# Patient Record
Sex: FEMALE | Race: WHITE | Employment: OTHER | URBAN - METROPOLITAN AREA
[De-identification: names, ages, dates, MRNs, and addresses within clinical notes are randomized per-mention and may not be internally consistent; named-entity substitution may affect disease eponyms.]

---

## 2017-02-21 ENCOUNTER — OFFICE VISIT (OUTPATIENT)
Dept: NEUROLOGY | Age: 77
End: 2017-02-21

## 2017-02-21 VITALS
WEIGHT: 150 LBS | HEART RATE: 84 BPM | BODY MASS INDEX: 26.58 KG/M2 | SYSTOLIC BLOOD PRESSURE: 122 MMHG | RESPIRATION RATE: 16 BRPM | DIASTOLIC BLOOD PRESSURE: 72 MMHG | HEIGHT: 63 IN | OXYGEN SATURATION: 98 %

## 2017-02-21 DIAGNOSIS — E05.90 HYPERTHYROIDISM: ICD-10-CM

## 2017-02-21 DIAGNOSIS — G44.229 CHRONIC TENSION-TYPE HEADACHE, NOT INTRACTABLE: ICD-10-CM

## 2017-02-21 DIAGNOSIS — G25.0 BENIGN ESSENTIAL TREMOR SYNDROME: Primary | ICD-10-CM

## 2017-02-21 DIAGNOSIS — G44.209 TENSION VASCULAR HEADACHE: ICD-10-CM

## 2017-02-21 RX ORDER — GABAPENTIN 100 MG/1
100 CAPSULE ORAL
Qty: 100 CAP | Refills: 11 | Status: SHIPPED | OUTPATIENT
Start: 2017-02-21 | End: 2017-08-31

## 2017-02-21 NOTE — LETTER
2/21/2017 12:23 PM 
 
Patient:  Destin Feliz YOB: 1940 Date of Visit: 2/21/2017 Dear No Recipients: Thank you for referring Ms. Destin Feliz to me for evaluation/treatment. Below are the relevant portions of my assessment and plan of care. Consult REFERRED BY: 
Liz Jang MD 
 
CHIEF COMPLAINT: Progressive tremors and headaches Subjective: Destin Feliz is a 77 y.o. right-handed  female seen today as a new patient to me for evaluation of new problem of progressive tremors over the last 1-1/2-2 years, and progressive headaches in the right temporal and periorbital region for the last 2 months at the request of Dr. Jenae Pinzon. Patient states the tremor seems to be worse when she is doing things like holding a plate or holding a cup of liquid, gets worse when she is stressed, anxious, tired, fatigued, and sometimes affect her ability to write. She has no family history of similar problems. She does not drink alcohol also does not know any relation to alcohol. Has been improved with taking clonazepam 1 mg twice a day. She has not tried any other medications. She complains of headache in the right temporal region that are becoming increasingly severe and almost daily and chronic, and sometimes can be more severe. She banged her head several times in the right frontal area and thinks that may have caused the headaches. She is having some difficulty functioning because the headaches can be more severe. She has been under increased stress and tension due to the illness of her  who just had a shunt inserted in his brain because of normal pressure hydrocephalus. She also had left shoulder replacement this year. She has chronic neck pain, chronic back pain, has had a cervical laminectomy from C5-C7 2016, and tube lumbar laminectomies done in 2005 and 2007 with chronic low back pain ever since.   She also has chronic numbness in her legs ever since the back surgery. She also had bilateral parotid gland removal because of painful salivation last year. She also had a history of hallucinations 3-1/2 years ago of unclear cause it seemed to resolve on its own, mainly with the medication of clonazepam.  She described herself as being anxious and very nervous. She has had all these medical problems recently and feels more stressed out. Again no family history of tremors. She has had no fever, no meningismus, no other precipitating cause for the headaches, except for the trauma as mentioned above which was associated with a dazed state after banging her head but did not lose consciousness. She had an MRI of her brain in 2010 that was normal, and a CT of the head in 2010 that was normal.  She has had no recent imaging of the brain. Past Medical History Diagnosis Date  Anemia   
  has had iron infusions; Hem/onc Dr Pia Tyson [phone]  Anxiety and depression  GERD (gastroesophageal reflux disease)  Hypercholesteremia  Hypertension  Limited peripheral vision of right eye   
  complication of cataract surgery  Meniere disease  Monoclonal gammopathy of unknown significance   
  hem/onc:  Dr Pia Tyson [phone]  Osteoporosis  Psoriasis   
  bilateral Legs  Restless leg  Skin melanoma ([facility] [address] 2012 (Rt ankle) and basal cell (Lt eye) removed  Transient ischemic attack approx 2005 HAS HAD 2 MINISTROKES-NO DEFICITS; neuro Dr Christie Fox (cc)  Unspecified adverse effect of anesthesia HAS NEEDED PORT OR CUTDOWN FOR RECVNG BLOOD OR LONG-TERM IVs; surgery 7/28/15 w/o any difficulty  Unspecified sleep apnea USES CPAP Past Surgical History Procedure Laterality Date  Hx cholecystectomy  2000  Hx appendectomy  Hx hysterectomy  Hx orthopaedic  2009 & 2011 LUMBAR FUSION and revision  Hx thyroidectomy Right  Hx other surgical  2010 MELANOMA (Rt ankle) and basal cell (Lt eye) removed; q6mo ck by DERMATOLOGY  Hx gi  2010  
  liver bx due to liver Bx due to elevated enzymes, liver laceration during Bx, hospitalized 10-12d  Hx cervical fusion  1/28/15 C5-7  
 Hx cataract removal  2011 Right  Hx heent  7/28/15 Lt total parotidectomy with facial nerve dissection: Dr Armen Wharton  Hx heent  11/2015 Right parotidectomy  Pr colon ca scrn not hi rsk ind  1/27/2016  Hx shoulder replacement Left 08/01/2016 Family History Problem Relation Age of Onset  Alcohol abuse Father  Emphysema Father  Cancer Sister PANCREATIC  Alcohol abuse Brother  COPD Brother Keenan Elysia Other Mother UNKNOWN CAUSE OF DEATH  
 Heart Disease Mother  Anesth Problems Neg Hx Social History Substance Use Topics  Smoking status: Former Smoker Years: 15.00 Quit date: 7/15/1975  Smokeless tobacco: Never Used  Alcohol use Yes Comment: may have a glass of wine on rare occasion Current Outpatient Prescriptions:  
  gabapentin (NEURONTIN) 100 mg capsule, Take 1 Cap by mouth three (3) times daily as needed. , Disp: 100 Cap, Rfl: 11 
  FOLIC ACID/MULTIVIT-MIN/LUTEIN (CENTRUM SILVER PO), Take  by mouth daily. , Disp: , Rfl:  
  clopidogrel (PLAVIX) 75 mg tablet, Take 75 mg by mouth daily. , Disp: , Rfl:  
  escitalopram oxalate (LEXAPRO) 20 mg tablet, Take 20 mg by mouth nightly. Indications: ANXIETY WITH DEPRESSION, Disp: , Rfl:  
  simvastatin (ZOCOR) 10 mg tablet, Take  by mouth nightly. Indications: HYPERCHOLESTEROLEMIA, Disp: , Rfl:  
  pantoprazole (PROTONIX) 40 mg tablet, Take 40 mg by mouth every morning. Indications: GASTROESOPHAGEAL REFLUX, Disp: , Rfl:  
  verapamil SR (CALAN-SR) 120 mg CR tablet, Take 120 mg by mouth nightly. Indications: HYPERTENSION, Disp: , Rfl:  
  ferrous sulfate 325 mg (65 mg iron) CpER, Take  by mouth daily. , Disp: , Rfl:  
   clonazepam (KLONOPIN) 0.5 mg tablet, Take 0.5 mg by mouth two (2) times a day. Indications: PANIC DISORDER, Disp: , Rfl:  
  NORTRIPTYLINE HCL (NORTRIPTYLINE PO), Take 10 mg by mouth two (2) times a day., Disp: , Rfl:  
  ROPINIROLE HCL (REQUIP PO), Take 1 mg by mouth nightly., Disp: , Rfl:  
  polyethylene glycol (MIRALAX) 17 gram packet, Take 17 g by mouth every morning. Pt puts in her coffee, Disp: , Rfl:  
 
 
 
Allergies Allergen Reactions  Aspirin Nausea and Vomiting  Bee Sting [Sting, Bee] Hives and Shortness of Breath  
  WASP STING  
 Codeine Nausea and Vomiting  Hydrocodone Rash And sedation  Morphine Rash  Oxycodone Rash On her back  Penicillins Hives, Shortness of Breath and Swelling Review of Systems: A comprehensive review of systems was negative except for: Constitutional: positive for fatigue and malaise Musculoskeletal: positive for myalgias, arthralgias, stiff joints, neck pain, back pain and muscle weakness Neurological: positive for headaches, paresthesia and tremor Behvioral/Psych: positive for anxiety and depression Vitals:  
 02/21/17 1112 BP: 122/72 Pulse: 84 Resp: 16 SpO2: 98% Weight: 150 lb (68 kg) Height: 5' 3\" (1.6 m) Objective: I 
 
 
NEUROLOGICAL EXAM: 
 
Appearance: The patient is well developed, well nourished, provides a coherent history and is in no acute distress. Mental Status: Oriented to time, place and person, and the president, cognitive function is normal and speech is fluent and no aphasia or dysarthria. Mood and affect appropriate but anxious and depressed . Cranial Nerves:   Intact visual fields. Fundi are benign. Pupils react normally on the left, but the right pupil is scarred and fundus poorly seen and she has decreased visual acuity there., EOM's full, no nystagmus, no ptosis. Facial sensation is normal. Corneal reflexes are not tested. Facial movement is symmetric. Hearing is abnormal bilaterally. Palate is midline with normal sternocleidomastoid and trapezius muscles are normal. Tongue is midline. Neck without meningismus or bruits Neck with limited mobility secondary to her previous surgery Temporal arteries are not tender or enlarged Motor:  4/5 strength in upper and lower proximal and distal muscles. Normal bulk and tone, with perhaps a slight increase in cogwheeling and rigidity in the right upper extremity. No fasciculations. Reflexes:   Deep tendon reflexes 1+/4 and symmetrical. 
No babinski or clonus present Sensory:   Normal to touch, pinprick and vibration and temperature decreased in both feet. DSS is intact Gait:  Abnormal gait with patient moving slowly, and limited mobility of the lumbar spine secondary to her previous back operations. Patient may have decreased arm swing in the right upper extremity Tremor:   Minimal bilateral intention tremor noted. Cerebellar:  No cerebellar signs present. Neurovascular:  Normal heart sounds and regular rhythm, peripheral pulses decreased, and no carotid bruits. Assessment: ICD-10-CM ICD-9-CM 1. Benign essential tremor syndrome G25.0 333.1 T3 TOTAL  
   T4  
   TSH 3RD GENERATION  
   SED RATE (ESR) CBC WITH AUTOMATED DIFF PATTIE COMPREHENSIVE PLUS PANEL  
   MRI BRAIN W WO CONT  
   gabapentin (NEURONTIN) 100 mg capsule 2. Chronic tension-type headache, not intractable G44.229 339.12 T3 TOTAL  
   T4  
   TSH 3RD GENERATION  
   SED RATE (ESR) CBC WITH AUTOMATED DIFF PATTIE COMPREHENSIVE PLUS PANEL  
   MRI BRAIN W WO CONT  
   gabapentin (NEURONTIN) 100 mg capsule 3. Tension vascular headache G44.209 307.81 T3 TOTAL  
   T4  
   TSH 3RD GENERATION  
   SED RATE (ESR) CBC WITH AUTOMATED DIFF PATTIE COMPREHENSIVE PLUS PANEL  
   MRI BRAIN W WO CONT  
   gabapentin (NEURONTIN) 100 mg capsule 4. Hyperthyroidism E05.90 242.90 T3 TOTAL  
   T4  
   TSH 3RD GENERATION  
   SED RATE (ESR) CBC WITH AUTOMATED DIFF PATTIE COMPREHENSIVE PLUS PANEL  
   MRI BRAIN W WO CONT  
   gabapentin (NEURONTIN) 100 mg capsule Plan:  
 
Patient with a history that suggest benign essential tremor, but very little evidence on exam today We will check her thyroid test and MRI of the brain in view of the associated headaches also to rule out structural brain lesion, rule out tumor, rule out subdural, rule out other neurologic causes. We will also try her on Neurontin 100 mg as needed 3 times a day for the tremor. She does not have persistent tremors so Mysoline on a daily basis does not sound like a good treatment. She will continue the Klonopin in the meantime She will check a MRI of the brain to rule out other causes of her tremor and severe headache, and also get a sed rate and PATTIE to rule out temporal arteritis as a cause of her right temporal headache She will check my chart for results of her test, and call us with any question. Further treatment and evaluation will depend on the results of her tests and her clinical course. Signed By: Soco Shin MD   
 February 21, 2017 CC: Micheal Barclay MD 
FAX: [phone] This note will not be viewable in [address] If you have questions, please do not hesitate to call me. I look forward to following Ms. Erickson Franks along with you. Sincerely, Soco Shin MD

## 2017-02-21 NOTE — PATIENT INSTRUCTIONS

## 2017-02-21 NOTE — PROGRESS NOTES
Consult  REFERRED BY:  Alonso Prasad MD    CHIEF COMPLAINT: Progressive tremors and headaches      Subjective: Km Loo is a 77 y.o. right-handed  female seen today as a new patient to me for evaluation of new problem of progressive tremors over the last 1-1/2-2 years, and progressive headaches in the right temporal and periorbital region for the last 2 months at the request of Dr. Tolu Barrios. Patient states the tremor seems to be worse when she is doing things like holding a plate or holding a cup of liquid, gets worse when she is stressed, anxious, tired, fatigued, and sometimes affect her ability to write. She has no family history of similar problems. She does not drink alcohol also does not know any relation to alcohol. Has been improved with taking clonazepam 1 mg twice a day. She has not tried any other medications. She complains of headache in the right temporal region that are becoming increasingly severe and almost daily and chronic, and sometimes can be more severe. She banged her head several times in the right frontal area and thinks that may have caused the headaches. She is having some difficulty functioning because the headaches can be more severe. She has been under increased stress and tension due to the illness of her  who just had a shunt inserted in his brain because of normal pressure hydrocephalus. She also had left shoulder replacement this year. She has chronic neck pain, chronic back pain, has had a cervical laminectomy from C5-C7 2016, and tube lumbar laminectomies done in 2005 and 2007 with chronic low back pain ever since. She also has chronic numbness in her legs ever since the back surgery. She also had bilateral parotid gland removal because of painful salivation last year.   She also had a history of hallucinations 3-1/2 years ago of unclear cause it seemed to resolve on its own, mainly with the medication of clonazepam.  She described herself as being anxious and very nervous. She has had all these medical problems recently and feels more stressed out. Again no family history of tremors. She has had no fever, no meningismus, no other precipitating cause for the headaches, except for the trauma as mentioned above which was associated with a dazed state after banging her head but did not lose consciousness. She had an MRI of her brain in 2010 that was normal, and a CT of the head in 2010 that was normal.  She has had no recent imaging of the brain. Past Medical History   Diagnosis Date    Anemia      has had iron infusions;  Hem/onc Dr Ifeoma Colvin [phone]    Anxiety and depression     GERD (gastroesophageal reflux disease)     Hypercholesteremia     Hypertension     Limited peripheral vision of right eye      complication of cataract surgery    Meniere disease     Monoclonal gammopathy of unknown significance      hem/onc:  Dr Ifeoma Colvin [phone]    Osteoporosis     Psoriasis      bilateral Legs    Restless leg     Skin melanoma ([facility] [address] 2012     (Rt ankle) and basal cell (Lt eye) removed    Transient ischemic attack approx 2005     HAS HAD 2 MINISTROKES-NO DEFICITS; neuro Dr Jerilyn Ng (cc)    Unspecified adverse effect of anesthesia      HAS NEEDED PORT OR CUTDOWN FOR RECVNG BLOOD OR LONG-TERM IVs; surgery 7/28/15 w/o any difficulty    Unspecified sleep apnea      USES CPAP      Past Surgical History   Procedure Laterality Date    Hx cholecystectomy  2000    Hx appendectomy      Hx hysterectomy      Hx orthopaedic  2009 & 2011     LUMBAR FUSION and revision    Hx thyroidectomy       Right    Hx other surgical  2010     MELANOMA (Rt ankle) and basal cell (Lt eye) removed; q6mo ck by DERMATOLOGY    Hx gi  2010     liver bx due to liver Bx due to elevated enzymes, liver laceration during Bx, hospitalized 10-12d    Hx cervical fusion  1/28/15     C5-7    Hx cataract removal  2011     Right    Hx heent  7/28/15     Lt total parotidectomy with facial nerve dissection: Dr Franchesca Hameed Hx heent  11/2015     Right parotidectomy    Pr colon ca scrn not hi rsk ind  1/27/2016          Hx shoulder replacement Left 08/01/2016     Family History   Problem Relation Age of Onset    Alcohol abuse Father     Emphysema Father     Cancer Sister      PANCREATIC    Alcohol abuse Brother     COPD Brother     Other Mother      UNKNOWN CAUSE OF DEATH    Heart Disease Mother     Anesth Problems Neg Hx       Social History   Substance Use Topics    Smoking status: Former Smoker     Years: 15.00     Quit date: 7/15/1975    Smokeless tobacco: Never Used    Alcohol use Yes      Comment: may have a glass of wine on rare occasion         Current Outpatient Prescriptions:     gabapentin (NEURONTIN) 100 mg capsule, Take 1 Cap by mouth three (3) times daily as needed. , Disp: 100 Cap, Rfl: 11    FOLIC ACID/MULTIVIT-MIN/LUTEIN (CENTRUM SILVER PO), Take  by mouth daily. , Disp: , Rfl:     clopidogrel (PLAVIX) 75 mg tablet, Take 75 mg by mouth daily. , Disp: , Rfl:     escitalopram oxalate (LEXAPRO) 20 mg tablet, Take 20 mg by mouth nightly. Indications: ANXIETY WITH DEPRESSION, Disp: , Rfl:     simvastatin (ZOCOR) 10 mg tablet, Take  by mouth nightly. Indications: HYPERCHOLESTEROLEMIA, Disp: , Rfl:     pantoprazole (PROTONIX) 40 mg tablet, Take 40 mg by mouth every morning. Indications: GASTROESOPHAGEAL REFLUX, Disp: , Rfl:     verapamil SR (CALAN-SR) 120 mg CR tablet, Take 120 mg by mouth nightly. Indications: HYPERTENSION, Disp: , Rfl:     ferrous sulfate 325 mg (65 mg iron) CpER, Take  by mouth daily. , Disp: , Rfl:     clonazepam (KLONOPIN) 0.5 mg tablet, Take 0.5 mg by mouth two (2) times a day.  Indications: PANIC DISORDER, Disp: , Rfl:     NORTRIPTYLINE HCL (NORTRIPTYLINE PO), Take 10 mg by mouth two (2) times a day., Disp: , Rfl:     ROPINIROLE HCL (REQUIP PO), Take 1 mg by mouth nightly., Disp: , Rfl:     polyethylene glycol (MIRALAX) 17 gram packet, Take 17 g by mouth every morning. Pt puts in her coffee, Disp: , Rfl:         Allergies   Allergen Reactions    Aspirin Nausea and Vomiting    Bee Sting [Sting, Bee] Hives and Shortness of Breath     WASP STING    Codeine Nausea and Vomiting    Hydrocodone Rash     And sedation    Morphine Rash    Oxycodone Rash     On her back    Penicillins Hives, Shortness of Breath and Swelling        Review of Systems:  A comprehensive review of systems was negative except for: Constitutional: positive for fatigue and malaise  Musculoskeletal: positive for myalgias, arthralgias, stiff joints, neck pain, back pain and muscle weakness  Neurological: positive for headaches, paresthesia and tremor  Behvioral/Psych: positive for anxiety and depression   Vitals:    02/21/17 1112   BP: 122/72   Pulse: 84   Resp: 16   SpO2: 98%   Weight: 150 lb (68 kg)   Height: 5' 3\" (1.6 m)     Objective:     I      NEUROLOGICAL EXAM:    Appearance: The patient is well developed, well nourished, provides a coherent history and is in no acute distress. Mental Status: Oriented to time, place and person, and the president, cognitive function is normal and speech is fluent and no aphasia or dysarthria. Mood and affect appropriate but anxious and depressed . Cranial Nerves:   Intact visual fields. Fundi are benign. Pupils react normally on the left, but the right pupil is scarred and fundus poorly seen and she has decreased visual acuity there., EOM's full, no nystagmus, no ptosis. Facial sensation is normal. Corneal reflexes are not tested. Facial movement is symmetric. Hearing is abnormal bilaterally. Palate is midline with normal sternocleidomastoid and trapezius muscles are normal. Tongue is midline. Neck without meningismus or bruits  Neck with limited mobility secondary to her previous surgery  Temporal arteries are not tender or enlarged    Motor:  4/5 strength in upper and lower proximal and distal muscles.  Normal bulk and tone, with perhaps a slight increase in cogwheeling and rigidity in the right upper extremity. No fasciculations. Reflexes:   Deep tendon reflexes 1+/4 and symmetrical.  No babinski or clonus present   Sensory:   Normal to touch, pinprick and vibration and temperature decreased in both feet. DSS is intact   Gait:  Abnormal gait with patient moving slowly, and limited mobility of the lumbar spine secondary to her previous back operations. Patient may have decreased arm swing in the right upper extremity    Tremor:   Minimal bilateral intention tremor noted. Cerebellar:  No cerebellar signs present. Neurovascular:  Normal heart sounds and regular rhythm, peripheral pulses decreased, and no carotid bruits. Assessment:       ICD-10-CM ICD-9-CM    1. Benign essential tremor syndrome G25.0 333.1 T3 TOTAL      T4      TSH 3RD GENERATION      SED RATE (ESR)      CBC WITH AUTOMATED DIFF      PATTIE COMPREHENSIVE PLUS PANEL      MRI BRAIN W WO CONT      gabapentin (NEURONTIN) 100 mg capsule   2. Chronic tension-type headache, not intractable G44.229 339.12 T3 TOTAL      T4      TSH 3RD GENERATION      SED RATE (ESR)      CBC WITH AUTOMATED DIFF      PATTIE COMPREHENSIVE PLUS PANEL      MRI BRAIN W WO CONT      gabapentin (NEURONTIN) 100 mg capsule   3. Tension vascular headache G44.209 307.81 T3 TOTAL      T4      TSH 3RD GENERATION      SED RATE (ESR)      CBC WITH AUTOMATED DIFF      PATTIE COMPREHENSIVE PLUS PANEL      MRI BRAIN W WO CONT      gabapentin (NEURONTIN) 100 mg capsule   4.  Hyperthyroidism E05.90 242.90 T3 TOTAL      T4      TSH 3RD GENERATION      SED RATE (ESR)      CBC WITH AUTOMATED DIFF      PATTIE COMPREHENSIVE PLUS PANEL      MRI BRAIN W WO CONT      gabapentin (NEURONTIN) 100 mg capsule         Plan:     Patient with a history that suggest benign essential tremor, but very little evidence on exam today  We will check her thyroid test and MRI of the brain in view of the associated headaches also to rule out structural brain lesion, rule out tumor, rule out subdural, rule out other neurologic causes. We will also try her on Neurontin 100 mg as needed 3 times a day for the tremor. She does not have persistent tremors so Mysoline on a daily basis does not sound like a good treatment. She will continue the Klonopin in the meantime  She will check a MRI of the brain to rule out other causes of her tremor and severe headache, and also get a sed rate and PATTIE to rule out temporal arteritis as a cause of her right temporal headache  She will check my chart for results of her test, and call us with any question. Further treatment and evaluation will depend on the results of her tests and her clinical course. Signed By: Linsday Neff MD     February 21, 2017       CC: Cedrick Mcnair MD  FAX: [phone]    This note will not be viewable in [address]

## 2017-02-21 NOTE — MR AVS SNAPSHOT
Visit Information Date & Time Provider Department Dept. Phone Encounter #  
 2/21/2017 11:00 AM Coleman Schmidt MD [facility]rology [facility] at [facility] [phone] [id number] Follow-up Instructions Return in about 6 months (around 8/21/2017). Upcoming Health Maintenance Date Due DTaP/Tdap/Td series (1 - Tdap) 9/23/1962 ZOSTER VACCINE AGE 60> 9/23/2001 GLAUCOMA SCREENING Q2Y 9/23/2006 Pneumococcal 65+ High/Highest Risk (1 of 2 - PCV13) 9/23/2006 MEDICARE YEARLY EXAM 9/23/2006 INFLUENZA AGE 9 TO ADULT 8/1/2016 Allergies as of 2/21/2017  Review Complete On: 2/21/2017 By: Coleman Schmidt MD  
  
 Severity Noted Reaction Type Reactions Aspirin  10/10/2013   Intolerance Nausea and Vomiting Bee Sting [Sting, Bee]  07/15/2011    Hives, Shortness of Breath  
 WASP STING Codeine  07/15/2011    Nausea and Vomiting Hydrocodone  08/31/2016    Rash And sedation Morphine  07/15/2011    Rash Oxycodone  08/31/2016    Rash On her back Penicillins  07/15/2011    Hives, Shortness of Breath, Swelling Current Immunizations  Reviewed on 1/28/2015 No immunizations on file. Not reviewed this visit You Were Diagnosed With   
  
 Codes Comments Benign essential tremor syndrome    -  Primary ICD-10-CM: G25.0 ICD-9-CM: 333.1 Chronic tension-type headache, not intractable     ICD-10-CM: C42.181 ICD-9-CM: 339.12 Tension vascular headache     ICD-10-CM: G44.209 ICD-9-CM: 307.81 Hyperthyroidism     ICD-10-CM: E05.90 ICD-9-CM: 242.90 Vitals BP Pulse Resp Height(growth percentile) Weight(growth percentile) SpO2  
 122/72 84 16 5' 3\" (1.6 m) 150 lb (68 kg) 98% BMI OB Status Smoking Status 26.57 kg/m2 Hysterectomy Former Smoker Vitals History BMI and BSA Data Body Mass Index Body Surface Area  
 26.57 kg/m 2 1.74 m 2 Preferred Pharmacy Pharmacy Name Phone [address] [phone] Your Updated Medication List  
  
   
This list is accurate as of: 2/21/17 11:51 AM.  Always use your most recent med list.  
  
  
  
  
 CENTRUM SILVER PO Take  by mouth daily. clonazePAM 0.5 mg tablet Commonly known as:  Pacheco Aw Take 0.5 mg by mouth two (2) times a day. Indications: PANIC DISORDER  
  
 clopidogrel 75 mg Tab Commonly known as:  PLAVIX Take 75 mg by mouth daily. ferrous sulfate 325 mg (65 mg iron) Cper Take  by mouth daily. gabapentin 100 mg capsule Commonly known as:  NEURONTIN Take 1 Cap by mouth three (3) times daily as needed. LEXAPRO 20 mg tablet Generic drug:  escitalopram oxalate Take 20 mg by mouth nightly. Indications: ANXIETY WITH DEPRESSION  
  
 MIRALAX 17 gram packet Generic drug:  polyethylene glycol Take 17 g by mouth every morning. Pt puts in her coffee NORTRIPTYLINE PO Take 10 mg by mouth two (2) times a day. PROTONIX 40 mg tablet Generic drug:  pantoprazole Take 40 mg by mouth every morning. Indications: GASTROESOPHAGEAL REFLUX  
  
 REQUIP PO Take 1 mg by mouth nightly. simvastatin 10 mg tablet Commonly known as:  ZOCOR Take  by mouth nightly. Indications: HYPERCHOLESTEROLEMIA  
  
 verapamil  mg tablet Commonly known as:  CALAN-SR Take 120 mg by mouth nightly. Indications: HYPERTENSION Prescriptions Sent to Pharmacy Refills  
 gabapentin (NEURONTIN) 100 mg capsule 11 Sig: Take 1 Cap by mouth three (3) times daily as needed. Class: Normal  
 Pharmacy: [address] Ph #: [phone] Route: Oral  
  
We Performed the Following PATTIE COMPREHENSIVE PLUS PANEL [[id number] Custom] CBC WITH AUTOMATED DIFF [64874 CPT(R)] SED RATE (ESR) [id number] CPT(R)]   
 T3 TOTAL [42863 CPT(R)] T4 [id number] CPT(R)] TSH 3RD GENERATION [58037 CPT(R)] Follow-up Instructions Return in about 6 months (around 8/21/2017). To-Do List   
 02/28/2017 Imaging:  MRI BRAIN W WO CONT Patient Instructions A Healthy Lifestyle: Care Instructions Your Care Instructions A healthy lifestyle can help you feel good, stay at a healthy weight, and have plenty of energy for both work and play. A healthy lifestyle is something you can share with your whole family. A healthy lifestyle also can lower your risk for serious health problems, such as high blood pressure, heart disease, and diabetes. You can follow a few steps listed below to improve your health and the health of your family. Follow-up care is a key part of your treatment and safety. Be sure to make and go to all appointments, and call your doctor if you are having problems. Its also a good idea to know your test results and keep a list of the medicines you take. How can you care for yourself at home? · Do not eat too much sugar, fat, or fast foods. You can still have dessert and treats now and then. The goal is moderation. · Start small to improve your eating habits. Pay attention to portion sizes, drink less juice and soda pop, and eat more fruits and vegetables. ¨ Eat a healthy amount of food. A 3-ounce serving of meat, for example, is about the size of a deck of cards. Fill the rest of your plate with vegetables and whole grains. ¨ Limit the amount of soda and sports drinks you have every day. Drink more water when you are thirsty. ¨ Eat at least 5 servings of fruits and vegetables every day. It may seem like a lot, but it is not hard to reach this goal. A serving or helping is 1 piece of fruit, 1 cup of vegetables, or 2 cups of leafy, raw vegetables. Have an apple or some carrot sticks as an afternoon snack instead of a candy bar.  Try to have fruits and/or vegetables at every meal. 
 · Make exercise part of your daily routine. You may want to start with simple activities, such as walking, bicycling, or slow swimming. Try to be active 30 to 60 minutes every day. You do not need to do all 30 to 60 minutes all at once. For example, you can exercise 3 times a day for 10 or 20 minutes. Moderate exercise is safe for most people, but it is always a good idea to talk to your doctor before starting an exercise program. 
· Keep moving. [place] Kra[place] the lawn, work in the garden, or [employer]. Take the stairs instead of the elevator at work. · If you smoke, quit. People who smoke have an increased risk for heart attack, stroke, cancer, and other lung illnesses. Quitting is hard, but there are ways to boost your chance of quitting tobacco for good. ¨ Use nicotine gum, patches, or lozenges. ¨ Ask your doctor about stop-smoking programs and medicines. ¨ Keep trying. In addition to reducing your risk of diseases in the future, you will notice some benefits soon after you stop using tobacco. If you have shortness of breath or asthma symptoms, they will likely get better within a few weeks after you quit. · Limit how much alcohol you drink. Moderate amounts of alcohol (up to 2 drinks a day for men, 1 drink a day for women) are okay. But drinking too much can lead to liver problems, high blood pressure, and other health problems. Family health If you have a family, there are many things you can do together to improve your health. · Eat meals together as a family as often as possible. · Eat healthy foods. This includes fruits, vegetables, lean meats and dairy, and whole grains. · Include your family in your fitness plan. Most people think of activities such as jogging or tennis as the way to fitness, but there are many ways you and your family can be more active. Anything that makes you breathe hard and gets your heart pumping is exercise. Here are some tips: ¨ Walk to do errands or to take your child to school or the bus. ¨ Go for a family bike ride after dinner instead of watching TV. Where can you learn more? Go to http://trae-joseline.info/. Enter V898 in the search box to learn more about \"A Healthy Lifestyle: Care Instructions. \" Current as of: July 26, 2016 Content Version: 11.1 © 0827-1212 [employer]. Care instructions adapted under license by [employer] (which disclaims liability or warranty for this information). If you have questions about a medical condition or this instruction, always ask your healthcare professional. [address] any warranty or liability for your use of this information. Introducing [facility]! Nimesh Leyva introduces [employer] patient portal. Now you can access parts of your medical record, email your doctor's office, and request medication refills online. 1. In your internet browser, go to https://[employer]. Send the Trend[employer] 2. Click on the First Time User? Click Here link in the Sign In box. You will see the New Member Sign Up page. 3. Enter your [employer] Access Code exactly as it appears below. You will not need to use this code after youve completed the sign-up process. If you do not sign up before the expiration date, you must request a new code. · [employer] Access Code: [email or web address] Expires: 5/22/2017 11:04 AM 
 
4. Enter the last four digits of your Social Security Number (xxxx) and Date of Birth (mm/dd/yyyy) as indicated and click Submit. You will be taken to the next sign-up page. 5. Create a Rating[employer]t ID. This will be your [employer] login ID and cannot be changed, so think of one that is secure and easy to remember. 6. Create a [employer] password. You can change your password at any time. 7. Enter your Password Reset Question and Answer. This can be used at a later time if you forget your password. 8. Enter your e-mail address. You will receive e-mail notification when new information is available in [address] 9. Click Sign Up. You can now view and download portions of your medical record. 10. Click the Download Summary menu link to download a portable copy of your medical information. If you have questions, please visit the Frequently Asked Questions section of the [facility] website. Remember, [facility] is NOT to be used for urgent needs. For medical emergencies, dial 911. Now available from your iPhone and Android! Please provide this summary of care documentation to your next provider. Your primary care clinician is listed as 535Megan Kaplan. If you have any questions after today's visit, please call [phone].

## 2017-03-02 ENCOUNTER — HOSPITAL ENCOUNTER (OUTPATIENT)
Dept: MRI IMAGING | Age: 77
Discharge: HOME | End: 2017-03-02
Attending: PSYCHIATRY & NEUROLOGY
Payer: MEDICARE

## 2017-03-02 DIAGNOSIS — G44.229 CHRONIC TENSION-TYPE HEADACHE, NOT INTRACTABLE: ICD-10-CM

## 2017-03-02 DIAGNOSIS — G25.0 BENIGN ESSENTIAL TREMOR SYNDROME: ICD-10-CM

## 2017-03-02 DIAGNOSIS — G44.209 TENSION VASCULAR HEADACHE: ICD-10-CM

## 2017-03-02 DIAGNOSIS — E05.90 HYPERTHYROIDISM: ICD-10-CM

## 2017-03-02 LAB — CREAT BLD-MCNC: 0.8 MG/DL (ref 0.6–1.3)

## 2017-03-02 PROCEDURE — 74011250636 HC RX REV CODE- 250/636: Performed by: PSYCHIATRY & NEUROLOGY

## 2017-03-02 PROCEDURE — A9585 GADOBUTROL INJECTION: HCPCS | Performed by: PSYCHIATRY & NEUROLOGY

## 2017-03-02 PROCEDURE — 82565 ASSAY OF CREATININE: CPT

## 2017-03-02 PROCEDURE — 70553 MRI BRAIN STEM W/O & W/DYE: CPT

## 2017-03-02 RX ADMIN — GADOBUTROL 7 ML: 604.72 INJECTION INTRAVENOUS at 16:21

## 2017-03-03 LAB
BASOPHILS # BLD AUTO: 0 X10E3/UL (ref 0–0.2)
BASOPHILS NFR BLD AUTO: 0 %
CENTROMERE B AB SER-ACNC: <0.2 AI (ref 0–0.9)
CHROMATIN AB SERPL-ACNC: <0.2 AI (ref 0–0.9)
DSDNA AB SER-ACNC: <1 IU/ML (ref 0–9)
ENA JO1 AB SER-ACNC: <0.2 AI (ref 0–0.9)
ENA RNP AB SER-ACNC: 0.4 AI (ref 0–0.9)
ENA SCL70 AB SER-ACNC: <0.2 AI (ref 0–0.9)
ENA SM AB SER-ACNC: <0.2 AI (ref 0–0.9)
ENA SM+RNP AB SER-ACNC: <0.2 AI (ref 0–0.9)
ENA SS-A AB SER-ACNC: <0.2 AI (ref 0–0.9)
ENA SS-B AB SER-ACNC: <0.2 AI (ref 0–0.9)
EOSINOPHIL # BLD AUTO: 0.1 X10E3/UL (ref 0–0.4)
EOSINOPHIL NFR BLD AUTO: 2 %
ERYTHROCYTE [DISTWIDTH] IN BLOOD BY AUTOMATED COUNT: 13.4 % (ref 12.3–15.4)
ERYTHROCYTE [SEDIMENTATION RATE] IN BLOOD BY WESTERGREN METHOD: 2 MM/HR (ref 0–40)
HCT VFR BLD AUTO: 38.3 % (ref 34–46.6)
HGB BLD-MCNC: 12.6 G/DL (ref 11.1–15.9)
IMM GRANULOCYTES # BLD: 0 X10E3/UL (ref 0–0.1)
IMM GRANULOCYTES NFR BLD: 0 %
LYMPHOCYTES # BLD AUTO: 1.5 X10E3/UL (ref 0.7–3.1)
LYMPHOCYTES NFR BLD AUTO: 28 %
MCH RBC QN AUTO: 30.7 PG (ref 26.6–33)
MCHC RBC AUTO-ENTMCNC: 32.9 G/DL (ref 31.5–35.7)
MCV RBC AUTO: 93 FL (ref 79–97)
MONOCYTES # BLD AUTO: 0.5 X10E3/UL (ref 0.1–0.9)
MONOCYTES NFR BLD AUTO: 8 %
NEUTROPHILS # BLD AUTO: 3.4 X10E3/UL (ref 1.4–7)
NEUTROPHILS NFR BLD AUTO: 62 %
PLATELET # BLD AUTO: 283 X10E3/UL (ref 150–379)
RBC # BLD AUTO: 4.11 X10E6/UL (ref 3.77–5.28)
RIBOSOMAL P AB SER-ACNC: <0.2 AI (ref 0–0.9)
SEE BELOW:, 164879: NORMAL
T3 SERPL-MCNC: 109 NG/DL (ref 71–180)
T4 SERPL-MCNC: 6.4 UG/DL (ref 4.5–12)
TSH SERPL DL<=0.005 MIU/L-ACNC: 1.93 UIU/ML (ref 0.45–4.5)
WBC # BLD AUTO: 5.5 X10E3/UL (ref 3.4–10.8)

## 2017-03-13 DIAGNOSIS — I67.89 CEREBRAL MICROVASCULAR DISEASE: ICD-10-CM

## 2017-03-13 DIAGNOSIS — I65.23 STENOSIS OF BOTH INTERNAL CAROTID ARTERIES: Primary | ICD-10-CM

## 2017-03-30 ENCOUNTER — HOSPITAL ENCOUNTER (OUTPATIENT)
Dept: MAMMOGRAPHY | Age: 77
Discharge: HOME | End: 2017-03-30
Attending: FAMILY MEDICINE
Payer: MEDICARE

## 2017-03-30 DIAGNOSIS — Z12.31 VISIT FOR SCREENING MAMMOGRAM: ICD-10-CM

## 2017-03-30 PROCEDURE — 77067 SCR MAMMO BI INCL CAD: CPT

## 2017-08-22 ENCOUNTER — OFFICE VISIT (OUTPATIENT)
Dept: NEUROLOGY | Age: 77
End: 2017-08-22

## 2017-08-22 VITALS
BODY MASS INDEX: 26.22 KG/M2 | HEIGHT: 63 IN | HEART RATE: 73 BPM | DIASTOLIC BLOOD PRESSURE: 70 MMHG | WEIGHT: 148 LBS | SYSTOLIC BLOOD PRESSURE: 130 MMHG | OXYGEN SATURATION: 98 %

## 2017-08-22 DIAGNOSIS — G44.209 TENSION VASCULAR HEADACHE: ICD-10-CM

## 2017-08-22 DIAGNOSIS — G25.0 BENIGN ESSENTIAL TREMOR SYNDROME: Primary | ICD-10-CM

## 2017-08-22 NOTE — PROGRESS NOTES
Date:             2017    Name:  Madeleine Be  :  1940  MRN:  [id number]     PCP:  Satish Cronin MD    Chief Complaint   Patient presents with    Follow-up    Tremors    Headache         HISTORY OF PRESENT ILLNESS:  Paul Thomas is a 77 y.o., female who presents today for follow up for headaches and essential tremor. When she was last seen in February, she was having severe headaches that were disruptive to her life. These have improved dramatically. She still gets a headache in her right temple a few times a week, but it is mild and she can usually get rid of it with Tylenol. She was started on gabapentin 100 mg 2-3 times a day for her tremor, has not seen much change and would like to discontinue it. Tremor is worst when she tries to read and tends to be more pronounced in her left arm but is also occasionally present in the right, it starts in the shoulder and travels down to her hand. It does not keep her from eating, if she puts the book down she is able to reach is fine. Sometimes when she is resting in bed her arm will start shaking. She had a total shoulder replacement on the left, but had the tremor before that. She has noticed the tremor for 2-3 years. Tremor is worst when she is holding something. It does not keeping her from eating or writing. She takes klonopin for anxiety, she is not sure that it makes much difference in her tremor but does plan to continue it for her anxiety. She keeps herself very busy, she works in the yard and cleans her large home. She admits that she is a little tired, but fatigue does not keep her from doing anything that she wants to do.      MRI Results (most recent):    Results from [place] encounter on 17   MRI BRAIN W WO CONT   Narrative INDICATION:  progressive headaches and tremors     COMPARISON:  2010    TECHNIQUE:  MR imaging of the brain was performed with sagittal T1, axial T1,  T2, FLAIR, GRE, DWI/ADC; pre and post contrast multiplanar T1 utilizing 7 mL  Gadavist.    FINDINGS:      Ventricles:  Midline, no hydrocephalus. Intracranial Hemorrhage:  None. Brain Parenchyma/Brainstem:  Several small foci of T2 hyperintense signal  supratentorial white matter suggests chronic small vessel ischemic changes. No  acute infarction. Basal Cisterns:  Normal.   Flow Voids:  Normal.  Post Contrast:  No abnormal parenchymal or meningeal enhancement. Additional Comments:  Fluid signal right mastoid. Partially visualized  postoperative changes cervical spine. Impression IMPRESSION:  Minimal supratentorial white matter disease likely due to chronic small vessel  ischemic changes, similar to the prior study. No acute intracranial abnormality. 2.21.2017 recap  Janny Lewis is a 77 y.o. right-handed  female seen today as a new patient to me for evaluation of new problem of progressive tremors over the last 1-1/2-2 years, and progressive headaches in the right temporal and periorbital region for the last 2 months at the request of Dr. Jesús Benoit. Patient states the tremor seems to be worse when she is doing things like holding a plate or holding a cup of liquid, gets worse when she is stressed, anxious, tired, fatigued, and sometimes affect her ability to write. She has no family history of similar problems. She does not drink alcohol also does not know any relation to alcohol. Has been improved with taking clonazepam 1 mg twice a day. She has not tried any other medications. She complains of headache in the right temporal region that are becoming increasingly severe and almost daily and chronic, and sometimes can be more severe. She banged her head several times in the right frontal area and thinks that may have caused the headaches. She is having some difficulty functioning because the headaches can be more severe.   She has been under increased stress and tension due to the illness of her  who just had a shunt inserted in his brain because of normal pressure hydrocephalus. She also had left shoulder replacement this year. She has chronic neck pain, chronic back pain, has had a cervical laminectomy from C5-C7 2016, and tube lumbar laminectomies done in 2005 and 2007 with chronic low back pain ever since. She also has chronic numbness in her legs ever since the back surgery. She also had bilateral parotid gland removal because of painful salivation last year. She also had a history of hallucinations 3-1/2 years ago of unclear cause it seemed to resolve on its own, mainly with the medication of clonazepam.  She described herself as being anxious and very nervous. She has had all these medical problems recently and feels more stressed out. Again no family history of tremors. She has had no fever, no meningismus, no other precipitating cause for the headaches, except for the trauma as mentioned above which was associated with a dazed state after banging her head but did not lose consciousness. She had an MRI of her brain in 2010 that was normal, and a CT of the head in 2010 that was normal.  She has had no recent imaging of the brain. Current Outpatient Prescriptions   Medication Sig    gabapentin (NEURONTIN) 100 mg capsule Take 1 Cap by mouth three (3) times daily as needed.  FOLIC ACID/MULTIVIT-MIN/LUTEIN (CENTRUM SILVER PO) Take  by mouth daily.  clopidogrel (PLAVIX) 75 mg tablet Take 75 mg by mouth daily.  escitalopram oxalate (LEXAPRO) 20 mg tablet Take 20 mg by mouth nightly. Indications: ANXIETY WITH DEPRESSION    simvastatin (ZOCOR) 10 mg tablet Take  by mouth nightly. Indications: HYPERCHOLESTEROLEMIA    pantoprazole (PROTONIX) 40 mg tablet Take 40 mg by mouth every morning. Indications: GASTROESOPHAGEAL REFLUX    verapamil SR (CALAN-SR) 120 mg CR tablet Take 120 mg by mouth nightly.  Indications: HYPERTENSION    ferrous sulfate 325 mg (65 mg iron) CpER Take by mouth daily.  clonazepam (KLONOPIN) 0.5 mg tablet Take 0.5 mg by mouth two (2) times a day. Indications: PANIC DISORDER    NORTRIPTYLINE HCL (NORTRIPTYLINE PO) Take 10 mg by mouth two (2) times a day.  ROPINIROLE HCL (REQUIP PO) Take 1 mg by mouth nightly.  polyethylene glycol (MIRALAX) 17 gram packet Take 17 g by mouth every morning. Pt puts in her coffee     No current facility-administered medications for this visit. Allergies   Allergen Reactions    Aspirin Nausea and Vomiting    Bee Sting [Sting, Bee] Hives and Shortness of Breath     WASP STING    Codeine Nausea and Vomiting    Hydrocodone Rash     And sedation    Morphine Rash    Oxycodone Rash     On her back    Penicillins Hives, Shortness of Breath and Swelling     Past Medical History:   Diagnosis Date    Anemia     has had iron infusions;  Hem/onc Dr Tatum Gallegos [phone]    Anxiety and depression     GERD (gastroesophageal reflux disease)     Hypercholesteremia     Hypertension     Limited peripheral vision of right eye     complication of cataract surgery    Meniere disease     Monoclonal gammopathy of unknown significance     hem/onc:  Dr Tatum Gallegos [phone]    Osteoporosis     Psoriasis     bilateral Legs    Restless leg     Skin melanoma ([facility] [address] 2012    (Rt ankle) and basal cell (Lt eye) removed    Transient ischemic attack approx 2005    HAS HAD 2 MINISTROKES-NO DEFICITS; neuro Dr Danielle Philip (cc)    Unspecified adverse effect of anesthesia     HAS NEEDED PORT OR CUTDOWN FOR RECVNG BLOOD OR LONG-TERM IVs; surgery 7/28/15 w/o any difficulty    Unspecified sleep apnea     USES CPAP     Past Surgical History:   Procedure Laterality Date    HX APPENDECTOMY      HX CATARACT REMOVAL  2011    Right    HX CERVICAL FUSION  1/28/15    C5-7    HX CHOLECYSTECTOMY  2000    HX GI  2010    liver bx due to liver Bx due to elevated enzymes, liver laceration during Bx, hospitalized 10-12d    HX HEENT  7/28/15    Lt total parotidectomy with facial nerve dissection: Dr Norton Payment HX HEENT  11/2015    Right parotidectomy    HX HYSTERECTOMY      HX ORTHOPAEDIC  2009 & 2011    LUMBAR FUSION and revision    HX OTHER SURGICAL  2010    MELANOMA (Rt ankle) and basal cell (Lt eye) removed; q6mo ck by DERMATOLOGY    HX SHOULDER REPLACEMENT Left 08/01/2016    HX THYROIDECTOMY      Right    [place] COLON CA SCRN NOT HI RSK IND  1/27/2016          Social History     Social History    Marital status:      Spouse name: N/A    Number of children: N/A    Years of education: N/A     Occupational History    Not on file. Social History Main Topics    Smoking status: Former Smoker     Years: 15.00     Quit date: 7/15/1975    Smokeless tobacco: Never Used    Alcohol use Yes      Comment: may have a glass of wine on rare occasion    Drug use: No    Sexual activity: Not on file     Other Topics Concern    Not on file     Social History Narrative    Lives in [place] with  of 62 years. Has 2 sons and 1 daughter. Used to work in a bank. Likes to garden and sew. Family History   Problem Relation Age of Onset    Alcohol abuse Father     Emphysema Father     Cancer Sister      PANCREATIC    Alcohol abuse Brother     COPD Brother     Other Mother      UNKNOWN CAUSE OF DEATH    Heart Disease Mother     Anesth Problems Neg Hx          PHYSICAL EXAMINATION:    Visit Vitals    /70    Pulse 73    Ht 5' 3\" (1.6 m)    Wt 148 lb (67.1 kg)    SpO2 98%    BMI 26.22 kg/m2     General:  Well defined, nourished, and groomed individual in no acute distress. Neck: Supple, nontender, no bruits, no pain with resistance to active range of motion. Heart: Regular rate and rhythm, no murmurs, rub, or gallop. Normal S1S2. Lungs:  Clear to auscultation bilaterally with equal chest expansion, no cough, no wheeze  Musculoskeletal:  Extremities revealed no edema and had full range of motion of joints.     Psych: Good mood and bright affect    NEUROLOGICAL EXAMINATION:     Mental Status:   Alert and oriented to person, place, and time with recent and remote memory intact. Attention span and concentration are normal. Speech is fluent with a full fund of knowledge. Cranial Nerves:    II, III, IV, VI:  Visual acuity grossly intact. Right pupil fixed, left pupil round, and reactive to light. Extra-ocular movements are full and fluid. No ptosis or nystagmus. V-XII: Hearing is grossly intact. Facial features are symmetric, with normal sensation and strength. The palate rises symmetrically and the tongue protrudes midline. Sternocleidomastoids 5/5. Motor Examination: Normal tone, bulk, and strength, 5/5 muscle strength throughout. No cogwheel rigidity  Coordination:  Finger to nose testing was normal.   No resting tremor, bilateral hand intention tremor worse in the left  Gait and Station:  Steady while walking with abnormal gait consistent with prior lumbar surgeries. Normal arm swing. No pronator drift. No muscle wasting or fasciculations noted. ASSESSMENT AND PLAN    ICD-10-CM ICD-9-CM    1. Benign essential tremor syndrome G25.0 333.1    2. Tension vascular headache G44.209 307.81      80-year-old female seen in follow-up for above. Headaches have improved, she has an occasional headache that goes away with Tylenol. Tremor is not terribly bothersome, usually occurs when she is holding a book. She still able to read. It is not affecting her ability to eat or write. She complains of an occasional resting tremor, but has no resting tremor on exam and no parkinsonian traits. 1.  Can stop gabapentin, has not made any improvement in her tremor is likely only contributing to fatigue. Advised her to resume it if headaches her tremor worsened when she comes off of it  2. Continue Tylenol as needed for headache abortive, no indication for preventative  3.   We will continue to monitor for any additional parkinsonian traits   4. Patient will call the office if essential tremor becomes more bothersome than it currently is and she would like other treatment options    Follow-up in 6 months, call sooner with concerns      Tammy Done NP    This note was created using voice recognition software. Despite editing, there may be syntax errors.

## 2017-08-22 NOTE — MR AVS SNAPSHOT
Visit Information Date & Time Provider Department Dept. Phone Encounter #  
 8/22/2017 11:30 AM Mayda Ortiz NP [facility]rology [facility] at [facility] [phone] [id number] Upcoming Health Maintenance Date Due DTaP/Tdap/Td series (1 - Tdap) 9/23/1962 ZOSTER VACCINE AGE 60> 7/23/2001 GLAUCOMA SCREENING Q2Y 9/23/2006 Pneumococcal 65+ High/Highest Risk (1 of 2 - PCV13) 9/23/2006 MEDICARE YEARLY EXAM 9/23/2006 INFLUENZA AGE 9 TO ADULT 8/1/2017 Allergies as of 8/22/2017  Review Complete On: 8/22/2017 By: Kobe Spangler LPN Severity Noted Reaction Type Reactions Aspirin  10/10/2013   Intolerance Nausea and Vomiting Bee Sting [Sting, Bee]  07/15/2011    Hives, Shortness of Breath  
 WASP STING Codeine  07/15/2011    Nausea and Vomiting Hydrocodone  08/31/2016    Rash And sedation Morphine  07/15/2011    Rash Oxycodone  08/31/2016    Rash On her back Penicillins  07/15/2011    Hives, Shortness of Breath, Swelling Current Immunizations  Reviewed on 1/28/2015 No immunizations on file. Not reviewed this visit You Were Diagnosed With   
  
 Codes Comments Benign essential tremor syndrome    -  Primary ICD-10-CM: G25.0 ICD-9-CM: 333.1 Tension vascular headache     ICD-10-CM: G44.209 ICD-9-CM: 307.81 Vitals BP Pulse Height(growth percentile) Weight(growth percentile) SpO2 BMI  
 130/70 73 5' 3\" (1.6 m) 148 lb (67.1 kg) 98% 26.22 kg/m2 OB Status Smoking Status Hysterectomy Former Smoker Vitals History BMI and BSA Data Body Mass Index Body Surface Area  
 26.22 kg/m 2 1.73 m 2 Preferred Pharmacy Pharmacy Name Phone [address] [phone] Your Updated Medication List  
  
   
This list is accurate as of: 8/22/17 12:09 PM.  Always use your most recent med list.  
  
  
  
  
 CENTRUM SILVER PO Take  by mouth daily. clonazePAM 0.5 mg tablet Commonly known as:  Layman Sella Take 0.5 mg by mouth two (2) times a day. Indications: PANIC DISORDER  
  
 clopidogrel 75 mg Tab Commonly known as:  PLAVIX Take 75 mg by mouth daily. ferrous sulfate 325 mg (65 mg iron) Cper Take  by mouth daily. gabapentin 100 mg capsule Commonly known as:  NEURONTIN Take 1 Cap by mouth three (3) times daily as needed. LEXAPRO 20 mg tablet Generic drug:  escitalopram oxalate Take 20 mg by mouth nightly. Indications: ANXIETY WITH DEPRESSION  
  
 MIRALAX 17 gram packet Generic drug:  polyethylene glycol Take 17 g by mouth every morning. Pt puts in her coffee NORTRIPTYLINE PO Take 10 mg by mouth two (2) times a day. PROTONIX 40 mg tablet Generic drug:  pantoprazole Take 40 mg by mouth every morning. Indications: GASTROESOPHAGEAL REFLUX  
  
 REQUIP PO Take 1 mg by mouth nightly. simvastatin 10 mg tablet Commonly known as:  ZOCOR Take  by mouth nightly. Indications: HYPERCHOLESTEROLEMIA  
  
 verapamil  mg tablet Commonly known as:  CALAN-SR Take 120 mg by mouth nightly. Indications: HYPERTENSION Patient Instructions You can stop taking your gabapentin if you do not feel that it has made an improvement in your tremor. If your tremor or your headaches get worse when you come off the medication, you can start taking it again PRESCRIPTION REFILL POLICY [facility] Statement to Patients April 1, 2014 In an effort to ensure the large volume of patient prescription refills is processed in the most efficient and expeditious manner, we are asking our patients to assist us by calling your Pharmacy for all prescription refills, this will include also your  Mail Order Pharmacy. The pharmacy will contact our office electronically to continue the refill process. Please do not wait until the last minute to call your pharmacy. We need at least 48 hours (2days) to fill prescriptions. We also encourage you to call your pharmacy before going to  your prescription to make sure it is ready. With regard to controlled substance prescription refill requests (narcotic refills) that need to be picked up at our office, we ask your cooperation by providing us with at least 72 hours (3days) notice that you will need a refill. We will not refill narcotic prescription refill requests after 4:00pm on any weekday, Monday through Thursday, or after 2:00pm on Fridays, or on the weekends. We encourage everyone to explore another way of getting your prescription refill request processed using EarlyTracks, our patient web portal through our electronic medical record system. EarlyTracks is an efficient and effective way to communicate your medication request directly to the office and  downloadable as an yesenia on your smart phone . EarlyTracks also features a review functionality that allows you to view your medication list as well as leave messages for your physician. Are you ready to get connected? If so please review the attatched instructions or speak to any of our staff to get you set up right away! Thank you so much for your cooperation. Should you have any questions please contact our Practice Administrator. The Physicians and Staff,  [facility] A Healthy Lifestyle: Care Instructions Your Care Instructions A healthy lifestyle can help you feel good, stay at a healthy weight, and have plenty of energy for both work and play. A healthy lifestyle is something you can share with your whole family. A healthy lifestyle also can lower your risk for serious health problems, such as high blood pressure, heart disease, and diabetes. You can follow a few steps listed below to improve your health and the health of your family. Follow-up care is a key part of your treatment and safety. Be sure to make and go to all appointments, and call your doctor if you are having problems. Its also a good idea to know your test results and keep a list of the medicines you take. How can you care for yourself at home? · Do not eat too much sugar, fat, or fast foods. You can still have dessert and treats now and then. The goal is moderation. · Start small to improve your eating habits. Pay attention to portion sizes, drink less juice and soda pop, and eat more fruits and vegetables. ¨ Eat a healthy amount of food. A 3-ounce serving of meat, for example, is about the size of a deck of cards. Fill the rest of your plate with vegetables and whole grains. ¨ Limit the amount of soda and sports drinks you have every day. Drink more water when you are thirsty. ¨ Eat at least 5 servings of fruits and vegetables every day. It may seem like a lot, but it is not hard to reach this goal. A serving or helping is 1 piece of fruit, 1 cup of vegetables, or 2 cups of leafy, raw vegetables. Have an apple or some carrot sticks as an afternoon snack instead of a candy bar. Try to have fruits and/or vegetables at every meal. 
· Make exercise part of your daily routine. You may want to start with simple activities, such as walking, bicycling, or slow swimming. Try to be active 30 to 60 minutes every day. You do not need to do all 30 to 60 minutes all at once. For example, you can exercise 3 times a day for 10 or 20 minutes. Moderate exercise is safe for most people, but it is always a good idea to talk to your doctor before starting an exercise program. 
· Keep moving. Ana Cristina Puff the lawn, work in the garden, or [employer]. Take the stairs instead of the elevator at work. · If you smoke, quit. People who smoke have an increased risk for heart attack, stroke, cancer, and other lung illnesses.  Quitting is hard, but there are ways to boost your chance of quitting tobacco for good. ¨ Use nicotine gum, patches, or lozenges. ¨ Ask your doctor about stop-smoking programs and medicines. ¨ Keep trying. In addition to reducing your risk of diseases in the future, you will notice some benefits soon after you stop using tobacco. If you have shortness of breath or asthma symptoms, they will likely get better within a few weeks after you quit. · Limit how much alcohol you drink. Moderate amounts of alcohol (up to 2 drinks a day for men, 1 drink a day for women) are okay. But drinking too much can lead to liver problems, high blood pressure, and other health problems. Family health If you have a family, there are many things you can do together to improve your health. · Eat meals together as a family as often as possible. · Eat healthy foods. This includes fruits, vegetables, lean meats and dairy, and whole grains. · Include your family in your fitness plan. Most people think of activities such as jogging or tennis as the way to fitness, but there are many ways you and your family can be more active. Anything that makes you breathe hard and gets your heart pumping is exercise. Here are some tips: 
¨ Walk to do errands or to take your child to school or the bus. ¨ Go for a family bike ride after dinner instead of watching TV. Where can you learn more? Go to http://trae-joseline.info/. Enter A641 in the search box to learn more about \"A Healthy Lifestyle: Care Instructions. \" Current as of: July 26, 2016 Content Version: 11.3 © 0908-2329 CatchFree. Care instructions adapted under license by [employer] (which disclaims liability or warranty for this information). If you have questions about a medical condition or this instruction, always ask your healthcare professional. [place] [address] any warranty or liability for your use of this information. Introducing [facility]! Dear Kaila King: 
Thank you for requesting a panpan account. Our records indicate that you already have an active panpan account. You can access your account anytime at [email or web address] Did you know that you can access your hospital and ER discharge instructions at any time in panpan? You can also review all of your test results from your hospital stay or ER visit. Additional Information If you have questions, please visit the Frequently Asked Questions section of the panpan website at [email or web address] Remember, panpan is NOT to be used for urgent needs. For medical emergencies, dial 911. Now available from your iPhone and Android! Please provide this summary of care documentation to your next provider. Your primary care clinician is listed as Dorcas Kaplan. If you have any questions after today's visit, please call [phone].

## 2017-08-22 NOTE — PATIENT INSTRUCTIONS
You can stop taking your gabapentin if you do not feel that it has made an improvement in your tremor. If your tremor or your headaches get worse when you come off the medication, you can start taking it again        [facility]   Statement to Patients  April 1, 2014      In an effort to ensure the large volume of patient prescription refills is processed in the most efficient and expeditious manner, we are asking our patients to assist us by calling your Pharmacy for all prescription refills, this will include also your  Mail Order Pharmacy. The pharmacy will contact our office electronically to continue the refill process. Please do not wait until the last minute to call your pharmacy. We need at least 48 hours (2days) to fill prescriptions. We also encourage you to call your pharmacy before going to  your prescription to make sure it is ready. With regard to controlled substance prescription refill requests (narcotic refills) that need to be picked up at our office, we ask your cooperation by providing us with at least 72 hours (3days) notice that you will need a refill. We will not refill narcotic prescription refill requests after 4:00pm on any weekday, Monday through Thursday, or after 2:00pm on Fridays, or on the weekends. We encourage everyone to explore another way of getting your prescription refill request processed using Wallerius, our patient web portal through our electronic medical record system. Wallerius is an efficient and effective way to communicate your medication request directly to the office and  downloadable as an yesenia on your smart phone . Wallerius also features a review functionality that allows you to view your medication list as well as leave messages for your physician. Are you ready to get connected? If so please review the attatched instructions or speak to any of our staff to get you set up right away!     Thank you so much for your cooperation. Should you have any questions please contact our Practice Administrator. The Physicians and Staff,  [facility]          A Healthy Lifestyle: Care Instructions  Your Care Instructions  A healthy lifestyle can help you feel good, stay at a healthy weight, and have plenty of energy for both work and play. A healthy lifestyle is something you can share with your whole family. A healthy lifestyle also can lower your risk for serious health problems, such as high blood pressure, heart disease, and diabetes. You can follow a few steps listed below to improve your health and the health of your family. Follow-up care is a key part of your treatment and safety. Be sure to make and go to all appointments, and call your doctor if you are having problems. Its also a good idea to know your test results and keep a list of the medicines you take. How can you care for yourself at home? · Do not eat too much sugar, fat, or fast foods. You can still have dessert and treats now and then. The goal is moderation. · Start small to improve your eating habits. Pay attention to portion sizes, drink less juice and soda pop, and eat more fruits and vegetables. ¨ Eat a healthy amount of food. A 3-ounce serving of meat, for example, is about the size of a deck of cards. Fill the rest of your plate with vegetables and whole grains. ¨ Limit the amount of soda and sports drinks you have every day. Drink more water when you are thirsty. ¨ Eat at least 5 servings of fruits and vegetables every day. It may seem like a lot, but it is not hard to reach this goal. A serving or helping is 1 piece of fruit, 1 cup of vegetables, or 2 cups of leafy, raw vegetables. Have an apple or some carrot sticks as an afternoon snack instead of a candy bar. Try to have fruits and/or vegetables at every meal.  · Make exercise part of your daily routine.  You may want to start with simple activities, such as walking, bicycling, or slow swimming. Try to be active 30 to 60 minutes every day. You do not need to do all 30 to 60 minutes all at once. For example, you can exercise 3 times a day for 10 or 20 minutes. Moderate exercise is safe for most people, but it is always a good idea to talk to your doctor before starting an exercise program.  · Keep moving. Jacob Brush the lawn, work in the garden, or [employer]. Take the stairs instead of the elevator at work. · If you smoke, quit. People who smoke have an increased risk for heart attack, stroke, cancer, and other lung illnesses. Quitting is hard, but there are ways to boost your chance of quitting tobacco for good. ¨ Use nicotine gum, patches, or lozenges. ¨ Ask your doctor about stop-smoking programs and medicines. ¨ Keep trying. In addition to reducing your risk of diseases in the future, you will notice some benefits soon after you stop using tobacco. If you have shortness of breath or asthma symptoms, they will likely get better within a few weeks after you quit. · Limit how much alcohol you drink. Moderate amounts of alcohol (up to 2 drinks a day for men, 1 drink a day for women) are okay. But drinking too much can lead to liver problems, high blood pressure, and other health problems. Family health  If you have a family, there are many things you can do together to improve your health. · Eat meals together as a family as often as possible. · Eat healthy foods. This includes fruits, vegetables, lean meats and dairy, and whole grains. · Include your family in your fitness plan. Most people think of activities such as jogging or tennis as the way to fitness, but there are many ways you and your family can be more active. Anything that makes you breathe hard and gets your heart pumping is exercise. Here are some tips:  ¨ Walk to do errands or to take your child to school or the bus. ¨ Go for a family bike ride after dinner instead of watching TV.   Where can you learn more? Go to http://trae-joseline.info/. Enter L046 in the search box to learn more about \"A Healthy Lifestyle: Care Instructions. \"  Current as of: July 26, 2016  Content Version: 11.3  © 6549-7154 [employer], [email or web address][employer]. Care instructions adapted under license by [employer] (which disclaims liability or warranty for this information). If you have questions about a medical condition or this instruction, always ask your healthcare professional. [place] [address] any warranty or liability for your use of this information.

## 2017-08-31 NOTE — PERIOP NOTES
[facility]  Ambulatory Surgery Unit  Pre-operative Instructions    Surgery/Procedure Date  9/6/2017            Tentative Arrival Time 0800      1. On the day of your surgery/procedure, please report to the Ambulatory Surgery Unit Registration Desk and sign in at your designated time. The Ambulatory Surgery Unit is located in [facility] on the [facility] side of the [facility] across from the [address] [place] [address]. Please have all of your health insurance cards and a photo ID. 2. You must have someone with you to drive you home, as you should not drive a car for 24 hours following anesthesia. Please make arrangements for a responsible adult friend or family member to stay with you for at least the first 24 hours after your surgery. 3. Do not have anything to eat or drink (including water, gum, mints, coffee, juice) after midnight   9/5/2017. This may not apply to medications prescribed by your physician. (Please note below the special instructions with medications to take the morning of surgery, if applicable.)    4. We recommend you do not drink any alcoholic beverages for 24 hours before and after your surgery. 5. Stop all Aspirin, non-steroidal anti-inflammatory drugs (i.e. Advil, Aleve), vitamins, and supplements as directed by your surgeon's office. **If you are currently taking Plavix, Coumadin, or other blood-thinning agents, contact your surgeon for instructions. **    6. In an effort to help prevent surgical site infection, we ask that you shower with an anti-bacterial soap (i.e. Dial or Safeguard) for 3 days prior to and on the morning of surgery, using a fresh towel after each shower. (Please begin this process with fresh bed linens.) Do not apply any lotions, powders, or deodorants after the shower on the day of your procedure. If applicable, please do not shave the operative site for 48 hours prior to surgery. 7. Wear comfortable clothes.  Wear glasses instead of contacts. Do not bring any jewelry or money (other than copays or fees as instructed). Do not wear make-up, particularly mascara, the morning of your surgery. Do not wear nail polish, particularly if you are having foot /hand surgery. Wear your hair loose or down, no ponytails, buns, armin pins or clips. All body piercings must be removed. 8. You should understand that if you do not follow these instructions your surgery may be cancelled. If your physical condition changes (i.e. fever, cold or flu) please contact your surgeon as soon as possible. 9. It is important that you be on time. If a situation occurs where you may be late, or if you have any questions or problems, please call [phone].    10. Your surgery time may be subject to change. You will receive a phone call the day prior to surgery to confirm your arrival time. 11. Pediatric patients: please bring a change of clothes, diapers, bottle/sippy cup, pacifier, etc.      Special Instructions: Take all medications and inhalers, as prescribed, on the morning of surgery with a sip of water EXCEPT: none      I understand a pre-operative phone call will be made to verify my surgery time. In the event that I am not available, I give permission for a message to be left on my answering service and/or with another person?       yes         _Patient Verbalized Understanding__________________      ___________________      ________________  (Signature of Patient)          (Witness)                   (Date and Time)

## 2017-09-01 ENCOUNTER — ANESTHESIA EVENT (OUTPATIENT)
Dept: SURGERY | Age: 77
End: 2017-09-01
Payer: MEDICARE

## 2017-09-05 ENCOUNTER — ANESTHESIA (OUTPATIENT)
Dept: SURGERY | Age: 77
End: 2017-09-05
Payer: MEDICARE

## 2017-09-05 ENCOUNTER — HOSPITAL ENCOUNTER (OUTPATIENT)
Age: 77
Setting detail: OUTPATIENT SURGERY
Discharge: HOME | End: 2017-09-05
Attending: PODIATRIST | Admitting: PODIATRIST
Payer: MEDICARE

## 2017-09-05 VITALS
OXYGEN SATURATION: 100 % | SYSTOLIC BLOOD PRESSURE: 137 MMHG | RESPIRATION RATE: 11 BRPM | HEIGHT: 62 IN | BODY MASS INDEX: 28.34 KG/M2 | WEIGHT: 154 LBS | DIASTOLIC BLOOD PRESSURE: 59 MMHG | TEMPERATURE: 97.8 F | HEART RATE: 65 BPM

## 2017-09-05 PROCEDURE — 77030031139 HC SUT VCRL2 J&J -A: Performed by: PODIATRIST

## 2017-09-05 PROCEDURE — 74011250636 HC RX REV CODE- 250/636: Performed by: PODIATRIST

## 2017-09-05 PROCEDURE — 74011000250 HC RX REV CODE- 250

## 2017-09-05 PROCEDURE — 74011250636 HC RX REV CODE- 250/636: Performed by: ANESTHESIOLOGY

## 2017-09-05 PROCEDURE — 77030018850 HC STOCK ANTIEMB THG COVD -A: Performed by: PODIATRIST

## 2017-09-05 PROCEDURE — 76210000040 HC AMBSU PH I REC FIRST 0.5 HR: Performed by: PODIATRIST

## 2017-09-05 PROCEDURE — 74011000250 HC RX REV CODE- 250: Performed by: PODIATRIST

## 2017-09-05 PROCEDURE — 77030020255 HC SOL INJ LR 1000ML BG: Performed by: PODIATRIST

## 2017-09-05 PROCEDURE — 77030000032 HC CUF TRNQT ZIMM -B: Performed by: PODIATRIST

## 2017-09-05 PROCEDURE — 77030002933 HC SUT MCRYL J&J -A: Performed by: PODIATRIST

## 2017-09-05 PROCEDURE — 76210000057 HC AMBSU PH II REC 1 TO 1.5 HR: Performed by: PODIATRIST

## 2017-09-05 PROCEDURE — 77030011640 HC PAD GRND REM COVD -A: Performed by: PODIATRIST

## 2017-09-05 PROCEDURE — 74011250636 HC RX REV CODE- 250/636

## 2017-09-05 PROCEDURE — 77030018836 HC SOL IRR NACL ICUM -A: Performed by: PODIATRIST

## 2017-09-05 PROCEDURE — 76030000001 HC AMB SURG OR TIME 1 TO 1.5: Performed by: PODIATRIST

## 2017-09-05 PROCEDURE — 76060000062 HC AMB SURG ANES 1 TO 1.5 HR: Performed by: PODIATRIST

## 2017-09-05 RX ORDER — SODIUM CHLORIDE 0.9 % (FLUSH) 0.9 %
5-10 SYRINGE (ML) INJECTION EVERY 8 HOURS
Status: DISCONTINUED | OUTPATIENT
Start: 2017-09-05 | End: 2017-09-05 | Stop reason: HOSPADM

## 2017-09-05 RX ORDER — LIDOCAINE HYDROCHLORIDE 10 MG/ML
0.1 INJECTION, SOLUTION EPIDURAL; INFILTRATION; INTRACAUDAL; PERINEURAL AS NEEDED
Status: DISCONTINUED | OUTPATIENT
Start: 2017-09-05 | End: 2017-09-05 | Stop reason: HOSPADM

## 2017-09-05 RX ORDER — DIPHENHYDRAMINE HYDROCHLORIDE 50 MG/ML
12.5 INJECTION, SOLUTION INTRAMUSCULAR; INTRAVENOUS AS NEEDED
Status: DISCONTINUED | OUTPATIENT
Start: 2017-09-05 | End: 2017-09-05 | Stop reason: HOSPADM

## 2017-09-05 RX ORDER — DEXAMETHASONE SODIUM PHOSPHATE 4 MG/ML
INJECTION, SOLUTION INTRA-ARTICULAR; INTRALESIONAL; INTRAMUSCULAR; INTRAVENOUS; SOFT TISSUE AS NEEDED
Status: DISCONTINUED | OUTPATIENT
Start: 2017-09-05 | End: 2017-09-05 | Stop reason: HOSPADM

## 2017-09-05 RX ORDER — CLINDAMYCIN PHOSPHATE 900 MG/50ML
900 INJECTION INTRAVENOUS ONCE
Status: COMPLETED | OUTPATIENT
Start: 2017-09-05 | End: 2017-09-05

## 2017-09-05 RX ORDER — SODIUM CHLORIDE, SODIUM LACTATE, POTASSIUM CHLORIDE, CALCIUM CHLORIDE 600; 310; 30; 20 MG/100ML; MG/100ML; MG/100ML; MG/100ML
25 INJECTION, SOLUTION INTRAVENOUS CONTINUOUS
Status: DISCONTINUED | OUTPATIENT
Start: 2017-09-05 | End: 2017-09-05 | Stop reason: HOSPADM

## 2017-09-05 RX ORDER — FENTANYL CITRATE 50 UG/ML
25 INJECTION, SOLUTION INTRAMUSCULAR; INTRAVENOUS
Status: DISCONTINUED | OUTPATIENT
Start: 2017-09-05 | End: 2017-09-05 | Stop reason: HOSPADM

## 2017-09-05 RX ORDER — HYDROMORPHONE HYDROCHLORIDE 2 MG/1
1 TABLET ORAL
Status: DISCONTINUED | OUTPATIENT
Start: 2017-09-05 | End: 2017-09-05 | Stop reason: HOSPADM

## 2017-09-05 RX ORDER — BUPIVACAINE HYDROCHLORIDE 5 MG/ML
INJECTION, SOLUTION EPIDURAL; INTRACAUDAL AS NEEDED
Status: DISCONTINUED | OUTPATIENT
Start: 2017-09-05 | End: 2017-09-05 | Stop reason: HOSPADM

## 2017-09-05 RX ORDER — PROMETHAZINE HYDROCHLORIDE 25 MG/1
25 TABLET ORAL
Qty: 25 TAB | Refills: 0 | Status: SHIPPED | OUTPATIENT
Start: 2017-09-05 | End: 2018-06-07 | Stop reason: CLARIF

## 2017-09-05 RX ORDER — LIDOCAINE HYDROCHLORIDE 20 MG/ML
INJECTION, SOLUTION EPIDURAL; INFILTRATION; INTRACAUDAL; PERINEURAL AS NEEDED
Status: DISCONTINUED | OUTPATIENT
Start: 2017-09-05 | End: 2017-09-05 | Stop reason: HOSPADM

## 2017-09-05 RX ORDER — PROPOFOL 10 MG/ML
INJECTION, EMULSION INTRAVENOUS
Status: DISCONTINUED | OUTPATIENT
Start: 2017-09-05 | End: 2017-09-05 | Stop reason: HOSPADM

## 2017-09-05 RX ORDER — HYDROMORPHONE HYDROCHLORIDE 1 MG/ML
.2-.5 INJECTION, SOLUTION INTRAMUSCULAR; INTRAVENOUS; SUBCUTANEOUS ONCE
Status: DISCONTINUED | OUTPATIENT
Start: 2017-09-05 | End: 2017-09-05 | Stop reason: HOSPADM

## 2017-09-05 RX ORDER — HYDROMORPHONE HYDROCHLORIDE 2 MG/1
2 TABLET ORAL
Qty: 25 TAB | Refills: 0 | Status: SHIPPED | OUTPATIENT
Start: 2017-09-05 | End: 2018-06-07 | Stop reason: CLARIF

## 2017-09-05 RX ORDER — SODIUM CHLORIDE 0.9 % (FLUSH) 0.9 %
5-10 SYRINGE (ML) INJECTION AS NEEDED
Status: DISCONTINUED | OUTPATIENT
Start: 2017-09-05 | End: 2017-09-05 | Stop reason: HOSPADM

## 2017-09-05 RX ORDER — PROPOFOL 10 MG/ML
INJECTION, EMULSION INTRAVENOUS AS NEEDED
Status: DISCONTINUED | OUTPATIENT
Start: 2017-09-05 | End: 2017-09-05 | Stop reason: HOSPADM

## 2017-09-05 RX ADMIN — PROPOFOL 100 MCG/KG/MIN: 10 INJECTION, EMULSION INTRAVENOUS at 09:31

## 2017-09-05 RX ADMIN — LIDOCAINE HYDROCHLORIDE 60 MG: 20 INJECTION, SOLUTION EPIDURAL; INFILTRATION; INTRACAUDAL; PERINEURAL at 09:30

## 2017-09-05 RX ADMIN — CLINDAMYCIN PHOSPHATE 900 MG: 18 INJECTION, SOLUTION INTRAVENOUS at 09:30

## 2017-09-05 RX ADMIN — PROPOFOL 50 MG: 10 INJECTION, EMULSION INTRAVENOUS at 09:30

## 2017-09-05 RX ADMIN — SODIUM CHLORIDE, SODIUM LACTATE, POTASSIUM CHLORIDE, AND CALCIUM CHLORIDE 25 ML/HR: 600; 310; 30; 20 INJECTION, SOLUTION INTRAVENOUS at 08:26

## 2017-09-05 RX ADMIN — PROPOFOL 20 MG: 10 INJECTION, EMULSION INTRAVENOUS at 09:44

## 2017-09-05 NOTE — ANESTHESIA POSTPROCEDURE EVALUATION
Post-Anesthesia Evaluation and Assessment    Patient: Paul Thomas MRN: [id number]  SSN: [id number]    YOB: 1940  Age: 77 y.o. Sex: female       Cardiovascular Function/Vital Signs  Visit Vitals    /59 (BP 1 Location: Left arm, BP Patient Position: At rest)    Pulse 65    Temp 36.6 °C (97.8 °F)    Resp 11    Ht 5' 2\" (1.575 m)    Wt 69.9 kg (154 lb)    SpO2 100%    BMI 28.17 kg/m2       Patient is status post MAC, general - backup anesthesia for Procedure(s):  CHEILECTOMY METATARSAL PHALANGEAL JOINT 1 RIGHT FOOT. Nausea/Vomiting: None    Postoperative hydration reviewed and adequate. Pain:  Pain Scale 1: Numeric (0 - 10) (09/05/17 1130)  Pain Intensity 1: 0 (09/05/17 1130)   Managed    Neurological Status:   Neuro (WDL): Within Defined Limits (09/05/17 1130)  Neuro  Neurologic State: Alert (09/05/17 1130)   At baseline    Mental Status and Level of Consciousness: Arousable    Pulmonary Status:   O2 Device: Room air (09/05/17 1130)   Adequate oxygenation and airway patent    Complications related to anesthesia: None    Post-anesthesia assessment completed.  No concerns    Signed By: Maurilio Gorman MD     September 5, 2017

## 2017-09-05 NOTE — DISCHARGE INSTRUCTIONS
INSTRUCTIONS FOLLOWING FOOT SURGERY    ACTIVITY:  · Elevate feet for 48 hours  · Use ice as directed by your doctor  Use crutches / walker as directed by your doctor, and follow your doctors instructions as to your weight bearing status - you can bear weight   DIET:  · Clear liquids until no nausea or vomiting; then light diet for the first day  · An advance to regular diet on second day, unless your doctor orders otherwise. PAIN:  · Take pain medication as directed by your doctor. · Call your doctor if pain is not relieved by medication. · Do not take aspirin or blood thinners until directed by your doctor. DRESSING CARE: keep dressing clean and dry, do not remove       FOLLOW-UP PHONE CALLS:  · Calls will be made by nursing staff. · If you had any problems, call your doctor as needed. CALL YOUR DOCTOR IF:  · Excessive bleeding that does not stop after holding mild pressure over the area  · Temperature of 101° F or above  · Redness, excessive swelling or bruising, and/or green or yellow, smelly discharge from incision  · Loss of sensation -cold, white, or blue toes    AFTER ANESTHESIA :   · For the first 24 hours: do not drive, drink alcoholic beverages, or make important decisions. · Be aware of dizziness following anesthesia and while taking pain medication. DISCHARGE MEDICATIONS:         APPOINTMENT DATE/TIME: please call for an appointment    YOUR [address] PHONE NUMBER: [phone]    Patients signature:  Date: 9/5/2017  Discharging Nurse:     DO NOT TAKE TYLENOL/ACETAMINOPHEN WITH PERCOCET, [address], [address] TAKE NARCOTIC PAIN MEDICATIONS WITH FOOD   Narcotics tend to be constipating, we suggest taking a stool softener such as Colace or Miralax (follow package instructions). DO NOT DRIVE WHILE TAKING NARCOTIC PAIN MEDICATIONS. DO NOT TAKE SLEEPING MEDICATIONS OR ANTIANXIETY MEDICATIONS WHILE TAKING NARCOTIC PAIN MEDICATIONS,  ESPECIALLY THE NIGHT OF ANESTHESIA.     CPAP PATIENTS BE SURE TO WEAR MACHINE WHENEVER NAPPING OR SLEEPING. DISCHARGE SUMMARY from Nurse    The following personal items collected during your admission are returned to you:   Dental Appliance: Dental Appliances: None  Vision: Visual Aid: Glasses  Hearing Aid:    Jewelry: Jewelry: None  Clothing: Clothing:  (pt belonging bag )  Other Valuables: Other Valuables: Other (comment) (glasses )  Valuables sent to safe:        PATIENT INSTRUCTIONS:    After General Anesthesia or Intravenous Sedation, for 24 hours or while taking prescription Narcotics:        Someone should be with you for the next 24 hours. For your own safety, a responsible adult must drive you home. · Limit your activities  · Recommended activity: Rest today, up with assistance today. Do not climb stairs or shower unattended for the next 24 hours. · Please start with a soft bland diet and advance as tolerated (no nausea) to regular diet. · If you have a sore throat you should try the following: fluids, warm salt water gargles, or throat lozenges. If it does not improve after several days please follow up with your primary physician. · Do not drive and operate hazardous machinery  · Do not make important personal or business decisions  · Do  not drink alcoholic beverages  · If you have not urinated within 8 hours after discharge, please contact your surgeon on call. Report the following to your surgeon:  · Excessive pain, swelling, redness or odor of or around the surgical area  · Temperature over 100.5  · Nausea and vomiting lasting longer than 4 hours or if unable to take medications  · Any signs of decreased circulation or nerve impairment to extremity: change in color, persistent  numbness, tingling, coldness or increase pain      · You will receive a Post Operative Call from one of the Recovery Room Nurses on the day after your surgery to check on you. It is very important for us to know how you are recovering after your surgery. If you have an issue or need to speak with someone, please call your surgeon, do not wait for the post operative call. · You may receive an e-mail or letter in the mail from [facility] regarding your experience with us in the Ambulatory Surgery Unit. Your feedback is valuable to us and we appreciate your participation in the survey. · If the above instructions are not adequate, please contact Lewis Dickson RN, Stephanie anesthesia Nurse Manager or our Anesthesiologist, at [phone]. If you are having problems after your surgery, call the physician at his office number. · We wish you a speedy recovery ? What to do at Home:      *  Please give a list of your current medications to your Primary Care Provider. *  Please update this list whenever your medications are discontinued, doses are      changed, or new medications (including over-the-counter products) are added. *  Please carry medication information at all times in case of emergency situations. These are general instructions for a healthy lifestyle:    No smoking/ No tobacco products/ Avoid exposure to second hand smoke    Surgeon General's Warning:  Quitting smoking now greatly reduces serious risk to your health. Obesity, smoking, and sedentary lifestyle greatly increases your risk for illness    A healthy diet, regular physical exercise & weight monitoring are important for maintaining a healthy lifestyle    You may be retaining fluid if you have a history of heart failure or if you experience any of the following symptoms:  Weight gain of 3 pounds or more overnight or 5 pounds in a week, increased swelling in our hands or feet or shortness of breath while lying flat in bed. Please call your doctor as soon as you notice any of these symptoms; do not wait until your next office visit.     Recognize signs and symptoms of STROKE:    B - Balance  E - Eyes    F-  Face looks uneven    A-  Arms unable to move or move even    S- Speech slurred or non-existent    T-  Time-call 911 as soon as signs and symptoms begin-DO NOT go       Back to bed or wait to see if you get better-TIME IS BRAIN. If you have not received your influenza and/or pneumococcal vaccine, please follow up with your primary care physician. The discharge information has been reviewed with the patient and spouse. The patient and spouse verbalized understanding. Devin Alberto THROMBOSIS AND PULMONARY EMBOLUS    SURGICAL PATIENTS  Surgical patients are the #1 risk for DVT and PE. WHAT IS DVT? WHAT IS PE?  DVT is a serious condition where blood clots develop deep in the veins of the legs. PE occurs when a blood clot breaks loose from the wall of a vein and travels to the lungs blocking the pulmonary artery or one of its branches impairing blood flow from the heart, which could result in death.   RISK FACTORS   Surgery lasting longer than 45 minutes   History of inflammatory bowel disease   Oral contraceptive or hormone replacement therapy   Immobilization   Varicose veins / swollen legs   Smoking    CHF / Acute MI / Irregular heart beat   Family history of thrombosis   General anesthesia greater than 2 hours   Obesity   Infection of less than one month   Less than 1 month postpartum   COPD / Pneumonia   Arthroscopic surgery   Malignancy / cancer   Spine surgery   Blood abnormalities   Stroke / Paralysis / Coma    SIGNS AND SYMPTOMS OF DEEP VEIN TROMBOSIS  Usually occurs in one leg, above or below the knee   Swelling - one calf or thigh may be larger than the other   Feeling increased warmth in the area of the leg that is swollen or painful   Leg pain, which may increase when standing or walking   Swelling along the vein of the leg   When swollen areas is pressed with a finger, a depression may remain   Tenderness of the leg that may be confined to one area   Change in leg color (bluish or red)    SIGNS AND SYMPTOMS OF PULMONARY EMBOLUS   Chest pain that gets worse with deep breath, coughing or chest movement   Coughing up blood   Sweating   Shortness of breath or difficulty breathing   Rapid heart beat   Lightheadedness    HOW TO REDUCE THE POSSIBLE RISK OF DVT   Exercise - simple activities as rotating ankles and wrists, wiggling toes and fingers, tightening and relaxing muscles in calves and thighs promotes circulation while recovering from surgery. Please do these exercises every hour during waking hours   BRIT hose - If you have been given white support hose while having surgery, wear them home. You may remove them for half an hour every 8-hour period and stop wearing them 48 hours after surgery or as prescribed by your physician. BRIT hose may be reused for air travel or extended car travel   Take mediation as prescribed by your physician (Lovenox, Coumadin, Aspirin)   Resume your normal activities as soon as your doctor advises you to do so.  Remember, when traveling, to [place] your legs frequently. Wear non skid shoes when BRIT hose are on. They are very slippery!     PATIENTS WHO BELIEVE THEY MAY BE EXPERIENCING SIGNS AND SYMPTOMS OF DVT OR PE SHOULD SEEK MEDICAL HELP IMMEDIATELY

## 2017-09-05 NOTE — BRIEF OP NOTE
BRIEF OPERATIVE NOTE    Date of Procedure: 9/5/2017   Preoperative Diagnosis: EXOSTOSIS METATARSAL PHALANGEAL JOINT 1 RIGHT FOOT  Postoperative Diagnosis: EXOSTOSIS METATARSAL PHALANGEAL JOINT 1 RIGHT     Procedure(s):  CHEILECTOMY METATARSAL PHALANGEAL JOINT 1 RIGHT FOOT  Surgeon(s) and Role:     * Stan Young DPM - Primary         Assistant Staff:       Surgical Staff:  Circ-1: Siomara Rutledge RN  Scrub Tech-1: Sara Ortega  Event Time In   Incision Start 3276   Incision Close 1023     Anesthesia: MAC   Estimated Blood Loss: 20cc  Specimens: * No specimens in log *   Findings: exostosis   Complications: none  Implants: * No implants in log *

## 2017-09-05 NOTE — ANESTHESIA PREPROCEDURE EVALUATION
Anesthetic History   No history of anesthetic complications            Review of Systems / Medical History  Patient summary reviewed, nursing notes reviewed and pertinent labs reviewed    Pulmonary        Sleep apnea: No treatment           Neuro/Psych         TIA (x 2, 2005) and psychiatric history (anxiety/ depression)     Cardiovascular    Hypertension: well controlled          Hyperlipidemia         GI/Hepatic/Renal     GERD: well controlled           Endo/Other        Anemia (h/o iron infusions)  Pertinent negatives: No hypothyroidism   Other Findings   Comments: Monoclonal gammopathy of unknown significance  Restless leg syndrome           Physical Exam    Airway  Mallampati: III  TM Distance: 4 - 6 cm  Neck ROM: normal range of motion   Mouth opening: Normal     Cardiovascular    Rhythm: regular  Rate: normal      Pertinent negatives: No murmur   Dental    Dentition: Caps/crowns  Comments: Across upper front   Pulmonary  Breath sounds clear to auscultation               Abdominal  GI exam deferred       Other Findings            Anesthetic Plan    ASA: 3  Anesthesia type: MAC and general - backup          Induction: Intravenous  Anesthetic plan and risks discussed with: Patient      Off plavix since 08/23/17

## 2017-09-05 NOTE — PERIOP NOTES
Janny Lewis  1940  [id number]    Situation:  Verbal report given from: Rosalva Delgado CRNA, John Houston RN  Procedure: Procedure(s):  CHEILECTOMY METATARSAL PHALANGEAL JOINT 1 RIGHT FOOT    Background:    Preoperative diagnosis: EXOSTOSIS METATARSAL PHALANGEAL JOINT 1 RIGHT FOOT    Postoperative diagnosis: EXOSTOSIS METATARSAL PHALANGEAL JOINT 1 RIGHT     :  Dr. Jude Lisa    Assistant(s): Circ-1: Edwin Chacon RN  Scrub Tech-1: Sancho Blanco    Specimens: * No specimens in log *    Assessment:  Intra-procedure medications         Anesthesia gave intra-procedure sedation and medications, see anesthesia flow sheet     Intravenous fluids: LR@ KVO     Vital signs stable       Recommendation:    Permission to share finding with family or friend yes

## 2017-09-05 NOTE — PERIOP NOTES
Pt waking up, vss.  Pt declined any liquids at this time. Called  to notify pt in recovery room. Instructed  to report to recovery room, pt fitted for post op shoe. 1100-Family at bedside, receiving update from Dr Jackie Guillen regarding surgical results, prescriptions and foot care. 1140-D/c instructions reviewed thoroughly with pt , daughter in law, . VSS. Explained how to do leg exercises, how post op shoe works and what meds to take when and when to call doctor. 1158- Pt transported via w/c to awaiting transportation. No complaints at this time.

## 2017-09-05 NOTE — H&P
Surgery History and Physical    Subjective: Ger Winters is a 77 y.o. female who presents for a painful exostosis dorsal aspect of the MPJ 1     Past Medical History:   Diagnosis Date    Anemia     has had iron infusions;  Hem/onc Dr Wright Core [phone]    Anxiety and depression     GERD (gastroesophageal reflux disease)     Hypercholesteremia     Hypertension     Limited peripheral vision of right eye     complication of cataract surgery    Meniere disease     Monoclonal gammopathy of unknown significance     hem/onc:  Dr Wright Core [phone]    Osteoporosis     Psoriasis     bilateral Legs    Restless leg     Skin melanoma ([facility] [address] 2012    (Rt ankle) and basal cell (Lt eye) removed    Transient ischemic attack approx 2005    HAS HAD 2 MINISTROKES-NO DEFICITS; neuro Dr Reynold Ponce (cc)    Unspecified adverse effect of anesthesia     HAS NEEDED PORT OR CUTDOWN FOR RECVNG BLOOD OR LONG-TERM IVs; surgery 7/28/15 w/o any difficulty    Unspecified sleep apnea     does not use CPAP     Past Surgical History:   Procedure Laterality Date    HX APPENDECTOMY      HX CATARACT REMOVAL  2011    Right    HX CERVICAL FUSION  1/28/15    C5-7    HX CHOLECYSTECTOMY  2000    HX GI  2010    liver bx due to liver Bx due to elevated enzymes, liver laceration during Bx, hospitalized 10-12d    HX HEENT  7/28/15    Lt total parotidectomy with facial nerve dissection: Dr Marc Him HX HEENT  11/2015    Right parotidectomy    HX HYSTERECTOMY      HX ORTHOPAEDIC  2009 & 2011    LUMBAR FUSION and revision    HX OTHER SURGICAL  2010    MELANOMA (Rt ankle) and basal cell (Lt eye) removed; q6mo ck by DERMATOLOGY    HX SHOULDER REPLACEMENT Left 08/01/2016    HX THYROIDECTOMY      Right    CT COLON CA SCRN NOT HI RSK IND  1/27/2016           Family History   Problem Relation Age of Onset    Alcohol abuse Father     Emphysema Father     Cancer Sister      PANCREATIC    Alcohol abuse Brother     COPD Brother     Other Mother      UNKNOWN CAUSE OF DEATH    Heart Disease Mother     Anesth Problems Neg Hx      Social History   Substance Use Topics    Smoking status: Former Smoker     Years: 15.00     Quit date: 7/15/1975    Smokeless tobacco: Never Used    Alcohol use Yes      Comment: may have a glass of wine on rare occasion      Prior to Admission medications    Medication Sig Start Date End Date Taking? Authorizing Provider   PREDNISONE PO Take  by mouth once. Indications: muscle spasm in next  took 1 daily x 3 days   Yes Historical Provider   OTHER Indications: muscle relax pt does not know name or dose   Yes Historical Provider   FOLIC ACID/MULTIVIT-MIN/LUTEIN (CENTRUM SILVER PO) Take  by mouth daily. Yes Historical Provider   clopidogrel (PLAVIX) 75 mg tablet Take 75 mg by mouth daily. Yes Historical Provider   escitalopram oxalate (LEXAPRO) 20 mg tablet Take 20 mg by mouth nightly. Indications: ANXIETY WITH DEPRESSION   Yes Historical Provider   simvastatin (ZOCOR) 10 mg tablet Take  by mouth nightly. Indications: HYPERCHOLESTEROLEMIA   Yes Historical Provider   pantoprazole (PROTONIX) 40 mg tablet Take 40 mg by mouth every morning. Indications: GASTROESOPHAGEAL REFLUX   Yes Historical Provider   verapamil SR (CALAN-SR) 120 mg CR tablet Take 120 mg by mouth nightly. Indications: HYPERTENSION   Yes Historical Provider   ferrous sulfate 325 mg (65 mg iron) CpER Take  by mouth daily. Yes Historical Provider   clonazepam (KLONOPIN) 0.5 mg tablet Take 0.5 mg by mouth two (2) times a day. Indications: PANIC DISORDER   Yes Historical Provider   NORTRIPTYLINE HCL (NORTRIPTYLINE PO) Take 10 mg by mouth two (2) times a day. Yes Historical Provider   ROPINIROLE HCL (REQUIP PO) Take 1 mg by mouth nightly. Yes Historical Provider   polyethylene glycol (MIRALAX) 17 gram packet Take 17 g by mouth every morning.  Pt puts in her coffee   Yes Historical Provider      Allergies   Allergen Reactions    Aspirin Nausea and Vomiting  Bee Sting [Sting, Bee] Hives and Shortness of Breath     WASP STING    Codeine Nausea and Vomiting    Hydrocodone Rash     And sedation    Morphine Rash    Nsaids (Non-Steroidal Anti-Inflammatory Drug) Nausea and Vomiting    Oxycodone Rash     On her back    Penicillins Hives, Shortness of Breath and Swelling       Review of Systems:  A comprehensive review of systems was negative except for that written in the History of Present Illness. Objective:      Patient Vitals for the past 8 hrs:   BP Temp Pulse Resp SpO2 Height Weight   17 0827 135/77 98.2 °F (36.8 °C) 73 15 96 % 5' 2\" (1.575 m) 69.9 kg (154 lb)       Temp (24hrs), Av.2 °F (36.8 °C), Min:98.2 °F (36.8 °C), Max:98.2 °F (36.8 °C)      Physical Exam:  Dorsal exostosis MPJ 1     Assessment:     Hallux limitus     Plan:     Discussed the risk of surgery including infection and continued pain in her arthritic joint ,  and the risks of general anesthetic. The patient understands the risks; any and all questions were answered to the patient's satisfaction.     Signed By: Gia rCowder DPM     2017

## 2017-09-05 NOTE — IP AVS SNAPSHOT
[address] [facility] 
[phone] Patient: Vitaly Parada MRN: [id number] MQP:[id number]/71/[id number] You are allergic to the following Allergen Reactions Aspirin Nausea and Vomiting Bee Sting (Sting, Bee) Hives Shortness of Breath  
 WASP STING Codeine Nausea and Vomiting Hydrocodone Rash And sedation Morphine Rash  
    
 Nsaids (Non-Steroidal Anti-Inflammatory Drug) Nausea and Vomiting Oxycodone Rash On her back Penicillins Hives Shortness of Breath Swelling Recent Documentation Height Weight BMI OB Status Smoking Status 1.575 m 69.9 kg 28.17 kg/m2 Hysterectomy Former Smoker Emergency Contacts Name Discharge Info Relation Home Work Mobile Megan Plant CAREGIVER [3] Spouse [3] [phone] Karin Acosta  Daughter [21] [phone] About your hospitalization You were admitted on:  September 5, 2017 You last received care in the:  [facility] PACU You were discharged on:  September 5, 2017 Unit phone number:  [phone] Why you were hospitalized Your primary diagnosis was:  Not on File Providers Seen During Your Hospitalizations Provider Role Specialty Primary office phone Aurora Ozuna DPM Attending Provider Podiatry [phone] Your Primary Care Physician (PCP) Primary Care Physician Office Phone Office Fax Shakir Soto [phone] Follow-up Information Follow up With Details Comments Contact Info Romario Jones MD   [address] [place] [address] 
[phone] Current Discharge Medication List  
  
START taking these medications Dose & Instructions Dispensing Information Comments Morning Noon Evening Bedtime HYDROmorphone 2 mg tablet Commonly known as:  DILAUDID Your last dose was: Your next dose is:    
   
   
 Dose:  2 mg Take 1 Tab by mouth every four (4) hours as needed for Pain. Max Daily Amount: 12 mg. Indications: Pain Quantity:  25 Tab Refills:  0  
     
   
   
   
  
 promethazine 25 mg tablet Commonly known as:  PHENERGAN Your last dose was: Your next dose is:    
   
   
 Dose:  25 mg Take 1 Tab by mouth every six (6) hours as needed for Nausea. Indications: Pain Treatment Adjunct, POST-OPERATIVE NAUSEA AND VOMITING Quantity:  25 Tab Refills:  0 CONTINUE these medications which have NOT CHANGED Dose & Instructions Dispensing Information Comments Morning Noon Evening Bedtime CENTRUM SILVER PO Your last dose was: Your next dose is: Take  by mouth daily. Refills:  0  
     
   
   
   
  
 clonazePAM 0.5 mg tablet Commonly known as:  Carlyle Ricketts Your last dose was: Your next dose is:    
   
   
 Dose:  0.5 mg Take 0.5 mg by mouth two (2) times a day. Indications: PANIC DISORDER Refills:  0  
     
   
   
   
  
 clopidogrel 75 mg Tab Commonly known as:  PLAVIX Your last dose was: Your next dose is:    
   
   
 Dose:  75 mg Take 75 mg by mouth daily. Refills:  0  
     
   
   
   
  
 ferrous sulfate 325 mg (65 mg iron) Cper Your last dose was: Your next dose is: Take  by mouth daily. Refills:  0 LEXAPRO 20 mg tablet Generic drug:  escitalopram oxalate Your last dose was: Your next dose is:    
   
   
 Dose:  20 mg Take 20 mg by mouth nightly. Indications: ANXIETY WITH DEPRESSION Refills:  0 MIRALAX 17 gram packet Generic drug:  polyethylene glycol Your last dose was: Your next dose is:    
   
   
 Dose:  17 g Take 17 g by mouth every morning. Pt puts in her coffee Refills:  0 NORTRIPTYLINE PO Your last dose was: Your next dose is:    
   
   
 Dose:  10 mg Take 10 mg by mouth two (2) times a day. Refills:  0  
     
   
   
   
  
 OTHER Your last dose was: Your next dose is:    
   
   
 Indications: muscle relax pt does not know name or dose Refills:  0 PREDNISONE PO Your last dose was: Your next dose is: Take  by mouth once. Indications: muscle spasm in next  took 1 daily x 3 days Refills:  0 PROTONIX 40 mg tablet Generic drug:  pantoprazole Your last dose was: Your next dose is:    
   
   
 Dose:  40 mg Take 40 mg by mouth every morning. Indications: GASTROESOPHAGEAL REFLUX Refills:  0  
     
   
   
   
  
 REQUIP PO Your last dose was: Your next dose is:    
   
   
 Dose:  1 mg Take 1 mg by mouth nightly. Refills:  0  
     
   
   
   
  
 simvastatin 10 mg tablet Commonly known as:  ZOCOR Your last dose was: Your next dose is: Take  by mouth nightly. Indications: HYPERCHOLESTEROLEMIA Refills:  0  
     
   
   
   
  
 verapamil  mg tablet Commonly known as:  CALAN-SR Your last dose was: Your next dose is:    
   
   
 Dose:  120 mg Take 120 mg by mouth nightly. Indications: HYPERTENSION Refills:  0 Where to Get Your Medications Information on where to get these meds will be given to you by the nurse or doctor. ! Ask your nurse or doctor about these medications HYDROmorphone 2 mg tablet  
 promethazine 25 mg tablet Discharge Instructions INSTRUCTIONS FOLLOWING FOOT SURGERY 
 
ACTIVITY: 
· Elevate feet for 48 hours · Use ice as directed by your doctor Use crutches / walker as directed by your doctor, and follow your doctors instructions as to your weight bearing status - you can bear weight DIET: 
· Clear liquids until no nausea or vomiting; then light diet for the first day · An advance to regular diet on second day, unless your doctor orders otherwise. PAIN: 
· Take pain medication as directed by your doctor. · Call your doctor if pain is not relieved by medication. · Do not take aspirin or blood thinners until directed by your doctor. DRESSING CARE: keep dressing clean and dry, do not remove FOLLOW-UP PHONE CALLS: 
· Calls will be made by nursing staff. · If you had any problems, call your doctor as needed. CALL YOUR DOCTOR IF: 
· Excessive bleeding that does not stop after holding mild pressure over the area · Temperature of 101° F or above · Redness, excessive swelling or bruising, and/or green or yellow, smelly discharge from incision · Loss of sensation cold, white, or blue toes AFTER ANESTHESIA :  
· For the first 24 hours: do not drive, drink alcoholic beverages, or make important decisions. · Be aware of dizziness following anesthesia and while taking pain medication. DISCHARGE MEDICATIONS:  
   
 
APPOINTMENT DATE/TIME: please call for an appointment YOUR DOCTORS PHONE NUMBER: [phone] Patients signature: 
Date: 9/5/2017 Discharging Nurse:  
 
DO NOT TAKE TYLENOL/ACETAMINOPHEN WITH PERCOCET, [address], [address] [address] [facility]. TAKE NARCOTIC PAIN MEDICATIONS WITH FOOD Narcotics tend to be constipating, we suggest taking a stool softener such as Colace or Miralax (follow package instructions). DO NOT DRIVE WHILE TAKING NARCOTIC PAIN MEDICATIONS. DO NOT TAKE SLEEPING MEDICATIONS OR ANTIANXIETY MEDICATIONS WHILE TAKING NARCOTIC PAIN MEDICATIONS,  ESPECIALLY THE NIGHT OF ANESTHESIA. CPAP PATIENTS BE SURE TO WEAR MACHINE WHENEVER NAPPING OR SLEEPING. DISCHARGE SUMMARY from Nurse The following personal items collected during your admission are returned to you:  
Dental Appliance: Dental Appliances: None Vision: Visual Aid: Glasses Hearing Aid:   
Jewelry: Jewelry: None Clothing: Clothing:  (pt belonging bag ) Other Valuables: Other Valuables: Other (comment) (glasses ) Valuables sent to safe:   
 
 
PATIENT INSTRUCTIONS: 
 
 
B - Balance E - Eyes F-  Face looks uneven A-  Arms unable to move or move even S-  Speech slurred or non-existent T-  Time-call 911 as soon as signs and symptoms begin-DO NOT go Back to bed or wait to see if you get better-TIME IS BRAIN. If you have not received your influenza and/or pneumococcal vaccine, please follow up with your primary care physician. The discharge information has been reviewed with the patient and spouse. The patient and spouse verbalized understanding. [address] THROMBOSIS AND PULMONARY EMBOLUS 
 
SURGICAL PATIENTS Surgical patients are the #1 risk for DVT and PE. WHAT IS DVT? WHAT IS PE? 
DVT is a serious condition where blood clots develop deep in the veins of the legs. PE occurs when a blood clot breaks loose from the wall of a vein and travels to the lungs blocking the pulmonary artery or one of its branches impairing blood flow from the heart, which could result in death. RISK FACTORS 
? Surgery lasting longer than 45 minutes ? History of inflammatory bowel disease 
? Oral contraceptive or hormone replacement therapy ? Immobilization ? Varicose veins / swollen legs ? Smoking  
? CHF / Acute MI / Irregular heart beat ? Family history of thrombosis ? General anesthesia greater than 2 hours ? Obesity ? Infection of less than one month ? Less than 1 month postpartum 
? COPD / Pneumonia ? Arthroscopic surgery ? Malignancy / cancer ? Spine surgery ? Blood abnormalities ? Stroke / Paralysis / Coma SIGNS AND SYMPTOMS OF DEEP VEIN TROMBOSIS Usually occurs in one leg, above or below the knee ? Swelling  one calf or thigh may be larger than the other ? Feeling increased warmth in the area of the leg that is swollen or painful ? Leg pain, which may increase when standing or walking ? Swelling along the vein of the leg ? When swollen areas is pressed with a finger, a depression may remain ? Tenderness of the leg that may be confined to one area ? Change in leg color (bluish or red) SIGNS AND SYMPTOMS OF PULMONARY EMBOLUS 
 ? Chest pain that gets worse with deep breath, coughing or chest movement ? Coughing up blood ? Sweating ? Shortness of breath or difficulty breathing ? Rapid heart beat ? Lightheadedness HOW TO REDUCE THE POSSIBLE RISK OF DVT ? Exercise  simple activities as rotating ankles and wrists, wiggling toes and fingers, tightening and relaxing muscles in calves and thighs promotes circulation while recovering from surgery. Please do these exercises every hour during waking hours ? BRIT hose  If you have been given white support hose while having surgery, wear them home. You may remove them for half an hour every 8-hour period and stop wearing them 48 hours after surgery or as prescribed by your physician. BRIT hose may be reused for air travel or extended car travel ? Take mediation as prescribed by your physician (Lovenox, Coumadin, Aspirin) ? Resume your normal activities as soon as your doctor advises you to do so. 
? Remember, when traveling, to [place] your legs frequently. Wear non skid shoes when BRIT hose are on. They are very slippery! PATIENTS WHO BELIEVE THEY MAY BE EXPERIENCING SIGNS AND SYMPTOMS OF DVT OR PE SHOULD SEEK MEDICAL HELP IMMEDIATELY Discharge Orders None [facility]! Dear Dave Cheney: 
Thank you for requesting a [employer] account. Our records indicate that you already have an active [employer] account. You can access your account anytime at [email or web address] Did you know that you can access your hospital and ER discharge instructions at any time in [employer]? You can also review all of your test results from your hospital stay or ER visit. Additional Information If you have questions, please visit the Frequently Asked Questions section of the [employer] website at [email or web address] Remember, [employer] is NOT to be used for urgent needs. For medical emergencies, dial 911. Now available from your iPhone and Android! General Information Please provide this summary of care documentation to your next provider. Patient Signature:  ____________________________________________________________ Date:  ____________________________________________________________  
  
Janny Jarrett Provider Signature:  ____________________________________________________________ Date:  ____________________________________________________________

## 2017-09-08 NOTE — OP NOTES
[facility]   [address]   OP NOTE       Name:  Basia Deng   MR#:  [id number]   :  1940   Account #:  [de-identified]    Surgery Date:  2017   Date of Adm:  2017       PREOPERATIVE DIAGNOSIS: Exostosis of metatarsophalangeal joint   1. POSTOPERATIVE DIAGNOSIS: Exostosis of metatarsophalangeal   joint 1. PROCEDURES PERFORMED: Cheilectomy of the   metatarsophalangeal joint, one right. SURGEON: Akira Otoole DPM    ANESTHESIA: Monitored anesthesia care. ESTIMATED BLOOD LOSS: 20 mL. COMPLICATIONS: None. SPECIMENS REMOVED: None. INDICATIONS: The patient has a painful exostosis on the dorsal   aspect of the first metatarsal. We discussed the surgery, the risks, the   complications, and even though there is arthritis, I recommended   leaving the joint alone and only removing the offending bone on the   dorsal aspect that was causing her problem with shoes. DESCRIPTION OF PROCEDURE: An incision was made over the 1st   metatarsophalangeal joint and carried down to the level of superficial   fascia and capsule was entered. The exostosis was identified and was   resected with a sagittal saw and smoothed out with an oval bur. The   area was lavaged. The capsule was closed with 3-0 Vicryl, superficial   fascia with 4-0 Vicryl, and the skin was reapproximated with 4-0 nylon. She left the operating room to the recovery room in stable condition.         Janice Lim DPM      LGR / THS   D:  2017   22:43   T:  2017   04:56   Job #:  [id number]

## 2017-09-11 ENCOUNTER — HOSPITAL ENCOUNTER (EMERGENCY)
Age: 77
Discharge: HOME | End: 2017-09-12
Attending: EMERGENCY MEDICINE
Payer: MEDICARE

## 2017-09-11 ENCOUNTER — APPOINTMENT (OUTPATIENT)
Dept: GENERAL RADIOLOGY | Age: 77
End: 2017-09-11
Attending: EMERGENCY MEDICINE
Payer: MEDICARE

## 2017-09-11 DIAGNOSIS — J20.9 ACUTE BRONCHITIS, UNSPECIFIED ORGANISM: Primary | ICD-10-CM

## 2017-09-11 LAB
BASOPHILS # BLD: 0 K/UL (ref 0–0.1)
BASOPHILS NFR BLD: 0 % (ref 0–1)
EOSINOPHIL # BLD: 0.1 K/UL (ref 0–0.4)
EOSINOPHIL NFR BLD: 3 % (ref 0–7)
ERYTHROCYTE [DISTWIDTH] IN BLOOD BY AUTOMATED COUNT: 13.3 % (ref 11.5–14.5)
HCT VFR BLD AUTO: 40.4 % (ref 35–47)
HGB BLD-MCNC: 13 G/DL (ref 11.5–16)
LYMPHOCYTES # BLD: 1.8 K/UL (ref 0.8–3.5)
LYMPHOCYTES NFR BLD: 36 % (ref 12–49)
MCH RBC QN AUTO: 30.3 PG (ref 26–34)
MCHC RBC AUTO-ENTMCNC: 32.2 G/DL (ref 30–36.5)
MCV RBC AUTO: 94.2 FL (ref 80–99)
MONOCYTES # BLD: 0.4 K/UL (ref 0–1)
MONOCYTES NFR BLD: 9 % (ref 5–13)
NEUTS SEG # BLD: 2.5 K/UL (ref 1.8–8)
NEUTS SEG NFR BLD: 52 % (ref 32–75)
PLATELET # BLD AUTO: 218 K/UL (ref 150–400)
RBC # BLD AUTO: 4.29 M/UL (ref 3.8–5.2)
WBC # BLD AUTO: 4.9 K/UL (ref 3.6–11)

## 2017-09-11 PROCEDURE — 94640 AIRWAY INHALATION TREATMENT: CPT

## 2017-09-11 PROCEDURE — 83735 ASSAY OF MAGNESIUM: CPT | Performed by: EMERGENCY MEDICINE

## 2017-09-11 PROCEDURE — 36415 COLL VENOUS BLD VENIPUNCTURE: CPT | Performed by: EMERGENCY MEDICINE

## 2017-09-11 PROCEDURE — 99284 EMERGENCY DEPT VISIT MOD MDM: CPT

## 2017-09-11 PROCEDURE — 71020 XR CHEST PA LAT: CPT

## 2017-09-11 PROCEDURE — 80053 COMPREHEN METABOLIC PANEL: CPT | Performed by: EMERGENCY MEDICINE

## 2017-09-11 PROCEDURE — 84484 ASSAY OF TROPONIN QUANT: CPT | Performed by: EMERGENCY MEDICINE

## 2017-09-11 PROCEDURE — 83605 ASSAY OF LACTIC ACID: CPT | Performed by: EMERGENCY MEDICINE

## 2017-09-11 PROCEDURE — 81003 URINALYSIS AUTO W/O SCOPE: CPT | Performed by: EMERGENCY MEDICINE

## 2017-09-11 PROCEDURE — 81001 URINALYSIS AUTO W/SCOPE: CPT | Performed by: EMERGENCY MEDICINE

## 2017-09-11 PROCEDURE — 77030029684 HC NEB SM VOL KT MONA -A

## 2017-09-11 PROCEDURE — 85025 COMPLETE CBC W/AUTO DIFF WBC: CPT | Performed by: EMERGENCY MEDICINE

## 2017-09-11 RX ORDER — ACETAMINOPHEN 325 MG/1
650 TABLET ORAL ONCE
Status: COMPLETED | OUTPATIENT
Start: 2017-09-11 | End: 2017-09-12

## 2017-09-11 RX ORDER — ALBUTEROL SULFATE 0.83 MG/ML
2.5 SOLUTION RESPIRATORY (INHALATION)
Status: COMPLETED | OUTPATIENT
Start: 2017-09-11 | End: 2017-09-12

## 2017-09-12 ENCOUNTER — APPOINTMENT (OUTPATIENT)
Dept: CT IMAGING | Age: 77
End: 2017-09-12
Attending: EMERGENCY MEDICINE
Payer: MEDICARE

## 2017-09-12 VITALS
OXYGEN SATURATION: 96 % | HEART RATE: 79 BPM | RESPIRATION RATE: 18 BRPM | HEIGHT: 62 IN | DIASTOLIC BLOOD PRESSURE: 60 MMHG | SYSTOLIC BLOOD PRESSURE: 127 MMHG | TEMPERATURE: 97.2 F | BODY MASS INDEX: 28.28 KG/M2 | WEIGHT: 153.66 LBS

## 2017-09-12 LAB
ALBUMIN SERPL-MCNC: 3.8 G/DL (ref 3.5–5)
ALBUMIN/GLOB SERPL: 1.1 {RATIO} (ref 1.1–2.2)
ALP SERPL-CCNC: 96 U/L (ref 45–117)
ALT SERPL-CCNC: 38 U/L (ref 12–78)
ANION GAP SERPL CALC-SCNC: 6 MMOL/L (ref 5–15)
APPEARANCE UR: CLEAR
AST SERPL-CCNC: 24 U/L (ref 15–37)
ATRIAL RATE: 77 BPM
BACTERIA URNS QL MICRO: NEGATIVE /HPF
BILIRUB SERPL-MCNC: 0.3 MG/DL (ref 0.2–1)
BILIRUB UR QL: NEGATIVE
BUN SERPL-MCNC: 14 MG/DL (ref 6–20)
BUN/CREAT SERPL: 20 (ref 12–20)
CALCIUM SERPL-MCNC: 8.8 MG/DL (ref 8.5–10.1)
CALCULATED P AXIS, ECG09: 51 DEGREES
CALCULATED R AXIS, ECG10: 52 DEGREES
CALCULATED T AXIS, ECG11: 42 DEGREES
CHLORIDE SERPL-SCNC: 106 MMOL/L (ref 97–108)
CO2 SERPL-SCNC: 27 MMOL/L (ref 21–32)
COLOR UR: ABNORMAL
CREAT SERPL-MCNC: 0.71 MG/DL (ref 0.55–1.02)
D DIMER PPP FEU-MCNC: 1.07 MG/L FEU (ref 0–0.65)
DIAGNOSIS, 93000: NORMAL
EPITH CASTS URNS QL MICRO: ABNORMAL /LPF
GLOBULIN SER CALC-MCNC: 3.6 G/DL (ref 2–4)
GLUCOSE SERPL-MCNC: 89 MG/DL (ref 65–100)
GLUCOSE UR STRIP.AUTO-MCNC: NEGATIVE MG/DL
HGB UR QL STRIP: NEGATIVE
KETONES UR QL STRIP.AUTO: NEGATIVE MG/DL
LACTATE SERPL-SCNC: 1.2 MMOL/L (ref 0.4–2)
LEUKOCYTE ESTERASE UR QL STRIP.AUTO: ABNORMAL
MAGNESIUM SERPL-MCNC: 2.3 MG/DL (ref 1.6–2.4)
NITRITE UR QL STRIP.AUTO: NEGATIVE
P-R INTERVAL, ECG05: 142 MS
PH UR STRIP: 6 [PH] (ref 5–8)
POTASSIUM SERPL-SCNC: 4 MMOL/L (ref 3.5–5.1)
PROT SERPL-MCNC: 7.4 G/DL (ref 6.4–8.2)
PROT UR STRIP-MCNC: NEGATIVE MG/DL
Q-T INTERVAL, ECG07: 384 MS
QRS DURATION, ECG06: 78 MS
QTC CALCULATION (BEZET), ECG08: 434 MS
RBC #/AREA URNS HPF: ABNORMAL /HPF (ref 0–5)
SODIUM SERPL-SCNC: 139 MMOL/L (ref 136–145)
SP GR UR REFRACTOMETRY: 1.01 (ref 1–1.03)
TROPONIN I SERPL-MCNC: <0.04 NG/ML
UROBILINOGEN UR QL STRIP.AUTO: 0.2 EU/DL (ref 0.2–1)
VENTRICULAR RATE, ECG03: 77 BPM
WBC URNS QL MICRO: ABNORMAL /HPF (ref 0–4)

## 2017-09-12 PROCEDURE — 74011250636 HC RX REV CODE- 250/636: Performed by: EMERGENCY MEDICINE

## 2017-09-12 PROCEDURE — 74011000250 HC RX REV CODE- 250: Performed by: EMERGENCY MEDICINE

## 2017-09-12 PROCEDURE — 93005 ELECTROCARDIOGRAM TRACING: CPT

## 2017-09-12 PROCEDURE — 71275 CT ANGIOGRAPHY CHEST: CPT

## 2017-09-12 PROCEDURE — 36415 COLL VENOUS BLD VENIPUNCTURE: CPT | Performed by: EMERGENCY MEDICINE

## 2017-09-12 PROCEDURE — 74011636320 HC RX REV CODE- 636/320: Performed by: EMERGENCY MEDICINE

## 2017-09-12 PROCEDURE — 85379 FIBRIN DEGRADATION QUANT: CPT | Performed by: EMERGENCY MEDICINE

## 2017-09-12 PROCEDURE — 74011250637 HC RX REV CODE- 250/637: Performed by: EMERGENCY MEDICINE

## 2017-09-12 RX ORDER — ALBUTEROL SULFATE 90 UG/1
1 AEROSOL, METERED RESPIRATORY (INHALATION)
Qty: 1 INHALER | Refills: 0 | Status: SHIPPED | OUTPATIENT
Start: 2017-09-12 | End: 2018-07-13

## 2017-09-12 RX ORDER — BENZONATATE 100 MG/1
100 CAPSULE ORAL
Qty: 30 CAP | Refills: 0 | Status: SHIPPED | OUTPATIENT
Start: 2017-09-12 | End: 2017-09-19

## 2017-09-12 RX ORDER — SODIUM CHLORIDE 0.9 % (FLUSH) 0.9 %
10 SYRINGE (ML) INJECTION
Status: COMPLETED | OUTPATIENT
Start: 2017-09-12 | End: 2017-09-12

## 2017-09-12 RX ORDER — AZITHROMYCIN 250 MG/1
TABLET, FILM COATED ORAL
Qty: 6 TAB | Refills: 0 | Status: SHIPPED | OUTPATIENT
Start: 2017-09-12 | End: 2018-06-07 | Stop reason: CLARIF

## 2017-09-12 RX ORDER — SODIUM CHLORIDE 9 MG/ML
50 INJECTION, SOLUTION INTRAVENOUS
Status: COMPLETED | OUTPATIENT
Start: 2017-09-12 | End: 2017-09-12

## 2017-09-12 RX ADMIN — SODIUM CHLORIDE 50 ML/HR: 900 INJECTION, SOLUTION INTRAVENOUS at 02:17

## 2017-09-12 RX ADMIN — ALBUTEROL SULFATE 2.5 MG: 2.5 SOLUTION RESPIRATORY (INHALATION) at 00:05

## 2017-09-12 RX ADMIN — Medication 10 ML: at 02:17

## 2017-09-12 RX ADMIN — IOPAMIDOL 80 ML: 755 INJECTION, SOLUTION INTRAVENOUS at 02:17

## 2017-09-12 RX ADMIN — ACETAMINOPHEN 650 MG: 325 TABLET ORAL at 00:06

## 2017-09-12 NOTE — ED NOTES
Pt arrives via wheelchair to room c/o a cough and shortness of breath x 1 day. Pt denies fever or other symptoms. Monitor x 2. Call bell within reach and plan of care discussed.

## 2017-09-12 NOTE — ED PROVIDER NOTES
Patient is a 77 y.o. female presenting with cough. The history is provided by the patient. Cough   This is a new problem. The current episode started more than 2 days ago. The problem has been gradually worsening. The cough is productive of sputum. There has been no fever. Associated symptoms include chest pain, chills, sweats, myalgias, shortness of breath and wheezing. Pertinent negatives include no headaches, no rhinorrhea, no sore throat, no nausea, no vomiting and no confusion. She has tried nothing for the symptoms. She is not a smoker. 76 yo F with PMH of anemia, GERD, HLD, HTN, Osteoporosis, TIA, and FRED who is post op day 6 from R bunionectomy who presents for evaluation of myalgias and cough. Patient states that since surgery she has had aching in all her muscles, worse at night. Has tried sleeping on a heating pad without improvement. She then starting having cough for the last few days. Cough productive of minimum amount of clear sputum, associated with SOB, and CP when coughing. Has had night sweats and chills but no objective fevers. Saw her surgeon this AM who stated that her foot was healing appropriately. This afternoon she saw her PCP who was concerned that she could have PNA and sent her here for further evaluation. She denies sick contact, nausea, vomiting, dysuria. Patient does not smoke. No h/o lung or heart issues. Past Medical History:   Diagnosis Date    Anemia     has had iron infusions;  Hem/onc Dr Landry Shah [phone]    Anxiety and depression     GERD (gastroesophageal reflux disease)     Hypercholesteremia     Hypertension     Limited peripheral vision of right eye     complication of cataract surgery    Meniere disease     Monoclonal gammopathy of unknown significance     hem/onc:  Dr Landry Shah [phone]    Osteoporosis     Psoriasis     bilateral Legs    Restless leg     Skin melanoma ([facility] [address] 2012    (Rt ankle) and basal cell (Lt eye) removed    Transient ischemic attack approx 2005    HAS HAD 2 MINISTROKES-NO DEFICITS; neuro Dr Denise Marshall (cc)    Unspecified adverse effect of anesthesia     HAS NEEDED PORT OR CUTDOWN FOR RECVNG BLOOD OR LONG-TERM IVs; surgery 7/28/15 w/o any difficulty    Unspecified sleep apnea     does not use CPAP       Past Surgical History:   Procedure Laterality Date    HX APPENDECTOMY      HX CATARACT REMOVAL  2011    Right    HX CERVICAL FUSION  1/28/15    C5-7    HX CHOLECYSTECTOMY  2000    HX GI  2010    liver bx due to liver Bx due to elevated enzymes, liver laceration during Bx, hospitalized 10-12d    HX HEENT  7/28/15    Lt total parotidectomy with facial nerve dissection: Dr Siri Baumgarten HX HEENT  11/2015    Right parotidectomy    HX HYSTERECTOMY      HX ORTHOPAEDIC  2009 & 2011    LUMBAR FUSION and revision    HX OTHER SURGICAL  2010    MELANOMA (Rt ankle) and basal cell (Lt eye) removed; q6mo ck by DERMATOLOGY    HX SHOULDER REPLACEMENT Left 08/01/2016    HX THYROIDECTOMY      Right    OR COLON CA SCRN NOT HI RSK IND  1/27/2016              Family History:   Problem Relation Age of Onset    Alcohol abuse Father     Emphysema Father     Cancer Sister      PANCREATIC    Alcohol abuse Brother     COPD Brother     Other Mother      UNKNOWN CAUSE OF DEATH    Heart Disease Mother     Anesth Problems Neg Hx        Social History     Social History    Marital status:      Spouse name: N/A    Number of children: N/A    Years of education: N/A     Occupational History    Not on file. Social History Main Topics    Smoking status: Former Smoker     Years: 15.00     Quit date: 7/15/1975    Smokeless tobacco: Never Used    Alcohol use Yes      Comment: may have a glass of wine on rare occasion    Drug use: No    Sexual activity: Not on file     Other Topics Concern    Not on file     Social History Narrative    Lives in [place] with  of 62 years. Has 2 sons and 1 daughter.   Used to work in a bank. Likes to garden and sew. ALLERGIES: Aspirin; Bee sting [sting, bee]; Codeine; Hydrocodone; Morphine; Nsaids (non-steroidal anti-inflammatory drug); Oxycodone; and Penicillins    Review of Systems   Constitutional: Positive for chills. Negative for activity change and fever. HENT: Negative for rhinorrhea and sore throat. Respiratory: Positive for cough, shortness of breath and wheezing. Cardiovascular: Positive for chest pain. Gastrointestinal: Negative for abdominal pain, diarrhea, nausea and vomiting. Endocrine: Negative for polyuria. Genitourinary: Negative for dysuria. Musculoskeletal: Positive for myalgias. Skin: Negative for rash. Neurological: Negative for headaches. Psychiatric/Behavioral: Negative for confusion. Vitals:    09/11/17 2043 09/12/17 0330   BP: (!) 190/101 127/60   Pulse: 79    Resp: 18    Temp: 97.2 °F (36.2 °C)    SpO2: 97% 96%   Weight: 69.7 kg (153 lb 10.6 oz)    Height: 5' 2\" (1.575 m)             Physical Exam   Constitutional: She is oriented to person, place, and time. She appears well-developed and well-nourished. No distress. HENT:   Head: Normocephalic and atraumatic. Eyes: Conjunctivae are normal.   Cardiovascular: Normal rate and normal heart sounds. Pulmonary/Chest: Effort normal. No respiratory distress. She has wheezes (mild, bibasillar). She has rales (mild, worse at L lung base). Musculoskeletal:   R foot with surgical dressing in place, appears clean, dry, and intact. Ecchymosis of the right toes   Neurological: She is alert and oriented to person, place, and time. Skin: No rash noted. She is not diaphoretic. Psychiatric: She has a normal mood and affect. Her behavior is normal.   Nursing note and vitals reviewed.        MDM  Number of Diagnoses or Management Options  Diagnosis management comments: Ddx: PNA, Bronchitis, Viral Illness, ACS, PE, Post Op Complication    78 yo F with PMH of anemia, GERD, HLD, HTN, Osteoporosis, TIA, and FRED who is post op day 6 from R bunionectomy who presents for evaluation of myalgias and cough. VS sig for HTN, other VS WNL. Patient is well appearing but does cough throughout interview. Will check EKG, CBC, BMP, trop, lactate, and CXR. Most likely etiology is PNA vs bronchitis. PE felt less likely though patient is post op because she has cough, no tachypnea/tachycardia. Wells criteria for PE score is 1.5, will get d dimer to further risk stratify. ACS less likely as patient has pain with coughing. Heart score is 3. Will get tylenol for pain. ED Course       Procedures    Chief Complaint   Patient presents with    Spasms     pt states she had foot surgery last week and has had cough and muscle spasms    Cough       12:44 AM  The patients presenting problems have been discussed, and they are in agreement with the care plan formulated and outlined with them. I have encouraged them to ask questions as they arise throughout their visit.     MEDICATIONS GIVEN:  Medications   acetaminophen (TYLENOL) tablet 650 mg (650 mg Oral Given 9/12/17 0006)   albuterol (PROVENTIL VENTOLIN) nebulizer solution 2.5 mg (2.5 mg Nebulization Given 9/12/17 0005)   sodium chloride (NS) flush 10 mL (10 mL IntraVENous Given 9/12/17 0217)   iopamidol (ISOVUE-370) 76 % injection 80 mL (80 mL IntraVENous Given 9/12/17 0217)   0.9% sodium chloride infusion (50 mL/hr IntraVENous New Bag 9/12/17 0217)       LABS REVIEWED:  Recent Results (from the past 24 hour(s))   CBC WITH AUTOMATED DIFF    Collection Time: 09/11/17 11:41 PM   Result Value Ref Range    WBC 4.9 3.6 - 11.0 K/uL    RBC 4.29 3.80 - 5.20 M/uL    HGB 13.0 11.5 - 16.0 g/dL    HCT 40.4 35.0 - 47.0 %    MCV 94.2 80.0 - 99.0 FL    MCH 30.3 26.0 - 34.0 PG    MCHC 32.2 30.0 - 36.5 g/dL    RDW 13.3 11.5 - 14.5 %    PLATELET 400 847 - 487 K/uL    NEUTROPHILS 52 32 - 75 %    LYMPHOCYTES 36 12 - 49 %    MONOCYTES 9 5 - 13 %    EOSINOPHILS 3 0 - 7 % BASOPHILS 0 0 - 1 %    ABS. NEUTROPHILS 2.5 1.8 - 8.0 K/UL    ABS. LYMPHOCYTES 1.8 0.8 - 3.5 K/UL    ABS. MONOCYTES 0.4 0.0 - 1.0 K/UL    ABS. EOSINOPHILS 0.1 0.0 - 0.4 K/UL    ABS. BASOPHILS 0.0 0.0 - 0.1 K/UL   LACTIC ACID    Collection Time: 09/11/17 11:41 PM   Result Value Ref Range    Lactic acid 1.2 0.4 - 2.0 MMOL/L   URINALYSIS W/ RFLX MICROSCOPIC    Collection Time: 09/11/17 11:41 PM   Result Value Ref Range    Color YELLOW/STRAW      Appearance CLEAR CLEAR      Specific gravity 1.015 1.003 - 1.030      pH (UA) 6.0 5.0 - 8.0      Protein NEGATIVE  NEG mg/dL    Glucose NEGATIVE  NEG mg/dL    Ketone NEGATIVE  NEG mg/dL    Bilirubin NEGATIVE  NEG      Blood NEGATIVE  NEG      Urobilinogen 0.2 0.2 - 1.0 EU/dL    Nitrites NEGATIVE  NEG      Leukocyte Esterase MODERATE (A) NEG      WBC 5-10 0 - 4 /hpf    RBC 0-5 0 - 5 /hpf    Epithelial cells FEW FEW /lpf    Bacteria NEGATIVE  NEG /hpf   TROPONIN I    Collection Time: 09/11/17 11:41 PM   Result Value Ref Range    Troponin-I, Qt. <0.04 <0.05 ng/mL   MAGNESIUM    Collection Time: 09/11/17 11:41 PM   Result Value Ref Range    Magnesium 2.3 1.6 - 2.4 mg/dL   METABOLIC PANEL, COMPREHENSIVE    Collection Time: 09/11/17 11:41 PM   Result Value Ref Range    Sodium 139 136 - 145 mmol/L    Potassium 4.0 3.5 - 5.1 mmol/L    Chloride 106 97 - 108 mmol/L    CO2 27 21 - 32 mmol/L    Anion gap 6 5 - 15 mmol/L    Glucose 89 65 - 100 mg/dL    BUN 14 6 - 20 MG/DL    Creatinine 0.71 0.55 - 1.02 MG/DL    BUN/Creatinine ratio 20 12 - 20      GFR est AA >60 >60 ml/min/1.73m2    GFR est non-AA >60 >60 ml/min/1.73m2    Calcium 8.8 8.5 - 10.1 MG/DL    Bilirubin, total 0.3 0.2 - 1.0 MG/DL    ALT (SGPT) 38 12 - 78 U/L    AST (SGOT) 24 15 - 37 U/L    Alk.  phosphatase 96 45 - 117 U/L    Protein, total 7.4 6.4 - 8.2 g/dL    Albumin 3.8 3.5 - 5.0 g/dL    Globulin 3.6 2.0 - 4.0 g/dL    A-G Ratio 1.1 1.1 - 2.2     EKG, 12 LEAD, INITIAL    Collection Time: 09/12/17 12:05 AM   Result Value Ref Range    Ventricular Rate 77 BPM    Atrial Rate 77 BPM    P-R Interval 142 ms    QRS Duration 78 ms    Q-T Interval 384 ms    QTC Calculation (Bezet) 434 ms    Calculated P Axis 51 degrees    Calculated R Axis 52 degrees    Calculated T Axis 42 degrees    Diagnosis       Normal sinus rhythm  Normal ECG  When compared with ECG of 16-JAN-2015 13:35,  No significant change was found     D DIMER    Collection Time: 09/12/17 12:48 AM   Result Value Ref Range    D-dimer 1.07 (H) 0.00 - 0.65 mg/L FEU       VITAL SIGNS:  Patient Vitals for the past 12 hrs:   Temp Pulse Resp BP SpO2   09/12/17 0330 - - - 127/60 96 %   09/11/17 2043 97.2 °F (36.2 °C) 79 18 (!) 190/101 97 %       RADIOLOGY RESULTS:  The following have been ordered and reviewed:  CTA CHEST W OR W WO CONT   Final Result   EXAM:  CTA CHEST W OR W WO CONT     INDICATION:   eval for PE. Cough     COMPARISON: None.     CONTRAST:  80 mL of Isovue-370.     TECHNIQUE:   Precontrast  images were obtained to localize the volume for acquisition. Multislice helical CT arteriography was performed from the diaphragm to the  thoracic inlet during uneventful rapid bolus intravenous contrast  administration. Lung and soft tissue windows were generated. Coronal and  sagittal images were generated and 3D post processing consisting of coronal  maximum intensity images was performed. CT dose reduction was achieved through  use of a standardized protocol tailored for this examination and automatic  exposure control for dose modulation.     FINDINGS:     THYROID: No nodule. MEDIASTINUM: No mass or lymphadenopathy. CHRIS: No mass or lymphadenopathy. THORACIC AORTA: No dissection or aneurysm. PULMONARY ARTERY: Normal in caliber. Pulmonary arteries are well enhanced. No  evidence of pulmonary embolus. TRACHEA/BRONCHI: Patent. ESOPHAGUS: No wall thickening or dilatation. HEART: Normal in size. PLEURA: No effusion or pneumothorax.   LUNGS: There is a 3 mm pulmonary nodule in the right middle lobe on image 55. A  4 mm pulmonary nodule is seen on image 54. Mild atelectatic changes seen in the  lungs. No evidence of infection, pleural effusion, or pneumothorax  INCIDENTALLY IMAGED UPPER ABDOMEN: There is a 5 mm hypodensity in the dome of  liver too small fully characterize, but likely representing a cyst. A 10 mm cyst  is seen in the left lateral hepatic lobe. BONES: No destructive bone lesion.     IMPRESSION  IMPRESSION:     1. Negative for pulmonary embolus. No evidence of acute process. 2. 2 small adjacent nodules in the right lung measuring less than 4 mm or less. If the patient is low-risk, no further follow-up is necessary. If the patient is  high risk, consider chest CT in 12 months. XR CHEST PA LAT   Final Result   Chest PA and lateral     History: Cough     Comparison: 4/26/2014     Findings: The lungs are well expanded. No focal consolidation, pleural  effusion, or pneumothorax. The cardiomediastinal silhouette is unremarkable. The patient is status post left shoulder arthroplasty. Anterior cervical fusion  hardware is present. Surgical clips are seen in the right upper quadrant.     IMPRESSION  Impression:  No acute cardiopulmonary process. EKG interpretation: (Preliminary) 5:20 AM  Rhythm: normal sinus rhythm; and regular . Rate (approx.): 77; Axis: normal; NM interval: normal; QRS interval: normal ; QTc interval: normal; other findings: no ischemic changes    PROCEDURES:  None    CONSULTATIONS:   None    PROGRESS NOTES:  0044- Lab workup remarkable to this point. CXR without any consolidation. Awaiting d dimer  0123- D dimer elevated at 1.07, CTA chest ordered. 3418- CTA of chest without PE. Has two small nodules that do not need follow up as patient is low risk. Have given her hard copy of her imaging results for her and her doctor's review. Will prescribe tessalon pearls, albuterol, and z pack. Patient to f/u with her PCP. DIAGNOSIS:    1.  Acute bronchitis, unspecified organism        PLAN:  Follow-up Information     Follow up With Details Comments Contact Info    Julianna Owen MD In 3 days  [facility]  [phone]      \[facility]\"" EMERGENCY DEPT  As needed, If symptoms worsen [address] [place] [address] Dr Julianna Owen MD In 3 days  [facility]  [phone]          Discharge Medication List as of 9/12/2017  3:48 AM            ED COURSE: The patients hospital course has been uncomplicated.

## 2018-03-23 ENCOUNTER — HOSPITAL ENCOUNTER (OUTPATIENT)
Dept: MRI IMAGING | Age: 78
Discharge: HOME | End: 2018-03-23
Attending: ORTHOPAEDIC SURGERY
Payer: MEDICARE

## 2018-03-23 DIAGNOSIS — M47.816 LUMBAR SPONDYLOSIS: ICD-10-CM

## 2018-03-23 DIAGNOSIS — M54.16 LEFT LUMBAR RADICULOPATHY: ICD-10-CM

## 2018-03-23 DIAGNOSIS — M48.062 LUMBAR STENOSIS WITH NEUROGENIC CLAUDICATION: ICD-10-CM

## 2018-03-23 DIAGNOSIS — M51.36 DDD (DEGENERATIVE DISC DISEASE), LUMBAR: ICD-10-CM

## 2018-03-23 PROCEDURE — 72148 MRI LUMBAR SPINE W/O DYE: CPT

## 2018-04-15 ENCOUNTER — HOSPITAL ENCOUNTER (OUTPATIENT)
Dept: MRI IMAGING | Age: 78
Discharge: HOME | End: 2018-04-15
Attending: ORTHOPAEDIC SURGERY
Payer: MEDICARE

## 2018-04-15 DIAGNOSIS — R26.81 UNSTEADY GAIT: ICD-10-CM

## 2018-04-15 DIAGNOSIS — R29.2 ABNORMAL REFLEX: ICD-10-CM

## 2018-04-15 PROCEDURE — 72146 MRI CHEST SPINE W/O DYE: CPT

## 2018-04-15 PROCEDURE — 72141 MRI NECK SPINE W/O DYE: CPT

## 2018-04-18 ENCOUNTER — TELEPHONE (OUTPATIENT)
Dept: NEUROLOGY | Age: 78
End: 2018-04-18

## 2018-04-18 NOTE — TELEPHONE ENCOUNTER
----- Message from Leann Bolus sent at 4/17/2018  4:04 PM EDT -----  Regarding: Dr. Fredis Vazquez / Lisa Jordon: [phone]  Pt stated Dr. Leeanne Rivas requested pt f/up with Dr. Fredis Vazquez regarding numbness in legs. Can you work in.

## 2018-04-19 ENCOUNTER — TELEPHONE (OUTPATIENT)
Dept: NEUROLOGY | Age: 78
End: 2018-04-19

## 2018-04-19 NOTE — TELEPHONE ENCOUNTER
----- Message from Carlos Hays sent at 4/19/2018  3:47 PM EDT -----  Regarding: Dr Echavarria/Telephone  Pt would like to schedule an appointment with Dr Elvin Stallworth, pt is requesting an appointment before October.     Contact [phone]

## 2018-04-30 ENCOUNTER — OFFICE VISIT (OUTPATIENT)
Dept: SURGERY | Age: 78
End: 2018-04-30

## 2018-04-30 VITALS
SYSTOLIC BLOOD PRESSURE: 199 MMHG | TEMPERATURE: 98.2 F | HEART RATE: 82 BPM | OXYGEN SATURATION: 99 % | HEIGHT: 62 IN | BODY MASS INDEX: 28.34 KG/M2 | WEIGHT: 154 LBS | DIASTOLIC BLOOD PRESSURE: 104 MMHG

## 2018-04-30 DIAGNOSIS — R10.30 GROIN PAIN, UNSPECIFIED LATERALITY: Primary | ICD-10-CM

## 2018-04-30 NOTE — PROGRESS NOTES
1. Have you been to the ER, urgent care clinic since your last visit? Hospitalized since your last visit? New patient    2. Have you seen or consulted any other health care providers outside of the [address] [place] [address] since your last visit? Include any pap smears or colon screening. New patient      Discussed advanced directive. Patient states that she does not have an advanced directive.

## 2018-04-30 NOTE — PROGRESS NOTES
To: Donna Mcrae MD  CC: Luciana Laird MD    From: Vaishali Amador MD    Thank you for sending Marquis Dumont to see us. Encounter Date: 4/30/2018  History and Physical    Assessment:   Bilateral groin pain. L>R. No evidence of hernia on exam.      She does have h/o lumbar spine surgery and c/o numbness in both legs and weakness in the left. Groin pain could be related -- referred radicular pain. Plan: Will see what Dr. Chad Ramos thinks when he sees her in June. She has seen Dr. Kayleigh Giron already. Lumbar spine MRI March of this year did not show significant dz. If Dr. Chad Ramos has no explanation for her symptoms, would get a CT of the abdomen and pelvis. Without urinary and GI symptoms, however, this may be low yield. Could possibly be tender lymph nodes from the vaginitis, but none palpable on exam.      HPI:   Marquis Dumont is a 69 y.o. female who is seen in consultation at the request of Dr. Lillian Gonzales for bilateral groin pain. Says its worse on the left than the right. Notices most at night in bed. Sleeps on her sides. Pain there 2-3 months. No bulge noted. Was recently treated by Dr. Lillian Gonzales for vaginitis. He also did a transvaginal US that per patient did not show any dz. She had partial hysterectomy in ~1973 for heavy periods and a large fibroid. Still has her ovaries. She has h/o spine surgery, cervical and lumbar by Dr. Kayleigh Giron. She is currently have numbness in both legs and weakness on the left. Had MRI's. Going to see Dr. Chad Ramos in June. Patient does not have urinary symptoms. Patient does not have difficulty with bowel movements. Patient does not have nausea or vomiting. Past Medical History:   Diagnosis Date    Anemia     has had iron infusions;  Hem/onc Dr Elizabeth Lawler [phone]    Anxiety and depression     GERD (gastroesophageal reflux disease)     Hypercholesteremia     Hypertension     Limited peripheral vision of right eye     complication of cataract surgery    Long term current use of anticoagulant therapy     Meniere disease     Monoclonal gammopathy of unknown significance     hem/onc:  Dr Kristi Neri [phone]    Osteoporosis     Psoriasis     bilateral Legs    Restless leg     Skin melanoma ([address] 2012    (Rt ankle) and basal cell (Lt eye) removed    Transient ischemic attack approx 2005    HAS HAD 2 MINISTROKES-NO DEFICITS; neuro Dr Michael Soni (cc)    Unspecified adverse effect of anesthesia     HAS NEEDED PORT OR CUTDOWN FOR RECVNG BLOOD OR LONG-TERM IVs; surgery 7/28/15 w/o any difficulty    Unspecified sleep apnea     does not use CPAP     Past Surgical History:   Procedure Laterality Date    HX APPENDECTOMY      HX CATARACT REMOVAL  2011    Right    HX CERVICAL FUSION  1/28/15    C5-7    HX CHOLECYSTECTOMY  2000    HX GI  2010    liver bx due to liver Bx due to elevated enzymes, liver laceration during Bx, hospitalized 10-12d    HX HEENT  7/28/15    Lt total parotidectomy with facial nerve dissection: Dr Ernie Fox HX HEENT  11/2015    Right parotidectomy    HX HYSTERECTOMY      HX ORTHOPAEDIC  2009 & 2011    LUMBAR FUSION and revision    HX OTHER SURGICAL  2010    MELANOMA (Rt ankle) and basal cell (Lt eye) removed; q6mo ck by DERMATOLOGY    HX SHOULDER REPLACEMENT Left 08/01/2016    HX THYROIDECTOMY      Right    AR COLON CA SCRN NOT HI RSK IND  1/27/2016           Family History   Problem Relation Age of Onset    Alcohol abuse Father     Emphysema Father     Cancer Sister      PANCREATIC    Alcohol abuse Brother     COPD Brother     Other Mother      UNKNOWN CAUSE OF DEATH    Heart Disease Mother     Anesth Problems Neg Hx       Social History   Substance Use Topics    Smoking status: Former Smoker     Years: 15.00     Quit date: 7/15/1975    Smokeless tobacco: Never Used    Alcohol use Yes      Comment: may have a glass of wine on rare occasion      Current Outpatient Prescriptions   Medication Sig    albuterol (PROVENTIL HFA, VENTOLIN HFA, PROAIR HFA) 90 mcg/actuation inhaler Take 1 Puff by inhalation every four (4) hours as needed for Wheezing.  escitalopram oxalate (LEXAPRO) 20 mg tablet Take 20 mg by mouth nightly. Indications: ANXIETY WITH DEPRESSION    simvastatin (ZOCOR) 10 mg tablet Take  by mouth nightly. Indications: HYPERCHOLESTEROLEMIA    pantoprazole (PROTONIX) 40 mg tablet Take 40 mg by mouth every morning. Indications: GASTROESOPHAGEAL REFLUX    verapamil SR (CALAN-SR) 120 mg CR tablet Take 120 mg by mouth nightly. Indications: HYPERTENSION    ferrous sulfate 325 mg (65 mg iron) CpER Take  by mouth daily.  clonazepam (KLONOPIN) 0.5 mg tablet Take 0.5 mg by mouth two (2) times a day. Indications: PANIC DISORDER    NORTRIPTYLINE HCL (NORTRIPTYLINE PO) Take 10 mg by mouth two (2) times a day.  ROPINIROLE HCL (REQUIP PO) Take 1 mg by mouth nightly.  polyethylene glycol (MIRALAX) 17 gram packet Take 17 g by mouth every morning. Pt puts in her coffee    azithromycin (ZITHROMAX Z-CLAUDY) 250 mg tablet As directed    PREDNISONE PO Take  by mouth once. Indications: muscle spasm in next  took 1 daily x 3 days    OTHER Indications: muscle relax pt does not know name or dose    HYDROmorphone (DILAUDID) 2 mg tablet Take 1 Tab by mouth every four (4) hours as needed for Pain. Max Daily Amount: 12 mg. Indications: Pain    promethazine (PHENERGAN) 25 mg tablet Take 1 Tab by mouth every six (6) hours as needed for Nausea. Indications: Pain Treatment Adjunct, POST-OPERATIVE NAUSEA AND VOMITING    FOLIC ACID/MULTIVIT-MIN/LUTEIN (CENTRUM SILVER PO) Take  by mouth daily.  clopidogrel (PLAVIX) 75 mg tablet Take 75 mg by mouth daily. No current facility-administered medications for this visit. Allergies:   Allergies   Allergen Reactions    Aspirin Nausea and Vomiting    Bee Sting [Sting, Bee] Hives and Shortness of Breath     WASP STING    Codeine Nausea and Vomiting    Hydrocodone Rash     And sedation    Morphine Rash    Nsaids (Non-Steroidal Anti-Inflammatory Drug) Nausea and Vomiting    Oxycodone Rash     On her back    Penicillins Hives, Shortness of Breath and Swelling        Review of Systems:  10 systems reviewed. See scanned sheet in \"Media\" section. See HPI for pertinent positives and negatives. Objective:     Visit Vitals    BP (!) 199/104 (BP 1 Location: Right arm, BP Patient Position: Sitting)    Pulse 82    Temp 98.2 °F (36.8 °C)    Ht 5' 2\" (1.575 m)    Wt 69.9 kg (154 lb)    SpO2 99%    BMI 28.17 kg/m2       Physical Exam:  General appearance  Alert, cooperative, no distress, appears stated age           Back   No CVA tenderness   Lungs   Clear to auscultation bilaterally   Heart  Regular rate and rhythm. No murmur, rub or gallop   Abdomen   Soft. Bowel sounds normal.  No hernias in the groins or along her pfannenstiel scar. TTP left groin > right. No obvious inguinal lymphadenopathy. Extremities no cyanosis or edema, feet warm and sensate. Pulses 1+ DP's bilaterally. Skin Skin color, texture, turgor normal.   Lymph nodes Inguinal nodes not palpable.    Neurologic Without overt sensory or motor deficit     Signed By: Sakshi Valdez MD     April 30, 2018

## 2018-05-10 ENCOUNTER — HOSPITAL ENCOUNTER (OUTPATIENT)
Dept: MAMMOGRAPHY | Age: 78
Discharge: HOME | End: 2018-05-10
Attending: FAMILY MEDICINE
Payer: MEDICARE

## 2018-05-10 DIAGNOSIS — Z12.39 SCREENING BREAST EXAMINATION: ICD-10-CM

## 2018-05-10 PROCEDURE — 77067 SCR MAMMO BI INCL CAD: CPT

## 2018-06-07 ENCOUNTER — OFFICE VISIT (OUTPATIENT)
Dept: NEUROLOGY | Age: 78
End: 2018-06-07

## 2018-06-07 VITALS
WEIGHT: 154 LBS | BODY MASS INDEX: 28.34 KG/M2 | HEIGHT: 62 IN | DIASTOLIC BLOOD PRESSURE: 82 MMHG | SYSTOLIC BLOOD PRESSURE: 136 MMHG | TEMPERATURE: 98 F | OXYGEN SATURATION: 97 % | RESPIRATION RATE: 12 BRPM | HEART RATE: 76 BPM

## 2018-06-07 DIAGNOSIS — M54.16 LUMBAR BACK PAIN WITH RADICULOPATHY AFFECTING LEFT LOWER EXTREMITY: ICD-10-CM

## 2018-06-07 DIAGNOSIS — M48.061 SPINAL STENOSIS OF LUMBAR REGION WITHOUT NEUROGENIC CLAUDICATION: ICD-10-CM

## 2018-06-07 DIAGNOSIS — G44.209 TENSION VASCULAR HEADACHE: Primary | ICD-10-CM

## 2018-06-07 DIAGNOSIS — I67.89 CEREBRAL MICROVASCULAR DISEASE: ICD-10-CM

## 2018-06-07 DIAGNOSIS — I65.23 STENOSIS OF BOTH INTERNAL CAROTID ARTERIES: ICD-10-CM

## 2018-06-07 DIAGNOSIS — G25.0 BENIGN ESSENTIAL TREMOR SYNDROME: ICD-10-CM

## 2018-06-07 DIAGNOSIS — E11.42 DIABETIC PERIPHERAL NEUROPATHY ASSOCIATED WITH TYPE 2 DIABETES MELLITUS (HCC): ICD-10-CM

## 2018-06-07 DIAGNOSIS — G44.229 CHRONIC TENSION-TYPE HEADACHE, NOT INTRACTABLE: ICD-10-CM

## 2018-06-07 DIAGNOSIS — E53.8 B12 DEFICIENCY: ICD-10-CM

## 2018-06-07 DIAGNOSIS — G60.8 IDIOPATHIC SMALL AND LARGE FIBER SENSORY NEUROPATHY: ICD-10-CM

## 2018-06-07 DIAGNOSIS — M96.1 CERVICAL POST-LAMINECTOMY SYNDROME: ICD-10-CM

## 2018-06-07 DIAGNOSIS — M54.16 LUMBAR BACK PAIN WITH RADICULOPATHY AFFECTING RIGHT LOWER EXTREMITY: ICD-10-CM

## 2018-06-07 PROBLEM — M47.22 CERVICAL RADICULOPATHY DUE TO DEGENERATIVE JOINT DISEASE OF SPINE: Status: ACTIVE | Noted: 2018-06-07

## 2018-06-07 PROBLEM — E55.9 VITAMIN D DEFICIENCY: Status: ACTIVE | Noted: 2018-06-07

## 2018-06-07 RX ORDER — MELATONIN
DAILY
COMMUNITY
End: 2019-03-08

## 2018-06-07 RX ORDER — PRIMIDONE 50 MG/1
25 TABLET ORAL
Qty: 90 TAB | Refills: 3 | Status: SHIPPED | OUTPATIENT
Start: 2018-06-07 | End: 2019-11-05 | Stop reason: DRUGHIGH

## 2018-06-07 NOTE — MR AVS SNAPSHOT
[address] [place] [address], [address] [facility] 
[phone] Patient: Danielle Friedman MRN: [id number]:[id number] Visit Information Date & Time Provider Department Dept. Phone Encounter #  
 6/7/2018  3:40 PM Guy Rubio MD [facility] [phone] [id number] Follow-up Instructions Return in about 6 months (around 12/7/2018). Your Appointments 10/24/2018  8:00 AM  
Follow Up with Guy Rubio MD  
[facility] [address] [facility]) Appt Note: Follow up $0CP tdb 8/22/17; r/s from 3/8/18/ follow up $0 CP, 3/7/18 ndm [address] [facility], 
[address] [place] [address], [address] [phone]  
[address] [facility], [address] [place] [address], [address] [place] [address] [address] [phone] Upcoming Health Maintenance Date Due DTaP/Tdap/Td series (1 - Tdap) 9/23/1962 ZOSTER VACCINE AGE 60> 7/23/2001 GLAUCOMA SCREENING Q2Y 9/23/2006 Pneumococcal 65+ High/Highest Risk (1 of 2 - PCV13) 9/23/2006 MEDICARE YEARLY EXAM 3/26/2018 Influenza Age 5 to Adult 8/1/2018 Allergies as of 6/7/2018  Review Complete On: 6/7/2018 By: Guy Rubio MD  
  
 Severity Noted Reaction Type Reactions Aspirin  10/10/2013   Intolerance Nausea and Vomiting Bee Sting [Sting, Bee]  07/15/2011    Hives, Shortness of Breath  
 WASP STING Codeine  07/15/2011    Nausea and Vomiting Hydrocodone  08/31/2016    Rash And sedation Morphine  07/15/2011    Rash  
 Nsaids (Non-steroidal Anti-inflammatory Drug)  08/31/2017    Nausea and Vomiting Oxycodone  08/31/2016    Rash On her back Penicillins  07/15/2011    Hives, Shortness of Breath, Swelling Current Immunizations  Reviewed on 1/28/2015 No immunizations on file. Not reviewed this visit You Were Diagnosed With   
  
 Codes Comments Tension vascular headache    -  Primary ICD-10-CM: C55.508 ICD-9-CM: 307.81 Benign essential tremor syndrome     ICD-10-CM: G25.0 ICD-9-CM: 333.1 Stenosis of both internal carotid arteries     ICD-10-CM: I65.23 ICD-9-CM: 433.10, 433.30 Spinal stenosis of lumbar region without neurogenic claudication     ICD-10-CM: M48.061 
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 
ICD-9-CM: 250.60, 357.2 B12 deficiency     ICD-10-CM: E53.8 ICD-9-CM: 266.2 Lumbar back pain with radiculopathy affecting right lower extremity     ICD-10-CM: M54.17 ICD-9-CM: 724.4 Lumbar back pain with radiculopathy affecting left lower extremity     ICD-10-CM: M54.17 ICD-9-CM: 724.4 Vitals BP Pulse Temp Resp Height(growth percentile) Weight(growth percentile) 136/82 76 98 °F (36.7 °C) 12 5' 2\" (1.575 m) 154 lb (69.9 kg) LMP SpO2 BMI OB Status Smoking Status (LMP Unknown) 97% 28.17 kg/m2 Hysterectomy Former Smoker Vitals History BMI and BSA Data Body Mass Index Body Surface Area  
 28.17 kg/m 2 1.75 m 2 Preferred Pharmacy Pharmacy Name Phone [address] [place] [address] [phone] Your Updated Medication List  
  
   
This list is accurate as of 6/7/18  4:36 PM.  Always use your most recent med list.  
  
  
  
  
 albuterol 90 mcg/actuation inhaler Commonly known as:  PROVENTIL HFA, VENTOLIN HFA, PROAIR HFA Take 1 Puff by inhalation every four (4) hours as needed for Wheezing.   
  
 CENTRUM SILVER PO  
 Take  by mouth daily. clonazePAM 0.5 mg tablet Commonly known as:  Lucendia Demark Take 0.5 mg by mouth two (2) times a day. Indications: PANIC DISORDER  
  
 clopidogrel 75 mg Tab Commonly known as:  PLAVIX Take 75 mg by mouth daily. ferrous sulfate 325 mg (65 mg iron) Cper Take  by mouth daily. LEXAPRO 20 mg tablet Generic drug:  escitalopram oxalate Take 20 mg by mouth nightly. Indications: ANXIETY WITH DEPRESSION  
  
 MIRALAX 17 gram packet Generic drug:  polyethylene glycol Take 17 g by mouth every morning. Pt puts in her coffee NORTRIPTYLINE PO Take 10 mg by mouth two (2) times a day. primidone 50 mg tablet Commonly known as: MYSOLINE Take 0.5 Tabs by mouth nightly. PROTONIX 40 mg tablet Generic drug:  pantoprazole Take 40 mg by mouth every morning. Indications: GASTROESOPHAGEAL REFLUX  
  
 REQUIP PO Take 1 mg by mouth nightly. simvastatin 10 mg tablet Commonly known as:  ZOCOR Take  by mouth nightly. Indications: HYPERCHOLESTEROLEMIA  
  
 verapamil  mg tablet Commonly known as:  CALAN-SR Take 120 mg by mouth nightly. Indications: HYPERTENSION  
  
 VITAMIN D3 1,000 unit tablet Generic drug:  cholecalciferol Take  by mouth daily. Prescriptions Sent to Pharmacy Refills  
 primidone (MYSOLINE) 50 mg tablet 3 Sig: Take 0.5 Tabs by mouth nightly. Class: Normal  
 Pharmacy: [facility] [address], [place] Ph #: [phone] Route: Oral  
  
We Performed the Following PATTIE COMPREHENSIVE PLUS PANEL [[id number] Custom] CBC WITH AUTOMATED DIFF [78360 CPT(R)] CK [id number] CPT(R)] GM1 ANTIBODY IGG, IGM [83294 CPT(R)] HEMOGLOBIN A1C WITH EAG [64341 CPT(R)] IMMUNOELECTROPHORESIS [facility].) [id number] CPT(R)] MAGNESIUM [id number] CPT(R)] METABOLIC PANEL, COMPREHENSIVE [56469 CPT(R)] MYELIN ASSOC GLYCOPROTEIN AB, IGM [id number] CPT(R)] SED RATE (ESR) [id number] CPT(R)] TSH 3RD GENERATION [39191 CPT(R)] VITAMIN B12 & FOLATE [78568 CPT(R)] VITAMIN D, 25 HYROXY PANEL [[id number] Custom] Follow-up Instructions Return in about 6 months (around 12/7/2018). To-Do List   
 07/07/2018 Neurology:  [facility] Patient Instructions Office Policies 
 
o Phone calls/patient messages: Please allow up to 24 hours for someone in the office to contact you about your call or message. Be mindful your provider may be out of the office or your message may require further review. We encourage you to use Plastio for your messages as this is a faster, more efficient way to communicate with our office 
 
o Medication Refills: 
Prescription medications require up to 48 business hours to process. We encourage you to use Plastio for your refills. For controlled medications: Please allow up to 72 business hours to process. Certain medications may require you to  a written prescription at our office. NO narcotic/controlled medications will be prescribed after 4pm Monday through Friday or on weekends 
 
o Form/Paperwork Completion: 
Please note there is a $25 fee for all paperwork completed by our providers. We ask that you allow 7-14 business days. Pre-payment is due prior to picking up/faxing the completed form. You may also download your forms to Plastio to have your doctor print off. A Healthy Lifestyle: Care Instructions Your Care Instructions A healthy lifestyle can help you feel good, stay at a healthy weight, and have plenty of energy for both work and play. A healthy lifestyle is something you can share with your whole family. A healthy lifestyle also can lower your risk for serious health problems, such as high blood pressure, heart disease, and diabetes. You can follow a few steps listed below to improve your health and the health of your family. Follow-up care is a key part of your treatment and safety. Be sure to make and go to all appointments, and call your doctor if you are having problems. It's also a good idea to know your test results and keep a list of the medicines you take. How can you care for yourself at home? · Do not eat too much sugar, fat, or fast foods. You can still have dessert and treats now and then. The goal is moderation. · Start small to improve your eating habits. Pay attention to portion sizes, drink less juice and soda pop, and eat more fruits and vegetables. ¨ Eat a healthy amount of food. A 3-ounce serving of meat, for example, is about the size of a deck of cards. Fill the rest of your plate with vegetables and whole grains. ¨ Limit the amount of soda and sports drinks you have every day. Drink more water when you are thirsty. ¨ Eat at least 5 servings of fruits and vegetables every day. It may seem like a lot, but it is not hard to reach this goal. A serving or helping is 1 piece of fruit, 1 cup of vegetables, or 2 cups of leafy, raw vegetables. Have an apple or some carrot sticks as an afternoon snack instead of a candy bar. Try to have fruits and/or vegetables at every meal. 
· Make exercise part of your daily routine. You may want to start with simple activities, such as walking, bicycling, or slow swimming. Try to be active 30 to 60 minutes every day. You do not need to do all 30 to 60 minutes all at once. For example, you can exercise 3 times a day for 10 or 20 minutes. Moderate exercise is safe for most people, but it is always a good idea to talk to your doctor before starting an exercise program. 
· Keep moving. Miley Velasquez the lawn, work in the garden, or behaview. Take the stairs instead of the elevator at work. · If you smoke, quit. People who smoke have an increased risk for heart attack, stroke, cancer, and other lung illnesses.  Quitting is hard, but there are ways to boost your chance of quitting tobacco for good. ¨ Use nicotine gum, patches, or lozenges. ¨ Ask your doctor about stop-smoking programs and medicines. ¨ Keep trying. In addition to reducing your risk of diseases in the future, you will notice some benefits soon after you stop using tobacco. If you have shortness of breath or asthma symptoms, they will likely get better within a few weeks after you quit. · Limit how much alcohol you drink. Moderate amounts of alcohol (up to 2 drinks a day for men, 1 drink a day for women) are okay. But drinking too much can lead to liver problems, high blood pressure, and other health problems. Family health If you have a family, there are many things you can do together to improve your health. · Eat meals together as a family as often as possible. · Eat healthy foods. This includes fruits, vegetables, lean meats and dairy, and whole grains. · Include your family in your fitness plan. Most people think of activities such as jogging or tennis as the way to fitness, but there are many ways you and your family can be more active. Anything that makes you breathe hard and gets your heart pumping is exercise. Here are some tips: 
¨ Walk to do errands or to take your child to school or the bus. ¨ Go for a family bike ride after dinner instead of watching TV. Where can you learn more? Go to http://trae-joseline.info/. Enter I467 in the search box to learn more about \"A Healthy Lifestyle: Care Instructions. \" Current as of: May 12, 2017 Content Version: 11.4 © 4421-1192 Healthwise, Incorporated. Care instructions adapted under license by [employer] (which disclaims liability or warranty for this information). If you have questions about a medical condition or this instruction, always ask your healthcare professional. [address] any warranty or liability for your use of this information. Introducing [facility]! Dear Javy Going: 
Thank you for requesting a AdsWizz account. Our records indicate that you already have an active AdsWizz account. You can access your account anytime at [email or web address]. [employer] Did you know that you can access your hospital and ER discharge instructions at any time in AdsWizz? You can also review all of your test results from your hospital stay or ER visit. Additional Information If you have questions, please visit the Frequently Asked Questions section of the AdsWizz website at [email or web address]. [employer]/. Remember, AdsWizz is NOT to be used for urgent needs. For medical emergencies, dial 911. Now available from your iPhone and Android! Please provide this summary of care documentation to your next provider. Your primary care clinician is listed as 535Megan Kaplan. If you have any questions after today's visit, please call [phone].

## 2018-06-07 NOTE — PATIENT INSTRUCTIONS
Office Policies    o Phone calls/patient messages:  Please allow up to 24 hours for someone in the office to contact you about your call or message. Be mindful your provider may be out of the office or your message may require further review. We encourage you to use [email or web address] for your messages as this is a faster, more efficient way to communicate with our office    o Medication Refills:  Prescription medications require up to 48 business hours to process. We encourage you to use [email or web address] for your refills. For controlled medications: Please allow up to 72 business hours to process. Certain medications may require you to  a written prescription at our office. NO narcotic/controlled medications will be prescribed after 4pm Monday through Friday or on weekends    o Form/Paperwork Completion:  Please note there is a $25 fee for all paperwork completed by our providers. We ask that you allow 7-14 business days. Pre-payment is due prior to picking up/faxing the completed form. You may also download your forms to [email or web address] to have your doctor print off. A Healthy Lifestyle: Care Instructions  Your Care Instructions    A healthy lifestyle can help you feel good, stay at a healthy weight, and have plenty of energy for both work and play. A healthy lifestyle is something you can share with your whole family. A healthy lifestyle also can lower your risk for serious health problems, such as high blood pressure, heart disease, and diabetes. You can follow a few steps listed below to improve your health and the health of your family. Follow-up care is a key part of your treatment and safety. Be sure to make and go to all appointments, and call your doctor if you are having problems. It's also a good idea to know your test results and keep a list of the medicines you take. How can you care for yourself at home? · Do not eat too much sugar, fat, or fast foods. You can still have dessert and treats now and then. The goal is moderation. · Start small to improve your eating habits. Pay attention to portion sizes, drink less juice and soda pop, and eat more fruits and vegetables. ¨ Eat a healthy amount of food. A 3-ounce serving of meat, for example, is about the size of a deck of cards. Fill the rest of your plate with vegetables and whole grains. ¨ Limit the amount of soda and sports drinks you have every day. Drink more water when you are thirsty. ¨ Eat at least 5 servings of fruits and vegetables every day. It may seem like a lot, but it is not hard to reach this goal. A serving or helping is 1 piece of fruit, 1 cup of vegetables, or 2 cups of leafy, raw vegetables. Have an apple or some carrot sticks as an afternoon snack instead of a candy bar. Try to have fruits and/or vegetables at every meal.  · Make exercise part of your daily routine. You may want to start with simple activities, such as walking, bicycling, or slow swimming. Try to be active 30 to 60 minutes every day. You do not need to do all 30 to 60 minutes all at once. For example, you can exercise 3 times a day for 10 or 20 minutes. Moderate exercise is safe for most people, but it is always a good idea to talk to your doctor before starting an exercise program.  · Keep moving. Miley Velasquez the lawn, work in the garden, or [place]. Take the stairs instead of the elevator at work. · If you smoke, quit. People who smoke have an increased risk for heart attack, stroke, cancer, and other lung illnesses. Quitting is hard, but there are ways to boost your chance of quitting tobacco for good. ¨ Use nicotine gum, patches, or lozenges. ¨ Ask your doctor about stop-smoking programs and medicines. ¨ Keep trying. In addition to reducing your risk of diseases in the future, you will notice some benefits soon after you stop using tobacco. If you have shortness of breath or asthma symptoms, they will likely get better within a few weeks after you quit.   · Limit how much alcohol you drink. Moderate amounts of alcohol (up to 2 drinks a day for men, 1 drink a day for women) are okay. But drinking too much can lead to liver problems, high blood pressure, and other health problems. Family health  If you have a family, there are many things you can do together to improve your health. · Eat meals together as a family as often as possible. · Eat healthy foods. This includes fruits, vegetables, lean meats and dairy, and whole grains. · Include your family in your fitness plan. Most people think of activities such as jogging or tennis as the way to fitness, but there are many ways you and your family can be more active. Anything that makes you breathe hard and gets your heart pumping is exercise. Here are some tips:  ¨ Walk to do errands or to take your child to school or the bus. ¨ Go for a family bike ride after dinner instead of watching TV. Where can you learn more? Go to http://trae-joseline.info/. Enter Y929 in the search box to learn more about \"A Healthy Lifestyle: Care Instructions. \"  Current as of: May 12, 2017  Content Version: 11.4  © 2083-3619 Healthwise, Incorporated. Care instructions adapted under license by [employer] (which disclaims liability or warranty for this information). If you have questions about a medical condition or this instruction, always ask your healthcare professional. [address] any warranty or liability for your use of this information.

## 2018-06-07 NOTE — LETTER
6/7/2018 8:45 PM 
 
Patient:  Ila Marcus YOB: 1940 Date of Visit: 6/7/2018 Dear No Recipients: Thank you for referring Ms. Ila Marcus to me for evaluation/treatment. Below are the relevant portions of my assessment and plan of care. Consult REFERRED BY: 
Isela Ken MD 
 
CHIEF COMPLAINT: Progressive tremors and headaches Subjective: Ila Marcus is a 69 y.o. right-handed  female seen today for urgent evaluation of new problem at the request of Dr. Jass Canales of progressive numbness in her legs, and pain and numbness radiating into her left arm, and increasing tremors in the left hand, and a sensation of weakness in her left leg and difficulty walking, with her leg occasionally turning out. On exam she seems to have a new decreased vibration and pin sensation in her feet may have a mild neuropathy, so we will do a workup for that. However on careful questioning of the patient's symptoms a lot of the symptoms have been present ever since she had her first spinal surgery and a subsequent spinal surgery with fusion from C5-C7 with rods inserted for myelopathy. A lot of this is probably myelopathic weakness and numbness from her previous cervical cord compression and myelopathy. She has a slight intention tremor in the left side, but she says it gets much worse now would like some medication for it. The pain in her left arm seems to radiate up into the neck region, is probably a cervical radiculopathy, but she does have a Tinel's over the median nerve on the left, so we will do an EMG to rule out carpal tunnel. Besides her neck operation she is also had a previous lumbar laminectomy from L3-L5.   MRI scans done in April 2018 by Dr. Karyle Reilly have shown stable disease in the MR I of the cervical spine, thoracic spine looks stable with still some degenerative changes and slight swelling of some of the disc spaces from the previous operations. In the lumbar spine looks stable again with moderate degenerative changes remaining. She has had tremors in her hands over the last 2-3 years, and progressive headaches in the right temporal and periorbital region for the last 2 months. Patient states the tremor seems to be worse when she is doing things like holding a plate or holding a cup of liquid, gets worse when she is stressed, anxious, tired, fatigued, and sometimes affect her ability to write. She has no family history of similar problems. She does not drink alcohol also does not know any relation to alcohol. Has been improved with taking clonazepam 1 mg twice a day. She has not tried any other medications. She complains of headache in the right temporal region that are becoming increasingly severe and almost daily and chronic, and sometimes can be more severe. She been under increased stress because her  has dementia with a shunt put in for normal pressure hydrocephalus several years ago. She also had left shoulder replacement. She has chronic neck pain, chronic back pain, has had a cervical laminectomy from C5-C7 2016, and tube lumbar laminectomies done in 2005 and 2007 with chronic low back pain ever since. She also has chronic numbness in her legs ever since the back surgery. She also had bilateral parotid gland removal because of painful salivation last year. She also had a history of hallucinations 3-1/2 years ago of unclear cause it seemed to resolve on its own, mainly with the medication of clonazepam.  She described herself as being anxious and very nervous. She has had all these medical problems recently and feels more stressed out. Again no family history of tremors.   She has had no fever, no meningismus, no other precipitating cause for the headaches, except for the trauma as mentioned above which was associated with a dazed state after banging her head but did not lose consciousness. She had an MRI of her brain in 2010 that was normal, and a CT of the head in 2010 that was normal.  She has had no recent imaging of the brain. Past Medical History:  
Diagnosis Date  Anemia   
 has had iron infusions; Hem/onc Dr Kendal Zaragoza [phone]  Anxiety and depression  GERD (gastroesophageal reflux disease)  Hypercholesteremia  Hypertension  Limited peripheral vision of right eye   
 complication of cataract surgery  Long term current use of anticoagulant therapy  Meniere disease  Monoclonal gammopathy of unknown significance   
 hem/onc:  Dr Kendal Zaragoza [phone]  Osteoporosis  Psoriasis   
 bilateral Legs  Restless leg  Skin melanoma ([facility] [address] 2012 (Rt ankle) and basal cell (Lt eye) removed  Transient ischemic attack approx 2005 HAS HAD 2 MINISTROKES-NO DEFICITS; neuro Dr Delia Benoit (cc)  Unspecified adverse effect of anesthesia HAS NEEDED PORT OR CUTDOWN FOR RECVNG BLOOD OR LONG-TERM IVs; surgery 7/28/15 w/o any difficulty  Unspecified sleep apnea   
 does not use CPAP Past Surgical History:  
Procedure Laterality Date  HX APPENDECTOMY  HX CATARACT REMOVAL  2011 Right  HX CERVICAL FUSION  1/28/15 C5-7  
 HX CHOLECYSTECTOMY  2000  HX GI  2010  
 liver bx due to liver Bx due to elevated enzymes, liver laceration during Bx, hospitalized 10-12d  HX HEENT  7/28/15 Lt total parotidectomy with facial nerve dissection: Dr Gillian Ocasio  HX HEENT  11/2015 Right parotidectomy  HX HYSTERECTOMY [facility]  2009 & 2011 LUMBAR FUSION and revision  HX OTHER SURGICAL  2010 MELANOMA (Rt ankle) and basal cell (Lt eye) removed; q6mo ck by DERMATOLOGY  HX SHOULDER REPLACEMENT Left 08/01/2016  HX THYROIDECTOMY Right  [place] COLON CA SCRN NOT HI RSK IND  1/27/2016 Family History Problem Relation Age of Onset  Alcohol abuse Father  Emphysema Father  Cancer Sister PANCREATIC  Alcohol abuse Brother  COPD Brother [facility] Other Mother UNKNOWN CAUSE OF DEATH  
 Heart Disease Mother  Breast Cancer Maternal Aunt  Anesth Problems Neg Hx Social History Substance Use Topics  Smoking status: Former Smoker Years: 15.00 Quit date: 7/15/1975  Smokeless tobacco: Never Used  Alcohol use Yes Comment: may have a glass of wine on rare occasion Current Outpatient Prescriptions:  
  cholecalciferol (VITAMIN D3) 1,000 unit tablet, Take  by mouth daily. , Disp: , Rfl:  
  primidone (MYSOLINE) 50 mg tablet, Take 0.5 Tabs by mouth nightly., Disp: 90 Tab, Rfl: 3 
  albuterol (PROVENTIL HFA, VENTOLIN HFA, PROAIR HFA) 90 mcg/actuation inhaler, Take 1 Puff by inhalation every four (4) hours as needed for Wheezing., Disp: 1 Inhaler, Rfl: 0 
  FOLIC ACID/MULTIVIT-MIN/LUTEIN (CENTRUM SILVER PO), Take  by mouth daily. , Disp: , Rfl:  
  clopidogrel (PLAVIX) 75 mg tablet, Take 75 mg by mouth daily. , Disp: , Rfl:  
  escitalopram oxalate (LEXAPRO) 20 mg tablet, Take 20 mg by mouth nightly. Indications: ANXIETY WITH DEPRESSION, Disp: , Rfl:  
  simvastatin (ZOCOR) 10 mg tablet, Take  by mouth nightly. Indications: HYPERCHOLESTEROLEMIA, Disp: , Rfl:  
  pantoprazole (PROTONIX) 40 mg tablet, Take 40 mg by mouth every morning. Indications: GASTROESOPHAGEAL REFLUX, Disp: , Rfl:  
  verapamil SR (CALAN-SR) 120 mg CR tablet, Take 120 mg by mouth nightly. Indications: HYPERTENSION, Disp: , Rfl:  
  ferrous sulfate 325 mg (65 mg iron) CpER, Take  by mouth daily. , Disp: , Rfl:  
  clonazepam (KLONOPIN) 0.5 mg tablet, Take 0.5 mg by mouth two (2) times a day.  Indications: PANIC DISORDER, Disp: , Rfl:  
  NORTRIPTYLINE HCL (NORTRIPTYLINE PO), Take 10 mg by mouth two (2) times a day., Disp: , Rfl:  
  ROPINIROLE HCL (REQUIP PO), Take 1 mg by mouth nightly., Disp: , Rfl:  
   polyethylene glycol (MIRALAX) 17 gram packet, Take 17 g by mouth every morning. Pt puts in her coffee, Disp: , Rfl:  
 
 
 
Allergies Allergen Reactions  Aspirin Nausea and Vomiting  Bee Sting [Sting, Bee] Hives and Shortness of Breath  
  WASP STING  
 Codeine Nausea and Vomiting  Hydrocodone Rash And sedation  Morphine Rash  Nsaids (Non-Steroidal Anti-Inflammatory Drug) Nausea and Vomiting  Oxycodone Rash On her back  Penicillins Hives, Shortness of Breath and Swelling Review of Systems: A comprehensive review of systems was negative except for: Constitutional: positive for fatigue and malaise Musculoskeletal: positive for myalgias, arthralgias, stiff joints, neck pain, back pain and muscle weakness Neurological: positive for headaches, paresthesia and tremor Behvioral/Psych: positive for anxiety and depression Vitals:  
 06/07/18 1550 BP: 136/82 Pulse: 76 Resp: 12 Temp: 98 °F (36.7 °C) SpO2: 97% Weight: 154 lb (69.9 kg) Height: 5' 2\" (1.575 m) Objective: I 
 
 
NEUROLOGICAL EXAM: 
 
Appearance: The patient is well developed, well nourished, provides a coherent history and is in no acute distress. Mental Status: Oriented to time, place and person, and the president, cognitive function is normal and speech is fluent and no aphasia or dysarthria. Mood and affect appropriate but anxious and depressed . Cranial Nerves:   Intact visual fields. Fundi are benign. Pupils react normally on the left, but the right pupil is scarred and fundus poorly seen and she has decreased visual acuity there., EOM's full, no nystagmus, no ptosis. Facial sensation is normal. Corneal reflexes are not tested. Facial movement is symmetric. Hearing is abnormal bilaterally. Palate is midline with normal sternocleidomastoid and trapezius muscles are normal. Tongue is midline. Neck without meningismus or bruits Neck with limited mobility secondary to her previous surgery Temporal arteries are not tender or enlarged Motor:  4/5 strength in upper and lower proximal and distal muscles. Normal bulk and tone, with a mild spastic paraplegia, worse on the left side where she has mild weakness noted and decreased respiratory movement and hyperreflexia and increased tone from her previous myelopathy. No fasciculations. Reflexes:   Deep tendon reflexes 1+/4 and symmetrical in the arms, 2+/4 in both legs with slight decreased ankle jerks on the right. Mallorie Belch No babinski or clonus present Sensory:   Abnormal to touch, pinprick and vibration and temperature decreased in both legs up to the knee level. DSS is intact Gait:  Abnormal gait with patient moving slowly, and limited mobility of the lumbar spine secondary to her previous back operations. Patient may have decreased arm swing in the right upper extremity Tremor:   Minimal bilateral intention tremor noted. Cerebellar:   Mildly abnormal Romberg and tandem cerebellar signs present. Neurovascular:  Normal heart sounds and regular rhythm, peripheral pulses decreased, and no carotid bruits. Assessment: ICD-10-CM ICD-9-CM 1. Tension vascular headache G44.209 307.81 cholecalciferol (VITAMIN D3) 1,000 unit tablet PATTIE COMPREHENSIVE PLUS PANEL  
   MAGNESIUM  
   CK METABOLIC PANEL, COMPREHENSIVE  
   CBC WITH AUTOMATED DIFF  
   VITAMIN D, 25 HYROXY PANEL  
   VITAMIN B12 & FOLATE  
   TSH 3RD GENERATION  
   IMMUNOELECTROPHORESIS (IMMUNOFIX.) HEMOGLOBIN A1C WITH EAG  
   GM1 ANTIBODY IGG, IGM  
   MYELIN ASSOC GLYCOPROTEIN AB, IGM  
   SED RATE (ESR) EMG LIMITED  
   primidone (MYSOLINE) 50 mg tablet 2. Benign essential tremor syndrome G25.0 333.1 cholecalciferol (VITAMIN D3) 1,000 unit tablet PATTIE COMPREHENSIVE PLUS PANEL  
   MAGNESIUM  
   CK    METABOLIC PANEL, COMPREHENSIVE  
   CBC WITH AUTOMATED DIFF  
 VITAMIN D, 25 HYROXY PANEL  
   VITAMIN B12 & FOLATE  
   TSH 3RD GENERATION  
   IMMUNOELECTROPHORESIS (IMMUNOFIX.) HEMOGLOBIN A1C WITH EAG  
   GM1 ANTIBODY IGG, IGM  
   MYELIN ASSOC GLYCOPROTEIN AB, IGM  
   SED RATE (ESR) EMG LIMITED  
   primidone (MYSOLINE) 50 mg tablet 3. Stenosis of both internal carotid arteries I65.23 433.10 cholecalciferol (VITAMIN D3) 1,000 unit tablet 433.30 PATTIE COMPREHENSIVE PLUS PANEL  
   MAGNESIUM  
   CK METABOLIC PANEL, COMPREHENSIVE  
   CBC WITH AUTOMATED DIFF  
   VITAMIN D, 25 HYROXY PANEL  
   VITAMIN B12 & FOLATE  
   TSH 3RD GENERATION  
   IMMUNOELECTROPHORESIS (IMMUNOFIX.) HEMOGLOBIN A1C WITH EAG  
   GM1 ANTIBODY IGG, IGM  
   MYELIN ASSOC GLYCOPROTEIN AB, IGM  
   SED RATE (ESR) EMG LIMITED  
   primidone (MYSOLINE) 50 mg tablet 4. Spinal stenosis of lumbar region without neurogenic claudication M48.061 724.02 cholecalciferol (VITAMIN D3) 1,000 unit tablet PATTIE COMPREHENSIVE PLUS PANEL  
   MAGNESIUM  
   CK METABOLIC PANEL, COMPREHENSIVE  
   CBC WITH AUTOMATED DIFF  
   VITAMIN D, 25 HYROXY PANEL  
   VITAMIN B12 & FOLATE  
   TSH 3RD GENERATION  
   IMMUNOELECTROPHORESIS (IMMUNOFIX.) HEMOGLOBIN A1C WITH EAG  
   GM1 ANTIBODY IGG, IGM  
   MYELIN ASSOC GLYCOPROTEIN AB, IGM  
   SED RATE (ESR) EMG LIMITED  
   primidone (MYSOLINE) 50 mg tablet 5. Cerebral microvascular disease I67.9 437.9 cholecalciferol (VITAMIN D3) 1,000 unit tablet PATTIE COMPREHENSIVE PLUS PANEL  
   MAGNESIUM  
   CK METABOLIC PANEL, COMPREHENSIVE  
   CBC WITH AUTOMATED DIFF  
   VITAMIN D, 25 HYROXY PANEL  
   VITAMIN B12 & FOLATE  
   TSH 3RD GENERATION  
   IMMUNOELECTROPHORESIS (IMMUNOFIX.) HEMOGLOBIN A1C WITH EAG  
   GM1 ANTIBODY IGG, IGM  
   MYELIN ASSOC GLYCOPROTEIN AB, IGM  
   SED RATE (ESR) EMG LIMITED  
   primidone (MYSOLINE) 50 mg tablet 6.  Chronic tension-type headache, not intractable G44.229 339.12 cholecalciferol (VITAMIN D3) 1,000 unit tablet PATTIE COMPREHENSIVE PLUS PANEL  
   MAGNESIUM  
   CK METABOLIC PANEL, COMPREHENSIVE  
   CBC WITH AUTOMATED DIFF  
   VITAMIN D, 25 HYROXY PANEL  
   VITAMIN B12 & FOLATE  
   TSH 3RD GENERATION  
   IMMUNOELECTROPHORESIS (IMMUNOFIX.) HEMOGLOBIN A1C WITH EAG  
   GM1 ANTIBODY IGG, IGM  
   MYELIN ASSOC GLYCOPROTEIN AB, IGM  
   SED RATE (ESR) EMG LIMITED  
   primidone (MYSOLINE) 50 mg tablet 7. Cervical post-laminectomy syndrome M96.1 722.81 cholecalciferol (VITAMIN D3) 1,000 unit tablet PATTIE COMPREHENSIVE PLUS PANEL  
   MAGNESIUM  
   CK METABOLIC PANEL, COMPREHENSIVE  
   CBC WITH AUTOMATED DIFF  
   VITAMIN D, 25 HYROXY PANEL  
   VITAMIN B12 & FOLATE  
   TSH 3RD GENERATION  
   IMMUNOELECTROPHORESIS (IMMUNOFIX.) HEMOGLOBIN A1C WITH EAG  
   GM1 ANTIBODY IGG, IGM  
   MYELIN ASSOC GLYCOPROTEIN AB, IGM  
   SED RATE (ESR) EMG LIMITED  
   primidone (MYSOLINE) 50 mg tablet 8. Idiopathic small and large fiber sensory neuropathy G60.8 356.4 cholecalciferol (VITAMIN D3) 1,000 unit tablet PATTIE COMPREHENSIVE PLUS PANEL  
   MAGNESIUM  
   CK METABOLIC PANEL, COMPREHENSIVE  
   CBC WITH AUTOMATED DIFF  
   VITAMIN D, 25 HYROXY PANEL  
   VITAMIN B12 & FOLATE  
   TSH 3RD GENERATION  
   IMMUNOELECTROPHORESIS (IMMUNOFIX.) HEMOGLOBIN A1C WITH EAG  
   GM1 ANTIBODY IGG, IGM  
   MYELIN ASSOC GLYCOPROTEIN AB, IGM  
   SED RATE (ESR) EMG LIMITED  
   primidone (MYSOLINE) 50 mg tablet 9. Diabetic peripheral neuropathy associated with type 2 diabetes mellitus (HCC) E11.42 250.60 cholecalciferol (VITAMIN D3) 1,000 unit tablet  
  357.2 PATTIE COMPREHENSIVE PLUS PANEL  
   MAGNESIUM  
   CK METABOLIC PANEL, COMPREHENSIVE  
   CBC WITH AUTOMATED DIFF  
   VITAMIN D, 25 HYROXY PANEL  
   VITAMIN B12 & FOLATE  
   TSH 3RD GENERATION  
   IMMUNOELECTROPHORESIS (IMMUNOFIX.)    HEMOGLOBIN A1C WITH EAG  
 GM1 ANTIBODY IGG, IGM  
   MYELIN ASSOC GLYCOPROTEIN AB, IGM  
   SED RATE (ESR) EMG LIMITED  
   primidone (MYSOLINE) 50 mg tablet 10. B12 deficiency E53.8 266.2 cholecalciferol (VITAMIN D3) 1,000 unit tablet PATTIE COMPREHENSIVE PLUS PANEL  
   MAGNESIUM  
   CK METABOLIC PANEL, COMPREHENSIVE  
   CBC WITH AUTOMATED DIFF  
   VITAMIN D, 25 HYROXY PANEL  
   VITAMIN B12 & FOLATE  
   TSH 3RD GENERATION  
   IMMUNOELECTROPHORESIS (IMMUNOFIX.) HEMOGLOBIN A1C WITH EAG  
   GM1 ANTIBODY IGG, IGM  
   MYELIN ASSOC GLYCOPROTEIN AB, IGM  
   SED RATE (ESR) EMG LIMITED  
   primidone (MYSOLINE) 50 mg tablet 11. Lumbar back pain with radiculopathy affecting right lower extremity M54.17 724.4 cholecalciferol (VITAMIN D3) 1,000 unit tablet PATTIE COMPREHENSIVE PLUS PANEL  
   MAGNESIUM  
   CK METABOLIC PANEL, COMPREHENSIVE  
   CBC WITH AUTOMATED DIFF  
   VITAMIN D, 25 HYROXY PANEL  
   VITAMIN B12 & FOLATE  
   TSH 3RD GENERATION  
   IMMUNOELECTROPHORESIS (IMMUNOFIX.) HEMOGLOBIN A1C WITH EAG  
   GM1 ANTIBODY IGG, IGM  
   MYELIN ASSOC GLYCOPROTEIN AB, IGM  
   SED RATE (ESR) EMG LIMITED  
   primidone (MYSOLINE) 50 mg tablet 12. Lumbar back pain with radiculopathy affecting left lower extremity M54.17 724.4 cholecalciferol (VITAMIN D3) 1,000 unit tablet PATTIE COMPREHENSIVE PLUS PANEL  
   MAGNESIUM  
   CK METABOLIC PANEL, COMPREHENSIVE  
   CBC WITH AUTOMATED DIFF  
   VITAMIN D, 25 HYROXY PANEL  
   VITAMIN B12 & FOLATE  
   TSH 3RD GENERATION  
   IMMUNOELECTROPHORESIS (IMMUNOFIX.) HEMOGLOBIN A1C WITH EAG  
   GM1 ANTIBODY IGG, IGM  
   MYELIN ASSOC GLYCOPROTEIN AB, IGM  
   SED RATE (ESR) EMG LIMITED  
   primidone (MYSOLINE) 50 mg tablet Plan:  
 
Patient with numbness in her feet, and on exam to suggest mild neuropathy, metabolic studies sent to rule out treatable causes, but I think most of this is probably from her previous myelopathy No weakness in her left leg is clearly coming because of the myelopathy and mild spastic paraplegia from her condition previous to surgery. She has a mild essential tremor, we will try her on Mysoline 25 mg at bedtime. Patient with a history that suggest benign essential tremor, but very little evidence on exam today We had treated her with Neurontin previously because of her pain and tremor, and that did not seem to help so she discontinued that Her headaches are most assuredly coming from the cervicogenic headaches and her chronic neck problems. Because of her left arm radicular pain, an EMG study will be done of the left arm in addition. We discussed her condition with the patient and her  in detail, for all these problems, discussed the workup as needed, treatment options, and she is advised to make sure she takes a multivitamin and vitamin D regularly exercise regular, and remain mentally and physically active. She will check my chart for results of her test, and call us with any question. Further treatment and evaluation will depend on the results of her tests and her clinical course. Signed By: Hernan Jimenez MD   
 June 7, 2018 CC: Brandon Ma MD 
FAX: [phone] This note will not be viewable in [address] If you have questions, please do not hesitate to call me. I look forward to following Ms. Porsha Nguyen along with you. Sincerely, Hernan Jimenez MD

## 2018-06-08 NOTE — PROGRESS NOTES
Consult  REFERRED BY:  Ruthie Burkitt, MD    CHIEF COMPLAINT: Progressive tremors and headaches      Subjective: David Slade is a 69 y.o. right-handed  female seen today for urgent evaluation of new problem at the request of Dr. Tramaine Babcock of progressive numbness in her legs, and pain and numbness radiating into her left arm, and increasing tremors in the left hand, and a sensation of weakness in her left leg and difficulty walking, with her leg occasionally turning out. On exam she seems to have a new decreased vibration and pin sensation in her feet may have a mild neuropathy, so we will do a workup for that. However on careful questioning of the patient's symptoms a lot of the symptoms have been present ever since she had her first spinal surgery and a subsequent spinal surgery with fusion from C5-C7 with rods inserted for myelopathy. A lot of this is probably myelopathic weakness and numbness from her previous cervical cord compression and myelopathy. She has a slight intention tremor in the left side, but she says it gets much worse now would like some medication for it. The pain in her left arm seems to radiate up into the neck region, is probably a cervical radiculopathy, but she does have a Tinel's over the median nerve on the left, so we will do an EMG to rule out carpal tunnel. Besides her neck operation she is also had a previous lumbar laminectomy from L3-L5. MRI scans done in April 2018 by Dr. Alex Bright have shown stable disease in the MR I of the cervical spine, thoracic spine looks stable with still some degenerative changes and slight swelling of some of the disc spaces from the previous operations. In the lumbar spine looks stable again with moderate degenerative changes remaining. She has had tremors in her hands over the last 2-3 years, and progressive headaches in the right temporal and periorbital region for the last 2 months.   Patient states the tremor seems to be worse when she is doing things like holding a plate or holding a cup of liquid, gets worse when she is stressed, anxious, tired, fatigued, and sometimes affect her ability to write. She has no family history of similar problems. She does not drink alcohol also does not know any relation to alcohol. Has been improved with taking clonazepam 1 mg twice a day. She has not tried any other medications. She complains of headache in the right temporal region that are becoming increasingly severe and almost daily and chronic, and sometimes can be more severe. She been under increased stress because her  has dementia with a shunt put in for normal pressure hydrocephalus several years ago. She also had left shoulder replacement. She has chronic neck pain, chronic back pain, has had a cervical laminectomy from C5-C7 2016, and tube lumbar laminectomies done in 2005 and 2007 with chronic low back pain ever since. She also has chronic numbness in her legs ever since the back surgery. She also had bilateral parotid gland removal because of painful salivation last year. She also had a history of hallucinations 3-1/2 years ago of unclear cause it seemed to resolve on its own, mainly with the medication of clonazepam.  She described herself as being anxious and very nervous. She has had all these medical problems recently and feels more stressed out. Again no family history of tremors. She has had no fever, no meningismus, no other precipitating cause for the headaches, except for the trauma as mentioned above which was associated with a dazed state after banging her head but did not lose consciousness. She had an MRI of her brain in 2010 that was normal, and a CT of the head in 2010 that was normal.  She has had no recent imaging of the brain. Past Medical History:   Diagnosis Date    Anemia     has had iron infusions;  Hem/onc Dr Kristi Neri [phone]    Anxiety and depression     GERD (gastroesophageal reflux disease)  Hypercholesteremia     Hypertension     Limited peripheral vision of right eye     complication of cataract surgery    Long term current use of anticoagulant therapy     Meniere disease     Monoclonal gammopathy of unknown significance     hem/onc:  Dr Cintron Factor [phone]    Osteoporosis     Psoriasis     bilateral Legs    Restless leg     Skin melanoma ([address] 2012    (Rt ankle) and basal cell (Lt eye) removed    Transient ischemic attack approx 2005    HAS HAD 2 MINISTROKES-NO DEFICITS; neuro Dr Holli Flnyn (cc)    Unspecified adverse effect of anesthesia     HAS NEEDED PORT OR CUTDOWN FOR RECVNG BLOOD OR LONG-TERM IVs; surgery 7/28/15 w/o any difficulty    Unspecified sleep apnea     does not use CPAP      Past Surgical History:   Procedure Laterality Date    HX APPENDECTOMY      HX CATARACT REMOVAL  2011    Right    HX CERVICAL FUSION  1/28/15    C5-7    HX CHOLECYSTECTOMY  2000    HX GI  2010    liver bx due to liver Bx due to elevated enzymes, liver laceration during Bx, hospitalized 10-12d    HX HEENT  7/28/15    Lt total parotidectomy with facial nerve dissection: Dr Orquidea Sotelo HX HEENT  11/2015    Right parotidectomy    HX HYSTERECTOMY      HX ORTHOPAEDIC  2009 & 2011    LUMBAR FUSION and revision    HX OTHER SURGICAL  2010    MELANOMA (Rt ankle) and basal cell (Lt eye) removed; q6mo ck by DERMATOLOGY    HX SHOULDER REPLACEMENT Left 08/01/2016    HX THYROIDECTOMY      Right    MT COLON CA SCRN NOT HI RSK IND  1/27/2016          Family History   Problem Relation Age of Onset    Alcohol abuse Father     Emphysema Father     Cancer Sister      PANCREATIC    Alcohol abuse Brother     COPD Brother     Other Mother      UNKNOWN CAUSE OF DEATH    Heart Disease Mother     Breast Cancer Maternal Aunt     Anesth Problems Neg Hx       Social History   Substance Use Topics    Smoking status: Former Smoker     Years: 15.00     Quit date: 7/15/1975    Smokeless tobacco: Never Used   [facility] Alcohol use Yes      Comment: may have a glass of wine on rare occasion         Current Outpatient Prescriptions:     cholecalciferol (VITAMIN D3) 1,000 unit tablet, Take  by mouth daily. , Disp: , Rfl:     primidone (MYSOLINE) 50 mg tablet, Take 0.5 Tabs by mouth nightly., Disp: 90 Tab, Rfl: 3    albuterol (PROVENTIL HFA, VENTOLIN HFA, PROAIR HFA) 90 mcg/actuation inhaler, Take 1 Puff by inhalation every four (4) hours as needed for Wheezing., Disp: 1 Inhaler, Rfl: 0    FOLIC ACID/MULTIVIT-MIN/LUTEIN (CENTRUM SILVER PO), Take  by mouth daily. , Disp: , Rfl:     clopidogrel (PLAVIX) 75 mg tablet, Take 75 mg by mouth daily. , Disp: , Rfl:     escitalopram oxalate (LEXAPRO) 20 mg tablet, Take 20 mg by mouth nightly. Indications: ANXIETY WITH DEPRESSION, Disp: , Rfl:     simvastatin (ZOCOR) 10 mg tablet, Take  by mouth nightly. Indications: HYPERCHOLESTEROLEMIA, Disp: , Rfl:     pantoprazole (PROTONIX) 40 mg tablet, Take 40 mg by mouth every morning. Indications: GASTROESOPHAGEAL REFLUX, Disp: , Rfl:     verapamil SR (CALAN-SR) 120 mg CR tablet, Take 120 mg by mouth nightly. Indications: HYPERTENSION, Disp: , Rfl:     ferrous sulfate 325 mg (65 mg iron) CpER, Take  by mouth daily. , Disp: , Rfl:     clonazepam (KLONOPIN) 0.5 mg tablet, Take 0.5 mg by mouth two (2) times a day. Indications: PANIC DISORDER, Disp: , Rfl:     NORTRIPTYLINE HCL (NORTRIPTYLINE PO), Take 10 mg by mouth two (2) times a day., Disp: , Rfl:     ROPINIROLE HCL (REQUIP PO), Take 1 mg by mouth nightly., Disp: , Rfl:     polyethylene glycol (MIRALAX) 17 gram packet, Take 17 g by mouth every morning.  Pt puts in her coffee, Disp: , Rfl:         Allergies   Allergen Reactions    Aspirin Nausea and Vomiting    Bee Sting [Sting, Bee] Hives and Shortness of Breath     WASP STING    Codeine Nausea and Vomiting    Hydrocodone Rash     And sedation    Morphine Rash    Nsaids (Non-Steroidal Anti-Inflammatory Drug) Nausea and Vomiting    Oxycodone Rash     On her back    Penicillins Hives, Shortness of Breath and Swelling        Review of Systems:  A comprehensive review of systems was negative except for: Constitutional: positive for fatigue and malaise  Musculoskeletal: positive for myalgias, arthralgias, stiff joints, neck pain, back pain and muscle weakness  Neurological: positive for headaches, paresthesia and tremor  Behvioral/Psych: positive for anxiety and depression   Vitals:    06/07/18 1550   BP: 136/82   Pulse: 76   Resp: 12   Temp: 98 °F (36.7 °C)   SpO2: 97%   Weight: 154 lb (69.9 kg)   Height: 5' 2\" (1.575 m)     Objective:     I      NEUROLOGICAL EXAM:    Appearance: The patient is well developed, well nourished, provides a coherent history and is in no acute distress. Mental Status: Oriented to time, place and person, and the president, cognitive function is normal and speech is fluent and no aphasia or dysarthria. Mood and affect appropriate but anxious and depressed . Cranial Nerves:   Intact visual fields. Fundi are benign. Pupils react normally on the left, but the right pupil is scarred and fundus poorly seen and she has decreased visual acuity there., EOM's full, no nystagmus, no ptosis. Facial sensation is normal. Corneal reflexes are not tested. Facial movement is symmetric. Hearing is abnormal bilaterally. Palate is midline with normal sternocleidomastoid and trapezius muscles are normal. Tongue is midline. Neck without meningismus or bruits  Neck with limited mobility secondary to her previous surgery  Temporal arteries are not tender or enlarged    Motor:  4/5 strength in upper and lower proximal and distal muscles. Normal bulk and tone, with a mild spastic paraplegia, worse on the left side where she has mild weakness noted and decreased respiratory movement and hyperreflexia and increased tone from her previous myelopathy. No fasciculations.    Reflexes:   Deep tendon reflexes 1+/4 and symmetrical in the arms, 2+/4 in both legs with slight decreased ankle jerks on the right. .  No babinski or clonus present   Sensory:   Abnormal to touch, pinprick and vibration and temperature decreased in both legs up to the knee level. DSS is intact   Gait:  Abnormal gait with patient moving slowly, and limited mobility of the lumbar spine secondary to her previous back operations. Patient may have decreased arm swing in the right upper extremity    Tremor:   Minimal bilateral intention tremor noted. Cerebellar:   Mildly abnormal Romberg and tandem cerebellar signs present. Neurovascular:  Normal heart sounds and regular rhythm, peripheral pulses decreased, and no carotid bruits. Assessment:       ICD-10-CM ICD-9-CM    1. Tension vascular headache G44.209 307.81 cholecalciferol (VITAMIN D3) 1,000 unit tablet      PATTIE COMPREHENSIVE PLUS PANEL      MAGNESIUM      CK      METABOLIC PANEL, COMPREHENSIVE      CBC WITH AUTOMATED DIFF      VITAMIN D, 25 HYROXY PANEL      VITAMIN B12 & FOLATE      TSH 3RD GENERATION      IMMUNOELECTROPHORESIS (IMMUNOFIX.)      HEMOGLOBIN A1C WITH EAG      GM1 ANTIBODY IGG, IGM      MYELIN ASSOC GLYCOPROTEIN AB, IGM      SED RATE (ESR)      EMG LIMITED      primidone (MYSOLINE) 50 mg tablet   2. Benign essential tremor syndrome G25.0 333.1 cholecalciferol (VITAMIN D3) 1,000 unit tablet      PATTIE COMPREHENSIVE PLUS PANEL      MAGNESIUM      CK      METABOLIC PANEL, COMPREHENSIVE      CBC WITH AUTOMATED DIFF      VITAMIN D, 25 HYROXY PANEL      VITAMIN B12 & FOLATE      TSH 3RD GENERATION      IMMUNOELECTROPHORESIS (IMMUNOFIX.)      HEMOGLOBIN A1C WITH EAG      GM1 ANTIBODY IGG, IGM      MYELIN ASSOC GLYCOPROTEIN AB, IGM      SED RATE (ESR)      EMG LIMITED      primidone (MYSOLINE) 50 mg tablet   3.  Stenosis of both internal carotid arteries I65.23 433.10 cholecalciferol (VITAMIN D3) 1,000 unit tablet     433.30 PATTIE COMPREHENSIVE PLUS PANEL      MAGNESIUM      CK      METABOLIC PANEL, COMPREHENSIVE CBC WITH AUTOMATED DIFF      VITAMIN D, 25 HYROXY PANEL      VITAMIN B12 & FOLATE      TSH 3RD GENERATION      IMMUNOELECTROPHORESIS (IMMUNOFIX.)      HEMOGLOBIN A1C WITH EAG      GM1 ANTIBODY IGG, IGM      MYELIN ASSOC GLYCOPROTEIN AB, IGM      SED RATE (ESR)      EMG LIMITED      primidone (MYSOLINE) 50 mg tablet   4. Spinal stenosis of lumbar region without neurogenic claudication M48.061 724.02 cholecalciferol (VITAMIN D3) 1,000 unit tablet      PATTIE COMPREHENSIVE PLUS PANEL      MAGNESIUM      CK      METABOLIC PANEL, COMPREHENSIVE      CBC WITH AUTOMATED DIFF      VITAMIN D, 25 HYROXY PANEL      VITAMIN B12 & FOLATE      TSH 3RD GENERATION      IMMUNOELECTROPHORESIS (IMMUNOFIX.)      HEMOGLOBIN A1C WITH EAG      GM1 ANTIBODY IGG, IGM      MYELIN ASSOC GLYCOPROTEIN AB, IGM      SED RATE (ESR)      EMG LIMITED      primidone (MYSOLINE) 50 mg tablet   5. Cerebral microvascular disease I67.9 437.9 cholecalciferol (VITAMIN D3) 1,000 unit tablet      PATTIE COMPREHENSIVE PLUS PANEL      MAGNESIUM      CK      METABOLIC PANEL, COMPREHENSIVE      CBC WITH AUTOMATED DIFF      VITAMIN D, 25 HYROXY PANEL      VITAMIN B12 & FOLATE      TSH 3RD GENERATION      IMMUNOELECTROPHORESIS (IMMUNOFIX.)      HEMOGLOBIN A1C WITH EAG      GM1 ANTIBODY IGG, IGM      MYELIN ASSOC GLYCOPROTEIN AB, IGM      SED RATE (ESR)      EMG LIMITED      primidone (MYSOLINE) 50 mg tablet   6. Chronic tension-type headache, not intractable G44.229 339.12 cholecalciferol (VITAMIN D3) 1,000 unit tablet      PATTIE COMPREHENSIVE PLUS PANEL      MAGNESIUM      CK      METABOLIC PANEL, COMPREHENSIVE      CBC WITH AUTOMATED DIFF      VITAMIN D, 25 HYROXY PANEL      VITAMIN B12 & FOLATE      TSH 3RD GENERATION      IMMUNOELECTROPHORESIS (IMMUNOFIX.)      HEMOGLOBIN A1C WITH EAG      GM1 ANTIBODY IGG, IGM      MYELIN ASSOC GLYCOPROTEIN AB, IGM      SED RATE (ESR)      EMG LIMITED      primidone (MYSOLINE) 50 mg tablet   7.  Cervical post-laminectomy syndrome M96.1 722.81 cholecalciferol (VITAMIN D3) 1,000 unit tablet      PATTIE COMPREHENSIVE PLUS PANEL      MAGNESIUM      CK      METABOLIC PANEL, COMPREHENSIVE      CBC WITH AUTOMATED DIFF      VITAMIN D, 25 HYROXY PANEL      VITAMIN B12 & FOLATE      TSH 3RD GENERATION      IMMUNOELECTROPHORESIS (IMMUNOFIX.)      HEMOGLOBIN A1C WITH EAG      GM1 ANTIBODY IGG, IGM      MYELIN ASSOC GLYCOPROTEIN AB, IGM      SED RATE (ESR)      EMG LIMITED      primidone (MYSOLINE) 50 mg tablet   8. Idiopathic small and large fiber sensory neuropathy G60.8 356.4 cholecalciferol (VITAMIN D3) 1,000 unit tablet      PATTIE COMPREHENSIVE PLUS PANEL      MAGNESIUM      CK      METABOLIC PANEL, COMPREHENSIVE      CBC WITH AUTOMATED DIFF      VITAMIN D, 25 HYROXY PANEL      VITAMIN B12 & FOLATE      TSH 3RD GENERATION      IMMUNOELECTROPHORESIS (IMMUNOFIX.)      HEMOGLOBIN A1C WITH EAG      GM1 ANTIBODY IGG, IGM      MYELIN ASSOC GLYCOPROTEIN AB, IGM      SED RATE (ESR)      EMG LIMITED      primidone (MYSOLINE) 50 mg tablet   9. Diabetic peripheral neuropathy associated with type 2 diabetes mellitus (HCC) E11.42 250.60 cholecalciferol (VITAMIN D3) 1,000 unit tablet     357.2 PATTIE COMPREHENSIVE PLUS PANEL      MAGNESIUM      CK      METABOLIC PANEL, COMPREHENSIVE      CBC WITH AUTOMATED DIFF      VITAMIN D, 25 HYROXY PANEL      VITAMIN B12 & FOLATE      TSH 3RD GENERATION      IMMUNOELECTROPHORESIS (IMMUNOFIX.)      HEMOGLOBIN A1C WITH EAG      GM1 ANTIBODY IGG, IGM      MYELIN ASSOC GLYCOPROTEIN AB, IGM      SED RATE (ESR)      EMG LIMITED      primidone (MYSOLINE) 50 mg tablet   10.  B12 deficiency E53.8 266.2 cholecalciferol (VITAMIN D3) 1,000 unit tablet      PATTIE COMPREHENSIVE PLUS PANEL      MAGNESIUM      CK      METABOLIC PANEL, COMPREHENSIVE      CBC WITH AUTOMATED DIFF      VITAMIN D, 25 HYROXY PANEL      VITAMIN B12 & FOLATE      TSH 3RD GENERATION      IMMUNOELECTROPHORESIS (IMMUNOFIX.)      HEMOGLOBIN A1C WITH EAG      GM1 ANTIBODY IGG, IGM MYELIN ASSOC GLYCOPROTEIN AB, IGM      SED RATE (ESR)      EMG LIMITED      primidone (MYSOLINE) 50 mg tablet   11. Lumbar back pain with radiculopathy affecting right lower extremity M54.17 724.4 cholecalciferol (VITAMIN D3) 1,000 unit tablet      PATTIE COMPREHENSIVE PLUS PANEL      MAGNESIUM      CK      METABOLIC PANEL, COMPREHENSIVE      CBC WITH AUTOMATED DIFF      VITAMIN D, 25 HYROXY PANEL      VITAMIN B12 & FOLATE      TSH 3RD GENERATION      IMMUNOELECTROPHORESIS (IMMUNOFIX.)      HEMOGLOBIN A1C WITH EAG      GM1 ANTIBODY IGG, IGM      MYELIN ASSOC GLYCOPROTEIN AB, IGM      SED RATE (ESR)      EMG LIMITED      primidone (MYSOLINE) 50 mg tablet   12. Lumbar back pain with radiculopathy affecting left lower extremity M54.17 724.4 cholecalciferol (VITAMIN D3) 1,000 unit tablet      PATTIE COMPREHENSIVE PLUS PANEL      MAGNESIUM      CK      METABOLIC PANEL, COMPREHENSIVE      CBC WITH AUTOMATED DIFF      VITAMIN D, 25 HYROXY PANEL      VITAMIN B12 & FOLATE      TSH 3RD GENERATION      IMMUNOELECTROPHORESIS (IMMUNOFIX.)      HEMOGLOBIN A1C WITH EAG      GM1 ANTIBODY IGG, IGM      MYELIN ASSOC GLYCOPROTEIN AB, IGM      SED RATE (ESR)      EMG LIMITED      primidone (MYSOLINE) 50 mg tablet         Plan:     Patient with numbness in her feet, and on exam to suggest mild neuropathy, metabolic studies sent to rule out treatable causes, but I think most of this is probably from her previous myelopathy  No weakness in her left leg is clearly coming because of the myelopathy and mild spastic paraplegia from her condition previous to surgery. She has a mild essential tremor, we will try her on Mysoline 25 mg at bedtime.   Patient with a history that suggest benign essential tremor, but very little evidence on exam today  We had treated her with Neurontin previously because of her pain and tremor, and that did not seem to help so she discontinued that  Her headaches are most assuredly coming from the cervicogenic headaches and her chronic neck problems. Because of her left arm radicular pain, an EMG study will be done of the left arm in addition. We discussed her condition with the patient and her  in detail, for all these problems, discussed the workup as needed, treatment options, and she is advised to make sure she takes a multivitamin and vitamin D regularly exercise regular, and remain mentally and physically active. She will check my chart for results of her test, and call us with any question. Further treatment and evaluation will depend on the results of her tests and her clinical course. Signed By: Gabriela Shah MD     June 7, 2018       CC: Thomas Rodriguez MD  FAX: [phone]    This note will not be viewable in [address]

## 2018-06-27 LAB
25(OH)D2 SERPL-MCNC: <1 NG/ML
25(OH)D3 SERPL-MCNC: 45 NG/ML
25(OH)D3+25(OH)D2 SERPL-MCNC: 46 NG/ML
ALBUMIN SERPL-MCNC: 4 G/DL (ref 3.5–4.8)
ALBUMIN/GLOB SERPL: 1.7 {RATIO} (ref 1.2–2.2)
ALP SERPL-CCNC: 100 IU/L (ref 39–117)
ALT SERPL-CCNC: 13 IU/L (ref 0–32)
AST SERPL-CCNC: 19 IU/L (ref 0–40)
BASOPHILS # BLD AUTO: 0 X10E3/UL (ref 0–0.2)
BASOPHILS NFR BLD AUTO: 1 %
BILIRUB SERPL-MCNC: 0.3 MG/DL (ref 0–1.2)
BUN SERPL-MCNC: 18 MG/DL (ref 8–27)
BUN/CREAT SERPL: 26 (ref 12–28)
CALCIUM SERPL-MCNC: 9 MG/DL (ref 8.7–10.3)
CENTROMERE B AB SER-ACNC: <0.2 AI (ref 0–0.9)
CHLORIDE SERPL-SCNC: 105 MMOL/L (ref 96–106)
CHROMATIN AB SERPL-ACNC: <0.2 AI (ref 0–0.9)
CK SERPL-CCNC: 55 U/L (ref 24–173)
CO2 SERPL-SCNC: 25 MMOL/L (ref 18–29)
CREAT SERPL-MCNC: 0.7 MG/DL (ref 0.57–1)
DSDNA AB SER-ACNC: <1 IU/ML (ref 0–9)
ENA JO1 AB SER-ACNC: <0.2 AI (ref 0–0.9)
ENA RNP AB SER-ACNC: 0.4 AI (ref 0–0.9)
ENA SCL70 AB SER-ACNC: <0.2 AI (ref 0–0.9)
ENA SM AB SER-ACNC: <0.2 AI (ref 0–0.9)
ENA SM+RNP AB SER-ACNC: <0.2 AI (ref 0–0.9)
ENA SS-A AB SER-ACNC: <0.2 AI (ref 0–0.9)
ENA SS-B AB SER-ACNC: <0.2 AI (ref 0–0.9)
EOSINOPHIL # BLD AUTO: 0.1 X10E3/UL (ref 0–0.4)
EOSINOPHIL NFR BLD AUTO: 2 %
ERYTHROCYTE [DISTWIDTH] IN BLOOD BY AUTOMATED COUNT: 13.6 % (ref 12.3–15.4)
ERYTHROCYTE [SEDIMENTATION RATE] IN BLOOD BY WESTERGREN METHOD: 2 MM/HR (ref 0–40)
EST. AVERAGE GLUCOSE BLD GHB EST-MCNC: 108 MG/DL
FOLATE SERPL-MCNC: >20 NG/ML
GLOBULIN SER CALC-MCNC: 2.3 G/DL (ref 1.5–4.5)
GLUCOSE SERPL-MCNC: 86 MG/DL (ref 65–99)
GM1 GANGL IGG TITR SER IA: NORMAL TITER
GM1 GANGL IGM TITR SER IA: NORMAL TITER
HBA1C MFR BLD: 5.4 % (ref 4.8–5.6)
HCT VFR BLD AUTO: 38 % (ref 34–46.6)
HGB BLD-MCNC: 12.6 G/DL (ref 11.1–15.9)
IGA SERPL-MCNC: 256 MG/DL (ref 64–422)
IGG SERPL-MCNC: 669 MG/DL (ref 700–1600)
IGM SERPL-MCNC: 34 MG/DL (ref 26–217)
IMM GRANULOCYTES # BLD: 0 X10E3/UL (ref 0–0.1)
IMM GRANULOCYTES NFR BLD: 0 %
LYMPHOCYTES # BLD AUTO: 1.6 X10E3/UL (ref 0.7–3.1)
LYMPHOCYTES NFR BLD AUTO: 42 %
MAG IGM TITR SER: NORMAL TITER
MAGNESIUM SERPL-MCNC: 2.2 MG/DL (ref 1.6–2.3)
MCH RBC QN AUTO: 30.7 PG (ref 26.6–33)
MCHC RBC AUTO-ENTMCNC: 33.2 G/DL (ref 31.5–35.7)
MCV RBC AUTO: 93 FL (ref 79–97)
MONOCYTES # BLD AUTO: 0.3 X10E3/UL (ref 0.1–0.9)
MONOCYTES NFR BLD AUTO: 9 %
NEUTROPHILS # BLD AUTO: 1.7 X10E3/UL (ref 1.4–7)
NEUTROPHILS NFR BLD AUTO: 46 %
PLATELET # BLD AUTO: 262 X10E3/UL (ref 150–379)
POTASSIUM SERPL-SCNC: 4.7 MMOL/L (ref 3.5–5.2)
PROT PATTERN SERPL IFE-IMP: ABNORMAL
PROT SERPL-MCNC: 6.3 G/DL (ref 6–8.5)
RBC # BLD AUTO: 4.1 X10E6/UL (ref 3.77–5.28)
RIBOSOMAL P AB SER-ACNC: <0.2 AI (ref 0–0.9)
SEE BELOW:, 164879: NORMAL
SODIUM SERPL-SCNC: 143 MMOL/L (ref 134–144)
TSH SERPL DL<=0.005 MIU/L-ACNC: 1.41 UIU/ML (ref 0.45–4.5)
VIT B12 SERPL-MCNC: 434 PG/ML (ref 232–1245)
WBC # BLD AUTO: 3.7 X10E3/UL (ref 3.4–10.8)

## 2018-07-13 ENCOUNTER — APPOINTMENT (OUTPATIENT)
Dept: GENERAL RADIOLOGY | Age: 78
End: 2018-07-13
Attending: PHYSICIAN ASSISTANT
Payer: MEDICARE

## 2018-07-13 ENCOUNTER — HOSPITAL ENCOUNTER (EMERGENCY)
Age: 78
Discharge: HOME | End: 2018-07-13
Attending: EMERGENCY MEDICINE
Payer: MEDICARE

## 2018-07-13 VITALS
SYSTOLIC BLOOD PRESSURE: 176 MMHG | WEIGHT: 155.2 LBS | RESPIRATION RATE: 16 BRPM | BODY MASS INDEX: 28.56 KG/M2 | OXYGEN SATURATION: 99 % | HEIGHT: 62 IN | HEART RATE: 84 BPM | TEMPERATURE: 97.9 F | DIASTOLIC BLOOD PRESSURE: 77 MMHG

## 2018-07-13 DIAGNOSIS — R07.89 ATYPICAL CHEST PAIN: Primary | ICD-10-CM

## 2018-07-13 DIAGNOSIS — R06.02 SHORTNESS OF BREATH: ICD-10-CM

## 2018-07-13 LAB
ALBUMIN SERPL-MCNC: 3.7 G/DL (ref 3.5–5)
ALBUMIN/GLOB SERPL: 1.2 {RATIO} (ref 1.1–2.2)
ALP SERPL-CCNC: 98 U/L (ref 45–117)
ALT SERPL-CCNC: 24 U/L (ref 12–78)
ANION GAP SERPL CALC-SCNC: 7 MMOL/L (ref 5–15)
AST SERPL-CCNC: 19 U/L (ref 15–37)
BASOPHILS # BLD: 0 K/UL (ref 0–0.1)
BASOPHILS NFR BLD: 1 % (ref 0–1)
BILIRUB SERPL-MCNC: 0.3 MG/DL (ref 0.2–1)
BNP SERPL-MCNC: 198 PG/ML (ref 0–450)
BUN SERPL-MCNC: 13 MG/DL (ref 6–20)
BUN/CREAT SERPL: 16 (ref 12–20)
CALCIUM SERPL-MCNC: 8.6 MG/DL (ref 8.5–10.1)
CHLORIDE SERPL-SCNC: 106 MMOL/L (ref 97–108)
CK MB CFR SERPL CALC: NORMAL % (ref 0–2.5)
CK MB SERPL-MCNC: <1 NG/ML (ref 5–25)
CK SERPL-CCNC: 60 U/L (ref 26–192)
CO2 SERPL-SCNC: 26 MMOL/L (ref 21–32)
CREAT SERPL-MCNC: 0.79 MG/DL (ref 0.55–1.02)
DIFFERENTIAL METHOD BLD: NORMAL
EOSINOPHIL # BLD: 0.1 K/UL (ref 0–0.4)
EOSINOPHIL NFR BLD: 2 % (ref 0–7)
ERYTHROCYTE [DISTWIDTH] IN BLOOD BY AUTOMATED COUNT: 12.4 % (ref 11.5–14.5)
GLOBULIN SER CALC-MCNC: 3 G/DL (ref 2–4)
GLUCOSE SERPL-MCNC: 87 MG/DL (ref 65–100)
HCT VFR BLD AUTO: 36.8 % (ref 35–47)
HGB BLD-MCNC: 12.1 G/DL (ref 11.5–16)
IMM GRANULOCYTES # BLD: 0 K/UL (ref 0–0.04)
IMM GRANULOCYTES NFR BLD AUTO: 0 % (ref 0–0.5)
LYMPHOCYTES # BLD: 1.5 K/UL (ref 0.8–3.5)
LYMPHOCYTES NFR BLD: 32 % (ref 12–49)
MCH RBC QN AUTO: 30.8 PG (ref 26–34)
MCHC RBC AUTO-ENTMCNC: 32.9 G/DL (ref 30–36.5)
MCV RBC AUTO: 93.6 FL (ref 80–99)
MONOCYTES # BLD: 0.5 K/UL (ref 0–1)
MONOCYTES NFR BLD: 10 % (ref 5–13)
NEUTS SEG # BLD: 2.5 K/UL (ref 1.8–8)
NEUTS SEG NFR BLD: 55 % (ref 32–75)
NRBC # BLD: 0 K/UL (ref 0–0.01)
NRBC BLD-RTO: 0 PER 100 WBC
PLATELET # BLD AUTO: 231 K/UL (ref 150–400)
PMV BLD AUTO: 10.1 FL (ref 8.9–12.9)
POTASSIUM SERPL-SCNC: 4.3 MMOL/L (ref 3.5–5.1)
PROT SERPL-MCNC: 6.7 G/DL (ref 6.4–8.2)
RBC # BLD AUTO: 3.93 M/UL (ref 3.8–5.2)
SODIUM SERPL-SCNC: 139 MMOL/L (ref 136–145)
TROPONIN I SERPL-MCNC: <0.05 NG/ML
TROPONIN I SERPL-MCNC: <0.05 NG/ML
WBC # BLD AUTO: 4.6 K/UL (ref 3.6–11)

## 2018-07-13 PROCEDURE — 85025 COMPLETE CBC W/AUTO DIFF WBC: CPT | Performed by: PHYSICIAN ASSISTANT

## 2018-07-13 PROCEDURE — 94640 AIRWAY INHALATION TREATMENT: CPT

## 2018-07-13 PROCEDURE — 83880 ASSAY OF NATRIURETIC PEPTIDE: CPT | Performed by: PHYSICIAN ASSISTANT

## 2018-07-13 PROCEDURE — 77030029684 HC NEB SM VOL KT MONA -A

## 2018-07-13 PROCEDURE — 93005 ELECTROCARDIOGRAM TRACING: CPT

## 2018-07-13 PROCEDURE — 71045 X-RAY EXAM CHEST 1 VIEW: CPT

## 2018-07-13 PROCEDURE — 82550 ASSAY OF CK (CPK): CPT | Performed by: PHYSICIAN ASSISTANT

## 2018-07-13 PROCEDURE — 80053 COMPREHEN METABOLIC PANEL: CPT | Performed by: PHYSICIAN ASSISTANT

## 2018-07-13 PROCEDURE — 74011250637 HC RX REV CODE- 250/637: Performed by: EMERGENCY MEDICINE

## 2018-07-13 PROCEDURE — 74011000250 HC RX REV CODE- 250: Performed by: EMERGENCY MEDICINE

## 2018-07-13 PROCEDURE — 74011636637 HC RX REV CODE- 636/637: Performed by: EMERGENCY MEDICINE

## 2018-07-13 PROCEDURE — 36415 COLL VENOUS BLD VENIPUNCTURE: CPT | Performed by: PHYSICIAN ASSISTANT

## 2018-07-13 PROCEDURE — 84484 ASSAY OF TROPONIN QUANT: CPT | Performed by: PHYSICIAN ASSISTANT

## 2018-07-13 PROCEDURE — 99284 EMERGENCY DEPT VISIT MOD MDM: CPT

## 2018-07-13 RX ORDER — NITROGLYCERIN 0.4 MG/1
0.4 TABLET SUBLINGUAL
Status: COMPLETED | OUTPATIENT
Start: 2018-07-13 | End: 2018-07-13

## 2018-07-13 RX ORDER — IPRATROPIUM BROMIDE AND ALBUTEROL SULFATE 2.5; .5 MG/3ML; MG/3ML
3 SOLUTION RESPIRATORY (INHALATION) ONCE
Status: COMPLETED | OUTPATIENT
Start: 2018-07-13 | End: 2018-07-13

## 2018-07-13 RX ORDER — PREDNISONE 10 MG/1
30 TABLET ORAL
Qty: 9 TAB | Refills: 0 | Status: SHIPPED | OUTPATIENT
Start: 2018-07-13 | End: 2018-07-16

## 2018-07-13 RX ORDER — PREDNISONE 10 MG/1
40 TABLET ORAL
Status: COMPLETED | OUTPATIENT
Start: 2018-07-13 | End: 2018-07-13

## 2018-07-13 RX ORDER — ATORVASTATIN CALCIUM 20 MG/1
20 TABLET, FILM COATED ORAL DAILY
COMMUNITY
End: 2019-06-03 | Stop reason: SDUPTHER

## 2018-07-13 RX ORDER — ALBUTEROL SULFATE 90 UG/1
1 AEROSOL, METERED RESPIRATORY (INHALATION)
Qty: 1 INHALER | Refills: 0 | Status: SHIPPED | OUTPATIENT
Start: 2018-07-13

## 2018-07-13 RX ADMIN — PREDNISONE 40 MG: 10 TABLET ORAL at 20:11

## 2018-07-13 RX ADMIN — IPRATROPIUM BROMIDE AND ALBUTEROL SULFATE 3 ML: .5; 3 SOLUTION RESPIRATORY (INHALATION) at 20:11

## 2018-07-13 RX ADMIN — NITROGLYCERIN 0.4 MG: 0.4 TABLET SUBLINGUAL at 14:56

## 2018-07-13 NOTE — ED PROVIDER NOTES
EMERGENCY DEPARTMENT HISTORY AND PHYSICAL EXAM 
 
 
Date: 7/13/2018 Patient Name: Catherine Adames History of Presenting Illness Chief Complaint Patient presents with  Shortness of Breath Pt ambulatroy to triage, states hard time breathing x 1 week; pt states she had bronchitis and had xray done at [facility] on Monday, showed bronchitis; pt was seen at PCP for \"checkup\"; states \"chest heaviness\"  Chest Pain \"heaviness\" History Provided By: Patient HPI: Catherine Adames, 69 y.o. female with PMHx significant for GERD, HTN, Anemia, melanoma, TIA, presents ambulatory to the ED with cc of new onset worsening SOB with associated chest tightness and heaviness for over a week. Pt also reports a cough for the past week. She describes her chest tightness as a pressure and heaviness. Pt was seen last week by Dr. Antonio Rivas (heme/onc) for follow up at [facility]. During her visit pt had c/o chest tightness and a cough, Dr. Igor Jang ordered an x-ray. The x-ray showed a bronchitis but no PNA, she was not started on any medications. Pt denies taking any of her prescribed medications this morning. Pt denies any fever, nausea, vomiting, abdominal pain. Social Hx: - Tobacco (former smoker), + EtOH (wine on occasion), - illicit drug use (-) There are no other complaints, changes, or physical findings at this time. PCP: Horace Mays MD 
 
Current Outpatient Prescriptions Medication Sig Dispense Refill  atorvastatin (LIPITOR) 20 mg tablet Take 20 mg by mouth daily.  predniSONE (DELTASONE) 10 mg tablet Take 3 Tabs by mouth daily (with breakfast) for 3 days. 9 Tab 0  
 albuterol (PROVENTIL HFA, VENTOLIN HFA, PROAIR HFA) 90 mcg/actuation inhaler Take 1 Puff by inhalation every four (4) hours as needed for Wheezing. 1 Inhaler 0  
 cholecalciferol (VITAMIN D3) 1,000 unit tablet Take  by mouth daily.  primidone (MYSOLINE) 50 mg tablet Take 0.5 Tabs by mouth nightly.  90 Tab 3  
 FOLIC ACID/MULTIVIT-MIN/LUTEIN (CENTRUM SILVER PO) Take  by mouth daily.  clopidogrel (PLAVIX) 75 mg tablet Take 75 mg by mouth daily.  escitalopram oxalate (LEXAPRO) 20 mg tablet Take 20 mg by mouth nightly. Indications: ANXIETY WITH DEPRESSION  pantoprazole (PROTONIX) 40 mg tablet Take 40 mg by mouth every morning. Indications: GASTROESOPHAGEAL REFLUX  verapamil SR (CALAN-SR) 120 mg CR tablet Take 120 mg by mouth nightly. Indications: HYPERTENSION  ferrous sulfate 325 mg (65 mg iron) CpER Take  by mouth daily.  clonazepam (KLONOPIN) 0.5 mg tablet Take 0.5 mg by mouth two (2) times a day. Indications: PANIC DISORDER    
 NORTRIPTYLINE HCL (NORTRIPTYLINE PO) Take 10 mg by mouth two (2) times a day.  ROPINIROLE HCL (REQUIP PO) Take 1 mg by mouth nightly.  polyethylene glycol (MIRALAX) 17 gram packet Take 17 g by mouth every morning. Pt puts in her coffee Past History Past Medical History: 
Past Medical History:  
Diagnosis Date  Anemia   
 has had iron infusions; Hem/onc Dr Tom Dang [phone]  Anxiety and depression  GERD (gastroesophageal reflux disease)  Hypercholesteremia  Hypertension  Limited peripheral vision of right eye   
 complication of cataract surgery  Long term current use of anticoagulant therapy  Meniere disease  Monoclonal gammopathy of unknown significance   
 hem/onc:  Dr Tom Dang [phone]  Osteoporosis  Psoriasis   
 bilateral Legs  Restless leg  Skin melanoma ([facility] [address] 2012 (Rt ankle) and basal cell (Lt eye) removed  Transient ischemic attack approx 2005 HAS HAD 2 MINISTROKES-NO DEFICITS; neuro Dr Gregory Azul (cc)  Unspecified adverse effect of anesthesia HAS NEEDED PORT OR CUTDOWN FOR RECVNG BLOOD OR LONG-TERM IVs; surgery 7/28/15 w/o any difficulty  Unspecified sleep apnea   
 does not use CPAP Past Surgical History: 
Past Surgical History:  
Procedure Laterality Date  HX APPENDECTOMY  HX CATARACT REMOVAL  2011 Right  HX CERVICAL FUSION  1/28/15 C5-7  
 HX CHOLECYSTECTOMY  2000  HX GI  2010  
 liver bx due to liver Bx due to elevated enzymes, liver laceration during Bx, hospitalized 10-12d  HX HEENT  7/28/15 Lt total parotidectomy with facial nerve dissection: Dr Pao Gaytan  HX HEENT  11/2015 Right parotidectomy  HX HYSTERECTOMY [facility]  2009 & 2011 LUMBAR FUSION and revision  HX OTHER SURGICAL  2010 MELANOMA (Rt ankle) and basal cell (Lt eye) removed; q6mo ck by DERMATOLOGY  HX SHOULDER REPLACEMENT Left 08/01/2016  HX THYROIDECTOMY Right  TN COLON CA SCRN NOT HI RSK IND  1/27/2016 Family History: 
Family History Problem Relation Age of Onset  Alcohol abuse Father  Emphysema Father  Cancer Sister PANCREATIC  Alcohol abuse Brother  COPD Brother Leonardo Cords Other Mother UNKNOWN CAUSE OF DEATH  
 Heart Disease Mother  Breast Cancer Maternal Aunt  Anesth Problems Neg Hx Social History: 
Social History Substance Use Topics  Smoking status: Former Smoker Years: 15.00 Quit date: 7/15/1975  Smokeless tobacco: Never Used  Alcohol use Yes Comment: may have a glass of wine on rare occasion Allergies: Allergies Allergen Reactions  Aspirin Nausea and Vomiting  Bee Sting [Sting, Bee] Hives and Shortness of Breath  
  WASP STING  
 Codeine Nausea and Vomiting  Hydrocodone Rash And sedation  Morphine Rash  Nsaids (Non-Steroidal Anti-Inflammatory Drug) Nausea and Vomiting  Oxycodone Rash On her back  Penicillins Hives, Shortness of Breath and Swelling Review of Systems Review of Systems Constitutional: Negative for activity change, appetite change, chills, fever and unexpected weight change. HENT: Negative for congestion. Eyes: Negative for pain and visual disturbance.   
Respiratory: Positive for cough, chest tightness and shortness of breath. Cardiovascular: Negative for chest pain. Gastrointestinal: Negative for abdominal pain, diarrhea, nausea and vomiting. Genitourinary: Negative for dysuria. Musculoskeletal: Negative for back pain. Skin: Negative for rash. Neurological: Negative for headaches. Physical Exam  
Physical Exam  
Constitutional: She is oriented to person, place, and time. She appears well-developed. She appears distressed (mild to moderate). Elderly thin female in mild to moderate distress. HENT:  
Head: Normocephalic and atraumatic. Mouth/Throat: Oropharynx is clear and moist.  
Eyes: Conjunctivae and EOM are normal. Pupils are equal, round, and reactive to light. Right eye exhibits no discharge. Left eye exhibits no discharge. Neck: Normal range of motion. Neck supple. Cardiovascular: Normal rate, regular rhythm and normal heart sounds. No murmur heard. Pulmonary/Chest: Effort normal and breath sounds normal. No respiratory distress. She has no wheezes. She has no rales. Abdominal: Soft. Bowel sounds are normal. She exhibits no distension. There is no tenderness. Musculoskeletal: Normal range of motion. She exhibits no edema. Neurological: She is alert and oriented to person, place, and time. No cranial nerve deficit. She exhibits normal muscle tone. Skin: Skin is warm and dry. No rash noted. She is not diaphoretic. Psychiatric:  
Flat affect. Nursing note and vitals reviewed. Diagnostic Study Results Labs - Recent Results (from the past 12 hour(s)) EKG, 12 LEAD, INITIAL Collection Time: 07/13/18 12:23 PM  
Result Value Ref Range Ventricular Rate 75 BPM  
 Atrial Rate 75 BPM  
 P-R Interval 154 ms QRS Duration 80 ms  
 Q-T Interval 406 ms QTC Calculation (Bezet) 453 ms Calculated P Axis 65 degrees Calculated R Axis 43 degrees Calculated T Axis 39 degrees Diagnosis Normal sinus rhythm Normal ECG When compared with ECG of 12-SEP-2017 00:05, No significant change was found CBC WITH AUTOMATED DIFF Collection Time: 07/13/18 12:46 PM  
Result Value Ref Range WBC 4.6 3.6 - 11.0 K/uL  
 RBC 3.93 3.80 - 5.20 M/uL  
 HGB 12.1 11.5 - 16.0 g/dL HCT 36.8 35.0 - 47.0 % MCV 93.6 80.0 - 99.0 FL  
 MCH 30.8 26.0 - 34.0 PG  
 MCHC 32.9 30.0 - 36.5 g/dL  
 RDW 12.4 11.5 - 14.5 % PLATELET 288 995 - 160 K/uL MPV 10.1 8.9 - 12.9 FL  
 NRBC 0.0 0  WBC ABSOLUTE NRBC 0.00 0.00 - 0.01 K/uL NEUTROPHILS 55 32 - 75 % LYMPHOCYTES 32 12 - 49 % MONOCYTES 10 5 - 13 % EOSINOPHILS 2 0 - 7 % BASOPHILS 1 0 - 1 % IMMATURE GRANULOCYTES 0 0.0 - 0.5 % ABS. NEUTROPHILS 2.5 1.8 - 8.0 K/UL  
 ABS. LYMPHOCYTES 1.5 0.8 - 3.5 K/UL  
 ABS. MONOCYTES 0.5 0.0 - 1.0 K/UL  
 ABS. EOSINOPHILS 0.1 0.0 - 0.4 K/UL  
 ABS. BASOPHILS 0.0 0.0 - 0.1 K/UL  
 ABS. IMM. GRANS. 0.0 0.00 - 0.04 K/UL  
 DF AUTOMATED METABOLIC PANEL, COMPREHENSIVE Collection Time: 07/13/18 12:46 PM  
Result Value Ref Range Sodium 139 136 - 145 mmol/L Potassium 4.3 3.5 - 5.1 mmol/L Chloride 106 97 - 108 mmol/L  
 CO2 26 21 - 32 mmol/L Anion gap 7 5 - 15 mmol/L Glucose 87 65 - 100 mg/dL BUN 13 6 - 20 MG/DL Creatinine 0.79 0.55 - 1.02 MG/DL  
 BUN/Creatinine ratio 16 12 - 20 GFR est AA >60 >60 ml/min/1.73m2 GFR est non-AA >60 >60 ml/min/1.73m2 Calcium 8.6 8.5 - 10.1 MG/DL Bilirubin, total 0.3 0.2 - 1.0 MG/DL  
 ALT (SGPT) 24 12 - 78 U/L  
 AST (SGOT) 19 15 - 37 U/L Alk. phosphatase 98 45 - 117 U/L Protein, total 6.7 6.4 - 8.2 g/dL Albumin 3.7 3.5 - 5.0 g/dL Globulin 3.0 2.0 - 4.0 g/dL A-G Ratio 1.2 1.1 - 2.2    
TROPONIN I Collection Time: 07/13/18 12:46 PM  
Result Value Ref Range Troponin-I, Qt. <0.05 <0.05 ng/mL CK W/ CKMB & INDEX Collection Time: 07/13/18 12:46 PM  
Result Value Ref Range  CK 60 26 - 192 U/L  
 CK - MB <1.0 <3.6 NG/ML  
 CK-MB Index Cannot be calculated 0 - 2.5 NT-PRO BNP Collection Time: 07/13/18 12:46 PM  
Result Value Ref Range NT pro- 0 - 450 PG/ML  
TROPONIN I Collection Time: 07/13/18  7:17 PM  
Result Value Ref Range Troponin-I, Qt. <0.05 <0.05 ng/mL Radiologic Studies - CXR Results  (Last 48 hours) 07/13/18 1351  XR CHEST PORT Final result Impression:  IMPRESSION:  
   
No acute process on portable chest. Nonspecific chronic interstitial lung  
disease is likely unchanged. Narrative:  EXAM:  XR CHEST PORT INDICATION:  Chest heaviness and shortness of breath for one week. Recent  
diagnosis of bronchitis. Chronic hypertension and psoriasis. COMPARISON: Chest views on 9/11/2017 TECHNIQUE: Upright portable chest AP digital view. FINDINGS: Cardiac monitoring wires overlie the thorax. The cardiomediastinal and  
hilar contours are within normal limits. The pulmonary vasculature is within  
normal limits. Reticular interstitial opacities are subtle but likely unchanged given  
difference in technique. Left upper lobe lingular scarring is unchanged. No  
evidence of pneumonia. Pleural spaces are clear. Left humerus prosthesis is  
partially imaged. Medical Decision Making I am the first provider for this patient. I reviewed the vital signs, available nursing notes, past medical history, past surgical history, family history and social history. Vital Signs-Reviewed the patient's vital signs. Patient Vitals for the past 12 hrs: 
 Temp Pulse Resp BP SpO2  
07/13/18 2015 - 84 16 176/77 99 % 07/13/18 1915 - 76 11 143/69 96 % 07/13/18 1816 - 79 20 (!) 154/93 96 % 07/13/18 1715 - 76 19 162/67 96 % 07/13/18 1616 - 72 18 163/69 98 % 07/13/18 1515 - 72 11 148/67 100 % 07/13/18 1446 - 78 19 - 99 % 07/13/18 1445 - - - 143/64 100 % 07/13/18 1430 - 74 16 153/61 99 % 07/13/18 1400 - - - 158/62 99 % 07/13/18 1330 - - - 161/75 100 % 07/13/18 1315 - - - 147/67 100 % 07/13/18 1210 97.9 °F (36.6 °C) 78 18 153/85 100 % EKG interpretation: (Preliminary) 12:39 PM 
Rhythm: normal sinus rhythm; and regular . Rate (approx.): 75; Axis: normal; SD interval: normal; QRS interval: normal ; ST/T wave: normal; Other findings: normal. 
Written by Evita Morrissey, ED Scribe, as dictated by Paty Vaz MD. Records Reviewed: Nursing Notes, Old Medical Records, Previous Radiology Studies and Previous Laboratory Studies Provider Notes (Medical Decision Making): Chest tightness x 1 week with SOB. Diagnosed with bronchitis without lab evaluation. Low suspicion for PE or dissection. Will rule out unstable angina. ED Course:  
Initial assessment performed. The patients presenting problems have been discussed, and they are in agreement with the care plan formulated and outlined with them. I have encouraged them to ask questions as they arise throughout their visit. Critical Care Time:  
None. Disposition: 
DISCHARGE NOTE 
7:55 PM 
The patient has been re-evaluated and is ready for discharge. Reviewed available results with patient. Counseled pt on diagnosis and care plan. Pt has expressed understanding, and all questions have been answered. Pt agrees with plan and agrees to F/U as recommended, or return to the ED if their sxs worsen. Discharge instructions have been provided and explained to the pt, along with reasons to return to the ED. PLAN: 
1. Current Discharge Medication List  
  
START taking these medications Details  
predniSONE (DELTASONE) 10 mg tablet Take 3 Tabs by mouth daily (with breakfast) for 3 days. Qty: 9 Tab, Refills: 0 CONTINUE these medications which have CHANGED Details  
albuterol (PROVENTIL HFA, VENTOLIN HFA, PROAIR HFA) 90 mcg/actuation inhaler Take 1 Puff by inhalation every four (4) hours as needed for Wheezing. Qty: 1 Inhaler, Refills: 0  
  
  
 
2. Follow-up Information  Follow up With Details Comments Contact Info [facility] EMERGENCY DEPT  If symptoms worsen [address] [facility] 
[phone] Return to ED if worse Diagnosis Clinical Impression: 1. Atypical chest pain 2. Shortness of breath Attestations: This note is prepared by Kash Alcantar acting as Scribe for MD Zari Cristina MD : The scribe's documentation has been prepared under my direction and personally reviewed by me in its entirety. I confirm that the note above accurately reflects all work, treatment, procedures, and medical decision making performed by me.

## 2018-07-13 NOTE — ED NOTES
Received pt to exam room for c/o having a hard time taking a deep breath and a heaviness in her chest. Pt was diagnosed Monday w/ bronchitis and states she feels like her breathing has gotten worse.

## 2018-07-13 NOTE — DISCHARGE INSTRUCTIONS
Chest Pain: Care Instructions  Your Care Instructions  There are many things that can cause chest pain. Some are not serious and will get better on their own in a few days. But some kinds of chest pain need more testing and treatment. Your doctor may have recommended a follow-up visit in the next 8 to 12 hours. If you are not getting better, you may need more tests or treatment. Even though your doctor has released you, you still need to watch for any problems. The doctor carefully checked you, but sometimes problems can develop later. If you have new symptoms or if your symptoms do not get better, get medical care right away. If you have worse or different chest pain or pressure that lasts more than 5 minutes or you passed out (lost consciousness), call 911 or seek other emergency help right away. A medical visit is only one step in your treatment. Even if you feel better, you still need to do what your doctor recommends, such as going to all suggested follow-up appointments and taking medicines exactly as directed. This will help you recover and help prevent future problems. How can you care for yourself at home? · Rest until you feel better. · Take your medicine exactly as prescribed. Call your doctor if you think you are having a problem with your medicine. · Do not drive after taking a prescription pain medicine. When should you call for help? Call 911 if:    · You passed out (lost consciousness).     · You have severe difficulty breathing.     · You have symptoms of a heart attack. These may include:  ¨ Chest pain or pressure, or a strange feeling in your chest.  ¨ Sweating. ¨ Shortness of breath. ¨ Nausea or vomiting. ¨ Pain, pressure, or a strange feeling in your back, neck, jaw, or upper belly or in one or both shoulders or arms. ¨ Lightheadedness or sudden weakness. ¨ A fast or irregular heartbeat.   After you call 911, the  may tell you to chew 1 adult-strength or 2 to 4 low-dose aspirin. Wait for an ambulance. Do not try to drive yourself.    Call your doctor today if:    · You have any trouble breathing.     · Your chest pain gets worse.     · You are dizzy or lightheaded, or you feel like you may faint.     · You are not getting better as expected.     · You are having new or different chest pain. Where can you learn more? Go to http://trae-joseline.info/. Enter A120 in the search box to learn more about \"Chest Pain: Care Instructions. \"  Current as of: November 20, 2017  Content Version: 11.7  © 7533-9708 [employer]. Care instructions adapted under license by [employer] (which disclaims liability or warranty for this information). If you have questions about a medical condition or this instruction, always ask your healthcare professional. [address] any warranty or liability for your use of this information. Shortness of Breath: Care Instructions  Your Care Instructions  Shortness of breath has many causes. Sometimes conditions such as anxiety can lead to shortness of breath. Some people get mild shortness of breath when they exercise. Trouble breathing also can be a symptom of a serious problem, such as asthma, lung disease, emphysema, heart problems, and pneumonia. If your shortness of breath continues, you may need tests and treatment. Watch for any changes in your breathing and other symptoms. Follow-up care is a key part of your treatment and safety. Be sure to make and go to all appointments, and call your doctor if you are having problems. It's also a good idea to know your test results and keep a list of the medicines you take. How can you care for yourself at home? · Do not smoke or allow others to smoke around you. If you need help quitting, talk to your doctor about stop-smoking programs and medicines. These can increase your chances of quitting for good.   · Get plenty of rest and sleep.  · Take your medicines exactly as prescribed. Call your doctor if you think you are having a problem with your medicine. · Find healthy ways to deal with stress. ¨ Exercise daily. ¨ Get plenty of sleep. ¨ Eat regularly and well. When should you call for help? Call 911 anytime you think you may need emergency care. For example, call if:    · You have severe shortness of breath.     · You have symptoms of a heart attack. These may include:  ¨ Chest pain or pressure, or a strange feeling in the chest.  ¨ Sweating. ¨ Shortness of breath. ¨ Nausea or vomiting. ¨ Pain, pressure, or a strange feeling in the back, neck, jaw, or upper belly or in one or both shoulders or arms. ¨ Lightheadedness or sudden weakness. ¨ A fast or irregular heartbeat. After you call 911, the  may tell you to chew 1 adult-strength or 2 to 4 low-dose aspirin. Wait for an ambulance. Do not try to drive yourself.    Call your doctor now or seek immediate medical care if:    · Your shortness of breath gets worse or you start to wheeze. Wheezing is a high-pitched sound when you breathe.     · You wake up at night out of breath or have to prop your head up on several pillows to breathe.     · You are short of breath after only light activity or while at rest.    Watch closely for changes in your health, and be sure to contact your doctor if:    · You do not get better over the next 1 to 2 days. Where can you learn more? Go to http://trae-joseline.info/. Enter S780 in the search box to learn more about \"Shortness of Breath: Care Instructions. \"  Current as of: December 6, 2017  Content Version: 11.7  © 4081-8921 [employer]. Care instructions adapted under license by [employer] (which disclaims liability or warranty for this information).  If you have questions about a medical condition or this instruction, always ask your healthcare professional. [place] [address] any warranty or liability for your use of this information.

## 2018-07-14 LAB
ATRIAL RATE: 75 BPM
CALCULATED P AXIS, ECG09: 65 DEGREES
CALCULATED R AXIS, ECG10: 43 DEGREES
CALCULATED T AXIS, ECG11: 39 DEGREES
DIAGNOSIS, 93000: NORMAL
P-R INTERVAL, ECG05: 154 MS
Q-T INTERVAL, ECG07: 406 MS
QRS DURATION, ECG06: 80 MS
QTC CALCULATION (BEZET), ECG08: 453 MS
VENTRICULAR RATE, ECG03: 75 BPM

## 2018-07-14 NOTE — ED NOTES
Pt was given discharge papers by the ED Physician. Verbalizes understanding. IV was removed with tip intact. Pressure was applied, bandage in place. Pt left via wheelchair) out of the ER with family.

## 2018-07-25 ENCOUNTER — OFFICE VISIT (OUTPATIENT)
Dept: NEUROLOGY | Age: 78
End: 2018-07-25

## 2018-07-25 VITALS — SYSTOLIC BLOOD PRESSURE: 163 MMHG | DIASTOLIC BLOOD PRESSURE: 90 MMHG | HEART RATE: 80 BPM | OXYGEN SATURATION: 100 %

## 2018-07-25 DIAGNOSIS — M54.16 LUMBAR BACK PAIN WITH RADICULOPATHY AFFECTING RIGHT LOWER EXTREMITY: ICD-10-CM

## 2018-07-25 DIAGNOSIS — M96.1 CERVICAL POST-LAMINECTOMY SYNDROME: ICD-10-CM

## 2018-07-25 DIAGNOSIS — M48.061 SPINAL STENOSIS OF LUMBAR REGION WITHOUT NEUROGENIC CLAUDICATION: ICD-10-CM

## 2018-07-25 DIAGNOSIS — M54.16 LUMBAR BACK PAIN WITH RADICULOPATHY AFFECTING LEFT LOWER EXTREMITY: ICD-10-CM

## 2018-07-25 DIAGNOSIS — M47.22 CERVICAL RADICULOPATHY DUE TO DEGENERATIVE JOINT DISEASE OF SPINE: ICD-10-CM

## 2018-07-25 DIAGNOSIS — G60.8 IDIOPATHIC SMALL AND LARGE FIBER SENSORY NEUROPATHY: ICD-10-CM

## 2018-07-25 DIAGNOSIS — G56.21 ULNAR NEUROPATHY AT ELBOW OF RIGHT UPPER EXTREMITY: ICD-10-CM

## 2018-07-25 DIAGNOSIS — D47.2 MGUS (MONOCLONAL GAMMOPATHY OF UNKNOWN SIGNIFICANCE): Primary | ICD-10-CM

## 2018-07-25 NOTE — PROCEDURES
[facility]        Tel: [phone] ? Fax: [phone]    ELECTRODIAGNOSTIC REPORT      Test Date:  2018    Patient: Cristiano Briscoe : 1940 Physician: Andrea Montague M.D. Sex: Female  < Ref Phys: Andrea Montague M.D. Technician: Marzette Frankel    Patient History: Patient is a 55-year-old right-handed  female with a known history of monoclonal gammopathy of unknown significance and possible neuropathy with burning pain and numbness in her feet, and in both arms, ever since she had her second cervical laminectomy for spinal stenosis and has had previous lumbar surgery with pain radiating into both legs. EMG study to rule out neuropathy, rule out entrapment neuropathy, without radiculopathy, without other neuromuscular disease. Neuro exam: Patient has hypoactive but symmetric reflexes, no clonus or Babinski present. Muscle tone and bulk appear to be normal in both arms and legs. Muscle strength appears to be normal in both arms and legs. Patient has decreased sensation to temperature and vibration in both feet to ankle level. Patient has normal cranial nerves II through XII, and has normal gait and station. 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 This would be consistent with various toxic, metabolic, or acquired neuropathies. 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   2.)  A mild ulnar nerve entrapment at the elbow on the right side, as manifest by the mild slowing of the ulnar nerve conduction the more proximal stimulation above the elbow compared to below the elbow, but without evidence of denervation or axonal changes seen in the ulnar intrinsic innervated muscles. There was no clear evidence of motor lumbar or cervical radiculopathy on the basis of this study. Clinical correlation recommended, repeat studies in 6-12 months time may be of further diagnostic benefit if clinically indicated. Correlation with metabolic studies and imaging studies may be of further diagnostic benefit. EMG & NCV Findings:  Evaluation of the left Fibular motor and the right Fibular motor nerves showed normal distal onset latency (L6.4, R5.4 ms), normal amplitude (L3.0, R3.7 mV), normal conduction velocity (B Fib-Ankle, L43, R41 m/s), and normal conduction velocity (Poplt-B Fib, L43, R43 m/s). The left median motor nerve showed normal distal onset latency (3.4 ms), normal amplitude (4.8 mV), and normal conduction velocity (Elbow-Wrist, 57 m/s). The left tibial motor and the right tibial motor nerves showed normal distal onset latency (L4.1, R4.7 ms), normal amplitude (L9.7, R10.5 mV), and normal conduction velocity (Knee-Ankle, L40, R43 m/s). The left ulnar motor nerve showed normal distal onset latency (2.7 ms), reduced amplitude (6.3 mV), normal conduction velocity (B Elbow-Wrist, 55 m/s), and normal conduction velocity (A Elbow-B Elbow, 56 m/s). The right ulnar motor nerve showed normal distal onset latency (3.0 ms), normal amplitude (7.6 mV), normal conduction velocity (B Elbow-Wrist, 56 m/s), and decreased conduction velocity (A Elbow-B Elbow, 48 m/s). The left median sensory, the right median sensory, the left radial sensory, and the right ulnar sensory nerves showed normal distal peak latency (L3.4, R3.4, L2.3, R3.2 ms) and normal amplitude (L29.9, R22.6, L21.6, R11.2 µV). The left Sup Fibular sensory and the right Sup Fibular sensory nerves showed no response (Lower leg). The left sural sensory and the right sural sensory nerves showed no response (Calf).   The left ulnar sensory nerve showed normal distal peak latency (3.9 ms) and reduced amplitude (4. 8 µV). Left vs. Right side comparison data for the ulnar sensory nerve indicates abnormal L-R latency difference (1.0 ms). All F Wave latencies were within normal limits.                 __________________  Ori Ramsay M.D.        Nerve Conduction Studies  Anti Sensory Summary Table     Stim Site NR Peak (ms) Norm Peak (ms) P-T Amp (µV) Norm P-T Amp Site1 Site2 Dist (cm)   Left Median Anti Sensory (2nd Digit)  32.4°C   Wrist    3.4 <4 29.9 >13 Wrist 2nd Digit 14.0   Right Median Anti Sensory (2nd Digit)  30.8°C   Wrist    3.4 <4 22.6 >13 Wrist 2nd Digit 14.0   Left Radial Anti Sensory (Base 1st Digit)  32.4°C   Wrist    2.3 <2.8 21.6 >11 Wrist Base 1st Digit 10.0   Left Sup Fibular Anti Sensory (Lat ankle)  30°C   Lower leg NR  <4.6  >4 Lower leg Lat ankle 10.0   Right Sup Fibular Anti Sensory (Lat ankle)  30.3°C   Lower leg NR  <4.6  >4 Lower leg Lat ankle 10.0   Left Sural Anti Sensory (Lat Mall)  30.4°C   Calf NR  <4.5  >4.0 Calf Lat Mall 14.0   Right Sural Anti Sensory (Lat Mall)  30.3°C   Calf NR  <4.5  >4.0 Calf Lat Mall 14.0   Left Ulnar Anti Sensory (5th Digit)  32.3°C   Wrist    3.9 <4.0 4.8 >9 Wrist 5th Digit 14.0   Right Ulnar Anti Sensory (5th Digit)  31.2°C   Wrist    3.2 <4.0 11.2 >9 Wrist 5th Digit 14.0     Motor Summary Table     Stim Site NR Onset (ms) Norm Onset (ms) O-P Amp (mV) Norm O-P Amp P-T Amp (mV) Site1 Site2 Dist (cm) Franklin (m/s)   Left Fibular Motor (Ext Dig Brev)  30.7°C   Ankle    6.4 <6.5 3.0 >1.1 4.2 Ankle Ext Dig Brev 8.0    B Fib    12.5  2.5  3.4 B Fib Ankle 26.0 43   Poplt    14.8  3.0  4.2 Poplt B Fib 10.0 43   Right Fibular Motor (Ext Dig Brev)  30.1°C   Ankle    5.4 <6.5 3.7 >1.1 5.6 Ankle Ext Dig Brev 8.0    B Fib    12.1  3.3  5.2 B Fib Ankle 27.5 41   Poplt    14.4  3.1  4.9 Poplt B Fib 10.0 43   Left Median Motor (Abd Poll Brev)  31.8°C   Wrist    3.4 <4.5 4.8 >4.1 9.1 Wrist Abd Poll Brev 8.0    Elbow    7.1  4.8  8.6 Elbow Wrist 21.0 57   Left Tibial Motor (Abd Bains Brev)  30.2°C   Ankle    4.1 <6.1 9.7 >1.1 14.0 Ankle Abd Bains Brev 8.0    Knee    13.5  6.9  10.6 Knee Ankle 38.0 40   Right Tibial Motor (Abd Bains Brev)  30°C   Ankle    4.7 <6.1 10.5 >1.1 19.0 Ankle Abd Bains Brev 8.0    Knee    13.8  7.4  11.5 Knee Ankle 39.0 43   Left Ulnar Motor (Abd Dig Minimi)  32.5°C   Wrist    2.7 <3.1 6.3 >7.0 11.3 Wrist Abd Dig Minimi 8.0    B Elbow    5.5  5.8  11.2 B Elbow Wrist 15.5 55   A Elbow    7.3  6.2  11.3 A Elbow B Elbow 10.0 56   Right Ulnar Motor (Abd Dig Minimi)  30.9°C   Wrist    3.0 <3.1 7.6 >7.0 13.4 Wrist Abd Dig Minimi 8.0    B Elbow    5.6  7.4  13.2 B Elbow Wrist 14.5 56   A Elbow    7.7  7.0  13.0 A Elbow B Elbow 10.0 48     F Wave Studies     NR F-Lat (ms) Lat Norm (ms) L-R F-Lat (ms) L-R Lat Norm   Left Tibial (Mrkrs) (Abd Hallucis)  30.2°C      47.07 <56  <5.7   Left Ulnar (Mrkrs) (Abd Dig Min)  32.5°C      25.77 <32  <2.5     EMG     Side Muscle Nerve Root Ins Act Fibs Psw Recrt Duration Amp Poly Comment   Left AntTibialis Dp Br Peron L4-5 Nml Nml Nml Nml Nml Nml Nml    Left MedGastroc Tibial S1-2 Nml Nml Nml Nml Nml Nml Nml    Left VastusLat Femoral L2-4 Nml Nml Nml Nml Nml Nml Nml    Left Lower Lumb Parasp Rami L5,S1 Nml Nml Nml Nml Nml Nml Nml    Left Ext Dig Brev Dp Br Peron L5, S1 Nml Nml Nml Reduced Incr Incr 2+    Left AbdHallucis MedPlantar S1-2 Nml Nml Nml Reduced Incr Incr 1+    Right AntTibialis Dp Br Peron L4-5 Nml Nml Nml Nml Nml Nml Nml    Right MedGastroc Tibial S1-2 Nml Nml Nml Nml Nml Nml Nml    Right VastusLat Femoral L2-4 Nml Nml Nml Nml Nml Nml Nml    Right Ext Dig Brev Dp Br Peron L5, S1 Nml Nml Nml Reduced Incr Incr 1+    Right AbdHallucis MedPlantar S1-2 Nml Nml Nml Reduced Incr Incr 1+    Right Lower Lumb Parasp Rami L5,S1 Nml Nml Nml Nml Nml Nml Nml    Left Biceps Musculocut C5-6 Nml Nml Nml Nml Nml Nml Nml    Left Triceps Radial C6-7-8 Nml Nml Nml Nml Nml Nml Nml    Left Deltoid Axillary C5-6 Nml Nml Nml Nml Nml Nml Nml    Left BrachioRad Radial C5-6 Nml Nml Nml Nml Nml Nml Nml    Left ExtCarRad Radial C6-7 Nml Nml Nml Nml Nml Nml Nml    Right 1stDorInt Ulnar C8-T1 Nml Nml Nml Nml Nml Nml Nml    Right Abd Poll Brev Median C8-T1 Nml Nml Nml Nml Nml Nml Nml    Right Lower Cerv Parasp Rami C7,T1 Nml Nml Nml Nml Nml Nml Nml    Left Lower Cerv Parasp Rami C7,T1 Nml Nml Nml Nml Nml Nml Nml    Right Triceps Radial C6-7-8 Nml Nml Nml Nml Nml Nml Nml    Left 1stDorInt Ulnar C8-T1 Nml Nml Nml Nml Nml Nml Nml    Left Abd Poll Brev Median C8-T1 Nml Nml Nml Nml Nml Nml Nml    Right Biceps Musculocut C5-6 Nml Nml Nml Nml Nml Nml Nml    Right Deltoid Axillary C5-6 Nml Nml Nml Nml Nml Nml Nml    Right ABD Dig Min Ulnar C8-T1 Nml Nml Nml Nml Nml Nml Nml    Right BrachioRad Radial C5-6 Nml Nml Nml Nml Nml Nml Nml          Waveforms:

## 2018-07-25 NOTE — LETTER
7/25/2018 9:21 PM 
 
Patient:  Catherine Adames YOB: 1940 Date of Visit: 7/25/2018 Dear No Recipients: Thank you for referring Ms. Catherine Adames to me for evaluation/treatment. Below are the relevant portions of my assessment and plan of care. EMG dictated in procedure note Patient has known monoclonal gammopathy of unknown clinical significance and is followed at [facility] hematology for this If you have questions, please do not hesitate to call me. I look forward to following Ms. Ford More along with you. Sincerely, Glenn Gabriel MD

## 2018-07-26 NOTE — PROGRESS NOTES
EMG dictated in procedure note    Patient has known monoclonal gammopathy of unknown clinical significance and is followed at [facility] hematology for this

## 2018-10-24 ENCOUNTER — OFFICE VISIT (OUTPATIENT)
Dept: NEUROLOGY | Age: 78
End: 2018-10-24

## 2018-10-24 VITALS
BODY MASS INDEX: 28.89 KG/M2 | HEIGHT: 62 IN | WEIGHT: 157 LBS | OXYGEN SATURATION: 95 % | DIASTOLIC BLOOD PRESSURE: 82 MMHG | SYSTOLIC BLOOD PRESSURE: 156 MMHG | HEART RATE: 80 BPM

## 2018-10-24 DIAGNOSIS — M48.061 SPINAL STENOSIS OF LUMBAR REGION WITHOUT NEUROGENIC CLAUDICATION: ICD-10-CM

## 2018-10-24 DIAGNOSIS — I65.23 STENOSIS OF BOTH INTERNAL CAROTID ARTERIES: ICD-10-CM

## 2018-10-24 DIAGNOSIS — G44.209 TENSION VASCULAR HEADACHE: ICD-10-CM

## 2018-10-24 DIAGNOSIS — Z98.890 HISTORY OF LUMBAR LAMINECTOMY: ICD-10-CM

## 2018-10-24 DIAGNOSIS — I67.89 CEREBRAL MICROVASCULAR DISEASE: Primary | ICD-10-CM

## 2018-10-24 DIAGNOSIS — M54.16 LUMBAR BACK PAIN WITH RADICULOPATHY AFFECTING RIGHT LOWER EXTREMITY: ICD-10-CM

## 2018-10-24 DIAGNOSIS — M54.16 LUMBAR BACK PAIN WITH RADICULOPATHY AFFECTING LEFT LOWER EXTREMITY: ICD-10-CM

## 2018-10-24 DIAGNOSIS — G60.8 IDIOPATHIC SMALL AND LARGE FIBER SENSORY NEUROPATHY: ICD-10-CM

## 2018-10-24 DIAGNOSIS — G25.0 BENIGN ESSENTIAL TREMOR SYNDROME: ICD-10-CM

## 2018-10-24 DIAGNOSIS — G25.81 RLS (RESTLESS LEGS SYNDROME): ICD-10-CM

## 2018-10-24 DIAGNOSIS — M47.22 CERVICAL RADICULOPATHY DUE TO DEGENERATIVE JOINT DISEASE OF SPINE: ICD-10-CM

## 2018-10-24 DIAGNOSIS — M96.1 CERVICAL POST-LAMINECTOMY SYNDROME: ICD-10-CM

## 2018-10-24 DIAGNOSIS — G56.21 ULNAR NEUROPATHY AT ELBOW OF RIGHT UPPER EXTREMITY: ICD-10-CM

## 2018-10-24 DIAGNOSIS — G44.229 CHRONIC TENSION-TYPE HEADACHE, NOT INTRACTABLE: ICD-10-CM

## 2018-10-24 NOTE — LETTER
10/24/2018 2:57 PM 
 
Patient:  Ariel Kenney YOB: 1940 Date of Visit: 10/24/2018 Dear No Recipients: Thank you for referring Ms. Ariel Kenney to me for evaluation/treatment. Below are the relevant portions of my assessment and plan of care. Consult REFERRED BY: 
Gerardo Mahmood MD 
 
CHIEF COMPLAINT: Progressive tremors and headaches Subjective: Ariel Kenney is a 69 y.o. right-handed  female seen today for evaluation of new problem at the request of Dr. Zee Ontiveros of progressive numbness in her legs, and pain and numbness radiating into her left arm, and increasing tremors in the left hand, and a sensation of weakness in her left leg and difficulty walking, with her leg occasionally turning out. Patient's EMG done July 2018 did show a length dependent mild axonal demyelinating polyneuropathy consistent with various toxic, metabolic or acquired causes, and her complete workup done showed no clear cause found no involvement. 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 She is taking vitamins and her neuropathy seems to be stable now. She has restless leg and takes Requip 1 mg at bedtime for that. She also has benign essential tremor and takes Mysoline 25 mg once a day at bedtime and doing well with that. She has had 2 lumbar laminectomies in the past and one cervical laminectomy, and seems to be doing stable from that standpoint with persistent pain in the back and the neck but without progressive weakness or numbness. She has essential tremor in addition and doing okay on the Mysoline. She has a history of TIA, microvascular disease and has not had a Doppler in a while so she will repeat a carotid Doppler study on Thursday to rule out recurrent stenosis.   She continues her Plavix therapy for her history of TIA and mini strokes. Pt try to control her risk factors for vascular disease including her blood pressure, giving her cholesterol down, watching her diet carefully, not smoking. She knows the warning signs of a stroke in the befast syndrome was gone over again with her in detail. However on careful questioning of the patient's symptoms a lot of the symptoms have been present ever since she had her first spinal surgery and a subsequent spinal surgery with fusion from C5-C7 with rods inserted for myelopathy. A lot of this is probably myelopathic weakness and numbness from her previous cervical cord compression and myelopathy. She has a slight intention tremor in the left side, but she says it gets much worse now would like some medication for it. The pain in her left arm seems to radiate up into the neck region, is probably a cervical radiculopathy, but she does have a Tinel's over the median nerve on the left. In the lumbar spine looks stable again with moderate degenerative changes remaining. She has had tremors in her hands over the last 2-3 years, and progressive headaches in the right temporal and periorbital region for the last 2 months. Patient states the tremor seems to be worse when she is doing things like holding a plate or holding a cup of liquid, gets worse when she is stressed, anxious, tired, fatigued, and sometimes affect her ability to write. She has no family history of similar problems. She does not drink alcohol also does not know any relation to alcohol. Has been improved with taking clonazepam 1 mg twice a day. She has not tried any other medications. She complains of headache in the right temporal region that are becoming increasingly severe and almost daily and chronic, and sometimes can be more severe. She been under increased stress because her  has dementia with a shunt put in for normal pressure hydrocephalus several years ago.   She also had left shoulder replacement. She has chronic neck pain, chronic back pain, has had a cervical laminectomy from C5-C7 2016, and tube lumbar laminectomies done in 2005 and 2007 with chronic low back pain ever since. She also has chronic numbness in her legs ever since the back surgery. She also had bilateral parotid gland removal because of painful salivation last year. She also had a history of hallucinations 3-1/2 years ago of unclear cause it seemed to resolve on its own, mainly with the medication of clonazepam.  She described herself as being anxious and very nervous. She has had all these medical problems recently and feels more stressed out. Again no family history of tremors. She has had no fever, no meningismus, no other precipitating cause for the headaches, except for the trauma as mentioned above which was associated with a dazed state after banging her head but did not lose consciousness. She had an MRI of her brain in 2010 that was normal, and a CT of the head in 2010 that was normal.  She has had no recent imaging of the brain. Past Medical History:  
Diagnosis Date  Anemia   
 has had iron infusions; Hem/onc Dr Juani Donato [phone]  Anxiety and depression  GERD (gastroesophageal reflux disease)  Hypercholesteremia  Hypertension  Limited peripheral vision of right eye   
 complication of cataract surgery  Long term current use of anticoagulant therapy  Meniere disease  Monoclonal gammopathy of unknown significance   
 hem/onc:  Dr Juani Donato [phone]  Osteoporosis  Psoriasis   
 bilateral Legs  Restless leg  Skin melanoma ([facility] [address] 2012 (Rt ankle) and basal cell (Lt eye) removed  Transient ischemic attack approx 2005 HAS HAD 2 MINISTROKES-NO DEFICITS; neuro Dr Sarmad Saravia (cc)  Unspecified adverse effect of anesthesia HAS NEEDED PORT OR CUTDOWN FOR RECVNG BLOOD OR LONG-TERM IVs; surgery 7/28/15 w/o any difficulty  Unspecified sleep apnea does not use CPAP Past Surgical History:  
Procedure Laterality Date  HX APPENDECTOMY  HX CATARACT REMOVAL   Right  HX CERVICAL FUSION  1/28/15 C5-7  
 HX CHOLECYSTECTOMY    HX GI  2010  
 liver bx due to liver Bx due to elevated enzymes, liver laceration during Bx, hospitalized 10-12d  HX HEENT  7/28/15 Lt total parotidectomy with facial nerve dissection: Dr Iman Valenzuela  HX HEENT  2015 Right parotidectomy  HX HYSTERECTOMY Duey Mantel ORTHOPAEDIC   &  LUMBAR FUSION and revision  HX OTHER SURGICAL  2010 MELANOMA (Rt ankle) and basal cell (Lt eye) removed; q6mo ck by DERMATOLOGY  HX SHOULDER REPLACEMENT Left 2016  HX THYROIDECTOMY Right  NC COLON CA SCRN NOT HI RSK IND  2016 Family History Problem Relation Age of Onset  Alcohol abuse Father  Emphysema Father  Cancer Sister PANCREATIC  Alcohol abuse Brother  COPD Brother [address] Other Mother UNKNOWN CAUSE OF DEATH  
 Heart Disease Mother  Breast Cancer Maternal Aunt  Anesth Problems Neg Hx Social History Tobacco Use  Smoking status: Former Smoker Years: 15.00 Last attempt to quit: 7/15/1975 Years since quittin.3  Smokeless tobacco: Never Used Substance Use Topics  Alcohol use: Yes Comment: may have a glass of wine on rare occasion Current Outpatient Medications:  
  atorvastatin (LIPITOR) 20 mg tablet, Take 20 mg by mouth daily. , Disp: , Rfl:  
  albuterol (PROVENTIL HFA, VENTOLIN HFA, PROAIR HFA) 90 mcg/actuation inhaler, Take 1 Puff by inhalation every four (4) hours as needed for Wheezing., Disp: 1 Inhaler, Rfl: 0 
  cholecalciferol (VITAMIN D3) 1,000 unit tablet, Take  by mouth daily. , Disp: , Rfl:  
  primidone (MYSOLINE) 50 mg tablet, Take 0.5 Tabs by mouth nightly., Disp: 90 Tab, Rfl: 3 
  FOLIC ACID/MULTIVIT-MIN/LUTEIN (CENTRUM SILVER PO), Take  by mouth daily., Disp: , Rfl:  
  clopidogrel (PLAVIX) 75 mg tablet, Take 75 mg by mouth daily. , Disp: , Rfl:  
  escitalopram oxalate (LEXAPRO) 20 mg tablet, Take 20 mg by mouth nightly. Indications: ANXIETY WITH DEPRESSION, Disp: , Rfl:  
  pantoprazole (PROTONIX) 40 mg tablet, Take 40 mg by mouth every morning. Indications: GASTROESOPHAGEAL REFLUX, Disp: , Rfl:  
  verapamil SR (CALAN-SR) 120 mg CR tablet, Take 120 mg by mouth nightly. Indications: HYPERTENSION, Disp: , Rfl:  
  ferrous sulfate 325 mg (65 mg iron) CpER, Take  by mouth daily. , Disp: , Rfl:  
  clonazepam (KLONOPIN) 0.5 mg tablet, Take 0.5 mg by mouth two (2) times a day. Indications: PANIC DISORDER, Disp: , Rfl:  
  NORTRIPTYLINE HCL (NORTRIPTYLINE PO), Take 10 mg by mouth two (2) times a day., Disp: , Rfl:  
  ROPINIROLE HCL (REQUIP PO), Take 1 mg by mouth nightly., Disp: , Rfl:  
  polyethylene glycol (MIRALAX) 17 gram packet, Take 17 g by mouth every morning. Pt puts in her coffee, Disp: , Rfl:  
 
 
 
Allergies Allergen Reactions  Aspirin Nausea and Vomiting  Bee Sting [Sting, Bee] Hives and Shortness of Breath  
  WASP STING  
 Codeine Nausea and Vomiting  Hydrocodone Rash And sedation  Morphine Rash  Nsaids (Non-Steroidal Anti-Inflammatory Drug) Nausea and Vomiting  Oxycodone Rash On her back  Penicillins Hives, Shortness of Breath and Swelling Review of Systems: A comprehensive review of systems was negative except for: Constitutional: positive for fatigue and malaise Musculoskeletal: positive for myalgias, arthralgias, stiff joints, neck pain, back pain and muscle weakness Neurological: positive for headaches, paresthesia and tremor Behvioral/Psych: positive for anxiety and depression Vitals:  
 10/24/18 0800 BP: 156/82 Pulse: 80 SpO2: 95% Weight: 157 lb (71.2 kg) Height: 5' 2\" (1.575 m) Objective: I 
 
 
NEUROLOGICAL EXAM: 
 
 Appearance: The patient is well developed, well nourished, provides a coherent history and is in no acute distress. Mental Status: Oriented to time, place and person, and the president, cognitive function is normal and speech is fluent and no aphasia or dysarthria. Mood and affect appropriate but anxious and depressed . Cranial Nerves:   Intact visual fields. Fundi are benign. Pupils react normally on the left, but the right pupil is scarred and fundus poorly seen and she has decreased visual acuity there., EOM's full, no nystagmus, no ptosis. Facial sensation is normal. Corneal reflexes are not tested. Facial movement is symmetric. Hearing is abnormal bilaterally. Palate is midline with normal sternocleidomastoid and trapezius muscles are normal. Tongue is midline. Neck without meningismus or bruits Neck with limited mobility secondary to her previous surgery Temporal arteries are not tender or enlarged Motor:  4/5 strength in upper and lower proximal and distal muscles. Normal bulk and tone, with a mild spastic paraplegia, worse on the left side where she has mild weakness noted and decreased respiratory movement and hyperreflexia and increased tone from her previous myelopathy. No fasciculations. Reflexes:   Deep tendon reflexes 1+/4 and symmetrical in the arms, 2+/4 in both legs with slight decreased ankle jerks on the right. [place] No babinski or clonus present Sensory:   Abnormal to touch, pinprick and vibration and temperature decreased in both legs up to the knee level. DSS is intact Gait:  Abnormal gait with patient moving slowly, and limited mobility of the lumbar spine secondary to her previous back operations. Patient may have decreased arm swing in the right upper extremity Tremor:   Minimal bilateral intention tremor noted. Cerebellar:   Mildly abnormal Romberg and tandem cerebellar signs present.   
Neurovascular:  Normal heart sounds and regular rhythm, peripheral pulses decreased, and no carotid bruits. Assessment: ICD-10-CM ICD-9-CM 1. Cerebral microvascular disease I67.9 437.9 DUPLEX CAROTID BILATERAL AMB NEURO 2. Stenosis of both internal carotid arteries I65.23 433.10 DUPLEX CAROTID BILATERAL AMB NEURO  
  433.30   
3. Ulnar neuropathy at elbow of right upper extremity G56.21 354.2 4. 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 Cervical radiculopathy due to degenerative joint disease of spine M47.22 721.0 6. Lumbar back pain with radiculopathy affecting left lower extremity M54.16 724.4 7. Lumbar back pain with radiculopathy affecting right lower extremity M54.16 724.4 8. Benign essential tremor syndrome G25.0 333.1 9. Chronic tension-type headache, not intractable G44.229 339.12   
10. Tension vascular headache G44.209 307.81   
11. Cervical post-laminectomy syndrome M96.1 722.81   
12. Spinal stenosis of lumbar region without neurogenic claudication M48.061 724.02   
13. History of lumbar laminectomy Z98.890 V45.89   
14. RLS (restless legs syndrome) G25.81 333.94 Plan:  
 
Because of her past history of TIA and stroke, she will get a carotid Doppler studies repeated again tomorrow, and follow-up will be done in 6 months time or earlier as needed. Patient with numbness in her feet, and on exam to suggest mild neuropathy, metabolic studies sent to rule out treatable causes and were normal,, but I think most of this is probably from her previous myelopathy New weakness in her left leg is clearly coming because of the myelopathy and mild spastic paraplegia from her condition previous to surgery. She has a mild essential tremor, we will try her on Mysoline 25 mg at bedtime. For her restless legs she will continue the Requip 1 mg at bedtime, she is staying stable with that Patient with a history that suggest benign essential tremor, but very little evidence on exam today We had treated her with Neurontin previously because of her pain and tremor, and that did not seem to help so she discontinued that Her headaches are most assuredly coming from the cervicogenic headaches and her chronic neck problems. Because of her left arm radicular pain, an EMG study will be done of the left arm in addition did not show any clear evidence of cervical radiculopathy She continues to keep the pressure off her right elbow to avoid the right ulnar nerve getting worse. Her headaches are better as long as she keeps moving her neck around and using muscle relaxants and pain meds as needed. . 
We discussed her condition with the patient and her  in detail, for all these problems, discussed the workup as needed, treatment options, and she is advised to make sure she takes a multivitamin and vitamin D regularly exercise regular, and remain mentally and physically active. She will check my chart for results of her test, and call us with any question. Further treatment and evaluation will depend on the results of her tests and her clinical course. Signed By: Festus Vargas MD   
 October 24, 2018 CC: Wanda Oliver MD 
FAX: [phone] This note will not be viewable in [address] If you have questions, please do not hesitate to call me. I look forward to following Ms. Kendra Barahona along with you. Sincerely, Festus Vargas MD

## 2018-10-24 NOTE — PROGRESS NOTES
Consult REFERRED BY: 
Brenda Lara MD 
 
CHIEF COMPLAINT: Progressive tremors and headaches Subjective: Bertha Min is a 69 y.o. right-handed  female seen today for evaluation of new problem at the request of Dr. Pam Camargo of progressive numbness in her legs, and pain and numbness radiating into her left arm, and increasing tremors in the left hand, and a sensation of weakness in her left leg and difficulty walking, with her leg occasionally turning out. Patient's EMG done July 2018 did show a length dependent mild axonal demyelinating polyneuropathy consistent with various toxic, metabolic or acquired causes, and her complete workup done showed no clear cause found no involvement. 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 She is taking vitamins and her neuropathy seems to be stable now. She has restless leg and takes Requip 1 mg at bedtime for that. She also has benign essential tremor and takes Mysoline 25 mg once a day at bedtime and doing well with that. She has had 2 lumbar laminectomies in the past and one cervical laminectomy, and seems to be doing stable from that standpoint with persistent pain in the back and the neck but without progressive weakness or numbness. She has essential tremor in addition and doing okay on the Mysoline. She has a history of TIA, microvascular disease and has not had a Doppler in a while so she will repeat a carotid Doppler study on Thursday to rule out recurrent stenosis. She continues her Plavix therapy for her history of TIA and mini strokes. Pt try to control her risk factors for vascular disease including her blood pressure, giving her cholesterol down, watching her diet carefully, not smoking.   She knows the warning signs of a stroke in the fast syndrome was gone over again with her in detail. However on careful questioning of the patient's symptoms a lot of the symptoms have been present ever since she had her first spinal surgery and a subsequent spinal surgery with fusion from C5-C7 with rods inserted for myelopathy. A lot of this is probably myelopathic weakness and numbness from her previous cervical cord compression and myelopathy. She has a slight intention tremor in the left side, but she says it gets much worse now would like some medication for it. The pain in her left arm seems to radiate up into the neck region, is probably a cervical radiculopathy, but she does have a Tinel's over the median nerve on the left. In the lumbar spine looks stable again with moderate degenerative changes remaining. She has had tremors in her hands over the last 2-3 years, and progressive headaches in the right temporal and periorbital region for the last 2 months. Patient states the tremor seems to be worse when she is doing things like holding a plate or holding a cup of liquid, gets worse when she is stressed, anxious, tired, fatigued, and sometimes affect her ability to write. She has no family history of similar problems. She does not drink alcohol also does not know any relation to alcohol. Has been improved with taking clonazepam 1 mg twice a day. She has not tried any other medications. She complains of headache in the right temporal region that are becoming increasingly severe and almost daily and chronic, and sometimes can be more severe. She been under increased stress because her  has dementia with a shunt put in for normal pressure hydrocephalus several years ago. She also had left shoulder replacement. She has chronic neck pain, chronic back pain, has had a cervical laminectomy from C5-C7 2016, and tube lumbar laminectomies done in 2005 and 2007 with chronic low back pain ever since.   She also has chronic numbness in her legs ever since the back surgery. She also had bilateral parotid gland removal because of painful salivation last year. She also had a history of hallucinations 3-1/2 years ago of unclear cause it seemed to resolve on its own, mainly with the medication of clonazepam.  She described herself as being anxious and very nervous. She has had all these medical problems recently and feels more stressed out. Again no family history of tremors. She has had no fever, no meningismus, no other precipitating cause for the headaches, except for the trauma as mentioned above which was associated with a dazed state after banging her head but did not lose consciousness. She had an MRI of her brain in 2010 that was normal, and a CT of the head in 2010 that was normal.  She has had no recent imaging of the brain. Past Medical History:  
Diagnosis Date  Anemia   
 has had iron infusions; Hem/onc Dr Berta Hughes [phone]  Anxiety and depression  GERD (gastroesophageal reflux disease)  Hypercholesteremia  Hypertension  Limited peripheral vision of right eye   
 complication of cataract surgery  Long term current use of anticoagulant therapy  Meniere disease  Monoclonal gammopathy of unknown significance   
 hem/onc:  Dr Berta Hughes [phone]  Osteoporosis  Psoriasis   
 bilateral Legs  Restless leg  Skin melanoma ([address] 2012 (Rt ankle) and basal cell (Lt eye) removed  Transient ischemic attack approx 2005 HAS HAD 2 MINISTROKES-NO DEFICITS; neuro Dr Efraín Saucedo (cc)  Unspecified adverse effect of anesthesia HAS NEEDED PORT OR CUTDOWN FOR RECVNG BLOOD OR LONG-TERM IVs; surgery 7/28/15 w/o any difficulty  Unspecified sleep apnea   
 does not use CPAP Past Surgical History:  
Procedure Laterality Date  HX APPENDECTOMY  HX CATARACT REMOVAL  2011 Right  HX CERVICAL FUSION  1/28/15 C5-7  
 HX CHOLECYSTECTOMY  2000  HX GI  2010 liver bx due to liver Bx due to elevated enzymes, liver laceration during Bx, hospitalized 10-12d  HX HEENT  7/28/15 Lt total parotidectomy with facial nerve dissection: Dr Martina Villarreal  HX HEENT  2015 Right parotidectomy  HX HYSTERECTOMY Jody Chang ORTHOPAEDIC   &  LUMBAR FUSION and revision  HX OTHER SURGICAL  2010 MELANOMA (Rt ankle) and basal cell (Lt eye) removed; q6mo ck by DERMATOLOGY  HX SHOULDER REPLACEMENT Left 2016  HX THYROIDECTOMY Right  SD COLON CA SCRN NOT HI RSK IND  2016 Family History Problem Relation Age of Onset  Alcohol abuse Father  Emphysema Father  Cancer Sister PANCREATIC  Alcohol abuse Brother  COPD Brother [facility] Other Mother UNKNOWN CAUSE OF DEATH  
 Heart Disease Mother  Breast Cancer Maternal Aunt  Anesth Problems Neg Hx Social History Tobacco Use  Smoking status: Former Smoker Years: 15.00 Last attempt to quit: 7/15/1975 Years since quittin.3  Smokeless tobacco: Never Used Substance Use Topics  Alcohol use: Yes Comment: may have a glass of wine on rare occasion Current Outpatient Medications:  
  atorvastatin (LIPITOR) 20 mg tablet, Take 20 mg by mouth daily. , Disp: , Rfl:  
  albuterol (PROVENTIL HFA, VENTOLIN HFA, PROAIR HFA) 90 mcg/actuation inhaler, Take 1 Puff by inhalation every four (4) hours as needed for Wheezing., Disp: 1 Inhaler, Rfl: 0 
  cholecalciferol (VITAMIN D3) 1,000 unit tablet, Take  by mouth daily. , Disp: , Rfl:  
  primidone (MYSOLINE) 50 mg tablet, Take 0.5 Tabs by mouth nightly., Disp: 90 Tab, Rfl: 3 
  FOLIC ACID/MULTIVIT-MIN/LUTEIN (CENTRUM SILVER PO), Take  by mouth daily. , Disp: , Rfl:  
  clopidogrel (PLAVIX) 75 mg tablet, Take 75 mg by mouth daily. , Disp: , Rfl:  
  escitalopram oxalate (LEXAPRO) 20 mg tablet, Take 20 mg by mouth nightly.  Indications: ANXIETY WITH DEPRESSION, Disp: , Rfl:  
   pantoprazole (PROTONIX) 40 mg tablet, Take 40 mg by mouth every morning. Indications: GASTROESOPHAGEAL REFLUX, Disp: , Rfl:  
  verapamil SR (CALAN-SR) 120 mg CR tablet, Take 120 mg by mouth nightly. Indications: HYPERTENSION, Disp: , Rfl:  
  ferrous sulfate 325 mg (65 mg iron) CpER, Take  by mouth daily. , Disp: , Rfl:  
  clonazepam (KLONOPIN) 0.5 mg tablet, Take 0.5 mg by mouth two (2) times a day. Indications: PANIC DISORDER, Disp: , Rfl:  
  NORTRIPTYLINE HCL (NORTRIPTYLINE PO), Take 10 mg by mouth two (2) times a day., Disp: , Rfl:  
  ROPINIROLE HCL (REQUIP PO), Take 1 mg by mouth nightly., Disp: , Rfl:  
  polyethylene glycol (MIRALAX) 17 gram packet, Take 17 g by mouth every morning. Pt puts in her coffee, Disp: , Rfl:  
 
 
 
Allergies Allergen Reactions  Aspirin Nausea and Vomiting  Bee Sting [Sting, Bee] Hives and Shortness of Breath  
  WASP STING  
 Codeine Nausea and Vomiting  Hydrocodone Rash And sedation  Morphine Rash  Nsaids (Non-Steroidal Anti-Inflammatory Drug) Nausea and Vomiting  Oxycodone Rash On her back  Penicillins Hives, Shortness of Breath and Swelling Review of Systems: A comprehensive review of systems was negative except for: Constitutional: positive for fatigue and malaise Musculoskeletal: positive for myalgias, arthralgias, stiff joints, neck pain, back pain and muscle weakness Neurological: positive for headaches, paresthesia and tremor Behvioral/Psych: positive for anxiety and depression Vitals:  
 10/24/18 0800 BP: 156/82 Pulse: 80 SpO2: 95% Weight: 157 lb (71.2 kg) Height: 5' 2\" (1.575 m) Objective: I 
 
 
NEUROLOGICAL EXAM: 
 
Appearance: The patient is well developed, well nourished, provides a coherent history and is in no acute distress.   
Mental Status: Oriented to time, place and person, and the president, cognitive function is normal and speech is fluent and no aphasia or dysarthria. Mood and affect appropriate but anxious and depressed . Cranial Nerves:   Intact visual fields. Fundi are benign. Pupils react normally on the left, but the right pupil is scarred and fundus poorly seen and she has decreased visual acuity there., EOM's full, no nystagmus, no ptosis. Facial sensation is normal. Corneal reflexes are not tested. Facial movement is symmetric. Hearing is abnormal bilaterally. Palate is midline with normal sternocleidomastoid and trapezius muscles are normal. Tongue is midline. Neck without meningismus or bruits Neck with limited mobility secondary to her previous surgery Temporal arteries are not tender or enlarged Motor:  4/5 strength in upper and lower proximal and distal muscles. Normal bulk and tone, with a mild spastic paraplegia, worse on the left side where she has mild weakness noted and decreased respiratory movement and hyperreflexia and increased tone from her previous myelopathy. No fasciculations. Reflexes:   Deep tendon reflexes 1+/4 and symmetrical in the arms, 2+/4 in both legs with slight decreased ankle jerks on the right. Gol[place] Guido No babinski or clonus present Sensory:   Abnormal to touch, pinprick and vibration and temperature decreased in both legs up to the knee level. DSS is intact Gait:  Abnormal gait with patient moving slowly, and limited mobility of the lumbar spine secondary to her previous back operations. Patient may have decreased arm swing in the right upper extremity Tremor:   Minimal bilateral intention tremor noted. Cerebellar:   Mildly abnormal Romberg and tandem cerebellar signs present. Neurovascular:  Normal heart sounds and regular rhythm, peripheral pulses decreased, and no carotid bruits. Assessment: ICD-10-CM ICD-9-CM 1. Cerebral microvascular disease I67.9 437.9 DUPLEX CAROTID BILATERAL AMB NEURO 2.  Stenosis of both internal carotid arteries I65.23 433.10 DUPLEX CAROTID BILATERAL AMB NEURO  
 433.30   
3. Ulnar neuropathy at elbow of right upper extremity G56.21 354.2 4. 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 Cervical radiculopathy due to degenerative joint disease of spine M47.22 721.0 6. Lumbar back pain with radiculopathy affecting left lower extremity M54.16 724.4 7. Lumbar back pain with radiculopathy affecting right lower extremity M54.16 724.4 8. Benign essential tremor syndrome G25.0 333.1 9. Chronic tension-type headache, not intractable G44.229 339.12   
10. Tension vascular headache G44.209 307.81   
11. Cervical post-laminectomy syndrome M96.1 722.81   
12. Spinal stenosis of lumbar region without neurogenic claudication M48.061 724.02   
13. History of lumbar laminectomy Z98.890 V45.89   
14. RLS (restless legs syndrome) G25.81 333.94 Plan:  
 
Because of her past history of TIA and stroke, she will get a carotid Doppler studies repeated again tomorrow, and follow-up will be done in 6 months time or earlier as needed. Patient with numbness in her feet, and on exam to suggest mild neuropathy, metabolic studies sent to rule out treatable causes and were normal,, but I think most of this is probably from her previous myelopathy New weakness in her left leg is clearly coming because of the myelopathy and mild spastic paraplegia from her condition previous to surgery. She has a mild essential tremor, we will try her on Mysoline 25 mg at bedtime. For her restless legs she will continue the Requip 1 mg at bedtime, she is staying stable with that Patient with a history that suggest benign essential tremor, but very little evidence on exam today We had treated her with Neurontin previously because of her pain and tremor, and that did not seem to help so she discontinued that Her headaches are most assuredly coming from the cervicogenic headaches and her chronic neck problems. Because of her left arm radicular pain, an EMG study will be done of the left arm in addition did not show any clear evidence of cervical radiculopathy She continues to keep the pressure off her right elbow to avoid the right ulnar nerve getting worse. Her headaches are better as long as she keeps moving her neck around and using muscle relaxants and pain meds as needed. . 
We discussed her condition with the patient and her  in detail, for all these problems, discussed the workup as needed, treatment options, and she is advised to make sure she takes a multivitamin and vitamin D regularly exercise regular, and remain mentally and physically active. She will check my chart for results of her test, and call us with any question. Further treatment and evaluation will depend on the results of her tests and her clinical course. Signed By: Genet Polk MD   
 October 24, 2018 CC: Tr Beltran MD 
FAX: [phone] This note will not be viewable in [address]

## 2018-10-24 NOTE — PATIENT INSTRUCTIONS
Office Policieso Phone calls/patient messages: Please allow up to 24 hours for someone in the office to contact you about your call or message. Be mindful your provider may be out of the office or your message may require further review. We encourage you to use [employer] for your messages as this is a faster, more efficient way to communicate with our office 
o Medication Refills: 
Prescription medications require up to 48 business hours to process. We encourage you to use [employer] for your refills. For controlled medications: Please allow up to 72 business hours to process. Certain medications may require you to  a written prescription at our office. NO narcotic/controlled medications will be prescribed after 4pm Monday through Friday or on weekends 
o Form/Paperwork Completion: 
Please note there is a $25 fee for all paperwork completed by our providers. We ask that you allow 7-14 business days. Pre-payment is due prior to picking up/faxing the completed form. You may also download your forms to [employer] to have your doctor print off. A Healthy Lifestyle: Care Instructions Your Care Instructions A healthy lifestyle can help you feel good, stay at a healthy weight, and have plenty of energy for both work and play. A healthy lifestyle is something you can share with your whole family. A healthy lifestyle also can lower your risk for serious health problems, such as high blood pressure, heart disease, and diabetes. You can follow a few steps listed below to improve your health and the health of your family. Follow-up care is a key part of your treatment and safety. Be sure to make and go to all appointments, and call your doctor if you are having problems. It's also a good idea to know your test results and keep a list of the medicines you take. How can you care for yourself at home? · Do not eat too much sugar, fat, or fast foods.  You can still have dessert and treats now and then. The goal is moderation. · Start small to improve your eating habits. Pay attention to portion sizes, drink less juice and soda pop, and eat more fruits and vegetables. ? Eat a healthy amount of food. A 3-ounce serving of meat, for example, is about the size of a deck of cards. Fill the rest of your plate with vegetables and whole grains. ? Limit the amount of soda and sports drinks you have every day. Drink more water when you are thirsty. ? Eat at least 5 servings of fruits and vegetables every day. It may seem like a lot, but it is not hard to reach this goal. A serving or helping is 1 piece of fruit, 1 cup of vegetables, or 2 cups of leafy, raw vegetables. Have an apple or some carrot sticks as an afternoon snack instead of a candy bar. Try to have fruits and/or vegetables at every meal. 
· Make exercise part of your daily routine. You may want to start with simple activities, such as walking, bicycling, or slow swimming. Try to be active 30 to 60 minutes every day. You do not need to do all 30 to 60 minutes all at once. For example, you can exercise 3 times a day for 10 or 20 minutes. Moderate exercise is safe for most people, but it is always a good idea to talk to your doctor before starting an exercise program. 
· Keep moving. [place] the lawn, work in the garden, or [employer]. Take the stairs instead of the elevator at work. · If you smoke, quit. People who smoke have an increased risk for heart attack, stroke, cancer, and other lung illnesses. Quitting is hard, but there are ways to boost your chance of quitting tobacco for good. ? Use nicotine gum, patches, or lozenges. ? Ask your doctor about stop-smoking programs and medicines. ? Keep trying.  
In addition to reducing your risk of diseases in the future, you will notice some benefits soon after you stop using tobacco. If you have shortness of breath or asthma symptoms, they will likely get better within a few weeks after you quit. · Limit how much alcohol you drink. Moderate amounts of alcohol (up to 2 drinks a day for men, 1 drink a day for women) are okay. But drinking too much can lead to liver problems, high blood pressure, and other health problems. Family health If you have a family, there are many things you can do together to improve your health. · Eat meals together as a family as often as possible. · Eat healthy foods. This includes fruits, vegetables, lean meats and dairy, and whole grains. · Include your family in your fitness plan. Most people think of activities such as jogging or tennis as the way to fitness, but there are many ways you and your family can be more active. Anything that makes you breathe hard and gets your heart pumping is exercise. Here are some tips: 
? Walk to do errands or to take your child to school or the bus. 
? Go for a family bike ride after dinner instead of watching TV. Where can you learn more? Go to http://trae-joseline.info/. Enter L753 in the search box to learn more about \"A Healthy Lifestyle: Care Instructions. \" Current as of: December 7, 2017 Content Version: 11.8 © 2795-5598 [employer], [employer]. Care instructions adapted under license by "[employer] (which disclaims liability or warranty for this information). If you have questions about a medical condition or this instruction, always ask your healthcare professional. [place] [address] any warranty or liability for your use of this information.

## 2018-10-25 ENCOUNTER — OFFICE VISIT (OUTPATIENT)
Dept: NEUROLOGY | Age: 78
End: 2018-10-25

## 2018-10-25 DIAGNOSIS — I67.89 CEREBRAL MICROVASCULAR DISEASE: ICD-10-CM

## 2018-10-25 DIAGNOSIS — I65.23 STENOSIS OF BOTH INTERNAL CAROTID ARTERIES: ICD-10-CM

## 2018-10-29 NOTE — PROCEDURES
DUPLEX CAROTID BILATERAL AMB [[id number]]               []Hide copied text    []Javi for details      This study consisted of pulsed wave Doppler examination, Color-flow imaging, and Duplex imaging of both the right and left carotid systems, and both vertebral arteries.        Imaging of both right and left carotid systems showed mild mixed plaquing at the bifurcations and proximal and distal internal and external carotid arteries bilaterally, with stenosis in the range of 16-49% only and with no flow abnormalities identified, but the proximal internal carotid artery on the right side did show a moderate flow disturbance indicating stenosis in the range of 50-79% with a peak systolic flow velocity of 209/72 in the proximal right internal carotid artery. Clinical correlation recommended, and if clinically indicated, other imaging modalities like CTA or MRA may be of further diagnostic benefit. .        Both vertebral arteries showed normal antegrade flow.       Clinical correlation recommended

## 2018-11-28 ENCOUNTER — HOSPITAL ENCOUNTER (OUTPATIENT)
Dept: GENERAL RADIOLOGY | Age: 78
Discharge: HOME | End: 2018-11-28
Payer: MEDICARE

## 2018-11-28 DIAGNOSIS — R52 PAIN: ICD-10-CM

## 2018-11-28 PROCEDURE — 71046 X-RAY EXAM CHEST 2 VIEWS: CPT

## 2019-02-28 ENCOUNTER — OFFICE VISIT (OUTPATIENT)
Dept: INTERNAL MEDICINE CLINIC | Age: 79
End: 2019-02-28

## 2019-02-28 VITALS
RESPIRATION RATE: 18 BRPM | BODY MASS INDEX: 28.89 KG/M2 | TEMPERATURE: 97.8 F | DIASTOLIC BLOOD PRESSURE: 92 MMHG | WEIGHT: 157 LBS | OXYGEN SATURATION: 99 % | SYSTOLIC BLOOD PRESSURE: 174 MMHG | HEART RATE: 78 BPM | HEIGHT: 62 IN

## 2019-02-28 DIAGNOSIS — Z00.00 ENCOUNTER FOR MEDICAL EXAMINATION TO ESTABLISH CARE: Primary | ICD-10-CM

## 2019-02-28 DIAGNOSIS — E78.2 MIXED HYPERLIPIDEMIA: ICD-10-CM

## 2019-02-28 DIAGNOSIS — C44.90 SKIN CANCER: ICD-10-CM

## 2019-02-28 DIAGNOSIS — G89.29 CHRONIC LEFT SHOULDER PAIN: ICD-10-CM

## 2019-02-28 DIAGNOSIS — F41.9 ANXIETY: ICD-10-CM

## 2019-02-28 DIAGNOSIS — G45.9 TIA (TRANSIENT ISCHEMIC ATTACK): ICD-10-CM

## 2019-02-28 DIAGNOSIS — M25.512 CHRONIC LEFT SHOULDER PAIN: ICD-10-CM

## 2019-02-28 DIAGNOSIS — D47.2 MGUS (MONOCLONAL GAMMOPATHY OF UNKNOWN SIGNIFICANCE): ICD-10-CM

## 2019-02-28 RX ORDER — CLOPIDOGREL BISULFATE 75 MG/1
75 TABLET ORAL DAILY
Qty: 90 TAB | Refills: 1 | Status: SHIPPED | OUTPATIENT
Start: 2019-02-28 | End: 2019-06-04 | Stop reason: SDUPTHER

## 2019-02-28 NOTE — PROGRESS NOTES
Reviewed record in preparation for visit and have obtained necessary documentation. Identified pt with two pt identifiers(name and ). Chief Complaint Patient presents with [facility] Establish Care Health Maintenance Due Topic Date Due  
 DTaP/Tdap/Td  (1 - Tdap) 1962  Shingles Vaccine (1 of 2) 1991  Glaucoma Screening   2006  Pneumococcal Vaccine (1 of 2 - PCV13) 2006  Flu Vaccine  2018 [facility] Annual Well Visit  10/12/2018 Ms. Karen Beverly has a reminder for a \"due or due soon\" health maintenance. I have asked that she discuss this further with her primary care provider for follow-up on this health maintenance. Coordination of Care Questionnaire: 
:  
 
1) Have you been to an emergency room, urgent care clinic since your last visit? no  
Hospitalized since your last visit? no          
 
2) Have you seen or consulted any other health care providers outside of [address] [place] [address] since your last visit? no  (Include any pap smears or colon screenings in this section.) 3) In the event something were to happen to you and you were unable to speak on your behalf, do you have an Advance Directive/ Living Will in place stating your wishes? NO Do you have an Advance Directive on file? no 
 
4) Are you interested in receiving information on Advance Directives? NO Patient is accompanied by  I have received verbal consent from Nick Velásquez to discuss any/all medical information while they are present in the room.

## 2019-02-28 NOTE — PROGRESS NOTES
Ms. Blu Pruitt is a new patient who is here to establish care. CC: 
Establish Care 
hx of TIA Shoulder pain HPI: 
 
Unfortunately the insurance dropped her previous PCP thus patient is establishing care. Patient has complex medical hx History of TIA:  
Sees Dr Eli Roblero Patient is on plavix and statin Reviewed her last MRI done brain done in 2017 which was normal she recently saw Dr Francy Dennis - reviewed noted had carotid US done which was normal. She is also being treated for chronic tremors Has chronic left shoulder pain - had a total shoulder replacement - Dr Jaqueline Torres did the surgery 2-3 years ago. Feels pain is more pronounced in the past few months. Denies new trauma Has a history on anxiety: on lexapro and low dose clonazepam BID -stable. Denies depression. Sleeping well Patient has a hx of MGUS -sees  Dr Nicho Hollins  at [facility] and follows there regularly. Dr Yodit Kerr for dermatology for history of melanoma. Unfortunately patient noted a change in skin of her face and will have it removed and skin graft inserted, she does not recall the exact diagnosis of the biopsy but states it is cancerous . 32 [address] Reionso Worked in the school system -   Non smoker No ETOH Had 3 children Review of systems: 
Constitutional: negative for fever, chills, weight loss, night sweats Eyes : negative for vision changes, eye pain and discharge Nose and Throat: negative for tinnitus, sore throat Cardiovascular: negative for chest pain, palpitations and shortness of breath Respiratory: negative for shortness of breath, cough and wheezing Gastroinstestinal: negative for abdominal pain, nausea, vomiting, diarrhea, constipation, and blood in the stool Musculoskeletal: + chronic shoulder pain Genitourinary: negative for dysuria, nocturia, polyuria and hematuria Neurologic: Negative for focal weakness, numbness or incoordination Skin: negative for rash, pruritus Hematologic: negative for easy bruising Past Medical History:  
Diagnosis Date  Anemia   
 has had iron infusions; Hem/onc Dr Stevenson Chiquita [phone]  Anxiety and depression  GERD (gastroesophageal reflux disease)  Hypercholesteremia  Hypertension  Limited peripheral vision of right eye   
 complication of cataract surgery  Long term current use of anticoagulant therapy  Meniere disease  MGUS (monoclonal gammopathy of unknown significance)  Monoclonal gammopathy of unknown significance   
 hem/onc:  Dr Stevenson Chiquita [phone]  Osteoporosis  Psoriasis   
 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   
 does not use CPAP Past Surgical History:  
Procedure Laterality Date  HX APPENDECTOMY  HX CATARACT REMOVAL  2011 Right  HX CERVICAL FUSION  1/28/15 C5-7  
 HX CHOLECYSTECTOMY  2000  HX GI  2010  
 liver bx due to liver Bx due to elevated enzymes, liver laceration during Bx, hospitalized 10-12d  HX HEENT  7/28/15 Lt total parotidectomy with facial nerve dissection: Dr Pinky Marcial  HX HEENT  11/2015 Right parotidectomy  HX HYSTERECTOMY [facility] ORTHOPAED[facility]  2009 & 2011 LUMBAR FUSION and revision  HX OTHER SURGICAL  2010 MELANOMA (Rt ankle) and basal cell (Lt eye) removed; q6mo ck by DERMATOLOGY  HX SHOULDER REPLACEMENT Left 08/01/2016  HX THYROIDECTOMY Right  PA COLON CA SCRN NOT HI RSK IND  1/27/2016 Allergies Allergen Reactions  Aspirin Nausea and Vomiting  Bee Sting [Sting, Bee] Hives and Shortness of Breath  
  WASP STING  
 Codeine Nausea and Vomiting  Hydrocodone Rash And sedation  Morphine Rash  Nsaids (Non-Steroidal Anti-Inflammatory Drug) Nausea and Vomiting  Oxycodone Rash On her back  Penicillins Hives, Shortness of Breath and Swelling Current Outpatient Medications on File Prior to Visit Medication Sig Dispense Refill  atorvastatin (LIPITOR) 20 mg tablet Take 20 mg by mouth daily.  albuterol (PROVENTIL HFA, VENTOLIN HFA, PROAIR HFA) 90 mcg/actuation inhaler Take 1 Puff by inhalation every four (4) hours as needed for Wheezing. 1 Inhaler 0  
 cholecalciferol (VITAMIN D3) 1,000 unit tablet Take  by mouth daily.  primidone (MYSOLINE) 50 mg tablet Take 0.5 Tabs by mouth nightly. 90 Tab 3  
 escitalopram oxalate (LEXAPRO) 20 mg tablet Take 20 mg by mouth nightly. Indications: ANXIETY WITH DEPRESSION  pantoprazole (PROTONIX) 40 mg tablet Take 40 mg by mouth every morning. Indications: GASTROESOPHAGEAL REFLUX  verapamil SR (CALAN-SR) 120 mg CR tablet Take 120 mg by mouth nightly. Indications: HYPERTENSION  ferrous sulfate 325 mg (65 mg iron) CpER Take  by mouth daily.  clonazepam (KLONOPIN) 0.5 mg tablet Take 0.5 mg by mouth two (2) times a day. Indications: PANIC DISORDER    
 NORTRIPTYLINE HCL (NORTRIPTYLINE PO) Take 10 mg by mouth two (2) times a day.  ROPINIROLE HCL (REQUIP PO) Take 1 mg by mouth nightly.  polyethylene glycol (MIRALAX) 17 gram packet Take 17 g by mouth every morning. Pt puts in her coffee No current facility-administered medications on file prior to visit. family history includes Alcohol abuse in her brother and father; Breast Cancer in her maternal aunt; COPD in her brother; Cancer in her sister; Emphysema in her father; Heart Disease in her mother; Other in her mother. Social History Socioeconomic History  Marital status:  Spouse name: Not on file  Number of children: Not on file  Years of education: Not on file  Highest education level: Not on file Social Needs  Financial resource strain: Not on file  Food insecurity - worry: Not on file  Food insecurity - inability: Not on file  Transportation needs - medical: Not on file  Transportation needs - non-medical: Not on file Occupational History  Not on file Tobacco Use  Smoking status: Former Smoker Years: 15.00 Last attempt to quit: 7/15/1975 Years since quittin.6  Smokeless tobacco: Never Used Substance and Sexual Activity  Alcohol use: Yes Comment: may have a glass of wine on rare occasion  Drug use: No  
 Sexual activity: Not Currently Other Topics Concern  Not on file Social History Narrative Lives in [place] with  of 62 years. Has 2 sons and 1 daughter. Used to work in a bank. Likes to garden and sew. Visit Vitals BP (!) 174/92 (BP 1 Location: Right arm, BP Patient Position: Sitting) Pulse 78 Temp 97.8 °F (36.6 °C) (Oral) Resp 18 Ht 5' 2\" (1.575 m) Wt 157 lb (71.2 kg) LMP  (LMP Unknown) SpO2 99% BMI 28.72 kg/m² General:  Well appearing female no acute distress HEENT:   PERRL,normal conjunctiva. External ear and canals normal, TMs normal.  Hearing normal to voice. Nose without edema or discharge, normal septum. Lips, teeth, gums normal.  Oropharynx: no erythema, no exudates, no lesions, normal tongue. Neck:  Supple. Thyroid normal size, nontender, without nodules. No carotid bruit. No masses or lymphadenopathy Respiratory: no respiratory distress,  no wheezing, no rhonchi, no rales. No chest wall tenderness. Cardiovascular:  RRR, normal S1S2, no murmur. Gastrointestinal: normal bowel sounds, soft, nontender, without masses. No hepatosplenomegaly. Extremities +2 pulses, no edema, normal sensation Musculoskeletal:  Normal gait. Normal digits and nails. Normal strength and tone, no atrophy, and no abnormal movement. Skin:  No rash, no lesions, no ulcers. Skin warm, normal turgor, without induration or nodules. Neuro:  A and OX4, fluent speech, cranial nerves normal 2-12. Sensation normal to light touch. DTR symmetrical 
Psych:  Normal affect Lab Results Component Value Date/Time WBC 4.6 07/13/2018 12:46 PM  
 HGB 12.1 07/13/2018 12:46 PM  
 HCT 36.8 07/13/2018 12:46 PM  
 PLATELET 740 02/50/9005 12:46 PM  
 MCV 93.6 07/13/2018 12:46 PM  
 
Lab Results Component Value Date/Time Sodium 139 07/13/2018 12:46 PM  
 Potassium 4.3 07/13/2018 12:46 PM  
 Chloride 106 07/13/2018 12:46 PM  
 CO2 26 07/13/2018 12:46 PM  
 Anion gap 7 07/13/2018 12:46 PM  
 Glucose 87 07/13/2018 12:46 PM  
 BUN 13 07/13/2018 12:46 PM  
 Creatinine 0.79 07/13/2018 12:46 PM  
 BUN/Creatinine ratio 16 07/13/2018 12:46 PM  
 GFR est AA >60 07/13/2018 12:46 PM  
 GFR est non-AA >60 07/13/2018 12:46 PM  
 Calcium 8.6 07/13/2018 12:46 PM  
 
No results found for: CHOL, CHOLPOCT, CHOLX, CHLST, CHOLV, HDL, HDLPOC, LDL, LDLCPOC, LDLC, DLDLP, VLDLC, VLDL, TGLX, TRIGL, TRIGP, TGLPOCT, CHHD, CHHDX Lab Results Component Value Date/Time TSH 1.410 06/08/2018 02:12 PM  
 
Lab Results Component Value Date/Time Hemoglobin A1c 5.4 06/08/2018 02:12 PM  
 
No results found for: Montse Sebastian, Mary Long, Pilar Mccauley, [id number] Assessment and Plan: 1. Encounter for medical examination to establish care 2. Chronic left shoulder pain 
- left shoulder pain she has a history of left shoulder replacement with Dr Anjelica Burks 3. Skin cancer 
- she has a hx of skin cancer and appears to have a recurrence for which she will have resection - Dr Laurent Haines is her dermatologist  
 
4. MGUS (monoclonal gammopathy of unknown significance) -stable followed by Dr Cleopatra Malloy 5. Mixed hyperlipidemia 
- on lipitor 20mg daily 6. Anxiety 
- controlled on low dose clonazepam daily and lexapro 7. TIA (transient ischemic attack) On statin and plavix. Followed by Dr Noy aHines 
- clopidogrel (PLAVIX) 75 mg tab; Take 1 Tab by mouth daily. Dispense: 90 Tab; Refill: 1 
- LIPID PANEL 
- METABOLIC PANEL, COMPREHENSIVE Requesting records from PCP Follow-up Disposition: 
Return in about 3 months (around 5/28/2019) for cholesterol, HTN.   
 
Fredrick Monroe MD

## 2019-02-28 NOTE — PATIENT INSTRUCTIONS
No change on medications. Let us know when you need refills. Please notify your pharmacy that you changed PCP.

## 2019-03-08 ENCOUNTER — HOSPITAL ENCOUNTER (OUTPATIENT)
Dept: PREADMISSION TESTING | Age: 79
Discharge: HOME | End: 2019-03-08
Attending: PLASTIC SURGERY
Payer: MEDICARE

## 2019-03-08 VITALS
HEIGHT: 62 IN | DIASTOLIC BLOOD PRESSURE: 73 MMHG | BODY MASS INDEX: 28.76 KG/M2 | WEIGHT: 156.31 LBS | RESPIRATION RATE: 16 BRPM | HEART RATE: 76 BPM | OXYGEN SATURATION: 97 % | TEMPERATURE: 98.3 F | SYSTOLIC BLOOD PRESSURE: 156 MMHG

## 2019-03-08 LAB
ANION GAP SERPL CALC-SCNC: 5 MMOL/L (ref 5–15)
BUN SERPL-MCNC: 14 MG/DL (ref 6–20)
BUN/CREAT SERPL: 17 (ref 12–20)
CALCIUM SERPL-MCNC: 8.7 MG/DL (ref 8.5–10.1)
CHLORIDE SERPL-SCNC: 105 MMOL/L (ref 97–108)
CO2 SERPL-SCNC: 26 MMOL/L (ref 21–32)
CREAT SERPL-MCNC: 0.84 MG/DL (ref 0.55–1.02)
ERYTHROCYTE [DISTWIDTH] IN BLOOD BY AUTOMATED COUNT: 12.6 % (ref 11.5–14.5)
GLUCOSE SERPL-MCNC: 91 MG/DL (ref 65–100)
HCT VFR BLD AUTO: 41.5 % (ref 35–47)
HGB BLD-MCNC: 13.2 G/DL (ref 11.5–16)
MCH RBC QN AUTO: 30.6 PG (ref 26–34)
MCHC RBC AUTO-ENTMCNC: 31.8 G/DL (ref 30–36.5)
MCV RBC AUTO: 96.3 FL (ref 80–99)
NRBC # BLD: 0 K/UL (ref 0–0.01)
NRBC BLD-RTO: 0 PER 100 WBC
PLATELET # BLD AUTO: 259 K/UL (ref 150–400)
PMV BLD AUTO: 10.2 FL (ref 8.9–12.9)
POTASSIUM SERPL-SCNC: 5.2 MMOL/L (ref 3.5–5.1)
RBC # BLD AUTO: 4.31 M/UL (ref 3.8–5.2)
SODIUM SERPL-SCNC: 136 MMOL/L (ref 136–145)
WBC # BLD AUTO: 4.9 K/UL (ref 3.6–11)

## 2019-03-08 PROCEDURE — 80048 BASIC METABOLIC PNL TOTAL CA: CPT

## 2019-03-08 PROCEDURE — 85027 COMPLETE CBC AUTOMATED: CPT

## 2019-03-08 PROCEDURE — 36415 COLL VENOUS BLD VENIPUNCTURE: CPT

## 2019-03-08 RX ORDER — SODIUM CHLORIDE, SODIUM LACTATE, POTASSIUM CHLORIDE, CALCIUM CHLORIDE 600; 310; 30; 20 MG/100ML; MG/100ML; MG/100ML; MG/100ML
25 INJECTION, SOLUTION INTRAVENOUS CONTINUOUS
Status: CANCELLED | OUTPATIENT
Start: 2019-03-13

## 2019-03-08 NOTE — PERIOP NOTES
[facility]  Preoperative Instructions        Surgery Date 03/13/2019          Time of Arrival 1030 am Contact # [phone]    1. On the day of your surgery, please report to the Surgical Services Registration Desk and sign in at your designated time. The Surgery Center is located to the right of the Emergency Room. 2. You must have someone with you to drive you home. You should not drive a car for 24 hours following surgery. Please make arrangements for a friend or family member to stay with you for the first 24 hours after your surgery. 3. Do not have anything to eat or drink (including water, gum, mints, coffee, juice) after midnight ?? .? This may not apply to medications prescribed by your physician. ?(Please note below the special instructions with medications to take the morning of your procedure.)    4. We recommend you do not drink any alcoholic beverages for 24 hours before and after your surgery. 5. Contact your surgeons office for instructions on the following medications: non-steroidal anti-inflammatory drugs (i.e. Advil, Aleve), vitamins, and supplements. (Some surgeons will want you to stop these medications prior to surgery and others may allow you to take them)  **If you are currently taking Plavix, Coumadin, Aspirin and/or other blood-thinning agents, contact your surgeon for instructions. ** Your surgeon will partner with the physician prescribing these medications to determine if it is safe to stop or if you need to continue taking. Please do not stop taking these medications without instructions from your surgeon    6. Wear comfortable clothes. Wear glasses instead of contacts. Do not bring any money or jewelry. Please bring picture ID, insurance card, and any prearranged co-payment or hospital payment. Do not wear make-up, particularly mascara the morning of your surgery. Do not wear nail polish, particularly if you are having foot /hand surgery. Wear your hair loose or down, no ponytails, buns, armin pins or clips. All body piercings must be removed. Please shower with antibacterial soap for three consecutive days before and on the morning of surgery, but do not apply any lotions, powders or deodorants after the shower on the day of surgery. Please use a fresh towels after each shower. Please sleep in clean clothes and change bed linens the night before surgery. Please do not shave for 48 hours prior to surgery. Shaving of the face is acceptable. 7. You should understand that if you do not follow these instructions your surgery may be cancelled. If your physical condition changes (I.e. fever, cold or flu) please contact your surgeon as soon as possible. 8. It is important that you be on time. If a situation occurs where you may be late, please call [phone] (OR Holding Area). 9. If you have any questions and or problems, please call [phone] (Pre-admission Testing). 10. Your surgery time may be subject to change. You will receive a phone call the evening prior if your time changes. 11.  If having outpatient surgery, you must have someone to drive you here, stay with you during the duration of your stay, and to drive you home at time of discharge. 12.   In an effort to improve the efficiency, privacy, and safety for all of our Pre-op patients visitors are not allowed in the Holding area. Once you arrive and are registered your family/visitors will be asked to remain in the waiting room. The Pre-op staff will get you from the Surgical Waiting Area and will explain to you and your family/visitors that the Pre-op phase is beginning. The staff will answer any questions and provide instructions for tracking of the patient, by use of the existing tracking number and color-coded status board in the waiting room.   At this time the staff will also ask for your designated spokesperson information in the event that the physician or staff need to provide an update or obtain any pertinent information. The designated spokesperson will be notified if the physician needs to speak to family during the pre-operative phase. If at any time your family/visitors has questions or concerns they may approach the volunteer desk in the waiting area for assistance. Special Instructions:    MEDICATIONS TO TAKE THE MORNING OF SURGERY WITH A SIP OF WATER:albuterol inhaler if needed, atorvastatin, klonopin, protonix      I understand a pre-operative phone call will be made to verify my surgery time. In the event that I am not available, I give permission for a message to be left on my answering service and/or with another person?   yes         ___________________      __________   _________    (Signature of Patient)             (Witness)                (Date and Time)

## 2019-03-13 ENCOUNTER — HOSPITAL ENCOUNTER (OUTPATIENT)
Age: 79
Setting detail: OUTPATIENT SURGERY
Discharge: HOME | End: 2019-03-13
Attending: PLASTIC SURGERY | Admitting: PLASTIC SURGERY
Payer: MEDICARE

## 2019-03-13 ENCOUNTER — ANESTHESIA EVENT (OUTPATIENT)
Dept: SURGERY | Age: 79
End: 2019-03-13
Payer: MEDICARE

## 2019-03-13 ENCOUNTER — ANESTHESIA (OUTPATIENT)
Dept: SURGERY | Age: 79
End: 2019-03-13
Payer: MEDICARE

## 2019-03-13 VITALS
HEIGHT: 62 IN | WEIGHT: 156.75 LBS | OXYGEN SATURATION: 98 % | DIASTOLIC BLOOD PRESSURE: 51 MMHG | SYSTOLIC BLOOD PRESSURE: 136 MMHG | RESPIRATION RATE: 16 BRPM | HEART RATE: 72 BPM | BODY MASS INDEX: 28.84 KG/M2 | TEMPERATURE: 98 F

## 2019-03-13 PROCEDURE — 88305 TISSUE EXAM BY PATHOLOGIST: CPT

## 2019-03-13 PROCEDURE — 77030011640 HC PAD GRND REM COVD -A: Performed by: PLASTIC SURGERY

## 2019-03-13 PROCEDURE — 77030002933 HC SUT MCRYL J&J -A: Performed by: PLASTIC SURGERY

## 2019-03-13 PROCEDURE — 74011250636 HC RX REV CODE- 250/636: Performed by: ANESTHESIOLOGY

## 2019-03-13 PROCEDURE — 74011250636 HC RX REV CODE- 250/636

## 2019-03-13 PROCEDURE — 77030032490 HC SLV COMPR SCD KNE COVD -B: Performed by: PLASTIC SURGERY

## 2019-03-13 PROCEDURE — 77030002916 HC SUT ETHLN J&J -A: Performed by: PLASTIC SURGERY

## 2019-03-13 PROCEDURE — 77030013629 HC ELECTRD NDL STRY -B: Performed by: PLASTIC SURGERY

## 2019-03-13 PROCEDURE — 74011000250 HC RX REV CODE- 250: Performed by: PLASTIC SURGERY

## 2019-03-13 PROCEDURE — 76210000021 HC REC RM PH II 0.5 TO 1 HR: Performed by: PLASTIC SURGERY

## 2019-03-13 PROCEDURE — 76060000032 HC ANESTHESIA 0.5 TO 1 HR: Performed by: PLASTIC SURGERY

## 2019-03-13 PROCEDURE — 77030020782 HC GWN BAIR PAWS FLX 3M -B

## 2019-03-13 PROCEDURE — 76010000138 HC OR TIME 0.5 TO 1 HR: Performed by: PLASTIC SURGERY

## 2019-03-13 PROCEDURE — 77030019908 HC STETH ESOPH SIMS -A: Performed by: ANESTHESIOLOGY

## 2019-03-13 PROCEDURE — 77030018836 HC SOL IRR NACL ICUM -A: Performed by: PLASTIC SURGERY

## 2019-03-13 PROCEDURE — 76210000006 HC OR PH I REC 0.5 TO 1 HR: Performed by: PLASTIC SURGERY

## 2019-03-13 PROCEDURE — 77030002974 HC SUT PLN J&J -A: Performed by: PLASTIC SURGERY

## 2019-03-13 RX ORDER — SODIUM CHLORIDE 0.9 % (FLUSH) 0.9 %
5-40 SYRINGE (ML) INJECTION EVERY 8 HOURS
Status: CANCELLED | OUTPATIENT
Start: 2019-03-13

## 2019-03-13 RX ORDER — FENTANYL CITRATE 50 UG/ML
INJECTION, SOLUTION INTRAMUSCULAR; INTRAVENOUS AS NEEDED
Status: DISCONTINUED | OUTPATIENT
Start: 2019-03-13 | End: 2019-03-13 | Stop reason: HOSPADM

## 2019-03-13 RX ORDER — BACITRACIN ZINC 500 UNIT/G
OINTMENT (GRAM) TOPICAL AS NEEDED
Status: DISCONTINUED | OUTPATIENT
Start: 2019-03-13 | End: 2019-03-13 | Stop reason: HOSPADM

## 2019-03-13 RX ORDER — ONDANSETRON 2 MG/ML
4 INJECTION INTRAMUSCULAR; INTRAVENOUS AS NEEDED
Status: CANCELLED | OUTPATIENT
Start: 2019-03-13

## 2019-03-13 RX ORDER — DIPHENHYDRAMINE HYDROCHLORIDE 50 MG/ML
12.5 INJECTION, SOLUTION INTRAMUSCULAR; INTRAVENOUS AS NEEDED
Status: CANCELLED | OUTPATIENT
Start: 2019-03-13 | End: 2019-03-13

## 2019-03-13 RX ORDER — MIDAZOLAM HYDROCHLORIDE 1 MG/ML
INJECTION, SOLUTION INTRAMUSCULAR; INTRAVENOUS AS NEEDED
Status: DISCONTINUED | OUTPATIENT
Start: 2019-03-13 | End: 2019-03-13 | Stop reason: HOSPADM

## 2019-03-13 RX ORDER — SODIUM CHLORIDE 0.9 % (FLUSH) 0.9 %
5-40 SYRINGE (ML) INJECTION AS NEEDED
Status: CANCELLED | OUTPATIENT
Start: 2019-03-13

## 2019-03-13 RX ORDER — PROPOFOL 10 MG/ML
INJECTION, EMULSION INTRAVENOUS
Status: DISCONTINUED | OUTPATIENT
Start: 2019-03-13 | End: 2019-03-13 | Stop reason: HOSPADM

## 2019-03-13 RX ORDER — FENTANYL CITRATE 50 UG/ML
25 INJECTION, SOLUTION INTRAMUSCULAR; INTRAVENOUS
Status: CANCELLED | OUTPATIENT
Start: 2019-03-13

## 2019-03-13 RX ORDER — SODIUM CHLORIDE 0.9 % (FLUSH) 0.9 %
5-40 SYRINGE (ML) INJECTION EVERY 8 HOURS
Status: DISCONTINUED | OUTPATIENT
Start: 2019-03-13 | End: 2019-03-13 | Stop reason: HOSPADM

## 2019-03-13 RX ORDER — ONDANSETRON 2 MG/ML
INJECTION INTRAMUSCULAR; INTRAVENOUS AS NEEDED
Status: DISCONTINUED | OUTPATIENT
Start: 2019-03-13 | End: 2019-03-13 | Stop reason: HOSPADM

## 2019-03-13 RX ORDER — HYDROMORPHONE HYDROCHLORIDE 1 MG/ML
.2-.5 INJECTION, SOLUTION INTRAMUSCULAR; INTRAVENOUS; SUBCUTANEOUS
Status: CANCELLED | OUTPATIENT
Start: 2019-03-13

## 2019-03-13 RX ORDER — SODIUM CHLORIDE 0.9 % (FLUSH) 0.9 %
5-40 SYRINGE (ML) INJECTION AS NEEDED
Status: DISCONTINUED | OUTPATIENT
Start: 2019-03-13 | End: 2019-03-13 | Stop reason: HOSPADM

## 2019-03-13 RX ORDER — SODIUM CHLORIDE, SODIUM LACTATE, POTASSIUM CHLORIDE, CALCIUM CHLORIDE 600; 310; 30; 20 MG/100ML; MG/100ML; MG/100ML; MG/100ML
25 INJECTION, SOLUTION INTRAVENOUS CONTINUOUS
Status: CANCELLED | OUTPATIENT
Start: 2019-03-13

## 2019-03-13 RX ORDER — SODIUM CHLORIDE, SODIUM LACTATE, POTASSIUM CHLORIDE, CALCIUM CHLORIDE 600; 310; 30; 20 MG/100ML; MG/100ML; MG/100ML; MG/100ML
25 INJECTION, SOLUTION INTRAVENOUS CONTINUOUS
Status: DISCONTINUED | OUTPATIENT
Start: 2019-03-13 | End: 2019-03-13 | Stop reason: HOSPADM

## 2019-03-13 RX ORDER — PROPOFOL 10 MG/ML
INJECTION, EMULSION INTRAVENOUS AS NEEDED
Status: DISCONTINUED | OUTPATIENT
Start: 2019-03-13 | End: 2019-03-13 | Stop reason: HOSPADM

## 2019-03-13 RX ORDER — LIDOCAINE HYDROCHLORIDE AND EPINEPHRINE 10; 10 MG/ML; UG/ML
INJECTION, SOLUTION INFILTRATION; PERINEURAL AS NEEDED
Status: DISCONTINUED | OUTPATIENT
Start: 2019-03-13 | End: 2019-03-13 | Stop reason: HOSPADM

## 2019-03-13 RX ADMIN — FENTANYL CITRATE 25 MCG: 50 INJECTION, SOLUTION INTRAMUSCULAR; INTRAVENOUS at 12:58

## 2019-03-13 RX ADMIN — PROPOFOL 20 MG: 10 INJECTION, EMULSION INTRAVENOUS at 12:41

## 2019-03-13 RX ADMIN — FENTANYL CITRATE 25 MCG: 50 INJECTION, SOLUTION INTRAMUSCULAR; INTRAVENOUS at 12:41

## 2019-03-13 RX ADMIN — ONDANSETRON 4 MG: 2 INJECTION INTRAMUSCULAR; INTRAVENOUS at 12:49

## 2019-03-13 RX ADMIN — PROPOFOL 50 MCG/KG/MIN: 10 INJECTION, EMULSION INTRAVENOUS at 12:33

## 2019-03-13 RX ADMIN — FENTANYL CITRATE 25 MCG: 50 INJECTION, SOLUTION INTRAMUSCULAR; INTRAVENOUS at 12:51

## 2019-03-13 RX ADMIN — PROPOFOL 20 MG: 10 INJECTION, EMULSION INTRAVENOUS at 12:35

## 2019-03-13 RX ADMIN — MIDAZOLAM HYDROCHLORIDE 1 MG: 1 INJECTION, SOLUTION INTRAMUSCULAR; INTRAVENOUS at 12:30

## 2019-03-13 RX ADMIN — SODIUM CHLORIDE, SODIUM LACTATE, POTASSIUM CHLORIDE, AND CALCIUM CHLORIDE 25 ML/HR: 600; 310; 30; 20 INJECTION, SOLUTION INTRAVENOUS at 11:08

## 2019-03-13 RX ADMIN — PROPOFOL 40 MG: 10 INJECTION, EMULSION INTRAVENOUS at 12:32

## 2019-03-13 RX ADMIN — PROPOFOL 20 MG: 10 INJECTION, EMULSION INTRAVENOUS at 12:43

## 2019-03-13 RX ADMIN — MIDAZOLAM HYDROCHLORIDE 1 MG: 1 INJECTION, SOLUTION INTRAMUSCULAR; INTRAVENOUS at 12:38

## 2019-03-13 RX ADMIN — FENTANYL CITRATE 25 MCG: 50 INJECTION, SOLUTION INTRAMUSCULAR; INTRAVENOUS at 12:32

## 2019-03-13 NOTE — ANESTHESIA PREPROCEDURE EVALUATION
Anesthetic History   No history of anesthetic complications            Review of Systems / Medical History  Patient summary reviewed, nursing notes reviewed and pertinent labs reviewed    Pulmonary        Sleep apnea: No treatment           Neuro/Psych         TIA (x 2, 2005) and psychiatric history (anxiety/ depression)     Cardiovascular    Hypertension: well controlled          Hyperlipidemia    Exercise tolerance: >4 METS     GI/Hepatic/Renal     GERD: well controlled           Endo/Other        Anemia (h/o iron infusions)  Pertinent negatives: No hypothyroidism   Other Findings   Comments: Monoclonal gammopathy of unknown significance  Restless leg syndrome    Cervical fusion           Physical Exam    Airway  Mallampati: III  TM Distance: 4 - 6 cm  Neck ROM: normal range of motion   Mouth opening: Normal     Cardiovascular    Rhythm: regular  Rate: normal      Pertinent negatives: No murmur   Dental    Dentition: Caps/crowns  Comments: Across upper front   Pulmonary  Breath sounds clear to auscultation               Abdominal  GI exam deferred       Other Findings            Anesthetic Plan    ASA: 3  Anesthesia type: MAC          Induction: Intravenous  Anesthetic plan and risks discussed with: Patient

## 2019-03-13 NOTE — PERIOP NOTES
Handoff Report from Operating Room to PACU    Report received from COLBY Langley RN and ADILSON Phillips CRNA regarding Ger Doctor. Surgeon(s):  Shellie Augustine MD  And Procedure(s) (LRB):  EXCISION RIGHT LOWER LID LESION WITH FULL THICKNESS SKIN GRAFT (Right)  confirmed   with allergies and dressings discussed. Anesthesia type, drugs, patient history, complications, estimated blood loss, vital signs, intake and output, and last pain medication, lines, reversal medications and temperature were reviewed.

## 2019-03-13 NOTE — PERIOP NOTES
Patient: Danielle Friedman MRN: [id number]  SSN: [id number]   YOB: 1940  Age: 69 y.o. Sex: female     Patient is status post Procedure(s):  EXCISION RIGHT LOWER LID LESION WITH FULL THICKNESS SKIN GRAFT. Surgeon(s) and Role:     * Nava Moya MD - Primary    Local/Dose/Irrigation:  See [facility]                Peripheral IV 03/13/19 Left;Posterior Hand (Active)   Site Assessment Clean, dry, & intact 3/13/2019 11:00 AM   Phlebitis Assessment 0 3/13/2019 11:00 AM   Infiltration Assessment 0 3/13/2019 11:00 AM   Dressing Status Clean, dry, & intact 3/13/2019 11:00 AM   Dressing Type Tape;Transparent 3/13/2019 11:00 AM   Hub Color/Line Status Pink; Infusing 3/13/2019 11:00 AM            Airway - Endotracheal Tube 03/13/19 (Active)                   Dressing/Packing:  Wound Face Right-Dressing Type :  Other (Comment)(Bacitracin Ointment ) (03/13/19 1309)  Wound Neck Right-Dressing Type : Topical skin adhesive/glue(Exofin ) (03/13/19 1309)      Other:  none

## 2019-03-13 NOTE — ANESTHESIA POSTPROCEDURE EVALUATION
Procedure(s):  EXCISION RIGHT LOWER LID LESION WITH FULL THICKNESS SKIN GRAFT. Anesthesia Post Evaluation        Patient location during evaluation: PACU  Note status: Adequate. Level of consciousness: responsive to verbal stimuli and sleepy but conscious  Pain management: satisfactory to patient  Airway patency: patent  Anesthetic complications: no  Cardiovascular status: acceptable  Respiratory status: acceptable  Hydration status: acceptable  Comments: +Post-Anesthesia Evaluation and Assessment    Patient: Danielle Friedman MRN: [id number]  SSN: [id number]   YOB: 1940  Age: 69 y.o. Sex: female      Cardiovascular Function/Vital Signs    /70 (BP 1 Location: Right arm, BP Patient Position: At rest)   Pulse 74   Temp 36.8 °C (98.3 °F)   Resp 15   Ht 5' 2\" (1.575 m)   Wt 71.1 kg (156 lb 12 oz)   SpO2 98%   BMI 28.67 kg/m²     Patient is status post Procedure(s):  EXCISION RIGHT LOWER LID LESION WITH FULL THICKNESS SKIN GRAFT. Nausea/Vomiting: Controlled. Postoperative hydration reviewed and adequate. Pain:  Pain Scale 1: FLACC (03/13/19 1345)  Pain Intensity 1: 0 (03/13/19 1345)   Managed. Neurological Status:   Neuro (WDL): Exceptions to WDL (03/13/19 1323)   At baseline. Mental Status and Level of Consciousness: Arousable. Pulmonary Status:   O2 Device: Room air (03/13/19 1400)   Adequate oxygenation and airway patent. Complications related to anesthesia: None    Post-anesthesia assessment completed. No concerns.     Signed By: George Beard MD    3/13/2019  Post anesthesia nausea and vomiting:  controlled      Visit Vitals  /70 (BP 1 Location: Right arm, BP Patient Position: At rest)   Pulse 74   Temp 36.8 °C (98.3 °F)   Resp 15   Ht 5' 2\" (1.575 m)   Wt 71.1 kg (156 lb 12 oz)   SpO2 98%   BMI 28.67 kg/m²

## 2019-03-13 NOTE — BRIEF OP NOTE
BRIEF OPERATIVE NOTE    Date of Procedure: 3/13/2019   Preoperative Diagnosis: RIGHT LOWER LID LESION  Postoperative Diagnosis: RIGHT LOWER LID LESION    Procedure(s):  EXCISION RIGHT LOWER LID LESION WITH FULL THICKNESS SKIN GRAFT 1.3 x 1.8cm  Surgeon(s) and Role:     Nena Lentz MD - Primary         Surgical Assistant: ADEEL    Surgical Staff:  Circ-1: Jennie Van RN  Scrub RN-1: Christianna Carrel  Surg Asst-1: Zia Corrigan  Event Time In Time Out   Incision Start 1251    Incision Close 1315      Anesthesia: MAC   Estimated Blood Loss: 5ml  Specimens:   ID Type Source Tests Collected by Time Destination   1 : Right lower eyelid lentigo maligna, stitch superior Preservative Face  Krissy Sin MD 3/13/2019 1253 Pathology      Findings: see note   Complications: none  Implants: * No implants in log *

## 2019-03-13 NOTE — PERIOP NOTES
Discharge instructions reviewed with pt and  and  Daughter. At conclusion of instruction pt described having a heavy feeling to her breathing. Reports that this happened earlier today and over the last couple months. States she had seen a cardiologist and had future appt to see pulmonary doctor. Denies chest pain, dizziness headache nausea or sweatiness. Denies shortness of breath other than heavy feeling. Color pink skin warm and dry. Respirations unlabored. Pt declined to be rechecked. States that she is not feeling any different than before.  IV d/c escorted to car via wheelchair at which time she stated she felt better

## 2019-03-13 NOTE — DISCHARGE INSTRUCTIONS
Apply ointment to lower lid 2-3 times per day. May take tylenol for pain. Keep head elevated as much as possible to reduce swelling. May shower tomorrow and get incisions wet. DISCHARGE SUMMARY from Nurse    PATIENT INSTRUCTIONS:    After general anesthesia or intravenous sedation, for 24 hours or while taking prescription Narcotics:  · Limit your activities  · Do not drive and operate hazardous machinery  · Do not make important personal or business decisions  · Do  not drink alcoholic beverages  · If you have not urinated within 8 hours after discharge, please contact your surgeon on call. Report the following to your surgeon:  · Excessive pain, swelling, redness or odor of or around the surgical area  · Temperature over 100.5  · Nausea and vomiting lasting longer than 4 hours or if unable to take medications  · Any signs of decreased circulation or nerve impairment to extremity: change in color, persistent  numbness, tingling, coldness or increase pain  · Any questions    What to do at Home:  [address] [place] [address]      1. [address] YOUR HANDS     To prevent infection, good handwashing is the most important thing you or your caregiver can do.  Wash your hands with soap and water or use the hand  we gave you before you touch any wounds. 2. SHOWER     Use the antibacterial soap we gave you when you take a shower.  Shower with this soap until your wounds are healed.  To reach all areas of your body, you may need someone to help you.  Dont forget to clean your belly button with every shower. 3.  USE CLEAN SHEETS     Use freshly cleaned sheets on your bed after surgery.  To keep the surgery site clean, do not allow pets to sleep with you while your wound is still healing. 4. STOP SMOKING     Stop smoking, or at least cut back on smoking     Smoking slows your healing. 5.  CONTROL YOUR BLOOD SUGAR     High blood sugars slow wound healing.     If you are diabetic, control your blood sugar levels before and after your surgery. A common side effect of anesthesia following surgery is nausea and/or vomiting. In order to decrease symptoms, it is wise to avoid foods that are high in fat, greasy foods, milk products, and spicy foods for the first 24 hours. Acceptable foods for the first 24 hours following surgery include but are not limited to:     soup   broth    toast    crackers    applesauce    bananas    mashed potatoes,   soft or scrambled eggs   oatmeal    jello    It is important to eat when taking your pain medication. This will help to prevent nausea. If possible, please try to time your meals with your medications. It is very important to stay hydrated following surgery. Sip fluids frequently while awake. Avoid acidic drinks such as citrus juices and soda for 24 hours. Carbonated beverages may cause bloating and gas. Acceptable fluids include:    - water (flavor packets may add variety)  - coffee or tea (in moderation)  - Gatorade  - Arsh-aid  - apple juice  - cranberry juice    You are encouraged to cough and deep breathe every hour when awake. This will help to prevent respiratory complications following anesthesia. You may want to hug a pillow when coughing and sneezing to add additional support to the surgical area and to decrease discomfort if you had abdominal or chest surgery. If you are discharged home with support stockings, you may remove them after 24 hours. Support stockings are used to help prevent blood clots in the legs following surgery. Please take time to review all of your Home Care Instructions and Medication Information sheets provided in your discharge packet. If you have any questions, please contact your surgeons office. Thank you. Please carry medication information at all times in case of emergency situations.     These are general instructions for a healthy lifestyle:    No smoking/ No tobacco products/ Avoid exposure to second hand smoke  Surgeon General's Warning:  Quitting smoking now greatly reduces serious risk to your health. Obesity, smoking, and sedentary lifestyle greatly increases your risk for illness    A healthy diet, regular physical exercise & weight monitoring are important for maintaining a healthy lifestyle    You may be retaining fluid if you have a history of heart failure or if you experience any of the following symptoms:  Weight gain of 3 pounds or more overnight or 5 pounds in a week, increased swelling in our hands or feet or shortness of breath while lying flat in bed. Please call your doctor as soon as you notice any of these symptoms; do not wait until your next office visit. Recognize signs and symptoms of STROKE:    F-face looks uneven    A-arms unable to move or move unevenly    S-speech slurred or non-existent    T-time-call 911 as soon as signs and symptoms begin-DO NOT go       Back to bed or wait to see if you get better-TIME IS BRAIN. Warning Signs of HEART ATTACK     Call 911 if you have these symptoms:   Chest discomfort. Most heart attacks involve discomfort in the center of the chest that lasts more than a few minutes, or that goes away and comes back. It can feel like uncomfortable pressure, squeezing, fullness, or pain.  Discomfort in other areas of the upper body. Symptoms can include pain or discomfort in one or both arms, the back, neck, jaw, or stomach.  Shortness of breath with or without chest discomfort.  Other signs may include breaking out in a cold sweat, nausea, or lightheadedness. Don't wait more than five minutes to call 911 - MINUTES MATTER! Fast action can save your life. Calling 911 is almost always the fastest way to get lifesaving treatment. Emergency Medical Services staff can begin treatment when they arrive -- up to an hour sooner than if someone gets to the hospital by car.      The discharge information has been reviewed with the {PATIENT PARENT GUARDIAN:02692}. The {PATIENT PARENT GUARDIAN:02736} verbalized understanding. Discharge medications reviewed with the {Dishcarge meds reviewed VSZW:45192} and appropriate educational materials and side effects teaching were provided.   ___________________________________________________________________________________________________________________________________

## 2019-03-15 NOTE — OP NOTES
[facility]  OPERATIVE REPORT    Name:  Carmelo Anton  MR#:  [id number]  :  1940  ACCOUNT #:  [de-identified]  DATE OF SERVICE:  2019      PREOPERATIVE DIAGNOSIS:  Right lower eyelid atypical melanocytic lesion. POSTOPERATIVE DIAGNOSIS:  Right lower eyelid atypical melanocytic lesion. PROCEDURE PERFORMED:  1. Excision right lower lid atypical melanocytic lesion measuring 1.3 x 1.8 cm.  2.  Full-thickness skin graft reconstruction right lower lid measuring 1.3 x 1.8 cm. SURGEON:  Gerson Wolfe MD.    ASSISTANT:  Mitra Bishop. ANESTHESIA:  Local with IV sedation. COMPLICATIONS:  None. SPECIMENS REMOVED:  Right lower lid lesion. IMPLANTS:  None. ESTIMATED BLOOD LOSS:  Less than 5 mL. INDICATIONS:  This 52-year-old white female presented with a biopsy-proven atypical melanocytic lesion of the right lower eyelid that was suspicious for early lentigo maligna. Excision and reconstruction were indicated. PROCEDURE:  After informed consent was obtained and under IV sedation, the right face and neck were infiltrated with buffered 1% lidocaine with epinephrine and then prepped and draped. Under loupe magnification, the visible lesion and scar were excised down to orbicularis oculi muscle leaving 5 mm peripheral margins. Specimen was marked with a single stitch superiorly and sent for permanent pathology. Hemostasis was obtained. The wound could not be closed primarily. A full-thickness skin graft was harvested as a transverse ellipse from the right anterolateral neck. The graft was defatted, cut to the appropriate size, fenestrated, and secured to the wound bed using interrupted 6-0 fast absorbing plain gut. The donor site was closed primarily with 4-0 Monocryl. Bacitracin ointment was applied to all wounds. She tolerated the procedure well and was transferred to the recovery room in good condition.       Latoya Romo MD      [id number]  D: 03/15/2019 10:31  T:  03/15/2019 18:46  JOB #:  [id number]  CC:  MD Lorin Singh MD

## 2019-06-03 RX ORDER — ATORVASTATIN CALCIUM 20 MG/1
20 TABLET, FILM COATED ORAL DAILY
Qty: 90 TAB | Refills: 1 | Status: SHIPPED | OUTPATIENT
Start: 2019-06-03 | End: 2019-06-04 | Stop reason: SDUPTHER

## 2019-06-03 NOTE — TELEPHONE ENCOUNTER
Requested Prescriptions     Pending Prescriptions Disp Refills    atorvastatin (LIPITOR) 20 mg tablet 90 Tab 1     Sig: Take 1 Tab by mouth daily. PCP: Grecia Sharma MD    Last appt: 2/28/2019  Future Appointments   Date Time Provider Logan Thornton   6/4/2019 10:30 AM Appa Lindalou Boas, MD [address]       Requested Prescriptions     Pending Prescriptions Disp Refills    atorvastatin (LIPITOR) 20 mg tablet 90 Tab 1     Sig: Take 1 Tab by mouth daily.

## 2019-06-04 ENCOUNTER — OFFICE VISIT (OUTPATIENT)
Dept: INTERNAL MEDICINE CLINIC | Age: 79
End: 2019-06-04

## 2019-06-04 VITALS
WEIGHT: 159.4 LBS | TEMPERATURE: 97.7 F | HEART RATE: 75 BPM | SYSTOLIC BLOOD PRESSURE: 142 MMHG | HEIGHT: 62 IN | OXYGEN SATURATION: 98 % | DIASTOLIC BLOOD PRESSURE: 90 MMHG | RESPIRATION RATE: 15 BRPM | BODY MASS INDEX: 29.33 KG/M2

## 2019-06-04 DIAGNOSIS — G25.0 BENIGN ESSENTIAL TREMOR SYNDROME: ICD-10-CM

## 2019-06-04 DIAGNOSIS — Z00.00 MEDICARE ANNUAL WELLNESS VISIT, SUBSEQUENT: ICD-10-CM

## 2019-06-04 DIAGNOSIS — G44.209 TENSION VASCULAR HEADACHE: ICD-10-CM

## 2019-06-04 DIAGNOSIS — M54.16 LUMBAR BACK PAIN WITH RADICULOPATHY AFFECTING RIGHT LOWER EXTREMITY: ICD-10-CM

## 2019-06-04 DIAGNOSIS — I65.23 STENOSIS OF BOTH INTERNAL CAROTID ARTERIES: ICD-10-CM

## 2019-06-04 DIAGNOSIS — M48.061 SPINAL STENOSIS OF LUMBAR REGION WITHOUT NEUROGENIC CLAUDICATION: ICD-10-CM

## 2019-06-04 DIAGNOSIS — E53.8 B12 DEFICIENCY: ICD-10-CM

## 2019-06-04 DIAGNOSIS — M96.1 CERVICAL POST-LAMINECTOMY SYNDROME: ICD-10-CM

## 2019-06-04 DIAGNOSIS — Z23 ENCOUNTER FOR IMMUNIZATION: ICD-10-CM

## 2019-06-04 DIAGNOSIS — M54.16 LUMBAR BACK PAIN WITH RADICULOPATHY AFFECTING LEFT LOWER EXTREMITY: ICD-10-CM

## 2019-06-04 DIAGNOSIS — E11.42 DIABETIC PERIPHERAL NEUROPATHY ASSOCIATED WITH TYPE 2 DIABETES MELLITUS (HCC): ICD-10-CM

## 2019-06-04 DIAGNOSIS — G44.229 CHRONIC TENSION-TYPE HEADACHE, NOT INTRACTABLE: ICD-10-CM

## 2019-06-04 DIAGNOSIS — G45.9 TIA (TRANSIENT ISCHEMIC ATTACK): ICD-10-CM

## 2019-06-04 DIAGNOSIS — G60.8 IDIOPATHIC SMALL AND LARGE FIBER SENSORY NEUROPATHY: ICD-10-CM

## 2019-06-04 DIAGNOSIS — I67.89 CEREBRAL MICROVASCULAR DISEASE: ICD-10-CM

## 2019-06-04 DIAGNOSIS — F41.9 ANXIETY: Primary | ICD-10-CM

## 2019-06-04 RX ORDER — ATORVASTATIN CALCIUM 20 MG/1
20 TABLET, FILM COATED ORAL DAILY
Qty: 90 TAB | Refills: 1 | Status: SHIPPED | OUTPATIENT
Start: 2019-06-04 | End: 2019-11-05 | Stop reason: DRUGHIGH

## 2019-06-04 RX ORDER — NORTRIPTYLINE HYDROCHLORIDE 10 MG/1
10-20 CAPSULE ORAL
Qty: 90 CAP | Refills: 3 | Status: SHIPPED | OUTPATIENT
Start: 2019-06-04 | End: 2020-01-20 | Stop reason: SDUPTHER

## 2019-06-04 RX ORDER — ESCITALOPRAM OXALATE 20 MG/1
20 TABLET ORAL
Qty: 90 TAB | Refills: 4 | Status: SHIPPED | OUTPATIENT
Start: 2019-06-04 | End: 2020-09-03

## 2019-06-04 RX ORDER — CLONAZEPAM 0.5 MG/1
0.5 TABLET ORAL 2 TIMES DAILY
Qty: 60 TAB | Refills: 2 | Status: SHIPPED | OUTPATIENT
Start: 2019-06-04 | End: 2019-09-13 | Stop reason: SDUPTHER

## 2019-06-04 RX ORDER — VERAPAMIL HYDROCHLORIDE 120 MG/1
120 TABLET, FILM COATED, EXTENDED RELEASE ORAL
Qty: 90 TAB | Refills: 1 | Status: SHIPPED | OUTPATIENT
Start: 2019-06-04 | End: 2019-12-03 | Stop reason: SDUPTHER

## 2019-06-04 RX ORDER — CLOPIDOGREL BISULFATE 75 MG/1
75 TABLET ORAL DAILY
Qty: 90 TAB | Refills: 1 | Status: SHIPPED | OUTPATIENT
Start: 2019-06-04 | End: 2019-11-05 | Stop reason: DRUGHIGH

## 2019-06-04 RX ORDER — PANTOPRAZOLE SODIUM 40 MG/1
40 TABLET, DELAYED RELEASE ORAL
Qty: 90 TAB | Refills: 1 | Status: SHIPPED | OUTPATIENT
Start: 2019-06-04 | End: 2020-04-08 | Stop reason: SDUPTHER

## 2019-06-04 NOTE — PROGRESS NOTES
Chief Complaint   Patient presents with    Follow-up     3 month     1. Have you been to the ER, urgent care clinic since your last visit? Hospitalized since your last visit? No    2. Have you seen or consulted any other health care providers outside of the [address] [place] [address] since your last visit? Include any pap smears or colon screening. No    3) Do you have an Advance Directive on file?  yes

## 2019-06-04 NOTE — PATIENT INSTRUCTIONS
Take melatonin 3mg at bedtime     Medicare Wellness Visit, Female     The best way to live healthy is to have a lifestyle where you eat a well-balanced diet, exercise regularly, limit alcohol use, and quit all forms of tobacco/nicotine, if applicable. Regular preventive services are another way to keep healthy. Preventive services (vaccines, screening tests, monitoring & exams) can help personalize your care plan, which helps you manage your own care. Screening tests can find health problems at the earliest stages, when they are easiest to treat. [facility] follows the current, evidence-based guidelines published by the [facility] (USPSTF) when recommending preventive services for our patients. Because we follow these guidelines, sometimes recommendations change over time as research supports it. (For example, mammograms used to be recommended annually. Even though Medicare will still pay for an annual mammogram, the newer guidelines recommend a mammogram every two years for women of average risk.)  Of course, you and your doctor may decide to screen more often for some diseases, based on your risk and your health status. Preventive services for you include:  - Medicare offers their members a free annual wellness visit, which is time for you and your primary care provider to discuss and plan for your preventive service needs. Take advantage of this benefit every year!  -All adults over the age of 72 should receive the recommended pneumonia vaccines. Current USPSTF guidelines recommend a series of two vaccines for the best pneumonia protection.   -All adults should have a flu vaccine yearly and a tetanus vaccine every 10 years. All adults age 61 and older should receive a shingles vaccine once in their lifetime.    -A bone mass density test is recommended when a woman turns 65 to screen for osteoporosis. This test is only recommended one time, as a screening.  Some providers will use this same test as a disease monitoring tool if you already have osteoporosis. -All adults age 38-68 who are overweight should have a diabetes screening test once every three years.   -Other screening tests and preventive services for persons with diabetes include: an eye exam to screen for diabetic retinopathy, a kidney function test, a foot exam, and stricter control over your cholesterol.   -Cardiovascular screening for adults with routine risk involves an electrocardiogram (ECG) at intervals determined by your doctor.   -Colorectal cancer screenings should be done for adults age 54-65 with no increased risk factors for colorectal cancer. There are a number of acceptable methods of screening for this type of cancer. Each test has its own benefits and drawbacks. Discuss with your doctor what is most appropriate for you during your annual wellness visit. The different tests include: colonoscopy (considered the best screening method), a fecal occult blood test, a fecal DNA test, and sigmoidoscopy. -Breast cancer screenings are recommended every other year for women of normal risk, age 54-69.  -Cervical cancer screenings for women over age 72 are only recommended with certain risk factors.   -All adults born between [facility] should be screened once for Hepatitis C.      Here is a list of your current Health Maintenance items (your personalized list of preventive services) with a due date:  Health Maintenance Due   Topic Date Due    DTaP/Tdap/Td  (1 - Tdap) Given prescription     Shingles Vaccine (1 of 2) Given prescription         Pneumococcal Vaccine (1 of 2 - PCV13) Given today    Annual Well Visit  Done today     Work on moving to a one level home with no steps

## 2019-06-04 NOTE — PROGRESS NOTES
CC: Follow-up (3 month)      HPI:    She is a 69 y.o. female who presents for evaluation of general medical issues. Patient falls asllep and waking up at 3- 4 AM   Takes clonazepam at bedtime and pamelor 10mg at bedtime  Has difficulty going back to sleep    HTN: missed her medications this morning- verapamil 120 mg daily. Typically her blood pressure is well controlled. She denies chest pain shortness of breath. Patient used to be on lipitor for TIA / takes aspirin   last LDL was 50   On my medication list she is supposed to be on Lipitor 20 mg.  I will refill medication. ROS:  10 systems reviewed and negative other than HPI    Past Medical History:   Diagnosis Date    Adverse effect of anesthesia     sleep apnea no cpap machine useage       Anemia     has had iron infusions; Hem/onc Dr Noble Pike [phone]    Anxiety and depression     GERD (gastroesophageal reflux disease)     Hypercholesteremia     Hypertension     Limited peripheral vision of right eye     complication of cataract surgery    Long term current use of anticoagulant therapy     Meniere disease     MGUS (monoclonal gammopathy of unknown significance)     Monoclonal gammopathy of unknown significance     hem/onc:  Dr Noble Pike [phone]    Osteoporosis     Psoriasis     bilateral Legs    Restless leg     Skin melanoma ([facility] [address] 2012    (Rt ankle) and basal cell (Lt eye) removed    Transient ischemic attack approx 2005    HAS HAD 2 MINISTROKES-NO DEFICITS; neuro Dr Patsy Storm (cc)    Unspecified adverse effect of anesthesia     HAS NEEDED PORT OR CUTDOWN FOR RECVNG BLOOD OR LONG-TERM IVs; surgery 7/28/15 w/o any difficulty    Unspecified sleep apnea     does not use CPAP       Current Outpatient Medications on File Prior to Visit   Medication Sig Dispense Refill    albuterol (PROVENTIL HFA, VENTOLIN HFA, PROAIR HFA) 90 mcg/actuation inhaler Take 1 Puff by inhalation every four (4) hours as needed for Wheezing.  1 Inhaler 0    primidone (MYSOLINE) 50 mg tablet Take 0.5 Tabs by mouth nightly. (Patient taking differently: Take 50 mg by mouth nightly.) 90 Tab 3    ferrous sulfate 325 mg (65 mg iron) CpER Take 1 Tab by mouth daily.  ROPINIROLE HCL (REQUIP PO) Take 1 mg by mouth nightly.  polyethylene glycol (MIRALAX) 17 gram packet Take 17 g by mouth every morning. Pt puts in her coffee       No current facility-administered medications on file prior to visit.         Past Surgical History:   Procedure Laterality Date    HX APPENDECTOMY      HX CATARACT REMOVAL  2011    Right    HX CERVICAL FUSION  1/28/15    C5-7    HX CHOLECYSTECTOMY  2000    HX GI  2010    liver bx due to liver Bx due to elevated enzymes, liver laceration during Bx, hospitalized 10-12d    HX HEENT  7/28/15    Lt total parotidectomy with facial nerve dissection: Dr Vanessa Rivera HX HEENT  11/2015    Right parotidectomy    HX HYSTERECTOMY      HX ORTHOPAEDIC  2009 & 2011    LUMBAR FUSION and revision    HX OTHER SURGICAL  2010    MELANOMA (Rt ankle) and basal cell (Lt eye) removed; q6mo ck by DERMATOLOGY    HX SHOULDER REPLACEMENT Left 08/01/2016    HX THYROIDECTOMY      Right    IA COLON CA SCRN NOT HI RSK IND  1/27/2016            Family History   Problem Relation Age of Onset    Alcohol abuse Father     Emphysema Father     Cancer Sister         PANCREATIC    Alcohol abuse Brother     COPD Brother     Other Mother         UNKNOWN CAUSE OF DEATH    Heart Disease Mother     Breast Cancer Maternal Aunt     Anesth Problems Neg Hx      Reviewed and no changes     Social History     Socioeconomic History    Marital status:      Spouse name: Not on file    Number of children: Not on file    Years of education: Not on file    Highest education level: Not on file   Occupational History    Not on file   Social Needs    Financial resource strain: Not on file    Food insecurity:     Worry: Not on file     Inability: Not on file   Velta Ganser Transportation needs:     Medical: Not on file     Non-medical: Not on file   Tobacco Use    Smoking status: Former Smoker     Years: 15.00     Last attempt to quit: 7/15/1975     Years since quittin.9    Smokeless tobacco: Never Used   Substance and Sexual Activity    Alcohol use: Yes     Comment: may have a glass of wine on rare occasion    Drug use: Yes     Types: Prescription, OTC    Sexual activity: Not Currently   Lifestyle    Physical activity:     Days per week: Not on file     Minutes per session: Not on file    Stress: Not on file   Relationships    Social connections:     Talks on phone: Not on file     Gets together: Not on file     Attends Gnosticist service: Not on file     Active member of club or organization: Not on file     Attends meetings of clubs or organizations: Not on file     Relationship status: Not on file    Intimate partner violence:     Fear of current or ex partner: Not on file     Emotionally abused: Not on file     Physically abused: Not on file     Forced sexual activity: Not on file   Other Topics Concern    Not on file   Social History Narrative    Lives in [place] with  of 62 years. Has 2 sons and 1 daughter. Used to work in a bank. Likes to garden and sew.              Visit Vitals  /90 (BP 1 Location: Left arm, BP Patient Position: Sitting)   Pulse 75   Temp 97.7 °F (36.5 °C) (Oral)   Resp 15   Ht 5' 2\" (1.575 m)   Wt 159 lb 6.4 oz (72.3 kg)   LMP  (LMP Unknown)   SpO2 98%   BMI 29.15 kg/m²       Physical Examination:   General - Well appearing female  HEENT - PERRL, TM no erythema/opacification, normal nasal turbinates, oropharynx no erythema or exudate, MMM  Neck - supple, no bruits, no TMG, no LAD  Pulm - clear to auscultation bilaterally  Cardio - RRR, normal S1 S2, no murmur gallops or rubs  Abd - soft, nontender, no masses, no HSM  Extrem - no edema, +2 distal pulses  Psych - normal affect, appropriate mood    Lab Results   Component Value Date/Time    WBC 4.9 03/08/2019 10:23 AM    HGB 13.2 03/08/2019 10:23 AM    HCT 41.5 03/08/2019 10:23 AM    PLATELET 878 89/69/1721 10:23 AM    MCV 96.3 03/08/2019 10:23 AM     Lab Results   Component Value Date/Time    Sodium 136 03/08/2019 10:23 AM    Potassium 5.2 (H) 03/08/2019 10:23 AM    Chloride 105 03/08/2019 10:23 AM    CO2 26 03/08/2019 10:23 AM    Anion gap 5 03/08/2019 10:23 AM    Glucose 91 03/08/2019 10:23 AM    BUN 14 03/08/2019 10:23 AM    Creatinine 0.84 03/08/2019 10:23 AM    BUN/Creatinine ratio 17 03/08/2019 10:23 AM    GFR est AA >60 03/08/2019 10:23 AM    GFR est non-AA >60 03/08/2019 10:23 AM    Calcium 8.7 03/08/2019 10:23 AM     No results found for: CHOL, CHOLPOCT, CHOLX, CHLST, CHOLV, HDL, HDLPOC, LDL, LDLCPOC, LDLC, DLDLP, VLDLC, VLDL, TGLX, TRIGL, TRIGP, TGLPOCT, CHHD, CHHDX  Lab Results   Component Value Date/Time    TSH 1.410 06/08/2018 02:12 PM     No results found for: PSA, Jolly Million, [id number], [id number], PSALT  Lab Results   Component Value Date/Time    Hemoglobin A1c 5.4 06/08/2018 02:12 PM     No results found for: Llcatherine Phenes, VD3RIA    Lab Results   Component Value Date/Time    ALT (SGPT) 24 07/13/2018 12:46 PM    AST (SGOT) 19 07/13/2018 12:46 PM    Alk. phosphatase 98 07/13/2018 12:46 PM    Bilirubin, total 0.3 07/13/2018 12:46 PM           Assessment/Plan:   Encounter for immunization  - ADMIN PNEUMOCOCCAL VACCINE  - PNEUMOCOCCAL CONJ VACCINE 13 VALENT IM     Blood pressure is not well controlled patient advised to take verapamil 120 mg daily even when going to doctor's appointments. That way we can evaluate whether the medication is working.     Skin cancer  - she has a hx of skin cancer and appears to have a recurrence for which she will have resection - Dr Emerald Hope is her dermatologist     MGUS (monoclonal gammopathy of unknown significance)  -stable followed by Dr Paddy Darby      Mixed hyperlipidemia  -Supposed to be on Lipitor 20mg daily /advised to resume medication. Unclear why this was stopped. Anxiety  - controlled on low dose clonazepam daily and lexapro     TIA (transient ischemic attack)  Should be on statin and plavix. Followed by Dr Leida Silva  -Refilled medications today        Calli Morataya MD  This is the Subsequent Medicare Annual Wellness Exam, performed 12 months or more after the Initial AWV or the last Subsequent AWV    I have reviewed the patient's medical history in detail and updated the computerized patient record. Depression Risk Factor Screening:     3 most recent PHQ Screens 6/4/2019   PHQ Not Done -   Little interest or pleasure in doing things Not at all   Feeling down, depressed, irritable, or hopeless Not at all   Total Score PHQ 2 0   You do not drink alcohol or very rarely. Alcohol Risk Factor Screening:       Functional Ability and Level of Safety:   Hearing Loss  Hearing is good. The patient wears hearing aids. Activities of Daily Living  The home contains: grab bars  Patient does total self care    Fall Risk  Fall Risk Assessment, last 12 mths 6/4/2019   Able to walk? Yes   Fall in past 12 months? No   Number of falls in past 12 months -       Abuse Screen  Patient is not abused    Cognitive Screening   Evaluation of Cognitive Function:  Has your family/caregiver stated any concerns about your memory: no  Normal    Patient Care Team   Patient Care Team:  Denise Campo MD as PCP - General (Internal Medicine)  Lebron Marie MD as Surgeon (General Surgery)  Marc Rosenbaum MD (Neurology)  Nell Berry MD (Hematology and Oncology)  Ty Lawrence MD (Dermatology)    Assessment/Plan   Education and counseling provided:  Are appropriate based on today's review and evaluation      15. Medicare annual wellness visit, subsequent    16.  Encounter for immunization  -     ADMIN PNEUMOCOCCAL VACCINE  -     PNEUMOCOCCAL CONJ VACCINE 13 VALENT IM    Other orders  -     verapamil ER (CALAN-SR) 120 mg tablet; Take 1 Tab by mouth nightly. Indications: high blood pressure  -     pantoprazole (PROTONIX) 40 mg tablet; Take 1 Tab by mouth every morning. Indications: gastroesophageal reflux disease  -     escitalopram oxalate (LEXAPRO) 20 mg tablet; Take 1 Tab by mouth nightly. Indications: Anxiousness associated with Depression  -     atorvastatin (LIPITOR) 20 mg tablet; Take 1 Tab by mouth daily. -     nortriptyline (PAMELOR) 10 mg capsule; Take 1-2 Caps by mouth nightly.   -     diph,pertuss,acel,,tetanus vac,PF, (ADACEL) 2 Lf-(2.5-5-3-5 mcg)-5Lf/0.5 mL syrg vaccine; 0.5 mL by IntraMUSCular route once for 1 dose.  -     varicella-zoster recombinant, PF, (SHINGRIX, PF,) 50 mcg/0.5 mL susr injection; 0.5mL by IntraMUSCular route once now and then repeat in 2-6 months        Here is a list of your current Health Maintenance items (your personalized list of preventive services) with a due date:  Health Maintenance Due   Topic Date Due    DTaP/Tdap/Td  (1 - Tdap) Given prescription     Shingles Vaccine (1 of 2) Given prescription         Pneumococcal Vaccine (1 of 2 - PCV13) Given today    Annual Well Visit  Done today     Work on moving to a one level home with no steps

## 2019-06-05 LAB
ALBUMIN SERPL-MCNC: 4.3 G/DL (ref 3.5–4.8)
ALBUMIN/GLOB SERPL: 1.9 {RATIO} (ref 1.2–2.2)
ALP SERPL-CCNC: 106 IU/L (ref 39–117)
ALT SERPL-CCNC: 27 IU/L (ref 0–32)
AST SERPL-CCNC: 26 IU/L (ref 0–40)
BILIRUB SERPL-MCNC: 0.4 MG/DL (ref 0–1.2)
BUN SERPL-MCNC: 14 MG/DL (ref 8–27)
BUN/CREAT SERPL: 20 (ref 12–28)
CALCIUM SERPL-MCNC: 9.2 MG/DL (ref 8.7–10.3)
CHLORIDE SERPL-SCNC: 103 MMOL/L (ref 96–106)
CHOLEST SERPL-MCNC: 179 MG/DL (ref 100–199)
CO2 SERPL-SCNC: 26 MMOL/L (ref 20–29)
CREAT SERPL-MCNC: 0.71 MG/DL (ref 0.57–1)
GLOBULIN SER CALC-MCNC: 2.3 G/DL (ref 1.5–4.5)
GLUCOSE SERPL-MCNC: 84 MG/DL (ref 65–99)
HDLC SERPL-MCNC: 74 MG/DL
LDLC SERPL CALC-MCNC: 78 MG/DL (ref 0–99)
POTASSIUM SERPL-SCNC: 4.5 MMOL/L (ref 3.5–5.2)
PROT SERPL-MCNC: 6.6 G/DL (ref 6–8.5)
SODIUM SERPL-SCNC: 141 MMOL/L (ref 134–144)
TRIGL SERPL-MCNC: 133 MG/DL (ref 0–149)
VLDLC SERPL CALC-MCNC: 27 MG/DL (ref 5–40)

## 2019-06-19 ENCOUNTER — HOSPITAL ENCOUNTER (OUTPATIENT)
Dept: MAMMOGRAPHY | Age: 79
Discharge: HOME | End: 2019-06-19
Attending: INTERNAL MEDICINE
Payer: MEDICARE

## 2019-06-19 DIAGNOSIS — Z12.31 ENCOUNTER FOR SCREENING MAMMOGRAM FOR MALIGNANT NEOPLASM OF BREAST: ICD-10-CM

## 2019-06-19 PROCEDURE — 77067 SCR MAMMO BI INCL CAD: CPT

## 2019-08-19 RX ORDER — ROPINIROLE 1 MG/1
1 TABLET, FILM COATED ORAL
Qty: 90 TAB | Refills: 2 | Status: SHIPPED | OUTPATIENT
Start: 2019-08-19 | End: 2020-06-03

## 2019-08-19 NOTE — TELEPHONE ENCOUNTER
PCP: Melody Barbosa MD    Last appt: 6/4/2019  Future Appointments   Date Time Provider Logan Thornton   9/12/2019  2:30 PM [facility]       Requested Prescriptions     Pending Prescriptions Disp Refills    rOPINIRole (REQUIP) 1 mg tablet 90 Tab 2     Sig: Take 1 Tab by mouth nightly.

## 2019-09-12 ENCOUNTER — OFFICE VISIT (OUTPATIENT)
Dept: INTERNAL MEDICINE CLINIC | Age: 79
End: 2019-09-12

## 2019-09-12 VITALS
DIASTOLIC BLOOD PRESSURE: 91 MMHG | TEMPERATURE: 97.8 F | SYSTOLIC BLOOD PRESSURE: 167 MMHG | WEIGHT: 159 LBS | OXYGEN SATURATION: 99 % | HEART RATE: 74 BPM | RESPIRATION RATE: 18 BRPM | BODY MASS INDEX: 29.26 KG/M2 | HEIGHT: 62 IN

## 2019-09-12 DIAGNOSIS — I10 ESSENTIAL HYPERTENSION: ICD-10-CM

## 2019-09-12 DIAGNOSIS — G44.209 TENSION VASCULAR HEADACHE: ICD-10-CM

## 2019-09-12 DIAGNOSIS — G45.9 TIA (TRANSIENT ISCHEMIC ATTACK): ICD-10-CM

## 2019-09-12 DIAGNOSIS — E11.42 DIABETIC PERIPHERAL NEUROPATHY ASSOCIATED WITH TYPE 2 DIABETES MELLITUS (HCC): ICD-10-CM

## 2019-09-12 DIAGNOSIS — G25.0 BENIGN ESSENTIAL TREMOR SYNDROME: Primary | ICD-10-CM

## 2019-09-12 DIAGNOSIS — Z23 ENCOUNTER FOR IMMUNIZATION: ICD-10-CM

## 2019-09-12 RX ORDER — LISINOPRIL 2.5 MG/1
2.5 TABLET ORAL DAILY
Qty: 30 TAB | Refills: 1 | Status: SHIPPED | OUTPATIENT
Start: 2019-09-12 | End: 2019-10-02 | Stop reason: SDUPTHER

## 2019-09-12 NOTE — PATIENT INSTRUCTIONS
2 weeks for BP check and labs/ 3 month with MD    Adding lisinopril low dose    Request records from cards reports recent stress test    Office Policies    Phone calls/patient messages:            Please allow up to 24 hours for someone in the office to contact you about your call or message. Be mindful your provider may be out of the office or your message may require further review. We encourage you to use [employer] for your messages as this is a faster, more efficient way to communicate with our office                         Medication Refills:            Prescription medications require 48-72 business hours to process. We encourage you to use [employer] for your refills. For controlled medications: Please allow 72 business hours to process. Certain medications may require you to  a written prescription at our office. NO narcotic/controlled medications will be prescribed after 4pm Monday through Friday or on weekends              Form/Paperwork Completion:            Please note a $25 fee may incur for all paperwork for completed by our providers. We ask that you allow 7-10 business days. Pre-payment is due prior to picking up/faxing the completed form. You may also download your forms to [employer] to have your doctor print off.

## 2019-09-12 NOTE — PROGRESS NOTES
Reviewed record in preparation for visit and have obtained necessary documentation. Identified pt with two pt identifiers(name and ). Chief Complaint   Patient presents with   Freedom Day Hypertension       Health Maintenance Due   Topic Date Due    Diabetic Foot Care  1950    Albumin Urine Test  1950    Eye Exam  1950    DTaP/Tdap/Td  (1 - Tdap) 1962    Shingles Vaccine (1 of 2) 1991    Glaucoma Screening   2006    Hemoglobin A1C    2018    Flu Vaccine  2019       Ms. Vishal Marion has a reminder for a \"due or due soon\" health maintenance. I have asked that she discuss this further with her primary care provider for follow-up on this health maintenance. Coordination of Care Questionnaire:  :     1) Have you been to an emergency room, urgent care clinic since your last visit? no   Hospitalized since your last visit? no             2) Have you seen or consulted any other health care providers outside of [address] [place] [address] since your last visit? no  (Include any pap smears or colon screenings in this section.)    3) In the event something were to happen to you and you were unable to speak on your behalf, do you have an Advance Directive/ Living Will in place stating your wishes? NO    Do you have an Advance Directive on file? yes    4) Are you interested in receiving information on Advance Directives? NO    Patient is accompanied by spouse I have received verbal consent from Ching Johnson to discuss any/all medical information while they are present in the room.

## 2019-09-12 NOTE — PROGRESS NOTES
CC: Hypertension      HPI:    She is a 69 y.o. female who presents for evaluation of general medical issues. HTN: taking verapamil 120 mg daily. Blood pressure has been elevated on two visits. I will add lisinopril low dose. Does report chest tightness with exertion at times but reports she had cardiac work up not too long ago she cannot recall exact date. Likely 1 year ago. Dr Levi Mazariegos ? Denies dyspnea    Patient had a TIA - she is on plavix and statin     ROS:  10 systems reviewed and negative other than HPI    Past Medical History:   Diagnosis Date    Adverse effect of anesthesia     sleep apnea no cpap machine useage       Anemia     has had iron infusions; Hem/onc Dr Jeremias Deluna [phone]    Anxiety and depression     GERD (gastroesophageal reflux disease)     Hypercholesteremia     Hypertension     Limited peripheral vision of right eye     complication of cataract surgery    Long term current use of anticoagulant therapy     Meniere disease     MGUS (monoclonal gammopathy of unknown significance)     Monoclonal gammopathy of unknown significance     hem/onc:  Dr Jeremias Deluna [phone]    Osteoporosis     Psoriasis     bilateral Legs    Restless leg     Skin melanoma ([facility] [address] 2012    (Rt ankle) and basal cell (Lt eye) removed    Transient ischemic attack approx 2005    HAS HAD 2 MINISTROKES-NO DEFICITS; neuro Dr Jeevan Millan (cc)    Unspecified adverse effect of anesthesia     HAS NEEDED PORT OR CUTDOWN FOR RECVNG BLOOD OR LONG-TERM IVs; surgery 7/28/15 w/o any difficulty    Unspecified sleep apnea     does not use CPAP       Current Outpatient Medications on File Prior to Visit   Medication Sig Dispense Refill    rOPINIRole (REQUIP) 1 mg tablet Take 1 Tab by mouth nightly. 90 Tab 2    clonazePAM (KLONOPIN) 0.5 mg tablet Take 1 Tab by mouth two (2) times a day. Max Daily Amount: 1 mg. Indications: panic disorder 60 Tab 2    verapamil ER (CALAN-SR) 120 mg tablet Take 1 Tab by mouth nightly. Indications: high blood pressure 90 Tab 1    pantoprazole (PROTONIX) 40 mg tablet Take 1 Tab by mouth every morning. Indications: gastroesophageal reflux disease 90 Tab 1    escitalopram oxalate (LEXAPRO) 20 mg tablet Take 1 Tab by mouth nightly. Indications: Anxiousness associated with Depression 90 Tab 4    atorvastatin (LIPITOR) 20 mg tablet Take 1 Tab by mouth daily. 90 Tab 1    clopidogrel (PLAVIX) 75 mg tab Take 1 Tab by mouth daily. 90 Tab 1    nortriptyline (PAMELOR) 10 mg capsule Take 1-2 Caps by mouth nightly. 90 Cap 3    varicella-zoster recombinant, PF, (SHINGRIX, PF,) 50 mcg/0.5 mL susr injection 0.5mL by IntraMUSCular route once now and then repeat in 2-6 months 0.5 mL 1    albuterol (PROVENTIL HFA, VENTOLIN HFA, PROAIR HFA) 90 mcg/actuation inhaler Take 1 Puff by inhalation every four (4) hours as needed for Wheezing. 1 Inhaler 0    primidone (MYSOLINE) 50 mg tablet Take 0.5 Tabs by mouth nightly. (Patient taking differently: Take 50 mg by mouth nightly.) 90 Tab 3    ferrous sulfate 325 mg (65 mg iron) CpER Take 1 Tab by mouth daily.  polyethylene glycol (MIRALAX) 17 gram packet Take 17 g by mouth every morning. Pt puts in her coffee       No current facility-administered medications on file prior to visit.         Past Surgical History:   Procedure Laterality Date    HX APPENDECTOMY      HX CATARACT REMOVAL  2011    Right    HX CERVICAL FUSION  1/28/15    C5-7    HX CHOLECYSTECTOMY  2000    HX GI  2010    liver bx due to liver Bx due to elevated enzymes, liver laceration during Bx, hospitalized 10-12d    HX HEENT  7/28/15    Lt total parotidectomy with facial nerve dissection: Dr Emilia Mae HX HEENT  11/2015    Right parotidectomy    HX HYSTERECTOMY      HX ORTHOPAEDIC  2009 & 2011    LUMBAR FUSION and revision    HX OTHER SURGICAL  2010    MELANOMA (Rt ankle) and basal cell (Lt eye) removed; q6mo ck by DERMATOLOGY    HX SHOULDER REPLACEMENT Left 08/01/2016    HX THYROIDECTOMY      Right    AR COLON CA SCRN NOT HI RSK IND  2016            Family History   Problem Relation Age of Onset    Alcohol abuse Father     Emphysema Father     Cancer Sister         PANCREATIC    Alcohol abuse Brother     COPD Brother     Other Mother         UNKNOWN CAUSE OF DEATH    Heart Disease Mother     Breast Cancer Maternal Aunt     Anesth Problems Neg Hx      Reviewed and no changes     Social History     Socioeconomic History    Marital status:      Spouse name: Not on file    Number of children: Not on file    Years of education: Not on file    Highest education level: Not on file   Occupational History    Not on file   Social Needs    Financial resource strain: Not on file    Food insecurity:     Worry: Not on file     Inability: Not on file    Transportation needs:     Medical: Not on file     Non-medical: Not on file   Tobacco Use    Smoking status: Former Smoker     Years: 15.00     Last attempt to quit: 7/15/1975     Years since quittin.1    Smokeless tobacco: Never Used   Substance and Sexual Activity    Alcohol use: Yes     Comment: may have a glass of wine on rare occasion    Drug use: Yes     Types: Prescription, OTC    Sexual activity: Not Currently   Lifestyle    Physical activity:     Days per week: Not on file     Minutes per session: Not on file    Stress: Not on file   Relationships    Social connections:     Talks on phone: Not on file     Gets together: Not on file     Attends Orthodox service: Not on file     Active member of club or organization: Not on file     Attends meetings of clubs or organizations: Not on file     Relationship status: Not on file    Intimate partner violence:     Fear of current or ex partner: Not on file     Emotionally abused: Not on file     Physically abused: Not on file     Forced sexual activity: Not on file   Other Topics Concern    Not on file   Social History Narrative    Lives in [place] with  of 62 years. Has 2 sons and 1 daughter. Used to work in a bank. Likes to garden and sew.              Visit Vitals  BP (!) 167/91 (BP 1 Location: Right arm, BP Patient Position: Sitting)   Pulse 74   Temp 97.8 °F (36.6 °C) (Oral)   Resp 18   Ht 5' 2\" (1.575 m)   Wt 159 lb (72.1 kg)   LMP  (LMP Unknown)   SpO2 99%   BMI 29.08 kg/m²       Physical Examination:   General - Well appearing female  HEENT - PERRL, TM no erythema/opacification, normal nasal turbinates, oropharynx no erythema or exudate, MMM  Neck - supple, no bruits, no TMG, no LAD  Pulm - clear to auscultation bilaterally  Cardio - RRR, normal S1 S2, no murmur gallops or rubs  Abd - soft, nontender, no masses, no HSM  Extrem - no edema, venous stasis ulcer bilateral in medial malleolus area,  +1 distal pulses  Psych - normal affect, appropriate mood    Lab Results   Component Value Date/Time    WBC 4.9 03/08/2019 10:23 AM    HGB 13.2 03/08/2019 10:23 AM    HCT 41.5 03/08/2019 10:23 AM    PLATELET 710 05/19/2021 10:23 AM    MCV 96.3 03/08/2019 10:23 AM     Lab Results   Component Value Date/Time    Sodium 141 06/04/2019 11:50 AM    Potassium 4.5 06/04/2019 11:50 AM    Chloride 103 06/04/2019 11:50 AM    CO2 26 06/04/2019 11:50 AM    Anion gap 5 03/08/2019 10:23 AM    Glucose 84 06/04/2019 11:50 AM    BUN 14 06/04/2019 11:50 AM    Creatinine 0.71 06/04/2019 11:50 AM    BUN/Creatinine ratio 20 06/04/2019 11:50 AM    GFR est AA 95 06/04/2019 11:50 AM    GFR est non-AA 82 06/04/2019 11:50 AM    Calcium 9.2 06/04/2019 11:50 AM     Lab Results   Component Value Date/Time    Cholesterol, total 179 06/04/2019 11:50 AM    HDL Cholesterol 74 06/04/2019 11:50 AM    LDL, calculated 78 06/04/2019 11:50 AM    VLDL, calculated 27 06/04/2019 11:50 AM    Triglyceride 133 06/04/2019 11:50 AM     Lab Results   Component Value Date/Time    TSH 1.410 06/08/2018 02:12 PM     No results found for: Annika KHAN [place], [id number], [id number], PSALT  Lab Results   Component Value Date/Time    Hemoglobin A1c 5.4 06/08/2018 02:12 PM     No results found for: Dasia Nelson, [id number]    Lab Results   Component Value Date/Time    ALT (SGPT) 27 06/04/2019 11:50 AM    AST (SGOT) 26 06/04/2019 11:50 AM    Alk. phosphatase 106 06/04/2019 11:50 AM    Bilirubin, total 0.4 06/04/2019 11:50 AM           Assessment/Plan:    HTN    Blood pressure is not well controlled on verapamil 120 mg daily. Adding lisinopril 2.5mg daily   Check kidney function in 2 weeks     Skin cancer  - she has a hx of skin cancer and appears to have a recurrence for which she will have resection - Dr Luis Miguel Zamudio is her dermatologist     MGUS (monoclonal gammopathy of unknown significance)  -stable followed by Dr Mary Oates      Mixed hyperlipidemia  -on Lipitor 20mg daily /due for lipid panel      Anxiety  - controlled on low dose clonazepam daily and lexapro     TIA (transient ischemic attack)   on statin and plavix.  Followed by Dr Royce Pugh    Venous stasis: elevate legs and increase walker, low salt diet    Request records from cards reports recent stress test  Moving to a senior living facility which will be better         Kathleen López MD

## 2019-09-13 DIAGNOSIS — F41.9 ANXIETY: ICD-10-CM

## 2019-09-14 RX ORDER — CLONAZEPAM 0.5 MG/1
TABLET ORAL
Qty: 60 TAB | Refills: 2 | Status: SHIPPED | OUTPATIENT
Start: 2019-09-14 | End: 2019-12-26 | Stop reason: SDUPTHER

## 2019-09-16 NOTE — TELEPHONE ENCOUNTER
clonazePAM (KLONOPIN) 0.5 mg tablet [[id number]]     Order Details   Dose, Route, Frequency: As Directed    Dispense Quantity: 60 Tab Refills: 2 Fills remaining: --           Sig: TAKE 1 TABLET BY MOUTH TWICE DAILY.  MAXIMUM DAILY AMOUNT: 1 MG          Written Date: 09/14/19 Expiration Date: --     Start Date: 09/14/19 End Date: --            Ordering Provider: -- MICHAEL #:  -- NPI:  --    Authorizing Provider: Nile Albarado MD MICHAEL #:  [id number] NPI:  [id number]    Ordering User:  Nile Albarado MD            Diagnosis Association: Anxiety (F41.9)      Original Order:  clonazePAM [place]) 0.5 mg tablet [[id number]]      Pharmacy:  [facility] [address], [place]        Prescription has been phoned into [address]

## 2019-09-17 ENCOUNTER — TELEPHONE (OUTPATIENT)
Dept: INTERNAL MEDICINE CLINIC | Age: 79
End: 2019-09-17

## 2019-09-17 NOTE — TELEPHONE ENCOUNTER
Monica/[facility] states she needs a call back to get clarification on prescription called in yesterday. Please call.  Thank you

## 2019-09-26 ENCOUNTER — CLINICAL SUPPORT (OUTPATIENT)
Dept: INTERNAL MEDICINE CLINIC | Age: 79
End: 2019-09-26

## 2019-09-26 VITALS
WEIGHT: 163.8 LBS | HEART RATE: 72 BPM | OXYGEN SATURATION: 95 % | SYSTOLIC BLOOD PRESSURE: 126 MMHG | HEIGHT: 62 IN | BODY MASS INDEX: 30.14 KG/M2 | DIASTOLIC BLOOD PRESSURE: 69 MMHG | RESPIRATION RATE: 16 BRPM | TEMPERATURE: 97.6 F

## 2019-09-26 DIAGNOSIS — E11.42 DIABETIC PERIPHERAL NEUROPATHY ASSOCIATED WITH TYPE 2 DIABETES MELLITUS (HCC): Primary | ICD-10-CM

## 2019-09-27 LAB
ALBUMIN SERPL-MCNC: 4.1 G/DL (ref 3.5–4.8)
ALBUMIN/CREAT UR: 4.6 MG/G CREAT (ref 0–30)
ALBUMIN/GLOB SERPL: 2 {RATIO} (ref 1.2–2.2)
ALP SERPL-CCNC: 113 IU/L (ref 39–117)
ALT SERPL-CCNC: 18 IU/L (ref 0–32)
AST SERPL-CCNC: 19 IU/L (ref 0–40)
BILIRUB SERPL-MCNC: 0.2 MG/DL (ref 0–1.2)
BUN SERPL-MCNC: 16 MG/DL (ref 8–27)
BUN/CREAT SERPL: 19 (ref 12–28)
CALCIUM SERPL-MCNC: 9.2 MG/DL (ref 8.7–10.3)
CHLORIDE SERPL-SCNC: 103 MMOL/L (ref 96–106)
CHOLEST SERPL-MCNC: 171 MG/DL (ref 100–199)
CO2 SERPL-SCNC: 23 MMOL/L (ref 20–29)
CREAT SERPL-MCNC: 0.86 MG/DL (ref 0.57–1)
CREAT UR-MCNC: 84.2 MG/DL
GLOBULIN SER CALC-MCNC: 2.1 G/DL (ref 1.5–4.5)
GLUCOSE SERPL-MCNC: 75 MG/DL (ref 65–99)
HBA1C MFR BLD: 5.5 % (ref 4.8–5.6)
HDLC SERPL-MCNC: 66 MG/DL
LDLC SERPL CALC-MCNC: 67 MG/DL (ref 0–99)
MICROALBUMIN UR-MCNC: 3.9 UG/ML
POTASSIUM SERPL-SCNC: 4.7 MMOL/L (ref 3.5–5.2)
PROT SERPL-MCNC: 6.2 G/DL (ref 6–8.5)
SODIUM SERPL-SCNC: 140 MMOL/L (ref 134–144)
TRIGL SERPL-MCNC: 190 MG/DL (ref 0–149)
VLDLC SERPL CALC-MCNC: 38 MG/DL (ref 5–40)

## 2019-09-27 NOTE — PROGRESS NOTES
Labs look good  Normal blood sugar  Normal kidney and liver function   Mild elevation in cholesterol triglycerides - work on low fat diet

## 2019-10-02 DIAGNOSIS — I10 ESSENTIAL HYPERTENSION: ICD-10-CM

## 2019-10-02 RX ORDER — LISINOPRIL 2.5 MG/1
2.5 TABLET ORAL DAILY
Qty: 90 TAB | Refills: 2 | Status: SHIPPED | OUTPATIENT
Start: 2019-10-02 | End: 2019-11-05 | Stop reason: DRUGHIGH

## 2019-10-02 NOTE — TELEPHONE ENCOUNTER
PCP: Lynn Vargas MD    Last appt: 9/26/2019  Future Appointments   Date Time Provider Logan Thornton   12/13/2019  2:45 PM Appa Js Luna MD [facility] 87       Requested Prescriptions     Pending Prescriptions Disp Refills    lisinopril (PRINIVIL, ZESTRIL) 2.5 mg tablet 30 Tab 1     Sig: Take 1 Tab by mouth daily.

## 2019-10-31 ENCOUNTER — OFFICE VISIT (OUTPATIENT)
Dept: URGENT CARE | Age: 79
End: 2019-10-31

## 2019-10-31 VITALS
OXYGEN SATURATION: 97 % | BODY MASS INDEX: 30 KG/M2 | DIASTOLIC BLOOD PRESSURE: 65 MMHG | TEMPERATURE: 98.6 F | SYSTOLIC BLOOD PRESSURE: 127 MMHG | HEIGHT: 62 IN | RESPIRATION RATE: 18 BRPM | WEIGHT: 163 LBS | HEART RATE: 86 BPM

## 2019-10-31 DIAGNOSIS — B96.89 BACTERIAL UPPER RESPIRATORY INFECTION: Primary | ICD-10-CM

## 2019-10-31 DIAGNOSIS — J06.9 BACTERIAL UPPER RESPIRATORY INFECTION: Primary | ICD-10-CM

## 2019-10-31 RX ORDER — FLUTICASONE PROPIONATE 50 MCG
2 SPRAY, SUSPENSION (ML) NASAL DAILY
Qty: 1 BOTTLE | Refills: 0 | Status: SHIPPED | OUTPATIENT
Start: 2019-10-31

## 2019-10-31 RX ORDER — AZITHROMYCIN 250 MG/1
TABLET, FILM COATED ORAL
Qty: 6 TAB | Refills: 0 | Status: SHIPPED | OUTPATIENT
Start: 2019-10-31 | End: 2019-11-05 | Stop reason: ALTCHOICE

## 2019-10-31 NOTE — PROGRESS NOTES
The history is provided by the patient. Cough   The history is provided by the patient. This is a new problem. The current episode started more than 1 week ago. The problem occurs constantly. The problem has been gradually worsening. The cough is non-productive. There has been no fever. Pertinent negatives include no chest pain, no chills, no sweats, no headaches, no rhinorrhea, no sore throat, no shortness of breath, no wheezing, no nausea and no vomiting. Associated symptoms comments: Coughing and facial congestion. She has tried cough syrup for the symptoms. The treatment provided no relief. She is not a smoker. Past Medical History:   Diagnosis Date    Adverse effect of anesthesia     sleep apnea no cpap machine useage       Anemia     has had iron infusions;  Hem/onc Dr Mar Sullivan [phone]    Anxiety and depression     GERD (gastroesophageal reflux disease)     Hypercholesteremia     Hypertension     Limited peripheral vision of right eye     complication of cataract surgery    Long term current use of anticoagulant therapy     Meniere disease     MGUS (monoclonal gammopathy of unknown significance)     Monoclonal gammopathy of unknown significance     hem/onc:  Dr Mar Sullivan [phone]    Osteoporosis     Psoriasis     bilateral Legs    Restless leg     Skin melanoma ([facility] [address] 2012    (Rt ankle) and basal cell (Lt eye) removed    Transient ischemic attack approx 2005    HAS HAD 2 MINISTROKES-NO DEFICITS; neuro Dr Nikko Donahue (cc)    Unspecified adverse effect of anesthesia     HAS NEEDED PORT OR CUTDOWN FOR RECVNG BLOOD OR LONG-TERM IVs; surgery 7/28/15 w/o any difficulty    Unspecified sleep apnea     does not use CPAP        Past Surgical History:   Procedure Laterality Date    HX APPENDECTOMY      HX CATARACT REMOVAL  2011    Right    HX CERVICAL FUSION  1/28/15    C5-7    HX CHOLECYSTECTOMY  2000    HX GI  2010    liver bx due to liver Bx due to elevated enzymes, liver laceration during Bx, hospitalized 10-12d    HX HEENT  7/28/15    Lt total parotidectomy with facial nerve dissection: Dr Sterling Leslie HX HEENT  2015    Right parotidectomy    HX HYSTERECTOMY      HX ORTHOPAEDIC   &     LUMBAR FUSION and revision    HX OTHER SURGICAL  2010    MELANOMA (Rt ankle) and basal cell (Lt eye) removed; q6mo ck by DERMATOLOGY    HX SHOULDER REPLACEMENT Left 2016    HX THYROIDECTOMY      Right    MS COLON CA SCRN NOT HI RSK IND  2016              Family History   Problem Relation Age of Onset    Alcohol abuse Father     Emphysema Father     Cancer Sister         PANCREATIC    Alcohol abuse Brother     COPD Brother     Other Mother         UNKNOWN CAUSE OF DEATH    Heart Disease Mother     Breast Cancer Maternal Aunt     Anesth Problems Neg Hx         Social History     Socioeconomic History    Marital status:      Spouse name: Not on file    Number of children: Not on file    Years of education: Not on file    Highest education level: Not on file   Occupational History    Not on file   Social Needs    Financial resource strain: Not on file    Food insecurity:     Worry: Not on file     Inability: Not on file    Transportation needs:     Medical: Not on file     Non-medical: Not on file   Tobacco Use    Smoking status: Former Smoker     Years: 15.00     Last attempt to quit: 7/15/1975     Years since quittin.3    Smokeless tobacco: Never Used   Substance and Sexual Activity    Alcohol use: Yes     Comment: may have a glass of wine on rare occasion    Drug use: Yes     Types: Prescription, OTC    Sexual activity: Not Currently   Lifestyle    Physical activity:     Days per week: Not on file     Minutes per session: Not on file    Stress: Not on file   Relationships    Social connections:     Talks on phone: Not on file     Gets together: Not on file     Attends Hinduism service: Not on file     Active member of club or organization: Not on file     Attends meetings of clubs or organizations: Not on file     Relationship status: Not on file    Intimate partner violence:     Fear of current or ex partner: Not on file     Emotionally abused: Not on file     Physically abused: Not on file     Forced sexual activity: Not on file   Other Topics Concern    Not on file   Social History Narrative    Lives in [place] with  of 62 years. Has 2 sons and 1 daughter. Used to work in a bank. Likes to garden and sew. ALLERGIES: Aspirin; Bee sting [sting, bee]; Codeine; Hydrocodone; Morphine; Nsaids (non-steroidal anti-inflammatory drug); Oxycodone; and Penicillins    Review of Systems   Constitutional: Negative for chills, fatigue and fever. HENT: Positive for congestion and postnasal drip. Negative for rhinorrhea, sinus pressure, sinus pain, sore throat and trouble swallowing. Eyes: Negative. Respiratory: Positive for cough. Negative for chest tightness, shortness of breath and wheezing. Cardiovascular: Negative for chest pain and leg swelling. Gastrointestinal: Negative. Negative for nausea and vomiting. Musculoskeletal: Negative. Skin: Negative. Neurological: Negative for dizziness, weakness, light-headedness, numbness and headaches. Vitals:    10/31/19 1319   BP: 127/65   Pulse: 86   Resp: 18   Temp: 98.6 °F (37 °C)   SpO2: 97%   Weight: 163 lb (73.9 kg)   Height: 5' 2\" (1.575 m)       Physical Exam   Constitutional: She is oriented to person, place, and time. She appears well-developed and well-nourished. HENT:   Right Ear: External ear normal.   Left Ear: External ear normal.   Nose: Nose normal.   Mouth/Throat: No oropharyngeal exudate. Post nasal drainage, maxillary tenderness   Eyes: Pupils are equal, round, and reactive to light. Conjunctivae are normal. Right eye exhibits no discharge. Left eye exhibits no discharge. Neck: Normal range of motion.    Cardiovascular: Normal rate, regular rhythm and normal heart sounds. Pulmonary/Chest: Effort normal and breath sounds normal. No respiratory distress. She has no wheezes. She has no rales. Abdominal: Soft. Bowel sounds are normal.   Musculoskeletal: Normal range of motion. Lymphadenopathy:     She has no cervical adenopathy. Neurological: She is alert and oriented to person, place, and time. Skin: Skin is warm and dry. Psychiatric: She has a normal mood and affect. MDM     Differential Diagnosis; Clinical Impression; Plan:     (J06.9,  B96.89) Bacterial upper respiratory infection  (primary encounter diagnosis)  Orders Placed This Encounter      azithromycin (ZITHROMAX) 250 mg tablet          Sig: Take 2 tablets today, then take 1 tablet daily          Dispense:  6 Tab          Refill:  0      fluticasone propionate (FLONASE) 50 mcg/actuation nasal spray          Si Sprays by Both Nostrils route daily. Dispense:  1 Bottle          Refill:  0    Increase water intake. Take tylenol for discomfort. The patients condition was discussed with the patient and they understand. The patient is to follow up with PCP. If signs and symptoms become worse the pt is to go to the ER. The patient is to take medications as prescribed. AVS given with patient instructions upon discharge.                   Procedures

## 2019-10-31 NOTE — PATIENT INSTRUCTIONS

## 2019-11-04 ENCOUNTER — TELEPHONE (OUTPATIENT)
Dept: INTERNAL MEDICINE CLINIC | Age: 79
End: 2019-11-04

## 2019-11-04 NOTE — TELEPHONE ENCOUNTER
----- Message from Yu Parra sent at 11/4/2019 11:21 AM EST -----  Regarding: Dr. Tori Braun telephone   Contact: [phone]  Pt requesting a return call regarding a cough she has had for more than a month. Pt states she has been to [facility] and what was prescribed did not work. Please call to discuss.        Copy/paste envera

## 2019-11-05 ENCOUNTER — APPOINTMENT (OUTPATIENT)
Dept: CT IMAGING | Age: 79
DRG: 041 | End: 2019-11-05
Attending: EMERGENCY MEDICINE
Payer: MEDICARE

## 2019-11-05 ENCOUNTER — APPOINTMENT (OUTPATIENT)
Dept: GENERAL RADIOLOGY | Age: 79
DRG: 041 | End: 2019-11-05
Attending: EMERGENCY MEDICINE
Payer: MEDICARE

## 2019-11-05 ENCOUNTER — APPOINTMENT (OUTPATIENT)
Dept: MRI IMAGING | Age: 79
DRG: 041 | End: 2019-11-05
Attending: INTERNAL MEDICINE
Payer: MEDICARE

## 2019-11-05 ENCOUNTER — HOSPITAL ENCOUNTER (INPATIENT)
Age: 79
LOS: 6 days | Discharge: HOME HEALTH | DRG: 041 | End: 2019-11-11
Attending: EMERGENCY MEDICINE | Admitting: INTERNAL MEDICINE
Payer: MEDICARE

## 2019-11-05 DIAGNOSIS — Z86.39 HISTORY OF HYPERCHOLESTEROLEMIA: ICD-10-CM
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
## 2019-11-05 DIAGNOSIS — G25.0 BENIGN ESSENTIAL TREMOR SYNDROME: ICD-10-CM

## 2019-11-05 DIAGNOSIS — G56.21 ULNAR NEUROPATHY AT ELBOW OF RIGHT UPPER EXTREMITY: ICD-10-CM

## 2019-11-05 DIAGNOSIS — M47.22 CERVICAL RADICULOPATHY DUE TO DEGENERATIVE JOINT DISEASE OF SPINE: ICD-10-CM

## 2019-11-05 DIAGNOSIS — I63.9 ISCHEMIC STROKE (HCC): Primary | ICD-10-CM

## 2019-11-05 DIAGNOSIS — M96.1 LUMBAR POST-LAMINECTOMY SYNDROME: ICD-10-CM

## 2019-11-05 DIAGNOSIS — I65.23 BILATERAL CAROTID ARTERY STENOSIS: ICD-10-CM

## 2019-11-05 DIAGNOSIS — Z86.79 HISTORY OF HYPERTENSION: ICD-10-CM

## 2019-11-05 DIAGNOSIS — I20.0 UNSTABLE ANGINA (HCC): ICD-10-CM

## 2019-11-05 DIAGNOSIS — G44.209 TENSION VASCULAR HEADACHE: ICD-10-CM

## 2019-11-05 DIAGNOSIS — D47.2 MGUS (MONOCLONAL GAMMOPATHY OF UNKNOWN SIGNIFICANCE): ICD-10-CM

## 2019-11-05 DIAGNOSIS — I65.23 STENOSIS OF BOTH INTERNAL CAROTID ARTERIES: ICD-10-CM

## 2019-11-05 DIAGNOSIS — M48.061 SPINAL STENOSIS OF LUMBAR REGION WITHOUT NEUROGENIC CLAUDICATION: ICD-10-CM

## 2019-11-05 DIAGNOSIS — I63.412 EMBOLIC STROKE INVOLVING LEFT MIDDLE CEREBRAL ARTERY (HCC): ICD-10-CM

## 2019-11-05 DIAGNOSIS — M96.1 CERVICAL POST-LAMINECTOMY SYNDROME: ICD-10-CM

## 2019-11-05 DIAGNOSIS — G60.8 IDIOPATHIC SMALL AND LARGE FIBER SENSORY NEUROPATHY: ICD-10-CM

## 2019-11-05 DIAGNOSIS — I67.89 CEREBRAL MICROVASCULAR DISEASE: ICD-10-CM

## 2019-11-05 LAB
ALBUMIN SERPL-MCNC: 3.5 G/DL (ref 3.5–5)
ALBUMIN/GLOB SERPL: 1.2 {RATIO} (ref 1.1–2.2)
ALP SERPL-CCNC: 95 U/L (ref 45–117)
ALT SERPL-CCNC: 19 U/L (ref 12–78)
ANION GAP SERPL CALC-SCNC: 7 MMOL/L (ref 5–15)
AST SERPL-CCNC: 13 U/L (ref 15–37)
BASOPHILS # BLD: 0 K/UL (ref 0–0.1)
BASOPHILS NFR BLD: 1 % (ref 0–1)
BILIRUB SERPL-MCNC: 0.4 MG/DL (ref 0.2–1)
BUN SERPL-MCNC: 12 MG/DL (ref 6–20)
BUN/CREAT SERPL: 16 (ref 12–20)
CALCIUM SERPL-MCNC: 9 MG/DL (ref 8.5–10.1)
CHLORIDE SERPL-SCNC: 106 MMOL/L (ref 97–108)
CO2 SERPL-SCNC: 25 MMOL/L (ref 21–32)
CREAT SERPL-MCNC: 0.76 MG/DL (ref 0.55–1.02)
DIFFERENTIAL METHOD BLD: NORMAL
EOSINOPHIL # BLD: 0.1 K/UL (ref 0–0.4)
EOSINOPHIL NFR BLD: 2 % (ref 0–7)
ERYTHROCYTE [DISTWIDTH] IN BLOOD BY AUTOMATED COUNT: 13.1 % (ref 11.5–14.5)
GLOBULIN SER CALC-MCNC: 3 G/DL (ref 2–4)
GLUCOSE SERPL-MCNC: 82 MG/DL (ref 65–100)
HCT VFR BLD AUTO: 38.7 % (ref 35–47)
HGB BLD-MCNC: 12.2 G/DL (ref 11.5–16)
IMM GRANULOCYTES # BLD AUTO: 0 K/UL (ref 0–0.04)
IMM GRANULOCYTES NFR BLD AUTO: 0 % (ref 0–0.5)
INR PPP: 1.1 (ref 0.9–1.1)
LYMPHOCYTES # BLD: 1.4 K/UL (ref 0.8–3.5)
LYMPHOCYTES NFR BLD: 35 % (ref 12–49)
MCH RBC QN AUTO: 30.3 PG (ref 26–34)
MCHC RBC AUTO-ENTMCNC: 31.5 G/DL (ref 30–36.5)
MCV RBC AUTO: 96 FL (ref 80–99)
MONOCYTES # BLD: 0.3 K/UL (ref 0–1)
MONOCYTES NFR BLD: 8 % (ref 5–13)
NEUTS SEG # BLD: 2.3 K/UL (ref 1.8–8)
NEUTS SEG NFR BLD: 54 % (ref 32–75)
NRBC # BLD: 0 K/UL (ref 0–0.01)
NRBC BLD-RTO: 0 PER 100 WBC
PLATELET # BLD AUTO: 240 K/UL (ref 150–400)
PMV BLD AUTO: 9.6 FL (ref 8.9–12.9)
POTASSIUM SERPL-SCNC: 4.1 MMOL/L (ref 3.5–5.1)
PROT SERPL-MCNC: 6.5 G/DL (ref 6.4–8.2)
PROTHROMBIN TIME: 10.8 SEC (ref 9–11.1)
RBC # BLD AUTO: 4.03 M/UL (ref 3.8–5.2)
SODIUM SERPL-SCNC: 138 MMOL/L (ref 136–145)
TROPONIN I SERPL-MCNC: <0.05 NG/ML
WBC # BLD AUTO: 4.1 K/UL (ref 3.6–11)

## 2019-11-05 PROCEDURE — 36415 COLL VENOUS BLD VENIPUNCTURE: CPT

## 2019-11-05 PROCEDURE — 71046 X-RAY EXAM CHEST 2 VIEWS: CPT

## 2019-11-05 PROCEDURE — 74011250637 HC RX REV CODE- 250/637: Performed by: EMERGENCY MEDICINE

## 2019-11-05 PROCEDURE — 84484 ASSAY OF TROPONIN QUANT: CPT

## 2019-11-05 PROCEDURE — 74011250636 HC RX REV CODE- 250/636: Performed by: EMERGENCY MEDICINE

## 2019-11-05 PROCEDURE — 85025 COMPLETE CBC W/AUTO DIFF WBC: CPT

## 2019-11-05 PROCEDURE — 93005 ELECTROCARDIOGRAM TRACING: CPT

## 2019-11-05 PROCEDURE — 70450 CT HEAD/BRAIN W/O DYE: CPT

## 2019-11-05 PROCEDURE — 0042T CT PERF W CBF: CPT

## 2019-11-05 PROCEDURE — 74011636320 HC RX REV CODE- 636/320: Performed by: EMERGENCY MEDICINE

## 2019-11-05 PROCEDURE — 74011250636 HC RX REV CODE- 250/636: Performed by: INTERNAL MEDICINE

## 2019-11-05 PROCEDURE — 94762 N-INVAS EAR/PLS OXIMTRY CONT: CPT

## 2019-11-05 PROCEDURE — 70551 MRI BRAIN STEM W/O DYE: CPT

## 2019-11-05 PROCEDURE — 85610 PROTHROMBIN TIME: CPT

## 2019-11-05 PROCEDURE — 80053 COMPREHEN METABOLIC PANEL: CPT

## 2019-11-05 PROCEDURE — 99285 EMERGENCY DEPT VISIT HI MDM: CPT

## 2019-11-05 PROCEDURE — 65660000000 HC RM CCU STEPDOWN

## 2019-11-05 PROCEDURE — 70496 CT ANGIOGRAPHY HEAD: CPT

## 2019-11-05 RX ORDER — ACETAMINOPHEN 325 MG/1
650 TABLET ORAL
Status: DISCONTINUED | OUTPATIENT
Start: 2019-11-05 | End: 2019-11-11 | Stop reason: HOSPADM

## 2019-11-05 RX ORDER — SODIUM CHLORIDE 9 MG/ML
75 INJECTION, SOLUTION INTRAVENOUS CONTINUOUS
Status: DISPENSED | OUTPATIENT
Start: 2019-11-05 | End: 2019-11-06

## 2019-11-05 RX ORDER — ATORVASTATIN CALCIUM 20 MG/1
20 TABLET, FILM COATED ORAL
COMMUNITY
End: 2020-07-12 | Stop reason: SDUPTHER

## 2019-11-05 RX ORDER — ATORVASTATIN CALCIUM 20 MG/1
20 TABLET, FILM COATED ORAL
Status: DISCONTINUED | OUTPATIENT
Start: 2019-11-06 | End: 2019-11-06

## 2019-11-05 RX ORDER — SODIUM CHLORIDE 9 MG/ML
150 INJECTION, SOLUTION INTRAVENOUS ONCE
Status: COMPLETED | OUTPATIENT
Start: 2019-11-05 | End: 2019-11-05

## 2019-11-05 RX ORDER — CLOPIDOGREL BISULFATE 75 MG/1
75 TABLET ORAL
Status: DISCONTINUED | OUTPATIENT
Start: 2019-11-06 | End: 2019-11-11 | Stop reason: HOSPADM

## 2019-11-05 RX ORDER — ASPIRIN 600 MG/1
600 SUPPOSITORY RECTAL
Status: COMPLETED | OUTPATIENT
Start: 2019-11-05 | End: 2019-11-05

## 2019-11-05 RX ORDER — PRIMIDONE 50 MG/1
50 TABLET ORAL
COMMUNITY
End: 2020-01-06 | Stop reason: SDUPTHER

## 2019-11-05 RX ORDER — ACETAMINOPHEN 650 MG/1
650 SUPPOSITORY RECTAL
Status: DISCONTINUED | OUTPATIENT
Start: 2019-11-05 | End: 2019-11-11 | Stop reason: HOSPADM

## 2019-11-05 RX ORDER — CLOPIDOGREL BISULFATE 75 MG/1
75 TABLET ORAL
COMMUNITY
End: 2020-04-08 | Stop reason: SDUPTHER

## 2019-11-05 RX ORDER — LABETALOL HYDROCHLORIDE 5 MG/ML
5 INJECTION, SOLUTION INTRAVENOUS
Status: DISCONTINUED | OUTPATIENT
Start: 2019-11-05 | End: 2019-11-11 | Stop reason: HOSPADM

## 2019-11-05 RX ORDER — SODIUM CHLORIDE 0.9 % (FLUSH) 0.9 %
10 SYRINGE (ML) INJECTION
Status: COMPLETED | OUTPATIENT
Start: 2019-11-05 | End: 2019-11-05

## 2019-11-05 RX ORDER — ENOXAPARIN SODIUM 100 MG/ML
40 INJECTION SUBCUTANEOUS EVERY 24 HOURS
Status: DISCONTINUED | OUTPATIENT
Start: 2019-11-05 | End: 2019-11-11 | Stop reason: HOSPADM

## 2019-11-05 RX ORDER — ESCITALOPRAM OXALATE 10 MG/1
20 TABLET ORAL
Status: DISCONTINUED | OUTPATIENT
Start: 2019-11-06 | End: 2019-11-11 | Stop reason: HOSPADM

## 2019-11-05 RX ORDER — LISINOPRIL 2.5 MG/1
2.5 TABLET ORAL
COMMUNITY
End: 2019-12-09 | Stop reason: SINTOL

## 2019-11-05 RX ORDER — PRIMIDONE 50 MG/1
50 TABLET ORAL
Status: DISCONTINUED | OUTPATIENT
Start: 2019-11-06 | End: 2019-11-11 | Stop reason: HOSPADM

## 2019-11-05 RX ORDER — ROPINIROLE 1 MG/1
1 TABLET, FILM COATED ORAL
Status: DISCONTINUED | OUTPATIENT
Start: 2019-11-06 | End: 2019-11-11 | Stop reason: HOSPADM

## 2019-11-05 RX ADMIN — IOPAMIDOL 100 ML: 755 INJECTION, SOLUTION INTRAVENOUS at 12:20

## 2019-11-05 RX ADMIN — ENOXAPARIN SODIUM 40 MG: 40 INJECTION SUBCUTANEOUS at 19:54

## 2019-11-05 RX ADMIN — SODIUM CHLORIDE 75 ML/HR: 900 INJECTION, SOLUTION INTRAVENOUS at 19:54

## 2019-11-05 RX ADMIN — SODIUM CHLORIDE 150 ML/HR: 900 INJECTION, SOLUTION INTRAVENOUS at 15:50

## 2019-11-05 RX ADMIN — Medication 10 ML: at 12:21

## 2019-11-05 RX ADMIN — ASPIRIN 600 MG: 600 SUPPOSITORY RECTAL at 13:11

## 2019-11-05 RX ADMIN — SODIUM CHLORIDE 1000 ML: 900 INJECTION, SOLUTION INTRAVENOUS at 13:13

## 2019-11-05 NOTE — Clinical Note
TRANSFER - OUT REPORT:  
 
Verbal report given to: RN. Report consisted of patient's Situation, Background, Assessment and  
Recommendations(SBAR). Opportunity for questions and clarification was provided. Patient transported with a Registered Nurse. Patient transported to: [id number].

## 2019-11-05 NOTE — TELEPHONE ENCOUNTER
Spoke with patient. Two pt identifiers confirmed. Patient advised per Dr. Ronal Robles that she will need to be seen for her cough before a prescription can be sent to her pharmacy. Patient offered an appointment for today, 11/05/19 with Dr. Ronal Robles. Appointment accepted. Patient advised if anything changes or if unable to keep this appointment to call the office  Pt verbalized understanding of information discussed w/ no further questions at this time.

## 2019-11-05 NOTE — PROGRESS NOTES

## 2019-11-05 NOTE — ED NOTES
TRANSFER - OUT REPORT:    Verbal report given to Juliet HOLLEY on Lester Monreal  being transferred to [facility] for routine progression of care       Report consisted of patients Situation, Background, Assessment and   Recommendations(SBAR). Information from the following report(s) SBAR, Kardex, ED Summary, Intake/Output and MAR was reviewed with the receiving nurse. Lines:   Peripheral IV 11/05/19 Right Antecubital (Active)   Site Assessment Clean, dry, & intact 11/5/2019 11:11 AM   Phlebitis Assessment 0 11/5/2019 11:11 AM   Infiltration Assessment 0 11/5/2019 11:11 AM   Dressing Status Clean, dry, & intact 11/5/2019 11:11 AM   Dressing Type Transparent 11/5/2019 11:11 AM   Hub Color/Line Status Pink 11/5/2019 11:11 AM        Opportunity for questions and clarification was provided.       Patient transported with: transport services

## 2019-11-05 NOTE — ED PROVIDER NOTES
EMERGENCY DEPARTMENT HISTORY AND PHYSICAL EXAM      Date: 11/5/2019  Patient Name: Jhonathan     History of Presenting Illness     Chief Complaint   Patient presents with    Cough     pt arrives by EMS from home with reports of cough x4 weeks and difficulty breathing       History Provided By: Patient and Patient's Daughter    HPI: Jhonathan Nurse, 67 y.o. female presents to the ED with history of hypercholesterolemia, hypertension, Ménière's disease, on Plavix, history of TIA in 2005, sleep apnea, last known well time at 8 PM last night when she went to bed, woke this morning and could not put the coffee pot together, something she does every morning. She spoke to family via phone and they know she was having a hard time getting her words out which is a definitive change from when they saw her over the weekend. She denies any headache, any chest pain any shortness of breath but she herself expresses \"I think I had a TIA this morning\" and has difficulty getting those words out. There are no other complaints, changes, or physical findings at this time. PCP: Chad Starks MD    No current facility-administered medications on file prior to encounter. Current Outpatient Medications on File Prior to Encounter   Medication Sig Dispense Refill    atorvastatin (LIPITOR) 20 mg tablet Take 20 mg by mouth nightly.  clopidogrel (PLAVIX) 75 mg tab Take 75 mg by mouth nightly.  lisinopril (PRINIVIL, ZESTRIL) 2.5 mg tablet Take 2.5 mg by mouth nightly.  primidone (MYSOLINE) 50 mg tablet Take 50 mg by mouth nightly.  multivit/folic acid/vit K1 (ONE-A-DAY WOMEN'S 50 PLUS PO) Take 1 Tab by mouth daily.  fluticasone propionate (FLONASE) 50 mcg/actuation nasal spray 2 Sprays by Both Nostrils route daily. 1 Bottle 0    clonazePAM (KLONOPIN) 0.5 mg tablet TAKE 1 TABLET BY MOUTH TWICE DAILY. MAXIMUM DAILY AMOUNT: 1 MG 60 Tab 2    rOPINIRole (REQUIP) 1 mg tablet Take 1 Tab by mouth nightly.  80 Tab 2    verapamil ER (CALAN-SR) 120 mg tablet Take 1 Tab by mouth nightly. Indications: high blood pressure 90 Tab 1    pantoprazole (PROTONIX) 40 mg tablet Take 1 Tab by mouth every morning. Indications: gastroesophageal reflux disease 90 Tab 1    escitalopram oxalate (LEXAPRO) 20 mg tablet Take 1 Tab by mouth nightly. Indications: Anxiousness associated with Depression 90 Tab 4    nortriptyline (PAMELOR) 10 mg capsule Take 1-2 Caps by mouth nightly. 90 Cap 3    polyethylene glycol (MIRALAX) 17 gram packet Take 17 g by mouth daily as needed (constipation).  [DISCONTINUED] azithromycin (ZITHROMAX) 250 mg tablet Take 2 tablets today, then take 1 tablet daily 6 Tab 0    [DISCONTINUED] lisinopril (PRINIVIL, ZESTRIL) 2.5 mg tablet Take 1 Tab by mouth daily. 90 Tab 2    [DISCONTINUED] atorvastatin (LIPITOR) 20 mg tablet Take 1 Tab by mouth daily. 90 Tab 1    [DISCONTINUED] clopidogrel (PLAVIX) 75 mg tab Take 1 Tab by mouth daily. 90 Tab 1    [DISCONTINUED] varicella-zoster recombinant, PF, (SHINGRIX, PF,) 50 mcg/0.5 mL susr injection 0.5mL by IntraMUSCular route once now and then repeat in 2-6 months 0.5 mL 1    albuterol (PROVENTIL HFA, VENTOLIN HFA, PROAIR HFA) 90 mcg/actuation inhaler Take 1 Puff by inhalation every four (4) hours as needed for Wheezing. 1 Inhaler 0    [DISCONTINUED] primidone (MYSOLINE) 50 mg tablet Take 0.5 Tabs by mouth nightly. (Patient taking differently: Take 50 mg by mouth nightly.) 90 Tab 3    [DISCONTINUED] ferrous sulfate 325 mg (65 mg iron) CpER Take 1 Tab by mouth daily. Past History     Past Medical History:  Past Medical History:   Diagnosis Date    Adverse effect of anesthesia     sleep apnea no cpap machine useage       Anemia     has had iron infusions;  Hem/onc Dr Marylu Olson [phone]    Anxiety and depression     GERD (gastroesophageal reflux disease)     Hypercholesteremia     Hypertension     Limited peripheral vision of right eye     complication of cataract surgery    Long term current use of anticoagulant therapy     Meniere disease     MGUS (monoclonal gammopathy of unknown significance)     Monoclonal gammopathy of unknown significance     hem/onc:  Dr Marte [facility] [phone]    Osteoporosis     Psoriasis     bilateral Legs    Restless leg     Skin melanoma ([facility] [address] 2012    (Rt ankle) and basal cell (Lt eye) removed    Transient ischemic attack approx 2005    HAS HAD 2 MINISTROKES-NO DEFICITS; neuro Dr Xiomy Andrea (cc)    Unspecified adverse effect of anesthesia     HAS NEEDED PORT OR CUTDOWN FOR RECVNG BLOOD OR LONG-TERM IVs; surgery 7/28/15 w/o any difficulty    Unspecified sleep apnea     does not use CPAP       Past Surgical History:  Past Surgical History:   Procedure Laterality Date    HX APPENDECTOMY      HX CATARACT REMOVAL  2011    Right    HX CERVICAL FUSION  1/28/15    C5-7    HX CHOLECYSTECTOMY  2000    HX GI  2010    liver bx due to liver Bx due to elevated enzymes, liver laceration during Bx, hospitalized 10-12d    HX HEENT  7/28/15    Lt total parotidectomy with facial nerve dissection: Dr Sterling Leslie HX HEENT  11/2015    Right parotidectomy    HX HYSTERECTOMY      HX ORTHOPAEDIC  2009 & 2011    LUMBAR FUSION and revision    HX OTHER SURGICAL  2010    MELANOMA (Rt ankle) and basal cell (Lt eye) removed; q6mo ck by DERMATOLOGY    HX SHOULDER REPLACEMENT Left 08/01/2016    HX THYROIDECTOMY      Right    VT COLON CA SCRN NOT HI RSK IND  1/27/2016            Family History:  Family History   Problem Relation Age of Onset    Alcohol abuse Father     Emphysema Father     Cancer Sister         PANCREATIC    Alcohol abuse Brother     COPD Brother     Other Mother         UNKNOWN CAUSE OF DEATH    Heart Disease Mother     Breast Cancer Maternal Aunt     Anesth Problems Neg Hx        Social History:  Social History     Tobacco Use    Smoking status: Former Smoker     Years: 15.00     Last attempt to quit: 7/15/1975     Years since quittin.3    Smokeless tobacco: Never Used   Substance Use Topics    Alcohol use: Yes     Comment: may have a glass of wine on rare occasion    Drug use: Yes     Types: Prescription, OTC       Allergies: Allergies   Allergen Reactions    Aspirin Nausea and Vomiting    Bee Sting [Sting, Bee] Hives and Shortness of Breath     WASP STING    Codeine Nausea and Vomiting    Hydrocodone Rash     And sedation    Morphine Rash    Nsaids (Non-Steroidal Anti-Inflammatory Drug) Nausea and Vomiting    Oxycodone Rash     On her back    Penicillins Hives, Shortness of Breath and Swelling         Review of Systems   Review of Systems   Constitutional: Negative. HENT: Negative. Eyes: Negative for visual disturbance. Respiratory: Negative for chest tightness and shortness of breath. Cardiovascular: Negative for chest pain and leg swelling. Gastrointestinal: Negative for abdominal distention and abdominal pain. Genitourinary: Negative for difficulty urinating. Musculoskeletal: Negative for back pain. Neurological: Positive for speech difficulty and weakness. Negative for dizziness, seizures, facial asymmetry, light-headedness, numbness and headaches. Hematological:        Patient takes Plavix, has not had any recent bleeding or recent surgeries. All other systems reviewed and are negative. Physical Exam   Physical Exam   Vital signs and nursing notes reviewed    CONSTITUTIONAL: Alert, in moderate distress; well-developed; well-nourished. Small, thin build. HEAD:  Normocephalic, atraumatic  EYES: PERRL; EOM's intact. ENTM: Nose: no rhinorrhea; Throat: no erythema or exudate, mucous membranes moist  Neck:  Supple. trachea is midline. No carotid bruits bilaterally. RESP: Chest clear, equal breath sounds. - W/R/R  CV: S1 and S2 WNL; No murmurs, gallops or rubs. 2+ radial and DP pulses bilaterally. Blood pressure 171/96.   GI: non-distended, normal bowel sounds, abdomen soft and non-tender. No masses or organomegaly. : No costo-vertebral angle tenderness. BACK:  Non-tender, normal appearance  UPPER EXT:  Normal inspection. no joint or soft tissue swelling  LOWER EXT: No edema, no calf tenderness. NEURO: Patient performs task correctly, answers questions correctly, no horizontal gaze defect, left arm exhibits slight drift, decreased strength in the left upper extremity as compared to the right, moderate expressive aphasia, intact receptive a aphasia. Mild dysarthria. SKIN: No rashes; Warm and dry  PSYCH: Normal mood, normal affect    Diagnostic Study Results     Labs -     Recent Results (from the past 12 hour(s))   CBC WITH AUTOMATED DIFF    Collection Time: 11/05/19  2:00 PM   Result Value Ref Range    WBC 4.1 3.6 - 11.0 K/uL    RBC 4.03 3.80 - 5.20 M/uL    HGB 12.2 11.5 - 16.0 g/dL    HCT 38.7 35.0 - 47.0 %    MCV 96.0 80.0 - 99.0 FL    MCH 30.3 26.0 - 34.0 PG    MCHC 31.5 30.0 - 36.5 g/dL    RDW 13.1 11.5 - 14.5 %    PLATELET 317 380 - 888 K/uL    MPV 9.6 8.9 - 12.9 FL    NRBC 0.0 0  WBC    ABSOLUTE NRBC 0.00 0.00 - 0.01 K/uL    NEUTROPHILS 54 32 - 75 %    LYMPHOCYTES 35 12 - 49 %    MONOCYTES 8 5 - 13 %    EOSINOPHILS 2 0 - 7 %    BASOPHILS 1 0 - 1 %    IMMATURE GRANULOCYTES 0 0.0 - 0.5 %    ABS. NEUTROPHILS 2.3 1.8 - 8.0 K/UL    ABS. LYMPHOCYTES 1.4 0.8 - 3.5 K/UL    ABS. MONOCYTES 0.3 0.0 - 1.0 K/UL    ABS. EOSINOPHILS 0.1 0.0 - 0.4 K/UL    ABS. BASOPHILS 0.0 0.0 - 0.1 K/UL    ABS. IMM.  GRANS. 0.0 0.00 - 0.04 K/UL    DF AUTOMATED     METABOLIC PANEL, COMPREHENSIVE    Collection Time: 11/05/19  2:00 PM   Result Value Ref Range    Sodium 138 136 - 145 mmol/L    Potassium 4.1 3.5 - 5.1 mmol/L    Chloride 106 97 - 108 mmol/L    CO2 25 21 - 32 mmol/L    Anion gap 7 5 - 15 mmol/L    Glucose 82 65 - 100 mg/dL    BUN 12 6 - 20 MG/DL    Creatinine 0.76 0.55 - 1.02 MG/DL    BUN/Creatinine ratio 16 12 - 20      GFR est AA >60 >60 ml/min/1.73m2    GFR est non-AA >60 >60 ml/min/1.73m2    Calcium 9.0 8.5 - 10.1 MG/DL    Bilirubin, total 0.4 0.2 - 1.0 MG/DL    ALT (SGPT) 19 12 - 78 U/L    AST (SGOT) 13 (L) 15 - 37 U/L    Alk. phosphatase 95 45 - 117 U/L    Protein, total 6.5 6.4 - 8.2 g/dL    Albumin 3.5 3.5 - 5.0 g/dL    Globulin 3.0 2.0 - 4.0 g/dL    A-G Ratio 1.2 1.1 - 2.2     PROTHROMBIN TIME + INR    Collection Time: 11/05/19  2:00 PM   Result Value Ref Range    INR 1.1 0.9 - 1.1      Prothrombin time 10.8 9.0 - 11.1 sec   TROPONIN I    Collection Time: 11/05/19  2:00 PM   Result Value Ref Range    Troponin-I, Qt. <0.05 <0.05 ng/mL   EKG, 12 LEAD, INITIAL    Collection Time: 11/05/19  3:01 PM   Result Value Ref Range    Ventricular Rate 81 BPM    Atrial Rate 81 BPM    P-R Interval 238 ms    QRS Duration 78 ms    Q-T Interval 386 ms    QTC Calculation (Bezet) 448 ms    Calculated P Axis 59 degrees    Calculated R Axis 59 degrees    Calculated T Axis 46 degrees    Diagnosis       Sinus rhythm with 1st degree AV block  When compared with ECG of 13-JUL-2018 12:23,  OK interval has increased         Radiologic Studies -   XR CHEST PA LAT   Final Result   IMPRESSION: No acute abnormality. CTA CODE NEURO HEAD AND NECK W CONT   Final Result   IMPRESSION:   No evidence of thromboembolic occlusion of the left MCA or its distal branches,   however there is a perfusion abnormality characterized by increased mean transit   time and decreased blood flow in the posterior left MCA distribution. Findings discussed by Dr. Feliciano Ross with Dr. Corrine Duong via telephone at 12:40 PM on   11/5/2019. CT PERF W CBF   Final Result   IMPRESSION:   No evidence of thromboembolic occlusion of the left MCA or its distal branches,   however there is a perfusion abnormality characterized by increased mean transit   time and decreased blood flow in the posterior left MCA distribution. Findings discussed by Dr. Feliciano Ross with Dr. Corrine Duong via telephone at 12:40 PM on   11/5/2019.       CT CODE NEURO HEAD WO CONTRAST   Final Result   IMPRESSION: Normal unenhanced CT examination of the head. The findings were called to Dr. Derian Lynne on 11/5/2019 at 1219 hours by myself. [phone]        CT Results  (Last 48 hours)               11/05/19 1228  CTA CODE NEURO HEAD AND NECK W CONT Final result    Impression:  IMPRESSION:   No evidence of thromboembolic occlusion of the left MCA or its distal branches,   however there is a perfusion abnormality characterized by increased mean transit   time and decreased blood flow in the posterior left MCA distribution. Findings discussed by Dr. Beal Done with Dr. Derian Lynne via telephone at 12:40 PM on   11/5/2019. Narrative:  CLINICAL HISTORY: Difficulty speaking. EXAMINATION:  CT ANGIOGRAPHY HEAD AND NECK, CT PERFUSION       COMPARISON: MRI brain March 2, 2017       TECHNIQUE:  Following the uneventful administration of iodinated contrast   material, axial CT angiography of the head and neck was performed. Delayed axial   images through the head were also obtained. Coronal and sagittal reconstructions   were obtained. Manual postprocessing of images was performed. 3-D  Sagittal   maximal intensity projection images were obtained. 3-D Coronal maximal   intensity projections were obtained. CT brain perfusion was performed with   generation of hemodynamic maps of multiple parameters, including cerebral blood   flow, cerebral blood volume, mean transit time, and TMAX. CT dose reduction was   achieved through use of a standardized protocol tailored for this examination   and automatic exposure control for dose modulation. FINDINGS:       Delayed contrast-enhanced head CT:       The ventricles are midline without hydrocephalus. There is no acute intra or   extra-axial hemorrhage. The basal cisterns are clear. The paranasal sinuses are   clear. CTA NECK:       Great vessels: Two-vessel aortic arch with patent origins.        Right subclavian artery: Patent Left subclavian artery: Patent        Right common carotid artery: Patent        Left common carotid artery: Patent        Cervical right internal carotid artery: Mixed plaque with 75% stenosis by NASCET   criteria. Cervical left internal carotid artery: Patent with no significant stenosis by   NASCET criteria. Right vertebral artery: Patent       Left vertebral artery: Patent       Anterior cervical fusion from C5 through C7. Imaged lung apices are clear. Thyroid gland contains a 10 x 6 mm nodule in the right lobe/isthmus (2:32). CTA HEAD:       Right cavernous internal carotid artery: Patent       Left cavernous internal carotid artery: Patent       Anterior cerebral arteries: Patent       Anterior communicating artery: Patent       Right middle cerebral artery: Patent       Left middle cerebral artery: Patent       Posterior communicating arteries: Patent       Posterior cerebral arteries: Patent       Basilar artery: Patent       Distal vertebral arteries: Patent       No evidence for intracranial aneurysm or hemodynamically significant stenosis. CT Perfusion:   There is increased mean transit time, decreased blood flow, and preserved blood   volume in the posterior left MCA distribution. 11/05/19 1228  CT PERF W CBF Final result    Impression:  IMPRESSION:   No evidence of thromboembolic occlusion of the left MCA or its distal branches,   however there is a perfusion abnormality characterized by increased mean transit   time and decreased blood flow in the posterior left MCA distribution. Findings discussed by Dr. Lauri Cardenas with Dr. Nayeli Biggs via telephone at 12:40 PM on   11/5/2019. Narrative:  CLINICAL HISTORY: Difficulty speaking.        EXAMINATION:  CT ANGIOGRAPHY HEAD AND NECK, CT PERFUSION       COMPARISON: MRI brain March 2, 2017       TECHNIQUE:  Following the uneventful administration of iodinated contrast   material, axial CT angiography of the head and neck was performed. Delayed axial   images through the head were also obtained. Coronal and sagittal reconstructions   were obtained. Manual postprocessing of images was performed. 3-D  Sagittal   maximal intensity projection images were obtained. 3-D Coronal maximal   intensity projections were obtained. CT brain perfusion was performed with   generation of hemodynamic maps of multiple parameters, including cerebral blood   flow, cerebral blood volume, mean transit time, and TMAX. CT dose reduction was   achieved through use of a standardized protocol tailored for this examination   and automatic exposure control for dose modulation. FINDINGS:       Delayed contrast-enhanced head CT:       The ventricles are midline without hydrocephalus. There is no acute intra or   extra-axial hemorrhage. The basal cisterns are clear. The paranasal sinuses are   clear. CTA NECK:       Great vessels: Two-vessel aortic arch with patent origins. Right subclavian artery: Patent       Left subclavian artery: Patent        Right common carotid artery: Patent        Left common carotid artery: Patent        Cervical right internal carotid artery: Mixed plaque with 75% stenosis by NASCET   criteria. Cervical left internal carotid artery: Patent with no significant stenosis by   NASCET criteria. Right vertebral artery: Patent       Left vertebral artery: Patent       Anterior cervical fusion from C5 through C7. Imaged lung apices are clear. Thyroid gland contains a 10 x 6 mm nodule in the right lobe/isthmus (2:32).        CTA HEAD:       Right cavernous internal carotid artery: Patent       Left cavernous internal carotid artery: Patent       Anterior cerebral arteries: Patent       Anterior communicating artery: Patent       Right middle cerebral artery: Patent       Left middle cerebral artery: Patent       Posterior communicating arteries: Patent       Posterior cerebral arteries: Patent Basilar artery: Patent       Distal vertebral arteries: Patent       No evidence for intracranial aneurysm or hemodynamically significant stenosis. CT Perfusion:   There is increased mean transit time, decreased blood flow, and preserved blood   volume in the posterior left MCA distribution. 11/05/19 1210  CT CODE NEURO HEAD WO CONTRAST Final result    Impression:  IMPRESSION: Normal unenhanced CT examination of the head. The findings were called to Dr. Fransisco Willis on 11/5/2019 at 1219 hours by myself. 789       Narrative:  EXAM:  CT HEAD WITHOUT CONTRAST   INDICATION: Code stroke. COMPARISON: 1/4/2010. CONTRAST: None. TECHNIQUE: Unenhanced CT of the head was performed using 5 mm images. Brain and   bone windows were generated. Sagittal and coronal reformations were generated. CT dose reduction was achieved through use of a standardized protocol tailored   for this examination and automatic exposure control for dose modulation. FINDINGS:   The ventricles and sulci are normal in size, shape and configuration and   midline. There is no significant white matter disease. The gray-white matter   differentiation is preserved. There is no intracranial hemorrhage. There is no extra-axial collection, mass, mass effect or midline shift. The basilar cisterns are open. No acute infarct is identified. The bone windows demonstrate no abnormalities. The visualized portions of the paranasal sinuses and mastoid air cells are   clear. CXR Results  (Last 48 hours)               11/05/19 1419  XR CHEST PA LAT Final result    Impression:  IMPRESSION: No acute abnormality. Narrative:  EXAM:  XR CHEST PA LAT. INDICATION: Acute cough. COMPARISON: 11/28/2018. FINDINGS:    PA and lateral radiographs of the chest were obtained. The positioning is   lordotic. Lungs: The lungs are clear of mass, nodule, airspace disease or edema. Pleura: There is no pleural effusion or pneumothorax. Mediastinum: The cardiac and mediastinal contours and pulmonary vascularity are   normal.   Bones and soft tissues: There is a left shoulder hemiarthroplasty and a plate   and screws in the cervical spine. Medical Decision Making   I am the first provider for this patient. I reviewed the vital signs, available nursing notes, past medical history, past surgical history, family history and social history. Vital Signs-Reviewed the patient's vital signs. Patient Vitals for the past 12 hrs:   Temp Pulse Resp BP SpO2   11/05/19 1330  77 10 161/62 100 %   11/05/19 1300  79 13 171/78 99 %   11/05/19 1247  81 18  100 %   11/05/19 1200  74 18 168/79 97 %   11/05/19 1130  76 19 110/47 97 %   11/05/19 1042 98.6 °F (37 °C) 75 16  99 %       EKG interpretation: (Preliminary)  EKG performed at 1501 hrs., as interpreted by me shows a sinus rhythm at a rate 81 axis, first-degree AV block, normal QRS, no visible acute ischemic changes. Records Reviewed: Nursing Notes and Old Medical Records    Provider Notes (Medical Decision Making):   78-year-old female with concern for ischemic stroke, outside TPA window, potential for large vessel acute occlusion but likely in M4 artery. Dr. Lafrances Eisenmenger, who reviewed the imaging, does not recommend any mechanical intervention and emergent transfer to [facility] is not warranted. Recommends fluid bolus, laying patient flat, 600 of aspirin rectally and admission here [facility] for further evaluation. ED Course:   Initial assessment performed. The patients presenting problems have been discussed, and they are in agreement with the care plan formulated and outlined with them. I have encouraged them to ask questions as they arise throughout their visit. ED Course as of Nov 05 1505   Tue Nov 05, 2019   1458 Current blood pressure 166/71. Patient laying flat in bed, nurse to continue IV fluids.   Check obtaining EKG, no current chest pain complaint, troponin was negative. [TL]      ED Course User Index  [TL] Reilly Dalal MD         Disposition:  Admit    Admit Note:  1500 hrs  Pt is being admitted by Dr. Natalie Mariscal. The results of their tests and reason(s) for their admission have been discussed with pt and/or available family. They convey agreement and understanding for the need to be admitted and for admission diagnosis. PLAN:  1. Current Discharge Medication List        2. Follow-up Information    None       Return to ED if worse     Diagnosis     Clinical Impression:   1. Ischemic stroke ([address]    2. History of hypertension    3. History of hypercholesterolemia        Attestations:    Dedrick Jin MD    Please note that this dictation was completed with [employer], the computer voice recognition software. Quite often unanticipated grammatical, syntax, homophones, and other interpretive errors are inadvertently transcribed by the computer software. Please disregard these errors. Please excuse any errors that have escaped final proofreading. Thank you.

## 2019-11-05 NOTE — ED TRIAGE NOTES
Pt reports she was seen at [facility] and prescribed antibiotics for cough, reports she had difficulty this morning remembering how to make coffee, pt reports she was not able to talk to her  this morning, having difficulty getting words out

## 2019-11-05 NOTE — H&P
Hospitalist Admission Note    NAME: Shira Rodgers   :  1940   MRN:  [id number]     Date/Time:  2019 5:28 PM    Patient PCP: Lennox Binning, MD  _____________________________________________________________________  Given the patient's current clinical presentation, I have a high level of concern for decompensation if discharged from the emergency department. Complex decision making was performed, which includes reviewing the patient's available past medical records, laboratory results, and x-ray films. My assessment of this patient's clinical condition and my plan of care is as follows. Assessment / Plan:    CVA  Aphasia  Left sided weakness  Right ICA stenosis on CTA  -CTA No evidence of thromboembolic occlusion of the left MCA or its distal branches,  however there is a perfusion abnormality characterized by increased mean transit  time and decreased blood flow in the posterior left MCA distribution. Cervical right internal carotid artery: Mixed plaque with 75% stenosis by NASCET  Criteria. Cervical left internal carotid artery: Patent with no significant stenosis by  NASCET criteria. -Echo pending  -LDL and A1c in AM  -permissive HTN  -NPO. SLP eval in AM  -given ASA supp in ER, reordered plavix for tomorrow unless she fails swallowing screen again, will have to continue ASA supp  -neurology consult in AM  -PT OT eval and treat    HTN  -labetalol prn. Holding meds    RLS  -continue requip and mysoline when able to take PO    Anxiety / Depression  -continue meds    FRED  Meniere's   MGUS    Code Status:   Surrogate Decision Maker:    DVT Prophylaxis:   GI Prophylaxis: not indicated    Baseline:         Subjective:   CHIEF COMPLAINT:   Speech difficulties    HISTORY OF PRESENT ILLNESS:     Shira Rodgers is a 67 y.o.  female with a history of HTN, HLD, TIA, FRED and RLS who presents with new stroke like symptoms.    Patient is the primary caregiver for her  who has dementia. She felt okay when she went to bed at around 8 PM.  She woke up this morning not feeling right. When she tried to make coffee she had difficulty scooping the coffee. It was not until her  came down when she realized she had difficulty speaking. She was able to find her cell phone and called her daughter at work. Her daughter could not understand her and called for EMS. DIL was able to swing by before EMS arrived. Stat CT head was negative. Subsequent CTA head/neck No evidence of thromboembolic occlusion of the left MCA or its distal branches, however there is a perfusion abnormality characterized by increased mean transit time and decreased blood flow in the posterior left MCA distribution. We were asked to admit for work up and evaluation of the above problems. Past Medical History:   Diagnosis Date    Adverse effect of anesthesia     sleep apnea no cpap machine useage       Anemia     has had iron infusions;  Hem/onc Dr Amish Alanis [phone]    Anxiety and depression     GERD (gastroesophageal reflux disease)     Hypercholesteremia     Hypertension     Limited peripheral vision of right eye     complication of cataract surgery    Long term current use of anticoagulant therapy     Meniere disease     MGUS (monoclonal gammopathy of unknown significance)     Monoclonal gammopathy of unknown significance     hem/onc:  Dr Amish Alanis [phone]    Osteoporosis     Psoriasis     bilateral Legs    Restless leg     Skin melanoma ([address] 2012    (Rt ankle) and basal cell (Lt eye) removed    Transient ischemic attack approx 2005    HAS HAD 2 MINISTROKES-NO DEFICITS; neuro Dr Cynthia George (cc)    Unspecified adverse effect of anesthesia     HAS NEEDED PORT OR CUTDOWN FOR RECVNG BLOOD OR LONG-TERM IVs; surgery 7/28/15 w/o any difficulty    Unspecified sleep apnea     does not use CPAP        Past Surgical History:   Procedure Laterality Date    HX APPENDECTOMY      HX CATARACT REMOVAL     Right    HX CERVICAL FUSION  1/28/15    C5-7    HX CHOLECYSTECTOMY  2000    HX GI  2010    liver bx due to liver Bx due to elevated enzymes, liver laceration during Bx, hospitalized 10-12d    HX HEENT  7/28/15    Lt total parotidectomy with facial nerve dissection: Dr Petit Labrador HX HEENT  2015    Right parotidectomy    HX HYSTERECTOMY      HX ORTHOPAEDIC   &     LUMBAR FUSION and revision    HX OTHER SURGICAL  2010    MELANOMA (Rt ankle) and basal cell (Lt eye) removed; q6mo ck by DERMATOLOGY    HX SHOULDER REPLACEMENT Left 2016    HX THYROIDECTOMY      Right    [place] COLON CA SCRN NOT HI RSK IND  2016            Social History     Tobacco Use    Smoking status: Former Smoker     Years: 15.00     Last attempt to quit: 7/15/1975     Years since quittin.3    Smokeless tobacco: Never Used   Substance Use Topics    Alcohol use: Yes     Comment: may have a glass of wine on rare occasion        Family History   Problem Relation Age of Onset    Alcohol abuse Father     Emphysema Father     Cancer Sister         PANCREATIC    Alcohol abuse Brother     COPD Brother     Other Mother         UNKNOWN CAUSE OF DEATH    Heart Disease Mother     Breast Cancer Maternal Aunt     Anesth Problems Neg Hx      Allergies   Allergen Reactions    Aspirin Nausea and Vomiting    Bee Sting [Sting, Bee] Hives and Shortness of Breath     WASP STING    Codeine Nausea and Vomiting    Hydrocodone Rash     And sedation    Morphine Rash    Nsaids (Non-Steroidal Anti-Inflammatory Drug) Nausea and Vomiting    Oxycodone Rash     On her back    Penicillins Hives, Shortness of Breath and Swelling        Prior to Admission medications    Medication Sig Start Date End Date Taking? Authorizing Provider   atorvastatin (LIPITOR) 20 mg tablet Take 20 mg by mouth nightly. Yes Provider, Historical   clopidogrel (PLAVIX) 75 mg tab Take 75 mg by mouth nightly.    Yes Provider, Historical lisinopril (PRINIVIL, ZESTRIL) 2.5 mg tablet Take 2.5 mg by mouth nightly. Yes Provider, Historical   primidone (MYSOLINE) 50 mg tablet Take 50 mg by mouth nightly. Yes Provider, Historical   multivit/folic acid/vit K1 (ONE-A-DAY WOMEN'S 50 PLUS PO) Take 1 Tab by mouth daily. Yes Provider, Historical   fluticasone propionate (FLONASE) 50 mcg/actuation nasal spray 2 Sprays by Both Nostrils route daily. 10/31/19  Yes Stephania Weaver NP   clonazePAM (KLONOPIN) 0.5 mg tablet TAKE 1 TABLET BY MOUTH TWICE DAILY. MAXIMUM DAILY AMOUNT: 1 MG 9/14/19  Yes Vandana Ireland MD   rOPINIRole (REQUIP) 1 mg tablet Take 1 Tab by mouth nightly. 8/19/19  Yes Liseth Edgar MD   verapamil ER (CALAN-SR) 120 mg tablet Take 1 Tab by mouth nightly. Indications: high blood pressure 6/4/19  Yes Vandana Ireland MD   pantoprazole (PROTONIX) 40 mg tablet Take 1 Tab by mouth every morning. Indications: gastroesophageal reflux disease 6/4/19  Yes Liseth Edgar MD   escitalopram oxalate (LEXAPRO) 20 mg tablet Take 1 Tab by mouth nightly. Indications: Anxiousness associated with Depression 6/4/19  Yes Liseth Edgar MD   nortriptyline (PAMELOR) 10 mg capsule Take 1-2 Caps by mouth nightly. 6/4/19  Yes Liseth Edgar MD   polyethylene glycol (MIRALAX) 17 gram packet Take 17 g by mouth daily as needed (constipation). Yes Provider, Historical   albuterol (PROVENTIL HFA, VENTOLIN HFA, PROAIR HFA) 90 mcg/actuation inhaler Take 1 Puff by inhalation every four (4) hours as needed for Wheezing.  7/13/18   Daniel Betancourt MD       REVIEW OF SYSTEMS:       Total of 12 systems reviewed as follows:       POSITIVE= underlined text  Negative = text not underlined  General:  fever, chills, sweats, generalized weakness, weight loss/gain,      loss of appetite   Eyes:    blurred vision, eye pain, loss of vision, double vision  ENT:    rhinorrhea, pharyngitis   Respiratory:   cough, sputum production, SOB, HERNANDES, wheezing, pleuritic pain   Cardiology:   chest pain, palpitations, orthopnea, PND, edema, syncope   Gastrointestinal:  abdominal pain , N/V, diarrhea, dysphagia, constipation, bleeding   Genitourinary:  frequency, urgency, dysuria, hematuria, incontinence   Muskuloskeletal :  arthralgia, myalgia, back pain  Hematology:  easy bruising, nose or gum bleeding, lymphadenopathy   Dermatological: rash, ulceration, pruritis, color change / jaundice  Endocrine:   hot flashes or polydipsia   Neurological:  headache, dizziness, confusion, focal weakness, paresthesia,     Speech difficulties, memory loss, gait difficulty  Psychological: Feelings of anxiety, depression, agitation    Objective:   VITALS:    Visit Vitals  /71   Pulse 81   Temp 98.4 °F (36.9 °C)   Resp 15   Ht 5' 2\" (1.575 m)   Wt 68 kg (150 lb)   SpO2 99%   BMI 27.44 kg/m²       PHYSICAL EXAM:    General:    Alert, cooperative, no distress, appears stated age. HEENT: Atraumatic, anicteric sclerae, pink conjunctivae     No oral ulcers, mucosa moist, throat clear, dentition fair  Neck:  Supple, symmetrical,  thyroid: non tender  Lungs:   Clear to auscultation bilaterally. No Wheezing or Rhonchi. No rales. Chest wall:  No tenderness  No Accessory muscle use. Heart:   Regular  rhythm,  No  murmur   No edema  Abdomen:   Soft, non-tender. Not distended. Bowel sounds normal  Extremities: No cyanosis. No clubbing,  Skin turgor normal, Capillary refill normal, Radial dial pulse 2+  Skin:     Not pale. Not Jaundiced  No rashes   Psych:  Fair insight. Not depressed. Not anxious or agitated. Neurologic: EOMs intact. No facial asymmetry. (+) expressive aphasia (+) LUE 3-4/5, LLE 3/5, Sensation poor due to history of neuropathy. Alert, follows commands.       _______________________________________________________________________  Care Plan discussed with:    Comments   Patient x  limited by aphasia   Family  x  daughter   RN     Care Manager Consultant:      _______________________________________________________________________  Expected  Disposition:   Home with Family    HH/PT/OT/RN    SNF/LTC    JHONNY x   ________________________________________________________________________  TOTAL TIME:    Minutes    Critical Care Provided     Minutes non procedure based      Comments    x Reviewed previous records   >50% of visit spent in counseling and coordination of care  Discussion with patient and/or family and questions answered       Given the patient's current clinical presentation, I have a high level of concern for decompensation if discharged from the ED. Complex decision making was performed which includes reviewing the patient's available past medical records, laboratory results, and Xray films. I have also directly communicated my plan and discussed this case with the involved ED physician.     ____________________________________________________________________  Siddharth Ribeiro MD    Procedures: see electronic medical records for all procedures/Xrays and details which were not copied into this note but were reviewed prior to creation of Plan. LAB DATA REVIEWED:    Recent Results (from the past 24 hour(s))   CBC WITH AUTOMATED DIFF    Collection Time: 11/05/19  2:00 PM   Result Value Ref Range    WBC 4.1 3.6 - 11.0 K/uL    RBC 4.03 3.80 - 5.20 M/uL    HGB 12.2 11.5 - 16.0 g/dL    HCT 38.7 35.0 - 47.0 %    MCV 96.0 80.0 - 99.0 FL    MCH 30.3 26.0 - 34.0 PG    MCHC 31.5 30.0 - 36.5 g/dL    RDW 13.1 11.5 - 14.5 %    PLATELET 442 496 - 866 K/uL    MPV 9.6 8.9 - 12.9 FL    NRBC 0.0 0  WBC    ABSOLUTE NRBC 0.00 0.00 - 0.01 K/uL    NEUTROPHILS 54 32 - 75 %    LYMPHOCYTES 35 12 - 49 %    MONOCYTES 8 5 - 13 %    EOSINOPHILS 2 0 - 7 %    BASOPHILS 1 0 - 1 %    IMMATURE GRANULOCYTES 0 0.0 - 0.5 %    ABS. NEUTROPHILS 2.3 1.8 - 8.0 K/UL    ABS. LYMPHOCYTES 1.4 0.8 - 3.5 K/UL    ABS. MONOCYTES 0.3 0.0 - 1.0 K/UL    ABS.  EOSINOPHILS 0.1 0.0 - 0.4 K/UL    ABS. BASOPHILS 0.0 0.0 - 0.1 K/UL    ABS. IMM. GRANS. 0.0 0.00 - 0.04 K/UL    DF AUTOMATED     METABOLIC PANEL, COMPREHENSIVE    Collection Time: 11/05/19  2:00 PM   Result Value Ref Range    Sodium 138 136 - 145 mmol/L    Potassium 4.1 3.5 - 5.1 mmol/L    Chloride 106 97 - 108 mmol/L    CO2 25 21 - 32 mmol/L    Anion gap 7 5 - 15 mmol/L    Glucose 82 65 - 100 mg/dL    BUN 12 6 - 20 MG/DL    Creatinine 0.76 0.55 - 1.02 MG/DL    BUN/Creatinine ratio 16 12 - 20      GFR est AA >60 >60 ml/min/1.73m2    GFR est non-AA >60 >60 ml/min/1.73m2    Calcium 9.0 8.5 - 10.1 MG/DL    Bilirubin, total 0.4 0.2 - 1.0 MG/DL    ALT (SGPT) 19 12 - 78 U/L    AST (SGOT) 13 (L) 15 - 37 U/L    Alk.  phosphatase 95 45 - 117 U/L    Protein, total 6.5 6.4 - 8.2 g/dL    Albumin 3.5 3.5 - 5.0 g/dL    Globulin 3.0 2.0 - 4.0 g/dL    A-G Ratio 1.2 1.1 - 2.2     PROTHROMBIN TIME + INR    Collection Time: 11/05/19  2:00 PM   Result Value Ref Range    INR 1.1 0.9 - 1.1      Prothrombin time 10.8 9.0 - 11.1 sec   TROPONIN I    Collection Time: 11/05/19  2:00 PM   Result Value Ref Range    Troponin-I, Qt. <0.05 <0.05 ng/mL   EKG, 12 LEAD, INITIAL    Collection Time: 11/05/19  3:01 PM   Result Value Ref Range    Ventricular Rate 81 BPM    Atrial Rate 81 BPM    P-R Interval 238 ms    QRS Duration 78 ms    Q-T Interval 386 ms    QTC Calculation (Bezet) 448 ms    Calculated P Axis 59 degrees    Calculated R Axis 59 degrees    Calculated T Axis 46 degrees    Diagnosis       Sinus rhythm with 1st degree AV block  When compared with ECG of 13-JUL-2018 12:23,  TX interval has increased

## 2019-11-05 NOTE — PROGRESS NOTES
Pharmacy Clarification of Prior to Admission Medication Regimen     The patient was interviewed regarding clarification of the prior to admission medication regimen. Children and  were present in room and obtained permission from patient to discuss drug regimen with visitor(s) present. Patient was questioned regarding use of any other inhalers, topical products, over the counter medications, herbal medications, vitamin products or ophthalmic/nasal/otic medication use. Information Obtained From: Patient, RX Query    Pertinent Pharmacy Findings:  albuterol (PROVENTIL HFA, VENTOLIN HFA, PROAIR HFA) 90 mcg/actuation inhaler: Patient stated she has this PRN agent at home, but was unable to stated the last administration    PTA medication list was corrected to the following:     Prior to Admission Medications   Prescriptions Last Dose Informant Patient Reported? Taking? albuterol (PROVENTIL HFA, VENTOLIN HFA, PROAIR HFA) 90 mcg/actuation inhaler Not Taking at Unknown time Self No No   Sig: Take 1 Puff by inhalation every four (4) hours as needed for Wheezing. atorvastatin (LIPITOR) 20 mg tablet 2019 at Unknown time Self Yes Yes   Sig: Take 20 mg by mouth nightly. clonazePAM (KLONOPIN) 0.5 mg tablet 2019 at Unknown time Self No Yes   Sig: TAKE 1 TABLET BY MOUTH TWICE DAILY. MAXIMUM DAILY AMOUNT: 1 MG   clopidogrel (PLAVIX) 75 mg tab 2019 at Unknown time Self Yes Yes   Sig: Take 75 mg by mouth nightly. escitalopram oxalate (LEXAPRO) 20 mg tablet 2019 at Unknown time Self No Yes   Sig: Take 1 Tab by mouth nightly. Indications: Anxiousness associated with Depression   fluticasone propionate (FLONASE) 50 mcg/actuation nasal spray 2019 at Unknown time Self No Yes   Si Sprays by Both Nostrils route daily. lisinopril (PRINIVIL, ZESTRIL) 2.5 mg tablet 2019 at Unknown time Self Yes Yes   Sig: Take 2.5 mg by mouth nightly.    multivit/folic acid/vit K1 (ONE-A-DAY WOMEN'S 50 PLUS PO) 11/4/2019 at Unknown time Self Yes Yes   Sig: Take 1 Tab by mouth daily. nortriptyline (PAMELOR) 10 mg capsule 11/4/2019 at Unknown time Self No Yes   Sig: Take 1-2 Caps by mouth nightly. pantoprazole (PROTONIX) 40 mg tablet 11/4/2019 at Unknown time Self No Yes   Sig: Take 1 Tab by mouth every morning. Indications: gastroesophageal reflux disease   polyethylene glycol (MIRALAX) 17 gram packet 11/1/2019 at Unknown time Self Yes Yes   Sig: Take 17 g by mouth daily as needed (constipation). primidone (MYSOLINE) 50 mg tablet 11/4/2019 at Unknown time Self Yes Yes   Sig: Take 50 mg by mouth nightly. rOPINIRole (REQUIP) 1 mg tablet 11/4/2019 at Unknown time Self No Yes   Sig: Take 1 Tab by mouth nightly. verapamil ER (CALAN-SR) 120 mg tablet 11/4/2019 at Unknown time Self No Yes   Sig: Take 1 Tab by mouth nightly.  Indications: high blood pressure      Facility-Administered Medications: None          Thank you,  Shelly Anaya CPhT  Medication History Pharmacy Technician

## 2019-11-05 NOTE — PROGRESS NOTES
Reason for Admission:   Ischemic stroke                   RRAT Score:          11 low risk           Plan for utilizing home health:      Has used home health in the past                    Current Advanced Directive/Advance Care Plan: On file 3/2019                         Transition of Care Plan:                      1  Patient in ED bed waiting for inpatient admission  2. Patient will need 2nd IM letter at discharge  3. Patient prefers to discharge home with family assistance and follow up appointments. 4.  Patient is primary caregiver for her  and prefers home therapy if ordered and possible    Patient is a 67year old female admitted 11/5 for an ischemic stroke. Patient alert and oriented, resting in bed with daughter in law present in room. Demographic information verified and correct. Insurance information verified and correct. Patient lives with her  and is his primary caregiver, no home oxygen, no DME, has used home health in the past.  Patient uses [address] [place] [address]. Patient states she has been independent with all ADLs and can drive. Patient's family can transport at discharge    Care Management Interventions  PCP Verified by CM: Yes(Vandana Redmond MD)  Mode of Transport at Discharge:  Other (see comment)(pt's family can transport at [email or web address])  Transition of Care Consult (CM Consult): Discharge Planning  Discharge Durable Medical Equipment: No  Physical Therapy Consult: No  Occupational Therapy Consult: No  Speech Therapy Consult: Yes  Current Support Network: Lives with Spouse, Own Home(4 story house, 0 steps to enter)  Confirm Follow Up Transport: Family  Plan discussed with Pt/Family/Caregiver: Yes  Discharge Location  Discharge Placement: [address], RN, [address] [facility]  [phone]

## 2019-11-06 ENCOUNTER — APPOINTMENT (OUTPATIENT)
Dept: NON INVASIVE DIAGNOSTICS | Age: 79
DRG: 041 | End: 2019-11-06
Attending: INTERNAL MEDICINE
Payer: MEDICARE

## 2019-11-06 PROBLEM — M96.1 LUMBAR POST-LAMINECTOMY SYNDROME: Status: ACTIVE | Noted: 2019-11-06

## 2019-11-06 LAB
ATRIAL RATE: 81 BPM
CALCULATED P AXIS, ECG09: 59 DEGREES
CALCULATED R AXIS, ECG10: 59 DEGREES
CALCULATED T AXIS, ECG11: 46 DEGREES
CHOLEST SERPL-MCNC: 186 MG/DL
DIAGNOSIS, 93000: NORMAL
ECHO AV CUSP MM: 1.61 CM
ECHO LV EDV TEICHHOLZ: 42.25 ML
ECHO LV ESV TEICHHOLZ: 18.37 ML
ECHO LV INTERNAL DIMENSION DIASTOLIC: 4.07 CM (ref 3.9–5.3)
ECHO LV INTERNAL DIMENSION SYSTOLIC: 2.88 CM
ECHO LV IVSD: 1.26 CM (ref 0.6–0.9)
ECHO LV IVSS: 1.48 CM
ECHO LV MASS 2D: 217.1 G (ref 67–162)
ECHO LV MASS INDEX 2D: 128.4 G/M2 (ref 43–95)
ECHO LV POSTERIOR WALL DIASTOLIC: 1.29 CM (ref 0.6–0.9)
ECHO LV POSTERIOR WALL SYSTOLIC: 1.5 CM
ECHO MV A VELOCITY: 88.26 CM/S
ECHO MV E DECELERATION TIME (DT): 188 MS
ECHO MV E VELOCITY: 87.03 CM/S
ECHO MV E/A RATIO: 0.99
ECHO PV MAX VELOCITY: 104.79 CM/S
ECHO PV PEAK GRADIENT: 4.4 MMHG
EST. AVERAGE GLUCOSE BLD GHB EST-MCNC: 105 MG/DL
HBA1C MFR BLD: 5.3 % (ref 4.2–6.3)
HDLC SERPL-MCNC: 70 MG/DL
HDLC SERPL: 2.7 {RATIO} (ref 0–5)
LDLC SERPL CALC-MCNC: 79.2 MG/DL (ref 0–100)
LIPID PROFILE,FLP: ABNORMAL
LVFS 2D: 29.2 %
LVSV (TEICH): 23.88 ML
MV DEC SLOPE: 4.72
P-R INTERVAL, ECG05: 238 MS
Q-T INTERVAL, ECG07: 386 MS
QRS DURATION, ECG06: 78 MS
QTC CALCULATION (BEZET), ECG08: 448 MS
TRIGL SERPL-MCNC: 184 MG/DL (ref ?–150)
TSH SERPL DL<=0.05 MIU/L-ACNC: 2.43 UIU/ML (ref 0.36–3.74)
VENTRICULAR RATE, ECG03: 81 BPM
VLDLC SERPL CALC-MCNC: 36.8 MG/DL

## 2019-11-06 PROCEDURE — 0JH632Z INSERTION OF MONITORING DEVICE INTO CHEST SUBCUTANEOUS TISSUE AND FASCIA, PERCUTANEOUS APPROACH: ICD-10-PCS | Performed by: INTERNAL MEDICINE

## 2019-11-06 PROCEDURE — 97166 OT EVAL MOD COMPLEX 45 MIN: CPT

## 2019-11-06 PROCEDURE — 74011250637 HC RX REV CODE- 250/637: Performed by: HOSPITALIST

## 2019-11-06 PROCEDURE — 97535 SELF CARE MNGMENT TRAINING: CPT

## 2019-11-06 PROCEDURE — 74011250637 HC RX REV CODE- 250/637: Performed by: INTERNAL MEDICINE

## 2019-11-06 PROCEDURE — 33285 INSJ SUBQ CAR RHYTHM MNTR: CPT | Performed by: INTERNAL MEDICINE

## 2019-11-06 PROCEDURE — 84443 ASSAY THYROID STIM HORMONE: CPT

## 2019-11-06 PROCEDURE — 97530 THERAPEUTIC ACTIVITIES: CPT | Performed by: PHYSICAL THERAPIST

## 2019-11-06 PROCEDURE — 65660000000 HC RM CCU STEPDOWN

## 2019-11-06 PROCEDURE — 93306 TTE W/DOPPLER COMPLETE: CPT

## 2019-11-06 PROCEDURE — 97116 GAIT TRAINING THERAPY: CPT | Performed by: PHYSICAL THERAPIST

## 2019-11-06 PROCEDURE — 74011250636 HC RX REV CODE- 250/636: Performed by: INTERNAL MEDICINE

## 2019-11-06 PROCEDURE — 83036 HEMOGLOBIN GLYCOSYLATED A1C: CPT

## 2019-11-06 PROCEDURE — 92610 EVALUATE SWALLOWING FUNCTION: CPT

## 2019-11-06 PROCEDURE — C1764 EVENT RECORDER, CARDIAC: HCPCS | Performed by: INTERNAL MEDICINE

## 2019-11-06 PROCEDURE — 80061 LIPID PANEL: CPT

## 2019-11-06 PROCEDURE — 36415 COLL VENOUS BLD VENIPUNCTURE: CPT

## 2019-11-06 PROCEDURE — 97161 PT EVAL LOW COMPLEX 20 MIN: CPT | Performed by: PHYSICAL THERAPIST

## 2019-11-06 PROCEDURE — 94760 N-INVAS EAR/PLS OXIMETRY 1: CPT

## 2019-11-06 DEVICE — RECORDER CARD MON REVEAL LINQ MYCARELINK IMPL LINQSYS: Type: IMPLANTABLE DEVICE | Status: FUNCTIONAL

## 2019-11-06 RX ORDER — ATORVASTATIN CALCIUM 40 MG/1
40 TABLET, FILM COATED ORAL
Status: DISCONTINUED | OUTPATIENT
Start: 2019-11-06 | End: 2019-11-11 | Stop reason: HOSPADM

## 2019-11-06 RX ADMIN — ROPINIROLE HYDROCHLORIDE 1 MG: 1 TABLET, FILM COATED ORAL at 20:40

## 2019-11-06 RX ADMIN — ENOXAPARIN SODIUM 40 MG: 40 INJECTION SUBCUTANEOUS at 17:40

## 2019-11-06 RX ADMIN — ESCITALOPRAM OXALATE 20 MG: 10 TABLET ORAL at 20:42

## 2019-11-06 RX ADMIN — SODIUM CHLORIDE 75 ML/HR: 900 INJECTION, SOLUTION INTRAVENOUS at 08:07

## 2019-11-06 RX ADMIN — PRIMIDONE 50 MG: 50 TABLET ORAL at 20:44

## 2019-11-06 RX ADMIN — CLOPIDOGREL BISULFATE 75 MG: 75 TABLET ORAL at 20:43

## 2019-11-06 RX ADMIN — ATORVASTATIN CALCIUM 40 MG: 40 TABLET, FILM COATED ORAL at 20:42

## 2019-11-06 NOTE — PROGRESS NOTES
Speech Pathology bedside swallow evaluation/discharge  Patient: Cassie Baldwin (27 y.o. female)  Date: 11/6/2019  Primary Diagnosis: CVA (cerebral vascular accident) [facility]) [I63.9]       Precautions:        ASSESSMENT :  Based on the objective data described below, the patient presents with functional swallow of all textures. She has no significant oral motor weakness. She tolerated purees, thins and solids with no choking. Very pleasant. Does not like eggs. Ordering breakfast with help of her daughter in law. PT/OT came in to eval. .  Skilled acute therapy provided by a speech-language pathologist is not indicated at this time. PLAN :  Recommendations:  Recommend reg/thins   Language eval is needed. Has some word finding  Discharge Recommendations: To Be Determined     SUBJECTIVE:   Patient is very nice and [place]. OBJECTIVE:     Past Medical History:   Diagnosis Date    Adverse effect of anesthesia     sleep apnea no cpap machine useage       Anemia     has had iron infusions;  Hem/onc Dr Maira Rock [phone]    Anxiety and depression     GERD (gastroesophageal reflux disease)     Hypercholesteremia     Hypertension     Limited peripheral vision of right eye     complication of cataract surgery    Long term current use of anticoagulant therapy     Meniere disease     MGUS (monoclonal gammopathy of unknown significance)     Monoclonal gammopathy of unknown significance     hem/onc:  Dr Maira Rock [phone]    Osteoporosis     Psoriasis     bilateral Legs    Restless leg     Skin melanoma ([address] 2012    (Rt ankle) and basal cell (Lt eye) removed    Transient ischemic attack approx 2005    HAS HAD 2 MINISTROKES-NO DEFICITS; neuro Dr Jean Dodd (cc)    Unspecified adverse effect of anesthesia     HAS NEEDED PORT OR CUTDOWN FOR RECVNG BLOOD OR LONG-TERM IVs; surgery 7/28/15 w/o any difficulty    Unspecified sleep apnea     does not use CPAP     Past Surgical History:   Procedure Laterality Date    HX APPENDECTOMY      HX CATARACT REMOVAL  2011    Right    HX CERVICAL FUSION  1/28/15    C5-7    HX CHOLECYSTECTOMY  2000    HX GI  2010    liver bx due to liver Bx due to elevated enzymes, liver laceration during Bx, hospitalized 10-12d    HX HEENT  7/28/15    Lt total parotidectomy with facial nerve dissection: Dr Kaushal Hinds HX HEENT  11/2015    Right parotidectomy    HX HYSTERECTOMY      HX ORTHOPAEDIC  2009 & 2011    LUMBAR FUSION and revision    HX OTHER SURGICAL  2010    MELANOMA (Rt ankle) and basal cell (Lt eye) removed; q6mo ck by DERMATOLOGY    HX SHOULDER REPLACEMENT Left 08/01/2016    HX THYROIDECTOMY      Right    [place] COLON CA SCRN NOT HI RSK IND  1/27/2016          Prior Level of Function/Home Situation: she is the caregiver for her spouse who has dementia. independent  Home Situation  Home Environment: Private residence  One/Two Story Residence: (P) Other (Comment)  Lift Chair Available: No  Living Alone: No  Support Systems: Family member(s)  Patient Expects to be Discharged to[de-identified] Private residence  Current DME Used/Available at Home: None  Diet prior to admission: reg/thins  Current Diet:  NPO   Cognitive and Communication Status:  Neurologic State: Alert  Orientation Level: Oriented to person, Oriented to place, Oriented to situation, Oriented to time, Oriented X4  Cognition: Appropriate decision making, Follows commands  Perception: Appears intact  Perseveration: No perseveration noted     Oral Assessment:  Oral Assessment  Labial: No impairment  Dentition: Natural;Extractions  Lingual: No impairment  Velum: Unable to visualize  Mandible: No impairment  P.O. Trials:     Vocal quality prior to P.O.: Hoarse(for 4 weeks per pt)  Consistency Presented: Thin liquid; Solid;Puree  How Presented: Self-fed/presented;Cup/sip     Bolus Acceptance: No impairment  Bolus Formation/Control: Impaired  Type of Impairment: Delayed  Propulsion: Delayed (# of seconds)  Oral Residue: None  Initiation of Swallow: No impairment  Laryngeal Elevation: Functional  Aspiration Signs/Symptoms: None  Pharyngeal Phase Characteristics: No impairment, issues, or problems              Oral Phase Severity: Mild  Pharyngeal Phase Severity : No impairment  NOMS:   The NOMS functional outcome measure was used to quantify this patient's level of swallowing impairment. Based on the NOMS, the patient was determined to be at level 6 for swallow function     NOMS Swallowing Levels:  Level 1 (CN): NPO  Level 2 (CM): NPO but takes consistency in therapy  Level 3 (CL): Takes less than 50% of nutrition p.o. and continues with nonoral feedings; and/or safe with mod cues; and/or max diet restriction  Level 4 (CK): Safe swallow but needs mod cues; and/or mod diet restriction; and/or still requires some nonoral feeding/supplements  Level 5 (CJ): Safe swallow with min diet restriction; and/or needs min cues  Level 6 (CI): Independent with p.o.; rare cues; usually self cues; may need to avoid some foods or needs extra time  Level 7 ([address] [place] [address]): Independent for all p.o.  WILIAN. (2003). National Outcomes Measurement System (NOMS): Adult Speech-Language Pathology User's Guide. Pain:  Pain Scale 1: Numeric (0 - 10)  Pain Intensity 1: 0     After treatment:   [] Patient left in no apparent distress sitting up in chair  [x] Patient left in no apparent distress in bed  [x] Call bell left within reach  [x] Nursing notified  [x] Caregiver present  [] Bed alarm activated    COMMUNICATION/EDUCATION:   The patients plan of care including findings, recommendations, and recommended diet changes were discussed with: Registered Nurse. The pt and her spouse were educated that she is tolerating all textures well and a regular diet will be ordered. [] Posted safety precautions in patient's room. [x] Patient/family have participated as able and agree with findings and recommendations. [] Patient is unable to participate in plan of care at this time.     Thank you for this referral.  Nichole Ferris, SLP  Time Calculation: 10 mins

## 2019-11-06 NOTE — PROGRESS NOTES
* No surgery found *  * No surgery found *  Bedside and Verbal shift change report given to Mariza Betancourt (oncoming nurse) by Leticia Arriola RN (offgoing nurse). Report included the following information SBAR, Kardex, Recent Results, Med Rec Status and Cardiac Rhythm SR. [facility] Phone:   [phone]      Significant changes during shift:  Admission, MRI done        Patient Information    Shazia Boss  67 y.o.  11/5/2019 10:34 AM by Roger Ceballos MD. Shazia Boss was admitted from Home    Problem List    Patient Active Problem List    Diagnosis Date Noted    CVA (cerebral vascular accident) ([facility] [address] 11/05/2019    Ulnar neuropathy at elbow of right upper extremity 07/25/2018    Cervical post-laminectomy syndrome 06/07/2018    Idiopathic small and large fiber sensory neuropathy 06/07/2018    Lumbar back pain with radiculopathy affecting left lower extremity 06/07/2018    Lumbar back pain with radiculopathy affecting right lower extremity 06/07/2018    Vitamin D deficiency 06/07/2018    Cervical radiculopathy due to degenerative joint disease of spine 06/07/2018    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    Stenosis of both internal carotid arteries 03/13/2017    Benign essential tremor syndrome 02/21/2017    Tension vascular headache 02/21/2017    Hyperthyroidism 02/21/2017    Osteoporosis 08/31/2016    MGUS (monoclonal gammopathy of unknown significance) 10/04/2013    Chronic tension headaches 06/25/2013    Falls 06/25/2013    Lumbar stenosis 08/03/2011     Past Medical History:   Diagnosis Date    Adverse effect of anesthesia     sleep apnea no cpap machine useage       Anemia     has had iron infusions;  Hem/onc Dr Audi Amezquita [phone]    Anxiety and depression     GERD (gastroesophageal reflux disease)     Hypercholesteremia     Hypertension     Limited peripheral vision of right eye     complication of cataract surgery    Long term current use of anticoagulant therapy     Meniere disease     MGUS (monoclonal gammopathy of unknown significance)     Monoclonal gammopathy of unknown significance     hem/onc:  Dr Alvaren[facility] [phone]    Osteoporosis     Psoriasis     bilateral Legs    Restless leg     Skin melanoma ([facility] [address] 2012    (Rt ankle) and basal cell (Lt eye) removed    Transient ischemic attack approx 2005    HAS HAD 2 MINISTROKES-NO DEFICITS; neuro Dr Russell Oliveira (cc)    Unspecified adverse effect of anesthesia     HAS NEEDED PORT OR CUTDOWN FOR RECVNG BLOOD OR LONG-TERM IVs; surgery 7/28/15 w/o any difficulty    Unspecified sleep apnea     does not use CPAP         Core Measures:    CVA: Yes Yes  CHF:No No  PNA:No No    Post Op Surgical (If Applicable):     Number times ambulated in hallway past shift:  0  Number of times OOB to chair past shift:   0  NG Tube: No  Incentive Spirometer: No  Drains: No   Volume  0  Dressing Present:  No  Flatus:  Not applicable    Activity Status:    OOB to Chair No  Ambulated this shift No   Bed Rest Yes    Supplemental O2: (If Applicable)    NC No  NRB No  Venti-mask No  On 0 Liters/min      LINES AND DRAINS:    Central Line? No   PICC LINE? No   Urinary Catheter? No   DVT prophylaxis:    DVT prophylaxis Med- Yes  DVT prophylaxis SCD or BRIT- No     Wounds: (If Applicable)    Wounds- No    Location 0    Patient Safety:    Falls Score Total Score: 2  Safety Level_______  Bed Alarm On? Yes  Sitter?  No    Plan for upcoming shift: safety, neurochecks, neuro consult in AM, echo in AM        Discharge Plan: Yes TBD    Active Consults:  IP CONSULT TO HOSPITALIST  IP CONSULT TO NEUROLOGY

## 2019-11-06 NOTE — PROGRESS NOTES
* No surgery found *  * No surgery found *  Bedside and Verbal shift change report given to Archana Jovita Bro (oncoming nurse) by Venus Melton (offgoing nurse). Report included the following information SBAR, Kardex, Recent Results, Med Rec Status and Cardiac Rhythm SR. [facility] Phone:   [phone]      Significant changes during shift:  Patient complained of hallucination this AM. MD Bonnie Delgado in room with pt. Patient Information    Daniela Lynn  67 y.o.  11/5/2019 10:34 AM by Migel Castro MD. Daniela Lynn was admitted from Home    Problem List    Patient Active Problem List    Diagnosis Date Noted    CVA (cerebral vascular accident) ([facility] [address] 11/05/2019    Ulnar neuropathy at elbow of right upper extremity 07/25/2018    Cervical post-laminectomy syndrome 06/07/2018    Idiopathic small and large fiber sensory neuropathy 06/07/2018    Lumbar back pain with radiculopathy affecting left lower extremity 06/07/2018    Lumbar back pain with radiculopathy affecting right lower extremity 06/07/2018    Vitamin D deficiency 06/07/2018    Cervical radiculopathy due to degenerative joint disease of spine 06/07/2018    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    Stenosis of both internal carotid arteries 03/13/2017    Benign essential tremor syndrome 02/21/2017    Tension vascular headache 02/21/2017    Hyperthyroidism 02/21/2017    Osteoporosis 08/31/2016    MGUS (monoclonal gammopathy of unknown significance) 10/04/2013    Chronic tension headaches 06/25/2013    Falls 06/25/2013    Lumbar stenosis 08/03/2011     Past Medical History:   Diagnosis Date    Adverse effect of anesthesia     sleep apnea no cpap machine useage       Anemia     has had iron infusions;  Hem/onc Dr Ayaz Belle [phone]    Anxiety and depression     GERD (gastroesophageal reflux disease)     Hypercholesteremia     Hypertension     Limited peripheral vision of right eye     complication of cataract surgery    Long term current use of anticoagulant therapy  Meniere disease     MGUS (monoclonal gammopathy of unknown significance)     Monoclonal gammopathy of unknown significance     hem/onc:  Dr Stark Prime [phone]    Osteoporosis     Psoriasis     bilateral Legs    Restless leg     Skin melanoma ([facility] [address] 2012    (Rt ankle) and basal cell (Lt eye) removed    Transient ischemic attack approx 2005    HAS HAD 2 MINISTROKES-NO DEFICITS; neuro Dr Taiwo Combs (cc)    Unspecified adverse effect of anesthesia     HAS NEEDED PORT OR CUTDOWN FOR RECVNG BLOOD OR LONG-TERM IVs; surgery 7/28/15 w/o any difficulty    Unspecified sleep apnea     does not use CPAP         Core Measures:    CVA: Yes Yes  CHF:No No  PNA:No No    Post Op Surgical (If Applicable):     Number times ambulated in hallway past shift:  0  Number of times OOB to chair past shift:   0  NG Tube: No  Incentive Spirometer: No  Drains: No   Volume  0  Dressing Present:  No  Flatus:  Not applicable    Activity Status:    OOB to Chair No  Ambulated this shift No   Bed Rest Yes    Supplemental O2: (If Applicable)    NC No  NRB No  Venti-mask No  On 0 Liters/min      LINES AND DRAINS:    Central Line? No   PICC LINE? No   Urinary Catheter? No   DVT prophylaxis:    DVT prophylaxis Med- Yes  DVT prophylaxis SCD or BRIT- No     Wounds: (If Applicable)    Wounds- No    Location 0    Patient Safety:    Falls Score Total Score: 2  Safety Level_______  Bed Alarm On? Yes  Sitter?  No    Plan for upcoming shift: safety, neurochecks, neuro consult in AM, echo in AM        Discharge Plan: Yes TBD    Active Consults:  IP CONSULT TO HOSPITALIST  IP CONSULT TO NEUROLOGY

## 2019-11-06 NOTE — CONSULTS
Consult  REFERRED BY:  Richmond Bal MD    CHIEF COMPLAINT: Right-sided weakness and difficulty with speech      Subjective: Monica Freedman is a 67 y.o. right-handed  female we are asked to evaluate by Dr. Fan Akbar of new problem sudden weakness that came on when she was making coffee yesterday morning and was having difficulty speaking also. She denies any chest pain or palpitations or cardiac symptoms during that time. She came to the emergency room and had a CT of the head done that was essentially negative and a CTA was negative except for some moderate stenosis and 60 to 70% range of the right internal carotid artery which is a known finding from previous Dopplers, and says is asymptomatic has been followed, and the patient was found to have some perfusion deficit in the left middle cerebral artery distribution. MRI scan does document 2 strokes, one in the left frontal opercular area and one in the temporal parietal area higher up and further back in 2 separate locations. This does suggest cardioembolic phenomena. Cardiology will see. Patient has a history of TIAs in the past but no clear history of stroke. The patient does not appear to have a history of significant heart disease. She does have a neuropathy that is thought to be secondary to her monoclonal gammopathy which is followed at [facility]. He also has a restless leg syndrome she has sleep apnea psoriasis and has decreased vision in the right eye from cataract surgery. She has hyperlipidemia and hypercholesterolemia may be some borderline diabetes. She has had degenerative arthritis, and a back operation and shoulder operation. Patient is somewhat better and her speech is better now, but she still has some speech deficit and weakness on the right side. He denies any significant headache, fever, meningismus, trauma, or other causes of her strokelike symptoms now.   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 [address] 2012    (Rt ankle) and basal cell (Lt eye) removed    Transient ischemic attack approx 2005    HAS HAD 2 MINISTROKES-NO DEFICITS; neuro Dr Dallas Calderon (cc)    Unspecified adverse effect of anesthesia     HAS NEEDED PORT OR CUTDOWN FOR RECVNG BLOOD OR LONG-TERM IVs; surgery 7/28/15 w/o any difficulty    Unspecified sleep apnea     does not use CPAP      Past Surgical History:   Procedure Laterality Date    HX APPENDECTOMY      HX CATARACT REMOVAL  2011    Right    HX CERVICAL FUSION  1/28/15    C5-7    HX CHOLECYSTECTOMY  2000    HX GI  2010    liver bx due to liver Bx due to elevated enzymes, liver laceration during Bx, hospitalized 10-12d    HX HEENT  7/28/15    Lt total parotidectomy with facial nerve dissection: Dr Javier Leyva HX HEENT  11/2015    Right parotidectomy    HX HYSTERECTOMY      HX ORTHOPAEDIC  2009 & 2011    LUMBAR FUSION and revision    HX OTHER SURGICAL  2010    MELANOMA (Rt ankle) and basal cell (Lt eye) removed; q6mo ck by DERMATOLOGY    HX SHOULDER REPLACEMENT Left 08/01/2016    HX THYROIDECTOMY      Right    NC COLON CA SCRN NOT HI RSK IND  2016          Family History   Problem Relation Age of Onset    Alcohol abuse Father     Emphysema Father     Cancer Sister         PANCREATIC    Alcohol abuse Brother     COPD Brother     Other Mother         UNKNOWN CAUSE OF DEATH    Heart Disease Mother     Breast Cancer Maternal Aunt     Anesth Problems Neg Hx       Social History     Tobacco Use    Smoking status: Former Smoker     Years: 15.00     Last attempt to quit: 7/15/1975     Years since quittin.3    Smokeless tobacco: Never Used   Substance Use Topics    Alcohol use: Yes     Comment: may have a glass of wine on rare occasion         Current Facility-Administered Medications:     atorvastatin (LIPITOR) tablet 40 mg, 40 mg, Oral, QHS, Ame Espinoza MD    acetaminophen (TYLENOL) tablet 650 mg, 650 mg, Oral, Q6H PRN, Jose Bartholomew MD    clopidogrel (PLAVIX) tablet 75 mg, 75 mg, Oral, QHS, Doug Rock MD    escitalopram oxalate (LEXAPRO) tablet 20 mg, 20 mg, Oral, QHS, Doug Rock MD    primidone (MYSOLINE) tablet 50 mg, 50 mg, Oral, QHS, Doug Rock MD    rOPINIRole (REQUIP) tablet 1 mg, 1 mg, Oral, QHS, Doug Rock MD    acetaminophen (TYLENOL) tablet 650 mg, 650 mg, Oral, Q4H PRN **OR** acetaminophen (TYLENOL) solution 650 mg, 650 mg, Per NG tube, Q4H PRN **OR** acetaminophen (TYLENOL) suppository 650 mg, 650 mg, Rectal, Q4H PRN, Doug Rock MD    0.9% sodium chloride infusion, 75 mL/hr, IntraVENous, CONTINUOUS, Doug Rock MD, Last Rate: 75 mL/hr at 19 0807, 75 mL/hr at 19 0807    labetalol (NORMODYNE;TRANDATE) injection 5 mg, 5 mg, IntraVENous, Q10MIN PRN, Doug Rock MD    enoxaparin (LOVENOX) injection 40 mg, 40 mg, SubCUTAneous, Q24H, Doug Rock MD, 40 mg at 19        Allergies   Allergen Reactions    Aspirin Nausea and Vomiting    Bee Sting [Sting, Bee] Hives and Shortness of Breath     WASP STING    Codeine Nausea and Vomiting    Hydrocodone Rash     And sedation    Morphine Rash    Nsaids (Non-Steroidal Anti-Inflammatory Drug) Nausea and Vomiting    Oxycodone Rash     On her back    Penicillins Hives, Shortness of Breath and Swelling      MRI Results (most recent):        Review of Systems:  A comprehensive review of systems was negative except for: Constitutional: positive for fatigue and malaise  Musculoskeletal: positive for myalgias, arthralgias, stiff joints and back pain  Neurological: positive for headaches, speech problems, paresthesia, coordination problems, gait problems and weakness  Behvioral/Psych: positive for anxiety and depression   Vitals:    11/06/19 1525 11/06/19 1544 11/06/19 1556 11/06/19 1628   BP: 162/73 165/69 170/70 160/90   Pulse: 85 84  87   Resp: 16 16  16   Temp:    97.9 °F (36.6 °C)   SpO2: 94% 96%  93%   Weight:   149 lb 14.6 oz (68 kg)    Height:   5' 2\" (1.575 m)      Objective:     INEUROLOGICAL EXAM:     Appearance: The patient is well developed, well nourished, provides a fair history and is in no acute distress. Mental Status: Oriented to time, place and person, and the president, cognitive function is slow but probably normal and speech is mildly aphasic and dysarthric, mood and affect appropriate but anxious and depressed . Cranial Nerves:   Intact visual fields. Fundi are benign on the left, but the right eye is scarred. Pupils react normally on the left, but the right pupil is scarred and fundus poorly seen and she has decreased visual acuity there., EOM's full, no nystagmus, no ptosis. Facial sensation is normal. Corneal reflexes are not tested. Facial movement is asymmetric, weak on the right. Hearing is abnormal bilaterally. Palate is midline with normal sternocleidomastoid and trapezius muscles are normal. Tongue is midline.   Neck without meningismus or bruits  Neck with limited mobility secondary to her previous surgery  Temporal arteries are not tender or enlarged    Motor:  4/5 strength in upper and lower proximal and distal muscles on the left, and about 23/5 on the right. Normal bulk and tone, with a mild spastic paraplegia, worse on the left side where she has mild weakness noted and decreased rapid alternating movement and hyperreflexia and increased tone from her previous myelopathy. No fasciculations. Reflexes:   Deep tendon reflexes 1+/4 in the left upper extremity and 2+/4 in the right upper extremity, 2+/4 in both legs with slight decreased ankle jerks on the right. .  No babinski or clonus present   Sensory:   Abnormal to touch, pinprick and vibration and temperature decreased in both legs up to the knee level. DSS is intact   Gait:  Abnormal gait with patient moving slowly, and limited mobility of the lumbar spine secondary to her previous back operations. Patient may have decreased arm swing in the right upper extremity    Tremor:   Minimal bilateral intention tremor noted. Cerebellar:   Mildly abnormal Romberg and tandem cerebellar signs present. Neurovascular:  Normal heart sounds and regular rhythm, peripheral pulses decreased, and no carotid bruits.                     Assessment:       ICD-10-CM ICD-9-CM    1. Ischemic stroke ([facility] [address] I63.9 434.91    2. History of hypertension Z86.79 V12.59    3. History of hypercholesterolemia Z86.39 V12.29    4. 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   Patient has asymptomatic right carotid stenosis of moderate degree, but not much change from her known 60 to 70% disease. Plavix as an outpatient and aspirin will be added to that and her statin will be increased up to a high dose statin. PT and speech therapy and OT will see the patient in addition while we do a complete neurovascular work-up. Her neuropathy looks stable, we will repeat some metabolic studies looking for treatable cause. Restless leg syndrome seems stable also on current treatment will continue her Requip. She will continue her clonazepam.  Also continue her Lexapro. She also has essential tremor continue the Mysoline for that. Difficult case, patient multiple medical problems, and needs complete neurovascular and cardiac evaluation for stroke that certainly look more likely embolic. We will just continue watching her following her right carotid stenosis at this time because she is asymptomatic from that as far as we know.       Signed By: Shelli Cheadle, MD     November 6, 2019       CC: Patience Hickey MD  FAX: [phone]

## 2019-11-06 NOTE — PROGRESS NOTES
Problem: Self Care Deficits Care Plan (Adult)  Goal: *Acute Goals and Plan of Care (Insert Text)  Description  FUNCTIONAL STATUS PRIOR TO ADMISSION: Patient was independent with ADL/IADL tasks. Drives. Denies Hx of falls. Was in the process of moving houses PTA. HOME SUPPORT: The patient lived with  but did not require assist. 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 She assists him with shower transfers, LB dressing and provides supervision 24/7. Occupational Therapy Goals  Initiated 11/6/2019   1. Patient will perform grooming tasks standing at the sink with modified independence within 7 day(s). 2.  Patient will perform lower body dressing with modified independence within 7 day(s). 3.  Patient will perform toilet transfers with modified independence using least restrictive device within 7 day(s). 4.  Patient will perform all aspects of toileting with modified independence within 7 day(s). 5.  Patient will perform safe item retrieval from high/low surfaces during ADLs with modified independence within 7 days. 6.  Patient will perform one IADL task with good static/dynamic standing balance with modified independence within 7 days. Outcome: Progressing Towards Goal   OCCUPATIONAL THERAPY EVALUATION  Patient: Edwin Ellis (75 y.o. female)  Date: 11/6/2019  Primary Diagnosis: CVA (cerebral vascular accident) [facility]) [I63.9]        Precautions:  Fall    ASSESSMENT  Based on the objective data described below, the patient presents with new onset of L sided weakness, mildly delayed word finding, decreased activity tolerance, and decreased functional mobility following acute L posterior frontal/parietal infarct. CT perfusion also revealed decreased blood flow in posterior MCA.  BP noted to drop slightly between pre/post activity with Pt c/o LLE \"giving way\" in bathroom following toileting and Pt demonstrating brief period of confusion, SOB and minor LUE drift on assessment once seated. Pt with resolution of symptoms once seated after several minutes. RN and neurologist notified immediately. Pt may benefit from Inpatient Rehab prior to discharging home given independent and active PLOF with higher level IADL tasks. Current Level of Function Impacting Discharge (ADLs/self-care): CGA with mobility/transfers; Setup to CGA with ADLs    Functional Outcome Measure: The patient scored Total: 70/100 on the Barthel Index outcome measure which is indicative of 30% impaired ability to care for basic self needs/dependency on others; inferred 30% dependency on others for instrumental ADLs. Other factors to consider for discharge: LLE weakness/buckling during mobility; Primary caregiver for  with Alzheimer's     Patient will benefit from skilled therapy intervention to address the above noted impairments. PLAN :  Recommendations and Planned Interventions: self care training, functional mobility training, therapeutic exercise, balance training, therapeutic activities, endurance activities, patient education and home safety training    Frequency/Duration: Patient will be followed by occupational therapy 5 times a week to address goals. Recommendation for discharge: (in order for the patient to meet his/her long term goals)  Rehab vs HH OT, pending progress    This discharge recommendation:  Has not yet been discussed the attending provider and/or case management    IF patient discharges home will need the following DME: to be determined (TBD)       SUBJECTIVE:   Patient stated When I close my eyes I see people\" (reports onset evening of 11/5/2019)    OBJECTIVE DATA SUMMARY:   HISTORY:   Past Medical History:   Diagnosis Date    Adverse effect of anesthesia     sleep apnea no cpap machine useage       Anemia     has had iron infusions;  Hem/onc Dr Mar Sullivan [phone]    Anxiety and depression     GERD (gastroesophageal reflux disease)     Hypercholesteremia     Hypertension     Limited peripheral vision of right eye     complication of cataract surgery    Long term current use of anticoagulant therapy     Meniere disease     MGUS (monoclonal gammopathy of unknown significance)     Monoclonal gammopathy of unknown significance     hem/onc:  Dr Clara Deluna [phone]    Osteoporosis     Psoriasis     bilateral Legs    Restless leg     Skin melanoma ([address] 2012    (Rt ankle) and basal cell (Lt eye) removed    Transient ischemic attack approx 2005    HAS HAD 2 MINISTROKES-NO DEFICITS; neuro Dr Lisa Krishna (cc)    Unspecified adverse effect of anesthesia     HAS NEEDED PORT OR CUTDOWN FOR RECVNG BLOOD OR LONG-TERM IVs; surgery 7/28/15 w/o any difficulty    Unspecified sleep apnea     does not use CPAP     Past Surgical History:   Procedure Laterality Date    HX APPENDECTOMY      HX CATARACT REMOVAL  2011    Right    HX CERVICAL FUSION  1/28/15    C5-7    HX CHOLECYSTECTOMY  2000    HX GI  2010    liver bx due to liver Bx due to elevated enzymes, liver laceration during Bx, hospitalized 10-12d    HX HEENT  7/28/15    Lt total parotidectomy with facial nerve dissection: Dr Adelfo Duffy HX HEENT  11/2015    Right parotidectomy    HX HYSTERECTOMY      HX ORTHOPAEDIC  2009 & 2011    LUMBAR FUSION and revision    HX OTHER SURGICAL  2010    MELANOMA (Rt ankle) and basal cell (Lt eye) removed; q6mo ck by DERMATOLOGY    HX SHOULDER REPLACEMENT Left 08/01/2016    HX THYROIDECTOMY      Right    HI COLON CA SCRN NOT HI RSK IND  1/27/2016            Expanded or extensive additional review of patient history: Meniere's disease, DJD spine, ulnar neuropathy, TIA. Home Situation  Home Environment: Private residence  # Steps to Enter:  6(enters through porch 0 steps)  One/Two Story Residence: Other (Comment)(4 story home; bed/bath 4th floor)  # of Interior Steps: 18(to bedroom)  Interior Rails: Both  Lift Chair Available: No  Living Alone: No(lives with ; serves as his caregiver)  Support Systems: Friends \ neighbors  Patient Expects to be Discharged to[de-identified] Private residence  Current DME Used/Available at Home: Mariano Moons, straight, Walker, rolling, Grab bars, Shower chair  Tub or Shower Type: Shower    Hand dominance: Right    EXAMINATION OF PERFORMANCE DEFICITS:  Cognitive/Behavioral Status:  Neurologic State: Alert  Orientation Level: Oriented X4  Cognition: Appropriate decision making; Follows commands  Perception: Appears intact(w/ exception of x1 LOB d/t LLE instability likely d/t BP)  Perseveration: No perseveration noted  Safety/Judgement: Awareness of environment; Fall prevention; Insight into deficits    Skin: Intact    Edema: None    Hearing: Auditory  Auditory Impairment: Hard of hearing, right side    Vision/Perceptual:          Limited peripheral vision R eye    Diplopia: No    Acuity: Within Defined Limits(with eyes open; reports seeing people w/ eyes closed)    Corrective Lenses: Glasses      Range of Motion:  AROM: Generally decreased, functional  PROM: Generally decreased, functional             Strength:  Strength: Generally decreased, functional(LUE/LLE weaker than R side)                Coordination:  Coordination: Generally decreased, functional  Fine Motor Skills-Upper: Left Intact; Right Intact    Gross Motor Skills-Upper: Left Intact; Right Intact    Tone & Sensation:  Tone: Normal  Sensation: Intact     Balance:  Sitting: Intact  Standing: Impaired; Without support  Standing - Static: Constant support;Good  Standing - Dynamic : Fair(x1 moderate LOB d/t c/o LLE buckling in bathroom)    Functional Mobility and Transfers for ADLs:  Bed Mobility:  Rolling: Supervision; Additional time  Supine to Sit: Supervision; Additional time  Scooting: Supervision; Additional time    Transfers:  Sit to Stand: Contact guard assistance  Stand to Sit: Contact guard assistance  Bed to Chair: Contact guard assistance  Bathroom Mobility: Contact guard assistance  Toilet Transfer : Contact guard assistance  Shower Transfer: Contact guard assistance    ADL Assessment:  Feeding: Independent    Oral Facial Hygiene/Grooming: Setup    Bathing: Contact guard assistance(if standing; Setup if seated)    Upper Body Dressing: Setup    Lower Body Dressing: Contact guard assistance(with standing aspects)    Toileting: Contact guard assistance(with standing aspects of clothing management)        ADL Intervention and task modifications:     Lower Body Dressing Assistance  Socks: Independent  Leg Crossed Method Used: Yes  Position Performed: Seated in chair  Cues: Doff; Don    Cognitive Retraining  Safety/Judgement: Awareness of environment; Fall prevention; Insight into deficits    Functional Measure:  Barthel Index:    Bathin  Bladder: 10  Bowels: 10  Groomin  Dressin  Feeding: 10  Mobility: 10  Stairs: 5  Toilet Use: 5  Transfer (Bed to Chair and Back): 10  Total: 70/100        The Barthel ADL Index: Guidelines  1. The index should be used as a record of what a patient does, not as a record of what a patient could do. 2. The main aim is to establish degree of independence from any help, physical or verbal, however minor and for whatever reason. 3. The need for supervision renders the patient not independent. 4. A patient's performance should be established using the best available evidence. Asking the patient, friends/relatives and nurses are the usual sources, but direct observation and common sense are also important. However direct testing is not needed. 5. Usually the patient's performance over the preceding 24-48 hours is important, but occasionally longer periods will be relevant. 6. Middle categories imply that the patient supplies over 50 per cent of the effort. 7. Use of aids to be independent is allowed. Garland Cohen., Barthel, D.W. (3913). Functional evaluation: the Barthel Index. [facility] (14)2. ALONSO Berry, Brina Sanchez., Nae Prim., Bee, [address] [facility] (). Measuring the change indisability after inpatient rehabilitation; comparison of the responsiveness of the Barthel Index and Functional Belden Measure. Journal of Neurology, Neurosurgery, and Psychiatry, 66(4), 451-071. PAVEL Mcghee, MILLER Rizo, & Gabriel Flores M.A. (2004.) Assessment of post-stroke quality of life in cost-effectiveness studies: The usefulness of the Barthel Index and the EuroQoL-5D. Quality of Life Research, 15, 906-14         Occupational Therapy Evaluation Charge Determination   History Examination Decision-Making   MEDIUM Complexity : Expanded review of history including physical, cognitive and psychosocial  history  MEDIUM Complexity : 3-5 performance deficits relating to physical, cognitive , or psychosocial skils that result in activity limitations and / or participation restrictions MEDIUM Complexity : Patient may present with comorbidities that affect occupational performnce. Miniml to moderate modification of tasks or assistance (eg, physical or verbal ) with assesment(s) is necessary to enable patient to complete evaluation       Based on the above components, the patient evaluation is determined to be of the following complexity level: MEDIUM  Pain Rating:  None    Activity Tolerance:   Fair  Please refer to the flowsheet for vital signs taken during this treatment. After treatment patient left in no apparent distress:    Sitting in chair, Call bell within reach and Caregiver / family present    COMMUNICATION/EDUCATION:   The patients plan of care was discussed with: Physical Therapist, Registered Nurse and Patient . Home safety education was provided and the patient/caregiver indicated understanding., Patient/family have participated as able in goal setting and plan of care. and Patient/family agree to work toward stated goals and plan of care.     Thank you for this referral.  Facundo Hill, OTR/L  Time Calculation: 32 mins

## 2019-11-06 NOTE — PROGRESS NOTES
Problem: Mobility Impaired (Adult and Pediatric)  Goal: *Acute Goals and Plan of Care (Insert Text)  Description    FUNCTIONAL STATUS PRIOR TO ADMISSION: Patient was independent and active without use of DME. Cares for  with dementia. In process of moving to a new home and has been packing up boxes and the house. HOME SUPPORT PRIOR TO ADMISSION: The patient lived with  but assists him. Physical Therapy Goals  Initiated 11/6/2019  1. Patient will move from supine to sit and sit to supine  in bed with modified independence within 7 day(s). 2.  Patient will transfer from bed to chair and chair to bed with modified independence using the least restrictive device within 7 day(s). 3.  Patient will perform sit to stand with modified independence within 7 day(s). 4.  Patient will ambulate with modified independence for 200 feet with the least restrictive device within 7 day(s). 5.  Patient will ascend/descend 12 stairs with 1 handrail(s) with modified independence within 7 day(s). 6.  Patient will improve Aparicio Balance score by 7 points within 7 days. Outcome: Progressing Towards Goal  PHYSICAL THERAPY EVALUATION- NEURO POPULATION  Patient: Norah Aldana (75 y.o. female)  Date: 11/6/2019  Primary Diagnosis: CVA (cerebral vascular accident) [facility]) [I63.9]        Precautions: fall         ASSESSMENT  Based on the objective data described below, the patient presents with decreased functional mobility from baseline level of function. Patient currently limited by B weakness (L>R), fair balance, gait instability and some dizziness. Initially needing CGA for transfers and gait into bathroom. Upon return from bathroom had increased weakness on the L and sensation that L LE was \"giving way\". Placed up in chair and RN notified. Will need APARICIO next session.     Current Level of Function Impacting Discharge (mobility/balance): CGA-Kosta for gait without RW      Other factors to consider for discharge: cares for elderly  with dementia, at risk for falls given weakness and impaired balance     Patient will benefit from skilled therapy intervention to address the above noted impairments. PLAN :  Recommendations and Planned Interventions: bed mobility training, transfer training, gait training, therapeutic exercises, patient and family training/education and therapeutic activities      Frequency/Duration: Patient will be followed by physical therapy:  5 times a week to address goals. Recommendation for discharge: (in order for the patient to meet his/her long term goals)  Therapy 3 hours per day 5-7 days per week vs HH PT pending progress with mobility      IF patient discharges home will need the following DME: to be determined (TBD)         SUBJECTIVE:   Patient stated I didn't get any sleep and when I close my eyes I see people.     OBJECTIVE DATA SUMMARY:   HISTORY:    Past Medical History:   Diagnosis Date    Adverse effect of anesthesia     sleep apnea no cpap machine useage       Anemia     has had iron infusions;  Hem/onc Dr Monica Hopkins [phone]    Anxiety and depression     GERD (gastroesophageal reflux disease)     Hypercholesteremia     Hypertension     Limited peripheral vision of right eye     complication of cataract surgery    Long term current use of anticoagulant therapy     Meniere disease     MGUS (monoclonal gammopathy of unknown significance)     Monoclonal gammopathy of unknown significance     hem/onc:  Dr Monica Hopkins [phone]    Osteoporosis     Psoriasis     bilateral Legs    Restless leg     Skin melanoma ([facility] [address] 2012    (Rt ankle) and basal cell (Lt eye) removed    Transient ischemic attack approx 2005    HAS HAD 2 MINISTROKES-NO DEFICITS; neuro Dr Tobias Rosas (cc)    Unspecified adverse effect of anesthesia     HAS NEEDED PORT OR CUTDOWN FOR RECVNG BLOOD OR LONG-TERM IVs; surgery 7/28/15 w/o any difficulty    Unspecified sleep apnea     does not use CPAP     Past Surgical History: Procedure Laterality Date    HX APPENDECTOMY      HX CATARACT REMOVAL  2011    Right    HX CERVICAL FUSION  1/28/15    C5-7    HX CHOLECYSTECTOMY  2000    HX GI  2010    liver bx due to liver Bx due to elevated enzymes, liver laceration during Bx, hospitalized 10-12d    HX HEENT  7/28/15    Lt total parotidectomy with facial nerve dissection: Dr Charles Roberson    HX HEENT  11/2015    Right parotidectomy    HX HYSTERECTOMY      HX ORTHOPAEDIC  2009 & 2011    LUMBAR FUSION and revision    HX OTHER SURGICAL  2010    MELANOMA (Rt ankle) and basal cell (Lt eye) removed; q6mo ck by DERMATOLOGY    HX SHOULDER REPLACEMENT Left 08/01/2016    HX THYROIDECTOMY      Right    UT COLON CA SCRN NOT HI RSK IND  1/27/2016            Personal factors and/or comorbidities impacting plan of care:     Home Situation  Home Environment: Private residence  # Steps to Enter: 6(enters through porch 0 steps)  One/Two Story Residence: Other (Comment)(4 story home; bed/bath 4th floor)  # of Interior Steps: 18(to bedroom)  Interior Rails: Both  Lift Chair Available: No  Living Alone: No(lives with ; serves as his caregiver)  Support Systems: Friends \ neighbors  Patient Expects to be Discharged to[de-identified] Private residence  Current DME Used/Available at Home: Maretta Judy, straight, Walker, rolling, Grab bars, Shower chair  Tub or Shower Type: Shower    EXAMINATION/PRESENTATION/DECISION MAKING:   Critical Behavior:  Neurologic State: Alert  Orientation Level: Oriented to person, Oriented to place, Oriented to situation, Oriented to time, Oriented X4  Cognition: Appropriate decision making, Follows commands     Hearing: Auditory  Auditory Impairment: Hard of hearing, right side    Range Of Motion:  AROM: Generally decreased, functional                       Strength:    Strength: Generally decreased, functional(L weaker than r)             Vision:   Corrective Lenses: Glasses  Functional Mobility:  Bed Mobility:  Rolling: Supervision; Additional time  Supine to Sit: Supervision; Additional time     Scooting: Supervision; Additional time  Transfers:  Sit to Stand: Contact guard assistance  Stand to Sit: Contact guard assistance                       Balance:   Sitting: Intact  Standing: Impaired  Standing - Static: Constant support;Good  Standing - Dynamic : Fair  Ambulation/Gait Training:  Distance (ft): 25 Feet (ft)  Assistive Device: Gait belt  Ambulation - Level of Assistance: Minimal assistance;Assist x2     Gait Description (WDL): Exceptions to WDL  Gait Abnormalities: Decreased step clearance        Base of Support: Center of gravity altered; Widened  Stance: Left decreased  Speed/Emani: Pace decreased (<100 feet/min); Shuffled; Slow  Step Length: Left shortened;Right shortened       Pain Rating:  No c/o pain    Activity Tolerance:   Fair and requires rest breaks  Please refer to the flowsheet for vital signs taken during this treatment. After treatment patient left in no apparent distress:   Sitting in chair, Call bell within reach and Caregiver / family present    COMMUNICATION/EDUCATION:   The patients plan of care was discussed with: Physical Therapist, Occupational Therapist and Registered Nurse. Will need BEFAST education next session    Fall prevention education was provided and the patient/caregiver indicated understanding., Patient/family have participated as able in goal setting and plan of care. and Patient/family agree to work toward stated goals and plan of care.     Thank you for this referral.  Yao La, PT, DPT

## 2019-11-06 NOTE — CONSULTS
Subjective:                [address], [place], [address] [facility]  [phone]    Date of  Admission: 11/5/2019 10:34 AM     Admission type:Emergency    Jackie Hays is a 67 y.o. female admitted for CVA (cerebral vascular accident) ([facility] [address] [I63.9]. Pt suffered cva - mri consistent with  Acute left posterior frontal/parietal operculum and posterior insula cortical Infarct. She feels dizziness when she closes her eyes. Asked to see her regarding an ILR for cryptogenic stroke. Patient Active Problem List    Diagnosis Date Noted    CVA (cerebral vascular accident) ([facility] [address] 11/05/2019    Ulnar neuropathy at elbow of right upper extremity 07/25/2018    Cervical post-laminectomy syndrome 06/07/2018    Idiopathic small and large fiber sensory neuropathy 06/07/2018    Lumbar back pain with radiculopathy affecting left lower extremity 06/07/2018    Lumbar back pain with radiculopathy affecting right lower extremity 06/07/2018    Vitamin D deficiency 06/07/2018    Cervical radiculopathy due to degenerative joint disease of spine 06/07/2018    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    Stenosis of both internal carotid arteries 03/13/2017    Benign essential tremor syndrome 02/21/2017    Tension vascular headache 02/21/2017    Hyperthyroidism 02/21/2017    Osteoporosis 08/31/2016    MGUS (monoclonal gammopathy of unknown significance) 10/04/2013    Chronic tension headaches 06/25/2013    Falls 06/25/2013    Lumbar stenosis 08/03/2011      Devin Lu MD  Past Medical History:   Diagnosis Date    Adverse effect of anesthesia     sleep apnea no cpap machine useage       Anemia     has had iron infusions;  Hem/onc Dr Chari Voss [phone]    Anxiety and depression     GERD (gastroesophageal reflux disease)     Hypercholesteremia     Hypertension     Limited peripheral vision of right eye     complication of cataract surgery    Long term current use of anticoagulant therapy     Meniere disease     MGUS (monoclonal gammopathy of unknown significance)     Monoclonal gammopathy of unknown significance     hem/onc:  Dr Dacosta Patience [phone]    Osteoporosis     Psoriasis     bilateral Legs    Restless leg     Skin melanoma ([address]     (Rt ankle) and basal cell (Lt eye) removed    Transient ischemic attack approx     HAS HAD 2 MINISTROKES-NO DEFICITS; neuro Dr Arielle Peterson (cc)    Unspecified adverse effect of anesthesia     HAS NEEDED PORT OR CUTDOWN FOR RECVNG BLOOD OR LONG-TERM IVs; surgery 7/28/15 w/o any difficulty    Unspecified sleep apnea     does not use CPAP      Past Surgical History:   Procedure Laterality Date    HX APPENDECTOMY      HX CATARACT REMOVAL      Right    HX CERVICAL FUSION  1/28/15    C5-7    HX CHOLECYSTECTOMY      HX GI  2010    liver bx due to liver Bx due to elevated enzymes, liver laceration during Bx, hospitalized 10-12d    HX HEENT  7/28/15    Lt total parotidectomy with facial nerve dissection: Dr James Avila HX HEENT  2015    Right parotidectomy    HX HYSTERECTOMY      HX ORTHOPAEDIC   &     LUMBAR FUSION and revision    HX OTHER SURGICAL  2010    MELANOMA (Rt ankle) and basal cell (Lt eye) removed; q6mo ck by DERMATOLOGY    HX SHOULDER REPLACEMENT Left 2016    HX THYROIDECTOMY      Right    NE COLON CA SCRN NOT HI RSK IND  2016          Allergies   Allergen Reactions    Aspirin Nausea and Vomiting    Bee Sting [Sting, Bee] Hives and Shortness of Breath     WASP STING    Codeine Nausea and Vomiting    Hydrocodone Rash     And sedation    Morphine Rash    Nsaids (Non-Steroidal Anti-Inflammatory Drug) Nausea and Vomiting    Oxycodone Rash     On her back    Penicillins Hives, Shortness of Breath and Swelling     Social History     Tobacco Use    Smoking status: Former Smoker     Years: 15.00     Last attempt to quit: 7/15/1975     Years since quittin.3    Smokeless tobacco: Never Used   Substance Use Topics    Alcohol use: Yes     Comment: may have a glass of wine on rare occasion    Drug use: Yes     Types: Prescription, OTC     Family History   Problem Relation Age of Onset    Alcohol abuse Father     Emphysema Father     Cancer Sister         PANCREATIC    Alcohol abuse Brother     COPD Brother     Other Mother         UNKNOWN CAUSE OF DEATH    Heart Disease Mother     Breast Cancer Maternal Aunt     Anesth Problems Neg Hx       Current Facility-Administered Medications   Medication Dose Route Frequency    atorvastatin (LIPITOR) tablet 40 mg  40 mg Oral QHS    acetaminophen (TYLENOL) tablet 650 mg  650 mg Oral Q6H PRN    clopidogrel (PLAVIX) tablet 75 mg  75 mg Oral QHS    escitalopram oxalate (LEXAPRO) tablet 20 mg  20 mg Oral QHS    primidone (MYSOLINE) tablet 50 mg  50 mg Oral QHS    rOPINIRole (REQUIP) tablet 1 mg  1 mg Oral QHS    acetaminophen (TYLENOL) tablet 650 mg  650 mg Oral Q4H PRN    Or    acetaminophen (TYLENOL) solution 650 mg  650 mg Per NG tube Q4H PRN    Or    acetaminophen (TYLENOL) suppository 650 mg  650 mg Rectal Q4H PRN    0.9% sodium chloride infusion  75 mL/hr IntraVENous CONTINUOUS    labetalol (NORMODYNE;TRANDATE) injection 5 mg  5 mg IntraVENous Q10MIN PRN    enoxaparin (LOVENOX) injection 40 mg  40 mg SubCUTAneous Q24H         Review of Symptoms:  Constitutional: negative  Eyes: negative  Ears, nose, mouth, throat, and face: negative  Respiratory: negative  Cardiovascular: negative  Gastrointestinal: negative  Genitourinary: negative  Musculoskeletal: negative  Neurological: cva/ dizziness  Behvioral/Psych: negative  Endocrine: negative     Subjective:      Visit Vitals  /57   Pulse 80   Temp 98 °F (36.7 °C)   Resp 16   Ht 5' 2\" (1.575 m)   Wt 150 lb (68 kg)   SpO2 95%   Breastfeeding? No   BMI 27.44 kg/m²       Physical Exam  Abdomen: soft, non-tender.    Extremities: extremities normal  Heart: regular rate and rhythm  Lungs: clear to auscultation bilaterally  Pulses: 2+ and symmetric    Cardiographics    Telemetry: nsr    ECG: nsr    Labs:  Recent Labs     11/05/19  1400   WBC 4.1   HGB 12.2   HCT 38.7        Recent Labs     11/05/19  1400      K 4.1      CO2 25   GLU 82   BUN 12   CREA 0.76   CA 9.0   ALB 3.5   TBILI 0.4   SGOT 13*   ALT 19   INR 1.1       Recent Labs     11/05/19  1400   TROIQ <0.05         Intake/Output Summary (Last 24 hours) at 11/6/2019 1431  Last data filed at 11/6/2019 0804  Gross per 24 hour   Intake 912.5 ml   Output 1450 ml   Net -537.5 ml         Assessment:     Assessment:       Active Problems:    CVA (cerebral vascular accident) ([facility] [address] (11/5/2019)    htn     Plan:     Greg Mullins is a pleasant lady with a cryptogenic stroke. She is a candidate for an ILR to r/o AF as a cause. I discussed the risks/benefits/alternatives of the procedure with the patient. Risks include (but are not limited to) bleeding, heart block, infection, cva/mi/tamponade/death. The patient understands and agrees to proceed. Thank you for this interesting consultation.         Yessy Haile MD, [facility]    11/6/2019

## 2019-11-06 NOTE — PROGRESS NOTES
TRANSFER - OUT REPORT:    Verbal report given to RN on unit(name) on Maria E Condon  being transferred to Neuro(unit) for routine progression of care       Report consisted of patients Situation, Background, Assessment and   Recommendations(SBAR). Information from the following report(s) Procedure Summary and MAR was reviewed with the receiving nurse. Lines:   Peripheral IV 11/05/19 Right Antecubital (Active)   Site Assessment Clean, dry, & intact 11/6/2019  8:04 AM   Phlebitis Assessment 0 11/6/2019  8:04 AM   Infiltration Assessment 0 11/6/2019  8:04 AM   Dressing Status Clean, dry, & intact 11/6/2019  8:04 AM   Dressing Type Tape;Transparent 11/6/2019  8:04 AM   Hub Color/Line Status Yellow; Infusing 11/6/2019  8:04 AM        Opportunity for questions and clarification was provided.       Patient transported with:   Registered Nurse

## 2019-11-06 NOTE — PROGRESS NOTES
Hospitalist Progress Note    NAME: Shira Rodgers   :  1940   MRN:  [id number]       Assessment / Plan:    Acute left posterior frontal/parietal operculum and posterior insula cortical infarct POA   Dysarthria and Left sided weakness likely due to above  Right ICA stenosis on CTA  -CTA No evidence of thromboembolic occlusion of the left MCA or its distal branches,  however there is a perfusion abnormality characterized by increased mean transit  time and decreased blood flow in the posterior left MCA distribution. Cervical right internal carotid artery: Mixed plaque with 75% stenosis by NASCET  Criteria. MRI brain shows Acute left posterior frontal/parietal operculum and posterior insula cortical Infarct.  -Echo pending  -LDL and A1c pending  -permissive HTN  - SLP eval   Plavix and statin, once pass dysphagia screen.  -neurology consult  -PT OT eval and treat     HTN  -labetalol prn. Holding meds     RLS  -continue requip and mysoline when able to take PO     Anxiety / Depression  -continue meds     FRED  Meniere's   MGUS     Code Status: Full   Surrogate Decision Maker: children     DVT Prophylaxis:   GI Prophylaxis: not indicated     Baseline: primary care giver for  who has dementia     Subjective:     Chief Complaint / Reason for Physician Visit  Slow speech but appropriate. AAO x3. Pt is concerned that how long she has to stay in hospital because she is middle of moving to new home with her . Daughter in law at bedside, plan of care discussed with patient and daughter in law, all questions answered    Discussed with RN events overnight.      Review of Systems:  Symptom Y/N Comments  Symptom Y/N Comments   Fever/Chills n   Chest Pain n    Poor Appetite n   Edema     Cough n   Abdominal Pain n    Sputum n   Joint Pain n    SOB/HERNANDES n   Pruritis/Rash n    Nausea/vomit n   Tolerating PT/OT     Diarrhea    Tolerating Diet     Constipation    Other       Could NOT obtain due to: Objective:     VITALS:   Last 24hrs VS reviewed since prior progress note. Most recent are:  Patient Vitals for the past 24 hrs:   Temp Pulse Resp BP SpO2   11/06/19 0644 97.9 °F (36.6 °C) 76 18 170/70 98 %   11/06/19 0354 97.7 °F (36.5 °C) 71 16 140/69 93 %   11/05/19 2309 98.3 °F (36.8 °C) 77 18 137/43 98 %   11/05/19 2305 98.1 °F (36.7 °C) 72 18 142/65 94 %   11/05/19 2149 97.6 °F (36.4 °C) 77 18 153/72 95 %   11/05/19 1946 97.5 °F (36.4 °C) 76 18 173/73 99 %   11/05/19 1841 98 °F (36.7 °C) 79 16 167/76 99 %   11/05/19 1800  83 13 168/68 98 %   11/05/19 1715  84      11/05/19 1700 98.4 °F (36.9 °C) 82 10 169/74 99 %   11/05/19 1600 98.6 °F (37 °C) 82 12 168/74 99 %   11/05/19 1530  81 15 164/71 99 %   11/05/19 1500 98.4 °F (36.9 °C) 80 13 166/74 98 %   11/05/19 1330  77 10 161/62 100 %   11/05/19 1300 98.4 °F (36.9 °C) 79 13 171/78 99 %   11/05/19 1247  81 18  100 %   11/05/19 1200 98.6 °F (37 °C) 74 18 168/79 97 %   11/05/19 1130  76 19 110/47 97 %   11/05/19 1042 98.6 °F (37 °C) 75 16  99 %       Intake/Output Summary (Last 24 hours) at 11/6/2019 1002  Last data filed at 11/6/2019 0804  Gross per 24 hour   Intake 1912.5 ml   Output 1450 ml   Net 462.5 ml        PHYSICAL EXAM:  General: WD, WN. Alert, cooperative, no acute distress    EENT:  EOMI. Anicteric sclerae. MMM  Resp:  CTA bilaterally, no wheezing or rales. No accessory muscle use  CV:  Regular  rhythm,  No edema  GI:  Soft, Non distended, Non tender.  +Bowel sounds  Neurologic:  Alert and oriented X 3, dysarthria, left side strength 4+/5 and right side 5/5  Psych:   Good insight. Not anxious nor agitated  Skin:  No rashes.   No jaundice    Reviewed most current lab test results and cultures  YES  Reviewed most current radiology test results   YES  Review and summation of old records today    NO  Reviewed patient's current orders and MAR    YES  PMH/SH reviewed - no change compared to H&P  ________________________________________________________________________  Care Plan discussed with:    Comments   Patient x    Family  x    RN x    Care Manager     Consultant                        Multidiciplinary team rounds were held today with , nursing, pharmacist and clinical coordinator. Patient's plan of care was discussed; medications were reviewed and discharge planning was addressed. ________________________________________________________________________  Total NON critical care TIME:    Minutes    Total CRITICAL CARE TIME Spent:   Minutes non procedure based      Comments   >50% of visit spent in counseling and coordination of care     ________________________________________________________________________  Leanna Grover MD     Procedures: see electronic medical records for all procedures/Xrays and details which were not copied into this note but were reviewed prior to creation of Plan. LABS:  I reviewed today's most current labs and imaging studies.   Pertinent labs include:  Recent Labs     11/05/19  1400   WBC 4.1   HGB 12.2   HCT 38.7        Recent Labs     11/05/19  1400      K 4.1      CO2 25   GLU 82   BUN 12   CREA 0.76   CA 9.0   ALB 3.5   TBILI 0.4   SGOT 13*   ALT 19   INR 1.1       Signed: Leanna Grover MD

## 2019-11-06 NOTE — PROGRESS NOTES

## 2019-11-06 NOTE — PROGRESS NOTES
0730  Report received from [facility]. SBAR, Kardex, ED Summary, Procedure Summary, Intake/Output, MAR, Recent Results, Med Rec Status and Cardiac Rhythm NSR were discussed.     [address]

## 2019-11-07 LAB
CK SERPL-CCNC: 80 U/L (ref 26–192)
ERYTHROCYTE [SEDIMENTATION RATE] IN BLOOD: 6 MM/HR (ref 0–30)

## 2019-11-07 PROCEDURE — 83520 IMMUNOASSAY QUANT NOS NONAB: CPT

## 2019-11-07 PROCEDURE — 83516 IMMUNOASSAY NONANTIBODY: CPT

## 2019-11-07 PROCEDURE — 95816 EEG AWAKE AND DROWSY: CPT | Performed by: PSYCHIATRY & NEUROLOGY

## 2019-11-07 PROCEDURE — 82550 ASSAY OF CK (CPK): CPT

## 2019-11-07 PROCEDURE — 36415 COLL VENOUS BLD VENIPUNCTURE: CPT

## 2019-11-07 PROCEDURE — 86038 ANTINUCLEAR ANTIBODIES: CPT

## 2019-11-07 PROCEDURE — 74011250637 HC RX REV CODE- 250/637: Performed by: INTERNAL MEDICINE

## 2019-11-07 PROCEDURE — 74011250636 HC RX REV CODE- 250/636: Performed by: INTERNAL MEDICINE

## 2019-11-07 PROCEDURE — 82784 ASSAY IGA/IGD/IGG/IGM EACH: CPT

## 2019-11-07 PROCEDURE — 77030022017 HC DRSG HEMO QCLOT ZMED -A

## 2019-11-07 PROCEDURE — 97112 NEUROMUSCULAR REEDUCATION: CPT

## 2019-11-07 PROCEDURE — 97535 SELF CARE MNGMENT TRAINING: CPT

## 2019-11-07 PROCEDURE — 86256 FLUORESCENT ANTIBODY TITER: CPT

## 2019-11-07 PROCEDURE — 85652 RBC SED RATE AUTOMATED: CPT

## 2019-11-07 PROCEDURE — 65660000000 HC RM CCU STEPDOWN

## 2019-11-07 PROCEDURE — 74011250637 HC RX REV CODE- 250/637: Performed by: HOSPITALIST

## 2019-11-07 PROCEDURE — 97530 THERAPEUTIC ACTIVITIES: CPT

## 2019-11-07 PROCEDURE — 97116 GAIT TRAINING THERAPY: CPT

## 2019-11-07 PROCEDURE — 92523 SPEECH SOUND LANG COMPREHEN: CPT | Performed by: SPEECH-LANGUAGE PATHOLOGIST

## 2019-11-07 RX ORDER — CLONAZEPAM 1 MG/1
0.5 TABLET ORAL
Status: DISCONTINUED | OUTPATIENT
Start: 2019-11-07 | End: 2019-11-10

## 2019-11-07 RX ORDER — LISINOPRIL 5 MG/1
2.5 TABLET ORAL DAILY
Status: DISCONTINUED | OUTPATIENT
Start: 2019-11-08 | End: 2019-11-11 | Stop reason: HOSPADM

## 2019-11-07 RX ORDER — VERAPAMIL HYDROCHLORIDE 120 MG/1
120 TABLET, FILM COATED, EXTENDED RELEASE ORAL
Status: DISCONTINUED | OUTPATIENT
Start: 2019-11-08 | End: 2019-11-11 | Stop reason: HOSPADM

## 2019-11-07 RX ORDER — GUAIFENESIN/DEXTROMETHORPHAN 100-10MG/5
5 SYRUP ORAL
Status: DISCONTINUED | OUTPATIENT
Start: 2019-11-07 | End: 2019-11-11 | Stop reason: HOSPADM

## 2019-11-07 RX ADMIN — PRIMIDONE 50 MG: 50 TABLET ORAL at 22:25

## 2019-11-07 RX ADMIN — ATORVASTATIN CALCIUM 40 MG: 40 TABLET, FILM COATED ORAL at 22:25

## 2019-11-07 RX ADMIN — CLOPIDOGREL BISULFATE 75 MG: 75 TABLET ORAL at 22:25

## 2019-11-07 RX ADMIN — ROPINIROLE HYDROCHLORIDE 1 MG: 1 TABLET, FILM COATED ORAL at 22:25

## 2019-11-07 RX ADMIN — ESCITALOPRAM OXALATE 20 MG: 10 TABLET ORAL at 22:25

## 2019-11-07 RX ADMIN — ENOXAPARIN SODIUM 40 MG: 40 INJECTION SUBCUTANEOUS at 18:41

## 2019-11-07 RX ADMIN — CLONAZEPAM 0.5 MG: 1 TABLET ORAL at 23:45

## 2019-11-07 NOTE — PROGRESS NOTES
Hospitalist Progress Note    NAME: Ha Houston   :  1940   MRN:  [id number]       Assessment / Plan:    Acute left posterior frontal/parietal operculum and posterior insula cortical infarct POA   Concern for embolic stroke  Dysarthria and Left sided weakness likely due to above  Right ICA stenosis on CTA  -CTA No evidence of thromboembolic occlusion of the left MCA or its distal branches,  however there is a perfusion abnormality characterized by increased mean transit  time and decreased blood flow in the posterior left MCA distribution. Cervical right internal carotid artery: Mixed plaque with 75% stenosis by NASCET  Criteria. MRI brain shows Acute left posterior frontal/parietal operculum and posterior insula cortical Infarct.  -Echo normal, on obvious shunting on bubble study  -LDL 79  -A1c 5.3  -permissive HTN, restart BP meds tomorrow  -SLP eval   -cont Plavix and statin  -neurology consult on board  -PT OT consults appreciated, referral sent to [facility]  -S/P ILR, planned for [facility]IE in the morning     HTN  -labetalol prn. Holding meds     RLS  -continue requip and mysoline when able to take PO     Anxiety / Depression  -continue meds     FRED  Meniere's   MGUS     Code Status: Full   Surrogate Decision Maker: children     DVT Prophylaxis:   GI Prophylaxis: not indicated     Baseline: primary care giver for  who has dementia     Subjective:     Chief Complaint / Reason for Physician Visit  Dysarthria improving, pleasant , denies any acute complaints. She has blood soaked dressing at site of ILR. Discussed with RN events overnight.      Review of Systems:  Symptom Y/N Comments  Symptom Y/N Comments   Fever/Chills n   Chest Pain n    Poor Appetite n   Edema     Cough n   Abdominal Pain n    Sputum n   Joint Pain n    SOB/HERNANDES n   Pruritis/Rash n    Nausea/vomit n   Tolerating PT/OT     Diarrhea    Tolerating Diet     Constipation    Other       Could NOT obtain due to:      Objective:     VITALS: Last 24hrs VS reviewed since prior progress note. Most recent are:  Patient Vitals for the past 24 hrs:   Temp Pulse Resp BP SpO2   11/07/19 1131 97.8 °F (36.6 °C) 73 18 155/72 100 %   11/07/19 1055    158/79    11/07/19 1047  78  135/82    11/07/19 1042  79  (!) 107/94    11/07/19 1040  74  146/65    11/07/19 0823 97.7 °F (36.5 °C) 68 18 143/52 98 %   11/07/19 0304 97.9 °F (36.6 °C) 71 18 144/60 95 %   11/07/19 0013 97.6 °F (36.4 °C) 74 18 157/68 96 %   11/06/19 1945 98 °F (36.7 °C) 75 16 135/62 96 %   11/06/19 1648  82  154/66    11/06/19 1628 97.9 °F (36.6 °C) 87 16 160/90 93 %   11/06/19 1600  80      11/06/19 1556    170/70    11/06/19 1544  84 16 165/69 96 %   11/06/19 1525  85 16 162/73 94 %   11/06/19 1233 98 °F (36.7 °C) 80 16 136/57 95 %       Intake/Output Summary (Last 24 hours) at 11/7/2019 1215  Last data filed at 11/6/2019 1945  Gross per 24 hour   Intake 240 ml   Output    Net 240 ml        PHYSICAL EXAM:  General: Alert, cooperative, no acute distress    EENT:  EOMI. Anicteric sclerae. MMM  Resp:  CTA bilaterally, ILR site left upper chest, dressing is soaked with blood, no obvious oozing  CV:  Regular  rhythm,  No edema  GI:  Soft, Non distended, Non tender.  +Bowel sounds  Neurologic:  Alert and oriented X 3, dysarthria, left side strength 4+/5 and right side 5/5  Psych:   Good insight. Not anxious nor agitated  Skin:  No rashes.   No jaundice    Reviewed most current lab test results and cultures  YES  Reviewed most current radiology test results   YES  Review and summation of old records today    NO  Reviewed patient's current orders and MAR    YES  PMH/SH reviewed - no change compared to H&P  ________________________________________________________________________  Care Plan discussed with:    Comments   Patient x    Family  x    RN x    Care Manager     Consultant  x                     x Multidiciplinary team rounds were held today with , nursing, pharmacist and clinical coordinator. Patient's plan of care was discussed; medications were reviewed and discharge planning was addressed. ________________________________________________________________________  Total NON critical care TIME:    Minutes    Total CRITICAL CARE TIME Spent:   Minutes non procedure based      Comments   >50% of visit spent in counseling and coordination of care     ________________________________________________________________________  Caleb Morataya MD     Procedures: see electronic medical records for all procedures/Xrays and details which were not copied into this note but were reviewed prior to creation of Plan. LABS:  I reviewed today's most current labs and imaging studies.   Pertinent labs include:  Recent Labs     11/05/19  1400   WBC 4.1   HGB 12.2   HCT 38.7        Recent Labs     11/05/19  1400      K 4.1      CO2 25   GLU 82   BUN 12   CREA 0.76   CA 9.0   ALB 3.5   TBILI 0.4   SGOT 13*   ALT 19   INR 1.1       Signed: Caleb Morataya MD

## 2019-11-07 NOTE — PROGRESS NOTES
Pt s/p Linq implant from 11/6. Dressing fairly saturated. RN request CCL team member to change dressing. Saturated bandage removed and replaced with sterile quick clot 4x4 and tegaderm. Steri strips remained in place.

## 2019-11-07 NOTE — ROUTINE PROCESS
Bedside and Verbal shift change report given to [address] (oncoming nurse) by Bang Florez (offgoing nurse). Report included the following information SBAR, Kardex, Recent Results, Med Rec Status and Cardiac Rhythm SR. 
  
Zone Phone:   [phone] 
  
Significant changes during shift: Dressing was changed on chest, eeg done  
  
Patient Information 
  Mohini Elizabeth 67 y.o. 
11/5/2019 10:34 AM by Wendy Caraballo MD. Mohini Elizabeth was admitted from Home 
  
Problem List 
  
    
Patient Active Problem List  
  Diagnosis Date Noted  CVA (cerebral vascular accident) ([facility] [address] 11/05/2019  Ulnar neuropathy at elbow of right upper extremity 07/25/2018  Cervical post-laminectomy syndrome 06/07/2018  Idiopathic small and large fiber sensory neuropathy 06/07/2018  Lumbar back pain with radiculopathy affecting left lower extremity 06/07/2018  Lumbar back pain with radiculopathy affecting right lower extremity 06/07/2018  Vitamin D deficiency 06/07/2018  Cervical radiculopathy due to degenerative joint disease of spine 06/07/2018  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  Stenosis of both internal carotid arteries 03/13/2017  Benign essential tremor syndrome 02/21/2017  Tension vascular headache 02/21/2017  Hyperthyroidism 02/21/2017  Osteoporosis 08/31/2016  MGUS (monoclonal gammopathy of unknown significance) 10/04/2013  Chronic tension headaches 06/25/2013  Falls 06/25/2013  Lumbar stenosis 08/03/2011  
  
    
Past Medical History:  
Diagnosis Date  Adverse effect of anesthesia    
  sleep apnea no cpap machine useage  Anemia    
  has had iron infusions; Hem/onc Dr Kori Castillo [phone]  Anxiety and depression    
 GERD (gastroesophageal reflux disease)    
 Hypercholesteremia    
 Hypertension    
 Limited peripheral vision of right eye    
  complication of cataract surgery  Long term current use of anticoagulant therapy    
 Meniere disease    
  MGUS (monoclonal gammopathy of unknown significance)    
 Monoclonal gammopathy of unknown significance    
  hem/onc:  Dr Berenice Manjarrez [phone]  Osteoporosis    
 Psoriasis    
  bilateral Legs  Restless leg    
 Skin melanoma ([address] 2012  
  (Rt ankle) and basal cell (Lt eye) removed  Transient ischemic attack approx 2005  
  HAS HAD 2 MINISTROKES-NO DEFICITS; neuro Dr Quincy Lyman (cc)  Unspecified adverse effect of anesthesia    
  HAS NEEDED PORT OR CUTDOWN FOR RECVNG BLOOD OR LONG-TERM IVs; surgery 7/28/15 w/o any difficulty  Unspecified sleep apnea    
  does not use CPAP Core Measures: 
  
CVA: Yes Yes CHF:No No 
PNA:No No 
  
Post Op Surgical (If Applicable):  
  
Number times ambulated in hallway past shift:  0 Number of times OOB to chair past shift:   0 
NG Tube: No 
Incentive Spirometer: No 
Drains: No   Volume  0 Dressing Present:  No 
Flatus:  Not applicable 
  
Activity Status: 
  
OOB to Chair No 
Ambulated this shift Yes Bed Rest No 
  
Supplemental O2: (If Applicable) 
  
NC No 
NRB No 
Venti-mask No 
On 0 Liters/min 
  
LINES AND DRAINS: 
  
PIV 
 
DVT prophylaxis:  
  
DVT prophylaxis Med- Yes DVT prophylaxis SCD or BRIT- No  
  
Wounds: (If Applicable) 
  
Wounds- No 
  
Location 0 
  
Patient Safety: 
  
Falls Score Total Score: 2 Safety Level_______ Bed Alarm On? Yes Sitter?  No 
  
Plan for upcoming shift: safety, home with home health, ALLIE 
  
Discharge Plan: Yes TBD 
  
Active Consults: 
IP CONSULT TO HOSPITALIST 
IP CONSULT TO NEUROLOGY

## 2019-11-07 NOTE — PROGRESS NOTES
SPEECH LANGUAGE PATHOLOGY EVALUATION  Patient: Claudean Meuse (75 y.o. female)  Date: 11/7/2019  Primary Diagnosis: CVA (cerebral vascular accident) ([facility] [address] [I63.9]  Procedure(s) (LRB):  LOOP RECORDER INSERT (N/A) 1 Day Post-Op   Precautions:   Fall    ASSESSMENT :  Based on the objective data described below, the patient presents with mild aphasia, mild dysarthria, and some dysfluency after CVAs. She reports improvement since yesterday and is motivated to continue tx. Patient will benefit from skilled intervention to address the above impairments. Patients rehabilitation potential is considered to be Excellent  Factors which may influence rehabilitation potential include:   ? None noted  ? Mental ability/status  ? Medical condition  ? Home/family situation and support systems  ? Safety awareness  ? Pain tolerance/management  ? Other:      PLAN :  Recommendations and Planned Interventions:  SLP tx as above  Frequency/Duration: Patient will be followed by speech-language pathology 3 times a week to address goals. Discharge Recommendations: Inpatient Rehab     SUBJECTIVE:   Patient stated I have never [id number] before. 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)     GERD (gastroesophageal reflux disease)     Hypercholesteremia     Hypertension     Idiopathic small and large fiber sensory neuropathy     Limited peripheral vision of right eye     complication of cataract surgery    Long term current use of anticoagulant therapy     Meniere disease     MGUS (monoclonal gammopathy of unknown significance) Monoclonal gammopathy of unknown significance     hem/onc:  Dr Maikol Ferraro [phone]    Osteoporosis     Psoriasis     bilateral Legs    Restless leg     Skin melanoma ([facility] [address] 2012    (Rt ankle) and basal cell (Lt eye) removed    Transient ischemic attack approx 2005    HAS HAD 2 MINISTROKES-NO DEFICITS; neuro Dr Francisco Braun (cc)    Unspecified adverse effect of anesthesia     HAS NEEDED PORT OR CUTDOWN FOR RECVNG BLOOD OR LONG-TERM IVs; surgery 7/28/15 w/o any difficulty    Unspecified sleep apnea     does not use CPAP     Past Surgical History:   Procedure Laterality Date    HX APPENDECTOMY      HX CATARACT REMOVAL  2011    Right    HX CERVICAL FUSION  1/28/15    C5-7    HX CHOLECYSTECTOMY  2000    HX GI  2010    liver bx due to liver Bx due to elevated enzymes, liver laceration during Bx, hospitalized 10-12d    HX HEENT  7/28/15    Lt total parotidectomy with facial nerve dissection: Dr Sonia Johnson    HX HEENT  11/2015    Right parotidectomy    HX HYSTERECTOMY      HX ORTHOPAEDIC  2009 & 2011    LUMBAR FUSION and revision    HX OTHER SURGICAL  2010    MELANOMA (Rt ankle) and basal cell (Lt eye) removed; q6mo ck by DERMATOLOGY    HX SHOULDER REPLACEMENT Left 08/01/2016    HX THYROIDECTOMY      Right    [place] COLON CA SCRN NOT HI RSK IND  1/27/2016          Prior Level of Function/Home Situation:   Home Situation  Home Environment: Private residence  # Steps to Enter:  6(enters through porch 0 steps)  One/Two Story Residence: Other (Comment)(4 story home; bed/bath 4th floor)  # of Interior Steps: 18(to bedroom)  Interior Rails: Both  Lift Chair Available: No  Living Alone: No(lives with ; serves as his caregiver)  Support Systems: Friends \ neighbors  Patient Expects to be Discharged to[de-identified] Private residence  Current DME Used/Available at Home: Shiela Lofts, straight, Walker, rolling, Grab bars, Shower chair  Tub or Shower Type: Shower  Mental Status:  Neurologic State: Alert  Orientation Level: Oriented X4  Cognition: Follows commands  Perception: Appears intact  Perseveration: No perseveration noted  Safety/Judgement: Awareness of environment  Motor Speech:  Oral-Motor Structure/Motor Speech  Labial: (decreased overall movement with smile)  Dentition: Natural;Extractions  Oral Hygiene: clean tongue   Lingual: No impairment  Mandible: No impairment  Apraxic Characteristics: Marked difficulty initiating speech  Dysarthric Characteristics: Imprecise;Phonation/respiration incoordination  Intelligibility: Impaired  Word Intelligibility (%): (100)  Conversation Intelligibility (%): (90)  Rate: slow, effortful  Prosody: abnomal, continuous phonatio noted at times  Compensatory Strategies for Motor Speech: increased volume   Language Comprehension and Expression:  Auditory Comprehension  Auditory Impairment: No (follows 3 step commands)  Verbal Expression  Primary Mode of Expression: Verbal  Initiation: No impairment  Automatic Speech Task: No impairment  Repetition: (no errors. but slow and halting)  Naming: (100% correct, but extra time needed for confronation naming. Difficulty with moderate divergent naming. )  Sentence Formulation: Impaired (%)  Conversation: Dysarthric(slow pauses, continuous, tight phonation )  Speech Characteristics: Word retrieval  Effective Techniques: Provide extra time  Overall Impairment: Mild      Reading headlines: 100%, slow                           Pragmatics: WNL     Voice:   Glottal toth, phonation at rest at times. NOMS: expression 5    Pain:  Pain Scale 1: Numeric (0 - 10)  Pain Intensity 1: 0     After treatment:   ?              Patient left in no apparent distress sitting up in chair  ? Patient left in no apparent distress in bed  ? Call bell left within reach  ? Nursing notified  ? Caregiver present  ?               Bed alarm activated    COMMUNICATION/EDUCATION:   The patients plan of care including recommendations and planned interventions was discussed with: Registered Nurse. Patient was educated regarding Her deficit(s) of aphasia as this relates to Her diagnosis of CVA. She demonstrated Good understanding as evidenced by her responses . ? Patient/family have participated as able in goal setting and plan of care. ?  Patient/family agree to work toward stated goals and plan of care. ?  Patient understands intent and goals of therapy, but is neutral about his/her participation. ? Patient is unable to participate in goal setting and plan of care.     Thank you for this referral.  Elmo [place], SLP  Time Calculation: 15 mins

## 2019-11-07 NOTE — PROGRESS NOTES
Problem: Mobility Impaired (Adult and Pediatric)  Goal: *Acute Goals and Plan of Care (Insert Text)  Description    FUNCTIONAL STATUS PRIOR TO ADMISSION: Patient was independent and active without use of DME. Cares for  with dementia. In process of moving to a new home and has been packing up boxes and the house. HOME SUPPORT PRIOR TO ADMISSION: The patient lived with  but assists him. Physical Therapy Goals  Initiated 11/6/2019  1. Patient will move from supine to sit and sit to supine  in bed with modified independence within 7 day(s). 2.  Patient will transfer from bed to chair and chair to bed with modified independence using the least restrictive device within 7 day(s). 3.  Patient will perform sit to stand with modified independence within 7 day(s). 4.  Patient will ambulate with modified independence for 200 feet with the least restrictive device within 7 day(s). 5.  Patient will ascend/descend 12 stairs with 1 handrail(s) with modified independence within 7 day(s). 6.  Patient will improve Weems Balance score by 7 points within 7 days. Outcome: Progressing Towards Goal   PHYSICAL THERAPY TREATMENT  Patient: Holley Barrera (63 y.o. female)  Date: 11/7/2019  Diagnosis: [facility] [I63.9] <principal problem not specified>  Procedure(s) (LRB):  LOOP RECORDER INSERT (N/A) 1 Day Post-Op  Precautions: Fall  Chart, physical therapy assessment, plan of care and goals were reviewed. ASSESSMENT  Patient continues with skilled PT services and is progressing towards goals. Patient continues to make progress with therapy. She demonstrates buckling in LLE at times, worsening with fatigue or increased gait speed. She continues to demonstrate unsteady gait without AD. Patient scored 30/56 on Weems balance test, indicating moderate fall risk.  She is the caregiver for her  and will benefit from IP rehab at discharge to regain strength and independence prior to returning home. She can tolerate 3 hours of intense rehab. Current Level of Function Impacting Discharge (mobility/balance): CGA-min A with R knee buckling    Other factors to consider for discharge: fall risk, caregiver for          PLAN :  Patient continues to benefit from skilled intervention to address the above impairments. Continue treatment per established plan of care. to address goals. Recommendation for discharge: (in order for the patient to meet his/her long term goals)  Therapy 3 hours per day 5-7 days per week in intensive rehab setting    This discharge recommendation:  Has been made in collaboration with the attending provider and/or case management    IF patient discharges home will need the following DME: to be determined (TBD)       SUBJECTIVE:   Patient stated I tried to do most of my bath for myself today.     OBJECTIVE DATA SUMMARY:   Critical Behavior:  Neurologic State: Alert, Appropriate for age  Orientation Level: Oriented X4  Cognition: Follows commands, Appropriate decision making, Appropriate for age attention/concentration, Appropriate safety awareness  Safety/Judgement: Awareness of environment, Fall prevention, Insight into deficits  Functional Mobility Training:  Bed Mobility:  Rolling: Supervision  Supine to Sit: Supervision     Scooting: Stand-by assistance        Transfers:  Sit to Stand: Contact guard assistance  Stand to Sit: Contact guard assistance        Bed to Chair: Contact guard assistance                    Balance:  Sitting: Intact; Without support  Standing: Impaired; Without support  Standing - Static: Constant support; Fair  Standing - Dynamic : Fair  Ambulation/Gait Training:  Distance (ft): 50 Feet (ft)  Assistive Device: Gait belt  Ambulation - Level of Assistance: Minimal assistance;Assist x2        Gait Abnormalities: Decreased step clearance; Path deviations(L knee buckling )        Base of Support: Center of gravity altered     Speed/Emani: HemaSource decreased (<100 feet/min); Shuffled  Step Length: Right shortened;Left shortened                  Neuro Re-education:  Weems Balance Test:    Sitting to Standing: 3  Standing Unsupported: 3  Sitting with Back Unsupported: 4  Standing to Sitting: 3  Transfers: 3  Standing Unsupported with Eyes Closed: 3  Standing Unsupported with Feet Together: 2  Reach Forward with Outstretched Arm: 2   Object: 3  Turn to Look Over Shoulders: 2  Turn 360 Degrees: 1  Alternate Foot on Step/Stool: 0  Standing Unsupported One Foot in Front: 0  Stand on One Le  Total: 30/56         56=Maximum possible score;   0-20=High fall risk  21-40=Moderate fall risk   41-56=Low fall risk             Activity Tolerance:   Good  Please refer to the flowsheet for vital signs taken during this treatment.     After treatment patient left in no apparent distress:   Sitting in chair and Call bell within reach    COMMUNICATION/COLLABORATION:   The patients plan of care was discussed with: Occupational Therapist, Registered Nurse and     Frannie Galvez PT, DPT   Time Calculation: 24 mins

## 2019-11-07 NOTE — PROGRESS NOTES
Consult  REFERRED BY:  Brianna Maldonado MD    CHIEF COMPLAINT: Right-sided weakness and difficulty with speech      Subjective: Cassie Baldwin is a 67 y.o. right-handed  female we are asked to evaluate by Dr. Melba Coffman of new problem sudden weakness that came on when she was making coffee yesterday morning and was having difficulty speaking also. She denies any chest pain or palpitations or cardiac symptoms during that time. She came to the emergency room and had a CT of the head done that was essentially negative and a CTA was negative except for some moderate stenosis and 60 to 70% range of the right internal carotid artery which is a known finding from previous Dopplers this year, and since this is asymptomatic has been followed, and the patient was found to have some perfusion deficit in the left middle cerebral artery distribution. MRI scan does document 2 strokes, one in the left frontal opercular area and one in the temporal parietal area higher up and further back in 2 separate locations. This does suggest cardioembolic phenomena. Cardiology was seen and placed a loop monitor, and we talked to them today, and they will arrange a ALLIE for tomorrow also to rule out cardioembolic stroke. Patient has a history of TIAs in the past but no clear history of stroke. The patient does not appear to have a history of significant heart disease. 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 Patient also has a restless leg syndrome she has sleep apnea psoriasis and has decreased vision in the right eye from cataract surgery. She has hyperlipidemia and hypercholesterolemia may be some borderline diabetes. She has had degenerative arthritis, and a back operation and shoulder operation. Patient is somewhat better and her speech is better now, but she still has some speech deficit and weakness on the right side.   He denies any significant headache, fever, meningismus, trauma, or other causes of her strokelike symptoms now. 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)     GERD (gastroesophageal reflux disease)     Hypercholesteremia     Hypertension     Idiopathic small and large fiber sensory neuropathy     Limited peripheral vision of right eye     complication of cataract surgery    Long term current use of anticoagulant therapy     Meniere disease     MGUS (monoclonal gammopathy of unknown significance)     Monoclonal gammopathy of unknown significance     hem/onc:  Dr Audi Amezquita [phone]    Osteoporosis     Psoriasis     bilateral Legs    Restless leg     Skin melanoma ([facility] [address] 2012    (Rt ankle) and basal cell (Lt eye) removed    Transient ischemic attack approx 2005    HAS HAD 2 MINISTROKES-NO DEFICITS; neuro Dr Jun Jean (cc)    Unspecified adverse effect of anesthesia     HAS NEEDED PORT OR CUTDOWN FOR RECVNG BLOOD OR LONG-TERM IVs; surgery 7/28/15 w/o any difficulty    Unspecified sleep apnea     does not use CPAP      Past Surgical History:   Procedure Laterality Date    HX APPENDECTOMY      HX CATARACT REMOVAL  2011    Right    HX CERVICAL FUSION  1/28/15    C5-7    HX CHOLECYSTECTOMY  2000    HX GI  2010    liver bx due to liver Bx due to elevated enzymes, liver laceration during Bx, hospitalized 10-12d    HX HEENT  7/28/15    Lt total parotidectomy with facial nerve dissection: Dr Al Wheat HX HEENT  11/2015    Right parotidectomy    HX HYSTERECTOMY      HX ORTHOPAEDIC  2009 & 2011    LUMBAR FUSION and revision    HX OTHER SURGICAL  2010 MELANOMA (Rt ankle) and basal cell (Lt eye) removed; q6mo ck by DERMATOLOGY    HX SHOULDER REPLACEMENT Left 2016    HX THYROIDECTOMY      Right    IN COLON CA SCRN NOT HI RSK IND  2016          Family History   Problem Relation Age of Onset    Alcohol abuse Father     Emphysema Father     Cancer Sister         PANCREATIC    Alcohol abuse Brother     COPD Brother     Other Mother         UNKNOWN CAUSE OF DEATH    Heart Disease Mother     Breast Cancer Maternal Aunt     Anesth Problems Neg Hx       Social History     Tobacco Use    Smoking status: Former Smoker     Years: 15.00     Last attempt to quit: 7/15/1975     Years since quittin.3    Smokeless tobacco: Never Used   Substance Use Topics    Alcohol use: Yes     Comment: may have a glass of wine on rare occasion         Current Facility-Administered Medications:     clonazePAM (KlonoPIN) tablet 0.5 mg, 0.5 mg, Oral, QHS PRN, Alessia Rivera MD    [START ON 2019] lisinopril (PRINIVIL, ZESTRIL) tablet 2.5 mg, 2.5 mg, Oral, DAILY, Alessia Rivera MD    [START ON 2019] verapamil ER (CALAN-SR) tablet 120 mg, 120 mg, Oral, DAILY WITH BREAKFAST, Alessia Rivera MD    atorvastatin (LIPITOR) tablet 40 mg, 40 mg, Oral, QHS, Alessia Rivera MD, 40 mg at 19    acetaminophen (TYLENOL) tablet 650 mg, 650 mg, Oral, Q6H PRN, Reilly Dalal MD    clopidogrel (PLAVIX) tablet 75 mg, 75 mg, Oral, QHS, Lizbeth George MD, 75 mg at 19    escitalopram oxalate (LEXAPRO) tablet 20 mg, 20 mg, Oral, QHS, Lizbeth George MD, 20 mg at 19    primidone (MYSOLINE) tablet 50 mg, 50 mg, Oral, QHS, Lizbeth George MD, 50 mg at 19    rOPINIRole (REQUIP) tablet 1 mg, 1 mg, Oral, QHS, Lizbeth George MD, 1 mg at 19    acetaminophen (TYLENOL) tablet 650 mg, 650 mg, Oral, Q4H PRN **OR** acetaminophen (TYLENOL) solution 650 mg, 650 mg, Per NG tube, Q4H PRN **OR** acetaminophen (TYLENOL) suppository 650 mg, 650 mg, Rectal, Q4H PRN, Jessi Hansen MD    labetalol (NORMODYNE;TRANDATE) injection 5 mg, 5 mg, IntraVENous, Q10MIN PRN, Jessi Hansen MD    enoxaparin (LOVENOX) injection 40 mg, 40 mg, SubCUTAneous, Q24H, Jessi Hansen MD, 40 mg at 11/06/19 1740        Allergies   Allergen Reactions    Aspirin Nausea and Vomiting    Bee Sting [Sting, Bee] Hives and Shortness of Breath     WASP STING    Codeine Nausea and Vomiting    Hydrocodone Rash     And sedation    Morphine Rash    Nsaids (Non-Steroidal Anti-Inflammatory Drug) Nausea and Vomiting    Oxycodone Rash     On her back    Penicillins Hives, Shortness of Breath and Swelling      MRI Results (most recent):        Review of Systems:  A comprehensive review of systems was negative except for: Constitutional: positive for fatigue and malaise  Musculoskeletal: positive for myalgias, arthralgias, stiff joints and back pain  Neurological: positive for headaches, speech problems, paresthesia, coordination problems, gait problems and weakness  Behvioral/Psych: positive for anxiety and depression   Vitals:    11/07/19 1042 11/07/19 1047 11/07/19 1055 11/07/19 1131   BP: (!) 107/94 135/82 158/79 155/72   Pulse: 79 78  73   Resp:    18   Temp:    97.8 °F (36.6 °C)   SpO2:    100%   Weight:       Height:         Objective:     INEUROLOGICAL EXAM:     Appearance: The patient is well developed, well nourished, provides a fair history and is in no acute distress. Mental Status: Oriented to time, place and person, and the president, cognitive function is slow but probably normal and speech is mildly aphasic and dysarthric, but better than yesterday, mood and affect appropriate but anxious and depressed . Cranial Nerves:   Intact visual fields. Fundi are benign on the left, but the right eye is scarred.  Pupils react normally on the left, but the right pupil is scarred and fundus poorly seen and she has decreased visual acuity there., EOM's full, no nystagmus, no ptosis. Facial sensation is normal. Corneal reflexes are not tested. Facial movement is asymmetric, weak on the right. Hearing is abnormal bilaterally. Palate is midline with normal sternocleidomastoid and trapezius muscles are normal. Tongue is midline. Neck without meningismus or bruits  Neck with limited mobility secondary to her previous surgery  Temporal arteries are not tender or enlarged    Motor:  4/5 strength in upper and lower proximal and distal muscles on the left, and about 3-4/5 on the right. Normal bulk and tone, with a mild spastic paraplegia, worse on the left side where she has mild weakness noted and decreased rapid alternating movement and hyperreflexia and increased tone from her previous myelopathy. No fasciculations. Reflexes:   Deep tendon reflexes 1+/4 in the left upper extremity and 2+/4 in the right upper extremity, 2+/4 in both legs with slight decreased ankle jerks on the right. .  No babinski or clonus present   Sensory:   Abnormal to touch, pinprick and vibration and temperature decreased in both legs up to the knee level. DSS is intact   Gait:  Abnormal gait with patient moving slowly, and limited mobility of the lumbar spine secondary to her previous back operations and now her new right hemiparesis. Patient may have decreased arm swing in the right upper extremity    Tremor:   Minimal bilateral intention tremor noted. Cerebellar:    Patient has abnormal Romberg and tandem cerebellar signs present. Finger-nose-finger examination shows mild tremor bilaterally in the upper extremities   Neurovascular:  Normal heart sounds and regular rhythm, peripheral pulses decreased, and no carotid bruits.                     Assessment:       ICD-10-CM ICD-9-CM    1. Ischemic stroke ([facility] [address] I63.9 434.91    2. History of hypertension Z86.79 V12.59    3. History of hypercholesterolemia Z86.39 V12.29    4.  Unstable angina (HCC) I20.0 411.1 ELECTROPHYSIOLOGY PROCEDURE      ELECTROPHYSIOLOGY PROCEDURE   5. 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 Bilateral carotid artery stenosis I65.23 433.10      433.30    7. Cervical post-laminectomy syndrome M96.1 722.81    8. Spinal stenosis of lumbar region without neurogenic claudication M48.061 724.02    9. Lumbar post-laminectomy syndrome M96.1 722.83    10. Cerebral microvascular disease I67.9 437.9    11. Benign essential tremor syndrome G25.0 333.1    12. Cervical radiculopathy due to degenerative joint disease of spine M47.22 721.0    13. Fall, initial encounter [address] XXXA E888.9    14. Idiopathic small and large fiber sensory neuropathy G60.8 356.4    15. Stenosis of both internal carotid arteries I65.23 433.10      433.30    16. Tension vascular headache G44.209 307.81    17. Ulnar neuropathy at elbow of right upper extremity G56.21 354.2    18. 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 right-handed  female we are asked to evaluate by Dr. Jarrett Moctezuma of new problem sudden weakness that came on when she was making coffee yesterday morning and was having difficulty speaking also. She denies any chest pain or palpitations or cardiac symptoms during that time.   She came to the emergency room and had a CT of the head done that was essentially negative and a CTA was negative except for some moderate stenosis and 60 to 70% range of the right internal carotid artery which is a known finding from previous Dopplers this year, and since this is asymptomatic has been followed, and the patient was found to have some perfusion deficit in the left middle cerebral artery distribution. MRI scan does document 2 strokes, one in the left frontal opercular area and one in the temporal parietal area higher up and further back in 2 separate locations. This does suggest cardioembolic phenomena. Cardiology was seen and placed a loop monitor, and we talked to them today, and they will arrange a ALLIE for tomorrow also to rule out cardioembolic stroke. Patient has a history of TIAs in the past but no clear history of stroke. The patient does not appear to have a history of significant heart disease. 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 Patient also has a restless leg syndrome she has sleep apnea psoriasis and has decreased vision in the right eye from cataract surgery. She has hyperlipidemia and hypercholesterolemia may be some borderline diabetes. She has had degenerative arthritis, and a back operation and shoulder operation. Patient is somewhat better and her speech is better now, but she still has some speech deficit and weakness on the right side. He denies any significant headache, fever, meningismus, trauma, or other causes of her strokelike symptoms now.   Patient unremarked increased stress and tension taking care of her  who has Alzheimer's disease        Signed By: Rafael Singer MD     November 7, 2019       CC: Mani Bridges MD  FAX: [phone]

## 2019-11-07 NOTE — PROGRESS NOTES
Problem: Patient Education: Go to Patient Education Activity  Goal: Patient/Family Education  Outcome: Progressing Towards Goal     Problem: TIA/CVA Stroke: 0-24 hours  Goal: Off Pathway (Use only if patient is Off Pathway)  Outcome: Progressing Towards Goal  Goal: Activity/Safety  Outcome: Progressing Towards Goal  Goal: Consults, if ordered  Outcome: Progressing Towards Goal  Goal: Diagnostic Test/Procedures  Outcome: Progressing Towards Goal  Goal: Nutrition/Diet  Outcome: Progressing Towards Goal  Goal: Discharge Planning  Outcome: Progressing Towards Goal  Goal: Medications  Outcome: Progressing Towards Goal  Goal: Respiratory  Outcome: Progressing Towards Goal  Goal: Treatments/Interventions/Procedures  Outcome: Progressing Towards Goal  Goal: Minimize risk of bleeding post-thrombolytic infusion  Outcome: Progressing Towards Goal  Goal: Monitor for complications post-thrombolytic infusion  Outcome: Progressing Towards Goal  Goal: Psychosocial  Outcome: Progressing Towards Goal  Goal: *Hemodynamically stable  Outcome: Progressing Towards Goal  Goal: *Neurologically stable  Description  Absence of additional neurological deficits    Outcome: Progressing Towards Goal  Goal: *Verbalizes anxiety and depression are reduced or absent  Outcome: Progressing Towards Goal  Goal: *Absence of Signs of Aspiration on Current Diet  Outcome: Progressing Towards Goal  Goal: *Absence of deep venous thrombosis signs and symptoms(Stroke Metric)  Outcome: Progressing Towards Goal  Goal: *Ability to perform ADLs and demonstrates progressive mobility and function  Outcome: Progressing Towards Goal  Goal: *Stroke education started(Stroke Metric)  Outcome: Progressing Towards Goal  Goal: *Dysphagia screen performed(Stroke Metric)  Outcome: Progressing Towards Goal  Goal: *Rehab consulted(Stroke Metric)  Outcome: Progressing Towards Goal     Problem: TIA/CVA Stroke: Day 2 Until Discharge  Goal: Off Pathway (Use only if patient is Off Pathway)  Outcome: Progressing Towards Goal  Goal: Activity/Safety  Outcome: Progressing Towards Goal  Goal: Diagnostic Test/Procedures  Outcome: Progressing Towards Goal  Goal: Nutrition/Diet  Outcome: Progressing Towards Goal  Goal: Discharge Planning  Outcome: Progressing Towards Goal  Goal: Medications  Outcome: Progressing Towards Goal  Goal: Respiratory  Outcome: Progressing Towards Goal  Goal: Treatments/Interventions/Procedures  Outcome: Progressing Towards Goal  Goal: Psychosocial  Outcome: Progressing Towards Goal  Goal: *Verbalizes anxiety and depression are reduced or absent  Outcome: Progressing Towards Goal  Goal: *Absence of aspiration  Outcome: Progressing Towards Goal  Goal: *Absence of deep venous thrombosis signs and symptoms(Stroke Metric)  Outcome: Progressing Towards Goal  Goal: *Optimal pain control at patient's stated goal  Outcome: Progressing Towards Goal  Goal: *Tolerating diet  Outcome: Progressing Towards Goal  Goal: *Ability to perform ADLs and demonstrates progressive mobility and function  Outcome: Progressing Towards Goal  Goal: *Stroke education continued(Stroke Metric)  Outcome: Progressing Towards Goal     Problem: Ischemic Stroke: Discharge Outcomes  Goal: *Verbalizes anxiety and depression are reduced or absent  Outcome: Progressing Towards Goal  Goal: *Verbalize understanding of risk factor modification(Stroke Metric)  Outcome: Progressing Towards Goal  Goal: *Hemodynamically stable  Outcome: Progressing Towards Goal  Goal: *Absence of aspiration pneumonia  Outcome: Progressing Towards Goal  Goal: *Aware of needed dietary changes  Outcome: Progressing Towards Goal  Goal: *Verbalize understanding of prescribed medications including anti-coagulants, anti-lipid, and/or anti-platelets(Stroke Metric)  Outcome: Progressing Towards Goal  Goal: *Tolerating diet  Outcome: Progressing Towards Goal  Goal: *Aware of follow-up diagnostics related to anticoagulants  Outcome: Progressing Towards Goal  Goal: *Ability to perform ADLs and demonstrates progressive mobility and function  Outcome: Progressing Towards Goal  Goal: *Absence of DVT(Stroke Metric)  Outcome: Progressing Towards Goal  Goal: *Absence of aspiration  Outcome: Progressing Towards Goal  Goal: *Optimal pain control at patient's stated goal  Outcome: Progressing Towards Goal  Goal: *Home safety concerns addressed  Outcome: Progressing Towards Goal  Goal: *Describes available resources and support systems  Outcome: Progressing Towards Goal  Goal: *Verbalizes understanding of activation of EMS(911) for stroke symptoms(Stroke Metric)  Outcome: Progressing Towards Goal  Goal: *Understands and describes signs and symptoms to report to providers(Stroke Metric)  Outcome: Progressing Towards Goal  Goal: *Neurolgocially stable (absence of additional neurological deficits)  Outcome: Progressing Towards Goal  Goal: *Verbalizes importance of follow-up with primary care physician(Stroke Metric)  Outcome: Progressing Towards Goal  Goal: *Smoking cessation discussed,if applicable(Stroke Metric)  Outcome: Progressing Towards Goal  Goal: *Depression screening completed(Stroke Metric)  Outcome: Progressing Towards Goal     Problem: Falls - Risk of  Goal: *Absence of Falls  Description  Document Dany Fall Risk and appropriate interventions in the flowsheet.   Outcome: Progressing Towards Goal  Note:   Fall Risk Interventions:  Mobility Interventions: Bed/chair exit alarm, Patient to call before getting OOB, OT consult for ADLs, PT Consult for mobility concerns, PT Consult for assist device competence              Elimination Interventions: Bed/chair exit alarm, Call light in reach, Patient to call for help with toileting needs, Stay With Me (per policy), Toilet paper/wipes in reach, Toileting schedule/hourly rounds              Problem: Patient Education: Go to Patient Education Activity  Goal: Patient/Family Education  Outcome: Progressing Towards Goal     Problem: Pressure Injury - Risk of  Goal: *Prevention of pressure injury  Description  Document Mega Scale and appropriate interventions in the flowsheet. Outcome: Progressing Towards Goal  Note:   Pressure Injury Interventions:  Sensory Interventions: Keep linens dry and wrinkle-free         Activity Interventions: Increase time out of bed, PT/OT evaluation    Mobility Interventions: Assess need for specialty bed, PT/OT evaluation, Turn and reposition approx.  every two hours(pillow and wedges)    Nutrition Interventions: Document food/fluid/supplement intake                     Problem: Patient Education: Go to Patient Education Activity  Goal: Patient/Family Education  Outcome: Progressing Towards Goal     Problem: Patient Education: Go to Patient Education Activity  Goal: Patient/Family Education  Outcome: Progressing Towards Goal     Problem: Patient Education: Go to Patient Education Activity  Goal: Patient/Family Education  Outcome: Progressing Towards Goal     Problem: Patient Education: Go to Patient Education Activity  Goal: Patient/Family Education  Outcome: Progressing Towards Goal

## 2019-11-07 NOTE — PROGRESS NOTES
Problem: Self Care Deficits Care Plan (Adult)  Goal: *Acute Goals and Plan of Care (Insert Text)  Description  FUNCTIONAL STATUS PRIOR TO ADMISSION: Patient was independent with ADL/IADL tasks. Drives. Denies Hx of falls. Was in the process of moving houses PTA. HOME SUPPORT: The patient lived with  but did not require assist. 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 She assists him with shower transfers, LB dressing and provides supervision 24/7. Occupational Therapy Goals  Initiated 11/6/2019   1. Patient will perform grooming tasks standing at the sink with modified independence within 7 day(s). 2.  Patient will perform lower body dressing with modified independence within 7 day(s). 3.  Patient will perform toilet transfers with modified independence using least restrictive device within 7 day(s). 4.  Patient will perform all aspects of toileting with modified independence within 7 day(s). 5.  Patient will perform safe item retrieval from high/low surfaces during ADLs with modified independence within 7 days. 6.  Patient will perform one IADL task with good static/dynamic standing balance with modified independence within 7 days. 7.  Patient will improve Fugl-Ayon UE assessment score by 5 points within 7 days. Outcome: Progressing Towards Goal   OCCUPATIONAL THERAPY TREATMENT  Patient: Vandana Gee (24 y.o. female)  Date: 11/7/2019  Diagnosis: CVA (cerebral vascular accident) ([facility] [address] [I63.9] <principal problem not specified>  Procedure(s) (LRB):  LOOP RECORDER INSERT (N/A) 1 Day Post-Op  Precautions: Fall  Chart, occupational therapy assessment, plan of care, and goals were reviewed. ASSESSMENT  Patient continues with skilled OT services and is progressing towards goals. Pt with 40 point drop in systolic BP following supine<>sit transfer yet Pt was asymptomatic. BP stabilizing in standing.  Pt completed toilet transfer and hand washing at sink with SBA and improved stability. Pt score 59/66 on Fugl-Ayon UE assessment of motor function with mild RUE impairment yet is functional. Demonstrates improved word finding this session. Recommend inpatient rehab at discharge given Pts high PLOF. Current Level of Function Impacting Discharge (ADLs): SBA-CGA with mobility; SBA to Independent with ADLs    Other factors to consider for discharge: Asymptomatic orthostatic hypotension; Independent PLOF; Caregiver for  with Alzheimer's          PLAN :  Patient continues to benefit from skilled intervention to address the above impairments. Continue treatment per established plan of care. to address goals. Recommend with staff: CGA with mobility    Recommendation for discharge: (in order for the patient to meet his/her long term goals)  Therapy 3 hours per day 5-7 days per week    This discharge recommendation:  Has been made in collaboration with the attending provider and/or case management    IF patient discharges home will need the following DME: to be determined (TBD)       SUBJECTIVE:   Patient stated When I wake up I feel good, then my body gets slow\"    OBJECTIVE DATA SUMMARY:   Cognitive/Behavioral Status:  Neurologic State: Alert; Appropriate for age  Orientation Level: Oriented X4  Cognition: Follows commands; Appropriate decision making; Appropriate for age attention/concentration; Appropriate safety awareness        Functional Mobility and Transfers for ADLs:  Bed Mobility:  Rolling: Supervision  Supine to Sit: Supervision  Scooting: Stand-by assistance    Transfers:  Sit to Stand: Contact guard assistance  Functional Transfers  Bathroom Mobility: Contact guard assistance  Toilet Transfer : Contact guard assistance  Bed to Chair: Contact guard assistance    Balance:  Sitting: Intact; Without support  Standing: Impaired; Without support  Standing - Static: Constant support; Fair  Standing - Dynamic : Fair    ADL Intervention:     Grooming  Washing Hands: Stand-by assistance(standing at sink)  Cues: Verbal cues provided(keep one hand on sink PRN for stability)    Neuro Re-Education:  Challenged Pt use of BUEs during standing grooming tasks and item retrieval task in room with good return demonstration and no neglect of RUE. Functional Measure:    Fugl-Ayon Assessment of Motor Recovery after Stroke:     Reflex Activity  Flexors/Biceps/Fingers: Can be elicited  Extensors/Triceps: Can be elicited  Reflex Subtotal: 4    Volitional Movement Within Synergies  Shoulder Retraction: Full  Shoulder Elevation: Full  Shoulder Abduction (90 degrees): Full  Shoulder External Rotation: Full  Elbow Flexion: Full  Forearm Supination: Full  Shoulder Adduction/Internal Rotation: Full  Elbow Extension: Full  Forearm Pronation: Full  Subtotal: 18    Volitional Movement Mixing Synergies  Hand to Lumbar Spine: Full  Shoulder Flexion (0-90 degrees): Full  Pronation-Supination: Full  Subtotal: 6    Volitional Movement With Little or No Synergy  Shoulder Abduction (0-90 degrees): Full  Shoulder Flexion ( degrees): Full  Pronation/Supination: Full  Subtotal : 6    Normal Reflex Activity  Biceps, Triceps, Finger Flexors: Full  Subtotal : 2    Upper Extremity Total   Upper Extremity Total: 36    Wrist  Stability at 15 Degree Dorsiflexion: Full  Repeated Dorsiflexion/ Volar Flexion: Full  Stability at 15 Degree Dorsiflexion: Full  Repeated Dorsiflexion/ Volar Flexion: Full  Circumduction: Partial  Wrist Total: 9    Hand  Mass Flexion: Full  Mass Extension: Full  Grasp A: Full  Grasp B: Partial  Grasp C: Partial  Grasp D: Partial  Grasp E: Partial  Hand Total: 10    Coordination/Speed  Tremor: None  Dysmetria: Slight  Time: 2-5s  Coordination/Speed Total : 4    Total A-D  Total A-D (Motor Function): 59/66         This is a reliable/valid measure of arm function after a neurological event.  It has established value to characterize functional status and for measuring spontaneous and therapy-induced recovery; tests proximal and distal motor functions. Fugl-Ayon Assessment  UE scores recorded between five and 30 days post neurologic event can be used to predict UE recovery at six months post neurologic event. Severe = 0-21 points   Moderately Severe = 22-33 points   Moderate = 34-47 points   Mild = 48-66 points  RAKEL Purcell, TO Gonzalez, & JONEL San (1992). Measurement of motor recovery after stroke: Outcome assessment and sample size requirements. Stroke, 23, pp. 5433-2255.   --------------------------------------------------------------------------------------------------------------------------------------------------------------------  MCID:  Stroke:   Breanne Cook et al, 2001; n = 171; mean age 79 (5) years; assessed within 16 (12) days of stroke, Acute Stroke)  FMA Motor Scores from Admission to Discharge    10 point increase in FMA Upper Extremity = 1.5 change in discharge FIM    10 point increase in FMA Lower Extremity = 1.9 change in discharge FIM  MDC:   Stroke:   Lori Means et al, 2008, n = 14, mean age = 59.9 (14.6) years, assessed on average 14 (6.5) months post stroke, Chronic Stroke)    FMA = 5.2 points for the Upper Extremity portion of the assessment        Activity Tolerance:   Fair  Please refer to the flowsheet for vital signs taken during this treatment. BP during session. Pt asymptomatic. 146/55  Supine  107/94  Sitting  135/82  Standing, Pre activity  158/79  Sitting, Post activity    After treatment patient left in no apparent distress:   Supine in bed ([employer] tech waiting outside) and Call bell within reach    COMMUNICATION/COLLABORATION:   The patients plan of care was discussed with: Physical Therapist, Registered Nurse and patient. Patient was educated regarding Her deficit(s) of R sided weakness and LLE weakness as this relates to Her diagnosis of CVA. She demonstrated Excellent understanding as evidenced by verbalizing.     Patient and/or family was verbally educated on the BE FAST acronym for signs/symptoms of CVA and TIA. BE FAST was written on patient's communication board  for visual education and reinforcement. All questions answered with patient indicating good understanding.      Kassandra Paredes, OTR/L  Time Calculation: 32 mins

## 2019-11-07 NOTE — ROUTINE PROCESS
Bedside and Verbal shift change report given to [address] (oncoming nurse) by Jim/Oleksandr RN (offgoing nurse). Report included the following information SBAR, Kardex, Recent Results, Med Rec Status and Cardiac Rhythm SR. 
  
Zone Phone:   [phone] 
  
  
Significant changes during shift:  Patient received ILR.   
  
Patient Information 
  Bret Johns 67 y.o. 
11/5/2019 10:34 AM by Nadine Brown MD. Bret Johns was admitted from Home 
  
Problem List 
  
    
Patient Active Problem List  
  Diagnosis Date Noted  CVA (cerebral vascular accident) ([facility] [address] 11/05/2019  Ulnar neuropathy at elbow of right upper extremity 07/25/2018  Cervical post-laminectomy syndrome 06/07/2018  Idiopathic small and large fiber sensory neuropathy 06/07/2018  Lumbar back pain with radiculopathy affecting left lower extremity 06/07/2018  Lumbar back pain with radiculopathy affecting right lower extremity 06/07/2018  Vitamin D deficiency 06/07/2018  Cervical radiculopathy due to degenerative joint disease of spine 06/07/2018  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  Stenosis of both internal carotid arteries 03/13/2017  Benign essential tremor syndrome 02/21/2017  Tension vascular headache 02/21/2017  Hyperthyroidism 02/21/2017  Osteoporosis 08/31/2016  MGUS (monoclonal gammopathy of unknown significance) 10/04/2013  Chronic tension headaches 06/25/2013  Falls 06/25/2013  Lumbar stenosis 08/03/2011  
  
    
Past Medical History:  
Diagnosis Date  Adverse effect of anesthesia    
  sleep apnea no cpap machine useage  Anemia    
  has had iron infusions; Hem/onc Dr Boyer Medico [phone]  Anxiety and depression    
 GERD (gastroesophageal reflux disease)    
 Hypercholesteremia    
 Hypertension    
 Limited peripheral vision of right eye    
  complication of cataract surgery  Long term current use of anticoagulant therapy    
 Meniere disease    
  MGUS (monoclonal gammopathy of unknown significance)    
 Monoclonal gammopathy of unknown significance    
  hem/onc:  Dr Norwood Comfort [id number]  Osteoporosis    
 Psoriasis    
  bilateral Legs  Restless leg    
 Skin melanoma ([facility] [address] 2012  
  (Rt ankle) and basal cell (Lt eye) removed  Transient ischemic attack approx 2005  
  HAS HAD 2 MINISTROKES-NO DEFICITS; neuro Dr Betito Carpenter (cc)  Unspecified adverse effect of anesthesia    
  HAS NEEDED PORT OR CUTDOWN FOR RECVNG BLOOD OR LONG-TERM IVs; surgery 7/28/15 w/o any difficulty  Unspecified sleep apnea    
  does not use CPAP  
  
  
  
Core Measures: 
  
CVA: Yes Yes CHF:No No 
PNA:No No 
  
Post Op Surgical (If Applicable):  
  
Number times ambulated in hallway past shift:  0 Number of times OOB to chair past shift:   0 
NG Tube: No 
Incentive Spirometer: No 
Drains: No   Volume  0 Dressing Present:  No 
Flatus:  Not applicable 
  
Activity Status: 
  
OOB to Chair No 
Ambulated this shift Yes Bed Rest No 
  
Supplemental O2: (If Applicable) 
  
NC No 
NRB No 
Venti-mask No 
On 0 Liters/min 
  
  
LINES AND DRAINS: 
  
PIV 
 
DVT prophylaxis:  
  
DVT prophylaxis Med- Yes DVT prophylaxis SCD or BRIT- No  
  
Wounds: (If Applicable) 
  
Wounds- No 
  
Location 0 
  
Patient Safety: 
  
Falls Score Total Score: 2 Safety Level_______ Bed Alarm On? Yes Sitter?  No 
  
Plan for upcoming shift: safety, home with home health  
  
Discharge Plan: Yes TBD 
  
Active Consults: 
IP CONSULT TO HOSPITALIST 
IP CONSULT TO NEUROLOGY

## 2019-11-08 ENCOUNTER — APPOINTMENT (OUTPATIENT)
Dept: NON INVASIVE DIAGNOSTICS | Age: 79
DRG: 041 | End: 2019-11-08
Attending: NURSE PRACTITIONER
Payer: MEDICARE

## 2019-11-08 LAB
ECHO EST RA PRESSURE: 10 MMHG
ECHO PULMONARY ARTERY SYSTOLIC PRESSURE (PASP): 34.4 MMHG
ECHO RIGHT VENTRICULAR SYSTOLIC PRESSURE (RVSP): 34.4 MMHG
ECHO TV REGURGITANT MAX VELOCITY: 247.1 CM/S
ECHO TV REGURGITANT PEAK GRADIENT: 24.4 MMHG

## 2019-11-08 PROCEDURE — 74011250637 HC RX REV CODE- 250/637: Performed by: HOSPITALIST

## 2019-11-08 PROCEDURE — 94760 N-INVAS EAR/PLS OXIMETRY 1: CPT

## 2019-11-08 PROCEDURE — 93312 ECHO TRANSESOPHAGEAL: CPT

## 2019-11-08 PROCEDURE — 65270000029 HC RM PRIVATE

## 2019-11-08 PROCEDURE — 74011250636 HC RX REV CODE- 250/636: Performed by: INTERNAL MEDICINE

## 2019-11-08 PROCEDURE — 74011250637 HC RX REV CODE- 250/637: Performed by: INTERNAL MEDICINE

## 2019-11-08 PROCEDURE — 92507 TX SP LANG VOICE COMM INDIV: CPT

## 2019-11-08 PROCEDURE — 99152 MOD SED SAME PHYS/QHP 5/>YRS: CPT

## 2019-11-08 PROCEDURE — 77010033678 HC OXYGEN DAILY

## 2019-11-08 PROCEDURE — 99153 MOD SED SAME PHYS/QHP EA: CPT

## 2019-11-08 PROCEDURE — 74011000250 HC RX REV CODE- 250: Performed by: INTERNAL MEDICINE

## 2019-11-08 RX ORDER — MIDAZOLAM HYDROCHLORIDE 1 MG/ML
.5-2 INJECTION, SOLUTION INTRAMUSCULAR; INTRAVENOUS
Status: DISCONTINUED | OUTPATIENT
Start: 2019-11-08 | End: 2019-11-08

## 2019-11-08 RX ORDER — FENTANYL CITRATE 50 UG/ML
12.5-5 INJECTION, SOLUTION INTRAMUSCULAR; INTRAVENOUS
Status: DISCONTINUED | OUTPATIENT
Start: 2019-11-08 | End: 2019-11-08

## 2019-11-08 RX ORDER — LIDOCAINE HYDROCHLORIDE 20 MG/ML
15 SOLUTION OROPHARYNGEAL AS NEEDED
Status: DISCONTINUED | OUTPATIENT
Start: 2019-11-08 | End: 2019-11-08

## 2019-11-08 RX ADMIN — MIDAZOLAM 1 MG: 1 INJECTION INTRAMUSCULAR; INTRAVENOUS at 09:30

## 2019-11-08 RX ADMIN — ROPINIROLE HYDROCHLORIDE 1 MG: 1 TABLET, FILM COATED ORAL at 22:00

## 2019-11-08 RX ADMIN — BENZOCAINE, BUTAMBEN, AND TETRACAINE HYDROCHLORIDE 1 SPRAY: .028; .004; .004 AEROSOL, SPRAY TOPICAL at 09:23

## 2019-11-08 RX ADMIN — ESCITALOPRAM OXALATE 20 MG: 10 TABLET ORAL at 21:59

## 2019-11-08 RX ADMIN — ATORVASTATIN CALCIUM 40 MG: 40 TABLET, FILM COATED ORAL at 22:00

## 2019-11-08 RX ADMIN — FENTANYL CITRATE 25 MCG: 50 INJECTION, SOLUTION INTRAMUSCULAR; INTRAVENOUS at 09:31

## 2019-11-08 RX ADMIN — CLOPIDOGREL BISULFATE 75 MG: 75 TABLET ORAL at 22:00

## 2019-11-08 RX ADMIN — LIDOCAINE HYDROCHLORIDE 15 ML: 20 SOLUTION ORAL; TOPICAL at 09:21

## 2019-11-08 RX ADMIN — MIDAZOLAM 1 MG: 1 INJECTION INTRAMUSCULAR; INTRAVENOUS at 09:48

## 2019-11-08 RX ADMIN — PRIMIDONE 50 MG: 50 TABLET ORAL at 21:59

## 2019-11-08 RX ADMIN — ENOXAPARIN SODIUM 40 MG: 40 INJECTION SUBCUTANEOUS at 17:35

## 2019-11-08 NOTE — PROGRESS NOTES
Problem: Patient Education: Go to Patient Education Activity  Goal: Patient/Family Education  Outcome: Progressing Towards Goal     Problem: TIA/CVA Stroke: 0-24 hours  Goal: Off Pathway (Use only if patient is Off Pathway)  Outcome: Progressing Towards Goal  Goal: Activity/Safety  Outcome: Progressing Towards Goal  Goal: Consults, if ordered  Outcome: Progressing Towards Goal  Goal: Diagnostic Test/Procedures  Outcome: Progressing Towards Goal  Goal: Nutrition/Diet  Outcome: Progressing Towards Goal  Goal: Discharge Planning  Outcome: Progressing Towards Goal  Goal: Medications  Outcome: Progressing Towards Goal  Goal: Respiratory  Outcome: Progressing Towards Goal  Goal: Treatments/Interventions/Procedures  Outcome: Progressing Towards Goal  Goal: Minimize risk of bleeding post-thrombolytic infusion  Outcome: Progressing Towards Goal  Goal: Monitor for complications post-thrombolytic infusion  Outcome: Progressing Towards Goal  Goal: Psychosocial  Outcome: Progressing Towards Goal  Goal: *Hemodynamically stable  Outcome: Progressing Towards Goal  Goal: *Neurologically stable  Description  Absence of additional neurological deficits    Outcome: Progressing Towards Goal  Goal: *Verbalizes anxiety and depression are reduced or absent  Outcome: Progressing Towards Goal  Goal: *Absence of Signs of Aspiration on Current Diet  Outcome: Progressing Towards Goal  Goal: *Absence of deep venous thrombosis signs and symptoms(Stroke Metric)  Outcome: Progressing Towards Goal  Goal: *Ability to perform ADLs and demonstrates progressive mobility and function  Outcome: Progressing Towards Goal  Goal: *Stroke education started(Stroke Metric)  Outcome: Progressing Towards Goal  Goal: *Dysphagia screen performed(Stroke Metric)  Outcome: Progressing Towards Goal  Goal: *Rehab consulted(Stroke Metric)  Outcome: Progressing Towards Goal     Problem: TIA/CVA Stroke: Day 2 Until Discharge  Goal: Off Pathway (Use only if patient is Off Pathway)  Outcome: Progressing Towards Goal  Goal: Activity/Safety  Outcome: Progressing Towards Goal  Goal: Diagnostic Test/Procedures  Outcome: Progressing Towards Goal  Goal: Nutrition/Diet  Outcome: Progressing Towards Goal  Goal: Discharge Planning  Outcome: Progressing Towards Goal  Goal: Medications  Outcome: Progressing Towards Goal  Goal: Respiratory  Outcome: Progressing Towards Goal  Goal: Treatments/Interventions/Procedures  Outcome: Progressing Towards Goal  Goal: Psychosocial  Outcome: Progressing Towards Goal  Goal: *Verbalizes anxiety and depression are reduced or absent  Outcome: Progressing Towards Goal  Goal: *Absence of aspiration  Outcome: Progressing Towards Goal  Goal: *Absence of deep venous thrombosis signs and symptoms(Stroke Metric)  Outcome: Progressing Towards Goal  Goal: *Optimal pain control at patient's stated goal  Outcome: Progressing Towards Goal  Goal: *Tolerating diet  Outcome: Progressing Towards Goal  Goal: *Ability to perform ADLs and demonstrates progressive mobility and function  Outcome: Progressing Towards Goal  Goal: *Stroke education continued(Stroke Metric)  Outcome: Progressing Towards Goal     Problem: Ischemic Stroke: Discharge Outcomes  Goal: *Verbalizes anxiety and depression are reduced or absent  Outcome: Progressing Towards Goal  Goal: *Verbalize understanding of risk factor modification(Stroke Metric)  Outcome: Progressing Towards Goal  Goal: *Hemodynamically stable  Outcome: Progressing Towards Goal  Goal: *Absence of aspiration pneumonia  Outcome: Progressing Towards Goal  Goal: *Aware of needed dietary changes  Outcome: Progressing Towards Goal  Goal: *Verbalize understanding of prescribed medications including anti-coagulants, anti-lipid, and/or anti-platelets(Stroke Metric)  Outcome: Progressing Towards Goal  Goal: *Tolerating diet  Outcome: Progressing Towards Goal  Goal: *Aware of follow-up diagnostics related to anticoagulants  Outcome: Progressing Towards Goal  Goal: *Ability to perform ADLs and demonstrates progressive mobility and function  Outcome: Progressing Towards Goal  Goal: *Absence of DVT(Stroke Metric)  Outcome: Progressing Towards Goal  Goal: *Absence of aspiration  Outcome: Progressing Towards Goal  Goal: *Optimal pain control at patient's stated goal  Outcome: Progressing Towards Goal  Goal: *Home safety concerns addressed  Outcome: Progressing Towards Goal  Goal: *Describes available resources and support systems  Outcome: Progressing Towards Goal  Goal: *Verbalizes understanding of activation of EMS(911) for stroke symptoms(Stroke Metric)  Outcome: Progressing Towards Goal  Goal: *Understands and describes signs and symptoms to report to providers(Stroke Metric)  Outcome: Progressing Towards Goal  Goal: *Neurolgocially stable (absence of additional neurological deficits)  Outcome: Progressing Towards Goal  Goal: *Verbalizes importance of follow-up with primary care physician(Stroke Metric)  Outcome: Progressing Towards Goal  Goal: *Smoking cessation discussed,if applicable(Stroke Metric)  Outcome: Progressing Towards Goal  Goal: *Depression screening completed(Stroke Metric)  Outcome: Progressing Towards Goal     Problem: Falls - Risk of  Goal: *Absence of Falls  Description  Document Dany Fall Risk and appropriate interventions in the flowsheet.   Outcome: Progressing Towards Goal  Note:   Fall Risk Interventions:  Mobility Interventions: Bed/chair exit alarm, OT consult for ADLs, Patient to call before getting OOB, PT Consult for mobility concerns, PT Consult for assist device competence              Elimination Interventions: Bed/chair exit alarm, Call light in reach, Patient to call for help with toileting needs, Stay With Me (per policy), Toilet paper/wipes in reach, Toileting schedule/hourly rounds              Problem: Patient Education: Go to Patient Education Activity  Goal: Patient/Family Education  Outcome: Progressing Towards Goal     Problem: Pressure Injury - Risk of  Goal: *Prevention of pressure injury  Description  Document Mega Scale and appropriate interventions in the flowsheet. Outcome: Progressing Towards Goal  Note:   Pressure Injury Interventions:  Sensory Interventions: Keep linens dry and wrinkle-free         Activity Interventions: Increase time out of bed, PT/OT evaluation    Mobility Interventions: PT/OT evaluation, Turn and reposition approx.  every two hours(pillow and wedges)    Nutrition Interventions: Document food/fluid/supplement intake                     Problem: Patient Education: Go to Patient Education Activity  Goal: Patient/Family Education  Outcome: Progressing Towards Goal     Problem: Patient Education: Go to Patient Education Activity  Goal: Patient/Family Education  Outcome: Progressing Towards Goal     Problem: Patient Education: Go to Patient Education Activity  Goal: Patient/Family Education  Outcome: Progressing Towards Goal     Problem: Patient Education: Go to Patient Education Activity  Goal: Patient/Family Education  Outcome: Progressing Towards Goal

## 2019-11-08 NOTE — PROGRESS NOTES
PT note:     Chart reviewed and spoke with nursing. Patient currently off the floor for ALLIE. Will defer and continue to follow. Recommend IP rehab at discharge.      Mavis Adams, PT, DPT

## 2019-11-08 NOTE — PROGRESS NOTES
Problem: Communication Impaired (Adult)  Goal: *Acute Goals and Plan of Care (Insert Text)  Description  Speech Therapy Goals  Initiated 11/7/2019  WITHIN 7 DAYS, PATIENT WILL:  1. Complete moderate level divergent naming/word fluency tasks, with goal of 5, 80% accuracy. 2.  Use given word in a sentence with correct usage and fluent speech, using pacing, 90%  3. Learn 3 dysarthria strategies and restate    Outcome: Progressing Towards Goal   SPEECH LANGUAGE PATHOLOGY TREATMENT  Patient: Maria E Condon (75 y.o. female)  Date: 11/8/2019  Diagnosis: [facility] [I63.9] <principal problem not specified>  Procedure(s) (LRB):  LOOP RECORDER INSERT (N/A) 2 Days Post-Op    ASSESSMENT:  Pt seen today for therapy. Her speech was generally fluent and appropriate. Once incidence of word finding; for stroke. Good divergent naming. Listed 10 items per category. Speech was fully intelligible but her voice is hoarse intermittently. Only 1-2 instances of dysfluency. PLAN:  Patient continues to benefit from skilled intervention to address the above impairments. Continue treatment per established plan of care. Discharge Recommendations: To Be Determined     SUBJECTIVE:   Patient stated she had some word finding since this stroke. OBJECTIVE:   Mental Status:  Neurologic State: Alert  Orientation Level: Oriented X4  Cognition: Follows commands  Perception: Appears intact  Perseveration: No perseveration noted  Safety/Judgement: Awareness of environment  Treatment & Interventions:   Motor Speech:     Intelligibility: No impairment                 Speech Characteristics: Word retrieval(one instance/couldn't think of \"stroke\")  Apraxic Characteristics: None  Dysarthric Characteristics: None     Language Comprehension and Expression:     Verbal Expression  Primary Mode of Expression: Verbal  Initiation: No impairment                  Naming: (listed 10 items per category; goal was 5) Sentence Formulation: No impairment              Response & Tolerance to Activities:     Pain:  Pain Scale 1: Numeric (0 - 10)  Pain Intensity 1: 0     After treatment:   ?       Patient left in no apparent distress sitting up in chair  ? Patient left in no apparent distress in bed  ? Call bell left within reach  ? Nursing notified  ? Caregiver present  ?        Bed alarm activated    COMMUNICATION/COLLABORATION:     The patients plan of care was discussed with: Registered Nurse    Elizabeth Arzate SLP  Time Calculation: 20 mins

## 2019-11-08 NOTE — PROGRESS NOTES
Pharmacy Clarification of Prior to Admission Medication Regimen     The patient was interviewed regarding clarification of the prior to admission medication regimen. Children and  were present in room and obtained permission from patient to discuss drug regimen with visitor(s) present. Patient was questioned regarding use of any other inhalers, topical products, over the counter medications, herbal medications, vitamin products or ophthalmic/nasal/otic medication use. Information Obtained From: Patient, RX Query    Pertinent Pharmacy Findings:  albuterol (PROVENTIL HFA, VENTOLIN HFA, PROAIR HFA) 90 mcg/actuation inhaler: Patient stated she has this PRN agent at home, but was unable to stated the last administration    PTA medication list was corrected to the following:     Prior to Admission Medications   Prescriptions Last Dose Informant Patient Reported? Taking? albuterol (PROVENTIL HFA, VENTOLIN HFA, PROAIR HFA) 90 mcg/actuation inhaler Not Taking at Unknown time Self No No   Sig: Take 1 Puff by inhalation every four (4) hours as needed for Wheezing. atorvastatin (LIPITOR) 20 mg tablet 2019 at Unknown time Self Yes Yes   Sig: Take 20 mg by mouth nightly. clonazePAM (KLONOPIN) 0.5 mg tablet 2019 at Unknown time Self No Yes   Sig: TAKE 1 TABLET BY MOUTH TWICE DAILY. MAXIMUM DAILY AMOUNT: 1 MG   clopidogrel (PLAVIX) 75 mg tab 2019 at Unknown time Self Yes Yes   Sig: Take 75 mg by mouth nightly. escitalopram oxalate (LEXAPRO) 20 mg tablet 2019 at Unknown time Self No Yes   Sig: Take 1 Tab by mouth nightly. Indications: Anxiousness associated with Depression   fluticasone propionate (FLONASE) 50 mcg/actuation nasal spray 2019 at Unknown time Self No Yes   Si Sprays by Both Nostrils route daily. lisinopril (PRINIVIL, ZESTRIL) 2.5 mg tablet 2019 at Unknown time Self Yes Yes   Sig: Take 2.5 mg by mouth nightly.    multivit/folic acid/vit K1 (ONE-A-DAY WOMEN'S 50 PLUS PO) 11/4/2019 at Unknown time Self Yes Yes   Sig: Take 1 Tab by mouth daily. nortriptyline (PAMELOR) 10 mg capsule 11/4/2019 at Unknown time Self No Yes   Sig: Take 1-2 Caps by mouth nightly. pantoprazole (PROTONIX) 40 mg tablet 11/4/2019 at Unknown time Self No Yes   Sig: Take 1 Tab by mouth every morning. Indications: gastroesophageal reflux disease   polyethylene glycol (MIRALAX) 17 gram packet 11/1/2019 at Unknown time Self Yes Yes   Sig: Take 17 g by mouth daily as needed (constipation). primidone (MYSOLINE) 50 mg tablet 11/4/2019 at Unknown time Self Yes Yes   Sig: Take 50 mg by mouth nightly. rOPINIRole (REQUIP) 1 mg tablet 11/4/2019 at Unknown time Self No Yes   Sig: Take 1 Tab by mouth nightly. verapamil ER (CALAN-SR) 120 mg tablet 11/4/2019 at Unknown time Self No Yes   Sig: Take 1 Tab by mouth nightly.  Indications: high blood pressure      Facility-Administered Medications: None          Thank you,  Delmer Loredo CPhT  Medication History Pharmacy Technician

## 2019-11-08 NOTE — ROUTINE PROCESS
Bedside and Verbal shift change report given to [address] [place] [address] [address] (oncoming nurse) by Kenna Costa (offgoing nurse). Report included the following information SBAR, Kardex, Recent Results, Med Rec Status and Cardiac Rhythm SR. 
  
Zone Phone:   [phone] 
  
Significant changes during shift: none, PT NPO @ 0000  
Patient Information 
  Lester Monreal 67 y.o. 
11/5/2019 10:34 AM by Eric Sutton MD. Lester Monreal was admitted from Home 
  
Problem List 
  
    
Patient Active Problem List  
  Diagnosis Date Noted  CVA (cerebral vascular accident) ([facility] [address] 11/05/2019  Ulnar neuropathy at elbow of right upper extremity 07/25/2018  Cervical post-laminectomy syndrome 06/07/2018  Idiopathic small and large fiber sensory neuropathy 06/07/2018  Lumbar back pain with radiculopathy affecting left lower extremity 06/07/2018  Lumbar back pain with radiculopathy affecting right lower extremity 06/07/2018  Vitamin D deficiency 06/07/2018  Cervical radiculopathy due to degenerative joint disease of spine 06/07/2018  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  Stenosis of both internal carotid arteries 03/13/2017  Benign essential tremor syndrome 02/21/2017  Tension vascular headache 02/21/2017  Hyperthyroidism 02/21/2017  Osteoporosis 08/31/2016  MGUS (monoclonal gammopathy of unknown significance) 10/04/2013  Chronic tension headaches 06/25/2013  Falls 06/25/2013  Lumbar stenosis 08/03/2011  
  
    
Past Medical History:  
Diagnosis Date  Adverse effect of anesthesia    
  sleep apnea no cpap machine useage  Anemia    
  has had iron infusions; Hem/onc Dr [facility] [phone]  Anxiety and depression    
 GERD (gastroesophageal reflux disease)    
 Hypercholesteremia    
 Hypertension    
 Limited peripheral vision of right eye    
  complication of cataract surgery  Long term current use of anticoagulant therapy    
 Meniere disease    
  MGUS (monoclonal gammopathy of unknown significance)    
 Monoclonal gammopathy of unknown significance    
  hem/onc:  Dr [place] Comfort [phone]  Osteoporosis    
 Psoriasis    
  bilateral Legs  Restless leg    
 Skin melanoma ([address] 2012  
  (Rt ankle) and basal cell (Lt eye) removed  Transient ischemic attack approx 2005  
  HAS HAD 2 MINISTROKES-NO DEFICITS; neuro Dr Betito Carpenter (cc)  Unspecified adverse effect of anesthesia    
  HAS NEEDED PORT OR CUTDOWN FOR RECVNG BLOOD OR LONG-TERM IVs; surgery 7/28/15 w/o any difficulty  Unspecified sleep apnea    
  does not use CPAP Core Measures: 
  
CVA: Yes Yes CHF:No No 
PNA:No No 
  
Post Op Surgical (If Applicable):  
  
Number times ambulated in hallway past shift:  0 Number of times OOB to chair past shift:   0 
NG Tube: No 
Incentive Spirometer: No 
Drains: No   Volume  0 Dressing Present:  No 
Flatus:  Not applicable 
  
Activity Status: 
  
OOB to Chair No 
Ambulated this shift Yes Bed Rest No 
  
Supplemental O2: (If Applicable) 
  
NC No 
NRB No 
Venti-mask No 
On 0 Liters/min 
  
LINES AND DRAINS: 
  
PIV 
 
DVT prophylaxis:  
  
DVT prophylaxis Med- Yes DVT prophylaxis SCD or BRIT- No  
  
Wounds: (If Applicable) 
  
Wounds- No 
  
Location 0 
  
Patient Safety: 
  
Falls Score Total Score: 2 Safety Level_______ Bed Alarm On? Yes Sitter?  No 
  
Plan for upcoming shift: safety, home with home health, ALLIE 
  
Discharge Plan: Yes TBD 
  
Active Consults: 
IP CONSULT TO HOSPITALIST 
IP CONSULT TO NEUROLOGY

## 2019-11-08 NOTE — PROCEDURES
[address]  EEG    Name:  Belia Mattson  MR#:  [id number]  :  1940  ACCOUNT #:  [de-identified]  DATE OF SERVICE:  2019    CLINICAL INDICATION:  The patient is a 76-year-old female with a history of sudden confusion, sudden weakness on her right side. The patient with confusion, altered mental status, loss of speech. EEG to rule out seizures, rule out partial complex seizures, rule out encephalopathy, rule out convulsion. EEG CLASSIFICATION:  In this patient is dysrhythmia grade II, maximum in mid frontotemporal area. DESCRIPTION OF THE RECORD:  The background of this recording contains a posteriorly located occipital alpha rhythm of 8-9 Hz that did attenuate some with eye opening. This was a 16-channel recording to rule out seizures on the patient. There was continuous EKG monitoring throughout the recording also. During the recording, there was an area of focal slowing seen in the left frontotemporal region where there was a slight increase in some irregular 3-6 Hz activity seen that was somewhat accentuated by periods of drowsiness. There were no clear spike or spike-and-wave discharges seen. The patient did enter brief states of sleep with K complexes and sleep spindles seen in the central head regions. Hyperventilation was not performed. Photic stimulation produced a very mild driving response in the posterior head regions. INTERPRETATION:  This is a mildly abnormal electroencephalogram due to some focal slowing in the left hemisphere, maximum in the left frontotemporal region, consistent with the patient's history of recent stroke in that area. There were no clear areas of spike or spike-and-wave discharges. No recorded spells of any type. Clinical correlation is recommended. Mushtaq Randhawa MD      [id number]  D:  2019 20:38  T:  2019 4:52  JOB #:  [id number]  CC:   Billy Redd MD

## 2019-11-08 NOTE — PROGRESS NOTES
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 Cervical right internal carotid artery: Mixed plaque with 75% stenosis by NASCET  Criteria. MRI brain shows Acute left posterior frontal/parietal operculum and posterior insula cortical Infarct.  -Echo normal, on obvious shunting on bubble study  -LDL 79, A1c 5.3  -seen by neurology:  R ICA stenosis, asymptomatic, follow OP   MRI with 2 strokes - this does suggest cardioembolic phenomena. cont Plavix and statin  -PT: SAH   -S/P ILR. ALLIE  wo thrombus      HTN  -BP stable  -cont home verapamil/  lisinopril      Anxiety / Depression, continue meds  FRED  RLS, cont Requip and mysoline  Meniere's   MGUS     Code Status: Full   Surrogate Decision Maker: children  DVT Prophylaxis: Lovenox      Baseline: primary care giver for  who has dementia     Subjective:     Chief Complaint / Reason for Physician Visit: stroke/ HTN   Sx: slowly improving     Discussed with RN events overnight. Review of Systems:  Symptom Y/N Comments  Symptom Y/N Comments   Fever/Chills n   Chest Pain n    Poor Appetite    Edema     Cough    Abdominal Pain n    Sputum    Joint Pain     SOB/HERNANDES n   Pruritis/Rash     Nausea/vomit    Tolerating PT/OT     Diarrhea    Tolerating Diet     Constipation    Other       Could NOT obtain due to:      Objective:     VITALS:   Last 24hrs VS reviewed since prior progress note.  Most recent are:  Patient Vitals for the past 24 hrs:   Temp Pulse Resp BP SpO2   19 1518 97.6 °F (36.4 °C) 77 16 148/72 98 %   11/08/19 1132 97.9 °F (36.6 °C) 73 16 147/78 95 %   11/08/19 1040  68 12 141/60 95 %   11/08/19 1035  70 12 142/64 94 %   11/08/19 1030  67 14 140/63 94 %   11/08/19 1025  72 12 140/63 95 %   11/08/19 1020  70 9 124/69 95 %   11/08/19 1015  68 12 138/59 97 %   11/08/19 1010  67 8 134/60 97 %   11/08/19 1005  69 10 138/63 97 %   11/08/19 1000  74 12 151/60 98 %   11/08/19 0955  81 13 181/84 99 %   11/08/19 0950  83 13 (!) 203/98 99 %   11/08/19 0945  76 14 153/76 98 %   11/08/19 0940  73 13 152/77 100 %   11/08/19 0935  74 11 155/79 100 %   11/08/19 0930  84 13 (!) 210/94 100 %   11/08/19 0925  82 18  100 %   11/08/19 0920  73 16  99 %   11/08/19 0915  72 14 151/71 99 %   11/08/19 0910  78 11 136/77 100 %   11/08/19 0905  79 14  100 %   11/08/19 0900  91 21 136/77 100 %   11/08/19 0845  74 11 160/67 100 %   11/08/19 0824  76 12 169/73 99 %   11/08/19 0740 97.7 °F (36.5 °C) 74 18 167/81 98 %   11/08/19 0445 97.8 °F (36.6 °C) 75 18 148/75 99 %   11/07/19 2349 98.7 °F (37.1 °C) 76 18 163/75 96 %   11/07/19 2023 98.3 °F (36.8 °C) 86 18 174/74 96 %   11/07/19 1554 98 °F (36.7 °C) 75 17 154/74 97 %     No intake or output data in the 24 hours ending 11/08/19 1530     PHYSICAL EXAM:  General: Alert, cooperative, no acute distress    EENT:  EOMI. Anicteric sclerae. MMM  Resp:  CTA bilaterally   CV:  Regular  rhythm,  No edema  GI:  Soft, Non distended, Non tender.  +Bowel sounds  Neurologic:  Alert and oriented X 3, dysarthria, left side strength 4+/5 and right side 5/5  Psych:   Good insight. Not anxious nor agitated  Skin:  No rashes.   No jaundice    Reviewed most current lab test results and cultures  YES  Reviewed most current radiology test results   YES  Review and summation of old records today    NO  Reviewed patient's current orders and MAR    YES  PMH/SH reviewed - no change compared to H&P  ________________________________________________________________________  Care Plan discussed with:    Comments   Patient x    Family  x Bedside multiple family members    RN x    Care Manager     Consultant  x                     x Multidiciplinary team rounds were held today with , nursing, pharmacist and clinical coordinator. Patient's plan of care was discussed; medications were reviewed and discharge planning was addressed. ________________________________________________________________________  Total NON critical care TIME: 25   Minutes    Total CRITICAL CARE TIME Spent:   Minutes non procedure based      Comments   >50% of visit spent in counseling and coordination of care     ________________________________________________________________________  Ortiz Denis MD     Procedures: see electronic medical records for all procedures/Xrays and details which were not copied into this note but were reviewed prior to creation of Plan. LABS:  I reviewed today's most current labs and imaging studies. Pertinent labs include:  No results for input(s): WBC, HGB, HCT, PLT, HGBEXT, HCTEXT, PLTEXT, HGBEXT, HCTEXT, PLTEXT in the last 72 hours. No results for input(s): NA, K, CL, CO2, GLU, BUN, CREA, CA, MG, PHOS, ALB, TBIL, TBILI, SGOT, ALT, INR, INREXT, INREXT in the last 72 hours.     Signed: Ortiz Denis MD

## 2019-11-08 NOTE — PROGRESS NOTES
OT Note:    Chart reviewed. Pt currently SAIRA for ALLIE. Will defer and continue to follow.     [facility], OTR/L

## 2019-11-08 NOTE — PROGRESS NOTES
CHRISTIANNE:    Inpatient Rehab  2nd  Medicare Letter    CM informed that pt's clinicals have been submitted for auth through her insurance company. CM will continue to follow up.     FREDDY Rodriguez, [address] [place] [address]

## 2019-11-08 NOTE — PROGRESS NOTES
Patient arrived to Non-Invasive Cardiology Lab for In Patient ALLIE Procedure. Staff introduced to patient. Patient identifiers verified with Name and Date of Birth. Procedure verified with patient. Consent forms reviewed and signed by patient or authorized representative and verified. Allergies verified. Patient informed of procedure and plan of care. Questions answered with review. Patient on cardiac monitor, non-invasive blood pressure, SPO2 monitor. On 2l NC. Patient is A&Ox3. Patient reports no complaints. Patient on bed, in low position, with side rails up. Patient instructed to call for assistance as needed. Family in waiting room.

## 2019-11-08 NOTE — PROGRESS NOTES
TRANSFER - OUT REPORT:    Verbal report given to Rola Gonsalves on Jackie Hays being transferred to [address] [place] [address] for routine progression of care       Report consisted of patient's Situation, Background, Assessment and   Recommendations(SBAR). Information from the following report(s) Kardex, Procedure Summary, Intake/Output, MAR, Recent Results, Med Rec Status, Cardiac Rhythm NSR and Procedure Verification was reviewed with the receiving nurse. Opportunity for questions and clarification was provided.       Patient transported with:   [email or web address]

## 2019-11-08 NOTE — ROUTINE PROCESS
Bedside and Verbal shift change report given to [address] (oncoming nurse) by Tasia Flood (offgoing nurse). Report included the following information SBAR, Kardex, Recent Results, Med Rec Status and Cardiac Rhythm SR. 
  
Zone Phone:   [phone] 
  
Significant changes during shift: resumed diet, ALLIE was completed, waiting for [facility] verification Patient Information 
  Rodrigue Sanchez 67 y.o. 
11/5/2019 10:34 AM by Fredrick Feliciano MD. Rodrigue Sanchez was admitted from Home 
  
Problem List 
  
    
Patient Active Problem List  
  Diagnosis Date Noted  CVA (cerebral vascular accident) ([facility] [address] 11/05/2019  Ulnar neuropathy at elbow of right upper extremity 07/25/2018  Cervical post-laminectomy syndrome 06/07/2018  Idiopathic small and large fiber sensory neuropathy 06/07/2018  Lumbar back pain with radiculopathy affecting left lower extremity 06/07/2018  Lumbar back pain with radiculopathy affecting right lower extremity 06/07/2018  Vitamin D deficiency 06/07/2018  Cervical radiculopathy due to degenerative joint disease of spine 06/07/2018  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  Stenosis of both internal carotid arteries 03/13/2017  Benign essential tremor syndrome 02/21/2017  Tension vascular headache 02/21/2017  Hyperthyroidism 02/21/2017  Osteoporosis 08/31/2016  MGUS (monoclonal gammopathy of unknown significance) 10/04/2013  Chronic tension headaches 06/25/2013  Falls 06/25/2013  Lumbar stenosis 08/03/2011  
  
    
Past Medical History:  
Diagnosis Date  Adverse effect of anesthesia    
  sleep apnea no cpap machine useage  Anemia    
  has had iron infusions; Hem/onc Dr Jeannie Arzate [phone]  Anxiety and depression    
 GERD (gastroesophageal reflux disease)    
 Hypercholesteremia    
 Hypertension    
 Limited peripheral vision of right eye    
  complication of cataract surgery  Long term current use of anticoagulant therapy    
 Meniere disease    
  MGUS (monoclonal gammopathy of unknown significance)    
 Monoclonal gammopathy of unknown significance    
  hem/onc:  Dr Monica Hopkins [phone]  Osteoporosis    
 Psoriasis    
  bilateral Legs  Restless leg    
 Skin melanoma ([address] 2012  
  (Rt ankle) and basal cell (Lt eye) removed  Transient ischemic attack approx 2005  
  HAS HAD 2 MINISTROKES-NO DEFICITS; neuro Dr Tobias Rosas (cc)  Unspecified adverse effect of anesthesia    
  HAS NEEDED PORT OR CUTDOWN FOR RECVNG BLOOD OR LONG-TERM IVs; surgery 7/28/15 w/o any difficulty  Unspecified sleep apnea    
  does not use CPAP Core Measures: 
  
CVA: Yes Yes CHF:No No 
PNA:No No 
  
Post Op Surgical (If Applicable):  
  
Number times ambulated in hallway past shift:  0 Number of times OOB to chair past shift:   0 
NG Tube: No 
Incentive Spirometer: No 
Drains: No   Volume  0 Dressing Present:  No 
Flatus:  Not applicable 
  
Activity Status: 
  
OOB to Chair No 
Ambulated this shift Yes Bed Rest No 
  
Supplemental O2: (If Applicable) 
  
NC No 
NRB No 
Venti-mask No 
On 0 Liters/min 
  
LINES AND DRAINS: 
  
PIV 
 
DVT prophylaxis:  
  
DVT prophylaxis Med- Yes DVT prophylaxis SCD or BRIT- No  
  
Wounds: (If Applicable) 
  
Wounds- No 
  
Location 0 
  
Patient Safety: 
  
Falls Score Total Score: 2 Safety Level_______ Bed Alarm On? Yes Sitter?  No 
  
Plan for upcoming shift: safety 
  
Discharge Plan: Yes TBD 
  
Active Consults: 
IP CONSULT TO HOSPITALIST 
IP CONSULT TO NEUROLOGY

## 2019-11-09 PROCEDURE — 74011250637 HC RX REV CODE- 250/637: Performed by: INTERNAL MEDICINE

## 2019-11-09 PROCEDURE — 65270000029 HC RM PRIVATE

## 2019-11-09 PROCEDURE — 74011250637 HC RX REV CODE- 250/637: Performed by: HOSPITALIST

## 2019-11-09 PROCEDURE — 77010033678 HC OXYGEN DAILY

## 2019-11-09 PROCEDURE — 94760 N-INVAS EAR/PLS OXIMETRY 1: CPT

## 2019-11-09 PROCEDURE — 74011250636 HC RX REV CODE- 250/636: Performed by: INTERNAL MEDICINE

## 2019-11-09 RX ADMIN — GUAIFENESIN AND DEXTROMETHORPHAN 5 ML: 100; 10 SYRUP ORAL at 13:43

## 2019-11-09 RX ADMIN — ESCITALOPRAM OXALATE 20 MG: 10 TABLET ORAL at 22:08

## 2019-11-09 RX ADMIN — ENOXAPARIN SODIUM 40 MG: 40 INJECTION SUBCUTANEOUS at 17:13

## 2019-11-09 RX ADMIN — ROPINIROLE HYDROCHLORIDE 1 MG: 1 TABLET, FILM COATED ORAL at 22:08

## 2019-11-09 RX ADMIN — PRIMIDONE 50 MG: 50 TABLET ORAL at 22:07

## 2019-11-09 RX ADMIN — VERAPAMIL HYDROCHLORIDE 120 MG: 120 TABLET, FILM COATED, EXTENDED RELEASE ORAL at 08:35

## 2019-11-09 RX ADMIN — ATORVASTATIN CALCIUM 40 MG: 40 TABLET, FILM COATED ORAL at 22:07

## 2019-11-09 RX ADMIN — GUAIFENESIN AND DEXTROMETHORPHAN 5 ML: 100; 10 SYRUP ORAL at 22:08

## 2019-11-09 RX ADMIN — CLOPIDOGREL BISULFATE 75 MG: 75 TABLET ORAL at 22:07

## 2019-11-09 RX ADMIN — CLONAZEPAM 0.5 MG: 1 TABLET ORAL at 22:08

## 2019-11-09 RX ADMIN — GUAIFENESIN AND DEXTROMETHORPHAN 5 ML: 100; 10 SYRUP ORAL at 03:20

## 2019-11-09 RX ADMIN — LISINOPRIL 2.5 MG: 5 TABLET ORAL at 08:35

## 2019-11-09 NOTE — PROGRESS NOTES
Bedside and Verbal shift change report given to [address] [facility] (oncoming nurse) by Philipp Boswell RN (offgoing nurse).  Report included the following information SBAR, Kardex, Recent Results, Med Rec Status and Cardiac Rhythm SR.

## 2019-11-09 NOTE — PROGRESS NOTES
Problem: TIA/CVA Stroke: Day 2 Until Discharge  Goal: Activity/Safety  Outcome: Progressing Towards Goal  Goal: Diagnostic Test/Procedures  Outcome: Progressing Towards Goal  Goal: Nutrition/Diet  Outcome: Progressing Towards Goal  Goal: Discharge Planning  Outcome: Progressing Towards Goal

## 2019-11-09 NOTE — PROGRESS NOTES
Consult  REFERRED BY:  Michael Garcia MD    CHIEF COMPLAINT: Right-sided weakness and difficulty with speech      Subjective: Mohini Elizabeth is a 67 y.o. right-handed  female we are asked to evaluate by Dr. Hilda Brunner of new problem sudden weakness that came on when she was making coffee yesterday morning and was having difficulty speaking also. She denies any chest pain or palpitations or cardiac symptoms during that time. She came to the emergency room and had a CT of the head done that was essentially negative and a CTA was negative except for some moderate stenosis and 60 to 70% range of the right internal carotid artery which is a known finding from previous Dopplers this year, and since this is asymptomatic has been followed, and the patient was found to have some perfusion deficit in the left middle cerebral artery distribution. MRI scan does document 2 strokes, one in the left frontal opercular area and one in the temporal parietal area higher up and further back in 2 separate locations. This does suggest cardioembolic phenomena. Cardiology was seen and placed a loop monitor, and she had a normal ALLIE today. . Patient has a history of TIAs in the past but no clear history of stroke. The patient does not appear to have a history of significant heart disease. She does have a neuropathy that is thought to be secondary to her monoclonal gammopathy which is followed at [facility]. Patient also has a restless leg syndrome she has sleep apnea psoriasis and has decreased vision in the right eye from cataract surgery. She has hyperlipidemia and hypercholesterolemia may be some borderline diabetes. She has had degenerative arthritis, and a back operation and shoulder operation. Patient is somewhat better and her speech is better now, but she still has some speech deficit and weakness on the right side.   He denies any significant headache, fever, meningismus, trauma, or other causes of her strokelike symptoms now.  Patient unremarked increased stress and tension taking care of her  who has Alzheimer's disease  And continues to slowly improve, and disposition is being decided whether he needs inpatient or outpatient therapy. 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)     GERD (gastroesophageal reflux disease)     Hypercholesteremia     Hypertension     Idiopathic small and large fiber sensory neuropathy     Limited peripheral vision of right eye     complication of cataract surgery    Long term current use of anticoagulant therapy     Meniere disease     MGUS (monoclonal gammopathy of unknown significance)     Monoclonal gammopathy of unknown significance     hem/onc:  Dr Sharyl Sandifer [phone]    Osteoporosis     Psoriasis     bilateral Legs    Restless leg     Skin melanoma ([facility] [address] 2012    (Rt ankle) and basal cell (Lt eye) removed    Transient ischemic attack approx 2005    HAS HAD 2 MINISTROKES-NO DEFICITS; neuro Dr Hoa Patiño (cc)    Unspecified adverse effect of anesthesia     HAS NEEDED PORT OR CUTDOWN FOR RECVNG BLOOD OR LONG-TERM IVs; surgery 7/28/15 w/o any difficulty    Unspecified sleep apnea     does not use CPAP      Past Surgical History:   Procedure Laterality Date    HX APPENDECTOMY      HX CATARACT REMOVAL  2011    Right    HX CERVICAL FUSION  1/28/15    C5-7    HX CHOLECYSTECTOMY  2000    HX GI  2010    liver bx due to liver Bx due to elevated enzymes, liver laceration during Bx, hospitalized 10-12d    HX HEENT  7/28/15    Lt total parotidectomy with facial nerve dissection: Dr Jarvis Smith HX HEENT  11/2015    Right parotidectomy    HX HYSTERECTOMY      HX ORTHOPAEDIC  2009 & 2011    LUMBAR FUSION and revision    HX OTHER SURGICAL  2010    MELANOMA (Rt ankle) and basal cell (Lt eye) removed; q6mo ck by DERMATOLOGY    HX SHOULDER REPLACEMENT Left 2016    HX THYROIDECTOMY      Right    VT COLON CA SCRN NOT HI RSK IND  2016         VT INSERTION SUBQ CARDIAC RHYTHM MONITOR W/PRGRMG N/A 2019    LOOP RECORDER INSERT performed by David Lara MD at \[facility]"" CARDIAC CATH LAB     Family History   Problem Relation Age of Onset    Alcohol abuse Father     Emphysema Father     Cancer Sister         PANCREATIC    Alcohol abuse Brother     COPD Brother     Other Mother         UNKNOWN CAUSE OF DEATH    Heart Disease Mother     Breast Cancer Maternal Aunt     Anesth Problems Neg Hx       Social History     Tobacco Use    Smoking status: Former Smoker     Years: 15.00     Last attempt to quit: 7/15/1975     Years since quittin.3    Smokeless tobacco: Never Used   Substance Use Topics    Alcohol use: Yes     Comment: may have a glass of wine on rare occasion         Current Facility-Administered Medications:     clonazePAM (KlonoPIN) tablet 0.5 mg, 0.5 mg, Oral, QHS PRN, Rosalinda Mares MD, 0.5 mg at 19 2345    lisinopril (PRINIVIL, ZESTRIL) tablet 2.5 mg, 2.5 mg, Oral, DAILY, Rosalinda Mares MD, Stopped at 19 0900    verapamil ER (CALAN-SR) tablet 120 mg, 120 mg, Oral, DAILY WITH BREAKFAST, Rosalinda Mares MD, Stopped at 19 0800    guaiFENesin-dextromethorphan (ROBITUSSIN DM) 100-10 mg/5 mL syrup 5 mL, 5 mL, Oral, Q6H PRN, Gaston Damon MD    atorvastatin (LIPITOR) tablet 40 mg, 40 mg, Oral, QHS, Rosalinda Mares MD, 40 mg at 19 2225    acetaminophen (TYLENOL) tablet 650 mg, 650 mg, Oral, Q6H PRN, Kayli Walker MD    clopidogrel (PLAVIX) tablet 75 mg, 75 mg, Oral, QHS, Elisa Guerra MD, 75 mg at 19 2225    escitalopram oxalate (LEXAPRO) tablet 20 mg, 20 mg, Oral, QHS, Elisa Guerra MD, 20 mg at 19 2225    primidone (MYSOLINE) tablet 50 mg, 50 mg, Oral, Dave Ruvalcaba MD, 50 mg at 11/07/19 2225    rOPINIRole (REQUIP) tablet 1 mg, 1 mg, Oral, QHS, Sofya Sams MD, 1 mg at 11/07/19 2225    acetaminophen (TYLENOL) tablet 650 mg, 650 mg, Oral, Q4H PRN **OR** acetaminophen (TYLENOL) solution 650 mg, 650 mg, Per NG tube, Q4H PRN **OR** acetaminophen (TYLENOL) suppository 650 mg, 650 mg, Rectal, Q4H PRN, Sofya Sams MD    labetalol (NORMODYNE;TRANDATE) injection 5 mg, 5 mg, IntraVENous, Q10MIN PRN, Sofya Sams MD    enoxaparin (LOVENOX) injection 40 mg, 40 mg, SubCUTAneous, Q24H, Sofya Sams MD, 40 mg at 11/08/19 1735        Allergies   Allergen Reactions    Aspirin Nausea and Vomiting    Bee Sting [Sting, Bee] Hives and Shortness of Breath     WASP STING    Codeine Nausea and Vomiting    Hydrocodone Rash     And sedation    Morphine Rash    Nsaids (Non-Steroidal Anti-Inflammatory Drug) Nausea and Vomiting    Oxycodone Rash     On her back    Penicillins Hives, Shortness of Breath and Swelling      MRI Results (most recent):        Review of Systems:  A comprehensive review of systems was negative except for: Constitutional: positive for fatigue and malaise  Musculoskeletal: positive for myalgias, arthralgias, stiff joints and back pain  Neurological: positive for headaches, speech problems, paresthesia, coordination problems, gait problems and weakness  Behvioral/Psych: positive for anxiety and depression   Vitals:    11/08/19 1035 11/08/19 1040 11/08/19 1132 11/08/19 1518   BP: 142/64 141/60 147/78 148/72   Pulse: 70 68 73 77   Resp: 12 12 16 16   Temp:   97.9 °F (36.6 °C) 97.6 °F (36.4 °C)   SpO2: 94% 95% 95% 98%   Weight:       Height:         Objective:     INEUROLOGICAL EXAM:     Appearance: The patient is well developed, well nourished, provides a fair history and is in no acute distress.    Mental Status: Oriented to time, place and person, and the president, cognitive function is slow but probably normal and speech is mildly aphasic and dysarthric, but better than yesterday, mood and affect appropriate but anxious and depressed . Cranial Nerves:   Intact visual fields. Fundi are benign on the left, but the right eye is scarred. Pupils react normally on the left, but the right pupil is scarred and fundus poorly seen and she has decreased visual acuity there., EOM's full, no nystagmus, no ptosis. Facial sensation is normal. Corneal reflexes are not tested. Facial movement is asymmetric, weak on the right. Hearing is abnormal bilaterally. Palate is midline with normal sternocleidomastoid and trapezius muscles are normal. Tongue is midline. Neck without meningismus or bruits  Neck with limited mobility secondary to her previous surgery  Temporal arteries are not tender or enlarged    Motor:  4/5 strength in upper and lower proximal and distal muscles on the left, and about 3-4/5 on the right. Normal bulk and tone, with a mild spastic paraplegia, worse on the left side where she has mild weakness noted and decreased rapid alternating movement and hyperreflexia and increased tone from her previous myelopathy. No fasciculations. Reflexes:   Deep tendon reflexes 1+/4 in the left upper extremity and 2+/4 in the right upper extremity, 2+/4 in both legs with slight decreased ankle jerks on the right. .  No babinski or clonus present   Sensory:   Abnormal to touch, pinprick and vibration and temperature decreased in both legs up to the knee level. DSS is intact   Gait:  Abnormal gait with patient moving slowly, and limited mobility of the lumbar spine secondary to her previous back operations and now her new right hemiparesis. Patient may have decreased arm swing in the right upper extremity    Tremor:   Minimal bilateral intention tremor noted. Cerebellar:    Patient has abnormal Romberg and tandem cerebellar signs present.   Finger-nose-finger examination shows mild tremor bilaterally in the upper extremities   Neurovascular:  Normal heart sounds and regular rhythm, peripheral pulses decreased, and no carotid bruits.                     Assessment:       ICD-10-CM ICD-9-CM    1. Ischemic stroke ([facility] [address] I63.9 434.91    2. History of hypertension Z86.79 V12.59    3. History of hypercholesterolemia Z86.39 V12.29    4. Unstable angina (HCC) I20.0 411.1 ELECTROPHYSIOLOGY PROCEDURE      ELECTROPHYSIOLOGY PROCEDURE   5. 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 Bilateral carotid artery stenosis I65.23 433.10      433.30    7. Cervical post-laminectomy syndrome M96.1 722.81    8. Spinal stenosis of lumbar region without neurogenic claudication M48.061 724.02    9. Lumbar post-laminectomy syndrome M96.1 722.83    10. Cerebral microvascular disease I67.9 437.9    11. Benign essential tremor syndrome G25.0 333.1    12. Cervical radiculopathy due to degenerative joint disease of spine M47.22 721.0    13. Fall, initial encounter [address] XXXA E888.9    14. Idiopathic small and large fiber sensory neuropathy G60.8 356.4    15. Stenosis of both internal carotid arteries I65.23 433.10      433.30    16. Tension vascular headache G44.209 307.81    17. Ulnar neuropathy at elbow of right upper extremity G56.21 354.2    18. 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 right-handed  female we are asked to evaluate by Dr. Tomi Chatterjee of new problem sudden weakness that came on when she was making coffee yesterday morning and was having difficulty speaking also. She denies any chest pain or palpitations or cardiac symptoms during that time.   She came to the emergency room and had a CT of the head done that was essentially negative and a CTA was negative except for some moderate stenosis and 60 to 70% range of the right internal carotid artery which is a known finding from previous Dopplers this year, and since this is asymptomatic has been followed, and the patient was found to have some perfusion deficit in the left middle cerebral artery distribution. MRI scan does document 2 strokes, one in the left frontal opercular area and one in the temporal parietal area higher up and further back in 2 separate locations. This does suggest cardioembolic phenomena. Cardiology was seen and placed a loop monitor, and she had a normal ALLIE today. . Patient has a history of TIAs in the past but no clear history of stroke. The patient does not appear to have a history of significant heart disease. She does have a neuropathy that is thought to be secondary to her monoclonal gammopathy which is followed at [facility]. Patient also has a restless leg syndrome she has sleep apnea psoriasis and has decreased vision in the right eye from cataract surgery. She has hyperlipidemia and hypercholesterolemia may be some borderline diabetes. She has had degenerative arthritis, and a back operation and shoulder operation. Patient is somewhat better and her speech is better now, but she still has some speech deficit and weakness on the right side. He denies any significant headache, fever, meningismus, trauma, or other causes of her strokelike symptoms now. Patient unremarked increased stress and tension taking care of her  who has Alzheimer's disease  And continues to slowly improve, and disposition is being decided whether he needs inpatient or outpatient therapy. I will follow as needed for now, she can be seen in stroke clinic in 2 to 4 weeks time.     Signed By: Kai Maciel MD     November 8, 2019       CC: Chad Starks MD  FAX: [phone]

## 2019-11-09 NOTE — PROGRESS NOTES
Bedside and Verbal shift change report given to [address] [place] [address] [address] (oncoming nurse) by Tolu Ca (offgoing nurse). Report included the following information SBAR, Kardex, Recent Results, Med Rec Status and Cardiac Rhythm SR. Zone Phone:   [phone]      Significant changes during shift:    Patient Information    Edwin Ellis  67 y.o.  11/5/2019 10:34 AM by Juliana Adamson MD. 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)     Bilateral carotid artery stenosis     CVA (cerebral vascular accident) ([facility] [address] 11/05/2019    Ulnar neuropathy at elbow of right upper extremity 07/25/2018    Cervical post-laminectomy syndrome 06/07/2018    Idiopathic small and large fiber sensory neuropathy 06/07/2018    Lumbar back pain with radiculopathy affecting left lower extremity 06/07/2018    Lumbar back pain with radiculopathy affecting right lower extremity 06/07/2018    Vitamin D deficiency 06/07/2018    Cervical radiculopathy due to degenerative joint disease of spine 06/07/2018    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    Stenosis of both internal carotid arteries 03/13/2017    Benign essential tremor syndrome 02/21/2017    Tension vascular headache 02/21/2017    Hyperthyroidism 02/21/2017    Osteoporosis 08/31/2016    MGUS (monoclonal gammopathy of unknown significance) 10/04/2013    Chronic tension headaches 06/25/2013    Falls 06/25/2013    Lumbar stenosis 08/03/2011     Past Medical History:   Diagnosis Date    Adverse effect of anesthesia     sleep apnea no cpap machine useage       Anemia     has had iron infusions;  Hem/onc Dr Chilton Oppenheim [phone]    Anxiety and depression     Bilateral carotid artery stenosis     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)     GERD (gastroesophageal reflux disease)     Hypercholesteremia     Hypertension     Idiopathic small and large fiber sensory neuropathy     Limited peripheral vision of right eye     complication of cataract surgery    Long term current use of anticoagulant therapy     Meniere disease     MGUS (monoclonal gammopathy of unknown significance)     Monoclonal gammopathy of unknown significance     hem/onc:  Dr Kori Castillo [phone]    Osteoporosis     Psoriasis     bilateral Legs    Restless leg     Skin melanoma ([facility] [address] 2012    (Rt ankle) and basal cell (Lt eye) removed    Transient ischemic attack approx 2005    HAS HAD 2 MINISTROKES-NO DEFICITS; neuro Dr Yvonne Parker (cc)    Unspecified adverse effect of anesthesia     HAS NEEDED PORT OR CUTDOWN FOR RECVNG BLOOD OR LONG-TERM IVs; surgery 7/28/15 w/o any difficulty    Unspecified sleep apnea     does not use CPAP         Core Measures:    CVA: Yes Yes  CHF:No No  PNA:No No    Post Op Surgical (If Applicable):     Number times ambulated in hallway past shift:  0  Number of times OOB to chair past shift:   0  NG Tube: No  Incentive Spirometer: No  Drains: No   Volume  0  Dressing Present:  No  Flatus:  Not applicable    Activity Status:    OOB to Chair No  Ambulated this shift No   Bed Rest Yes    Supplemental O2: (If Applicable)    NC No  NRB No  Venti-mask No  On 0 Liters/min      LINES AND DRAINS:    Central Line? No   PICC LINE? No   Urinary Catheter? No   DVT prophylaxis:    DVT prophylaxis Med- Yes  DVT prophylaxis SCD or BRIT- No     Wounds: (If Applicable)    Wounds- No    Location 0    Patient Safety:    Falls Score Total Score: 3  Safety Level_______  Bed Alarm On? Yes  Sitter?  No    Plan for upcoming shift: safety, neurochecks, neuro consult in AM, echo in AM        Discharge Plan: Yes TBD    Active Consults:  IP CONSULT TO HOSPITALIST  IP CONSULT TO NEUROLOGY  IP CONSULT TO CARDIOLOGY

## 2019-11-09 NOTE — PROGRESS NOTES
Hospitalist Progress Note    NAME: Monica Freedman   :  1940   MRN:  [id number]       Assessment / Plan:  Acute left posterior frontal/parietal operculum and posterior insula cortical infarct POA   Right ICA stenosis on CTA  -Sx: Dysarthria and Left sided weakness - slowly getting better   -CTA No evidence of thromboembolic occlusion of the left MCA or its distal branches,  however there is a perfusion abnormality characterized by increased mean transit  time and decreased blood flow in the posterior left MCA distribution. Cervical right internal carotid artery: Mixed plaque with 75% stenosis by NASCET  Criteria. MRI brain shows Acute left posterior frontal/parietal operculum and posterior insula cortical Infarct.  -Echo normal, on obvious shunting on bubble study  -LDL 79, A1c 5.3  -seen by neurology:  R ICA stenosis, asymptomatic, follow OP   MRI with 2 strokes - this does suggest cardioembolic phenomena. cont Plavix and statin  -PT: [facility]   -S/P ILR. ALLIE  wo thrombus      HTN  -BP stable  -cont home verapamil/  lisinopril      Anxiety / Depression, continue meds  FRED  RLS, cont Requip and mysoline  Meniere's   MGUS     Code Status: Full   Surrogate Decision Maker: children  DVT Prophylaxis: Lovenox      Baseline: primary care giver for  who has dementia  Disposition: [facility] pening insurance auth      Subjective:     Chief Complaint / Reason for Physician Visit: stroke/ HTN   Sx: slowly improving   Family at bedside   No new neurological Sx     Discussed with RN events overnight. Review of Systems:  Symptom Y/N Comments  Symptom Y/N Comments   Fever/Chills n   Chest Pain n    Poor Appetite    Edema     Cough    Abdominal Pain n    Sputum    Joint Pain     SOB/HERNANDES n   Pruritis/Rash     Nausea/vomit    Tolerating PT/OT     Diarrhea    Tolerating Diet     Constipation    Other       Could NOT obtain due to:      Objective:     VITALS:   Last 24hrs VS reviewed since prior progress note.  Most recent are:  Patient Vitals for the past 24 hrs:   Temp Pulse Resp BP SpO2   11/09/19 1233 97.5 °F (36.4 °C) 68 16 113/58 96 %   11/09/19 0730 98 °F (36.7 °C) 77 16 182/73 99 %   11/09/19 0319 97.8 °F (36.6 °C) 82 14 138/82 97 %   11/08/19 2339 97.9 °F (36.6 °C) 72 17 149/61 93 %   11/08/19 1939 97.9 °F (36.6 °C) 78  151/69 97 %   11/08/19 1518 97.6 °F (36.4 °C) 77 16 148/72 98 %     No intake or output data in the 24 hours ending 11/09/19 1410     PHYSICAL EXAM:  General: Alert, cooperative, no acute distress    EENT:  EOMI. Anicteric sclerae. MMM  Resp:  CTA bilaterally   CV:  Regular  rhythm,  No edema  GI:  Soft, Non distended, Non tender.  +Bowel sounds  Neurologic:  Alert and oriented X 3, dysarthria, left side strength 4+/5 and right side 5/5  Psych:   Good insight. Not anxious nor agitated  Skin:  No rashes. No jaundice    Reviewed most current lab test results and cultures  YES  Reviewed most current radiology test results   YES  Review and summation of old records today    NO  Reviewed patient's current orders and MAR    YES  PMH/ reviewed - no change compared to H&P  ________________________________________________________________________  Care Plan discussed with:    Comments   Patient x    Family  x Bedside multiple family members    RN x    Care Manager     Consultant                        Multidiciplinary team rounds were held today with , nursing, pharmacist and clinical coordinator. Patient's plan of care was discussed; medications were reviewed and discharge planning was addressed.      ________________________________________________________________________  Total NON critical care TIME: 25   Minutes    Total CRITICAL CARE TIME Spent:   Minutes non procedure based      Comments   >50% of visit spent in counseling and coordination of care     ________________________________________________________________________  Yesenia Mendosa MD     Procedures: see electronic medical records for all procedures/Xrays and details which were not copied into this note but were reviewed prior to creation of Plan. LABS:  I reviewed today's most current labs and imaging studies. Pertinent labs include:  No results for input(s): WBC, HGB, HCT, PLT, HGBEXT, HCTEXT, PLTEXT, HGBEXT, HCTEXT, PLTEXT in the last 72 hours. No results for input(s): NA, K, CL, CO2, GLU, BUN, CREA, CA, MG, PHOS, ALB, TBIL, TBILI, SGOT, ALT, INR, INREXT, INREXT in the last 72 hours.     Signed: Johnny Lopez MD

## 2019-11-10 PROCEDURE — 74011250637 HC RX REV CODE- 250/637: Performed by: HOSPITALIST

## 2019-11-10 PROCEDURE — 74011250637 HC RX REV CODE- 250/637: Performed by: EMERGENCY MEDICINE

## 2019-11-10 PROCEDURE — 65270000029 HC RM PRIVATE

## 2019-11-10 PROCEDURE — 94760 N-INVAS EAR/PLS OXIMETRY 1: CPT

## 2019-11-10 PROCEDURE — 74011250636 HC RX REV CODE- 250/636: Performed by: INTERNAL MEDICINE

## 2019-11-10 PROCEDURE — 74011250637 HC RX REV CODE- 250/637: Performed by: INTERNAL MEDICINE

## 2019-11-10 RX ORDER — CLONAZEPAM 1 MG/1
0.5 TABLET ORAL 2 TIMES DAILY
Status: DISCONTINUED | OUTPATIENT
Start: 2019-11-10 | End: 2019-11-11 | Stop reason: HOSPADM

## 2019-11-10 RX ADMIN — LISINOPRIL 2.5 MG: 5 TABLET ORAL at 08:30

## 2019-11-10 RX ADMIN — CLONAZEPAM 0.5 MG: 1 TABLET ORAL at 18:05

## 2019-11-10 RX ADMIN — CLOPIDOGREL BISULFATE 75 MG: 75 TABLET ORAL at 21:56

## 2019-11-10 RX ADMIN — VERAPAMIL HYDROCHLORIDE 120 MG: 120 TABLET, FILM COATED, EXTENDED RELEASE ORAL at 08:30

## 2019-11-10 RX ADMIN — CLONAZEPAM 0.5 MG: 1 TABLET ORAL at 09:48

## 2019-11-10 RX ADMIN — GUAIFENESIN AND DEXTROMETHORPHAN 5 ML: 100; 10 SYRUP ORAL at 22:22

## 2019-11-10 RX ADMIN — ACETAMINOPHEN 650 MG: 325 TABLET ORAL at 12:12

## 2019-11-10 RX ADMIN — PRIMIDONE 50 MG: 50 TABLET ORAL at 21:56

## 2019-11-10 RX ADMIN — ATORVASTATIN CALCIUM 40 MG: 40 TABLET, FILM COATED ORAL at 21:56

## 2019-11-10 RX ADMIN — ROPINIROLE HYDROCHLORIDE 1 MG: 1 TABLET, FILM COATED ORAL at 21:56

## 2019-11-10 RX ADMIN — ENOXAPARIN SODIUM 40 MG: 40 INJECTION SUBCUTANEOUS at 18:05

## 2019-11-10 RX ADMIN — ESCITALOPRAM OXALATE 20 MG: 10 TABLET ORAL at 21:56

## 2019-11-10 RX ADMIN — GUAIFENESIN AND DEXTROMETHORPHAN 5 ML: 100; 10 SYRUP ORAL at 07:42

## 2019-11-10 NOTE — PROGRESS NOTES
I notified Dr Meryl Stevenson via the phone that the patient states she felt slightly anxious. The patient states that she normally takes 0.5 mg of Klonipin twice a day. I advised Dr Meryl Stevenson of this finding to which she responded change the patient's order reflect 0.5 mg of Klonipin twice a day. TORB    I notified Dr Meryl Stevenson via the phone that the patient states she felt slightly anxious. The patient states that she normally takes 0.5 mg of Klonopin twice a day. I advised Dr Meryl Stevenson of this finding to which she responded change the patient's order reflect 0.5 mg of Klonopin twice a day.  TORB    Amended to reflect the correct spelling of Klonopin

## 2019-11-10 NOTE — PROGRESS NOTES
Bedside shift change report given to Joan Carbajal RN (oncoming nurse) by Chet Ge RN (offgoing nurse). Report included the following information SBAR, Kardex, ED Summary, Intake/Output, MAR and Recent Results.

## 2019-11-10 NOTE — PROGRESS NOTES
Problem: Patient Education: Go to Patient Education Activity  Goal: Patient/Family Education  Outcome: Progressing Towards Goal     Problem: TIA/CVA Stroke: 0-24 hours  Goal: Off Pathway (Use only if patient is Off Pathway)  Outcome: Progressing Towards Goal  Goal: Activity/Safety  Outcome: Progressing Towards Goal  Goal: Consults, if ordered  Outcome: Progressing Towards Goal  Goal: Diagnostic Test/Procedures  Outcome: Progressing Towards Goal  Goal: Nutrition/Diet  Outcome: Progressing Towards Goal  Goal: Discharge Planning  Outcome: Progressing Towards Goal  Goal: Medications  Outcome: Progressing Towards Goal  Goal: Respiratory  Outcome: Progressing Towards Goal  Goal: Treatments/Interventions/Procedures  Outcome: Progressing Towards Goal  Goal: Minimize risk of bleeding post-thrombolytic infusion  Outcome: Progressing Towards Goal  Goal: Monitor for complications post-thrombolytic infusion  Outcome: Progressing Towards Goal  Goal: Psychosocial  Outcome: Progressing Towards Goal  Goal: *Hemodynamically stable  Outcome: Progressing Towards Goal  Goal: *Neurologically stable  Description  Absence of additional neurological deficits    Outcome: Progressing Towards Goal  Goal: *Verbalizes anxiety and depression are reduced or absent  Outcome: Progressing Towards Goal  Goal: *Absence of Signs of Aspiration on Current Diet  Outcome: Progressing Towards Goal  Goal: *Absence of deep venous thrombosis signs and symptoms(Stroke Metric)  Outcome: Progressing Towards Goal  Goal: *Ability to perform ADLs and demonstrates progressive mobility and function  Outcome: Progressing Towards Goal  Goal: *Stroke education started(Stroke Metric)  Outcome: Progressing Towards Goal  Goal: *Dysphagia screen performed(Stroke Metric)  Outcome: Progressing Towards Goal  Goal: *Rehab consulted(Stroke Metric)  Outcome: Progressing Towards Goal     Problem: TIA/CVA Stroke: Day 2 Until Discharge  Goal: Off Pathway (Use only if patient is Off Pathway)  Outcome: Progressing Towards Goal  Goal: Activity/Safety  Outcome: Progressing Towards Goal  Goal: Diagnostic Test/Procedures  Outcome: Progressing Towards Goal  Goal: Nutrition/Diet  Outcome: Progressing Towards Goal  Goal: Discharge Planning  Outcome: Progressing Towards Goal  Goal: Medications  Outcome: Progressing Towards Goal  Goal: Respiratory  Outcome: Progressing Towards Goal  Goal: Treatments/Interventions/Procedures  Outcome: Progressing Towards Goal  Goal: Psychosocial  Outcome: Progressing Towards Goal  Goal: *Verbalizes anxiety and depression are reduced or absent  Outcome: Progressing Towards Goal  Goal: *Absence of aspiration  Outcome: Progressing Towards Goal  Goal: *Absence of deep venous thrombosis signs and symptoms(Stroke Metric)  Outcome: Progressing Towards Goal  Goal: *Optimal pain control at patient's stated goal  Outcome: Progressing Towards Goal  Goal: *Tolerating diet  Outcome: Progressing Towards Goal  Goal: *Ability to perform ADLs and demonstrates progressive mobility and function  Outcome: Progressing Towards Goal  Goal: *Stroke education continued(Stroke Metric)  Outcome: Progressing Towards Goal     Problem: TIA/CVA Stroke: Day 2 Until Discharge  Goal: Off Pathway (Use only if patient is Off Pathway)  Outcome: Progressing Towards Goal  Goal: Activity/Safety  Outcome: Progressing Towards Goal  Goal: Diagnostic Test/Procedures  Outcome: Progressing Towards Goal  Goal: Nutrition/Diet  Outcome: Progressing Towards Goal  Goal: Discharge Planning  Outcome: Progressing Towards Goal  Goal: Medications  Outcome: Progressing Towards Goal  Goal: Respiratory  Outcome: Progressing Towards Goal  Goal: Treatments/Interventions/Procedures  Outcome: Progressing Towards Goal  Goal: Psychosocial  Outcome: Progressing Towards Goal  Goal: *Verbalizes anxiety and depression are reduced or absent  Outcome: Progressing Towards Goal  Goal: *Absence of aspiration  Outcome: Progressing Towards Goal  Goal: *Absence of deep venous thrombosis signs and symptoms(Stroke Metric)  Outcome: Progressing Towards Goal  Goal: *Optimal pain control at patient's stated goal  Outcome: Progressing Towards Goal  Goal: *Tolerating diet  Outcome: Progressing Towards Goal  Goal: *Ability to perform ADLs and demonstrates progressive mobility and function  Outcome: Progressing Towards Goal  Goal: *Stroke education continued(Stroke Metric)  Outcome: Progressing Towards Goal     Problem: Ischemic Stroke: Discharge Outcomes  Goal: *Verbalizes anxiety and depression are reduced or absent  Outcome: Progressing Towards Goal  Goal: *Verbalize understanding of risk factor modification(Stroke Metric)  Outcome: Progressing Towards Goal  Goal: *Hemodynamically stable  Outcome: Progressing Towards Goal  Goal: *Absence of aspiration pneumonia  Outcome: Progressing Towards Goal  Goal: *Aware of needed dietary changes  Outcome: Progressing Towards Goal  Goal: *Verbalize understanding of prescribed medications including anti-coagulants, anti-lipid, and/or anti-platelets(Stroke Metric)  Outcome: Progressing Towards Goal  Goal: *Tolerating diet  Outcome: Progressing Towards Goal  Goal: *Aware of follow-up diagnostics related to anticoagulants  Outcome: Progressing Towards Goal  Goal: *Ability to perform ADLs and demonstrates progressive mobility and function  Outcome: Progressing Towards Goal  Goal: *Absence of DVT(Stroke Metric)  Outcome: Progressing Towards Goal  Goal: *Absence of aspiration  Outcome: Progressing Towards Goal  Goal: *Optimal pain control at patient's stated goal  Outcome: Progressing Towards Goal  Goal: *Home safety concerns addressed  Outcome: Progressing Towards Goal  Goal: *Describes available resources and support systems  Outcome: Progressing Towards Goal  Goal: *Verbalizes understanding of activation of EMS(911) for stroke symptoms(Stroke Metric)  Outcome: Progressing Towards Goal  Goal: *Understands and describes signs and symptoms to report to providers(Stroke Metric)  Outcome: Progressing Towards Goal  Goal: *Neurolgocially stable (absence of additional neurological deficits)  Outcome: Progressing Towards Goal  Goal: *Verbalizes importance of follow-up with primary care physician(Stroke Metric)  Outcome: Progressing Towards Goal  Goal: *Smoking cessation discussed,if applicable(Stroke Metric)  Outcome: Progressing Towards Goal  Goal: *Depression screening completed(Stroke Metric)  Outcome: Progressing Towards Goal

## 2019-11-10 NOTE — PROGRESS NOTES
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 Cervical right internal carotid artery: Mixed plaque with 75% stenosis by NASCET  Criteria. MRI brain shows Acute left posterior frontal/parietal operculum and posterior insula cortical Infarct.  -Echo normal, on obvious shunting on bubble study  -LDL 79, A1c 5.3  -seen by neurology:  R ICA stenosis, asymptomatic, follow OP   MRI with 2 strokes - this does suggest cardioembolic phenomena. cont Plavix and statin  -PT: [facility]   -S/P ILR. ALLIE  wo thrombus      HTN  -BP stable  -cont home verapamil/  lisinopril      Anxiety / Depression, continue meds  FRED  RLS, cont Requip and mysoline  Meniere's   MGUS     Code Status: Full   Surrogate Decision Maker: children  DVT Prophylaxis: Lovenox      Baseline: primary care giver for  who has dementia  Disposition: [facility] pening insurance auth      Subjective:     Chief Complaint / Reason for Physician Visit: stroke/ HTN   No new neurological Sx   C/o \" soreness at ILR site\", + some erythema around dressing - will ask cardiology NP to take a look in am     Discussed with RN events overnight.      Review of Systems:  Symptom Y/N Comments  Symptom Y/N Comments   Fever/Chills n   Chest Pain n    Poor Appetite    Edema     Cough    Abdominal Pain n    Sputum    Joint Pain     SOB/HERNANDES n   Pruritis/Rash     Nausea/vomit    Tolerating PT/OT     Diarrhea    Tolerating Diet     Constipation    Other       Could NOT obtain due to:      Objective: VITALS:   Last 24hrs VS reviewed since prior progress note. Most recent are:  Patient Vitals for the past 24 hrs:   Temp Pulse Resp BP SpO2   11/10/19 1137 97.8 °F (36.6 °C) 71 18 139/62 96 %   11/10/19 0736 97.8 °F (36.6 °C) 67 18 149/61 98 %   11/10/19 0332 97.9 °F (36.6 °C) 66 18 124/61 97 %   11/09/19 2323 98.2 °F (36.8 °C) 65 18 126/68 96 %   11/09/19 2037 98.7 °F (37.1 °C) 78 18 137/69 97 %   11/09/19 1558 98.1 °F (36.7 °C) 62 16 158/79 95 %     No intake or output data in the 24 hours ending 11/10/19 1322     PHYSICAL EXAM:  General: Alert, cooperative, no acute distress    EENT:  EOMI. Anicteric sclerae. MMM  Resp:  CTA bilaterally   CV:  Regular  rhythm,  No edema  GI:  Soft, Non distended, Non tender.  +Bowel sounds  Neurologic:  Alert and oriented X 3, dysarthria, left side strength 4+/5 and right side 5/5  Psych:   Good insight. Not anxious nor agitated  Skin:  No rashes. No jaundice    Reviewed most current lab test results and cultures  YES  Reviewed most current radiology test results   YES  Review and summation of old records today    NO  Reviewed patient's current orders and MAR    YES  PMH/ reviewed - no change compared to H&P  ________________________________________________________________________  Care Plan discussed with:    Comments   Patient x    Family      RN x    Care Manager     Consultant                        Multidiciplinary team rounds were held today with , nursing, pharmacist and clinical coordinator. Patient's plan of care was discussed; medications were reviewed and discharge planning was addressed.      ________________________________________________________________________  Total NON critical care TIME: 25 Minutes    Total CRITICAL CARE TIME Spent:   Minutes non procedure based      Comments   >50% of visit spent in counseling and coordination of care     ________________________________________________________________________  Silvestre Gore MD Procedures: see electronic medical records for all procedures/Xrays and details which were not copied into this note but were reviewed prior to creation of Plan. LABS:  I reviewed today's most current labs and imaging studies. Pertinent labs include:  No results for input(s): WBC, HGB, HCT, PLT, HGBEXT, HCTEXT, PLTEXT, HGBEXT, HCTEXT, PLTEXT in the last 72 hours. No results for input(s): NA, K, CL, CO2, GLU, BUN, CREA, CA, MG, PHOS, ALB, TBIL, TBILI, SGOT, ALT, INR, INREXT, INREXT in the last 72 hours.     Signed: [place] MD

## 2019-11-10 NOTE — PROGRESS NOTES
I notified Dr Zenaida Cosme that the patient's IV had dislodged and inquired if the patient needed another IV in place. Dr Zenaida Cosme stated that the patient is waiting for placement and does not need an IV line.  IRMA

## 2019-11-10 NOTE — PROGRESS NOTES
Bedside and Verbal shift change report given to [address] [place] [address] [address] (oncoming nurse) by Yaneli Suazo (offgoing nurse). Report included the following information SBAR, Kardex, Recent Results, Med Rec Status and Cardiac Rhythm SR. Zone Phone:   [phone]      Significant changes during shift:    Patient Information    Bret Johns  67 y.o.  11/5/2019 10:34 AM by Nadine Brown MD. Brte Johns was admitted from Home    Problem List    Patient Active Problem List    Diagnosis Date Noted    Lumbar post-laminectomy syndrome 77/[id number]/8452    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)     Bilateral carotid artery stenosis     CVA (cerebral vascular accident) ([facility] [address] 11/05/2019    Ulnar neuropathy at elbow of right upper extremity 07/25/2018    Cervical post-laminectomy syndrome 06/07/2018    Idiopathic small and large fiber sensory neuropathy 06/07/2018    Lumbar back pain with radiculopathy affecting left lower extremity 06/07/2018    Lumbar back pain with radiculopathy affecting right lower extremity 06/07/2018    Vitamin D deficiency 06/07/2018    Cervical radiculopathy due to degenerative joint disease of spine 06/07/2018    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    Stenosis of both internal carotid arteries 03/13/2017    Benign essential tremor syndrome 02/21/2017    Tension vascular headache 02/21/2017    Hyperthyroidism 02/21/2017    Osteoporosis 08/31/2016    MGUS (monoclonal gammopathy of unknown significance) 10/04/2013    Chronic tension headaches 06/25/2013    Falls 06/25/2013    Lumbar stenosis 08/03/2011     Past Medical History:   Diagnosis Date    Adverse effect of anesthesia     sleep apnea no cpap machine useage       Anemia     has had iron infusions;  Hem/onc Dr Boyer Medico [phone]    Anxiety and depression     Bilateral carotid artery stenosis     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)     GERD (gastroesophageal reflux disease)     Hypercholesteremia     Hypertension     Idiopathic small and large fiber sensory neuropathy     Limited peripheral vision of right eye     complication of cataract surgery    Long term current use of anticoagulant therapy     Meniere disease     MGUS (monoclonal gammopathy of unknown significance)     Monoclonal gammopathy of unknown significance     hem/onc:  Dr Jarvis Gaucher [phone]    Osteoporosis     Psoriasis     bilateral Legs    Restless leg     Skin melanoma ([facility] [address] 2012    (Rt ankle) and basal cell (Lt eye) removed    Transient ischemic attack approx 2005    HAS HAD 2 MINISTROKES-NO DEFICITS; neuro Dr Gillermo Schirmer (cc)    Unspecified adverse effect of anesthesia     HAS NEEDED PORT OR CUTDOWN FOR RECVNG BLOOD OR LONG-TERM IVs; surgery 7/28/15 w/o any difficulty    Unspecified sleep apnea     does not use CPAP         Core Measures:    CVA: Yes Yes  CHF:No No  PNA:No No    Post Op Surgical (If Applicable):     Number times ambulated in hallway past shift:  0  Number of times OOB to chair past shift:   0  NG Tube: No  Incentive Spirometer: No  Drains: No   Volume  0  Dressing Present:  No  Flatus:  Not applicable    Activity Status:    OOB to Chair No  Ambulated this shift No   Bed Rest Yes    Supplemental O2: (If Applicable)    NC No  NRB No  Venti-mask No  On 0 Liters/min      LINES AND DRAINS:    Central Line? No   PICC LINE? No   Urinary Catheter? No   DVT prophylaxis:    DVT prophylaxis Med- Yes  DVT prophylaxis SCD or BRIT- No     Wounds: (If Applicable)    Wounds- No    Location 0    Patient Safety:    Falls Score Total Score: 3  Safety Level_______  Bed Alarm On? Yes  Sitter?  No    Plan for upcoming shift: safety, neurochecks, neuro consult in AM, echo in AM        Discharge Plan: Yes TBD    Active Consults:  IP CONSULT TO HOSPITALIST  IP CONSULT TO NEUROLOGY  IP CONSULT TO CARDIOLOGY

## 2019-11-11 ENCOUNTER — TELEPHONE (OUTPATIENT)
Dept: NEUROLOGY | Age: 79
End: 2019-11-11

## 2019-11-11 ENCOUNTER — TELEPHONE (OUTPATIENT)
Dept: INTERNAL MEDICINE CLINIC | Age: 79
End: 2019-11-11

## 2019-11-11 ENCOUNTER — HOME HEALTH ADMISSION (OUTPATIENT)
Dept: HOME HEALTH SERVICES | Facility: HOME HEALTH | Age: 79
End: 2019-11-11
Payer: MEDICARE

## 2019-11-11 VITALS
SYSTOLIC BLOOD PRESSURE: 142 MMHG | HEART RATE: 69 BPM | DIASTOLIC BLOOD PRESSURE: 68 MMHG | BODY MASS INDEX: 28.52 KG/M2 | HEIGHT: 62 IN | RESPIRATION RATE: 16 BRPM | TEMPERATURE: 97.8 F | WEIGHT: 155 LBS | OXYGEN SATURATION: 97 %

## 2019-11-11 PROCEDURE — 74011250637 HC RX REV CODE- 250/637: Performed by: HOSPITALIST

## 2019-11-11 PROCEDURE — 94760 N-INVAS EAR/PLS OXIMETRY 1: CPT

## 2019-11-11 PROCEDURE — 97116 GAIT TRAINING THERAPY: CPT | Performed by: PHYSICAL THERAPIST

## 2019-11-11 PROCEDURE — 97535 SELF CARE MNGMENT TRAINING: CPT | Performed by: OCCUPATIONAL THERAPIST

## 2019-11-11 RX ADMIN — CLONAZEPAM 0.5 MG: 1 TABLET ORAL at 09:26

## 2019-11-11 RX ADMIN — VERAPAMIL HYDROCHLORIDE 120 MG: 120 TABLET, FILM COATED, EXTENDED RELEASE ORAL at 09:26

## 2019-11-11 RX ADMIN — LISINOPRIL 2.5 MG: 5 TABLET ORAL at 09:26

## 2019-11-11 NOTE — PROGRESS NOTES
Home Health Care Discharge Planning: [facility]  Face to Face Encounter      NAME: Quynh Ceballos   :  1940   MRN:  [id number]     Primary Diagnosis: stroke     Date of Face to Face:  2019 2:48 PM                                  Face to Face Encounter findings are related to primary reason for home care:   YES    1. I certify that the patient needs intermittent skilled nursing care, physical therapy and/or speech therapy. I will not be following this patient in the Community and Dr. Mani Bridges MD will be responsible for signing the [address] of Care. 2. Initial Orders for Care: Skilled Nursing, Physical Therapy and Occupational Therapy    3. I certify that this patient is homebound because of illness or injury, need the aid of supportive devices such as crutches, canes, wheelchairs, and walkers; the use of special transportation; or the assistance of another person in order to leave their place of residence. There exists a normal inability to leave home and leaving home requires a considerable and taxing effort. 4. I certify that this patient is under my care and that I had a Face-to-Face Encounter that meets the physician Face-to-Face Encounter requirements. Document the physical findings from the Face-to-Face Encounter that support the need for skilled services: Has new diagnosis that requires skilled nursing teaching and intervention , Has new medications that requires skilled nursing teaching and monitoring for understanding and compliance , Needs skilled safety assessment and interventions  and Has new finding of weakness and altered mobility that requires skilled physical/occupational and/or speech therapy services for evaluation and interventions.      Malini Pantoja MD  Discharging Physician  Office: [phone]  Fax:   [phone]

## 2019-11-11 NOTE — TELEPHONE ENCOUNTER
----- Message from Pratik Baptiste sent at 11/11/2019 12:49 PM EST -----  Regarding: Dr. Nasra Morrell  General Message/Vendor Calls    Caller's first and last name: Doug pineda/[facility]      Reason for call: Schedule a hospital follow-up for stroke, pt discharged 11/11.       Callback required yes/no and why: Yes      Best contact number(s): [phone]      Details to clarify the request:      Praitk Baptiste

## 2019-11-11 NOTE — TELEPHONE ENCOUNTER
Please call pt to schedule CHRISTIANNE as she is discharging today, 9-11-19 from [facility].

## 2019-11-11 NOTE — PROGRESS NOTES
Problem: Mobility Impaired (Adult and Pediatric)  Goal: *Acute Goals and Plan of Care (Insert Text)  Description    FUNCTIONAL STATUS PRIOR TO ADMISSION: Patient was independent and active without use of DME. Cares for  with dementia. In process of moving to a new home and has been packing up boxes and the house. HOME SUPPORT PRIOR TO ADMISSION: The patient lived with  but assists him. Physical Therapy Goals  Initiated 11/6/2019  1. Patient will move from supine to sit and sit to supine  in bed with modified independence within 7 day(s). 2.  Patient will transfer from bed to chair and chair to bed with modified independence using the least restrictive device within 7 day(s). 3.  Patient will perform sit to stand with modified independence within 7 day(s). 4.  Patient will ambulate with modified independence for 200 feet with the least restrictive device within 7 day(s). 5.  Patient will ascend/descend 12 stairs with 1 handrail(s) with modified independence within 7 day(s). 6.  Patient will improve Weems Balance score by 7 points within 7 days. Outcome: Progressing Towards Goal   PHYSICAL THERAPY TREATMENT  Patient: Karsten Arnett (16 y.o. female)  Date: 11/11/2019  Diagnosis: [facility]cerebral [facility] accident[facility] [I63.9] <principal problem not specified>  Procedure(s) (LRB):  LOOP RECORDER INSERT (N/A) 5 Days Post-Op  Precautions: Fall  Chart, physical therapy assessment, plan of care and goals were reviewed. ASSESSMENT  Patient continues with skilled PT services and is progressing towards goals. Patient improving strength and functional mobility. Overall needing CGA this session. Has 1 instance of very mild knee instability on the L which she attributes to neuropathy at baseline. She is able to self correct for this. Amb 50 feet with and without RW with improved gait noted with RW support. Current Level of Function Impacting Discharge (mobility/balance):  CGA for mobility, stability improves with RW use    Other factors to consider for discharge: primary caregiver for  with dementia, at risk for falls given intermittent knee instability         PLAN :  Patient continues to benefit from skilled intervention to address the above impairments. Continue treatment per established plan of care. to address goals. Recommendation for discharge: (in order for the patient to meet his/her long term goals)  Therapy 3 hours per day 5-7 days per week, if not approved will certainly need HH PT and would benefit from some additional supervision at home and at night in order to maintain safety      IF patient discharges home will need the following DME: rolling walker if she does not already own       SUBJECTIVE:   Patient stated I would like to go home if I can.     OBJECTIVE DATA SUMMARY:   Critical Behavior:  Neurologic State: Appropriate for age  Orientation Level: Oriented X4  Cognition: Appropriate decision making  Safety/Judgement: Awareness of environment  Functional Mobility Training:  Bed Mobility:   Not tested                 Transfers:  Sit to Stand: Contact guard assistance  Stand to Sit: Contact guard assistance                             Balance:  Sitting: Intact  Standing: Impaired  Standing - Static: Constant support;Good  Standing - Dynamic : Constant support; Fair  Ambulation/Gait Training:  Distance (ft): 50 Feet (ft)(x 2, 2nd trial with RW)  Assistive Device: Gait belt  Ambulation - Level of Assistance: Contact guard assistance        Gait Abnormalities: Decreased step clearance        Base of Support: Center of gravity altered     Speed/Emani: Pace decreased (<100 feet/min); Slow  Step Length: Left shortened;Right shortened          Pain Rating:  No c/o pain    Activity Tolerance:   Good  Please refer to the flowsheet for vital signs taken during this treatment.     After treatment patient left in no apparent distress:   Sitting in chair, Call bell within reach and Caregiver / family present    COMMUNICATION/COLLABORATION:   The patients plan of care was discussed with: Physical Therapist, Occupational Therapist and Registered Nurse    Mayito Rosario PT, DPT   Time Calculation: 16 mins

## 2019-11-11 NOTE — DISCHARGE SUMMARY
Hospitalist Discharge Summary     Patient ID:  Chadd Perez  [id number]  67 y.o.  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  female with a history of HTN, HLD, TIA, FRED and RLS who presents with new stroke like symptoms. Patient is the primary caregiver for her  who has dementia. She felt okay when she went to bed at around 8 PM.  She woke up this morning not feeling right. When she tried to make coffee she had difficulty scooping the coffee. It was not until her  came down when she realized she had difficulty speaking. She was able to find her cell phone and called her daughter at work. Her daughter could not understand her and called for EMS. DIL was able to swing by before EMS arrived. Stat CT head was negative. Subsequent CTA head/neck No evidence of thromboembolic occlusion of the left MCA or its distal branches, however there is a perfusion abnormality characterized by increased mean transit time and decreased blood flow in the posterior left MCA distribution.     We were asked to admit for work up and evaluation of the above problems.        ______________________________________________________________________  DISCHARGE SUMMARY/HOSPITAL COURSE:  for full details see H&P, daily progress notes, labs, consult notes.        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   Cervical right internal carotid artery: Mixed plaque with 75% stenosis by NASCET  Criteria. MRI brain shows Acute left posterior frontal/parietal operculum and posterior insula cortical Infarct.  -Echo normal, on obvious shunting on bubble study  -LDL 79, A1c 5.3  -seen by neurology:  R ICA stenosis, asymptomatic, follow OP   MRI with 2 strokes - this does suggest cardioembolic phenomena. cont Plavix and statin  -PT: SAH initially but improved and now OK to go home with Constantino 79   -S/P ILR. ALLIE 11/08 wo thrombus      HTN  -BP stable  -cont home verapamil/  lisinopril      Anxiety / Depression, continue meds  FRED  RLS, cont Requip and mysoline  Meniere's   MGUS     Code Status: Full   Surrogate Decision Maker: children  DVT Prophylaxis: Lovenox      Baseline: primary care giver for  who has dementia  Disposition: Constantino Larson       _______________________________________________________________________  Patient seen and examined by me on discharge day. Pertinent Findings:  General:          Alert, cooperative, no acute distress    EENT:              EOMI. Anicteric sclerae. MMM  Resp:               CTA bilaterally   CV:                  Regular  rhythm,  No edema  GI:                   Soft, Non distended, Non tender.  +Bowel sounds  Neurologic:      Alert and oriented X 3, dysarthria, left side strength 4+/5 and right side 5/5  Psych:             Good insight. Not anxious nor agitated  Skin:                No rashes.   No jaundice  _______________________________________________________________________  DISCHARGE MEDICATIONS:   Current Discharge Medication List      CONTINUE these medications which have NOT CHANGED    Details   atorvastatin (LIPITOR) 20 mg tablet Take 20 mg by mouth nightly. clopidogrel (PLAVIX) 75 mg tab Take 75 mg by mouth nightly. lisinopril (PRINIVIL, ZESTRIL) 2.5 mg tablet Take 2.5 mg by mouth nightly. primidone (MYSOLINE) 50 mg tablet Take 50 mg by mouth nightly. multivit/folic acid/vit K1 (ONE-A-DAY WOMEN'S 50 PLUS PO) Take 1 Tab by mouth daily. fluticasone propionate (FLONASE) 50 mcg/actuation nasal spray 2 Sprays by Both Nostrils route daily. Qty: 1 Bottle, Refills: 0    Associated Diagnoses: Bacterial upper respiratory infection      clonazePAM (KLONOPIN) 0.5 mg tablet TAKE 1 TABLET BY MOUTH TWICE DAILY. MAXIMUM DAILY AMOUNT: 1 MG  Qty: 60 Tab, Refills: 2    Associated Diagnoses: Anxiety      rOPINIRole (REQUIP) 1 mg tablet Take 1 Tab by mouth nightly. Qty: 90 Tab, Refills: 2      verapamil ER (CALAN-SR) 120 mg tablet Take 1 Tab by mouth nightly. Indications: high blood pressure  Qty: 90 Tab, Refills: 1      pantoprazole (PROTONIX) 40 mg tablet Take 1 Tab by mouth every morning. Indications: gastroesophageal reflux disease  Qty: 90 Tab, Refills: 1      escitalopram oxalate (LEXAPRO) 20 mg tablet Take 1 Tab by mouth nightly. Indications: Anxiousness associated with Depression  Qty: 90 Tab, Refills: 4      nortriptyline (PAMELOR) 10 mg capsule Take 1-2 Caps by mouth nightly. Qty: 90 Cap, Refills: 3      polyethylene glycol (MIRALAX) 17 gram packet Take 17 g by mouth daily as needed (constipation). albuterol (PROVENTIL HFA, VENTOLIN HFA, PROAIR HFA) 90 mcg/actuation inhaler Take 1 Puff by inhalation every four (4) hours as needed for Wheezing. Qty: 1 Inhaler, Refills: 0         STOP taking these medications       azithromycin (ZITHROMAX) 250 mg tablet Comments:   Reason for Stopping:                Follow-up Information     Follow up With Specialties Details Why [address], Manjinder Headley MD Internal Medicine   [address] [facility]  [address] [facility]      Jorge Bates MD Cardiology, [address] Vascular Surgery, Internal Medicine In 2 weeks cardiology  [address]  [phone]      Felisha Carey MD Neurology In 3 weeks 2-4 weeks  [address]  [address] [facility]  [phone]          ________________________________________________________________    Risk of deterioration: Low    Condition at Discharge:  Stable  __________________________________________________________________    Disposition  Home with family and home health services    ____________________________________________________________________    Code Status: Full Code  ___________________________________________________________________      Total time in minutes spent coordinating this discharge (includes going over instructions, follow-up, prescriptions, and preparing report for sign off to her PCP) :  > 30  minutes    Signed:  Neris Melgar MD

## 2019-11-11 NOTE — PROGRESS NOTES
Problem: Patient Education: Go to Patient Education Activity  Goal: Patient/Family Education  Outcome: Progressing Towards Goal     Problem: TIA/CVA Stroke: 0-24 hours  Goal: Off Pathway (Use only if patient is Off Pathway)  Outcome: Progressing Towards Goal  Goal: Activity/Safety  Outcome: Progressing Towards Goal  Goal: Consults, if ordered  Outcome: Progressing Towards Goal  Goal: Diagnostic Test/Procedures  Outcome: Progressing Towards Goal  Goal: Nutrition/Diet  Outcome: Progressing Towards Goal  Goal: Discharge Planning  Outcome: Progressing Towards Goal  Goal: Medications  Outcome: Progressing Towards Goal  Goal: Respiratory  Outcome: Progressing Towards Goal  Goal: Treatments/Interventions/Procedures  Outcome: Progressing Towards Goal  Goal: Minimize risk of bleeding post-thrombolytic infusion  Outcome: Progressing Towards Goal  Goal: Monitor for complications post-thrombolytic infusion  Outcome: Progressing Towards Goal  Goal: Psychosocial  Outcome: Progressing Towards Goal  Goal: *Hemodynamically stable  Outcome: Progressing Towards Goal  Goal: *Neurologically stable  Description  Absence of additional neurological deficits    Outcome: Progressing Towards Goal  Goal: *Verbalizes anxiety and depression are reduced or absent  Outcome: Progressing Towards Goal  Goal: *Absence of Signs of Aspiration on Current Diet  Outcome: Progressing Towards Goal  Goal: *Absence of deep venous thrombosis signs and symptoms(Stroke Metric)  Outcome: Progressing Towards Goal  Goal: *Ability to perform ADLs and demonstrates progressive mobility and function  Outcome: Progressing Towards Goal  Goal: *Stroke education started(Stroke Metric)  Outcome: Progressing Towards Goal  Goal: *Dysphagia screen performed(Stroke Metric)  Outcome: Progressing Towards Goal  Goal: *Rehab consulted(Stroke Metric)  Outcome: Progressing Towards Goal     Problem: TIA/CVA Stroke: Day 2 Until Discharge  Goal: Off Pathway (Use only if patient is Off Pathway)  Outcome: Progressing Towards Goal  Goal: Activity/Safety  Outcome: Progressing Towards Goal  Goal: Diagnostic Test/Procedures  Outcome: Progressing Towards Goal  Goal: Nutrition/Diet  Outcome: Progressing Towards Goal  Goal: Discharge Planning  Outcome: Progressing Towards Goal  Goal: Medications  Outcome: Progressing Towards Goal  Goal: Respiratory  Outcome: Progressing Towards Goal  Goal: Treatments/Interventions/Procedures  Outcome: Progressing Towards Goal  Goal: Psychosocial  Outcome: Progressing Towards Goal  Goal: *Verbalizes anxiety and depression are reduced or absent  Outcome: Progressing Towards Goal  Goal: *Absence of aspiration  Outcome: Progressing Towards Goal  Goal: *Absence of deep venous thrombosis signs and symptoms(Stroke Metric)  Outcome: Progressing Towards Goal  Goal: *Optimal pain control at patient's stated goal  Outcome: Progressing Towards Goal  Goal: *Tolerating diet  Outcome: Progressing Towards Goal  Goal: *Ability to perform ADLs and demonstrates progressive mobility and function  Outcome: Progressing Towards Goal  Goal: *Stroke education continued(Stroke Metric)  Outcome: Progressing Towards Goal     Problem: Ischemic Stroke: Discharge Outcomes  Goal: *Verbalizes anxiety and depression are reduced or absent  Outcome: Progressing Towards Goal  Goal: *Verbalize understanding of risk factor modification(Stroke Metric)  Outcome: Progressing Towards Goal  Goal: *Hemodynamically stable  Outcome: Progressing Towards Goal  Goal: *Absence of aspiration pneumonia  Outcome: Progressing Towards Goal  Goal: *Aware of needed dietary changes  Outcome: Progressing Towards Goal  Goal: *Verbalize understanding of prescribed medications including anti-coagulants, anti-lipid, and/or anti-platelets(Stroke Metric)  Outcome: Progressing Towards Goal  Goal: *Tolerating diet  Outcome: Progressing Towards Goal  Goal: *Aware of follow-up diagnostics related to anticoagulants  Outcome: Progressing Towards Goal  Goal: *Ability to perform ADLs and demonstrates progressive mobility and function  Outcome: Progressing Towards Goal  Goal: *Absence of DVT(Stroke Metric)  Outcome: Progressing Towards Goal  Goal: *Absence of aspiration  Outcome: Progressing Towards Goal  Goal: *Optimal pain control at patient's stated goal  Outcome: Progressing Towards Goal  Goal: *Home safety concerns addressed  Outcome: Progressing Towards Goal  Goal: *Describes available resources and support systems  Outcome: Progressing Towards Goal  Goal: *Verbalizes understanding of activation of EMS(911) for stroke symptoms(Stroke Metric)  Outcome: Progressing Towards Goal  Goal: *Understands and describes signs and symptoms to report to providers(Stroke Metric)  Outcome: Progressing Towards Goal  Goal: *Neurolgocially stable (absence of additional neurological deficits)  Outcome: Progressing Towards Goal  Goal: *Verbalizes importance of follow-up with primary care physician(Stroke Metric)  Outcome: Progressing Towards Goal  Goal: *Smoking cessation discussed,if applicable(Stroke Metric)  Outcome: Progressing Towards Goal  Goal: *Depression screening completed(Stroke Metric)  Outcome: Progressing Towards Goal     Problem: Falls - Risk of  Goal: *Absence of Falls  Description  Document Dany Fall Risk and appropriate interventions in the flowsheet.   Outcome: Progressing Towards Goal  Note:   Fall Risk Interventions:  Mobility Interventions: Utilize walker, cane, or other assistive device, Communicate number of staff needed for ambulation/transfer         Medication Interventions: Patient to call before getting OOB, Teach patient to arise slowly, Assess postural VS orthostatic hypotension    Elimination Interventions: Call light in reach, Patient to call for help with toileting needs, Toileting schedule/hourly rounds

## 2019-11-11 NOTE — DISCHARGE INSTRUCTIONS
Patient Discharge Instructions    Stacia Roland / [id number] : 1940    Admitted 2019 Discharged: 2019         DISCHARGE DIAGNOSIS:     Stroke   - continue Plavix   - you had a special loop recorder placed in your heart to monitor for cardiac arrhythmias which can cause strokes. Follow up with cardiology Dr Larry Banerjee in 2 weeks. By [facility] you are not allowed to drive for 6 months after stroke or until cleared by neurology. Take Home Medications           General drug facts     If you have a very bad allergy, wear an allergy ID at all times. It is important that you take the medication exactly as they are prescribed. Keep your medication in the bottles provided by the pharmacist.  Keep a list of all your drugs (prescription, natural products, vitamins, OTC) with you. Give this list to your doctor. Do not take other medications without consulting your doctor. Do not share your drugs with others and do not take anyone else's drugs. Keep all drugs out of the reach of children and pets. Most drugs may be thrown away in household trash after mixing with coffee grounds or kirill litter and sealing in a plastic bag. Keep a list Call your doctor for help with any side effects. If in the [place] you may also call the FDA at 5-684-FDA-8668    Talk with the doctor before starting any new drug, including OTC, natural products, or vitamins. What to do at Home    1. Recommended diet: cardiac     2. Recommended activity: Activity as tolerated and PT/OT per Home Health    3. If you experience any of the following symptoms then please call your primary care physician or return to the emergency room if you cannot get hold of your doctor: fever/chills/ new weakness     4. Wound Care: keep area of surgical incision clean and dry. OK to shower     5. Bring these papers with you to your follow up appointments.  The papers will help your doctors be sure to continue the care plan from the [facility].      Follow-up with:   PCP: Mani Bridges MD  Follow-up Information     Follow up With Specialties Details Why Contact Info    Mani Bridges MD Internal Medicine   [address]  [place] [address]  [facility]  [phone]      Jorge Bates MD Cardiology, [address] [facility], Internal Medicine In 2 weeks cardiology  [address]  [address] [facility]      Lorelle Snellen, MD Neurology In 3 weeks 2-4 weeks  [address]  [phone]             Please call for your own appointment        Information obtained by :  I understand that if any problems occur once I am at home I am to contact my physician. I understand and acknowledge receipt of the instructions indicated above.                                                                                                                                            Physician's or R.N.'s Signature                                                                  Date/Time                                                                                                                                              Patient or Representative Signature                                                          Date/Time

## 2019-11-11 NOTE — PROGRESS NOTES
Bedside and Verbal shift change report given to Michael Dorantes RN (oncoming nurse) by Maryfrances Mohs (offgoing nurse). Report included the following information SBAR, Kardex, Recent Results, Med Rec Status and Cardiac Rhythm SR. Zone Phone:   [phone]      Significant changes during shift:  none  Patient Information    Shira Rodgers  67 y.o.  11/5/2019 10:34 AM by Yesenia Lauren MD. Shira Rodgers was admitted from Home    Problem List    Patient Active Problem List    Diagnosis Date Noted    Lumbar post-laminectomy syndrome 25/77/2216    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)     Bilateral carotid artery stenosis     CVA (cerebral vascular accident) ([facility] [address] 11/05/2019    Ulnar neuropathy at elbow of right upper extremity 07/25/2018    Cervical post-laminectomy syndrome 06/07/2018    Idiopathic small and large fiber sensory neuropathy 06/07/2018    Lumbar back pain with radiculopathy affecting left lower extremity 06/07/2018    Lumbar back pain with radiculopathy affecting right lower extremity 06/07/2018    Vitamin D deficiency 06/07/2018    Cervical radiculopathy due to degenerative joint disease of spine 06/07/2018    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    Stenosis of both internal carotid arteries 03/13/2017    Benign essential tremor syndrome 02/21/2017    Tension vascular headache 02/21/2017    Hyperthyroidism 02/21/2017    Osteoporosis 08/31/2016    MGUS (monoclonal gammopathy of unknown significance) 10/04/2013    Chronic tension headaches 06/25/2013    Falls 06/25/2013    Lumbar stenosis 08/03/2011     Past Medical History:   Diagnosis Date    Adverse effect of anesthesia     sleep apnea no cpap machine useage       Anemia     has had iron infusions;  Hem/onc Dr Vandana Garrett [phone]    Anxiety and depression     Bilateral carotid artery stenosis     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)     GERD (gastroesophageal reflux disease)     Hypercholesteremia     Hypertension     Idiopathic small and large fiber sensory neuropathy     Limited peripheral vision of right eye     complication of cataract surgery    Long term current use of anticoagulant therapy     Meniere disease     MGUS (monoclonal gammopathy of unknown significance)     Monoclonal gammopathy of unknown significance     hem/onc:  Dr Amish Alanis [phone]    Osteoporosis     Psoriasis     bilateral Legs    Restless leg     Skin melanoma ([facility] [address] 2012    (Rt ankle) and basal cell (Lt eye) removed    Transient ischemic attack approx 2005    HAS HAD 2 MINISTROKES-NO DEFICITS; neuro Dr Cynthia George (cc)    Unspecified adverse effect of anesthesia     HAS NEEDED PORT OR CUTDOWN FOR RECVNG BLOOD OR LONG-TERM IVs; surgery 7/28/15 w/o any difficulty    Unspecified sleep apnea     does not use CPAP         Core Measures:    CVA: Yes Yes  CHF:No No  PNA:No No    Post Op Surgical (If Applicable):     Number times ambulated in hallway past shift:  0  Number of times OOB to chair past shift:   0  NG Tube: No  Incentive Spirometer: No  Drains: No   Volume  0  Dressing Present:  No  Flatus:  Not applicable    Activity Status:    OOB to Chair No  Ambulated this shift No   Bed Rest Yes    Supplemental O2: (If Applicable)    NC No  NRB No  Venti-mask No  On 0 Liters/min      LINES AND DRAINS:    Central Line? No   PICC LINE? No   Urinary Catheter? No   DVT prophylaxis:    DVT prophylaxis Med- Yes  DVT prophylaxis SCD or BRIT- No     Wounds: (If Applicable)    Wounds- No    Location 0    Patient Safety:    Falls Score Total Score: 3  Safety Level_______  Bed Alarm On? Yes  Sitter?  No    Plan for upcoming shift: safety, neurochecks,        Discharge Plan: Yes TBD    Active Consults:  IP CONSULT TO HOSPITALIST  IP CONSULT TO NEUROLOGY  IP CONSULT TO CARDIOLOGY

## 2019-11-11 NOTE — PROGRESS NOTES
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 Cervical right internal carotid artery: Mixed plaque with 75% stenosis by NASCET  Criteria. MRI brain shows Acute left posterior frontal/parietal operculum and posterior insula cortical Infarct.  -Echo normal, on obvious shunting on bubble study  -LDL 79, A1c 5.3  -seen by neurology:  R ICA stenosis, asymptomatic, follow OP   MRI with 2 strokes - this does suggest cardioembolic phenomena. cont Plavix and statin  -PT: SAH initially but improved and now OK to go home with Nelyu 79   -S/P ILR. ALLIE  wo thrombus      HTN  -BP stable  -cont home verapamil/  lisinopril      Anxiety / Depression, continue meds  FRED  RLS, cont Requip and mysoline  Meniere's   MGUS     Code Status: Full   Surrogate Decision Maker: children  DVT Prophylaxis: Lovenox      Baseline: primary care giver for  who has dementia  Disposition: [facility]      Subjective:     Chief Complaint / Reason for Physician Visit: stroke/ HTN   No new neurological Sx   Cardiology advised to take dressing off   Ambulating in the room independent     Discussed with RN events overnight.      Review of Systems:  Symptom Y/N Comments  Symptom Y/N Comments   Fever/Chills n   Chest Pain n    Poor Appetite    Edema     Cough    Abdominal Pain n    Sputum    Joint Pain     SOB/HERNANDES n   Pruritis/Rash     Nausea/vomit    Tolerating PT/OT     Diarrhea    Tolerating Diet     Constipation    Other       Could NOT obtain due to:      Objective: VITALS:   Last 24hrs VS reviewed since prior progress note. Most recent are:  Patient Vitals for the past 24 hrs:   Temp Pulse Resp BP SpO2   11/11/19 1212 97.8 °F (36.6 °C) 69 16 142/68 97 %   11/11/19 0815 98.1 °F (36.7 °C) 75 16 129/79 96 %   11/11/19 0406 97.8 °F (36.6 °C)  16 (!) 124/98 98 %   11/11/19 0018 98.2 °F (36.8 °C) 73 18 121/61 96 %   11/10/19 2030 98.4 °F (36.9 °C) 69 18 144/71 95 %   11/10/19 1607 97.9 °F (36.6 °C) 67 18 138/55 96 %     No intake or output data in the 24 hours ending 11/11/19 1231     PHYSICAL EXAM:  General: Alert, cooperative, no acute distress    EENT:  EOMI. Anicteric sclerae. MMM  Resp:  CTA bilaterally   CV:  Regular  rhythm,  No edema  GI:  Soft, Non distended, Non tender.  +Bowel sounds  Neurologic:  Alert and oriented X 3, dysarthria, left side strength 4+/5 and right side 5/5  Psych:   Good insight. Not anxious nor agitated  Skin:  No rashes. No jaundice    Reviewed most current lab test results and cultures  YES  Reviewed most current radiology test results   YES  Review and summation of old records today    NO  Reviewed patient's current orders and MAR    YES  PMH/ reviewed - no change compared to H&P  ________________________________________________________________________  Care Plan discussed with:    Comments   Patient x    Family      RN x    Care Manager x    Consultant                       x Multidiciplinary team rounds were held today with , nursing, pharmacist and clinical coordinator. Patient's plan of care was discussed; medications were reviewed and discharge planning was addressed.      ________________________________________________________________________  Total NON critical care TIME: 35  Minutes    Total CRITICAL CARE TIME Spent:   Minutes non procedure based      Comments   >50% of visit spent in counseling and coordination of care y Dc coordination    ________________________________________________________________________  [facility] Gita Ryan MD     Procedures: see electronic medical records for all procedures/Xrays and details which were not copied into this note but were reviewed prior to creation of Plan. LABS:  I reviewed today's most current labs and imaging studies. Pertinent labs include:  No results for input(s): WBC, HGB, HCT, PLT, HGBEXT, HCTEXT, PLTEXT, HGBEXT, HCTEXT, PLTEXT in the last 72 hours. No results for input(s): NA, K, CL, CO2, GLU, BUN, CREA, CA, MG, PHOS, ALB, TBIL, TBILI, SGOT, ALT, INR, INREXT, INREXT in the last 72 hours.     Signed: Kevin Lyman MD

## 2019-11-11 NOTE — PROGRESS NOTES
Problem: Self Care Deficits Care Plan (Adult)  Goal: *Acute Goals and Plan of Care (Insert Text)  Description  FUNCTIONAL STATUS PRIOR TO ADMISSION: Patient was independent with ADL/IADL tasks. Drives. Denies Hx of falls. Was in the process of moving houses PTA. HOME SUPPORT: The patient lived with  but did not require assist. 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 She assists him with shower transfers, LB dressing and provides supervision 24/7. Occupational Therapy Goals  Initiated 11/6/2019   1. Patient will perform grooming tasks standing at the sink with modified independence within 7 day(s). 2.  Patient will perform lower body dressing with modified independence within 7 day(s). 3.  Patient will perform toilet transfers with modified independence using least restrictive device within 7 day(s). 4.  Patient will perform all aspects of toileting with modified independence within 7 day(s). 5.  Patient will perform safe item retrieval from high/low surfaces during ADLs with modified independence within 7 days. 6.  Patient will perform one IADL task with good static/dynamic standing balance with modified independence within 7 days. 7.  Patient will improve Fugl-Ayon UE assessment score by 5 points within 7 days. Outcome: Progressing Towards Goal    OCCUPATIONAL THERAPY TREATMENT  Patient: You Issa (58 y.o. female)  Date: 11/11/2019  Diagnosis: CVA (cerebral vascular accident) ([facility] [I63.9] <principal problem not specified>  Procedure(s) (LRB):  LOOP RECORDER INSERT (N/A) 5 Days Post-Op  Precautions: Fall  Chart, occupational therapy assessment, plan of care, and goals were reviewed. ASSESSMENT  Patient continues with skilled OT services and is progressing towards goals. She was able to manage self-care routine with SBA. Recommend use of RW and educated on how to maneuver in small/tight spaces, like the bathroom.   Patient indicates that her family will be available to assist at home if needed. Continue per plan of care. Current Level of Function Impacting Discharge (ADLs): SBA to Independent    Other factors to consider for discharge:          PLAN :  Patient continues to benefit from skilled intervention to address the above impairments. Continue treatment per established plan of care. to address goals. Recommendation for discharge: (in order for the patient to meet his/her long term goals)  Occupational therapy at least 2 days/week in the home     This discharge recommendation:  Has been made in collaboration with the attending provider and/or case management    IF patient discharges home will need the following DME: rolling walker       SUBJECTIVE:   Patient stated I really hope I can go home.     OBJECTIVE DATA SUMMARY:   Cognitive/Behavioral Status:  Neurologic State: Appropriate for age  Orientation Level: Oriented X4  Cognition: Appropriate decision making  Perception: Appears intact  Perseveration: No perseveration noted  Safety/Judgement: Awareness of environment; Insight into deficits    Functional Mobility and Transfers for ADLs:  Bed Mobility:       Transfers:  Sit to Stand: Stand-by assistance  Functional Transfers  Bathroom Mobility: Supervision/set up  Toilet Transfer : Supervision  Cues: Verbal cues provided       Balance:  Sitting: Intact  Standing: Impaired  Standing - Static: Constant support;Good  Standing - Dynamic : Constant support; Fair    ADL Intervention:       Grooming  Washing Face: Supervision  Washing Hands: Supervision  Cues: Verbal cues provided;Visual cues provided(for walker placement at the sink)         Lower Body Bathing  Perineal  : Supervision  Position Performed: Standing  Lower Body : Supervision         Lower Body Dressing Assistance  Socks: Independent  Leg Crossed Method Used: Yes  Position Performed: Seated edge of bed  Cues: Doff; Lindsay Rowe  Bladder Hygiene: Supervision  Clothing Management: Supervision  Adaptive Equipment: Walker    Cognitive Retraining  Safety/Judgement: Awareness of environment; Insight into deficits    Pain:  No complaints    Activity Tolerance:   Fair  Please refer to the flowsheet for vital signs taken during this treatment.     After treatment patient left in no apparent distress:   Sitting in chair and Call bell within reach    COMMUNICATION/COLLABORATION:   The patients plan of care was discussed with: Physical Therapist and Registered Nurse    Sudhir Humphrey, OTR/L  Time Calculation: 19 mins

## 2019-11-11 NOTE — PROGRESS NOTES
CHRISTIANNE:    [facility]   2nd IM Medicare Letter  Home with Family    CM completed IDR, and it was reported that pt would be able to return home with [facility] vs Inpatient Rehab. CM contact pt's daughter: Tricia Mena, via telephone and explained to her the following. Teri agreeable with [facility] services vs Inpatient Rehab. CM completed room visit to inform pt of the following. Pt agreeable to [facility], and selected [facility] to service her at home. Pt made aware if [facility], is unable to accept, referral can be sent to other [facility] agencies. CM informed pt of FOC document and 2nd IM Medicare Letter (signed) placed in chart. CM sent referral to [facility], Willy Phalen currently pending). CM has updated Tricia Mena of the following.     UPDATE: 2:34PM    CM informed that pt has been accepted to [address] [facility], [address] [place] [address]

## 2019-11-12 ENCOUNTER — PATIENT OUTREACH (OUTPATIENT)
Dept: INTERNAL MEDICINE CLINIC | Age: 79
End: 2019-11-12

## 2019-11-12 NOTE — TELEPHONE ENCOUNTER
Spoke with patient. Two pt identifiers confirmed. Patient offered an appointment with Dr. Antonio Olivo on 12/09/19. Appointment accepted. Patient advised if anything changes or if unable to keep this appointment to call the office  Pt verbalized understanding of information discussed w/ no further questions at this time.

## 2019-11-13 ENCOUNTER — HOME CARE VISIT (OUTPATIENT)
Dept: SCHEDULING | Facility: HOME HEALTH | Age: 79
End: 2019-11-13
Payer: MEDICARE

## 2019-11-13 ENCOUNTER — TELEPHONE (OUTPATIENT)
Dept: CARDIOLOGY CLINIC | Age: 79
End: 2019-11-13

## 2019-11-13 VITALS
TEMPERATURE: 98.4 F | RESPIRATION RATE: 17 BRPM | WEIGHT: 159 LBS | BODY MASS INDEX: 29.26 KG/M2 | SYSTOLIC BLOOD PRESSURE: 140 MMHG | HEART RATE: 76 BPM | HEIGHT: 62 IN | OXYGEN SATURATION: 98 % | DIASTOLIC BLOOD PRESSURE: 76 MMHG

## 2019-11-13 VITALS
DIASTOLIC BLOOD PRESSURE: 80 MMHG | TEMPERATURE: 97.6 F | OXYGEN SATURATION: 98 % | HEART RATE: 70 BPM | RESPIRATION RATE: 18 BRPM | SYSTOLIC BLOOD PRESSURE: 128 MMHG

## 2019-11-13 LAB
ANA SER QL: NEGATIVE
GLIADIN PEPTIDE IGA SER-ACNC: 3 UNITS (ref 0–19)
GLIADIN PEPTIDE IGG SER-ACNC: 2 UNITS (ref 0–19)
GM1 IGG AUTOANTIBODIES, GM1GAT: <5 % (ref 0–30)
IGA SERPL-MCNC: 264 MG/DL (ref 64–422)
IGG SERPL-MCNC: 765 MG/DL (ref 700–1600)
IGM SERPL-MCNC: 32 MG/DL (ref 26–217)
INTERPRETATION: NORMAL
MAG AB, IGM, ANMGLT: NORMAL TITER
PROT PATTERN SERPL IFE-IMP: NORMAL

## 2019-11-13 PROCEDURE — G0151 HHCP-SERV OF PT,EA 15 MIN: HCPCS

## 2019-11-13 PROCEDURE — 400013 HH SOC

## 2019-11-13 PROCEDURE — G0299 HHS/HOSPICE OF RN EA 15 MIN: HCPCS

## 2019-11-13 NOTE — TELEPHONE ENCOUNTER
Verified patient with 2 identifiers   Patient advised to open area open to air on Monday November 18, 2019

## 2019-11-13 NOTE — TELEPHONE ENCOUNTER
Attempted to contact Milind Lucio from [address] [place] [address]- not available and unable to leave a message. Will contact patient.

## 2019-11-13 NOTE — TELEPHONE ENCOUNTER
Jing El from [address] [place] [address] called. Patient had a Loop Monitor implanted on 11/6/19. She had a reaction to the first adhesive put on in hospital. The family has since applied a new one and patient has not had a reaction to this one. Anum Valdes is Questioning if the area should be open to air now or should the adhesive stay in place til her f/u appt on 11/20/19.

## 2019-11-14 LAB
INTERPRETATION, NEU2LT: NORMAL
NEURONAL CELL AB, NEU1LT: 5 UNITS (ref 0–54)

## 2019-11-14 NOTE — PROGRESS NOTES
Hospital Discharge Follow-Up      Date/Time:  19 2:34 PM    Patient was admitted to [facility] on 19 and discharged on 19 for CVA. The physician discharge summary was available at the time of outreach. Patient was contacted within 1 business days of discharge. Top Challenges reviewed with the provider   CVA -S/P ILR. ALLIE  wo thrombus   Acute left posterior frontal/parietal operculum and posterior insula cortical Infarct  [facility] . Patient is primary caregiver for her    First available CHRISTIANNE appt   Advance Care Planning:   Does patient have an Advance Directive:  reviewed and current   (named agents were also witnesses)     Method of communication with provider :chart routing    Inpatient RRAT score: 23  Was this a readmission? no   Patient stated reason for the readmission: n/a    Care Transition Nurse (CTN) contacted the patient by telephone to perform post hospital discharge assessment. Verified name and  with patient as identifiers. Provided introduction to self, and explanation of the CTN role. Patient received hospital discharge instructions. CTN reviewed discharge instructions and red flags with patient who verbalized understanding. Patient given an opportunity to ask questions and does not have any further questions or concerns at this time. The patient agrees to contact the PCP office for questions related to their healthcare. CTN provided contact information for future reference.     Disease Specific:   N/A    Patients top risk factors for readmission:  lack of knowledge about disease, support system patient is primary care giver for her      [address] orders at discharge: PT, OT, [address]: [facility]  Date of initial visit: 19    Durable Medical Equipment ordered at discharge: none    Medication(s):   New Medications at Discharge: n/a  Changed Medications at Discharge: n/a  Discontinued Medications at Discharge:  azithromycin (ZITHROMAX) 250 mg tablet Comments:   Reason for Stopping:                 Medication reconciliation was performed with patient, who verbalizes understanding of administration of home medications. There were no barriers to obtaining medications identified at this time. Referral to Pharm D needed: no     Current Outpatient Medications   Medication Sig    atorvastatin (LIPITOR) 20 mg tablet Take 20 mg by mouth nightly.  clopidogrel (PLAVIX) 75 mg tab Take 75 mg by mouth nightly.  lisinopril (PRINIVIL, ZESTRIL) 2.5 mg tablet Take 2.5 mg by mouth nightly.  primidone (MYSOLINE) 50 mg tablet Take 50 mg by mouth nightly.  multivit/folic acid/vit K1 (ONE-A-DAY WOMEN'S 50 PLUS PO) Take 1 Tab by mouth daily.  fluticasone propionate (FLONASE) 50 mcg/actuation nasal spray 2 Sprays by Both Nostrils route daily.  clonazePAM (KLONOPIN) 0.5 mg tablet TAKE 1 TABLET BY MOUTH TWICE DAILY. MAXIMUM DAILY AMOUNT: 1 MG    rOPINIRole (REQUIP) 1 mg tablet Take 1 Tab by mouth nightly.  verapamil ER (CALAN-SR) 120 mg tablet Take 1 Tab by mouth nightly. Indications: high blood pressure    pantoprazole (PROTONIX) 40 mg tablet Take 1 Tab by mouth every morning. Indications: gastroesophageal reflux disease    escitalopram oxalate (LEXAPRO) 20 mg tablet Take 1 Tab by mouth nightly. Indications: Anxiousness associated with Depression    nortriptyline (PAMELOR) 10 mg capsule Take 1-2 Caps by mouth nightly.  albuterol (PROVENTIL HFA, VENTOLIN HFA, PROAIR HFA) 90 mcg/actuation inhaler Take 1 Puff by inhalation every four (4) hours as needed for Wheezing.  polyethylene glycol (MIRALAX) 17 gram packet Take 17 g by mouth daily as needed (constipation). No current facility-administered medications for this visit. There are no discontinued medications.     [facility] follow up appointment(s):   Future Appointments   Date Time Provider Logan Thornton   11/15/2019 To Be Determined Gopal Puckett Naman Cedillo [place] [facility] [address]   11/19/2019 11:30 AM Johan Redding [facility] [place] [address]   11/20/2019 10:15 AM PACEMAKER, [facility] SCHED   11/21/2019 To Be Determined Edger Power [place] [address]   11/26/2019 To Be Determined [employer] [place] [address]   11/28/2019 To Be Determined [employer] [place] [address]   12/3/2019 To Be Determined [employer] [place] [address]   12/5/2019 To Be Determined Clayton Philippe, PT [facility] [place] [address]   12/9/2019 10:30 AM Appdenise Novoa [place] [address]    12/13/2019  2:45 PM Appdenise Novoa MD [facility]I SCHED   1/23/2020 10:40 AM Cheyenne Qiu  [place]      Non-[facility] follow up appointment(s): n/a  [facility]:  offered and patient declined - information provided as a resource        Goals      Returns to baseline activity level. 11/13/19    - spoke to patient and her daughter today. - patient is primary care giver for her  - however family is close by and is provided 24/7 supervision while patient recovers   - open to [facility] for PT, OT, SN  - first available CHRISTIANNE appt 12/9  - Dr. Yany Lay 11/20  - patient/family is to schedule f/u with Dr. Nain Castellanos - current appt not until January 2020   - reminded patient/family to not get loop recorder incision site wet until doctor states it was ok, and no heavy lifting (limited discharge activity/precaution education provided on AVS)  - family can provided transporation to appts. Patient/family verbalized understanding. CTN to f/u 7-10 days. AR         Understands red flags post discharge. 11/13/19    Spoke to patient and family today. Reviewed red flag s/s with patient, nausea, vomiting, inability to pass urine/stool, SOB, mental status change, chest pain, fever.      Reviewed FAST with patient and signs of CVA, such as sudden numbness, tingling, loss of movement in your face, arm, leg, especially on one side of the body, vision changes, trouble speaking, confusion, walking or balance problems and sudden headache. Patient declined [facility] f/u at this time. Info provided as resource. Patient will contact Md/CTN if red flag s/s arise. CTN to w/u ~ 7-10 days.  AR

## 2019-11-15 ENCOUNTER — HOME CARE VISIT (OUTPATIENT)
Dept: SCHEDULING | Facility: HOME HEALTH | Age: 79
End: 2019-11-15
Payer: MEDICARE

## 2019-11-15 PROCEDURE — G0151 HHCP-SERV OF PT,EA 15 MIN: HCPCS

## 2019-11-16 ENCOUNTER — HOME CARE VISIT (OUTPATIENT)
Dept: SCHEDULING | Facility: HOME HEALTH | Age: 79
End: 2019-11-16
Payer: MEDICARE

## 2019-11-16 VITALS
DIASTOLIC BLOOD PRESSURE: 80 MMHG | SYSTOLIC BLOOD PRESSURE: 130 MMHG | TEMPERATURE: 97.7 F | OXYGEN SATURATION: 98 % | RESPIRATION RATE: 18 BRPM | HEART RATE: 70 BPM

## 2019-11-16 PROCEDURE — G0300 HHS/HOSPICE OF LPN EA 15 MIN: HCPCS

## 2019-11-17 VITALS
DIASTOLIC BLOOD PRESSURE: 78 MMHG | SYSTOLIC BLOOD PRESSURE: 116 MMHG | RESPIRATION RATE: 16 BRPM | HEART RATE: 62 BPM | TEMPERATURE: 98.6 F | OXYGEN SATURATION: 98 %

## 2019-11-18 ENCOUNTER — HOME CARE VISIT (OUTPATIENT)
Dept: SCHEDULING | Facility: HOME HEALTH | Age: 79
End: 2019-11-18
Payer: MEDICARE

## 2019-11-18 VITALS
TEMPERATURE: 98 F | HEART RATE: 78 BPM | DIASTOLIC BLOOD PRESSURE: 70 MMHG | OXYGEN SATURATION: 98 % | SYSTOLIC BLOOD PRESSURE: 130 MMHG

## 2019-11-18 PROCEDURE — G0152 HHCP-SERV OF OT,EA 15 MIN: HCPCS

## 2019-11-19 ENCOUNTER — HOME CARE VISIT (OUTPATIENT)
Dept: SCHEDULING | Facility: HOME HEALTH | Age: 79
End: 2019-11-19
Payer: MEDICARE

## 2019-11-19 VITALS
TEMPERATURE: 97.1 F | RESPIRATION RATE: 18 BRPM | SYSTOLIC BLOOD PRESSURE: 120 MMHG | DIASTOLIC BLOOD PRESSURE: 84 MMHG | OXYGEN SATURATION: 97 % | HEART RATE: 76 BPM

## 2019-11-19 PROCEDURE — G0151 HHCP-SERV OF PT,EA 15 MIN: HCPCS

## 2019-11-20 ENCOUNTER — HOME CARE VISIT (OUTPATIENT)
Dept: HOME HEALTH SERVICES | Facility: HOME HEALTH | Age: 79
End: 2019-11-20
Payer: MEDICARE

## 2019-11-20 ENCOUNTER — CLINICAL SUPPORT (OUTPATIENT)
Dept: CARDIOLOGY CLINIC | Age: 79
End: 2019-11-20

## 2019-11-20 DIAGNOSIS — I63.30 CEREBROVASCULAR ACCIDENT (CVA) DUE TO THROMBOSIS OF CEREBRAL ARTERY (HCC): Primary | ICD-10-CM

## 2019-11-20 DIAGNOSIS — Z95.818 STATUS POST PLACEMENT OF IMPLANTABLE LOOP RECORDER: ICD-10-CM

## 2019-11-20 DIAGNOSIS — Z51.89 VISIT FOR WOUND CHECK: Primary | ICD-10-CM

## 2019-11-20 DIAGNOSIS — Z45.09 ENCOUNTER FOR LOOP RECORDER CHECK: ICD-10-CM

## 2019-11-20 DIAGNOSIS — I63.30 CEREBROVASCULAR ACCIDENT (CVA) DUE TO THROMBOSIS OF CEREBRAL ARTERY (HCC): ICD-10-CM

## 2019-11-20 NOTE — PROGRESS NOTES
Khalida Lino  1940  Ino Patino MD          Subjective:    Patient is here for 2 week site check and device interrogation after ILR implant for cryptogenic stroke. The patient denies chest pain/shortness of breath or fevers. Patient Active Problem List    Diagnosis Date Noted    Lumbar post-laminectomy syndrome 97/22/7129    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)     Bilateral carotid artery stenosis     CVA (cerebral vascular accident) ([address] 11/05/2019    Ulnar neuropathy at elbow of right upper extremity 07/25/2018    Cervical post-laminectomy syndrome 06/07/2018    Idiopathic small and large fiber sensory neuropathy 06/07/2018    Lumbar back pain with radiculopathy affecting left lower extremity 06/07/2018    Lumbar back pain with radiculopathy affecting right lower extremity 06/07/2018    Vitamin D deficiency 06/07/2018    Cervical radiculopathy due to degenerative joint disease of spine 06/07/2018    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    Stenosis of both internal carotid arteries 03/13/2017    Benign essential tremor syndrome 02/21/2017    Tension vascular headache 02/21/2017    Hyperthyroidism 02/21/2017    Osteoporosis 08/31/2016    MGUS (monoclonal gammopathy of unknown significance) 10/04/2013    Chronic tension headaches 06/25/2013    Falls 06/25/2013    Lumbar stenosis 08/03/2011      Past Medical History:   Diagnosis Date    Adverse effect of anesthesia     sleep apnea no cpap machine useage       Anemia     has had iron infusions;  Hem/onc Dr Reddy Laser [phone]    Anxiety and depression     Bilateral carotid artery stenosis     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)     GERD (gastroesophageal reflux disease)     Hypercholesteremia     Hypertension     Idiopathic small and large fiber sensory neuropathy     Limited peripheral vision of right eye     complication of cataract surgery    Long term current use of anticoagulant therapy     Meniere disease     MGUS (monoclonal gammopathy of unknown significance)     Monoclonal gammopathy of unknown significance     hem/onc:  Dr Audi Amezquita [phone]    Osteoporosis     Psoriasis     bilateral Legs    Restless leg     Skin melanoma ([address] 2012    (Rt ankle) and basal cell (Lt eye) removed    Transient ischemic attack approx 2005    HAS HAD 2 MINISTROKES-NO DEFICITS; neuro Dr Jun Jean (cc)    Unspecified adverse effect of anesthesia     HAS NEEDED PORT OR CUTDOWN FOR RECVNG BLOOD OR LONG-TERM IVs; surgery 7/28/15 w/o any difficulty    Unspecified sleep apnea     does not use CPAP      Past Surgical History:   Procedure Laterality Date    HX APPENDECTOMY      HX CATARACT REMOVAL  2011    Right    HX CERVICAL FUSION  1/28/15    C5-7    HX CHOLECYSTECTOMY  2000    HX GI  2010    liver bx due to liver Bx due to elevated enzymes, liver laceration during Bx, hospitalized 10-12d    HX HEENT  7/28/15    Lt total parotidectomy with facial nerve dissection: Dr Al Wheat HX HEENT  11/2015    Right parotidectomy    HX HYSTERECTOMY      HX ORTHOPAEDIC  2009 & 2011    LUMBAR FUSION and revision    HX OTHER SURGICAL  2010    MELANOMA (Rt ankle) and basal cell (Lt eye) removed; q6mo ck by DERMATOLOGY    HX SHOULDER REPLACEMENT Left 08/01/2016    HX THYROIDECTOMY      Right    IA COLON CA SCRN NOT HI RSK IND  1/27/2016         IA INSERTION SUBQ CARDIAC RHYTHM MONITOR W/PRGRMG N/A 11/6/2019    LOOP RECORDER INSERT performed by Val Powell MD at [facility] CARDIAC CATH LAB     Allergies   Allergen Reactions    Aspirin Nausea and Vomiting    Bee Sting [Sting, Bee] Hives and Shortness of Breath     WASP STING    Codeine Nausea and Vomiting    Hydrocodone Rash     And sedation    Morphine Rash    Nsaids (Non-Steroidal Anti-Inflammatory Drug) Nausea and Vomiting    Oxycodone Rash     On her back    Penicillins Hives, Shortness of Breath and Swelling      Family History   Problem Relation Age of Onset    Alcohol abuse Father     Emphysema Father     Cancer Sister         PANCREATIC    Alcohol abuse Brother     COPD Brother     Other Mother         UNKNOWN CAUSE OF DEATH    Heart Disease Mother     Breast Cancer Maternal Aunt     Anesth Problems Neg Hx       Current Outpatient Medications   Medication Sig    atorvastatin (LIPITOR) 20 mg tablet Take 20 mg by mouth nightly.  clopidogrel (PLAVIX) 75 mg tab Take 75 mg by mouth nightly.  lisinopril (PRINIVIL, ZESTRIL) 2.5 mg tablet Take 2.5 mg by mouth nightly.  primidone (MYSOLINE) 50 mg tablet Take 50 mg by mouth nightly.  multivit/folic acid/vit K1 (ONE-A-DAY WOMEN'S 50 PLUS PO) Take 1 Tab by mouth daily.  fluticasone propionate (FLONASE) 50 mcg/actuation nasal spray 2 Sprays by Both Nostrils route daily.  clonazePAM (KLONOPIN) 0.5 mg tablet TAKE 1 TABLET BY MOUTH TWICE DAILY. MAXIMUM DAILY AMOUNT: 1 MG    rOPINIRole (REQUIP) 1 mg tablet Take 1 Tab by mouth nightly.  verapamil ER (CALAN-SR) 120 mg tablet Take 1 Tab by mouth nightly. Indications: high blood pressure    pantoprazole (PROTONIX) 40 mg tablet Take 1 Tab by mouth every morning. Indications: gastroesophageal reflux disease    escitalopram oxalate (LEXAPRO) 20 mg tablet Take 1 Tab by mouth nightly. Indications: Anxiousness associated with Depression    nortriptyline (PAMELOR) 10 mg capsule Take 1-2 Caps by mouth nightly.  albuterol (PROVENTIL HFA, VENTOLIN HFA, PROAIR HFA) 90 mcg/actuation inhaler Take 1 Puff by inhalation every four (4) hours as needed for Wheezing.  polyethylene glycol (MIRALAX) 17 gram packet Take 17 g by mouth daily as needed (constipation). No current facility-administered medications for this visit. Review of Systems:    General: Pt denies excessive weight gain or loss. Pt is able to conduct ADL's  Respiratory: Denies shortness of breath, HERNANDES, wheezing or stridor.   Cardiovascular: Denies precordial pain, palpitations, edema or PND        Physical ExamPhysical Exam:      General: Well developed, in no acute distress. .  Chest: left subclavian pacer site approximated well  Neuro: A&Ox3, speech clear, gait stable. Assessment:       ICD-10-CM ICD-9-CM    1. Visit for wound check Z51.89 V58.89    2. Cerebrovascular accident (CVA) due to thrombosis of cerebral artery ([facility] [address] I63.30 434.01    3. Status post placement of implantable loop recorder Z95.818 V45.09         Plan:     Patient feels well following ILR  implantation. Left subclavian ILR site approximated well, no discharge. No erythema or heat. No events noted on interrogation. Follow up as planned.      Sara Tsang, ANP

## 2019-11-22 ENCOUNTER — HOME CARE VISIT (OUTPATIENT)
Dept: SCHEDULING | Facility: HOME HEALTH | Age: 79
End: 2019-11-22
Payer: MEDICARE

## 2019-11-22 VITALS
TEMPERATURE: 98.2 F | OXYGEN SATURATION: 97 % | DIASTOLIC BLOOD PRESSURE: 74 MMHG | SYSTOLIC BLOOD PRESSURE: 132 MMHG | RESPIRATION RATE: 18 BRPM | HEART RATE: 73 BPM

## 2019-11-22 VITALS
HEART RATE: 68 BPM | DIASTOLIC BLOOD PRESSURE: 88 MMHG | TEMPERATURE: 97.3 F | SYSTOLIC BLOOD PRESSURE: 144 MMHG | OXYGEN SATURATION: 99 %

## 2019-11-22 PROCEDURE — G0299 HHS/HOSPICE OF RN EA 15 MIN: HCPCS

## 2019-11-22 PROCEDURE — G0151 HHCP-SERV OF PT,EA 15 MIN: HCPCS

## 2019-11-25 ENCOUNTER — HOME CARE VISIT (OUTPATIENT)
Dept: SCHEDULING | Facility: HOME HEALTH | Age: 79
End: 2019-11-25
Payer: MEDICARE

## 2019-11-25 PROCEDURE — G0299 HHS/HOSPICE OF RN EA 15 MIN: HCPCS

## 2019-11-26 ENCOUNTER — HOME CARE VISIT (OUTPATIENT)
Dept: SCHEDULING | Facility: HOME HEALTH | Age: 79
End: 2019-11-26
Payer: MEDICARE

## 2019-11-26 VITALS
OXYGEN SATURATION: 97 % | TEMPERATURE: 97.5 F | SYSTOLIC BLOOD PRESSURE: 102 MMHG | HEART RATE: 77 BPM | RESPIRATION RATE: 18 BRPM | DIASTOLIC BLOOD PRESSURE: 60 MMHG

## 2019-11-26 VITALS
RESPIRATION RATE: 18 BRPM | SYSTOLIC BLOOD PRESSURE: 128 MMHG | HEART RATE: 70 BPM | TEMPERATURE: 98.2 F | OXYGEN SATURATION: 98 % | DIASTOLIC BLOOD PRESSURE: 80 MMHG

## 2019-11-26 PROCEDURE — G0151 HHCP-SERV OF PT,EA 15 MIN: HCPCS

## 2019-11-27 ENCOUNTER — HOME CARE VISIT (OUTPATIENT)
Dept: SCHEDULING | Facility: HOME HEALTH | Age: 79
End: 2019-11-27
Payer: MEDICARE

## 2019-11-27 PROCEDURE — G0299 HHS/HOSPICE OF RN EA 15 MIN: HCPCS

## 2019-11-28 VITALS
SYSTOLIC BLOOD PRESSURE: 122 MMHG | RESPIRATION RATE: 18 BRPM | DIASTOLIC BLOOD PRESSURE: 70 MMHG | HEART RATE: 70 BPM | OXYGEN SATURATION: 98 % | TEMPERATURE: 98.2 F

## 2019-11-29 ENCOUNTER — HOME CARE VISIT (OUTPATIENT)
Dept: SCHEDULING | Facility: HOME HEALTH | Age: 79
End: 2019-11-29
Payer: MEDICARE

## 2019-11-29 PROCEDURE — G0151 HHCP-SERV OF PT,EA 15 MIN: HCPCS

## 2019-12-01 VITALS
OXYGEN SATURATION: 97 % | SYSTOLIC BLOOD PRESSURE: 120 MMHG | DIASTOLIC BLOOD PRESSURE: 70 MMHG | HEART RATE: 73 BPM | RESPIRATION RATE: 18 BRPM

## 2019-12-03 ENCOUNTER — HOME CARE VISIT (OUTPATIENT)
Dept: SCHEDULING | Facility: HOME HEALTH | Age: 79
End: 2019-12-03
Payer: MEDICARE

## 2019-12-03 VITALS
RESPIRATION RATE: 18 BRPM | HEART RATE: 79 BPM | TEMPERATURE: 96.7 F | SYSTOLIC BLOOD PRESSURE: 120 MMHG | DIASTOLIC BLOOD PRESSURE: 80 MMHG | OXYGEN SATURATION: 97 %

## 2019-12-03 PROCEDURE — G0151 HHCP-SERV OF PT,EA 15 MIN: HCPCS

## 2019-12-04 RX ORDER — VERAPAMIL HYDROCHLORIDE 120 MG/1
TABLET, FILM COATED, EXTENDED RELEASE ORAL
Qty: 90 TAB | Refills: 0 | Status: SHIPPED | OUTPATIENT
Start: 2019-12-04 | End: 2020-03-17

## 2019-12-05 ENCOUNTER — PATIENT OUTREACH (OUTPATIENT)
Dept: INTERNAL MEDICINE CLINIC | Age: 79
End: 2019-12-05

## 2019-12-05 NOTE — PROGRESS NOTES
Goals      Returns to baseline activity level. 11/13/19    - spoke to patient and her daughter today. - patient is primary care giver for her  - however family is close by and is provided 24/7 supervision while patient recovers   - open to [facility] for PT, OT, SN  - first available [facility] appt 12/9  - Dr. Olvera Son 11/20  - patient/family is to schedule f/u with Dr. Shilpa Morales - current appt not until January 2020   - reminded patient/family to not get loop recorder incision site wet until doctor states it was ok, and no heavy lifting (limited discharge activity/precaution education provided on AVS)  - family can provided transporation to appts. Patient/family verbalized understanding. CTN to f/u 7-10 days. AR    12/05/19  - patient attended appt Dr. Olvera Son on 11/20  - Dr. Angel Rosen 12/9  - patient states her last PT day was yesterday and she is waiting for final eval to be released from [facility] PT.  - patient and her  are in the process of moving   - patient states she has plenty of family that can help her with moving and her     Patient/family verbalized understanding. CTN to f/u 7-10 days. AR          Understands red flags post discharge. 11/13/19    Spoke to patient and family today. Reviewed red flag s/s with patient, nausea, vomiting, inability to pass urine/stool, SOB, mental status change, chest pain, fever. Reviewed FAST with patient and signs of CVA, such as sudden numbness, tingling, loss of movement in your face, arm, leg, especially on one side of the body, vision changes, trouble speaking, confusion, walking or balance problems and sudden headache. Patient declined [facility] f/u at this time. Info provided as resource. Patient will contact Md/CTN if red flag s/s arise. CTN to w/u ~ 7-10 days. AR      12/05/19  Spoke to patient today. Reviewed red flag s/s. Patient declines experiencing any at this time. Reminded patient of red flag s/s that warrant f/u.      Patient will contact Md/CTN if red flag s/s arise. CTN to w/u ~ 7-10 days.  AR

## 2019-12-06 ENCOUNTER — HOME CARE VISIT (OUTPATIENT)
Dept: SCHEDULING | Facility: HOME HEALTH | Age: 79
End: 2019-12-06
Payer: MEDICARE

## 2019-12-06 VITALS
HEART RATE: 74 BPM | SYSTOLIC BLOOD PRESSURE: 140 MMHG | OXYGEN SATURATION: 97 % | TEMPERATURE: 97.3 F | RESPIRATION RATE: 16 BRPM | DIASTOLIC BLOOD PRESSURE: 70 MMHG

## 2019-12-06 PROCEDURE — G0151 HHCP-SERV OF PT,EA 15 MIN: HCPCS

## 2019-12-09 ENCOUNTER — OFFICE VISIT (OUTPATIENT)
Dept: INTERNAL MEDICINE CLINIC | Age: 79
End: 2019-12-09

## 2019-12-09 VITALS
HEIGHT: 62 IN | WEIGHT: 158 LBS | SYSTOLIC BLOOD PRESSURE: 125 MMHG | HEART RATE: 84 BPM | TEMPERATURE: 98.1 F | BODY MASS INDEX: 29.08 KG/M2 | RESPIRATION RATE: 18 BRPM | DIASTOLIC BLOOD PRESSURE: 80 MMHG | OXYGEN SATURATION: 99 %

## 2019-12-09 DIAGNOSIS — Z86.73 HX OF COMPLETED STROKE: ICD-10-CM

## 2019-12-09 DIAGNOSIS — I10 ESSENTIAL HYPERTENSION: Primary | ICD-10-CM

## 2019-12-09 RX ORDER — OLMESARTAN MEDOXOMIL 5 MG/1
2.5 TABLET ORAL DAILY
Qty: 30 TAB | Refills: 1 | Status: SHIPPED | OUTPATIENT
Start: 2019-12-09 | End: 2020-02-10

## 2019-12-09 NOTE — PROGRESS NOTES
Reviewed record in preparation for visit and have obtained necessary documentation. Identified pt with two pt identifiers(name and ). Chief Complaint   Patient presents with   [facility] Follow Up       Health Maintenance Due   Topic Date Due    Diabetic Foot Care  1950    Eye Exam  1950    DTaP/Tdap/Td  (1 - Tdap) 1952    Shingles Vaccine (1 of 2) 1991       Ms. Laverne Bryant has a reminder for a \"due or due soon\" health maintenance. I have asked that she discuss this further with her primary care provider for follow-up on this health maintenance. Coordination of Care Questionnaire:  :     1) Have you been to an emergency room, urgent care clinic since your last visit? yes   Hospitalized since your last visit? yes             2) Have you seen or consulted any other health care providers outside of [address] [place] [address] since your last visit? no  (Include any pap smears or colon screenings in this section.)    3) In the event something were to happen to you and you were unable to speak on your behalf, do you have an Advance Directive/ Living Will in place stating your wishes? NO    Do you have an Advance Directive on file? yes    4) Are you interested in receiving information on Advance Directives? NO    Patient is accompanied by self I have received verbal consent from Rebeca Strauss to discuss any/all medical information while they are present in the room.

## 2019-12-09 NOTE — PATIENT INSTRUCTIONS
STOP LISINOPRIL DUE TO COUGH 
START BENICAR ( half a pill daily) FOLLOW UP in 2 weeks with me Schedule appointment with neurologist Dr Stephanie Goldstein Office Policies Phone calls/patient messages: Please allow up to 24 hours for someone in the office to contact you about your call or message. Be mindful your provider may be out of the office or your message may require further review. We encourage you to use [employer] for your messages as this is a faster, more efficient way to communicate with our office Medication Refills: 
         
Prescription medications require 48-72 business hours to process. We encourage you to use [employer] for your refills. For controlled medications: Please allow 72 business hours to process. Certain medications may require you to  a written prescription at our office. NO narcotic/controlled medications will be prescribed after 4pm Monday through Friday or on weekends Form/Paperwork Completion: 
         
Please note a $25 fee may incur for all paperwork for completed by our providers. We ask that you allow 7-10 business days. Pre-payment is due prior to picking up/faxing the completed form. You may also download your forms to [employer] to have your doctor print off.

## 2019-12-09 NOTE — PROGRESS NOTES
CC: Hospital Follow Up      HPI:    She is a 67 y.o. female who presents for evaluation of general medical issues. In the interval 11/05 - 11/11/2019 had admission for stroke   Acute left posterior frontal/parietal operculum and posterior insula cortical infarct POA   Right ICA stenosis on CTA - known history 70 % asymptomatic  She is wearing a loop recorder - the 2 locations of the stroke raise the suspicion for cardioembolic   Recovered well from the stroke       HTN: taking verapamil 120 mg daily. 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)     GERD (gastroesophageal reflux disease)     Hypercholesteremia     Hypertension     Idiopathic small and large fiber sensory neuropathy     Limited peripheral vision of right eye     complication of cataract surgery    Long term current use of anticoagulant therapy     Meniere disease     MGUS (monoclonal gammopathy of unknown significance)     Monoclonal gammopathy of unknown significance     hem/onc:  Dr Maikol Ferraro [phone]    Osteoporosis     Psoriasis     bilateral Legs    Restless leg     Skin melanoma ([facility] [address] 2012    (Rt ankle) and basal cell (Lt eye) removed    Transient ischemic attack approx 2005    HAS HAD 2 MINISTROKES-NO DEFICITS; neuro Dr Francisco Braun (cc)    Unspecified adverse effect of anesthesia     HAS NEEDED PORT OR CUTDOWN FOR RECVNG BLOOD OR LONG-TERM IVs; surgery 7/28/15 w/o any difficulty    Unspecified sleep apnea     does not use CPAP       Current Outpatient Medications on File Prior to Visit   Medication Sig Dispense Refill    verapamil ER (CALAN-SR) 120 mg tablet TAKE 1 TABLET BY MOUTH NIGHTLY FOR HIGH BLOOD PRESSURE 90 Tab 0    atorvastatin (LIPITOR) 20 mg tablet Take 20 mg by mouth nightly.  clopidogrel (PLAVIX) 75 mg tab Take 75 mg by mouth nightly.  lisinopril (PRINIVIL, ZESTRIL) 2.5 mg tablet Take 2.5 mg by mouth nightly.  primidone (MYSOLINE) 50 mg tablet Take 50 mg by mouth nightly.  multivit/folic acid/vit K1 (ONE-A-DAY WOMEN'S 50 PLUS PO) Take 1 Tab by mouth daily.  fluticasone propionate (FLONASE) 50 mcg/actuation nasal spray 2 Sprays by Both Nostrils route daily. 1 Bottle 0    clonazePAM (KLONOPIN) 0.5 mg tablet TAKE 1 TABLET BY MOUTH TWICE DAILY. MAXIMUM DAILY AMOUNT: 1 MG 60 Tab 2    rOPINIRole (REQUIP) 1 mg tablet Take 1 Tab by mouth nightly. 90 Tab 2    pantoprazole (PROTONIX) 40 mg tablet Take 1 Tab by mouth every morning. Indications: gastroesophageal reflux disease 90 Tab 1    escitalopram oxalate (LEXAPRO) 20 mg tablet Take 1 Tab by mouth nightly. Indications: Anxiousness associated with Depression 90 Tab 4    nortriptyline (PAMELOR) 10 mg capsule Take 1-2 Caps by mouth nightly. 90 Cap 3    albuterol (PROVENTIL HFA, VENTOLIN HFA, PROAIR HFA) 90 mcg/actuation inhaler Take 1 Puff by inhalation every four (4) hours as needed for Wheezing. 1 Inhaler 0    polyethylene glycol (MIRALAX) 17 gram packet Take 17 g by mouth daily as needed (constipation). No current facility-administered medications on file prior to visit.         Past Surgical History:   Procedure Laterality Date    HX APPENDECTOMY      HX CATARACT REMOVAL  2011    Right    HX CERVICAL FUSION  1/28/15    C5-7    HX CHOLECYSTECTOMY  2000    HX GI  2010    liver bx due to liver Bx due to elevated enzymes, liver laceration during Bx, hospitalized 10-12d    HX HEENT  7/28/15    Lt total parotidectomy with facial nerve dissection: Dr Javier Leyva HX HEENT  2015    Right parotidectomy    HX HYSTERECTOMY      HX ORTHOPAEDIC   &     LUMBAR FUSION and revision    HX OTHER SURGICAL  2010    MELANOMA (Rt ankle) and basal cell (Lt eye) removed; q6mo ck by DERMATOLOGY    HX SHOULDER REPLACEMENT Left 2016    HX THYROIDECTOMY      Right    TX COLON CA SCRN NOT HI RSK IND  2016         TX INSERTION SUBQ CARDIAC RHYTHM MONITOR W/PRGRMG N/A 2019    LOOP RECORDER INSERT performed by Jensen Waldrop MD at [facility] CARDIAC CATH LAB       Family History   Problem Relation Age of Onset    Alcohol abuse Father     Emphysema Father     Cancer Sister         PANCREATIC    Alcohol abuse Brother     COPD Brother     Other Mother         UNKNOWN CAUSE OF DEATH    Heart Disease Mother     Breast Cancer Maternal Aunt     Anesth Problems Neg Hx      Reviewed and no changes     Social History     Socioeconomic History    Marital status:      Spouse name: Not on file    Number of children: Not on file    Years of education: Not on file    Highest education level: Not on file   Occupational History    Not on file   Social Needs    Financial resource strain: Not on file    Food insecurity:     Worry: Not on file     Inability: Not on file    Transportation needs:     Medical: Not on file     Non-medical: Not on file   Tobacco Use    Smoking status: Former Smoker     Years: 15.00     Last attempt to quit: 7/15/1975     Years since quittin.4    Smokeless tobacco: Never Used   Substance and Sexual Activity    Alcohol use: Yes     Comment: may have a glass of wine on rare occasion    Drug use: Yes     Types: Prescription, OTC    Sexual activity: Not Currently   Lifestyle    Physical activity:     Days per week: Not on file     Minutes per session: Not on file    Stress: Not on file   Relationships    Social connections:     Talks on phone: Not on file Gets together: Not on file     Attends Scientologist service: Not on file     Active member of club or organization: Not on file     Attends meetings of clubs or organizations: Not on file     Relationship status: Not on file    Intimate partner violence:     Fear of current or ex partner: Not on file     Emotionally abused: Not on file     Physically abused: Not on file     Forced sexual activity: Not on file   Other Topics Concern    Not on file   Social History Narrative    Lives in [place] with  of 62 years. Has 2 sons and 1 daughter. Used to work in a bank. Likes to garden and [employer].              Visit Vitals  /79 (BP 1 Location: Right arm, BP Patient Position: Sitting)   Pulse 84   Temp 98.1 °F (36.7 °C) (Oral)   Resp 18   Ht 5' 2\" (1.575 m)   Wt 158 lb (71.7 kg)   LMP  (LMP Unknown)   SpO2 99%   BMI 28.90 kg/m²       Physical Examination:   General - Well appearing female  HEENT - PERRL, TM no erythema/opacification, normal nasal turbinates, oropharynx no erythema or exudate, MMM  Neck - supple, no bruits, no TMG, no LAD  Pulm - clear to auscultation bilaterally  Cardio - RRR, normal S1 S2, no murmur gallops or rubs  Abd - soft, nontender, no masses, no HSM  Extrem - no edema, venous stasis ulcer bilateral in medial malleolus area,  +1 distal pulses  Psych - normal affect, appropriate mood    Lab Results   Component Value Date/Time    WBC 4.1 11/05/2019 02:00 PM    HGB 12.2 11/05/2019 02:00 PM    HCT 38.7 11/05/2019 02:00 PM    PLATELET 970 24/36/9827 02:00 PM    MCV 96.0 11/05/2019 02:00 PM     Lab Results   Component Value Date/Time    Sodium 138 11/05/2019 02:00 PM    Potassium 4.1 11/05/2019 02:00 PM    Chloride 106 11/05/2019 02:00 PM    CO2 25 11/05/2019 02:00 PM    Anion gap 7 11/05/2019 02:00 PM    Glucose 82 11/05/2019 02:00 PM    BUN 12 11/05/2019 02:00 PM    Creatinine 0.76 11/05/2019 02:00 PM    BUN/Creatinine ratio 16 11/05/2019 02:00 PM    GFR est AA >60 11/05/2019 02:00 PM GFR est non-AA >60 11/05/2019 02:00 PM    Calcium 9.0 11/05/2019 02:00 PM     Lab Results   Component Value Date/Time    Cholesterol, total 186 11/06/2019 02:16 AM    HDL Cholesterol 70 11/06/2019 02:16 AM    LDL, calculated 79.2 11/06/2019 02:16 AM    VLDL, calculated 36.8 11/06/2019 02:16 AM    Triglyceride 184 (H) 11/06/2019 02:16 AM    CHOL/HDL Ratio 2.7 11/06/2019 02:16 AM     Lab Results   Component Value Date/Time    TSH 2.43 11/06/2019 02:16 AM     No results found for: PSA, Chao Delaney, [id number], [id number], PSALT  Lab Results   Component Value Date/Time    Hemoglobin A1c 5.3 11/06/2019 02:16 AM     No results found for: Cortesmichael Escobarjayleen, VD3RIA    Lab Results   Component Value Date/Time    ALT (SGPT) 19 11/05/2019 02:00 PM    AST (SGOT) 13 (L) 11/05/2019 02:00 PM    Alk. phosphatase 95 11/05/2019 02:00 PM    Bilirubin, total 0.4 11/05/2019 02:00 PM           Assessment/Plan:    HTN   Has cough on ace, stop ace. Change to benicar 2.5mg daily  Continue verapamil  Follow up in 2 weeks     Skin cancer hx followed by - Dr Melanie Greene    MGUS (monoclonal gammopathy of unknown significance)  -stable followed by Dr Etta Esparza       Mixed hyperlipidemia  -on Lipitor 20mg daily /due for lipid panel      Anxiety  - controlled on low dose clonazepam daily and lexapro     Recent stroke ( recovered )   - currently has loop recorder as stroke in two different areas raising suspicion that it is embolic . TTE ok    on statin and plavix.  Followed by Dr Cintia Tracy    Follow up in 2 weeks       Michael Eckert MD

## 2019-12-26 ENCOUNTER — PATIENT OUTREACH (OUTPATIENT)
Dept: INTERNAL MEDICINE CLINIC | Age: 79
End: 2019-12-26

## 2019-12-26 DIAGNOSIS — F41.9 ANXIETY: ICD-10-CM

## 2019-12-26 RX ORDER — CLONAZEPAM 0.5 MG/1
TABLET ORAL
Qty: 60 TAB | Refills: 2 | Status: SHIPPED | OUTPATIENT
Start: 2019-12-26 | End: 2020-03-24

## 2019-12-26 NOTE — TELEPHONE ENCOUNTER
PCP: Edna Colon MD    Last appt: 12/13/2019  Future Appointments   Date Time Provider Logan Thornton   1/23/2020 10:40 AM Jennyfer Saucedo  [place]   2/19/2020  9:00 AM PACEMAKER, [facility] SCHED   11/19/2020 10:00 AM Sara Lopez, ANP [facility] SCHED       Requested Prescriptions     Pending Prescriptions Disp Refills    clonazePAM (KLONOPIN) 0.5 mg tablet 60 Tab 2     Sig: TAKE 1 TABLET BY MOUTH TWICE DAILY.  MAXIMUM DAILY AMOUNT: 1 MG

## 2019-12-26 NOTE — PROGRESS NOTES
Patient has graduated from the Transitions of Care Coordination  program on 12/26/19. Patient/family has the ability to self-manage at this time Care management goals have been completed. Patient was not referred to the [facility] team for further management. Goals Addressed                 This Visit's Progress     COMPLETED: Returns to baseline activity level. 11/13/19    - spoke to patient and her daughter today. - patient is primary care giver for her  - however family is close by and is provided 24/7 supervision while patient recovers   - open to [facility] for PT, OT, SN  - first available [facility]RISTIAN[facility] appt 12/9  - Dr. Shana Maria 11/20  - patient/family is to schedule f/u with Dr. Fercho Guido - current appt not until January 2020   - reminded patient/family to not get loop recorder incision site wet until doctor states it was ok, and no heavy lifting (limited discharge activity/precaution education provided on AVS)  - family can provided transporation to appts. Patient/family verbalized understanding. CTN to f/u 7-10 days. AR    12/05/19  - patient attended appt Dr. Shana Maria on 11/20  - Dr. Ludwig Mata 12/9  - patient states her last PT day was yesterday and she is waiting for final eval to be released from [facility].  - patient and her  are in the process of moving   - patient states she has plenty of family that can help her with moving and her     Patient/family verbalized understanding. CTN to f/u 7-10 days. AR          COMPLETED: Understands red flags post discharge. 11/13/19    Spoke to patient and family today. Reviewed red flag s/s with patient, nausea, vomiting, inability to pass urine/stool, SOB, mental status change, chest pain, fever.      Reviewed FAST with patient and signs of CVA, such as sudden numbness, tingling, loss of movement in your face, arm, leg, especially on one side of the body, vision changes, trouble speaking, confusion, walking or balance problems and sudden headache. Patient declined [facility] f/u at this time. Info provided as resource. Patient will contact Md/CTN if red flag s/s arise. CTN to w/u ~ 7-10 days. AR      12/05/19  Spoke to patient today. Reviewed red flag s/s. Patient declines experiencing any at this time. Reminded patient of red flag s/s that warrant f/u. Patient will contact Md/CTN if red flag s/s arise. CTN to w/u ~ 7-10 days. AR                Patient has Care Transition Nurse's contact information for any further questions, concerns, or needs.   Patients upcoming visits:    Future Appointments   Date Time Provider Logan Beauchampisti   1/23/2020 10:40 AM Isaura Guzman MD [address] [place] [address]   2/19/2020  9:00 AM PACEMAKER, [facility] SCHED   11/19/2020 10:00 AM Sara Lopez, ANP [facility] SCHED

## 2020-01-06 RX ORDER — PRIMIDONE 50 MG/1
50 TABLET ORAL
Qty: 90 TAB | Refills: 1 | Status: SHIPPED | OUTPATIENT
Start: 2020-01-06 | End: 2021-12-13 | Stop reason: DRUGHIGH

## 2020-01-06 NOTE — TELEPHONE ENCOUNTER
PCP: Adair Clifford MD    Last appt: 12/13/2019  Future Appointments   Date Time Provider Logan Thornton   1/23/2020 10:40 AM Nichole Isbell  [facility]   2/19/2020  9:00 AM PACEMAKER, [facility] SCHED   11/19/2020 10:00 AM Sara Lopez, ANP [facility] ALLI SCHED       Requested Prescriptions     Pending Prescriptions Disp Refills    primidone (MYSOLINE) 50 mg tablet 90 Tab 1     Sig: Take 1 Tab by mouth nightly.

## 2020-01-20 RX ORDER — NORTRIPTYLINE HYDROCHLORIDE 10 MG/1
10-20 CAPSULE ORAL
Qty: 90 CAP | Refills: 3 | Status: SHIPPED | OUTPATIENT
Start: 2020-01-20 | End: 2020-06-19

## 2020-01-20 NOTE — TELEPHONE ENCOUNTER
PCP: Gosia Bradshaw MD    Last appt: 12/13/2019  Future Appointments   Date Time Provider Logan Thornton   1/23/2020 10:40 AM Taiwo Rudd  [facility]   2/19/2020  9:00 AM PACEMAKER, [facility] SCHED   3/30/2020  2:45 PM Appa Lurdes Duff MD [address]   11/19/2020 10:00 AM Sara Lopez, ANP [facility] ALLI SCHED       Requested Prescriptions     Pending Prescriptions Disp Refills    nortriptyline (PAMELOR) 10 mg capsule 90 Cap 3     Sig: Take 1-2 Caps by mouth nightly.

## 2020-01-23 ENCOUNTER — OFFICE VISIT (OUTPATIENT)
Dept: NEUROLOGY | Age: 80
End: 2020-01-23

## 2020-01-23 VITALS
SYSTOLIC BLOOD PRESSURE: 118 MMHG | BODY MASS INDEX: 29.08 KG/M2 | DIASTOLIC BLOOD PRESSURE: 62 MMHG | HEIGHT: 62 IN | WEIGHT: 158 LBS | OXYGEN SATURATION: 99 % | HEART RATE: 75 BPM

## 2020-01-23 DIAGNOSIS — G44.209 TENSION VASCULAR HEADACHE: ICD-10-CM

## 2020-01-23 DIAGNOSIS — I63.412 EMBOLIC STROKE INVOLVING LEFT MIDDLE CEREBRAL ARTERY (HCC): ICD-10-CM

## 2020-01-23 DIAGNOSIS — I65.23 STENOSIS OF BOTH INTERNAL CAROTID ARTERIES: ICD-10-CM

## 2020-01-23 DIAGNOSIS — I67.89 CEREBRAL MICROVASCULAR DISEASE: Primary | ICD-10-CM

## 2020-01-23 DIAGNOSIS — G60.8 IDIOPATHIC SMALL AND LARGE FIBER SENSORY NEUROPATHY: ICD-10-CM

## 2020-01-23 DIAGNOSIS — G25.0 BENIGN ESSENTIAL TREMOR SYNDROME: ICD-10-CM

## 2020-01-23 DIAGNOSIS — M54.16 LUMBAR BACK PAIN WITH RADICULOPATHY AFFECTING RIGHT LOWER EXTREMITY: ICD-10-CM

## 2020-01-23 DIAGNOSIS — M96.1 CERVICAL POST-LAMINECTOMY SYNDROME: ICD-10-CM

## 2020-01-23 DIAGNOSIS — G44.229 CHRONIC TENSION-TYPE HEADACHE, NOT INTRACTABLE: ICD-10-CM

## 2020-01-23 DIAGNOSIS — D47.2 MGUS (MONOCLONAL GAMMOPATHY OF UNKNOWN SIGNIFICANCE): ICD-10-CM

## 2020-01-23 DIAGNOSIS — G56.21 ULNAR NEUROPATHY AT ELBOW OF RIGHT UPPER EXTREMITY: ICD-10-CM

## 2020-01-23 DIAGNOSIS — M96.1 LUMBAR POST-LAMINECTOMY SYNDROME: ICD-10-CM

## 2020-01-23 DIAGNOSIS — M54.16 LUMBAR BACK PAIN WITH RADICULOPATHY AFFECTING LEFT LOWER EXTREMITY: ICD-10-CM

## 2020-01-23 DIAGNOSIS — M47.22 CERVICAL RADICULOPATHY DUE TO DEGENERATIVE JOINT DISEASE OF SPINE: ICD-10-CM

## 2020-01-23 RX ORDER — MECLIZINE HCL 12.5 MG 12.5 MG/1
12.5-25 TABLET ORAL
Qty: 100 TAB | Refills: 5 | Status: SHIPPED | OUTPATIENT
Start: 2020-01-23 | End: 2020-02-02

## 2020-01-23 NOTE — PROGRESS NOTES
Consult  REFERRED BY:  Antonino Gonzalez MD    CHIEF COMPLAINT: Right-sided weakness and difficulty with speech      Subjective: Lakesha Valencia is a 67 y.o. right-handed  female we are asked to evaluate by Dr Vanessa Escobar on urgent work in basis for evaluation of new problem of increasing dizziness and a sensation of vertigo when she gets up or moves around, and seems to have a positive Hallpike Hipolito maneuver and vertigo on head movement suggesting benign positional vertigo, and we gave her Antivert to use as needed for that, and she has another new problem of sudden episodes of confusion twice, when she was at the computer and suddenly seem to have difficulty using the computer and found that she was using the mouse upside down, and is concerned that she is having TIAs or neurologic symptoms that cause that because she said has highly atypical because normally she knows what to do with the mouse. Patient was admitted to the hospital in November 2019 for sudden weakness and confusion and difficulty speaking, and was found to have 2 separate left hemispheric areas of infarction, but no atrial fib was found on monitoring, and echocardiogram was unremarkable, and she had a normal ALLIE, and she had a loop monitor placed which has not shown any significant A. fib so far. Her aspirin was switched to Plavix and she has remained stable on that. She was found to have 75% stenosis of the right internal carotid artery on CTA but Dopplers were not done. The stenosis on the right carotid is a known finding from previous Dopplers last year, and since this is asymptomatic has been followed, and we will repeat the carotid Doppler 1 more time again to make sure there is no progression of stenosis. MRI scan documented 2 strokes, one in the left frontal opercular area and one in the temporal parietal area higher up and further back in 2 separate locations. This does suggest cardioembolic phenomena.   Patient had a previous history of TIAs but no stroke, and no significant history of cardiac disease prior to this. She does have a neuropathy that is thought to be secondary to her monoclonal gammopathy which is followed at [facility]. Patient also has a restless leg syndrome she has sleep apnea psoriasis and has decreased vision in the right eye from cataract surgery. She has hyperlipidemia and hypercholesterolemia may be some borderline diabetes. She has had degenerative arthritis, and a back operation and shoulder operation. Patient is somewhat better and her speech is better now, but she still has some speech deficit and weakness on the right side. He denies any significant headache, fever, meningismus, trauma, or other causes of her strokelike symptoms now. Patient under remarkable increased stress and tension taking care of her  who has Alzheimer's disease  Patient seems to have recovered from the stroke in November, except for these new problems as above. 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)     GERD (gastroesophageal reflux disease)     Hypercholesteremia     Hypertension     Idiopathic small and large fiber sensory neuropathy     Limited peripheral vision of right eye     complication of cataract surgery    Long term current use of anticoagulant therapy     Meniere disease     MGUS (monoclonal gammopathy of unknown significance)     Monoclonal gammopathy of unknown significance     hem/onc:  Dr Marianne Cote [phone]    Osteoporosis     Psoriasis     bilateral Legs    Restless leg     Skin melanoma ([facility] [address] 2012    (Rt ankle) and basal cell (Lt eye) removed    Transient ischemic attack approx 2005    HAS HAD 2 MINISTROKES-NO DEFICITS; neuro Dr Garth Rios (cc)    Unspecified adverse effect of anesthesia     HAS NEEDED PORT OR CUTDOWN FOR RECVNG BLOOD OR LONG-TERM IVs; surgery 7/28/15 w/o any difficulty    Unspecified sleep apnea     does not use CPAP      Past Surgical History:   Procedure Laterality Date    HX APPENDECTOMY      HX CATARACT REMOVAL      Right    HX CERVICAL FUSION  1/28/15    C5-7    HX CHOLECYSTECTOMY      HX GI  2010    liver bx due to liver Bx due to elevated enzymes, liver laceration during Bx, hospitalized 10-12d    HX HEENT  7/28/15    Lt total parotidectomy with facial nerve dissection: Dr Franchesca Hameed HX HEENT  2015    Right parotidectomy    HX HYSTERECTOMY      HX ORTHOPAEDIC   &     LUMBAR FUSION and revision    HX OTHER SURGICAL  2010    MELANOMA (Rt ankle) and basal cell (Lt eye) removed; q6mo ck by DERMATOLOGY    HX SHOULDER REPLACEMENT Left 2016    HX THYROIDECTOMY      Right    SD COLON CA SCRN NOT HI RSK IND  2016         SD INSERTION SUBQ CARDIAC RHYTHM MONITOR W/PRGRMG N/A 2019    LOOP RECORDER INSERT performed by Darrel Vernon MD at [facility] CARDIAC C[facility] LAB     Family History   Problem Relation Age of Onset    Alcohol abuse Father     Emphysema Father     Cancer Sister         PANCREATIC    Alcohol abuse Brother     COPD Brother     Other Mother         UNKNOWN CAUSE OF DEATH    Heart Disease Mother     Breast Cancer Maternal Aunt     Anesth Problems Neg Hx       Social History     Tobacco Use    Smoking status: Former Smoker     Years: 15.00     Last attempt to quit: 7/15/1975     Years since quittin.5    Smokeless tobacco: Never Used   Substance Use Topics    Alcohol use: Yes     Comment: may have a glass of wine on rare occasion         Current Outpatient Medications:     meclizine (ANTIVERT) 12.5 mg tablet, Take 1-2 Tabs by mouth three (3) times daily as needed for Dizziness for up to 10 days. , Disp: 100 Tab, Rfl: 5    nortriptyline (PAMELOR) 10 mg capsule, Take 1-2 Caps by mouth nightly., Disp: 90 Cap, Rfl: 3    primidone (MYSOLINE) 50 mg tablet, Take 1 Tab by mouth nightly., Disp: 90 Tab, Rfl: 1    clonazePAM (KLONOPIN) 0.5 mg tablet, TAKE 1 TABLET BY MOUTH TWICE DAILY. MAXIMUM DAILY AMOUNT: 1 MG, Disp: 60 Tab, Rfl: 2    olmesartan (BENICAR) 5 mg tablet, Take 0.5 Tabs by mouth daily. , Disp: 30 Tab, Rfl: 1    verapamil ER (CALAN-SR) 120 mg tablet, TAKE 1 TABLET BY MOUTH NIGHTLY FOR HIGH BLOOD PRESSURE, Disp: 90 Tab, Rfl: 0    atorvastatin (LIPITOR) 20 mg tablet, Take 20 mg by mouth nightly., Disp: , Rfl:     clopidogrel (PLAVIX) 75 mg tab, Take 75 mg by mouth nightly., Disp: , Rfl:     multivit/folic acid/vit K1 (ONE-A-DAY WOMEN'S 50 PLUS PO), Take 1 Tab by mouth daily. , Disp: , Rfl:     fluticasone propionate (FLONASE) 50 mcg/actuation nasal spray, 2 Sprays by Both Nostrils route daily. , Disp: 1 Bottle, Rfl: 0    rOPINIRole (REQUIP) 1 mg tablet, Take 1 Tab by mouth nightly., Disp: 90 Tab, Rfl: 2    pantoprazole (PROTONIX) 40 mg tablet, Take 1 Tab by mouth every morning. Indications: gastroesophageal reflux disease, Disp: 90 Tab, Rfl: 1    escitalopram oxalate (LEXAPRO) 20 mg tablet, Take 1 Tab by mouth nightly. Indications: Anxiousness associated with Depression, Disp: 90 Tab, Rfl: 4    albuterol (PROVENTIL HFA, VENTOLIN HFA, PROAIR HFA) 90 mcg/actuation inhaler, Take 1 Puff by inhalation every four (4) hours as needed for Wheezing., Disp: 1 Inhaler, Rfl: 0    polyethylene glycol (MIRALAX) 17 gram packet, Take 17 g by mouth daily as needed (constipation). , Disp: , Rfl:         Allergies   Allergen Reactions    Aspirin Nausea and Vomiting    Bee Sting [Sting, Bee] Hives and Shortness of Breath     WASP STING    Codeine Nausea and Vomiting    Hydrocodone Rash     And sedation    Lisinopril Cough    Morphine Rash    Nsaids (Non-Steroidal Anti-Inflammatory Drug) Nausea and Vomiting    Oxycodone Rash     On her back    Penicillins Hives, Shortness of Breath and Swelling      MRI Results (most recent):        Review of Systems:  A comprehensive review of systems was negative except for: Constitutional: positive for fatigue and malaise  Musculoskeletal: positive for myalgias, arthralgias, stiff joints and back pain  Neurological: positive for headaches, speech problems, paresthesia, coordination problems, gait problems and weakness  Behvioral/Psych: positive for anxiety and depression   Vitals:    01/23/20 1055   BP: 118/62   Pulse: 75   SpO2: 99%   Weight: 158 lb (71.7 kg)   Height: 5' 2\" (1.575 m)     Objective:     INEUROLOGICAL EXAM:     Appearance: The patient is well developed, well nourished, provides a fair history and is in no acute distress. Mental Status: Oriented to time, place and person, and the president, cognitive function is slow but probably normal and speech is mildly aphasic and dysarthric, but better than yesterday, mood and affect appropriate but anxious and depressed . Cranial Nerves:   Intact visual fields. 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 Fundi are benign on the left, but the right eye is scarred. Pupils react normally on the left, but the right pupil is scarred and fundus poorly seen and she has decreased visual acuity there., EOM's full, no nystagmus, no ptosis. Facial sensation is normal. Corneal reflexes are not tested. Facial movement is asymmetric, weak on the right. Hearing is abnormal bilaterally. Palate is midline with normal sternocleidomastoid and trapezius muscles are normal. Tongue is midline. Neck without meningismus or bruits  Neck with limited mobility secondary to her previous surgery  Temporal arteries are not tender or enlarged    Motor:  4/5 strength in upper and lower proximal and distal muscles on the left, and about 3-4/5 on the right.  Normal bulk and tone, with a mild spastic paraplegia, worse on the left side where she has mild weakness noted and decreased rapid alternating movement and hyperreflexia and increased tone from her previous myelopathy. No fasciculations. Reflexes:   Deep tendon reflexes 1+/4 in the left upper extremity and 2+/4 in the right upper extremity, 2+/4 in both legs with slight decreased ankle jerks on the right. .  No babinski or clonus present  Rapid alternating movement is slow in all extremities but symmetric  Patient has vertigo on head movement and Hallpike [place] maneuver   Sensory:   Abnormal to touch, pinprick and vibration and temperature decreased in both legs up to the knee level. DSS is intact   Gait:  Abnormal gait with patient moving slowly, and limited mobility of the lumbar spine secondary to her previous back operations and now her new right hemiparesis. Patient may have decreased arm swing in the right upper extremity    Tremor:   Minimal bilateral intention tremor noted. Cerebellar:   Patient has abnormal Romberg and tandem cerebellar signs present. Finger-nose-finger examination shows mild tremor bilaterally in the upper extremities   Neurovascular:  Normal heart sounds and regular rhythm, peripheral pulses decreased, and no carotid bruits.                     Assessment:       ICD-10-CM ICD-9-CM    1. Cerebral microvascular disease I67.9 437.9 DUPLEX CAROTID BILATERAL      CT HEAD WO CONT      meclizine (ANTIVERT) 12.5 mg tablet   2. Idiopathic small and large fiber sensory neuropathy G60.8 356.4 DUPLEX CAROTID BILATERAL      CT HEAD WO CONT      meclizine (ANTIVERT) 12.5 mg tablet   3. Lumbar back pain with radiculopathy affecting right lower extremity M54.16 724.4 DUPLEX CAROTID BILATERAL      CT HEAD WO CONT      meclizine (ANTIVERT) 12.5 mg tablet   4. Tension vascular headache G44.209 307.81 DUPLEX CAROTID BILATERAL      CT HEAD WO CONT      meclizine (ANTIVERT) 12.5 mg tablet   5.  Stenosis of both internal carotid arteries I65.23 433.10 DUPLEX CAROTID BILATERAL     433.30 CT HEAD WO CONT      meclizine (ANTIVERT) 12.5 mg tablet   6. Cervical radiculopathy due to degenerative joint disease of spine M47.22 721.0 DUPLEX CAROTID BILATERAL      CT HEAD WO CONT      meclizine (ANTIVERT) 12.5 mg tablet   7. Benign essential tremor syndrome G25.0 333.1 DUPLEX CAROTID BILATERAL      CT HEAD WO CONT      meclizine (ANTIVERT) 12.5 mg tablet   8. Cervical post-laminectomy syndrome M96.1 722.81 DUPLEX CAROTID BILATERAL      CT HEAD WO CONT      meclizine (ANTIVERT) 12.5 mg tablet   9. Ulnar neuropathy at elbow of right upper extremity G56.21 354.2 DUPLEX CAROTID BILATERAL      CT HEAD WO CONT      meclizine (ANTIVERT) 12.5 mg tablet   10. Lumbar back pain with radiculopathy affecting left lower extremity M54.16 724.4 DUPLEX CAROTID BILATERAL      CT HEAD WO CONT      meclizine (ANTIVERT) 12.5 mg tablet   11. Chronic tension-type headache, not intractable G44.229 339.12 DUPLEX CAROTID BILATERAL      CT HEAD WO CONT      meclizine (ANTIVERT) 12.5 mg tablet   12. MGUS (monoclonal gammopathy of unknown significance) D47.2 273.1 DUPLEX CAROTID BILATERAL      CT HEAD WO CONT      meclizine (ANTIVERT) 12.5 mg tablet   13. Lumbar post-laminectomy syndrome M96.1 722.83 DUPLEX CAROTID BILATERAL      CT HEAD WO CONT      meclizine (ANTIVERT) 12.5 mg tablet   14. 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 *      Plan:     Patient with recurring spells of TIA questionably, episodes of confusion, will repeat a CT scan of the head because she is worried about stroke, and she will also get repeat carotid Dopplers make sure the right carotid artery is not progressively worse or occluded. She continues her antiplatelet therapy and statin high-dose treatment. For her recurrent vertigo, she is to continue or restart her Antivert and new prescription given to patient for that today.   For her essential tremor she continues on Mysoline 50 mg at nighttime. For her restless leg syndrome she continues Requip 1 mg at nighttime. Patient will continue her Plavix therapy, and call us immediately has any further problem, check my chart for results of her test, and call us if there is any change or worsening of symptoms immediately  She is advised of the warning factors of stroke, that is, be fast, balance, eyes, face, arms, sensory and time  Patient advised of 4 risk factors for stroke as smoking which she does not do, cholesterol, hypertension, diet and diabetes, and cardiac disease, and her internist and cardiologist are trying to control all these. 45-minute spent with patient going over her history, going over her exam, and deciding on further treatment and evaluation and prognosis and testing needed. Follow-up in 3 months time if testing negative and patient stable. Signed By: Lindsay Neff MD     January 23, 2020       CC: Deni Thompson MD  FAX: [phone]     This note will not be viewable in [address]

## 2020-01-23 NOTE — PATIENT INSTRUCTIONS
Office Policies · Phone calls/patient messages: Please allow up to 24 hours for someone in the office to contact you about your call or message. Be mindful your provider may be out of the office or your message may require further review. We encourage you to use [employer] for your messages as this is a faster, more efficient way to communicate with our office · Medication Refills: 
Prescription medications require up to 48 business hours to process. We encourage you to use [employer] for your refills. For controlled medications: Please allow up to 72 business hours to process. Certain medications may require you to  a written prescription at our office. NO narcotic/controlled medications will be prescribed after 4pm Monday through Friday or on weekends · Form/Paperwork Completion: 
Please note there is a $25 fee for all paperwork completed by our providers. We ask that you allow 7-14 business days. Pre-payment is due prior to picking up/faxing the completed form. You may Dizziness: Care Instructions Your Care Instructions Dizziness is the feeling of unsteadiness or fuzziness in your head. It is different than having vertigo, which is a feeling that the room is spinning or that you are moving or falling. It is also different from lightheadedness, which is the feeling that you are about to faint. It can be hard to know what causes dizziness. Some people feel dizzy when they have migraine headaches. Sometimes bouts of flu can make you feel dizzy. Some medical conditions, such as heart problems or high blood pressure, can make you feel dizzy. Many medicines can cause dizziness, including medicines for high blood pressure, pain, or anxiety. If a medicine causes your symptoms, your doctor may recommend that you stop or change the medicine. If it is a problem with your heart, you may need medicine to help your heart work better.  If there is no clear reason for your symptoms, your doctor may suggest watching and waiting for a while to see if the dizziness goes away on its own. Follow-up care is a key part of your treatment and safety. Be sure to make and go to all appointments, and call your doctor if you are having problems. It's also a good idea to know your test results and keep a list of the medicines you take. How can you care for yourself at home? · If your doctor recommends or prescribes medicine, take it exactly as directed. Call your doctor if you think you are having a problem with your medicine. · Do not drive while you feel dizzy. · Try to prevent falls. Steps you can take include: ? Using nonskid mats, adding grab bars near the tub, and using night-lights. ? Clearing your home so that walkways are free of anything you might trip on. 
? Letting family and friends know that you have been feeling dizzy. This will help them know how to help you. When should you call for help? Call 911 anytime you think you may need emergency care. For example, call if: 
  · You passed out (lost consciousness).  
  · You have dizziness along with symptoms of a heart attack. These may include: 
? Chest pain or pressure, or a strange feeling in the chest. 
? Sweating. ? Shortness of breath. ? Nausea or vomiting. ? Pain, pressure, or a strange feeling in the back, neck, jaw, or upper belly or in one or both shoulders or arms. ? Lightheadedness or sudden weakness. ? A fast or irregular heartbeat.  
  · You have symptoms of a stroke. These may include: 
? Sudden numbness, tingling, weakness, or loss of movement in your face, arm, or leg, especially on only one side of your body. ? Sudden vision changes. ? Sudden trouble speaking. ? Sudden confusion or trouble understanding simple statements. ? Sudden problems with walking or balance. ? A sudden, severe headache that is different from past headaches.  
 Call your doctor now or seek immediate medical care if:   · You feel dizzy and have a fever, headache, or ringing in your ears.  
  · You have new or increased nausea and vomiting.  
  · Your dizziness does not go away or comes back.  
 Watch closely for changes in your health, and be sure to contact your doctor if: 
  · You do not get better as expected. Where can you learn more? Go to http://trae-joseline.info/. Enter E598 in the search box to learn more about \"Dizziness: Care Instructions. \" Current as of: June 26, 2019 Content Version: 12.2 © 6661-9071 [employer]. Care instructions adapted under license by [employer] (which disclaims liability or warranty for this information). If you have questions about a medical condition or this instruction, always ask your healthcare professional. [place] [address] any warranty or liability for your use of this information. Benign Paroxysmal Positional Vertigo (BPPV): Care Instructions Your Care Instructions Benign paroxysmal positional vertigo, also called BPPV, is an inner ear problem. It causes a spinning or whirling sensation when you move your head. This sensation is called vertigo. The vertigo usually lasts for less than a minute. People often have vertigo spells for a few days or weeks. Then the vertigo goes away. But it may come back again. The vertigo may be mild, or it may be bad enough to cause unsteadiness, nausea, and vomiting. When you move, your inner ear sends messages to the brain. This helps you keep your balance. Vertigo can happen when debris builds up in the inner ear. The buildup can cause the inner ear to send the wrong message to the brain. Your doctor may move you in different positions to help your vertigo get better faster. This is called the Epley maneuver. Your doctor may also prescribe medicines or exercises to help with your symptoms. Follow-up care is a key part of your treatment and safety.  Be sure to make and go to all appointments, and call your doctor if you are having problems. It's also a good idea to know your test results and keep a list of the medicines you take. How can you care for yourself at home? · If your doctor suggests that you do Gipson-Daroff exercises: 
? Sit on the edge of a bed or sofa. Quickly lie down on the side that causes the worst vertigo. Lie on your side with your ear down. ? Stay in this position for at least 30 seconds or until the vertigo goes away. ? Sit up. If this causes vertigo, wait for it to stop. ? Repeat the procedure on the other side. ? Repeat this 10 times. Do these exercises 2 times a day until the vertigo is gone. When should you call for help? Call 911 anytime you think you may need emergency care. For example, call if: 
  · You have symptoms of a stroke. These may include: 
? Sudden numbness, tingling, weakness, or loss of movement in your face, arm, or leg, especially on only one side of your body. ? Sudden vision changes. ? Sudden trouble speaking. ? Sudden confusion or trouble understanding simple statements. ? Sudden problems with walking or balance. ? A sudden, severe headache that is different from past headaches.  
 Call your doctor now or seek immediate medical care if: 
  · You have new or worse nausea and vomiting.  
  · You have new symptoms such as hearing loss or roaring in your ears.  
 Watch closely for changes in your health, and be sure to contact your doctor if: 
  · You are not getting better as expected.  
  · Your vertigo gets worse. Where can you learn more? Go to http://trae-joseline.info/. Enter  in the search box to learn more about \"Benign Paroxysmal Positional Vertigo (BPPV): Care Instructions. \" Current as of: October 21, 2018 Content Version: 12.2 © 4961-2453 [employer].  Care instructions adapted under license by [employer] (which disclaims liability or warranty for this information). If you have questions about a medical condition or this instruction, always ask your healthcare professional. [address] any warranty or liability for your use of this information. [place]orne Exercises for Vertigo: Care Instructions Your Care Instructions Simple exercises can help you regain your balance when you have vertigo. If you have Ménière's disease, benign paroxysmal positional vertigo (BPPV), or another inner ear problem, you may have vertigo off and on. Do these exercises first thing in the morning and before you go to bed. You might get dizzy when you first start them. If this happens, try to do them for at least 5 minutes. Do a group of exercises at a time, starting at the top of the list. It may take several weeks before you can do all the exercises without feeling dizzy. Follow-up care is a key part of your treatment and safety. Be sure to make and go to all appointments, and call your doctor if you are having problems. It's also a good idea to know your test results and keep a list of the medicines you take. How can you care for yourself at home? Exercise 1 While sitting on the side of the bed and holding your head still: 
· Look up as far as you can. · Look down as far as you can. · Look from side to side as far as you can. · Stretch your arm straight out in front of you. Focus on your index finger. Continue to focus on your finger while you bring it to your nose. Exercise 2 While sitting on the side of the bed: · Bring your head as far back as you can. · Bring your head forward to touch your chin to your chest. 
· Turn your head from side to side. · Do these exercises first with your eyes open. Then try with your eyes closed. Exercise 3 While sitting on the side of the bed: · Shrug your shoulders straight upward, then relax them. · Bend over and try to touch the ground with your fingers. Then go back to a sitting position. · Toss a small ball from one hand to the other. Throw the ball higher than your eyes so you have to look up. Exercise 4 While standing (with someone close by if you feel uncomfortable): 
· Repeat Exercise 1. 
· Repeat Exercise 2. 
· Pass a ball between your legs and above your head. · Sit down and then stand up. Repeat. Turn around in a [place] a different way each time you stand. · With someone close by to help you, try the above exercises with your eyes closed. Exercise 5 In a room that is cleared of obstacles: 
· Walk to a corner of the room, turn to your right, and walk back to the starting point. Now, repeat and turn left. · Walk up and down a slope. Now try stairs. · While holding on to someone's arm, try these exercises with your eyes closed. When should you call for help? Watch closely for changes in your health, and be sure to contact your doctor if: 
  · You do not get better as expected. Where can you learn more? Go to http://trae-joseline.info/. Enter E001 in the search box to learn more about \"Cawthorne Exercises for Vertigo: Care Instructions. \" Current as of: October 21, 2018 Content Version: 12.2 © 9658-0381 [employer]. Care instructions adapted under license by [employer] (which disclaims liability or warranty for this information). If you have questions about a medical condition or this instruction, always ask your healthcare professional. [address] any warranty or liability for your use of this information. Vertigo: Care Instructions Your Care Instructions Vertigo is the feeling that you or your surroundings are moving when there is no actual movement. It is often described as a feeling of spinning, whirling, falling, or tilting. Vertigo may make you vomit or feel nauseated. You may have trouble standing or walking and may lose your balance. Vertigo is often related to an inner ear problem, but it can have other more serious causes. If vertigo continues, you may need more tests to find its cause. Follow-up care is a key part of your treatment and safety. Be sure to make and go to all appointments, and call your doctor if you are having problems. It's also a good idea to know your test results and keep a list of the medicines you take. How can you care for yourself at home? · Do not lie flat on your back. Prop yourself up slightly. This may reduce the spinning feeling. Keep your eyes open. · Move slowly so that you do not fall. · If your doctor recommends medicine, take it exactly as directed. · Do not drive while you are having vertigo. Certain exercises, called Gipson-Daroff exercises, can help decrease vertigo. To do Gipson-Daroff exercises: · Sit on the edge of a bed or sofa and quickly lie down on the side that causes the worst vertigo. Lie on your side with your ear down. · Stay in this position for at least 30 seconds or until the vertigo goes away. · Sit up. If this causes vertigo, wait for it to stop. · Repeat the procedure on the other side. · Repeat this 10 times. Do these exercises 2 times a day until the vertigo is gone. When should you call for help? Call 911 anytime you think you may need emergency care. For example, call if: 
  · You passed out (lost consciousness).  
  · You have symptoms of a stroke. These may include: 
? Sudden numbness, tingling, weakness, or loss of movement in your face, arm, or leg, especially on only one side of your body. ? Sudden vision changes. ? Sudden trouble speaking. ? Sudden confusion or trouble understanding simple statements. ? Sudden problems with walking or balance. ? A sudden, severe headache that is different from past headaches.  
 Call your doctor now or seek immediate medical care if: 
  · Vertigo occurs with a fever, a headache, or ringing in your ears.   · You have new or increased nausea and vomiting.  
 Watch closely for changes in your health, and be sure to contact your doctor if: 
  · Vertigo gets worse or happens more often.  
  · Vertigo has not gotten better after 2 weeks. Where can you learn more? Go to http://trae-joseline.info/. Enter P194 in the search box to learn more about \"Vertigo: Care Instructions. \" Current as of: October 21, 2018 Content Version: 12.2 © 1856-8881 [employer]. Care instructions adapted under license by [employer] (which disclaims liability or warranty for this information). If you have questions about a medical condition or this instruction, always ask your healthcare professional. [address] any warranty or liability for your use of this information. also download your forms to [employer] to have your doctor print off.

## 2020-01-23 NOTE — LETTER
1/23/20 Patient: Samantha Jimenez YOB: 1940 Date of Visit: 1/23/2020 Savita Moy MD 
[address] [facility] [address] [address] VIA In Basket Dear Savita Moy MD, Thank you for referring Ms. Cristiano Briscoe to [facility] for evaluation. My notes for this consultation are attached. Consult REFERRED BY: 
Maximo Gipson MD 
 
CHIEF COMPLAINT: Right-sided weakness and difficulty with speech Subjective: Samantha Jimenez is a 67 y.o. right-handed  female we are asked to evaluate by Dr Trinity Dias on urgent work in basis for evaluation of new problem of increasing dizziness and a sensation of vertigo when she gets up or moves around, and seems to have a positive Hallpike Hipolito maneuver and vertigo on head movement suggesting benign positional vertigo, and we gave her Antivert to use as needed for that, and she has another new problem of sudden episodes of confusion twice, when she was at the computer and suddenly seem to have difficulty using the computer and found that she was using the mouse upside down, and is concerned that she is having TIAs or neurologic symptoms that cause that because she said has highly atypical because normally she knows what to do with the mouse. Patient was admitted to the hospital in November 2019 for sudden weakness and confusion and difficulty speaking, and was found to have 2 separate left hemispheric areas of infarction, but no atrial fib was found on monitoring, and echocardiogram was unremarkable, and she had a normal ALLIE, and she had a loop monitor placed which has not shown any significant A. fib so far. Her aspirin was switched to Plavix and she has remained stable on that. She was found to have 75% stenosis of the right internal carotid artery on CTA but Dopplers were not done.   The stenosis on the right carotid is a known finding from previous Dopplers last year, and since this is asymptomatic has been followed, and we will repeat the carotid Doppler 1 more time again to make sure there is no progression of stenosis. MRI scan documented 2 strokes, one in the left frontal opercular area and one in the temporal parietal area higher up and further back in 2 separate locations. This does suggest cardioembolic phenomena. Patient had a previous history of TIAs but no stroke, and no significant history of cardiac disease prior to this. She does have a neuropathy that is thought to be secondary to her monoclonal gammopathy which is followed at [facility]. Patient also has a restless leg syndrome she has sleep apnea psoriasis and has decreased vision in the right eye from cataract surgery. She has hyperlipidemia and hypercholesterolemia may be some borderline diabetes. She has had degenerative arthritis, and a back operation and shoulder operation. Patient is somewhat better and her speech is better now, but she still has some speech deficit and weakness on the right side. He denies any significant headache, fever, meningismus, trauma, or other causes of her strokelike symptoms now. Patient under remarkable increased stress and tension taking care of her  who has Alzheimer's disease Patient seems to have recovered from the stroke in November, except for these new problems as above. Past Medical History:  
Diagnosis Date  Adverse effect of anesthesia   
 sleep apnea no cpap machine useage  Anemia   
 has had iron infusions; Hem/onc Dr Thomason Range [phone]  Anxiety and depression  Bilateral carotid artery stenosis  Embolic stroke involving left middle cerebral artery ([address]  GERD (gastroesophageal reflux disease)  Hypercholesteremia  Hypertension  Idiopathic small and large fiber sensory neuropathy  Limited peripheral vision of right eye complication of cataract surgery  Long term current use of anticoagulant therapy  Meniere disease  MGUS (monoclonal gammopathy of unknown significance)  Monoclonal gammopathy of unknown significance   
 hem/onc:  Dr Igor Jang [phone]  Osteoporosis  Psoriasis   
 bilateral Legs  Restless leg  Skin melanoma ([address] 2012 (Rt ankle) and basal cell (Lt eye) removed  Transient ischemic attack approx 2005 HAS HAD 2 MINISTROKES-NO DEFICITS; neuro Dr Paula Daley (cc)  Unspecified adverse effect of anesthesia HAS NEEDED PORT OR CUTDOWN FOR RECVNG BLOOD OR LONG-TERM IVs; surgery 7/28/15 w/o any difficulty  Unspecified sleep apnea   
 does not use CPAP Past Surgical History:  
Procedure Laterality Date  HX APPENDECTOMY  HX CATARACT REMOVAL  2011 Right  HX CERVICAL FUSION  1/28/15 C5-7  
 HX CHOLECYSTECTOMY  2000  HX GI  2010  
 liver bx due to liver Bx due to elevated enzymes, liver laceration during Bx, hospitalized 10-12d  HX HEENT  7/28/15 Lt total parotidectomy with facial nerve dissection: Dr Clive Freeman  HX HEENT  11/2015 Right parotidectomy  HX HYSTERECTOMY Re [facility]  2009 & 2011 LUMBAR FUSION and revision  HX OTHER SURGICAL  2010 MELANOMA (Rt ankle) and basal cell (Lt eye) removed; q6mo ck by DERMATOLOGY  HX SHOULDER REPLACEMENT Left 08/01/2016  HX THYROIDECTOMY Right  MA COLON CA SCRN NOT HI RSK IND  1/27/2016  MA INSERTION SUBQ CARDIAC RHYTHM MONITOR W/PRGRMG N/A 11/6/2019 LOOP RECORDER INSERT performed by Amy Tabares MD at [address] CATH LAB Family History Problem Relation Age of Onset  Alcohol abuse Father  Emphysema Father  Cancer Sister PANCREATIC  Alcohol abuse Brother  COPD Brother [facility] Other Mother UNKNOWN CAUSE OF DEATH  
 Heart Disease Mother  Breast Cancer Maternal Aunt  Anesth Problems Neg Hx Social History Tobacco Use  Smoking status: Former Smoker Years: 15.00 Last attempt to quit: 7/15/1975 Years since quittin.5  Smokeless tobacco: Never Used Substance Use Topics  Alcohol use: Yes Comment: may have a glass of wine on rare occasion Current Outpatient Medications:  
  meclizine (ANTIVERT) 12.5 mg tablet, Take 1-2 Tabs by mouth three (3) times daily as needed for Dizziness for up to 10 days. , Disp: 100 Tab, Rfl: 5 
  nortriptyline (PAMELOR) 10 mg capsule, Take 1-2 Caps by mouth nightly., Disp: 90 Cap, Rfl: 3 
  primidone (MYSOLINE) 50 mg tablet, Take 1 Tab by mouth nightly., Disp: 90 Tab, Rfl: 1   clonazePAM (KLONOPIN) 0.5 mg tablet, TAKE 1 TABLET BY MOUTH TWICE DAILY. MAXIMUM DAILY AMOUNT: 1 MG, Disp: 60 Tab, Rfl: 2 
  olmesartan (BENICAR) 5 mg tablet, Take 0.5 Tabs by mouth daily. , Disp: 30 Tab, Rfl: 1   verapamil ER (CALAN-SR) 120 mg tablet, TAKE 1 TABLET BY MOUTH NIGHTLY FOR HIGH BLOOD PRESSURE, Disp: 90 Tab, Rfl: 0 
  atorvastatin (LIPITOR) 20 mg tablet, Take 20 mg by mouth nightly., Disp: , Rfl:  
  clopidogrel (PLAVIX) 75 mg tab, Take 75 mg by mouth nightly., Disp: , Rfl:  
  multivit/folic acid/vit K1 (ONE-A-DAY WOMEN'S 50 PLUS PO), Take 1 Tab by mouth daily. , Disp: , Rfl:  
  fluticasone propionate (FLONASE) 50 mcg/actuation nasal spray, 2 Sprays by Both Nostrils route daily. , Disp: 1 Bottle, Rfl: 0 
  rOPINIRole (REQUIP) 1 mg tablet, Take 1 Tab by mouth nightly., Disp: 90 Tab, Rfl: 2 
  pantoprazole (PROTONIX) 40 mg tablet, Take 1 Tab by mouth every morning. Indications: gastroesophageal reflux disease, Disp: 90 Tab, Rfl: 1   escitalopram oxalate (LEXAPRO) 20 mg tablet, Take 1 Tab by mouth nightly.  Indications: Anxiousness associated with Depression, Disp: 90 Tab, Rfl: 4 
  albuterol (PROVENTIL HFA, VENTOLIN HFA, PROAIR HFA) 90 mcg/actuation inhaler, Take 1 Puff by inhalation every four (4) hours as needed for Wheezing., Disp: 1 Inhaler, Rfl: 0 
   polyethylene glycol (MIRALAX) 17 gram packet, Take 17 g by mouth daily as needed (constipation). , Disp: , Rfl:  
 
 
 
Allergies Allergen Reactions  Aspirin Nausea and Vomiting  Bee Sting [Sting, Bee] Hives and Shortness of Breath  
  WASP STING  
 Codeine Nausea and Vomiting  Hydrocodone Rash And sedation  Lisinopril Cough  Morphine Rash  Nsaids (Non-Steroidal Anti-Inflammatory Drug) Nausea and Vomiting  Oxycodone Rash On her back  Penicillins Hives, Shortness of Breath and Swelling MRI Results (most recent): 
 
 
 
Review of Systems: A comprehensive review of systems was negative except for: Constitutional: positive for fatigue and malaise Musculoskeletal: positive for myalgias, arthralgias, stiff joints and back pain Neurological: positive for headaches, speech problems, paresthesia, coordination problems, gait problems and weakness Behvioral/Psych: positive for anxiety and depression Vitals:  
 01/23/20 1055 BP: 118/62 Pulse: 75 SpO2: 99% Weight: 158 lb (71.7 kg) Height: 5' 2\" (1.575 m) Objective: INEUROLOGICAL EXAM: 
  
Appearance: The patient is well developed, well nourished, provides a fair history and is in no acute distress. Mental Status: Oriented to time, place and person, and the president, cognitive function is slow but probably normal and speech is mildly aphasic and dysarthric, but better than yesterday, mood and affect appropriate but anxious and depressed . Cranial Nerves:   Intact visual fields. 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 Fundi are benign on the left, but the right eye is scarred. Pupils react normally on the left, but the right pupil is scarred and fundus poorly seen and she has decreased visual acuity there., EOM's full, no nystagmus, no ptosis. Facial sensation is normal. Corneal reflexes are not tested. Facial movement is asymmetric, weak on the right.  Hearing is abnormal bilaterally. Palate is midline with normal sternocleidomastoid and trapezius muscles are normal. Tongue is midline. Neck without meningismus or bruits Neck with limited mobility secondary to her previous surgery Temporal arteries are not tender or enlarged Motor:  4/5 strength in upper and lower proximal and distal muscles on the left, and about 3-4/5 on the right. Normal bulk and tone, with a mild spastic paraplegia, worse on the left side where she has mild weakness noted and decreased rapid alternating movement and hyperreflexia and increased tone from her previous myelopathy. No fasciculations. Reflexes:   Deep tendon reflexes 1+/4 in the left upper extremity and 2+/4 in the right upper extremity, 2+/4 in both legs with slight decreased ankle jerks on the right. Sudie Mar No babinski or clonus present Rapid alternating movement is slow in all extremities but symmetric Patient has vertigo on head movement and Hallpike Lizton maneuver Sensory:   Abnormal to touch, pinprick and vibration and temperature decreased in both legs up to the knee level. DSS is intact Gait:  Abnormal gait with patient moving slowly, and limited mobility of the lumbar spine secondary to her previous back operations and now her new right hemiparesis. Patient may have decreased arm swing in the right upper extremity Tremor:   Minimal bilateral intention tremor noted. Cerebellar:   Patient has abnormal Romberg and tandem cerebellar signs present. Finger-nose-finger examination shows mild tremor bilaterally in the upper extremities Neurovascular:  Normal heart sounds and regular rhythm, peripheral pulses decreased, and no carotid bruits.  
  
  
 
 
 
 
 
 
Assessment: ICD-10-CM ICD-9-CM 1. Cerebral microvascular disease I67.9 437.9 DUPLEX CAROTID BILATERAL  
   CT HEAD WO CONT  
   meclizine (ANTIVERT) 12.5 mg tablet 2.  Idiopathic small and large fiber sensory neuropathy G60.8 356.4 DUPLEX CAROTID BILATERAL  
 CT HEAD WO CONT  
   meclizine (ANTIVERT) 12.5 mg tablet 3. Lumbar back pain with radiculopathy affecting right lower extremity M54.16 724.4 DUPLEX CAROTID BILATERAL  
   CT HEAD WO CONT  
   meclizine (ANTIVERT) 12.5 mg tablet 4. Tension vascular headache G44.209 307.81 DUPLEX CAROTID BILATERAL  
   CT HEAD WO CONT  
   meclizine (ANTIVERT) 12.5 mg tablet 5. Stenosis of both internal carotid arteries I65.23 433.10 DUPLEX CAROTID BILATERAL  
  433.30 CT HEAD WO CONT  
   meclizine (ANTIVERT) 12.5 mg tablet 6. Cervical radiculopathy due to degenerative joint disease of spine M47.22 721.0 DUPLEX CAROTID BILATERAL  
   CT HEAD WO CONT  
   meclizine (ANTIVERT) 12.5 mg tablet 7. Benign essential tremor syndrome G25.0 333.1 DUPLEX CAROTID BILATERAL  
   CT HEAD WO CONT  
   meclizine (ANTIVERT) 12.5 mg tablet 8. Cervical post-laminectomy syndrome M96.1 722.81 DUPLEX CAROTID BILATERAL  
   CT HEAD WO CONT  
   meclizine (ANTIVERT) 12.5 mg tablet 9. Ulnar neuropathy at elbow of right upper extremity G56.21 354.2 DUPLEX CAROTID BILATERAL  
   CT HEAD WO CONT  
   meclizine (ANTIVERT) 12.5 mg tablet 10. Lumbar back pain with radiculopathy affecting left lower extremity M54.16 724.4 DUPLEX CAROTID BILATERAL  
   CT HEAD WO CONT  
   meclizine (ANTIVERT) 12.5 mg tablet 11. Chronic tension-type headache, not intractable G44.229 339.12 DUPLEX CAROTID BILATERAL  
   CT HEAD WO CONT  
   meclizine (ANTIVERT) 12.5 mg tablet 12. MGUS (monoclonal gammopathy of unknown significance) D47.2 273.1 DUPLEX CAROTID BILATERAL  
   CT HEAD WO CONT  
   meclizine (ANTIVERT) 12.5 mg tablet 13. Lumbar post-laminectomy syndrome M96.1 722.83 DUPLEX CAROTID BILATERAL  
   CT HEAD WO CONT  
   meclizine (ANTIVERT) 12.5 mg tablet 14. 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) I63.412 434.11 DUPLEX CAROTID BILATERAL  
   CT HEAD WO CONT  
   meclizine (ANTIVERT) 12.5 mg tablet Active Problems: * No active hospital problems. * 
 
 
Plan:  
 
Patient with recurring spells of TIA questionably, episodes of confusion, will repeat a CT scan of the head because she is worried about stroke, and she will also get repeat carotid Dopplers make sure the right carotid artery is not progressively worse or occluded. She continues her antiplatelet therapy and statin high-dose treatment. For her recurrent vertigo, she is to continue or restart her Antivert and new prescription given to patient for that today. For her essential tremor she continues on Mysoline 50 mg at nighttime. For her restless leg syndrome she continues Requip 1 mg at nighttime. Patient will continue her Plavix therapy, and call us immediately has any further problem, check my chart for results of her test, and call us if there is any change or worsening of symptoms immediately She is advised of the warning factors of stroke, that is, be fast, balance, eyes, face, arms, sensory and time Patient advised of 4 risk factors for stroke as smoking which she does not do, cholesterol, hypertension, diet and diabetes, and cardiac disease, and her internist and cardiologist are trying to control all these. 45-minute spent with patient going over her history, going over her exam, and deciding on further treatment and evaluation and prognosis and testing needed. Follow-up in 3 months time if testing negative and patient stable. Signed By: Aleisha Sommer MD   
 January 23, 2020 CC: Tala Sanchez MD 
FAX: [phone] This note will not be viewable in [address] If you have questions, please do not hesitate to call me. I look forward to following your patient along with you. Sincerely, Aleisha Sommer MD

## 2020-01-31 ENCOUNTER — DOCUMENTATION ONLY (OUTPATIENT)
Dept: NEUROLOGY | Age: 80
End: 2020-01-31

## 2020-01-31 ENCOUNTER — HOSPITAL ENCOUNTER (OUTPATIENT)
Dept: CT IMAGING | Age: 80
Discharge: HOME | End: 2020-01-31
Attending: PSYCHIATRY & NEUROLOGY
Payer: MEDICARE

## 2020-01-31 DIAGNOSIS — G44.229 CHRONIC TENSION-TYPE HEADACHE, NOT INTRACTABLE: ICD-10-CM

## 2020-01-31 DIAGNOSIS — M47.22 CERVICAL RADICULOPATHY DUE TO DEGENERATIVE JOINT DISEASE OF SPINE: ICD-10-CM

## 2020-01-31 DIAGNOSIS — I63.412 EMBOLIC STROKE INVOLVING LEFT MIDDLE CEREBRAL ARTERY (HCC): ICD-10-CM

## 2020-01-31 DIAGNOSIS — D47.2 MGUS (MONOCLONAL GAMMOPATHY OF UNKNOWN SIGNIFICANCE): ICD-10-CM

## 2020-01-31 DIAGNOSIS — M96.1 CERVICAL POST-LAMINECTOMY SYNDROME: ICD-10-CM

## 2020-01-31 DIAGNOSIS — G25.0 BENIGN ESSENTIAL TREMOR SYNDROME: ICD-10-CM

## 2020-01-31 DIAGNOSIS — I67.89 CEREBRAL MICROVASCULAR DISEASE: ICD-10-CM

## 2020-01-31 DIAGNOSIS — G44.209 TENSION VASCULAR HEADACHE: ICD-10-CM

## 2020-01-31 DIAGNOSIS — M54.16 LUMBAR BACK PAIN WITH RADICULOPATHY AFFECTING LEFT LOWER EXTREMITY: ICD-10-CM

## 2020-01-31 DIAGNOSIS — G60.8 IDIOPATHIC SMALL AND LARGE FIBER SENSORY NEUROPATHY: ICD-10-CM

## 2020-01-31 DIAGNOSIS — M54.16 LUMBAR BACK PAIN WITH RADICULOPATHY AFFECTING RIGHT LOWER EXTREMITY: ICD-10-CM

## 2020-01-31 DIAGNOSIS — I65.23 STENOSIS OF BOTH INTERNAL CAROTID ARTERIES: ICD-10-CM

## 2020-01-31 DIAGNOSIS — M96.1 LUMBAR POST-LAMINECTOMY SYNDROME: ICD-10-CM

## 2020-01-31 DIAGNOSIS — G56.21 ULNAR NEUROPATHY AT ELBOW OF RIGHT UPPER EXTREMITY: ICD-10-CM

## 2020-01-31 PROCEDURE — 70450 CT HEAD/BRAIN W/O DYE: CPT

## 2020-01-31 NOTE — PROGRESS NOTES
I called the patient, left a message CT scan of the head was okay, that is good news, call if any questions

## 2020-02-03 ENCOUNTER — TELEPHONE (OUTPATIENT)
Dept: INTERNAL MEDICINE CLINIC | Age: 80
End: 2020-02-03

## 2020-02-03 NOTE — TELEPHONE ENCOUNTER
#[phone] Fourmile states she needs a routine appt sooner than what is available for psr to schedule.   She would like to come in with  if possible? (message sent back on him as well)

## 2020-02-08 DIAGNOSIS — I10 ESSENTIAL HYPERTENSION: ICD-10-CM

## 2020-02-10 RX ORDER — OLMESARTAN MEDOXOMIL 5 MG/1
TABLET ORAL
Qty: 30 TAB | Refills: 0 | Status: SHIPPED | OUTPATIENT
Start: 2020-02-10 | End: 2020-05-27

## 2020-02-19 ENCOUNTER — OFFICE VISIT (OUTPATIENT)
Dept: CARDIOLOGY CLINIC | Age: 80
End: 2020-02-19

## 2020-02-19 DIAGNOSIS — Z45.09 ENCOUNTER FOR LOOP RECORDER CHECK: ICD-10-CM

## 2020-02-19 DIAGNOSIS — I63.30 CEREBROVASCULAR ACCIDENT (CVA) DUE TO THROMBOSIS OF CEREBRAL ARTERY (HCC): Primary | ICD-10-CM

## 2020-02-28 ENCOUNTER — OFFICE VISIT (OUTPATIENT)
Dept: INTERNAL MEDICINE CLINIC | Age: 80
End: 2020-02-28

## 2020-02-28 VITALS
HEIGHT: 62 IN | RESPIRATION RATE: 18 BRPM | DIASTOLIC BLOOD PRESSURE: 67 MMHG | BODY MASS INDEX: 28.93 KG/M2 | TEMPERATURE: 97.8 F | OXYGEN SATURATION: 99 % | WEIGHT: 157.2 LBS | SYSTOLIC BLOOD PRESSURE: 137 MMHG | HEART RATE: 73 BPM

## 2020-02-28 DIAGNOSIS — I65.23 STENOSIS OF BOTH INTERNAL CAROTID ARTERIES: ICD-10-CM

## 2020-02-28 DIAGNOSIS — F41.9 ANXIETY: ICD-10-CM

## 2020-02-28 DIAGNOSIS — I10 ESSENTIAL HYPERTENSION: Primary | ICD-10-CM

## 2020-02-28 DIAGNOSIS — Z86.73 HX OF COMPLETED STROKE: ICD-10-CM

## 2020-02-28 NOTE — PROGRESS NOTES
CC: Hypertension      HPI:    She is a 67 y.o. female who presents for evaluation of general medical issues. No recurrent strokes. Recently saw Dr Boni Glover and had repeat CT scan brain and doppler of carotids has stable carotid stenosis taking plavix and statin     HTN: taking verapamil 120 mg daily and benicar 5mg and tolerating well with good blood pressure today. Had cough with lisinopril    Has anxiety with sleeping and takes clonazepam at bedtime. 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)     GERD (gastroesophageal reflux disease)     Hypercholesteremia     Hypertension     Idiopathic small and large fiber sensory neuropathy     Limited peripheral vision of right eye     complication of cataract surgery    Long term current use of anticoagulant therapy     Meniere disease     MGUS (monoclonal gammopathy of unknown significance)     Monoclonal gammopathy of unknown significance     hem/onc:  Dr Igor Jang [phone]    Osteoporosis     Psoriasis     bilateral Legs    Restless leg     Skin melanoma ([facility] [address] 2012    (Rt ankle) and basal cell (Lt eye) removed    Transient ischemic attack approx 2005    HAS HAD 2 MINISTROKES-NO DEFICITS; neuro Dr Paula Daley (cc)    Unspecified adverse effect of anesthesia     HAS NEEDED PORT OR CUTDOWN FOR RECVNG BLOOD OR LONG-TERM IVs; surgery 7/28/15 w/o any difficulty    Unspecified sleep apnea     does not use CPAP       Current Outpatient Medications on File Prior to Visit   Medication Sig Dispense Refill    olmesartan (BENICAR) 5 mg tablet TAKE 1/2 (ONE-HALF) TABLET BY MOUTH ONCE DAILY 30 Tab 0    nortriptyline (PAMELOR) 10 mg capsule Take 1-2 Caps by mouth nightly. 90 Cap 3    primidone (MYSOLINE) 50 mg tablet Take 1 Tab by mouth nightly. 90 Tab 1    clonazePAM (KLONOPIN) 0.5 mg tablet TAKE 1 TABLET BY MOUTH TWICE DAILY. MAXIMUM DAILY AMOUNT: 1 MG 60 Tab 2    verapamil ER (CALAN-SR) 120 mg tablet TAKE 1 TABLET BY MOUTH NIGHTLY FOR HIGH BLOOD PRESSURE 90 Tab 0    atorvastatin (LIPITOR) 20 mg tablet Take 20 mg by mouth nightly.  clopidogrel (PLAVIX) 75 mg tab Take 75 mg by mouth nightly.  multivit/folic acid/vit K1 (ONE-A-DAY WOMEN'S 50 PLUS PO) Take 1 Tab by mouth daily.  fluticasone propionate (FLONASE) 50 mcg/actuation nasal spray 2 Sprays by Both Nostrils route daily. 1 Bottle 0    rOPINIRole (REQUIP) 1 mg tablet Take 1 Tab by mouth nightly. 90 Tab 2    pantoprazole (PROTONIX) 40 mg tablet Take 1 Tab by mouth every morning. Indications: gastroesophageal reflux disease 90 Tab 1    escitalopram oxalate (LEXAPRO) 20 mg tablet Take 1 Tab by mouth nightly. Indications: Anxiousness associated with Depression 90 Tab 4    albuterol (PROVENTIL HFA, VENTOLIN HFA, PROAIR HFA) 90 mcg/actuation inhaler Take 1 Puff by inhalation every four (4) hours as needed for Wheezing. 1 Inhaler 0    polyethylene glycol (MIRALAX) 17 gram packet Take 17 g by mouth daily as needed (constipation). No current facility-administered medications on file prior to visit.         Past Surgical History:   Procedure Laterality Date    HX APPENDECTOMY      HX CATARACT REMOVAL  2011    Right    HX CERVICAL FUSION  1/28/15    C5-7    HX CHOLECYSTECTOMY  2000    HX GI  2010    liver bx due to liver Bx due to elevated enzymes, liver laceration during Bx, hospitalized 10-12d    HX HEENT  7/28/15    Lt total parotidectomy with facial nerve dissection: Dr Karen Bob HX HEENT  11/2015    Right parotidectomy    HX HYSTERECTOMY      HX ORTHOPAEDIC  2009 & 2011    LUMBAR FUSION and revision    HX OTHER SURGICAL  2010    MELANOMA (Rt ankle) and basal cell (Lt eye) removed; q6mo ck by DERMATOLOGY    HX SHOULDER REPLACEMENT Left 2016    HX THYROIDECTOMY      Right    [facility] COLON CA SCRN NOT HI RSK IND  2016         [facility] INSERTION SUBQ CARDIAC RHYTHM MONITOR W/PRGRMG N/A 2019    LOOP RECORDER INSERT performed by Johanna Sierra MD at [facility] CARDIAC [facility]       Family History   Problem Relation Age of Onset    Alcohol abuse Father     Emphysema Father     Cancer Sister         PANCREATIC    Alcohol abuse Brother     COPD Brother     Other Mother         UNKNOWN CAUSE OF DEATH    Heart Disease Mother     Breast Cancer Maternal Aunt     Anesth Problems Neg Hx      Reviewed and no changes     Social History     Socioeconomic History    Marital status:      Spouse name: Not on file    Number of children: Not on file    Years of education: Not on file    Highest education level: Not on file   Occupational History    Not on file   Social Needs    Financial resource strain: Not on file    Food insecurity:     Worry: Not on file     Inability: Not on file    Transportation needs:     Medical: Not on file     Non-medical: Not on file   Tobacco Use    Smoking status: Former Smoker     Years: 15.00     Last attempt to quit: 7/15/1975     Years since quittin.6    Smokeless tobacco: Never Used   Substance and Sexual Activity    Alcohol use: Yes     Comment: may have a glass of wine on rare occasion    Drug use: Yes     Types: Prescription, OTC    Sexual activity: Not Currently   Lifestyle    Physical activity:     Days per week: Not on file     Minutes per session: Not on file    Stress: Not on file   Relationships    Social connections:     Talks on phone: Not on file     Gets together: Not on file     Attends Taoism service: Not on file     Active member of club or organization: Not on file     Attends meetings of clubs or organizations: Not on file     Relationship status: Not on file    Intimate partner violence:     Fear of current or ex partner: Not on file     Emotionally abused: Not on file     Physically abused: Not on file     Forced sexual activity: Not on file   Other Topics Concern    Not on file   Social History Narrative    Lives in [place] with  of 62 years. Has 2 sons and 1 daughter. Used to work in a bank. Likes to garden and sew.              Visit Vitals  /67 (BP 1 Location: Right arm, BP Patient Position: Sitting)   Pulse 73   Temp 97.8 °F (36.6 °C) (Oral)   Resp 18   Ht 5' 2\" (1.575 m)   Wt 157 lb 3.2 oz (71.3 kg)   LMP  (LMP Unknown)   SpO2 99%   BMI 28.75 kg/m²       Physical Examination:   General - Well appearing female  HEENT - PERRL, TM no erythema/opacification, normal nasal turbinates, oropharynx no erythema or exudate, MMM  Neck - supple, no bruits, no TMG, no LAD  Pulm - clear to auscultation bilaterally  Cardio - RRR, normal S1 S2, no murmur gallops or rubs  Abd - soft, nontender, no masses, no HSM  Extrem - no edema, venous stasis ulcer bilateral in medial malleolus area,  +1 distal pulses  Psych - normal affect, appropriate mood    Lab Results   Component Value Date/Time    WBC 4.1 11/05/2019 02:00 PM    HGB 12.2 11/05/2019 02:00 PM    HCT 38.7 11/05/2019 02:00 PM    PLATELET 366 35/42/3260 02:00 PM    MCV 96.0 11/05/2019 02:00 PM     Lab Results   Component Value Date/Time    Sodium 138 11/05/2019 02:00 PM    Potassium 4.1 11/05/2019 02:00 PM    Chloride 106 11/05/2019 02:00 PM    CO2 25 11/05/2019 02:00 PM    Anion gap 7 11/05/2019 02:00 PM    Glucose 82 11/05/2019 02:00 PM    BUN 12 11/05/2019 02:00 PM    Creatinine 0.76 11/05/2019 02:00 PM    BUN/Creatinine ratio 16 11/05/2019 02:00 PM    GFR est AA >60 11/05/2019 02:00 PM    GFR est non-AA >60 11/05/2019 02:00 PM    Calcium 9.0 11/05/2019 02:00 PM     Lab Results   Component Value Date/Time    Cholesterol, total 186 11/06/2019 02:16 AM    HDL Cholesterol 70 11/06/2019 02:16 AM    LDL, calculated 79.2 11/06/2019 02:16 AM    VLDL, calculated 36.8 11/06/2019 02:16 AM    Triglyceride 184 (H) 11/06/2019 02:16 AM    CHOL/HDL Ratio 2.7 11/06/2019 02:16 AM     Lab Results   Component Value Date/Time    TSH 2.43 11/06/2019 02:16 AM     No results found for: PSALev, [id number], [id number], PSALT  Lab Results   Component Value Date/Time    Hemoglobin A1c 5.3 11/06/2019 02:16 AM     No results found for: Tamia Andino, [id number]    Lab Results   Component Value Date/Time    ALT (SGPT) 19 11/05/2019 02:00 PM    AST (SGOT) 13 (L) 11/05/2019 02:00 PM    Alk. phosphatase 95 11/05/2019 02:00 PM    Bilirubin, total 0.4 11/05/2019 02:00 PM           Assessment/Plan:    HTN   Has cough on ace, stop ace. Changde to benicar 2.5mg daily  Continued verapamil and BP is stable, check kidney function today    Skin cancer hx followed by - Dr Melonie Jin    MGUS (monoclonal gammopathy of unknown significance)  -stable followed by Dr Noble Pike       Mixed hyperlipidemia  -on Lipitor 20mg daily last LDL was 79 very close to goal     Anxiety  - controlled on low dose clonazepam daily and lexapro , taking clonazepam at night     recurrent TIAs - michelle Dr Blas Mccall repeated CT scan and normal,  Repeated doppler of carotid and has proximal stenosis of of R internal carotid artery and less degree on left,  That is stable, will defer to Dr Blas Mccall   - currently has loop recorder as stroke in two different areas raising suspicion that it is embolic . TTE ok    on statin and plavix.  Followed by Dr Beverly Hurley MD

## 2020-02-28 NOTE — PROGRESS NOTES
Reviewed record in preparation for visit and have obtained necessary documentation. Identified pt with two pt identifiers(name and ). Chief Complaint   Patient presents with   [facility] Hypertension       Health Maintenance Due   Topic Date Due    Eye Exam  1950    DTaP/Tdap/Td  (1 - Tdap) 1952    Shingles Vaccine (1 of 2) 1991       Ms. Khurram Jane has a reminder for a \"due or due soon\" health maintenance. I have asked that she discuss this further with her primary care provider for follow-up on this health maintenance. Coordination of Care Questionnaire:  :     1) Have you been to an emergency room, urgent care clinic since your last visit? no   Hospitalized since your last visit? no             2) Have you seen or consulted any other health care providers outside of [facility] since your last visit? no  (Include any pap smears or colon screenings in this section.)    3) In the event something were to happen to you and you were unable to speak on your behalf, do you have an Advance Directive/ Living Will in place stating your wishes? NO    Do you have an Advance Directive on file? no    4) Are you interested in receiving information on Advance Directives? NO    Patient is accompanied by self I have received verbal consent from [employer] to discuss any/all medical information while they are present in the room.

## 2020-02-28 NOTE — PATIENT INSTRUCTIONS
Office Policies Phone calls/patient messages: Please allow up to 24 hours for someone in the office to contact you about your call or message. Be mindful your provider may be out of the office or your message may require further review. We encourage you to use [employer] for your messages as this is a faster, more efficient way to communicate with our office Medication Refills: 
         
Prescription medications require 48-72 business hours to process. We encourage you to use [employer] for your refills. For controlled medications: Please allow 72 business hours to process. Certain medications may require you to  a written prescription at our office. NO narcotic/controlled medications will be prescribed after 4pm Monday through Friday or on weekends Form/Paperwork Completion: 
         
Please note a $25 fee may incur for all paperwork for completed by our providers. We ask that you allow 7-10 business days. Pre-payment is due prior to picking up/faxing the completed form. You may also download your forms to [employer] to have your doctor print off.

## 2020-02-29 LAB
ALBUMIN SERPL-MCNC: 4.1 G/DL (ref 3.7–4.7)
ALBUMIN/GLOB SERPL: 2.2 {RATIO} (ref 1.2–2.2)
ALP SERPL-CCNC: 102 IU/L (ref 39–117)
ALT SERPL-CCNC: 13 IU/L (ref 0–32)
AST SERPL-CCNC: 17 IU/L (ref 0–40)
BASOPHILS # BLD AUTO: 0 X10E3/UL (ref 0–0.2)
BASOPHILS NFR BLD AUTO: 1 %
BILIRUB SERPL-MCNC: 0.3 MG/DL (ref 0–1.2)
BUN SERPL-MCNC: 15 MG/DL (ref 8–27)
BUN/CREAT SERPL: 17 (ref 12–28)
CALCIUM SERPL-MCNC: 9.2 MG/DL (ref 8.7–10.3)
CHLORIDE SERPL-SCNC: 103 MMOL/L (ref 96–106)
CO2 SERPL-SCNC: 23 MMOL/L (ref 20–29)
CREAT SERPL-MCNC: 0.86 MG/DL (ref 0.57–1)
EOSINOPHIL # BLD AUTO: 0.1 X10E3/UL (ref 0–0.4)
EOSINOPHIL NFR BLD AUTO: 2 %
ERYTHROCYTE [DISTWIDTH] IN BLOOD BY AUTOMATED COUNT: 11.7 % (ref 11.7–15.4)
GLOBULIN SER CALC-MCNC: 1.9 G/DL (ref 1.5–4.5)
GLUCOSE SERPL-MCNC: 77 MG/DL (ref 65–99)
HCT VFR BLD AUTO: 36.3 % (ref 34–46.6)
HGB BLD-MCNC: 12.2 G/DL (ref 11.1–15.9)
IMM GRANULOCYTES # BLD AUTO: 0 X10E3/UL (ref 0–0.1)
IMM GRANULOCYTES NFR BLD AUTO: 0 %
LYMPHOCYTES # BLD AUTO: 1.5 X10E3/UL (ref 0.7–3.1)
LYMPHOCYTES NFR BLD AUTO: 32 %
MCH RBC QN AUTO: 30.9 PG (ref 26.6–33)
MCHC RBC AUTO-ENTMCNC: 33.6 G/DL (ref 31.5–35.7)
MCV RBC AUTO: 92 FL (ref 79–97)
MONOCYTES # BLD AUTO: 0.5 X10E3/UL (ref 0.1–0.9)
MONOCYTES NFR BLD AUTO: 12 %
NEUTROPHILS # BLD AUTO: 2.4 X10E3/UL (ref 1.4–7)
NEUTROPHILS NFR BLD AUTO: 53 %
PLATELET # BLD AUTO: 259 X10E3/UL (ref 150–450)
POTASSIUM SERPL-SCNC: 4.3 MMOL/L (ref 3.5–5.2)
PROT SERPL-MCNC: 6 G/DL (ref 6–8.5)
RBC # BLD AUTO: 3.95 X10E6/UL (ref 3.77–5.28)
SODIUM SERPL-SCNC: 141 MMOL/L (ref 134–144)
WBC # BLD AUTO: 4.5 X10E3/UL (ref 3.4–10.8)

## 2020-03-17 RX ORDER — VERAPAMIL HYDROCHLORIDE 120 MG/1
TABLET, FILM COATED, EXTENDED RELEASE ORAL
Qty: 90 TAB | Refills: 0 | Status: SHIPPED | OUTPATIENT
Start: 2020-03-17 | End: 2020-03-24

## 2020-03-20 DIAGNOSIS — F41.9 ANXIETY: ICD-10-CM

## 2020-03-24 RX ORDER — VERAPAMIL HYDROCHLORIDE 120 MG/1
TABLET, FILM COATED, EXTENDED RELEASE ORAL
Qty: 90 TAB | Refills: 0 | Status: SHIPPED | OUTPATIENT
Start: 2020-03-24 | End: 2020-09-03

## 2020-03-24 RX ORDER — CLONAZEPAM 0.5 MG/1
TABLET ORAL
Qty: 60 TAB | Refills: 0 | Status: SHIPPED | OUTPATIENT
Start: 2020-03-24 | End: 2020-04-20

## 2020-04-09 RX ORDER — CLOPIDOGREL BISULFATE 75 MG/1
75 TABLET ORAL
Qty: 90 TAB | Refills: 1 | Status: SHIPPED | OUTPATIENT
Start: 2020-04-09 | End: 2020-12-05

## 2020-04-09 RX ORDER — PANTOPRAZOLE SODIUM 40 MG/1
40 TABLET, DELAYED RELEASE ORAL
Qty: 90 TAB | Refills: 1 | Status: SHIPPED | OUTPATIENT
Start: 2020-04-09 | End: 2020-05-18

## 2020-04-18 DIAGNOSIS — F41.9 ANXIETY: ICD-10-CM

## 2020-04-20 RX ORDER — CLONAZEPAM 0.5 MG/1
TABLET ORAL
Qty: 60 TAB | Refills: 0 | Status: SHIPPED | OUTPATIENT
Start: 2020-04-20 | End: 2020-05-18

## 2020-04-22 ENCOUNTER — APPOINTMENT (OUTPATIENT)
Dept: GENERAL RADIOLOGY | Age: 80
DRG: 202 | End: 2020-04-22
Attending: EMERGENCY MEDICINE
Payer: MEDICARE

## 2020-04-22 ENCOUNTER — HOSPITAL ENCOUNTER (INPATIENT)
Age: 80
LOS: 4 days | Discharge: HOME HEALTH | DRG: 202 | End: 2020-04-26
Attending: EMERGENCY MEDICINE | Admitting: INTERNAL MEDICINE
Payer: MEDICARE

## 2020-04-22 ENCOUNTER — APPOINTMENT (OUTPATIENT)
Dept: CT IMAGING | Age: 80
DRG: 202 | End: 2020-04-22
Attending: EMERGENCY MEDICINE
Payer: MEDICARE

## 2020-04-22 ENCOUNTER — NURSE TRIAGE (OUTPATIENT)
Dept: OTHER | Facility: CLINIC | Age: 80
End: 2020-04-22

## 2020-04-22 DIAGNOSIS — R06.02 SOB (SHORTNESS OF BREATH): Primary | ICD-10-CM

## 2020-04-22 PROBLEM — R06.03 ACUTE RESPIRATORY DISTRESS: Status: ACTIVE | Noted: 2020-04-22

## 2020-04-22 LAB
ALBUMIN SERPL-MCNC: 3.4 G/DL (ref 3.5–5)
ALBUMIN/GLOB SERPL: 1.1 {RATIO} (ref 1.1–2.2)
ALP SERPL-CCNC: 113 U/L (ref 45–117)
ALT SERPL-CCNC: 30 U/L (ref 12–78)
ANION GAP SERPL CALC-SCNC: 6 MMOL/L (ref 5–15)
AST SERPL-CCNC: 22 U/L (ref 15–37)
B PERT DNA SPEC QL NAA+PROBE: NOT DETECTED
BASOPHILS # BLD: 0 K/UL (ref 0–0.1)
BASOPHILS NFR BLD: 1 % (ref 0–1)
BILIRUB SERPL-MCNC: 0.8 MG/DL (ref 0.2–1)
BNP SERPL-MCNC: 153 PG/ML
BORDETELLA PARAPERTUSSIS PCR, BORPAR: NOT DETECTED
BUN SERPL-MCNC: 18 MG/DL (ref 6–20)
BUN/CREAT SERPL: 21 (ref 12–20)
C PNEUM DNA SPEC QL NAA+PROBE: NOT DETECTED
CALCIUM SERPL-MCNC: 8.7 MG/DL (ref 8.5–10.1)
CHLORIDE SERPL-SCNC: 109 MMOL/L (ref 97–108)
CO2 SERPL-SCNC: 27 MMOL/L (ref 21–32)
CREAT SERPL-MCNC: 0.85 MG/DL (ref 0.55–1.02)
CRP SERPL-MCNC: 0.35 MG/DL (ref 0–0.6)
D DIMER PPP FEU-MCNC: 0.57 MG/L FEU (ref 0–0.65)
DIFFERENTIAL METHOD BLD: ABNORMAL
EOSINOPHIL # BLD: 0.1 K/UL (ref 0–0.4)
EOSINOPHIL NFR BLD: 3 % (ref 0–7)
ERYTHROCYTE [DISTWIDTH] IN BLOOD BY AUTOMATED COUNT: 13.1 % (ref 11.5–14.5)
FERRITIN SERPL-MCNC: 39 NG/ML (ref 26–388)
FLUAV AG NPH QL IA: NEGATIVE
FLUAV H1 2009 PAND RNA SPEC QL NAA+PROBE: NOT DETECTED
FLUAV H1 RNA SPEC QL NAA+PROBE: NOT DETECTED
FLUAV H3 RNA SPEC QL NAA+PROBE: NOT DETECTED
FLUAV SUBTYP SPEC NAA+PROBE: NOT DETECTED
FLUBV AG NOSE QL IA: NEGATIVE
FLUBV RNA SPEC QL NAA+PROBE: NOT DETECTED
GLOBULIN SER CALC-MCNC: 3.2 G/DL (ref 2–4)
GLUCOSE SERPL-MCNC: 95 MG/DL (ref 65–100)
HADV DNA SPEC QL NAA+PROBE: NOT DETECTED
HCOV 229E RNA SPEC QL NAA+PROBE: NOT DETECTED
HCOV HKU1 RNA SPEC QL NAA+PROBE: NOT DETECTED
HCOV NL63 RNA SPEC QL NAA+PROBE: NOT DETECTED
HCOV OC43 RNA SPEC QL NAA+PROBE: NOT DETECTED
HCT VFR BLD AUTO: 38.1 % (ref 35–47)
HGB BLD-MCNC: 11.9 G/DL (ref 11.5–16)
HMPV RNA SPEC QL NAA+PROBE: NOT DETECTED
HPIV1 RNA SPEC QL NAA+PROBE: NOT DETECTED
HPIV2 RNA SPEC QL NAA+PROBE: NOT DETECTED
HPIV3 RNA SPEC QL NAA+PROBE: NOT DETECTED
HPIV4 RNA SPEC QL NAA+PROBE: NOT DETECTED
IMM GRANULOCYTES # BLD AUTO: 0 K/UL (ref 0–0.04)
IMM GRANULOCYTES NFR BLD AUTO: 0 % (ref 0–0.5)
LDH SERPL L TO P-CCNC: 164 U/L (ref 81–246)
LYMPHOCYTES # BLD: 1.7 K/UL (ref 0.8–3.5)
LYMPHOCYTES NFR BLD: 43 % (ref 12–49)
M PNEUMO DNA SPEC QL NAA+PROBE: NOT DETECTED
MCH RBC QN AUTO: 29.8 PG (ref 26–34)
MCHC RBC AUTO-ENTMCNC: 31.2 G/DL (ref 30–36.5)
MCV RBC AUTO: 95.3 FL (ref 80–99)
MONOCYTES # BLD: 0.4 K/UL (ref 0–1)
MONOCYTES NFR BLD: 10 % (ref 5–13)
NEUTS SEG # BLD: 1.7 K/UL (ref 1.8–8)
NEUTS SEG NFR BLD: 43 % (ref 32–75)
NRBC # BLD: 0 K/UL (ref 0–0.01)
NRBC BLD-RTO: 0 PER 100 WBC
PLATELET # BLD AUTO: 257 K/UL (ref 150–400)
PMV BLD AUTO: 9.7 FL (ref 8.9–12.9)
POTASSIUM SERPL-SCNC: 4.6 MMOL/L (ref 3.5–5.1)
PROCALCITONIN SERPL-MCNC: <0.05 NG/ML
PROT SERPL-MCNC: 6.6 G/DL (ref 6.4–8.2)
RBC # BLD AUTO: 4 M/UL (ref 3.8–5.2)
RSV RNA SPEC QL NAA+PROBE: NOT DETECTED
RV+EV RNA SPEC QL NAA+PROBE: NOT DETECTED
SODIUM SERPL-SCNC: 142 MMOL/L (ref 136–145)
TROPONIN I SERPL-MCNC: <0.05 NG/ML
WBC # BLD AUTO: 3.9 K/UL (ref 3.6–11)

## 2020-04-22 PROCEDURE — 36415 COLL VENOUS BLD VENIPUNCTURE: CPT

## 2020-04-22 PROCEDURE — 74011250636 HC RX REV CODE- 250/636: Performed by: INTERNAL MEDICINE

## 2020-04-22 PROCEDURE — 71045 X-RAY EXAM CHEST 1 VIEW: CPT

## 2020-04-22 PROCEDURE — 80053 COMPREHEN METABOLIC PANEL: CPT

## 2020-04-22 PROCEDURE — 82728 ASSAY OF FERRITIN: CPT

## 2020-04-22 PROCEDURE — 74011250637 HC RX REV CODE- 250/637: Performed by: FAMILY MEDICINE

## 2020-04-22 PROCEDURE — 93005 ELECTROCARDIOGRAM TRACING: CPT

## 2020-04-22 PROCEDURE — 77010033678 HC OXYGEN DAILY

## 2020-04-22 PROCEDURE — 74011250637 HC RX REV CODE- 250/637: Performed by: INTERNAL MEDICINE

## 2020-04-22 PROCEDURE — 65660000001 HC RM ICU INTERMED STEPDOWN

## 2020-04-22 PROCEDURE — 74011636320 HC RX REV CODE- 636/320: Performed by: EMERGENCY MEDICINE

## 2020-04-22 PROCEDURE — 87635 SARS-COV-2 COVID-19 AMP PRB: CPT

## 2020-04-22 PROCEDURE — 85379 FIBRIN DEGRADATION QUANT: CPT

## 2020-04-22 PROCEDURE — 71275 CT ANGIOGRAPHY CHEST: CPT

## 2020-04-22 PROCEDURE — 86140 C-REACTIVE PROTEIN: CPT

## 2020-04-22 PROCEDURE — 0100U RESPIRATORY PANEL,PCR,NASOPHARYNGEAL: CPT

## 2020-04-22 PROCEDURE — 84145 PROCALCITONIN (PCT): CPT

## 2020-04-22 PROCEDURE — 85025 COMPLETE CBC W/AUTO DIFF WBC: CPT

## 2020-04-22 PROCEDURE — 74011636637 HC RX REV CODE- 636/637: Performed by: INTERNAL MEDICINE

## 2020-04-22 PROCEDURE — 94640 AIRWAY INHALATION TREATMENT: CPT

## 2020-04-22 PROCEDURE — 99285 EMERGENCY DEPT VISIT HI MDM: CPT

## 2020-04-22 PROCEDURE — 83615 LACTATE (LD) (LDH) ENZYME: CPT

## 2020-04-22 PROCEDURE — 87804 INFLUENZA ASSAY W/OPTIC: CPT

## 2020-04-22 PROCEDURE — 74011000250 HC RX REV CODE- 250: Performed by: EMERGENCY MEDICINE

## 2020-04-22 PROCEDURE — 83880 ASSAY OF NATRIURETIC PEPTIDE: CPT

## 2020-04-22 PROCEDURE — 84484 ASSAY OF TROPONIN QUANT: CPT

## 2020-04-22 RX ORDER — ESCITALOPRAM OXALATE 10 MG/1
20 TABLET ORAL
Status: DISCONTINUED | OUTPATIENT
Start: 2020-04-22 | End: 2020-04-26 | Stop reason: HOSPADM

## 2020-04-22 RX ORDER — BENZONATATE 100 MG/1
100 CAPSULE ORAL
Status: DISCONTINUED | OUTPATIENT
Start: 2020-04-22 | End: 2020-04-26 | Stop reason: HOSPADM

## 2020-04-22 RX ORDER — SODIUM CHLORIDE 0.9 % (FLUSH) 0.9 %
10 SYRINGE (ML) INJECTION
Status: COMPLETED | OUTPATIENT
Start: 2020-04-22 | End: 2020-04-22

## 2020-04-22 RX ORDER — CLONAZEPAM 0.5 MG/1
0.5 TABLET ORAL
Status: DISCONTINUED | OUTPATIENT
Start: 2020-04-22 | End: 2020-04-26 | Stop reason: HOSPADM

## 2020-04-22 RX ORDER — VERAPAMIL HYDROCHLORIDE 120 MG/1
120 TABLET, FILM COATED, EXTENDED RELEASE ORAL
Status: DISCONTINUED | OUTPATIENT
Start: 2020-04-23 | End: 2020-04-26 | Stop reason: HOSPADM

## 2020-04-22 RX ORDER — SODIUM CHLORIDE 0.9 % (FLUSH) 0.9 %
5-40 SYRINGE (ML) INJECTION EVERY 8 HOURS
Status: DISCONTINUED | OUTPATIENT
Start: 2020-04-22 | End: 2020-04-26 | Stop reason: HOSPADM

## 2020-04-22 RX ORDER — LOSARTAN POTASSIUM 25 MG/1
25 TABLET ORAL DAILY
Status: DISCONTINUED | OUTPATIENT
Start: 2020-04-23 | End: 2020-04-26 | Stop reason: HOSPADM

## 2020-04-22 RX ORDER — ATORVASTATIN CALCIUM 20 MG/1
20 TABLET, FILM COATED ORAL
Status: DISCONTINUED | OUTPATIENT
Start: 2020-04-22 | End: 2020-04-26 | Stop reason: HOSPADM

## 2020-04-22 RX ORDER — CLOPIDOGREL BISULFATE 75 MG/1
75 TABLET ORAL
Status: DISCONTINUED | OUTPATIENT
Start: 2020-04-22 | End: 2020-04-26 | Stop reason: HOSPADM

## 2020-04-22 RX ORDER — NALOXONE HYDROCHLORIDE 0.4 MG/ML
0.4 INJECTION, SOLUTION INTRAMUSCULAR; INTRAVENOUS; SUBCUTANEOUS AS NEEDED
Status: DISCONTINUED | OUTPATIENT
Start: 2020-04-22 | End: 2020-04-26 | Stop reason: HOSPADM

## 2020-04-22 RX ORDER — PREDNISONE 20 MG/1
40 TABLET ORAL
Status: COMPLETED | OUTPATIENT
Start: 2020-04-22 | End: 2020-04-26

## 2020-04-22 RX ORDER — PANTOPRAZOLE SODIUM 40 MG/1
40 TABLET, DELAYED RELEASE ORAL
Status: DISCONTINUED | OUTPATIENT
Start: 2020-04-23 | End: 2020-04-26 | Stop reason: HOSPADM

## 2020-04-22 RX ORDER — ROPINIROLE 0.25 MG/1
1 TABLET, FILM COATED ORAL
Status: DISCONTINUED | OUTPATIENT
Start: 2020-04-22 | End: 2020-04-26 | Stop reason: HOSPADM

## 2020-04-22 RX ORDER — FACIAL-BODY WIPES
10 EACH TOPICAL DAILY PRN
Status: DISCONTINUED | OUTPATIENT
Start: 2020-04-22 | End: 2020-04-26 | Stop reason: HOSPADM

## 2020-04-22 RX ORDER — ONDANSETRON 2 MG/ML
4 INJECTION INTRAMUSCULAR; INTRAVENOUS
Status: DISCONTINUED | OUTPATIENT
Start: 2020-04-22 | End: 2020-04-26 | Stop reason: HOSPADM

## 2020-04-22 RX ORDER — IPRATROPIUM BROMIDE AND ALBUTEROL SULFATE 2.5; .5 MG/3ML; MG/3ML
3 SOLUTION RESPIRATORY (INHALATION)
Status: COMPLETED | OUTPATIENT
Start: 2020-04-22 | End: 2020-04-22

## 2020-04-22 RX ORDER — PRIMIDONE 50 MG/1
50 TABLET ORAL
Status: DISCONTINUED | OUTPATIENT
Start: 2020-04-22 | End: 2020-04-22

## 2020-04-22 RX ORDER — ACETAMINOPHEN 325 MG/1
650 TABLET ORAL
Status: DISCONTINUED | OUTPATIENT
Start: 2020-04-22 | End: 2020-04-26 | Stop reason: HOSPADM

## 2020-04-22 RX ORDER — ALBUTEROL SULFATE 90 UG/1
2 AEROSOL, METERED RESPIRATORY (INHALATION)
Status: DISCONTINUED | OUTPATIENT
Start: 2020-04-22 | End: 2020-04-26 | Stop reason: HOSPADM

## 2020-04-22 RX ORDER — SODIUM CHLORIDE 0.9 % (FLUSH) 0.9 %
5-40 SYRINGE (ML) INJECTION AS NEEDED
Status: DISCONTINUED | OUTPATIENT
Start: 2020-04-22 | End: 2020-04-26 | Stop reason: HOSPADM

## 2020-04-22 RX ORDER — HEPARIN SODIUM 5000 [USP'U]/ML
5000 INJECTION, SOLUTION INTRAVENOUS; SUBCUTANEOUS EVERY 8 HOURS
Status: DISCONTINUED | OUTPATIENT
Start: 2020-04-22 | End: 2020-04-26 | Stop reason: HOSPADM

## 2020-04-22 RX ADMIN — CLONAZEPAM 0.5 MG: 0.5 TABLET ORAL at 21:48

## 2020-04-22 RX ADMIN — Medication 10 ML: at 21:40

## 2020-04-22 RX ADMIN — IPRATROPIUM BROMIDE AND ALBUTEROL SULFATE 3 ML: .5; 3 SOLUTION RESPIRATORY (INHALATION) at 13:45

## 2020-04-22 RX ADMIN — PREDNISONE 40 MG: 20 TABLET ORAL at 18:54

## 2020-04-22 RX ADMIN — ESCITALOPRAM OXALATE 20 MG: 10 TABLET ORAL at 21:39

## 2020-04-22 RX ADMIN — Medication 10 ML: at 17:42

## 2020-04-22 RX ADMIN — ATORVASTATIN CALCIUM 20 MG: 20 TABLET, FILM COATED ORAL at 21:38

## 2020-04-22 RX ADMIN — CLOPIDOGREL BISULFATE 75 MG: 75 TABLET, FILM COATED ORAL at 21:39

## 2020-04-22 RX ADMIN — HEPARIN SODIUM 5000 UNITS: 5000 INJECTION INTRAVENOUS; SUBCUTANEOUS at 17:41

## 2020-04-22 RX ADMIN — ALBUTEROL SULFATE 2 PUFF: 90 AEROSOL, METERED RESPIRATORY (INHALATION) at 19:57

## 2020-04-22 RX ADMIN — ROPINIROLE HYDROCHLORIDE 1 MG: 1 TABLET, FILM COATED ORAL at 23:02

## 2020-04-22 RX ADMIN — IOPAMIDOL 59 ML: 755 INJECTION, SOLUTION INTRAVENOUS at 16:07

## 2020-04-22 RX ADMIN — Medication 10 ML: at 16:08

## 2020-04-22 NOTE — ED PROVIDER NOTES
EMERGENCY DEPARTMENT HISTORY AND PHYSICAL EXAM      Date: 4/22/2020  Patient Name: Joan Carter    History of Presenting Illness     Chief Complaint   Patient presents with    Cough     Pt states she has been having a dry cough x 2 days, with intermittent SOB. Pt denies CP or fevers.  Fatigue     Pt c/o generalized fatigue from coughing so much       History Provided By: Patient    HPI: Joan Carter, 67 y.o. female with PMHx significant for hypertension, GERD, CVA, anxiety, who presents with a chief complaint of cough with some intermittent shortness of breath. Patient states she is been having a dry cough at home for last 2 days which is prevented her from sleeping at night. Also reports some mild shortness of breath. Denies any chest pain or fever. States she has been taking NyQuil at home with no relief of her symptoms. Called her PCP today and was referred to the emergency department for further evaluation. Patient lives at home with her  and has no known contact with COVID-19 positive patients. No abdominal pain, vomiting, diarrhea, urinary symptoms. PCP: Lilia Jose MD    There are no other complaints, changes, or physical findings at this time. Current Outpatient Medications   Medication Sig Dispense Refill    clonazePAM (KlonoPIN) 0.5 mg tablet TAKE 1 TABLET BY MOUTH TWICE DAILY **MAX  2  PER  24  HOURS** 60 Tab 0    clopidogreL (PLAVIX) 75 mg tab Take 1 Tab by mouth nightly. 90 Tab 1    pantoprazole (Protonix) 40 mg tablet Take 1 Tab by mouth every morning. Indications: gastroesophageal reflux disease 90 Tab 1    verapamil ER (CALAN-SR) 120 mg tablet TAKE 1 TABLET BY MOUTH NIGHTLY FOR HIGH BLOOD PRESSURE 90 Tab 0    olmesartan (BENICAR) 5 mg tablet TAKE 1/2 (ONE-HALF) TABLET BY MOUTH ONCE DAILY 30 Tab 0    nortriptyline (PAMELOR) 10 mg capsule Take 1-2 Caps by mouth nightly. 90 Cap 3    primidone (MYSOLINE) 50 mg tablet Take 1 Tab by mouth nightly.  90 Tab 1    atorvastatin (LIPITOR) 20 mg tablet Take 20 mg by mouth nightly.  multivit/folic acid/vit K1 (ONE-A-DAY WOMEN'S 50 PLUS PO) Take 1 Tab by mouth daily.  fluticasone propionate (FLONASE) 50 mcg/actuation nasal spray 2 Sprays by Both Nostrils route daily. 1 Bottle 0    rOPINIRole (REQUIP) 1 mg tablet Take 1 Tab by mouth nightly. 90 Tab 2    escitalopram oxalate (LEXAPRO) 20 mg tablet Take 1 Tab by mouth nightly. Indications: Anxiousness associated with Depression 90 Tab 4    albuterol (PROVENTIL HFA, VENTOLIN HFA, PROAIR HFA) 90 mcg/actuation inhaler Take 1 Puff by inhalation every four (4) hours as needed for Wheezing. 1 Inhaler 0    polyethylene glycol (MIRALAX) 17 gram packet Take 17 g by mouth daily as needed (constipation). 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)     GERD (gastroesophageal reflux disease)     Hypercholesteremia     Hypertension     Idiopathic small and large fiber sensory neuropathy     Limited peripheral vision of right eye     complication of cataract surgery    Long term current use of anticoagulant therapy     Meniere disease     MGUS (monoclonal gammopathy of unknown significance)     Monoclonal gammopathy of unknown significance     hem/onc:  Dr Erika Coughlin [phone]    Osteoporosis     Psoriasis     bilateral Legs    Restless leg     Skin melanoma ([facility] [address] 2012    (Rt ankle) and basal cell (Lt eye) removed    Transient ischemic attack approx 2005    HAS HAD 2 MINISTROKES-NO DEFICITS; neuro Dr Joey Ojeda (cc)    Unspecified adverse effect of anesthesia     HAS NEEDED PORT OR CUTDOWN FOR RECVNG BLOOD OR LONG-TERM IVs; surgery 7/28/15 w/o any difficulty    Unspecified sleep apnea     does not use CPAP     Past Surgical History:  Past Surgical History:   Procedure Laterality Date    HX APPENDECTOMY      HX CATARACT REMOVAL      Right    HX CERVICAL FUSION  1/28/15    C5-7    HX CHOLECYSTECTOMY      HX GI  2010    liver bx due to liver Bx due to elevated enzymes, liver laceration during Bx, hospitalized 10-12d    HX HEENT  7/28/15    Lt total parotidectomy with facial nerve dissection: Dr Leopold Filippo HX HEENT  2015    Right parotidectomy    HX HYSTERECTOMY      HX ORTHOPAEDIC   &     LUMBAR FUSION and revision    HX OTHER SURGICAL  2010    MELANOMA (Rt ankle) and basal cell (Lt eye) removed; q6mo ck by DERMATOLOGY    HX SHOULDER REPLACEMENT Left 2016    HX THYROIDECTOMY      Right    CA COLON CA SCRN NOT HI RSK IND  2016         CA INSERTION SUBQ CARDIAC RHYTHM MONITOR W/PRGRMG N/A 2019    LOOP RECORDER INSERT performed by Jacqueline Hernandes MD at [facility] CARDIAC CATH LAB     Family History:  Family History   Problem Relation Age of Onset    Alcohol abuse Father     Emphysema Father     Cancer Sister         PANCREATIC    Alcohol abuse Brother     COPD Brother     Other Mother         UNKNOWN CAUSE OF DEATH    Heart Disease Mother     Breast Cancer Maternal Aunt     Anesth Problems Neg Hx      Social History:  Social History     Tobacco Use    Smoking status: Former Smoker     Years: 15.00     Last attempt to quit: 7/15/1975     Years since quittin.8    Smokeless tobacco: Never Used   Substance Use Topics    Alcohol use: Yes     Comment: may have a glass of wine on rare occasion    Drug use: Yes     Types: Prescription, OTC     Allergies:   Allergies   Allergen Reactions    Aspirin Nausea and Vomiting    Bee Sting [Sting, Bee] Hives and Shortness of Breath     WASP STING    Codeine Nausea and Vomiting    Hydrocodone Rash     And sedation    Lisinopril Cough    Morphine Rash    Nsaids (Non-Steroidal Anti-Inflammatory Drug) Nausea and Vomiting    Oxycodone Rash     On her back    Penicillins Hives, Shortness of Breath and Swelling     Review of Systems   Review of Systems   Constitutional: Positive for fatigue. Negative for chills and fever. HENT: Negative for congestion, rhinorrhea and sore throat. Respiratory: Positive for cough and shortness of breath. Cardiovascular: Negative for chest pain. Gastrointestinal: Negative for abdominal pain, nausea and vomiting. Genitourinary: Negative for dysuria and urgency. Skin: Negative for rash. Neurological: Negative for dizziness, light-headedness and headaches. All other systems reviewed and are negative. Physical Exam   Physical Exam  Vitals signs and nursing note reviewed. Constitutional:       General: She is not in acute distress. Appearance: She is well-developed. HENT:      Head: Normocephalic and atraumatic. Eyes:      Conjunctiva/sclera: Conjunctivae normal.      Pupils: Pupils are equal, round, and reactive to light. Neck:      Musculoskeletal: Normal range of motion. Cardiovascular:      Rate and Rhythm: Normal rate and regular rhythm. Pulmonary:      Effort: Pulmonary effort is normal. No respiratory distress. Breath sounds: No stridor. Wheezing present. Comments: Occasional, scattered wheeze  Abdominal:      General: There is no distension. Palpations: Abdomen is soft. Tenderness: There is no abdominal tenderness. Musculoskeletal: Normal range of motion. Skin:     General: Skin is warm and dry. Neurological:      Mental Status: She is alert and oriented to person, place, and time.        Diagnostic Study Results   Labs -     Recent Results (from the past 12 hour(s))   EKG, 12 LEAD, INITIAL    Collection Time: 04/22/20 10:32 AM   Result Value Ref Range    Ventricular Rate 64 BPM    Atrial Rate 64 BPM    P-R Interval 184 ms    QRS Duration 70 ms    Q-T Interval 420 ms    QTC Calculation (Bezet) 433 ms    Calculated P Axis 75 degrees    Calculated R Axis 48 degrees    Calculated T Axis 71 degrees    Diagnosis       Sinus rhythm with premature atrial complexes  When compared with ECG of 05-NOV-2019 15:01,  premature atrial complexes are now present  DC interval has decreased     CBC WITH AUTOMATED DIFF    Collection Time: 04/22/20 11:22 AM   Result Value Ref Range    WBC 3.9 3.6 - 11.0 K/uL    RBC 4.00 3.80 - 5.20 M/uL    HGB 11.9 11.5 - 16.0 g/dL    HCT 38.1 35.0 - 47.0 %    MCV 95.3 80.0 - 99.0 FL    MCH 29.8 26.0 - 34.0 PG    MCHC 31.2 30.0 - 36.5 g/dL    RDW 13.1 11.5 - 14.5 %    PLATELET 402 636 - 273 K/uL    MPV 9.7 8.9 - 12.9 FL    NRBC 0.0 0  WBC    ABSOLUTE NRBC 0.00 0.00 - 0.01 K/uL    NEUTROPHILS 43 32 - 75 %    LYMPHOCYTES 43 12 - 49 %    MONOCYTES 10 5 - 13 %    EOSINOPHILS 3 0 - 7 %    BASOPHILS 1 0 - 1 %    IMMATURE GRANULOCYTES 0 0.0 - 0.5 %    ABS. NEUTROPHILS 1.7 (L) 1.8 - 8.0 K/UL    ABS. LYMPHOCYTES 1.7 0.8 - 3.5 K/UL    ABS. MONOCYTES 0.4 0.0 - 1.0 K/UL    ABS. EOSINOPHILS 0.1 0.0 - 0.4 K/UL    ABS. BASOPHILS 0.0 0.0 - 0.1 K/UL    ABS. IMM. GRANS. 0.0 0.00 - 0.04 K/UL    DF AUTOMATED     METABOLIC PANEL, COMPREHENSIVE    Collection Time: 04/22/20 11:22 AM   Result Value Ref Range    Sodium 142 136 - 145 mmol/L    Potassium 4.6 3.5 - 5.1 mmol/L    Chloride 109 (H) 97 - 108 mmol/L    CO2 27 21 - 32 mmol/L    Anion gap 6 5 - 15 mmol/L    Glucose 95 65 - 100 mg/dL    BUN 18 6 - 20 MG/DL    Creatinine 0.85 0.55 - 1.02 MG/DL    BUN/Creatinine ratio 21 (H) 12 - 20      GFR est AA >60 >60 ml/min/1.73m2    GFR est non-AA >60 >60 ml/min/1.73m2    Calcium 8.7 8.5 - 10.1 MG/DL    Bilirubin, total 0.8 0.2 - 1.0 MG/DL    ALT (SGPT) 30 12 - 78 U/L    AST (SGOT) 22 15 - 37 U/L    Alk.  phosphatase 113 45 - 117 U/L    Protein, total 6.6 6.4 - 8.2 g/dL    Albumin 3.4 (L) 3.5 - 5.0 g/dL    Globulin 3.2 2.0 - 4.0 g/dL    A-G Ratio 1.1 1.1 - 2.2     TROPONIN I    Collection Time: 04/22/20 11:22 AM Result Value Ref Range    Troponin-I, Qt. <0.05 <0.05 ng/mL   NT-PRO BNP    Collection Time: 04/22/20 11:22 AM   Result Value Ref Range    NT pro- <450 PG/ML       Radiologic Studies -   XR CHEST PORT   Final Result   IMPRESSION:   1. No radiographic evidence of acute cardiopulmonary disease. Xr Chest Port    Result Date: 4/22/2020  IMPRESSION: 1. No radiographic evidence of acute cardiopulmonary disease. Medical Decision Making   I am the first provider for this patient. I reviewed the vital signs, available nursing notes, past medical history, past surgical history, family history and social history. Vital Signs-Reviewed the patient's vital signs. Patient Vitals for the past 12 hrs:   Temp Pulse Resp BP SpO2   04/22/20 1345 -- -- -- -- 100 %   04/22/20 1330 -- 70 12 140/79 96 %   04/22/20 1300 -- 72 17 145/74 98 %   04/22/20 1053 -- -- -- -- 95 %   04/22/20 1020 97.8 °F (36.6 °C) 66 18 139/65 98 %       Pulse Oximetry Analysis - 99% on ra    Cardiac Monitor:   Rate: 72 bpm  Rhythm: Normal Sinus Rhythm      ED EKG interpretation:  Rhythm: normal sinus rhythm and PAC's; and regular . Rate (approx.): 64; Axis: normal; P wave: normal; QRS interval: normal ; ST/T wave: normal; Other findings: normal. This EKG was interpreted by ADILSON Posada MD,ED Provider. Records Reviewed: Nursing Notes and Old Medical Records    Provider Notes (Medical Decision Making):   Patient presents with cough intermittent shortness of breath for last 2 nights. On exam she is overall well-appearing with stable vital signs. Not hypoxic on room air. Has an occasional wheeze on exam.  Differential includes URI, bronchitis, pneumonia, CHF, cannot exclude COVID-19 infection. Will check basic labs, troponin, BNP, chest x-ray, EKG, reevaluate. ED Course:   Initial assessment performed.  The patients presenting problems have been discussed, and they are in agreement with the care plan formulated and outlined with them. I have encouraged them to ask questions as they arise throughout their visit. Lab work unremarkable. Chest x-ray with no focal infiltrate. Patient was ambulated but did become more dyspneic and when nursing sat her back down on the bed her O2 sats were 88%. Will give a nebulizer treatment and reevaluate. ED Course as of Apr 22 1529 Wed Apr 22, 2020   1519 Patient still significantly weak and dyspneic with ambulation. Hospitalist paged for admission    [NU]   0783 Discussed with Dr. Rigoberto Felton, hospitalist, will see for admission. COVID markers and CTA chest ordered    [JOANNA]      ED Course User Index  Luz Moreno MD       Critical Care:  none    Disposition:    Admission Note:  Patient is being admitted to the hospital by Dr. Rigoberto Felton, Service: Hospitalist.  The results of their tests and reasons for their admission have been discussed with them and available family. They convey agreement and understanding for the need to be admitted and for their admission diagnosis. Diagnosis     Clinical Impression:   1. SOB (shortness of breath)        This note will not be viewable in [employer] software[employer]. Please note that this dictation was completed with [employer], the [employer] voice recognition software. Quite often unanticipated grammatical, syntax, homophones, and other interpretive errors are inadvertently transcribed by the computer software. Please disregard these errors.   Please excuse any errors that have escaped final proofreading

## 2020-04-22 NOTE — PROGRESS NOTES
Verbal shift change report GIVEN TO KISHAN Silva. Report included the following information SBAR, Kardex, Intake/Output, MAR and Recent Results. SIGNIFICANT CHANGES DURING SHIFT:  Consult cardiology in am, labs in am      CONCERNS TO ADDRESS WITH MD:            [facility] NURSING NOTE   Admission Date 4/22/2020   Admission Diagnosis Acute respiratory distress [R06.03]   Consults IP CONSULT TO HOSPITALIST  IP CONSULT TO CARDIOLOGY      Cardiac Monitoring [x] Yes [] No      Purposeful Hourly Rounding [x] Yes    Dany Score Total Score: 2   Dany score 3 or > [x] Bed Alarm [] Avasys [] 1:1 sitter [] Patient refused (Signed refusal form in chart)   Mega Score Mega Score: 19   Mega score 14 or < [] PMT consult [] Wound Care consult    []  Specialty bed  [] Nutrition consult      Influenza Vaccine Received Flu Vaccine for Current Season (usually Sept-March): Not Flu Season           Oxygen needs? [] Room air Oxygen @  []1L    [x]2L    []3L   []4L    []5L   []6L via  NC   Chronic home O2 use? [] Yes [x] No  Perform O2 challenge test and document in progress note using [employer] (.Homeoxygen)      Last bowel movement Last Bowel Movement Date: 04/21/20      Urinary Catheter             LDAs               Peripheral IV 04/22/20 Right Antecubital (Active)   Site Assessment Clean, dry, & intact 4/22/2020  5:03 PM   Phlebitis Assessment 0 4/22/2020  5:03 PM   Infiltration Assessment 0 4/22/2020  5:03 PM   Dressing Status Clean, dry, & intact 4/22/2020  5:03 PM   Dressing Type Tape;Transparent 4/22/2020  5:03 PM   Hub Color/Line Status Pink;Flushed 4/22/2020  5:03 PM                         Readmission Risk Assessment Tool Score Medium Risk            20       Total Score        3 Has Seen PCP in Last 6 Months (Yes=3, No=0)    4 IP Visits Last 12 Months (1-3=4, 4=9, >4=11)    5 Pt.  Coverage (Medicare=5 , Medicaid, or Self-Pay=4)    8 Charlson Comorbidity Score (Age + Comorbid Conditions)        Criteria that do not apply:    . Living with Significant Other. Assisted Living. LTAC. SNF.  or   Rehab    Patient Length of Stay (>5 days = 3)       Expected Length of Stay - - -   Actual Length of Stay 0

## 2020-04-22 NOTE — PROGRESS NOTES
CHRISTIANNE:  1. Home with family, pending PT eval  2. OP f/u appts  3. Daughter or son to transport home at d/c      Reason for Admission: Acute respiratory distress                     RUR Score: 9                     Plan for utilizing home health: [facility] services not indicated at this time. PT to evaluate. PCP: First and Last name: Dr. Sunni Brice    Name of Practice: [facility]   Are you a current patient: Yes/No: Yes   Approximate date of last visit: 2/28/20                    Current Advanced Directive/Advance Care Plan: Full code. AMD on file. Melyssa Boyer (daughter) is mPOA1, and Luz Reno (son) is mPOA 2. Bela Bob (son) is also listed on AMD. CM verified with pt that current AMD is correct. Transition of Care Plan: Home with f/u appts    Initial eval conducted with pt over the phone due to current isolation precautions. COVID-19 results pending. Pt is a 66 yo female admitted to [facility] on 4/22/20 with acute respiratory distress. Pt lives with her  ( has dementia) in a single story home with no STEVO in a senior living community. Pt has three children who are all very supportive and live in [place]. Pt reported to be independent in ADL/IADLs to include driving at baseline. Pt does not require the use of an assistive devices although she has access to a RW, cane, and shower chair from her  if ever needed. Hx of HH with [address] following a TIA in 2019. No hx of SNF or IP Rehab. Pt is insured with [employer] and uses the pharmacy at [address] [facility] [address] [facility]. No reported financial/medication cost concerns. Pt is currently requiring supplemental O2 which is not chronic. RN staff will attempt to wean prior to d/c. Pt does not have a current qualifying diagnosis for home O2, however if pt tests positive for COVID-19 that will qualify.  Floor CM will continue to follow and assist with transitional care planning as necessary. Care Management Interventions  PCP Verified by CM: Yes(Dr. Elvira Shrestha)  Last Visit to PCP: 02/28/20  Mode of Transport at Discharge:  Other (see comment)(daughter or son)  Transition of Care Consult (CM Consult): Discharge Planning(initial eval - anticipate home with OP f/u appts)  MyChart Signup: No  Discharge Durable Medical Equipment: No(reported DME: RW, cane, shower chair)  Physical Therapy Consult: Yes  Occupational Therapy Consult: No  Speech Therapy Consult: No  Current Support Network: Lives with Spouse, Family Lives Nearby, Own Home(pt and  live together in single story home with no STEVO)  Confirm Follow Up Transport: Self    Blue Weldon MSW  Care Manager  [phone]

## 2020-04-22 NOTE — TELEPHONE ENCOUNTER
Patient having a hard time breathing while on the phone. Advised to go to the ED or call 911 if ride not available. Patient opted not to call 911 but to call her daughter to take her to the ED    Reason for Disposition   [1] Difficulty breathing occurs AND [2] within 14 days of COVID-19 EXPOSURE (Close Contact)    Answer Assessment - Initial Assessment Questions  1. CLOSE CONTACT: \"Who is the person with the confirmed or suspected COVID-19 infection that you were exposed to? \"     UNSURE  2. PLACE of CONTACT: \"Where were you when you were exposed to COVID-19? \" (e.g., home, school, medical waiting room; which city?)     UNSURE  3. TYPE of CONTACT: \"How much contact was there? \" (e.g., sitting next to, live in same house, work in same office, same building)     UNSURE  4. DURATION of CONTACT: \"How long were you in contact with the COVID-19 patient? \" (e.g., a few seconds, passed by person, a few minutes, live with the patient)     UNSURE  5. DATE of CONTACT: \"When did you have contact with a COVID-19 patient? \" (e.g., how many days ago)     UNSURE  6. TRAVEL: \"Have you traveled out of the country recently? \" If so, \"When and where? \"      * Also ask about out-of-state travel, since the CDC has identified some high risk cities for community spread in the [address] [place] [address]. * Note: Travel becomes less relevant if there is widespread community transmission where the patient lives. DENIES  7. COMMUNITY SPREAD: \"Are there lots of cases or COVID-19 (community spread) where you live? \" (See public health department website, if unsure)    * MAJOR community spread: high number of cases; numbers of cases are increasing; many people hospitalized. * MINOR community spread: low number of cases; not increasing; few or no people hospitalized    UNSURE  8. SYMPTOMS: \"Do you have any symptoms? \" (e.g., fever, cough, breathing difficulty)   COUGH, SOB, FATIGUE  9. PREGNANCY OR POSTPARTUM: \"Is there any chance you are pregnant? \" \"When was your last menstrual period? \" \"Did you deliver in the last 2 weeks? \"     N/A  10. HIGH RISK: \"Do you have any heart or lung problems? Do you have a weak immune system? \" (e.g., CHF, COPD, asthma, HIV positive, chemotherapy, renal failure, diabetes mellitus, sickle cell anemia)      DENIES    Protocols used: CORONAVIRUS (COVID-19) EXPOSURE-ADULT-AH

## 2020-04-22 NOTE — ED NOTES
Pt ambulated with PCT. Pt became winded with minimal effort and when returning to room SPO2 was 88% on room air.  Notified MD

## 2020-04-22 NOTE — ACP (ADVANCE CARE PLANNING)
Advance Care Planning Clinical Specialist  Conversation Note      Date of ACP Conversation: 4/22/2020    Conversation Conducted with:   Patient with Decision Making Capacity    ACP Clinical Specialist: Chacha Vigil    *When Decision Maker makes decisions on behalf of the incapacitated patient: Ruddy Damon is asked to consider and make decisions based on patient values, known preferences, or best interests. Current Designated Health Care Decision Maker:    Primary Decision Maker: Laura Ying - Daughter - [phone]    Secondary Decision Maker: Beverly Martines - Son - [phone]    Supplemental (Other) Decision Maker: Thalia Stephens - [phone]      Ventilation  If you were in your present state of health and suddenly became very ill and were unable to breathe on your own, what would your preference be about the use of a ventilator (breathing machine) if it were available to you? If patient would desire the use of a ventilator (breathing machine), answer \"yes\", if not \"no\": YES    If your health were to worsen and it became clear that your chance of recovery was unlikely, would that change your answer? NO    Resuscitation  CPR works best to restart the heart when there is a sudden event, like a heart attack, in someone who is otherwise healthy. Unfortunately, CPR does not typically restart the heart for people who have serious health conditions or who are very sick. In the event your heart stopped, would you want attempts to restart your heart (answer \"yes\") or would you prefer a natural death (answer \"no\")? YES    If your health were to worsen and it became clear that your chance of recovery was unlikely, would that change your answer? YES    [] Yes  [x] No   Educated Patient / Ruddy Damon regarding differences between Advance Directives and portable DNR orders.     Length of ACP Conversation in minutes:  10 minutes    Conversation Outcomes:  [] ACP discussion completed  [x] Existing advance directive reviewed with patient; no changes to patient's previously recorded wishes   [] New Advance Directive completed   [] Portable Do Not Rescitate prepared for Provider review and signature  [] POLST/POST/MOLST/MOST prepared for Provider review and signature      Follow-up plan:    [] Schedule follow-up conversation to continue planning  [] Referred individual to Provider for additional questions/concerns   [] Advised patient/agent/surrogate to review completed ACP document and update if needed with changes in condition, patient preferences or care setting     [x] This note routed to one or more involved healthcare providers      FREDDY Hernandez  Care Manager  [phone]

## 2020-04-22 NOTE — H&P
Hospitalist Admission Note    NAME:  Lorenza Lozano   :   1940   MRN:   [id number]     Date of admit: 2020    PCP: Owen Ortega MD    Assessment/Plan:     Acute respiratory failure with hypoxia POA severe dyspnea, room air sats 80% while walking  Acute bronchitis with bronchospasm POA  Baseline does not wear home oxygen  Notes increasing shortness of breath with exertion and substernal chest pain for the past several months  Past several days increasing cough and shortness of breath over baseline  ED reports wheezing on arrival, requires oxygen  Chest x-ray clear  CTA chest with no PE or airspace disease or COPD changes  Suspect bronchitis with bronchospasm  Clinically no evidence of CHF  Etiology of the chronic dyspnea on exertion unclear questionable ischemic  COVID rule out and respiratory PCR  Check inflammatory markers  Short course steroids and albuterol metered-dose inhaler with the wheezing  Tessalon for cough  Check pro calcitonin  pBNP relatively normal    Exertional chest pain POA  Exertional chest pain and shortness of breath on exertion for months  Improved with rest  Known atherosclerosis with right carotid stenosis  Serial cardiac enzymes  Continue Plavix statin and blood pressure medications  Ask cardiology to assess in a.m,  may need stress test at some point  Recent echo and ALLIE with LVEF 56 to 60% with no wall motion abnormalities  2019    History of stroke 2019  Left frontal and parietal, questionable cardioembolic  Plavix, statin and blood pressure medications  No new recent symptoms    Hyperlipidemia POA  Continue Lipitor    Essential hypertension POA   continue verapamil and olmesartan    Restless leg syndrome  Continue primidone and Requip    Gastroesophageal reflux disease  Continue PPI    History of MGUS    History of Ménière's disease      Overweight   POA Body mass index is 28.39 kg/m².     Given the patient's current clinical presentation, I have a high level of concern for decompensation if discharged from the emergency department. My assessment of this patient's clinical condition and my plan of care is as noted above.     DVT prophylaxis with heparin SQ    Code status: full code  NOK: children    History     CHIEF COMPLAINT: \"I have been coughing and short of breath when I walk for the past 2 to 3 days\"    HISTORY OF PRESENT ILLNESS:    71-year-old white female    Currently lives with her  who has dementia, she is the primary caregiver  Baseline does not wear oxygen  Note for the past 6 months or so increasing dyspnea on exertion and exertional fatigue  Often walking to the mailbox she has to stop to catch her breath  She also notes occasional substernal chest discomfort that improves with rest  Denies a prior history of coronary artery disease    Last admission 152 1/11/2019 with speech changes  Acute stroke in left frontal and left parietal area, questionable cardioembolic  31% right internal carotid artery stenosis felt to be asymptomatic  ALLIE without embolic source  TTE LVEF 56 to 60%  Treated with aspirin and statin, continue blood pressure medications  A loop recorder was placed by Dr Devin Ramsey    2 to 3 days ago, increasing cough which is nonproductive, especially at night  She was frequent frequently coughing when I was in the room with her  No fevers or chills, headache, sore throat, abdominal pain, nausea vomiting, diarrhea  No current chest pain  Increasingly short of breath when she walks for the past 2 days  No orthopnea or lower extremity edema  Called her PCP when the symptoms did not respond to NyQuil  Referred to the emergency room    Emergency room  Room air sats stable at rest  Actively wheezing per emergency room doctor  [facility] in the hallway, very tachypneic and sats dropped to 88%  Oxygen nasal cannula started  No sirs criteria  No EKG changes and cardiac enzymes negative  proBNP 153  Chest x-ray with no airspace disease or edema  CTA chest with no PE, airspace disease, COPD changes  We were called to admit the patient      Past Medical History:   Diagnosis Date    Adverse effect of anesthesia     sleep apnea no cpap machine useage       Anemia     has had iron infusions;  Hem/onc Dr Kim Ryan [phone]    Anxiety and depression     Bilateral carotid artery stenosis     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)     GERD (gastroesophageal reflux disease)     Hypercholesteremia     Hypertension     Idiopathic small and large fiber sensory neuropathy     Limited peripheral vision of right eye     complication of cataract surgery    Long term current use of anticoagulant therapy     Meniere disease     MGUS (monoclonal gammopathy of unknown significance)     Monoclonal gammopathy of unknown significance     hem/onc:  Dr Kim Ryan [phone]    Osteoporosis     Psoriasis     bilateral Legs    Restless leg     Skin melanoma ([facility] [address] 2012    (Rt ankle) and basal cell (Lt eye) removed    Transient ischemic attack approx 2005    HAS HAD 2 MINISTROKES-NO DEFICITS; neuro Dr Benjamin Interiano (cc)    Unspecified adverse effect of anesthesia     HAS NEEDED PORT OR CUTDOWN FOR RECVNG BLOOD OR LONG-TERM IVs; surgery 7/28/15 w/o any difficulty    Unspecified sleep apnea     does not use CPAP        Past Surgical History:   Procedure Laterality Date    HX APPENDECTOMY      HX CATARACT REMOVAL  2011    Right    HX CERVICAL FUSION  1/28/15    C5-7    HX CHOLECYSTECTOMY  2000    HX GI  2010    liver bx due to liver Bx due to elevated enzymes, liver laceration during Bx, hospitalized 10-12d    HX HEENT  7/28/15    Lt total parotidectomy with facial nerve dissection: Dr Chao Ket HX HEENT  11/2015    Right parotidectomy    HX HYSTERECTOMY      HX ORTHOPAEDIC  2009 & 2011    LUMBAR FUSION and revision    HX OTHER SURGICAL  2010    MELANOMA (Rt ankle) and basal cell (Lt eye) removed; q6mo ck by DERMATOLOGY    HX SHOULDER REPLACEMENT Left 2016    HX THYROIDECTOMY      Right    MN COLON CA SCRN NOT HI RSK IND  2016         MN INSERTION SUBQ CARDIAC RHYTHM MONITOR W/PRGRMG N/A 2019    LOOP RECORDER INSERT performed by Sonia Torres MD at [facility] CARDIAC CATH LAB       Social History     Tobacco Use    Smoking status: Former Smoker     Years: 15.00     Last attempt to quit: 7/15/1975     Years since quittin.8    Smokeless tobacco: Never Used   Substance Use Topics    Alcohol use: Yes     Comment: may have a glass of wine on rare occasion        Family History   Problem Relation Age of Onset    Alcohol abuse Father     Emphysema Father     Cancer Sister         PANCREATIC    Alcohol abuse Brother     COPD Brother     Other Mother         UNKNOWN CAUSE OF DEATH    Heart Disease Mother     Breast Cancer Maternal Aunt     Anesth Problems Neg Hx     3 children    Allergies   Allergen Reactions    Aspirin Nausea and Vomiting    Bee Sting [Sting, Bee] Hives and Shortness of Breath     WASP STING    Codeine Nausea and Vomiting    Hydrocodone Rash     And sedation    Lisinopril Cough    Morphine Rash    Nsaids (Non-Steroidal Anti-Inflammatory Drug) Nausea and Vomiting    Oxycodone Rash     On her back    Penicillins Hives, Shortness of Breath and Swelling        Prior to Admission medications    Medication Sig Start Date End Date Taking? Authorizing Provider   clonazePAM (KlonoPIN) 0.5 mg tablet TAKE 1 TABLET BY MOUTH TWICE DAILY **MAX  2  PER  24  HOURS** 20  Yes Vandana Ireland MD   clopidogreL (PLAVIX) 75 mg tab Take 1 Tab by mouth nightly. 20  Yes Appdenise Worley MD   pantoprazole (Protonix) 40 mg tablet Take 1 Tab by mouth every morning.  Indications: gastroesophageal reflux disease 20  Yes Vandana Ireland MD   verapamil ER (CALAN-SR) 120 mg tablet TAKE 1 TABLET BY MOUTH NIGHTLY FOR HIGH BLOOD PRESSURE 3/24/20  Yes AppVandana Abel MD   olmesartan (BENICAR) 5 mg tablet TAKE 1/2 (ONE-HALF) TABLET BY MOUTH ONCE DAILY 2/10/20  Yes Liseth Santo MD   nortriptyline (PAMELOR) 10 mg capsule Take 1-2 Caps by mouth nightly. 1/20/20  Yes Liseth Santo MD   primidone (MYSOLINE) 50 mg tablet Take 1 Tab by mouth nightly. 1/6/20  Yes Liseth Santo MD   atorvastatin (LIPITOR) 20 mg tablet Take 20 mg by mouth nightly. Yes Provider, Historical   multivit/folic acid/vit K1 (ONE-A-DAY WOMEN'S 50 PLUS PO) Take 1 Tab by mouth daily. Yes Provider, Historical   fluticasone propionate (FLONASE) 50 mcg/actuation nasal spray 2 Sprays by Both Nostrils route daily. 10/31/19  Yes Stephania Weaver NP   rOPINIRole (REQUIP) 1 mg tablet Take 1 Tab by mouth nightly. 8/19/19  Yes Appa Jazmin Santo MD   escitalopram oxalate (LEXAPRO) 20 mg tablet Take 1 Tab by mouth nightly. Indications: Anxiousness associated with Depression 6/4/19  Yes Vandana Ireland MD   albuterol (PROVENTIL HFA, VENTOLIN HFA, PROAIR HFA) 90 mcg/actuation inhaler Take 1 Puff by inhalation every four (4) hours as needed for Wheezing. 7/13/18  Yes Manuel Soto MD   polyethylene glycol (MIRALAX) 17 gram packet Take 17 g by mouth daily as needed (constipation).    Yes Provider, Historical       Review of symptoms:     POSITIVE= Bold  Negative = not bold  General:  fever, chills, sweats  Eyes:    blurred vision, eye pain, double vision  ENT:    Coryza, sore throat, trouble swallowing  Respiratory:   cough, sputum, SOB  Cardiology:   chest pain, orthopnea, PND, edema  Gastrointestinal:  abdominal pain , N/V, diarrhea, constipation, melena or BRBPR  Genitourinary:  Urgency, dysuria, hematuria  Muskuloskeletal :  Joint redness, swelling or acute joint pain, myalgias  Hematology:  easy bruising, nose or gum bleeding  Dermatological: rash, ulceration  Endocrine:   Polyuria or polydipsia, heat or hold intolerance  Neurological:  Headache, focal motor or sensory changes     Speech difficulties, memory loss  Psychological: depression, agitation      Objective:   VITALS:    Patient Vitals for the past 24 hrs:   Temp Pulse Resp BP SpO2   20 1638 98 °F (36.7 °C) 76 15 146/73 100 %   20 1430 -- 91 18 141/85 99 %   20 1345 -- -- -- -- 100 %   20 1330 -- 70 12 140/79 96 %   20 1300 -- 72 17 145/74 98 %   20 1053 -- -- -- -- 95 %   20 1020 97.8 °F (36.6 °C) 66 18 139/65 98 %     Temp (24hrs), Av.9 °F (36.6 °C), Min:97.8 °F (36.6 °C), Max:98 °F (36.7 °C)    O2 Flow Rate (L/min): 2 l/min O2 Device: Nasal cannula    Wt Readings from Last 12 Encounters:   20 70.4 kg (155 lb 3.3 oz)   20 71.3 kg (157 lb 3.2 oz)   20 71.7 kg (158 lb)   19 71.7 kg (158 lb)   19 72.1 kg (159 lb)   19 70.3 kg (155 lb)   19 68 kg (149 lb 14.6 oz)   10/31/19 73.9 kg (163 lb)   19 74.3 kg (163 lb 12.8 oz)   19 72.1 kg (159 lb)   19 72.3 kg (159 lb 6.4 oz)   19 71.1 kg (156 lb 12 oz)         PHYSICAL EXAM:   General:    Alert, cooperative in no distress     HEENT: Normocephalic, atraumatic    PERRL, EOMI  Sclera no icterus    Nasal mucosa without masses or discharge  Hearing intact to voice    Oropharynx without erythema or exudate  No thrush  Pink MM  Neck:  No meningismus, trachea midline, no carotid bruits     Thyroid not enlarged, no nodules or tenderness  Lungs:   Clear to auscultation bilaterally. No wheezing or rales when I saw    No accessory muscle use or retractions. Heart:   Regular rate and rhythm,  no murmur or gallop. No LE edema  Abdomen:   Soft, mild epigastic tenderness. Not distended. Bowel sounds normal.     No masses, No Hepatosplenomegaly, No Rebound or guarding  Lymph nodes: No cervical or inguinal RL  Musculoskeletal:  No Joint swelling, erythema, warmth.  No Cyanosis or clubbing  Skin:      No rashes     Not Jaundiced   No nodules or thickening    Capillary refill normal  Neurologic: Alert and oriented X 3, follows commands     Cranial nerves 2 to 12 intact    Symmetric motor strength bilaterally, no pronator drift       LAB DATA REVIEWED:    Recent Results (from the past 12 hour(s))   EKG, 12 LEAD, INITIAL    Collection Time: 04/22/20 10:32 AM   Result Value Ref Range    Ventricular Rate 64 BPM    Atrial Rate 64 BPM    P-R Interval 184 ms    QRS Duration 70 ms    Q-T Interval 420 ms    QTC Calculation (Bezet) 433 ms    Calculated P Axis 75 degrees    Calculated R Axis 48 degrees    Calculated T Axis 71 degrees    Diagnosis       Sinus rhythm with premature atrial complexes  When compared with ECG of 05-NOV-2019 15:01,  premature atrial complexes are now present  AZ interval has decreased     CBC WITH AUTOMATED DIFF    Collection Time: 04/22/20 11:22 AM   Result Value Ref Range    WBC 3.9 3.6 - 11.0 K/uL    RBC 4.00 3.80 - 5.20 M/uL    HGB 11.9 11.5 - 16.0 g/dL    HCT 38.1 35.0 - 47.0 %    MCV 95.3 80.0 - 99.0 FL    MCH 29.8 26.0 - 34.0 PG    MCHC 31.2 30.0 - 36.5 g/dL    RDW 13.1 11.5 - 14.5 %    PLATELET 414 711 - 904 K/uL    MPV 9.7 8.9 - 12.9 FL    NRBC 0.0 0  WBC    ABSOLUTE NRBC 0.00 0.00 - 0.01 K/uL    NEUTROPHILS 43 32 - 75 %    LYMPHOCYTES 43 12 - 49 %    MONOCYTES 10 5 - 13 %    EOSINOPHILS 3 0 - 7 %    BASOPHILS 1 0 - 1 %    IMMATURE GRANULOCYTES 0 0.0 - 0.5 %    ABS. NEUTROPHILS 1.7 (L) 1.8 - 8.0 K/UL    ABS. LYMPHOCYTES 1.7 0.8 - 3.5 K/UL    ABS. MONOCYTES 0.4 0.0 - 1.0 K/UL    ABS. EOSINOPHILS 0.1 0.0 - 0.4 K/UL    ABS. BASOPHILS 0.0 0.0 - 0.1 K/UL    ABS. IMM.  GRANS. 0.0 0.00 - 0.04 K/UL    DF AUTOMATED     METABOLIC PANEL, COMPREHENSIVE    Collection Time: 04/22/20 11:22 AM   Result Value Ref Range    Sodium 142 136 - 145 mmol/L    Potassium 4.6 3.5 - 5.1 mmol/L    Chloride 109 (H) 97 - 108 mmol/L    CO2 27 21 - 32 mmol/L    Anion gap 6 5 - 15 mmol/L    Glucose 95 65 - 100 mg/dL    BUN 18 6 - 20 MG/DL    Creatinine 0.85 0.55 - 1.02 MG/DL    BUN/Creatinine ratio 21 (H) 12 - 20      GFR est AA >60 >60 ml/min/1.73m2    GFR est non-AA >60 >60 ml/min/1.73m2    Calcium 8.7 8.5 - 10.1 MG/DL    Bilirubin, total 0.8 0.2 - 1.0 MG/DL    ALT (SGPT) 30 12 - 78 U/L    AST (SGOT) 22 15 - 37 U/L    Alk. phosphatase 113 45 - 117 U/L    Protein, total 6.6 6.4 - 8.2 g/dL    Albumin 3.4 (L) 3.5 - 5.0 g/dL    Globulin 3.2 2.0 - 4.0 g/dL    A-G Ratio 1.1 1.1 - 2.2     TROPONIN I    Collection Time: 04/22/20 11:22 AM   Result Value Ref Range    Troponin-I, Qt. <0.05 <0.05 ng/mL   NT-PRO BNP    Collection Time: 04/22/20 11:22 AM   Result Value Ref Range    NT pro- <450 PG/ML   D DIMER    Collection Time: 04/22/20  4:31 PM   Result Value Ref Range    D-dimer 0.57 0.00 - 0.65 mg/L FEU   SARS-COV-2    Collection Time: 04/22/20  4:31 PM   Result Value Ref Range    Specimen source Nasopharyngeal      SARS-CoV-2 PENDING     SARS-CoV-2 PENDING     SARS-CoV-2 PENDING     COVID-19 PENDING NEG       EKG as read by me shows NSR rate 64 with PACs, normal axis, no LVH  Normal intervals, no ischemic ST-T changes    CXR read by radiology and reviewed by myself results:  Single frontal view of the chest.   Lines/tubes/surgical: Cardiac monitor leads overly the patient. Heart/mediastinum: Unremarkable. Loop recorder. Lungs/pleura:  No focal consolidation or mass. No visualized pleural effusion or  pneumothorax. Additional Comments: Left shoulder arthroplasty. Partially imaged cervical  fixation  IMPRESSION:  1. No radiographic evidence of acute cardiopulmonary disease.        CTA chest scan read by radiology FINDINGS:  There is a stable 3 mm nodule in the right upper lobe (coronal image 79 and  axial image 78). The pulmonary arteries are well enhanced and no pulmonary emboli are identified. There is no mediastinal or hilar adenopathy or mass. The aorta enhances normally  without evidence of aneurysm or dissection. The visualized portions of the upper abdominal organs are remarkable for prior  cholecystectomy.  There is a stable 12 mm hypodensity in the left hepatic lobe. There is a stable 5 mm hypodensity in hepatic segment 8. IMPRESSION:  No evidence for pulmonary embolism. Incidental stable micronodule in the right upper lobe, stable for 18 months      I saw the patient personally, took a history and did a complete physical exam at the bedside. I performed complex decision making in coming up with a diagnostic and treatment plan for the patient. I reviewed the patient's past medical records, current laboratory and radiology results, and actual Xray films/EKG. I have also discussed this case with the involved ED physician.     Care Plan discussed with:    Patient, Family, ED Doc    Risk of deterioration:  High    Total Time Coordinating Admission: 65    minutes    Total Critical Care Time:         Kelsey Rey MD

## 2020-04-22 NOTE — PROGRESS NOTES
TRANSFER - IN REPORT:    Verbal report received from Sydnee(name) on Leydi Sierra  being received from ER(unit) for routine progression of care      Report consisted of patients Situation, Background, Assessment and   Recommendations(SBAR). Information from the following report(s) Kardex and MAR was reviewed with the receiving nurse. Opportunity for questions and clarification was provided. Assessment completed upon patients arrival to unit and care assumed.

## 2020-04-22 NOTE — ED NOTES
Assumed care of pt from triage. Pt is A&O x 4. Pt reports CC of a cough that has been going on for the past two days. Pt says it is a dry cough. Pt says since yesterday she has also been having shortness of breath. Pt called her PCP and was referred to come here. Pt says she has been Nyquil to help her sleep at night since the cough has been so bad but has had no relief. Pt denies any fevers, NVD. Pt says she has been out to the grocery store but does wear a mask when out. Pt resting on stretcher in POC with call bell in reach. Pt placed on monitor x 3. VSS at this time. 1316: Dr. Carlee Moe at bedside. 1336: Paged RT for Duo-Neb    1344: RT at bedside    1417: This RN entered pt room to reassess pt after neb tx. Pt found to be very jittery. Pt does not normally take any type of nebulizers at home. MD aware. Pt lungs clear and O2 sats are normal. Will continue to monitor for any further changes. 1431: Called and updated pt son Ed on plan of care. 1518: Attempted to walk pt. Pt became super winded and lethargic. Pt placed back in bed and placed on 2L NC since when pt dozes off pt O2 declines into the upper 80's. MD aware. Will continue to monitor. TRANSFER - OUT REPORT:    Verbal report given to [facility] RN Kassandra (name) on eJn Hdez  being transferred to [facility] (unit) for routine progression of care       Report consisted of patients Situation, Background, Assessment and   Recommendations(SBAR). Information from the following report(s) SBAR, ED Summary and Recent Results was reviewed with the receiving nurse.     Lines:   Peripheral IV 04/22/20 Right Antecubital (Active)   Site Assessment Clean, dry, & intact 4/22/2020 11:23 AM   Phlebitis Assessment 0 4/22/2020 11:23 AM   Infiltration Assessment 0 4/22/2020 11:23 AM   Dressing Status Clean, dry, & intact 4/22/2020 11:23 AM   Dressing Type Transparent 4/22/2020 11:23 AM   Hub Color/Line Status Pink;Flushed 4/22/2020 11:23 AM        Opportunity for questions and clarification was provided. Patient transported with:   O2 @ 2 liters   Tech  Pt Belongings    1654: Called pt son Ed and updated him on pt status.

## 2020-04-22 NOTE — PROGRESS NOTES
1706-Received pt from er, vss.  Denies pain. C/o leon. Assisted to bathroom. Oriented to room and surroundings.

## 2020-04-22 NOTE — PROGRESS NOTES
Problem: Breathing Pattern - Ineffective  Goal: *Absence of hypoxia  Outcome: Progressing Towards Goal     Problem: Falls - Risk of  Goal: *Absence of Falls  Description: Document Dany Fall Risk and appropriate interventions in the flowsheet.   Outcome: Progressing Towards Goal  Note: Fall Risk Interventions:  Mobility Interventions: Bed/chair exit alarm, Communicate number of staff needed for ambulation/transfer, Patient to call before getting OOB

## 2020-04-23 PROBLEM — I50.32 DIASTOLIC CHF, CHRONIC (HCC): Status: ACTIVE | Noted: 2020-04-23

## 2020-04-23 LAB
ALBUMIN SERPL-MCNC: 3.6 G/DL (ref 3.5–5)
ALBUMIN/GLOB SERPL: 1 {RATIO} (ref 1.1–2.2)
ALP SERPL-CCNC: 110 U/L (ref 45–117)
ALT SERPL-CCNC: 29 U/L (ref 12–78)
ANION GAP SERPL CALC-SCNC: 6 MMOL/L (ref 5–15)
AST SERPL-CCNC: 20 U/L (ref 15–37)
ATRIAL RATE: 64 BPM
BASOPHILS # BLD: 0 K/UL (ref 0–0.1)
BASOPHILS NFR BLD: 1 % (ref 0–1)
BILIRUB SERPL-MCNC: 0.3 MG/DL (ref 0.2–1)
BUN SERPL-MCNC: 15 MG/DL (ref 6–20)
BUN/CREAT SERPL: 20 (ref 12–20)
CALCIUM SERPL-MCNC: 8.9 MG/DL (ref 8.5–10.1)
CALCULATED P AXIS, ECG09: 75 DEGREES
CALCULATED R AXIS, ECG10: 48 DEGREES
CALCULATED T AXIS, ECG11: 71 DEGREES
CHLORIDE SERPL-SCNC: 109 MMOL/L (ref 97–108)
CO2 SERPL-SCNC: 25 MMOL/L (ref 21–32)
CREAT SERPL-MCNC: 0.74 MG/DL (ref 0.55–1.02)
DIAGNOSIS, 93000: NORMAL
DIFFERENTIAL METHOD BLD: ABNORMAL
EOSINOPHIL # BLD: 0 K/UL (ref 0–0.4)
EOSINOPHIL NFR BLD: 0 % (ref 0–7)
ERYTHROCYTE [DISTWIDTH] IN BLOOD BY AUTOMATED COUNT: 13.1 % (ref 11.5–14.5)
GLOBULIN SER CALC-MCNC: 3.5 G/DL (ref 2–4)
GLUCOSE SERPL-MCNC: 138 MG/DL (ref 65–100)
HCT VFR BLD AUTO: 39.7 % (ref 35–47)
HGB BLD-MCNC: 12.8 G/DL (ref 11.5–16)
IMM GRANULOCYTES # BLD AUTO: 0 K/UL (ref 0–0.04)
IMM GRANULOCYTES NFR BLD AUTO: 0 % (ref 0–0.5)
LYMPHOCYTES # BLD: 0.6 K/UL (ref 0.8–3.5)
LYMPHOCYTES NFR BLD: 14 % (ref 12–49)
MCH RBC QN AUTO: 30.3 PG (ref 26–34)
MCHC RBC AUTO-ENTMCNC: 32.2 G/DL (ref 30–36.5)
MCV RBC AUTO: 94.1 FL (ref 80–99)
MONOCYTES # BLD: 0 K/UL (ref 0–1)
MONOCYTES NFR BLD: 1 % (ref 5–13)
NEUTS SEG # BLD: 3.5 K/UL (ref 1.8–8)
NEUTS SEG NFR BLD: 84 % (ref 32–75)
NRBC # BLD: 0 K/UL (ref 0–0.01)
NRBC BLD-RTO: 0 PER 100 WBC
P-R INTERVAL, ECG05: 184 MS
PLATELET # BLD AUTO: 250 K/UL (ref 150–400)
PMV BLD AUTO: 10.2 FL (ref 8.9–12.9)
POTASSIUM SERPL-SCNC: 4 MMOL/L (ref 3.5–5.1)
PROT SERPL-MCNC: 7.1 G/DL (ref 6.4–8.2)
Q-T INTERVAL, ECG07: 420 MS
QRS DURATION, ECG06: 70 MS
QTC CALCULATION (BEZET), ECG08: 433 MS
RBC # BLD AUTO: 4.22 M/UL (ref 3.8–5.2)
RBC MORPH BLD: ABNORMAL
SARS-COV-2, COV2: NOT DETECTED
SODIUM SERPL-SCNC: 140 MMOL/L (ref 136–145)
SPECIMEN SOURCE, FCOV2M: NORMAL
TROPONIN I SERPL-MCNC: <0.05 NG/ML
VENTRICULAR RATE, ECG03: 64 BPM
WBC # BLD AUTO: 4.1 K/UL (ref 3.6–11)

## 2020-04-23 PROCEDURE — 74011636637 HC RX REV CODE- 636/637: Performed by: INTERNAL MEDICINE

## 2020-04-23 PROCEDURE — 74011250637 HC RX REV CODE- 250/637: Performed by: INTERNAL MEDICINE

## 2020-04-23 PROCEDURE — 85025 COMPLETE CBC W/AUTO DIFF WBC: CPT

## 2020-04-23 PROCEDURE — 77010033678 HC OXYGEN DAILY

## 2020-04-23 PROCEDURE — 74011250637 HC RX REV CODE- 250/637: Performed by: FAMILY MEDICINE

## 2020-04-23 PROCEDURE — 65660000001 HC RM ICU INTERMED STEPDOWN

## 2020-04-23 PROCEDURE — 74011250637 HC RX REV CODE- 250/637: Performed by: HOSPITALIST

## 2020-04-23 PROCEDURE — 94640 AIRWAY INHALATION TREATMENT: CPT

## 2020-04-23 PROCEDURE — 36415 COLL VENOUS BLD VENIPUNCTURE: CPT

## 2020-04-23 PROCEDURE — 84484 ASSAY OF TROPONIN QUANT: CPT

## 2020-04-23 PROCEDURE — 74011250636 HC RX REV CODE- 250/636: Performed by: INTERNAL MEDICINE

## 2020-04-23 PROCEDURE — 94760 N-INVAS EAR/PLS OXIMETRY 1: CPT

## 2020-04-23 PROCEDURE — 80053 COMPREHEN METABOLIC PANEL: CPT

## 2020-04-23 RX ORDER — AZITHROMYCIN 250 MG/1
500 TABLET, FILM COATED ORAL DAILY
Status: DISCONTINUED | OUTPATIENT
Start: 2020-04-23 | End: 2020-04-26 | Stop reason: HOSPADM

## 2020-04-23 RX ADMIN — PANTOPRAZOLE SODIUM 40 MG: 40 TABLET, DELAYED RELEASE ORAL at 06:44

## 2020-04-23 RX ADMIN — Medication 10 ML: at 16:43

## 2020-04-23 RX ADMIN — HEPARIN SODIUM 5000 UNITS: 5000 INJECTION INTRAVENOUS; SUBCUTANEOUS at 11:23

## 2020-04-23 RX ADMIN — ROPINIROLE HYDROCHLORIDE 1 MG: 1 TABLET, FILM COATED ORAL at 22:15

## 2020-04-23 RX ADMIN — CLONAZEPAM 0.5 MG: 0.5 TABLET ORAL at 20:31

## 2020-04-23 RX ADMIN — Medication 10 ML: at 01:50

## 2020-04-23 RX ADMIN — ALBUTEROL SULFATE 2 PUFF: 90 AEROSOL, METERED RESPIRATORY (INHALATION) at 15:32

## 2020-04-23 RX ADMIN — HEPARIN SODIUM 5000 UNITS: 5000 INJECTION INTRAVENOUS; SUBCUTANEOUS at 16:43

## 2020-04-23 RX ADMIN — VERAPAMIL HYDROCHLORIDE 120 MG: 120 TABLET, FILM COATED, EXTENDED RELEASE ORAL at 11:28

## 2020-04-23 RX ADMIN — CLOPIDOGREL BISULFATE 75 MG: 75 TABLET, FILM COATED ORAL at 22:14

## 2020-04-23 RX ADMIN — ESCITALOPRAM OXALATE 20 MG: 10 TABLET ORAL at 22:15

## 2020-04-23 RX ADMIN — AZITHROMYCIN MONOHYDRATE 500 MG: 250 TABLET ORAL at 13:00

## 2020-04-23 RX ADMIN — HEPARIN SODIUM 5000 UNITS: 5000 INJECTION INTRAVENOUS; SUBCUTANEOUS at 01:50

## 2020-04-23 RX ADMIN — ALBUTEROL SULFATE 2 PUFF: 90 AEROSOL, METERED RESPIRATORY (INHALATION) at 19:45

## 2020-04-23 RX ADMIN — ALBUTEROL SULFATE 2 PUFF: 90 AEROSOL, METERED RESPIRATORY (INHALATION) at 08:44

## 2020-04-23 RX ADMIN — ATORVASTATIN CALCIUM 20 MG: 20 TABLET, FILM COATED ORAL at 22:14

## 2020-04-23 RX ADMIN — LOSARTAN POTASSIUM 25 MG: 25 TABLET ORAL at 11:23

## 2020-04-23 RX ADMIN — Medication 10 ML: at 22:15

## 2020-04-23 RX ADMIN — PREDNISONE 40 MG: 20 TABLET ORAL at 11:22

## 2020-04-23 RX ADMIN — ALBUTEROL SULFATE 2 PUFF: 90 AEROSOL, METERED RESPIRATORY (INHALATION) at 11:37

## 2020-04-23 NOTE — PROGRESS NOTES
Hospitalist Progress Note    NAME: Silviano Sorenson   :  1940   MRN:  [id number]     Subjective:   Daily Progress Note: 2020 11:34 AM      Chief complaint: admitted with SOB  Feels improved, per staff COVID test pend.  No CP/N/V/D    Current Facility-Administered Medications   Medication Dose Route Frequency    sodium chloride (NS) flush 5-40 mL  5-40 mL IntraVENous Q8H    sodium chloride (NS) flush 5-40 mL  5-40 mL IntraVENous PRN    acetaminophen (TYLENOL) tablet 650 mg  650 mg Oral Q6H PRN    naloxone (NARCAN) injection 0.4 mg  0.4 mg IntraVENous PRN    ondansetron (ZOFRAN) injection 4 mg  4 mg IntraVENous Q4H PRN    bisacodyL (DULCOLAX) suppository 10 mg  10 mg Rectal DAILY PRN    heparin (porcine) injection 5,000 Units  5,000 Units SubCUTAneous Q8H    albuterol (PROVENTIL HFA, VENTOLIN HFA, PROAIR HFA) inhaler 2 Puff  2 Puff Inhalation QID RT    predniSONE (DELTASONE) tablet 40 mg  40 mg Oral DAILY WITH BREAKFAST    benzonatate (TESSALON) capsule 100 mg  100 mg Oral TID PRN    atorvastatin (LIPITOR) tablet 20 mg  20 mg Oral QHS    clonazePAM (KlonoPIN) tablet 0.5 mg  0.5 mg Oral BID PRN    clopidogreL (PLAVIX) tablet 75 mg  75 mg Oral QHS    escitalopram oxalate (LEXAPRO) tablet 20 mg  20 mg Oral QHS    verapamil ER (CALAN-SR) tablet 120 mg  120 mg Oral DAILY WITH BREAKFAST    losartan (COZAAR) tablet 25 mg  25 mg Oral DAILY    pantoprazole (PROTONIX) tablet 40 mg  40 mg Oral 7am    rOPINIRole (REQUIP) tablet 1 mg  1 mg Oral QHS          Objective:     Visit Vitals  /79 (BP 1 Location: Left arm, BP Patient Position: At rest)   Pulse 81   Temp 98 °F (36.7 °C)   Resp 14   Ht 5' 2\" (1.575 m)   Wt 70.4 kg (155 lb 3.3 oz)   LMP  (LMP Unknown)   SpO2 97%   BMI 28.39 kg/m²    O2 Flow Rate (L/min): 2 l/min O2 Device: Nasal cannula    Temp (24hrs), Av.9 °F (36.6 °C), Min:97.6 °F (36.4 °C), Max:98 °F (36.7 °C)      PHYSICAL EXAM:  Gen elderly looking NAD  HEENT MMM  CVS no edema  Chest symmetric expansion  Abd ND  Ext moves all  Neuro AAO x 3  Psych  Normal affect      Data Review    Recent Results (from the past 24 hour(s))   C REACTIVE PROTEIN, QT    Collection Time: 04/22/20  4:31 PM   Result Value Ref Range    C-Reactive protein 0.35 0.00 - 0.60 mg/dL   LD    Collection Time: 04/22/20  4:31 PM   Result Value Ref Range     81 - 246 U/L   FERRITIN    Collection Time: 04/22/20  4:31 PM   Result Value Ref Range    Ferritin 39 26 - 388 NG/ML   D DIMER    Collection Time: 04/22/20  4:31 PM   Result Value Ref Range    D-dimer 0.57 0.00 - 0.65 mg/L FEU   PROCALCITONIN    Collection Time: 04/22/20  4:31 PM   Result Value Ref Range    Procalcitonin <0.05 ng/mL   SARS-COV-2    Collection Time: 04/22/20  4:31 PM   Result Value Ref Range    Specimen source Nasopharyngeal      SARS-CoV-2 PENDING    RESPIRATORY PANEL,PCR,NASOPHARYNGEAL    Collection Time: 04/22/20  4:31 PM   Result Value Ref Range    Adenovirus Not detected NOTD      Coronavirus 229E Not detected NOTD      Coronavirus HKU1 Not detected NOTD      Coronavirus CVNL63 Not detected NOTD      Coronavirus OC43 Not detected NOTD      Metapneumovirus Not detected NOTD      Rhinovirus and Enterovirus Not detected NOTD      Influenza A Not detected NOTD      Influenza A, subtype H1 Not detected NOTD      Influenza A, subtype H3 Not detected NOTD      INFLUENZA A H1N1 PCR Not detected NOTD      Influenza B Not detected NOTD      Parainfluenza 1 Not detected NOTD      Parainfluenza 2 Not detected NOTD      Parainfluenza 3 Not detected NOTD      Parainfluenza virus 4 Not detected NOTD      RSV by PCR Not detected NOTD      B. parapertussis, PCR Not detected NOTD      Bordetella pertussis - PCR Not detected NOTD      Chlamydophila pneumoniae DNA, QL, PCR Not detected NOTD      Mycoplasma pneumoniae DNA, QL, PCR Not detected NOTD     INFLUENZA A+B VIRAL AGS    Collection Time: 04/22/20  4:31 PM   Result Value Ref Range    Influenza A Antigen Negative NEG      Influenza B Antigen Negative NEG     METABOLIC PANEL, COMPREHENSIVE    Collection Time: 04/23/20  2:03 AM   Result Value Ref Range    Sodium 140 136 - 145 mmol/L    Potassium 4.0 3.5 - 5.1 mmol/L    Chloride 109 (H) 97 - 108 mmol/L    CO2 25 21 - 32 mmol/L    Anion gap 6 5 - 15 mmol/L    Glucose 138 (H) 65 - 100 mg/dL    BUN 15 6 - 20 MG/DL    Creatinine 0.74 0.55 - 1.02 MG/DL    BUN/Creatinine ratio 20 12 - 20      GFR est AA >60 >60 ml/min/1.73m2    GFR est non-AA >60 >60 ml/min/1.73m2    Calcium 8.9 8.5 - 10.1 MG/DL    Bilirubin, total 0.3 0.2 - 1.0 MG/DL    ALT (SGPT) 29 12 - 78 U/L    AST (SGOT) 20 15 - 37 U/L    Alk. phosphatase 110 45 - 117 U/L    Protein, total 7.1 6.4 - 8.2 g/dL    Albumin 3.6 3.5 - 5.0 g/dL    Globulin 3.5 2.0 - 4.0 g/dL    A-G Ratio 1.0 (L) 1.1 - 2.2     CBC WITH AUTOMATED DIFF    Collection Time: 04/23/20  2:03 AM   Result Value Ref Range    WBC 4.1 3.6 - 11.0 K/uL    RBC 4.22 3.80 - 5.20 M/uL    HGB 12.8 11.5 - 16.0 g/dL    HCT 39.7 35.0 - 47.0 %    MCV 94.1 80.0 - 99.0 FL    MCH 30.3 26.0 - 34.0 PG    MCHC 32.2 30.0 - 36.5 g/dL    RDW 13.1 11.5 - 14.5 %    PLATELET 423 026 - 345 K/uL    MPV 10.2 8.9 - 12.9 FL    NRBC 0.0 0  WBC    ABSOLUTE NRBC 0.00 0.00 - 0.01 K/uL    NEUTROPHILS 84 (H) 32 - 75 %    LYMPHOCYTES 14 12 - 49 %    MONOCYTES 1 (L) 5 - 13 %    EOSINOPHILS 0 0 - 7 %    BASOPHILS 1 0 - 1 %    IMMATURE GRANULOCYTES 0 0.0 - 0.5 %    ABS. NEUTROPHILS 3.5 1.8 - 8.0 K/UL    ABS. LYMPHOCYTES 0.6 (L) 0.8 - 3.5 K/UL    ABS. MONOCYTES 0.0 0.0 - 1.0 K/UL    ABS. EOSINOPHILS 0.0 0.0 - 0.4 K/UL    ABS. BASOPHILS 0.0 0.0 - 0.1 K/UL    ABS. IMM. GRANS. 0.0 0.00 - 0.04 K/UL    DF SMEAR SCANNED      RBC COMMENTS NORMOCYTIC, NORMOCHROMIC     TROPONIN I    Collection Time: 04/23/20  2:03 AM   Result Value Ref Range    Troponin-I, Qt. <0.05 <0.05 ng/mL     No results found for this visit on 04/22/20.   All Micro Results     Procedure Component Value Units Date/Time    RESPIRATORY PANEL,PCR,NASOPHARYNGEAL [[id number]] Collected:  04/22/20 1631    Order Status:  Completed Specimen:  Nasopharyngeal Updated:  04/22/20 2239     Adenovirus Not detected        Coronavirus 229E Not detected        Coronavirus HKU1 Not detected        Coronavirus CVNL63 Not detected        Coronavirus OC43 Not detected        Metapneumovirus Not detected        Rhinovirus and Enterovirus Not detected        Influenza A Not detected        Influenza A, subtype H1 Not detected        Influenza A, subtype H3 Not detected        INFLUENZA A H1N1 PCR Not detected        Influenza B Not detected        Parainfluenza 1 Not detected        Parainfluenza 2 Not detected        Parainfluenza 3 Not detected        Parainfluenza virus 4 Not detected        RSV by PCR Not detected        B. parapertussis, PCR Not detected        Bordetella pertussis - PCR Not detected        Chlamydophila pneumoniae DNA, QL, PCR Not detected        Mycoplasma pneumoniae DNA, QL, PCR Not detected              Radiology reports and films for the last 24 hours have been reviewed.     Assessment/Plan:     Acute respiratory failure with hypoxia POA severe dyspnea, room air sats 80% while walking  Acute bronchitis with bronchospasm POA  Not on  home oxygen   Chest x-ray clear  CTA chest with no PE or airspace disease or COPD changes  Suspect bronchitis with bronchospasm   COVID rule out and respiratory PCR pend  albuterol MDI with prednisone  zithromax 500mg daily   pBNP relatively normal     Exertional chest pain POA   lasting for months  Serial cardiac enzymes neg  Continue Plavix statin and blood pressure medications   Cardiology to eval may needs  stress test at some point  Recent echo and ALLIE with LVEF 56 to 60% with no wall motion abnormalities  Nov 2019     History of stroke November 2019  Left frontal and parietal, questionable cardioembolic  Plavix, statin       Hyperlipidemia POA  Continue Lipitor     Essential hypertension POA   continue verapamil and olmesartan     Restless leg syndrome  Continue primidone and Requip      DVT prophylaxis with heparin SQ     Code status: full code  NOK: children    Signed By: Whit Abarca MD     April 23, 2020

## 2020-04-23 NOTE — PROGRESS NOTES
Verbal shift change report given to Philipp (oncoming nurse) by Marci Sanchez (offgoing nurse). Report included the following information SBAR.

## 2020-04-23 NOTE — PHYSICIAN ADVISORY
Letter of Status Determination: Current Status   INPATIENT is Appropriate         Pt Name:  Enzo Song   MR#  [id number]   [facility]#   [id number]   [address] [place] [address]/  @ [facility]   Hospitalization date  4/22/2020 10:23 AM   Current Attending Physician  Nelida Hill MD   Principal diagnosis  Acute resp failure   Clinicals  69-year-old white female  Currently lives with her  who has dementia, she is the primary caregiver  Baseline does not wear oxygen  Note for the past 6 months or so increasing dyspnea on exertion and exertional fatigue  Often walking to the mailbox she has to stop to catch her breath  She also notes occasional substernal chest discomfort that improves with rest  Denies a prior history of coronary artery disease  Last admission 152 1/11/2019 with speech changes  Acute stroke in left frontal and left parietal area, questionable cardioembolic  44% right internal carotid artery stenosis felt to be asymptomatic  ALLIE without embolic source  TTE LVEF 56 to 60%  Treated with aspirin and statin, continue blood pressure medications  A loop recorder was placed by Dr Flavio Coles  2 to 3 days ago, increasing cough which is nonproductive, especially at night  She was frequent frequently coughing when I was in the room with her  No fevers or chills, headache, sore throat, abdominal pain, nausea vomiting, diarrhea  No current chest pain  Increasingly short of breath when she walks for the past 2 days  No orthopnea or lower extremity edema  Called her PCP when the symptoms did not respond to NyQuil  Referred to the emergency room   Emergency room  Room air sats stable at rest  Actively wheezing per emergency room doctor  [facility] in the hallway, very tachypneic and sats dropped to 88%  Oxygen nasal cannula started  No sirs criteria  No EKG changes and cardiac enzymes negative  proBNP 153  Chest x-ray with no airspace disease or edema  CTA chest with no PE, airspace disease, COPD changes  We were called to admit the patient     COVID test pending, requiring  O2    [place] MCG criteria   Does  NOT apply    STATUS DETERMINATION  On the basis of clinical data, available documentaion, we believe that the current status of this patient as INPATIENT is Appropriate     The final decision of the patient's hospitalization status depends on the attending physician's judgment    Additional comments     Insurance  Payor: Tomasa Camacho / Plan: Tavon Jaclyn / Product Type: [employer] /    [address] [place] [address] Phone: [phone]    Subscriber: Norm De Los Santos Subscriber#: [id number]    Group#: [id number] Precert#:                    Mercy Goodwin MD  Cell: [phone]  Physician Advisor

## 2020-04-23 NOTE — PROGRESS NOTES
Dr. Padmini Bradley by to see pt with NP Wong Dennis, reviewed pt's status with them. VSS. Weaned pt to room air. 1630-Updated pt's daughter with current plan of care, such as stress test possibly tomorrow, depending on covid results. 1850-Ambulated pt to bathroom with unsteady gait, on return to bed, used walker and reminded pt to slow down when getting up from a sitting position to a standing.

## 2020-04-23 NOTE — PROGRESS NOTES
Problem: Breathing Pattern - Ineffective  Goal: *Absence of hypoxia  Outcome: Progressing Towards Goal  Goal: *Use of effective breathing techniques  Outcome: Progressing Towards Goal     Problem: Patient Education: Go to Patient Education Activity  Goal: Patient/Family Education  Outcome: Progressing Towards Goal

## 2020-04-23 NOTE — PROGRESS NOTES
PT note:     Orders received, chart reviewed and patient is currently under investigation for COVID-19. In attempts to have only essential personnel enter the room and conserve PPE, we will confer with nursing and/or the referring provider to determine the most appropriate timing of our therapy intervention. Until this time, we will follow the patient peripherally to support nursing staff on an appropriate care plan. Spoke with nursing and she is in agreement. Will follow peripherally for PT needs. Thank you for your assistance.      Frannie Vasquez, PT, DPT   Total time spent: 10 minutes

## 2020-04-23 NOTE — PROGRESS NOTES
Bedside shift change report GIVEN TO Astrid Berry RN. Report included the following information SBAR and Kardex. SIGNIFICANT CHANGES DURING SHIFT:  None. CONCERNS TO ADDRESS WITH MD:  None. [id number] NURSING NOTE   Admission Date 4/22/2020   Admission Diagnosis Acute respiratory distress [R06.03]   Consults IP CONSULT TO HOSPITALIST  IP CONSULT TO CARDIOLOGY      Cardiac Monitoring [x] Yes [] No      Purposeful Hourly Rounding [x] Yes    Dany Score Total Score: 3   Dany score 3 or > [x] Bed Alarm [] Avasys [] 1:1 sitter [] Patient refused (Signed refusal form in chart)   Mega Score Mega Score: 19   Mega score 14 or < [] PMT consult [] Wound Care consult    []  Specialty bed  [] Nutrition consult      Influenza Vaccine Received Flu Vaccine for Current Season (usually Sept-March): Not Flu Season           Oxygen needs? [] Room air Oxygen @  []1L    [x]2L    []3L   []4L    []5L   []6L via  NC   Chronic home O2 use? [] Yes [x] No  Perform O2 challenge test and document in progress note using smartphrase (.Homeoxygen)      Last bowel movement Last Bowel Movement Date: 04/21/20      Urinary Catheter             LDAs               Peripheral IV 04/22/20 Right Antecubital (Active)   Site Assessment Clean, dry, & intact 4/23/2020  3:00 AM   Phlebitis Assessment 0 4/23/2020  3:00 AM   Infiltration Assessment 0 4/23/2020  3:00 AM   Dressing Status Clean, dry, & intact 4/23/2020  3:00 AM   Dressing Type Transparent;Tape 4/23/2020  3:00 AM   Hub Color/Line Status Flushed;Pink 4/23/2020  3:00 AM                         Readmission Risk Assessment Tool Score Medium Risk            20       Total Score        3 Has Seen PCP in Last 6 Months (Yes=3, No=0)    4 IP Visits Last 12 Months (1-3=4, 4=9, >4=11)    5 Pt. Coverage (Medicare=5 , Medicaid, or Self-Pay=4)    8 Charlson Comorbidity Score (Age + Comorbid Conditions)        Criteria that do not apply:    . Living with Significant Other.  Assisted Living. LTAC. SNF.  or   Rehab    Patient Length of Stay (>5 days = 3)       Expected Length of Stay - - -   Actual Length of Stay 1

## 2020-04-23 NOTE — PROGRESS NOTES
Problem: Breathing Pattern - Ineffective  Goal: *Absence of hypoxia  Outcome: Progressing Towards Goal     Problem: Falls - Risk of  Goal: *Absence of Falls  Description: Document Dany Fall Risk and appropriate interventions in the flowsheet.   Outcome: Progressing Towards Goal  Note: Fall Risk Interventions:  Mobility Interventions: Bed/chair exit alarm, Patient to call before getting OOB         Medication Interventions: Bed/chair exit alarm, Patient to call before getting OOB, Teach patient to arise slowly         History of Falls Interventions: Bed/chair exit alarm

## 2020-04-23 NOTE — CONSULTS
[facility]     Date of  Admission: 4/22/2020 10:23 AM     Admission type:Emergency    Consult for: chest pain, SOB  Consult by: hospitalist     Subjective: Ronan Church is a 67 y.o. female admitted for Acute respiratory distress [R06.03]. Admitted with c/o worsening dry cough last 3 days. Ms. Clara Worley states that for the last 6 months she is more SOB, HERNANDES, fatigued. Admission 11/2019 with CVA, Medtronic LINQ monitor placed - interrogation shows no arrhythmias. Remains on Plavix. Patient states that she has \"chest tightness\", pointing to lower sternal area, and denies chest pain. Continues with chronic dizziness managed by Neurology. Very stressed as she cares for her demented . ECG SR with no acute changes  Troponin neg   CXray normal   NTproBNP 153  DDimer normal, COVID19 test pending    Previous treatment/evaluation includes echocardiogram and stress thallium .  Cardiac risk factors: family history, diabetes mellitus, obesity, sedentary life style, hypertension, stress, post-menopausal.      Patient Active Problem List    Diagnosis Date Noted    Diastolic CHF, chronic ([address] 04/23/2020    Acute respiratory distress 04/22/2020    Lumbar post-laminectomy syndrome 60/78/3255    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)     Bilateral carotid artery stenosis     CVA (cerebral vascular accident) ([address] 11/05/2019    Ulnar neuropathy at elbow of right upper extremity 07/25/2018    Cervical post-laminectomy syndrome 06/07/2018    Idiopathic small and large fiber sensory neuropathy 06/07/2018    Lumbar back pain with radiculopathy affecting left lower extremity 06/07/2018    Lumbar back pain with radiculopathy affecting right lower extremity 06/07/2018    Vitamin D deficiency 06/07/2018    Cervical radiculopathy due to degenerative joint disease of spine 06/07/2018    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    Stenosis of both internal carotid arteries 03/13/2017    Benign essential tremor syndrome 02/21/2017    Tension vascular headache 02/21/2017    Hyperthyroidism 02/21/2017    Osteoporosis 08/31/2016    MGUS (monoclonal gammopathy of unknown significance) 10/04/2013    Chronic tension headaches 06/25/2013    Falls 06/25/2013    Lumbar stenosis 08/03/2011      Panfilo Stacy MD  Past Medical History:   Diagnosis Date    Adverse effect of anesthesia     sleep apnea no cpap machine useage       Anemia     has had iron infusions;  Hem/onc Dr Aniyah Munoz [phone]    Anxiety and depression     Bilateral carotid artery stenosis     Diastolic CHF, chronic ([facility] 6/79/8652    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)     GERD (gastroesophageal reflux disease)     Hypercholesteremia     Hypertension     Idiopathic small and large fiber sensory neuropathy     Limited peripheral vision of right eye     complication of cataract surgery    Long term current use of anticoagulant therapy     Meniere disease     MGUS (monoclonal gammopathy of unknown significance)     Monoclonal gammopathy of unknown significance     hem/onc:  Dr Aniyah Munoz [phone]    Osteoporosis     Psoriasis     bilateral Legs    Restless leg     Skin melanoma ([facility] 2012    (Rt ankle) and basal cell (Lt eye) removed    Transient ischemic attack approx 2005    HAS HAD 2 MINISTROKES-NO DEFICITS; neuro Dr Radha Salazar (cc)    Unspecified adverse effect of anesthesia     HAS NEEDED PORT OR CUTDOWN FOR RECVNG BLOOD OR LONG-TERM IVs; surgery 7/28/15 w/o any difficulty    Unspecified sleep apnea     does not use CPAP      Social History     Socioeconomic History    Marital status:      Spouse name: Not on file    Number of children: Not on file    Years of education: Not on file    Highest education level: Not on file   Tobacco Use    Smoking status: Former Smoker     Years: 15.00     Last attempt to quit: 7/15/1975     Years since quittin.8    Smokeless tobacco: Never Used   Substance and Sexual Activity    Alcohol use: Yes     Comment: may have a glass of wine on rare occasion    Drug use: Yes     Types: Prescription, OTC    Sexual activity: Not Currently   Social History Narrative    Lives in [place] with  of 62 years. Has 2 sons and 1 daughter. Used to work in a bank. Likes to garden and [employer].       Allergies   Allergen Reactions    Aspirin Nausea and Vomiting    Bee Sting [Sting, Bee] Hives and Shortness of Breath     WASP STING    Codeine Nausea and Vomiting    Hydrocodone Rash     And sedation    Lisinopril Cough    Morphine Rash    Nsaids (Non-Steroidal Anti-Inflammatory Drug) Nausea and Vomiting    Oxycodone Rash     On her back    Penicillins Hives, Shortness of Breath and Swelling      Family History   Problem Relation Age of Onset    Alcohol abuse Father     Emphysema Father     Cancer Sister         PANCREATIC    Alcohol abuse Brother     COPD Brother     Other Mother         UNKNOWN CAUSE OF DEATH    Heart Disease Mother     Breast Cancer Maternal Aunt     Anesth Problems Neg Hx       Current Facility-Administered Medications   Medication Dose Route Frequency    azithromycin (ZITHROMAX) tablet 500 mg  500 mg Oral DAILY    sodium chloride (NS) flush 5-40 mL  5-40 mL IntraVENous Q8H    sodium chloride (NS) flush 5-40 mL  5-40 mL IntraVENous PRN    acetaminophen (TYLENOL) tablet 650 mg  650 mg Oral Q6H PRN    naloxone (NARCAN) injection 0.4 mg  0.4 mg IntraVENous PRN    ondansetron (ZOFRAN) injection 4 mg  4 mg IntraVENous Q4H PRN    bisacodyL (DULCOLAX) suppository 10 mg  10 mg Rectal DAILY PRN    heparin (porcine) injection 5,000 Units  5,000 Units SubCUTAneous Q8H    albuterol (PROVENTIL HFA, VENTOLIN HFA, PROAIR HFA) inhaler 2 Puff  2 Puff Inhalation QID RT    predniSONE (DELTASONE) tablet 40 mg  40 mg Oral DAILY WITH BREAKFAST    benzonatate (TESSALON) capsule 100 mg  100 mg Oral TID PRN    atorvastatin (LIPITOR) tablet 20 mg  20 mg Oral QHS    clonazePAM (KlonoPIN) tablet 0.5 mg  0.5 mg Oral BID PRN    clopidogreL (PLAVIX) tablet 75 mg  75 mg Oral QHS    escitalopram oxalate (LEXAPRO) tablet 20 mg  20 mg Oral QHS    verapamil ER (CALAN-SR) tablet 120 mg  120 mg Oral DAILY WITH BREAKFAST    losartan (COZAAR) tablet 25 mg  25 mg Oral DAILY    pantoprazole (PROTONIX) tablet 40 mg  40 mg Oral 7am    rOPINIRole (REQUIP) tablet 1 mg  1 mg Oral QHS        Review of Symptoms: denies fever  11 systems reviewed, negative other than as stated in the HPI        Objective:      Visit Vitals  /69 (BP 1 Location: Left arm, BP Patient Position: At rest)   Pulse 81   Temp 97.8 °F (36.6 °C)   Resp 16   Ht 5' 2\" (1.575 m)   Wt 70.4 kg (155 lb 3.3 oz)   SpO2 99%   BMI 28.39 kg/m²       Physical:   General: older slightly obese  female in no acute distress  Heart: RRR, no m/S3/JVD, no carotid bruits   Lungs: LLL mild wheeze  Abdomen: Soft, +BS, NTND   Extremities: LE xiomara +DP/PT, no edema   Neurologic: Grossly normal    Data Review:   Recent Labs     04/23/20  0203 04/22/20  1122   WBC 4.1 3.9   HGB 12.8 11.9   HCT 39.7 38.1    257     Recent Labs     04/23/20  0203 04/22/20  1122    142   K 4.0 4.6   * 109*   CO2 25 27   * 95   BUN 15 18   CREA 0.74 0.85   CA 8.9 8.7   ALB 3.6 3.4*   TBILI 0.3 0.8   SGOT 20 22   ALT 29 30       Recent Labs     04/23/20  0203 04/22/20  1122   TROIQ <0.05 <0.05         Intake/Output Summary (Last 24 hours) at 4/23/2020 1153  Last data filed at 4/23/2020 9339  Gross per 24 hour   Intake 590 ml   Output --   Net 590 ml        Cardiographics    Telemetry: SR  ECG: SR with no acute changes    Echocardiogram:   ALLIE 11/2019: EF 55-60%, grade 2 DHF, trace MR    CXRAY: no acute process       Assessment:       Principal Problem:    Acute respiratory distress (4/22/2020)    Active Problems:    Stenosis of both internal carotid arteries (3/26/1288)      Diastolic CHF, chronic ([address] (4/23/2020)         Plan:     Possible [place] with CP and SOB:  Negative ECG and troponin's  Her CP is likely r/t increased cough   Her SOB can be r/t debilitation, poor exercise tolerance, anxiety, stress, and she does have a hx of DHF, but is not volume overloaded  Continue on Plavix, statin. No ASA as patient is intolerant. Plavix prescribed for post CVA  No BB indicated at this time, continues with BP meds  SOB managed with inhalers  No need to repeat echo  Plan for nuclear lexiscan stress test prior to discharge, once COVID19 negative. Will keep her NPO tonight in case we can start study tomorrow    HTN:  Stable, continue verapamil and ARB    Hx of CVA:  No evidence of arrhythmias. Thank you for consulting [address], [place]       [facility]    4/23/2020         Patient seen, examined by me personally. Plan discussed as detailed. Agree with note as outlined by  NP. I confirm findings in history and physical exam. No additional findings noted. Agree with plan as outlined above. Current episode appears to be upper respiratory. However her symptoms for the past several weeks ar suggestive of angina. Significant risk factors. Plan as noted above once COVID negative.     Sudeep Valdovinos MD

## 2020-04-24 ENCOUNTER — APPOINTMENT (OUTPATIENT)
Dept: NUCLEAR MEDICINE | Age: 80
DRG: 202 | End: 2020-04-24
Attending: NURSE PRACTITIONER
Payer: MEDICARE

## 2020-04-24 ENCOUNTER — HOSPITAL ENCOUNTER (OUTPATIENT)
Dept: NON INVASIVE DIAGNOSTICS | Age: 80
Discharge: HOME | DRG: 202 | End: 2020-04-24
Attending: NURSE PRACTITIONER
Payer: MEDICARE

## 2020-04-24 ENCOUNTER — HOME HEALTH ADMISSION (OUTPATIENT)
Dept: HOME HEALTH SERVICES | Facility: HOME HEALTH | Age: 80
End: 2020-04-24
Payer: MEDICARE

## 2020-04-24 LAB
STRESS BASELINE DIAS BP: 70 MMHG
STRESS BASELINE HR: 67 BPM
STRESS BASELINE SYS BP: 152 MMHG
STRESS ESTIMATED WORKLOAD: 1 METS
STRESS EXERCISE DUR MIN: NORMAL
STRESS O2 SAT PEAK: 99 %
STRESS O2 SAT REST: 96 %
STRESS PEAK DIAS BP: 70 MMHG
STRESS PEAK SYS BP: 152 MMHG
STRESS PERCENT HR ACHIEVED: 61 %
STRESS POST PEAK HR: 87 BPM
STRESS RATE PRESSURE PRODUCT: NORMAL BPM*MMHG
STRESS TARGET HR: 142 BPM

## 2020-04-24 PROCEDURE — 74011250636 HC RX REV CODE- 250/636: Performed by: NURSE PRACTITIONER

## 2020-04-24 PROCEDURE — 74011250637 HC RX REV CODE- 250/637: Performed by: INTERNAL MEDICINE

## 2020-04-24 PROCEDURE — 97535 SELF CARE MNGMENT TRAINING: CPT | Performed by: OCCUPATIONAL THERAPIST

## 2020-04-24 PROCEDURE — 74011250637 HC RX REV CODE- 250/637: Performed by: FAMILY MEDICINE

## 2020-04-24 PROCEDURE — 94760 N-INVAS EAR/PLS OXIMETRY 1: CPT

## 2020-04-24 PROCEDURE — 74011636637 HC RX REV CODE- 636/637: Performed by: INTERNAL MEDICINE

## 2020-04-24 PROCEDURE — 74011250637 HC RX REV CODE- 250/637: Performed by: HOSPITALIST

## 2020-04-24 PROCEDURE — A9500 TC99M SESTAMIBI: HCPCS

## 2020-04-24 PROCEDURE — 94640 AIRWAY INHALATION TREATMENT: CPT

## 2020-04-24 PROCEDURE — 65660000000 HC RM CCU STEPDOWN

## 2020-04-24 PROCEDURE — 97116 GAIT TRAINING THERAPY: CPT

## 2020-04-24 PROCEDURE — 97165 OT EVAL LOW COMPLEX 30 MIN: CPT | Performed by: OCCUPATIONAL THERAPIST

## 2020-04-24 PROCEDURE — 74011250636 HC RX REV CODE- 250/636: Performed by: INTERNAL MEDICINE

## 2020-04-24 PROCEDURE — 93017 CV STRESS TEST TRACING ONLY: CPT

## 2020-04-24 PROCEDURE — 97162 PT EVAL MOD COMPLEX 30 MIN: CPT

## 2020-04-24 RX ORDER — POLYETHYLENE GLYCOL 3350 17 G/17G
17 POWDER, FOR SOLUTION ORAL DAILY PRN
Status: DISCONTINUED | OUTPATIENT
Start: 2020-04-24 | End: 2020-04-26 | Stop reason: HOSPADM

## 2020-04-24 RX ORDER — SODIUM CHLORIDE 0.9 % (FLUSH) 0.9 %
10 SYRINGE (ML) INJECTION AS NEEDED
Status: DISCONTINUED | OUTPATIENT
Start: 2020-04-24 | End: 2020-04-28 | Stop reason: HOSPADM

## 2020-04-24 RX ADMIN — ATORVASTATIN CALCIUM 20 MG: 20 TABLET, FILM COATED ORAL at 21:41

## 2020-04-24 RX ADMIN — ALBUTEROL SULFATE 2 PUFF: 90 AEROSOL, METERED RESPIRATORY (INHALATION) at 16:07

## 2020-04-24 RX ADMIN — ALBUTEROL SULFATE 2 PUFF: 90 AEROSOL, METERED RESPIRATORY (INHALATION) at 20:12

## 2020-04-24 RX ADMIN — Medication 10 ML: at 11:38

## 2020-04-24 RX ADMIN — REGADENOSON 0.4 MG: 0.08 INJECTION, SOLUTION INTRAVENOUS at 11:38

## 2020-04-24 RX ADMIN — ESCITALOPRAM OXALATE 20 MG: 10 TABLET ORAL at 21:40

## 2020-04-24 RX ADMIN — AZITHROMYCIN MONOHYDRATE 500 MG: 250 TABLET ORAL at 08:49

## 2020-04-24 RX ADMIN — ROPINIROLE HYDROCHLORIDE 1 MG: 1 TABLET, FILM COATED ORAL at 21:39

## 2020-04-24 RX ADMIN — PREDNISONE 40 MG: 20 TABLET ORAL at 08:49

## 2020-04-24 RX ADMIN — HEPARIN SODIUM 5000 UNITS: 5000 INJECTION INTRAVENOUS; SUBCUTANEOUS at 01:07

## 2020-04-24 RX ADMIN — CLONAZEPAM 0.5 MG: 0.5 TABLET ORAL at 21:40

## 2020-04-24 RX ADMIN — CLOPIDOGREL BISULFATE 75 MG: 75 TABLET, FILM COATED ORAL at 21:40

## 2020-04-24 RX ADMIN — VERAPAMIL HYDROCHLORIDE 120 MG: 120 TABLET, FILM COATED, EXTENDED RELEASE ORAL at 08:58

## 2020-04-24 RX ADMIN — Medication 10 ML: at 21:39

## 2020-04-24 RX ADMIN — ALBUTEROL SULFATE 2 PUFF: 90 AEROSOL, METERED RESPIRATORY (INHALATION) at 07:56

## 2020-04-24 RX ADMIN — HEPARIN SODIUM 5000 UNITS: 5000 INJECTION INTRAVENOUS; SUBCUTANEOUS at 16:33

## 2020-04-24 RX ADMIN — PANTOPRAZOLE SODIUM 40 MG: 40 TABLET, DELAYED RELEASE ORAL at 08:49

## 2020-04-24 RX ADMIN — HEPARIN SODIUM 5000 UNITS: 5000 INJECTION INTRAVENOUS; SUBCUTANEOUS at 08:58

## 2020-04-24 RX ADMIN — Medication 10 ML: at 16:33

## 2020-04-24 RX ADMIN — BENZONATATE 100 MG: 100 CAPSULE ORAL at 08:49

## 2020-04-24 RX ADMIN — LOSARTAN POTASSIUM 25 MG: 25 TABLET ORAL at 08:49

## 2020-04-24 RX ADMIN — Medication 10 ML: at 05:28

## 2020-04-24 NOTE — PROGRESS NOTES
[address] [place] [address]  [phone]      Cardiology Progress Note      4/24/2020 8:43 AM    Admit Date: 4/22/2020    Admit Diagnosis:   Acute respiratory distress [R06.03]    Subjective: Ashley Magana has no c/o CP, still HERNANDES with minimal exertion. COVID19 neg. Plan for nuclear stress today.     Visit Vitals  /85 (BP 1 Location: Left arm, BP Patient Position: Head of bed elevated (Comment degrees))   Pulse 65   Temp 97.7 °F (36.5 °C)   Resp 20   Ht 5' 2\" (1.575 m)   Wt 70.4 kg (155 lb 3.3 oz)   LMP  (LMP Unknown)   SpO2 99%   BMI 28.39 kg/m²       Current Facility-Administered Medications   Medication Dose Route Frequency    azithromycin (ZITHROMAX) tablet 500 mg  500 mg Oral DAILY    sodium chloride (NS) flush 5-40 mL  5-40 mL IntraVENous Q8H    sodium chloride (NS) flush 5-40 mL  5-40 mL IntraVENous PRN    acetaminophen (TYLENOL) tablet 650 mg  650 mg Oral Q6H PRN    naloxone (NARCAN) injection 0.4 mg  0.4 mg IntraVENous PRN    ondansetron (ZOFRAN) injection 4 mg  4 mg IntraVENous Q4H PRN    bisacodyL (DULCOLAX) suppository 10 mg  10 mg Rectal DAILY PRN    heparin (porcine) injection 5,000 Units  5,000 Units SubCUTAneous Q8H    albuterol (PROVENTIL HFA, VENTOLIN HFA, PROAIR HFA) inhaler 2 Puff  2 Puff Inhalation QID RT    predniSONE (DELTASONE) tablet 40 mg  40 mg Oral DAILY WITH BREAKFAST    benzonatate (TESSALON) capsule 100 mg  100 mg Oral TID PRN    atorvastatin (LIPITOR) tablet 20 mg  20 mg Oral QHS    clonazePAM (KlonoPIN) tablet 0.5 mg  0.5 mg Oral BID PRN    clopidogreL (PLAVIX) tablet 75 mg  75 mg Oral QHS    escitalopram oxalate (LEXAPRO) tablet 20 mg  20 mg Oral QHS    verapamil ER (CALAN-SR) tablet 120 mg  120 mg Oral DAILY WITH BREAKFAST    losartan (COZAAR) tablet 25 mg  25 mg Oral DAILY    pantoprazole (PROTONIX) tablet 40 mg  40 mg Oral 7am    rOPINIRole (REQUIP) tablet 1 mg  1 mg Oral QHS       Objective:      Physical Exam:  General Appearance:  eldelry  female in no acute distress  Chest:   Clear  Cardiovascular:  Regular rate and rhythm, no murmur. Abdomen:   Soft, non-tender, bowel sounds are active. Extremities: no peripheral edema  Skin:  Warm and dry. Data Review:   Recent Labs     04/23/20  0203 04/22/20  1122   WBC 4.1 3.9   HGB 12.8 11.9   HCT 39.7 38.1    257     Recent Labs     04/23/20  0203 04/22/20  1122    142   K 4.0 4.6   * 109*   CO2 25 27   * 95   BUN 15 18   CREA 0.74 0.85   CA 8.9 8.7   ALB 3.6 3.4*   TBILI 0.3 0.8   SGOT 20 22   ALT 29 30       Recent Labs     04/23/20  0203 04/22/20  1122   TROIQ <0.05 <0.05         Intake/Output Summary (Last 24 hours) at 4/24/2020 0843  Last data filed at 4/23/2020 2300  Gross per 24 hour   Intake 840 ml   Output 300 ml   Net 540 ml        Telemetry: SR      Assessment:     Principal Problem:    Acute respiratory distress (4/22/2020)    Active Problems:    Stenosis of both internal carotid arteries (6/67/0832)      Diastolic CHF, chronic ([address] (4/23/2020)        Plan:     Possible [place] with CP and SOB:  Negative ECG and troponin's  Her CP is likely r/t increased cough   Her SOB may be r/t angina, therefore nuclear stress test today. Continue on Plavix, statin. No ASA as patient is intolerant. Plavix prescribed for post CVA  No BB indicated at this time, continues with BP meds  SOB managed with inhalers  No need to repeat echo     HTN:  Mildy elevated. Continue verapamil and ARB, start BB?     Hx of CVA:  No evidence of arrhythmias.       [facility]    4/24/2020         Patient seen, examined by me personally. Plan discussed as detailed. Agree with note as outlined by  NP. I confirm findings in history and physical exam. No additional findings noted. Agree with plan as outlined above.      Zander Moreira MD

## 2020-04-24 NOTE — PROGRESS NOTES
Problem: Breathing Pattern - Ineffective  Goal: *Absence of hypoxia  Outcome: Progressing Towards Goal     Problem: Falls - Risk of  Goal: *Absence of Falls  Description: Document Dany Fall Risk and appropriate interventions in the flowsheet. Outcome: Progressing Towards Goal  Note: Fall Risk Interventions:  Mobility Interventions: Assess mobility with egress test, Bed/chair exit alarm         Medication Interventions: Assess postural VS orthostatic hypotension, Bed/chair exit alarm, Patient to call before getting OOB         History of Falls Interventions: Bed/chair exit alarm, Door open when patient unattended         Problem: Patient Education: Go to Patient Education Activity  Goal: Patient/Family Education  Outcome: Progressing Towards Goal     Problem: Pressure Injury - Risk of  Goal: *Prevention of pressure injury  Description: Document Mega Scale and appropriate interventions in the flowsheet.   Outcome: Progressing Towards Goal  Note: Pressure Injury Interventions:  Sensory Interventions: Assess changes in LOC, Assess need for specialty bed         Activity Interventions: Assess need for specialty bed, Chair cushion    Mobility Interventions: Assess need for specialty bed, Chair cushion    Nutrition Interventions: Document food/fluid/supplement intake, Discuss nutritional consult with provider, Offer support with meals,snacks and hydration    Friction and Shear Interventions: Feet elevated on foot rest, Lift sheet

## 2020-04-24 NOTE — PROGRESS NOTES
Plan:  -Home with family support  - SN/PT/OT  -Needs O2 challege  -Family to transport at d/c       3:33PM  Review of therapy notes. Rec for [facility]. CM PC to pt in her room. Pt agreeable to [facility] and has used [facility] in the past. She would like to use them again. FOC on chart. Referral sent through [address] [place] [address] for SN/PT/OT. Referral pending. If pt continues to require O2, will need to wean or complete O2 challenge. CM will continue to follow and assist with d/c planning.        Jas Blakely, MSW  Care Manager

## 2020-04-24 NOTE — PROGRESS NOTES
Hospitalist Progress Note    NAME: Mark Felton   :  1940   MRN:  [id number]     Subjective:   Daily Progress Note: 2020 11:34 AM      Chief complaint: admitted with SOB  Per staff COVID test neg moved to tele bed. She still has some HERNANDES but no CP/N/V/D. For stress test today.     Current Facility-Administered Medications   Medication Dose Route Frequency    azithromycin (ZITHROMAX) tablet 500 mg  500 mg Oral DAILY    sodium chloride (NS) flush 5-40 mL  5-40 mL IntraVENous Q8H    sodium chloride (NS) flush 5-40 mL  5-40 mL IntraVENous PRN    acetaminophen (TYLENOL) tablet 650 mg  650 mg Oral Q6H PRN    naloxone (NARCAN) injection 0.4 mg  0.4 mg IntraVENous PRN    ondansetron (ZOFRAN) injection 4 mg  4 mg IntraVENous Q4H PRN    bisacodyL (DULCOLAX) suppository 10 mg  10 mg Rectal DAILY PRN    heparin (porcine) injection 5,000 Units  5,000 Units SubCUTAneous Q8H    albuterol (PROVENTIL HFA, VENTOLIN HFA, PROAIR HFA) inhaler 2 Puff  2 Puff Inhalation QID RT    predniSONE (DELTASONE) tablet 40 mg  40 mg Oral DAILY WITH BREAKFAST    benzonatate (TESSALON) capsule 100 mg  100 mg Oral TID PRN    atorvastatin (LIPITOR) tablet 20 mg  20 mg Oral QHS    clonazePAM (KlonoPIN) tablet 0.5 mg  0.5 mg Oral BID PRN    clopidogreL (PLAVIX) tablet 75 mg  75 mg Oral QHS    escitalopram oxalate (LEXAPRO) tablet 20 mg  20 mg Oral QHS    verapamil ER (CALAN-SR) tablet 120 mg  120 mg Oral DAILY WITH BREAKFAST    losartan (COZAAR) tablet 25 mg  25 mg Oral DAILY    pantoprazole (PROTONIX) tablet 40 mg  40 mg Oral 7am    rOPINIRole (REQUIP) tablet 1 mg  1 mg Oral QHS          Objective:     Visit Vitals  /71 (BP 1 Location: Left arm, BP Patient Position: At rest)   Pulse 74   Temp 97.5 °F (36.4 °C)   Resp 20   Ht 5' 2\" (1.575 m)   Wt 70.4 kg (155 lb 3.3 oz)   LMP  (LMP Unknown)   SpO2 96%   BMI 28.39 kg/m²    O2 Flow Rate (L/min): 2 l/min O2 Device: Room air    Temp (24hrs), Av °F (36.7 °C), Min:97.5 °F (36.4 °C), Max:98.5 °F (36.9 °C)      PHYSICAL EXAM:  Gen elderly looking NAD  HEENT MMM  CVS no edema  Chest symmetric expansion  Abd ND  Ext moves all  Neuro AAO x 3  Psych  Normal affect      Data Review    Recent Results (from the past 24 hour(s))   NUCLEAR CARDIAC STRESS TEST    Collection Time: 04/24/20  8:36 AM   Result Value Ref Range    Target  bpm     No results found for this visit on 04/22/20. All Micro Results     Procedure Component Value Units Date/Time    RESPIRATORY PANEL,PCR,NASOPHARYNGEAL [[id number]] Collected:  04/22/20 1631    Order Status:  Completed Specimen:  Nasopharyngeal Updated:  04/22/20 2234     Adenovirus Not detected        Coronavirus 229E Not detected        Coronavirus HKU1 Not detected        Coronavirus CVNL63 Not detected        Coronavirus OC43 Not detected        Metapneumovirus Not detected        Rhinovirus and Enterovirus Not detected        Influenza A Not detected        Influenza A, subtype H1 Not detected        Influenza A, subtype H3 Not detected        INFLUENZA A H1N1 PCR Not detected        Influenza B Not detected        Parainfluenza 1 Not detected        Parainfluenza 2 Not detected        Parainfluenza 3 Not detected        Parainfluenza virus 4 Not detected        RSV by PCR Not detected        B. parapertussis, PCR Not detected        Bordetella pertussis - PCR Not detected        Chlamydophila pneumoniae DNA, QL, PCR Not detected        Mycoplasma pneumoniae DNA, QL, PCR Not detected              Radiology reports and films for the last 24 hours have been reviewed.     Assessment/Plan:     Acute respiratory failure with hypoxia POA severe dyspnea, room air sats 80% while walking  Acute bronchitis with bronchospasm POA  Not on  home oxygen   Chest x-ray clear  CTA chest with no PE or airspace disease or COPD changes  Suspect bronchitis with bronchospasm   COVID/Flu/RVP Neg so far  albuterol MDI with prednisone  zithromax 500mg daily   pBNP relatively normal  pulm eval     Exertional chest pain POA   lasting for months  Serial cardiac enzymes neg  Continue Plavix statin and for stress test today per cards   Cardiology on case   Recent echo and ALLIE with LVEF 56 to 60% with no wall motion abnormalities  Nov 2019     History of stroke November 2019  Left frontal and parietal, questionable cardioembolic  Plavix, statin       Hyperlipidemia POA  Continue Lipitor     Essential hypertension POA   continue verapamil and olmesartan     Restless leg syndrome  Continue primidone and Requip      DVT prophylaxis with heparin SQ     Code status: full code  NOK: children  Dispo: Home once medially stable.  PT/OT eval    Signed By: Phyllis Cameron MD     April 24, 2020

## 2020-04-24 NOTE — PROGRESS NOTES
Ms. Pretty [facility] nuclear stress study is negative. No further cardiology evaluation indicated. [facility] signing off.

## 2020-04-24 NOTE — PROGRESS NOTES
Bedside shift change report GIVEN TO KISHAN Cadet. Report included the following information SBAR, Kardex and MAR. SIGNIFICANT CHANGES DURING SHIFT:        CONCERNS TO ADDRESS WITH MD:            [address] NURSING NOTE   Admission Date 4/22/2020   Admission Diagnosis Acute respiratory distress [R06.03]   Consults IP CONSULT TO HOSPITALIST  IP CONSULT TO CARDIOLOGY  IP CONSULT TO PULMONOLOGY      Cardiac Monitoring [x] Yes [] No      Purposeful Hourly Rounding [x] Yes    Dany Score Total Score: 3   Dany score 3 or > [x] Bed Alarm [] Avasys [] 1:1 sitter [] Patient refused (Signed refusal form in chart)   Mega Score Mega Score: 18   Mega score 14 or < [] PMT consult [] Wound Care consult    []  Specialty bed  [] Nutrition consult      Influenza Vaccine Received Flu Vaccine for Current Season (usually Sept-March): Not Flu Season           Oxygen needs? [x] Room air Oxygen @  []1L    []2L    []3L   []4L    []5L   []6L via  NC   Chronic home O2 use? [] Yes [] No  Perform O2 challenge test and document in progress note using smartphrase (.Homeoxygen)      Last bowel movement Last Bowel Movement Date: 04/21/20      Urinary Catheter             LDAs               Peripheral IV 04/22/20 Right Antecubital (Active)   Site Assessment Clean, dry, & intact 4/24/2020  4:52 AM   Phlebitis Assessment 0 4/24/2020  4:52 AM   Infiltration Assessment 0 4/24/2020  4:52 AM   Dressing Status Clean, dry, & intact 4/24/2020  4:52 AM   Dressing Type Transparent 4/24/2020  4:52 AM   Hub Color/Line Status Pink 4/24/2020  4:52 AM                         Readmission Risk Assessment Tool Score High Risk            24       Total Score        3 Has Seen PCP in Last 6 Months (Yes=3, No=0)    4 IP Visits Last 12 Months (1-3=4, 4=9, >4=11)    5 Pt. Coverage (Medicare=5 , Medicaid, or Self-Pay=4)    12 Charlson Comorbidity Score (Age + Comorbid Conditions)        Criteria that do not apply:    . Living with Significant Other.  Assisted Living. LTAC. SNF.  or   Rehab    Patient Length of Stay (>5 days = 3)       Expected Length of Stay 3d 19h   Actual Length of Stay 2

## 2020-04-24 NOTE — PROGRESS NOTES
Problem: Self Care Deficits Care Plan (Adult)  Goal: *Acute Goals and Plan of Care (Insert Text)  Description: FUNCTIONAL STATUS PRIOR TO ADMISSION: Patient was independent and active without use of DME for all ADLS and IADLs, was driving and caring for her  whom has dementia, family is near by to [facility]: The patient lived with  whom has dementia but did not require assist.    Occupational Therapy Goals:  Initiated 4/24/2020  1. Patient will perform grooming standing with item retrieval with independence within 7 days. 2. Patient will perform toileting with independence within 7 days. 3. Patient will perform upper body dressing and lower body dressing with independence within 7 days. 4. Patient will perform one light IADL task with independence within 7 days. 5. Patient will transfer from toilet with independence within 7 days. Outcome: Progressing Towards Goal   OCCUPATIONAL THERAPY EVALUATION  Patient: Gill Pardo (14 y.o. female)  Date: 4/24/2020  Primary Diagnosis: Acute respiratory distress [R06.03]        Precautions:   Fall    ASSESSMENT  Based on the objective data described below, the patient presents with general weakness and decreased higher level balance. Pt is normally completely independent without assist devices for all ADLS and IADLS. She was on room air and all vitals were stable. She has been ruled out for COVID-19 and was taken off of precautions. She reports having good family assist if needed and her daughter can assist.      Current Level of Function Impacting Discharge (ADLs/self-care): CGA to min assist for all mobility and ADLS    Functional Outcome Measure: The patient scored 70/100 on the barthel outcome measure which is indicative of minimal deficits with mobility and ADLs.       Other factors to consider for discharge: none     Patient will benefit from skilled therapy intervention to address the above noted impairments. PLAN :  Recommendations and Planned Interventions: self care training, functional mobility training, therapeutic exercise, balance training, therapeutic activities, patient education, home safety training and family training/education    Frequency/Duration: Patient will be followed by occupational therapy 3 times a week to address goals. Recommendation for discharge: (in order for the patient to meet his/her long term goals)  Occupational therapy at least 2 days/week in the home     This discharge recommendation:  Has been made in collaboration with the attending provider and/or case management    IF patient discharges home will need the following DME: none       SUBJECTIVE:   Patient stated I feel weak. 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)     GERD (gastroesophageal reflux disease)     Hypercholesteremia     Hypertension     Idiopathic small and large fiber sensory neuropathy     Limited peripheral vision of right eye     complication of cataract surgery    Long term current use of anticoagulant therapy     Meniere disease     MGUS (monoclonal gammopathy of unknown significance)     Monoclonal gammopathy of unknown significance     hem/onc:  Dr Colt Maradiaga [phone]    Osteoporosis     Psoriasis     bilateral Legs    Restless leg     Skin melanoma ([facility] [address] 2012    (Rt ankle) and basal cell (Lt eye) removed    Transient ischemic attack approx 2005    HAS HAD 2 MINISTROKES-NO DEFICITS; neuro Dr Kurt Johnson (cc)    Unspecified adverse effect of anesthesia     HAS NEEDED PORT OR CUTDOWN FOR RECVNG BLOOD OR LONG-TERM IVs; surgery 7/28/15 w/o any difficulty    Unspecified sleep apnea     does not use CPAP     Past Surgical History:   Procedure Laterality Date    HX APPENDECTOMY      HX CATARACT REMOVAL  2011    Right    HX CERVICAL FUSION  1/28/15    C5-7    HX CHOLECYSTECTOMY  2000    HX GI  2010    liver bx due to liver Bx due to elevated enzymes, liver laceration during Bx, hospitalized 10-12d    HX HEENT  7/28/15    Lt total parotidectomy with facial nerve dissection: Dr Nahun Gracia HX HEENT  11/2015    Right parotidectomy    HX HYSTERECTOMY      HX ORTHOPAEDIC  2009 & 2011    LUMBAR FUSION and revision    HX OTHER SURGICAL  2010    MELANOMA (Rt ankle) and basal cell (Lt eye) removed; q6mo ck by DERMATOLOGY    HX SHOULDER REPLACEMENT Left 08/01/2016    HX THYROIDECTOMY      Right    RI COLON CA SCRN NOT HI RSK IND  1/27/2016         RI INSERTION SUBQ CARDIAC RHYTHM MONITOR W/PRGRMG N/A 11/6/2019    LOOP RECORDER INSERT performed by Anmol Vincent MD at \[facility]\"" CARDIAC CATH LAB       Expanded or extensive additional review of patient history:     Home Situation  Home Environment: Apartment  # Steps to Enter: 0  One/Two Story Residence: One story  Living Alone: No  Support Systems: Child(makayla), Spouse/Significant Other/Partner  Patient Expects to be Discharged to[de-identified] Unknown  Current DME Used/Available at Home: Walker, rolling, Cane, straight, Wheelchair, Shower chair, Grab bars  Tub or Shower Type: Shower    Hand dominance: Right    EXAMINATION OF PERFORMANCE DEFICITS:  Cognitive/Behavioral Status:  Neurologic State: Alert  Orientation Level: Oriented X4  Cognition: Follows commands  Perception: Appears intact  Perseveration: No perseveration noted  Safety/Judgement: Awareness of environment; Fall prevention;Home safety; Insight into deficits      Hearing:   Auditory  Auditory Impairment: Hard of hearing, bilateral    Vision/Perceptual:                                Corrective Lenses: Glasses    Range of Motion:    AROM: Within functional limits  PROM: Within functional limits                      Strength:    Strength: Generally decreased, functional                Coordination:  Coordination: Within functional limits  Fine Motor Skills-Upper: Left Intact; Right Intact    Gross Motor Skills-Upper: Left Intact; Right Intact    Tone & Sensation:    Tone: Normal  Sensation: Intact                      Balance:  Sitting: Intact; Without support  Standing: Impaired; Without support  Standing - Static: Good  Standing - Dynamic : Fair    Functional Mobility and Transfers for ADLs:  Bed Mobility:  Rolling: Independent  Supine to Sit: Independent  Scooting: Independent    Transfers:  Sit to Stand: Stand-by assistance  Stand to Sit: Stand-by assistance  Bed to Chair: Contact guard assistance  Bathroom Mobility: Contact guard assistance;Minimum assistance  Toilet Transfer : Contact guard assistance    ADL Assessment:  Feeding: Independent    Oral Facial Hygiene/Grooming: Contact guard assistance    Bathing: Contact guard assistance;Minimum assistance(balance)    Upper Body Dressing: Contact guard assistance    Lower Body Dressing: Minimum assistance;Contact guard assistance(balance)    Toileting: Contact guard assistance;Minimum assistance(balance)                ADL Intervention and task modifications:   CGA sit to stand and CGA to min assist without assist devices for ADL mobility/transfers and managing bathroom door. Performed toileting and grooming with CGA to min assist.  Donned socks seated with crossed leg technique without assist  Cognitive Retraining  Safety/Judgement: Awareness of environment; Fall prevention;Home safety; Insight into deficits    Functional Measure:  Barthel Index:    Bathin  Bladder: 10  Bowels: 10  Groomin  Dressin  Feeding: 10  Mobility: 10  Stairs: 5  Toilet Use: 5  Transfer (Bed to Chair and Back): 10  Total: 70/100        The Barthel ADL Index: Guidelines  1.  The index should be used as a record of what a patient does, not as a record of what a patient could do. 2. The main aim is to establish degree of independence from any help, physical or verbal, however minor and for whatever reason. 3. The need for supervision renders the patient not independent. 4. A patient's performance should be established using the best available evidence. Asking the patient, friends/relatives and nurses are the usual sources, but direct observation and common sense are also important. However direct testing is not needed. 5. Usually the patient's performance over the preceding 24-48 hours is important, but occasionally longer periods will be relevant. 6. Middle categories imply that the patient supplies over 50 per cent of the effort. 7. Use of aids to be independent is allowed. Kb Oquendo, Barthel, D.W. (1583). Functional evaluation: the Barthel Index. [address] [facility] (14)2. ALONSO Masters, Tomasa Flores., Brooke Rodriguez., [facility] (1999). Measuring the change indisability after inpatient rehabilitation; comparison of the responsiveness of the Barthel Index and Functional [place] Measure. Journal of Neurology, Neurosurgery, and Psychiatry, 66(4), 750-016. Dale Sauceda, N.J.A, JAMES Rizo.J.M, & Nini Bay, M.A. (2004.) Assessment of post-stroke quality of life in cost-effectiveness studies: The usefulness of the Barthel Index and the EuroQoL-5D.  Quality of Life Research, 15, 289-01         Occupational Therapy Evaluation Charge Determination   History Examination Decision-Making   LOW Complexity : Brief history review  LOW Complexity : 1-3 performance deficits relating to physical, cognitive , or psychosocial skils that result in activity limitations and / or participation restrictions  LOW Complexity : No comorbidities that affect functional and no verbal or physical assistance needed to complete eval tasks       Based on the above components, the patient evaluation is determined to be of the following complexity level: LOW   Pain Ratin/10    Activity Tolerance:   Fair  Please refer to the flowsheet for vital signs taken during this treatment. After treatment patient left in no apparent distress:    assisted to wheelchair with transporter to leave for testing    COMMUNICATION/EDUCATION:   The patients plan of care was discussed with: Physical therapist, Registered nurse and patient. Home safety education was provided and the patient/caregiver indicated understanding., Patient/family have participated as able in goal setting and plan of care. and Patient/family agree to work toward stated goals and plan of care. This patients plan of care is appropriate for delegation to [facility].     Thank you for this referral.  Jared Castillo, OTR/L  Time Calculation: 13 mins

## 2020-04-24 NOTE — CONSULTS
Pulmonary, Critical Care, and Sleep Medicine~Consult Note    Name: Bob Vickers MRN: [id number]   : 1940 Hospital: [facility]   Date: 2020 3:25 PM Admission: 2020     Impression Plan   1. Dyspnea on exertion, acute on chronic  2. Diastolic CHF  3. GERD  4. Cough - allergies? Secondary to aspiration event after emesis from missing some PPI doses? 5. Had wheezing on presentation  -- cardiogenic vs reactive airways? Normal PFTs in 2019  6. 3 mm RUL pulmonary nodule, reported to be stable over 18 mos -- no need for follow up 1. Likely will be going home -- discussed with nurse to check her ambulatory sats prior to d/c  2. Agree w/ zpak. benzonatate  3. Continue PPI and follow reflux precautiosn  4. Taper prednisone quickly  5. Recommend f/u with pulm in next few weeks -- may consider further asthma work up in future vs updated echocardiogram and checking an overnight oximetry as she has a hx of untreated FRED     Daily Progression: We were asked to see this patient in regards to acute hypoxic respiratory failure. She is a 67 yr old woman, known to Dr. Mary Booth of our group who last saw her in 2019 for evaluation of dyspnea on exertion. She had normal PFTs last year. She denies significant environmental allergy symptoms. She denies wheezing at home, yet she was noted to be wheezing in the ED on presentation. When ambulating in the ER, she was tachypneic and sats reportedly dropped to 88%. respiratory viral panel was negative    About 1 week prior to admission, she had a 1-2 day long period of nausea and vomiting which she attributes to having run out of her reflux medication. Then 2 days prior to admission, she developed a nonproductive cough which was worse at night when lying down. She had no associated f/c/s, sore throat, rhinitits, myalgias. Cough did not improve w/ Nyquil.   She reports a constant discomfort in her chest near the lower sternum. Today, her sats are in the upper 90s on room air. She has some SOB at rest.    The patient has a reported history of FRED (details not known) but does not use CPAP. She does not use oxygen at home. She is a non smoker. Previous trials of bronchodilators and ICS/LABA inhalers have not relieved her dyspnea. She reports yearly follow ups with Dr. Leia Mckeon for her acid reflux. She denies ever having an esophageal dilitation. Hx of CVA in Jan 2019, remains on Plavix  Hx of diastolic CHF. Echo has not been updated on this admission. I do not know what her PASP is. A nuclear stress test was performed today and review of the chart reflects that it was negative. I have reviewed the labs and previous days notes. A comprehensive review of systems was negative except for that written in the HPI. 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)     GERD (gastroesophageal reflux disease)     Hypercholesteremia     Hypertension     Idiopathic small and large fiber sensory neuropathy     Limited peripheral vision of right eye     complication of cataract surgery    Long term current use of anticoagulant therapy     Meniere disease     MGUS (monoclonal gammopathy of unknown significance)     Monoclonal gammopathy of unknown significance     hem/onc:  Dr Ofelia Osuna [phone]    Osteoporosis     Psoriasis     bilateral Legs    Restless leg     Skin melanoma ([facility] [address] 2012    (Rt ankle) and basal cell (Lt eye) removed    Transient ischemic attack approx 2005    HAS HAD 2 MINISTROKES-NO DEFICITS; neuro Dr Leonard Parish (cc)    Unspecified adverse effect of anesthesia     HAS NEEDED PORT OR CUTDOWN FOR RECVNG BLOOD OR LONG-TERM IVs; surgery 7/28/15 w/o any difficulty    Unspecified sleep apnea     does not use CPAP      Past Surgical History:   Procedure Laterality Date    HX APPENDECTOMY      HX CATARACT REMOVAL  2011    Right    HX CERVICAL FUSION  1/28/15    C5-7    HX CHOLECYSTECTOMY  2000    HX GI  2010    liver bx due to liver Bx due to elevated enzymes, liver laceration during Bx, hospitalized 10-12d    HX HEENT  7/28/15    Lt total parotidectomy with facial nerve dissection: Dr Robert Granados HX HEENT  11/2015    Right parotidectomy    HX HYSTERECTOMY      HX ORTHOPAEDIC  2009 & 2011    LUMBAR FUSION and revision    HX OTHER SURGICAL  2010    MELANOMA (Rt ankle) and basal cell (Lt eye) removed; q6mo ck by DERMATOLOGY    HX SHOULDER REPLACEMENT Left 08/01/2016    HX THYROIDECTOMY      Right    MT COLON CA SCRN NOT HI RSK IND  1/27/2016         MT INSERTION SUBQ CARDIAC RHYTHM MONITOR W/PRGRMG N/A 11/6/2019    LOOP RECORDER INSERT performed by Lamin Infante MD at [facility] CARDIAC CATH LAB      Prior to Admission medications    Medication Sig Start Date End Date Taking? Authorizing Provider   clonazePAM (KlonoPIN) 0.5 mg tablet TAKE 1 TABLET BY MOUTH TWICE DAILY **MAX  2  PER  24  HOURS** 4/20/20  Yes Vandana Ireland MD   clopidogreL (PLAVIX) 75 mg tab Take 1 Tab by mouth nightly. 4/9/20  Yes Liseth Jonas MD   pantoprazole (Protonix) 40 mg tablet Take 1 Tab by mouth every morning. Indications: gastroesophageal reflux disease 4/9/20  Yes Vandana Ireland MD   verapamil ER (CALAN-SR) 120 mg tablet TAKE 1 TABLET BY MOUTH NIGHTLY FOR HIGH BLOOD PRESSURE 3/24/20  Yes Vandana Ireland MD   olmesartan (BENICAR) 5 mg tablet TAKE 1/2 (ONE-HALF) TABLET BY MOUTH ONCE DAILY 2/10/20  Yes Vandana Ireland MD   nortriptyline (PAMELOR) 10 mg capsule Take 1-2 Caps by mouth nightly. 1/20/20  Yes Liseth Jonas MD   primidone (MYSOLINE) 50 mg tablet Take 1 Tab by mouth nightly. 20  Yes Appa Angela Renner MD   atorvastatin (LIPITOR) 20 mg tablet Take 20 mg by mouth nightly. Yes Provider, Historical   multivit/folic acid/vit K1 (ONE-A-DAY WOMEN'S 50 PLUS PO) Take 1 Tab by mouth daily. Yes Provider, Historical   fluticasone propionate (FLONASE) 50 mcg/actuation nasal spray 2 Sprays by Both Nostrils route daily. 10/31/19  Yes Stephania Weaver NP   rOPINIRole (REQUIP) 1 mg tablet Take 1 Tab by mouth nightly. 19  Yes Appdenise Renner MD   escitalopram oxalate (LEXAPRO) 20 mg tablet Take 1 Tab by mouth nightly. Indications: Anxiousness associated with Depression 19  Yes Appa Vandana Egan MD   albuterol (PROVENTIL HFA, VENTOLIN HFA, PROAIR HFA) 90 mcg/actuation inhaler Take 1 Puff by inhalation every four (4) hours as needed for Wheezing. 18  Yes TermeerYuri MD   polyethylene glycol (MIRALAX) 17 gram packet Take 17 g by mouth daily as needed (constipation).    Yes Provider, Historical     Allergies   Allergen Reactions    Aspirin Nausea and Vomiting    Bee Sting [Sting, Bee] Hives and Shortness of Breath     WASP STING    Codeine Nausea and Vomiting    Hydrocodone Rash     And sedation    Lisinopril Cough    Morphine Rash    Nsaids (Non-Steroidal Anti-Inflammatory Drug) Nausea and Vomiting    Oxycodone Rash     On her back    Penicillins Hives, Shortness of Breath and Swelling      Social History     Tobacco Use    Smoking status: Former Smoker     Years: 15.00     Last attempt to quit: 7/15/1975     Years since quittin.8    Smokeless tobacco: Never Used   Substance Use Topics    Alcohol use: Yes     Comment: may have a glass of wine on rare occasion      Family History   Problem Relation Age of Onset    Alcohol abuse Father     Emphysema Father     Cancer Sister         PANCREATIC    Alcohol abuse Brother     COPD Brother     Other Mother         UNKNOWN CAUSE OF DEATH    Heart Disease Mother     Breast Cancer Maternal Aunt  Anesth Problems Neg Hx      OBJECTIVE:     Vital Signs:       Visit Vitals  /62 (BP 1 Location: Right arm, BP Patient Position: At rest)   Pulse 73   Temp 98.4 °F (36.9 °C)   Resp 16   Ht 5' 2\" (1.575 m)   Wt 70.4 kg (155 lb 3.3 oz)   LMP  (LMP Unknown)   SpO2 98%   BMI 28.39 kg/m²      Temp (24hrs), Av.1 °F (36.7 °C), Min:97.5 °F (36.4 °C), Max:98.5 °F (36.9 °C)     Intake/Output:     Last shift:  07 -  1900  In: 50 [P.O.:50]  Out: 200 [Urine:200]    Last 3 shifts:  190 -  0700  In: 1190 [P.O.:1190]  Out: 300 [Urine:300]          Intake/Output Summary (Last 24 hours) at 2020 1525  Last data filed at 2020 0753  Gross per 24 hour   Intake 410 ml   Output 500 ml   Net -90 ml       Physical Exam:                                        Exam Findings Other   General: No resp distress noted, appears stated age    HEENT:  No ulcers, JVD not elevated, no cervical LAD    Chest: No pectus deformity, normal chest rise b/l    HEART:  RRR, no murmurs/rubs/gallops    Lungs:  CTA b/l, no rhonchi/crackles/wheeze, diminished BS at bases    ABD: Soft/NT, non rigid mildly distended    EXT: No cyanosis/clubbing/edema, normal peripheral pulses    Skin: No rashes or ulcers, no mottling    Neuro: A/O x 3        Medications:  Current Facility-Administered Medications   Medication Dose Route Frequency    polyethylene glycol (MIRALAX) packet 17 g  17 g Oral DAILY PRN    azithromycin (ZITHROMAX) tablet 500 mg  500 mg Oral DAILY    sodium chloride (NS) flush 5-40 mL  5-40 mL IntraVENous Q8H    sodium chloride (NS) flush 5-40 mL  5-40 mL IntraVENous PRN    acetaminophen (TYLENOL) tablet 650 mg  650 mg Oral Q6H PRN    naloxone (NARCAN) injection 0.4 mg  0.4 mg IntraVENous PRN    ondansetron (ZOFRAN) injection 4 mg  4 mg IntraVENous Q4H PRN    bisacodyL (DULCOLAX) suppository 10 mg  10 mg Rectal DAILY PRN    heparin (porcine) injection 5,000 Units  5,000 Units SubCUTAneous Q8H    albuterol (PROVENTIL HFA, VENTOLIN HFA, PROAIR HFA) inhaler 2 Puff  2 Puff Inhalation QID RT    predniSONE (DELTASONE) tablet 40 mg  40 mg Oral DAILY WITH BREAKFAST    benzonatate (TESSALON) capsule 100 mg  100 mg Oral TID PRN    atorvastatin (LIPITOR) tablet 20 mg  20 mg Oral QHS    clonazePAM (KlonoPIN) tablet 0.5 mg  0.5 mg Oral BID PRN    clopidogreL (PLAVIX) tablet 75 mg  75 mg Oral QHS    escitalopram oxalate (LEXAPRO) tablet 20 mg  20 mg Oral QHS    verapamil ER (CALAN-SR) tablet 120 mg  120 mg Oral DAILY WITH BREAKFAST    losartan (COZAAR) tablet 25 mg  25 mg Oral DAILY    pantoprazole (PROTONIX) tablet 40 mg  40 mg Oral 7am    rOPINIRole (REQUIP) tablet 1 mg  1 mg Oral QHS     Facility-Administered Medications Ordered in Other Encounters   Medication Dose Route Frequency    sodium chloride (NS) flush 10 mL  10 mL IntraVENous PRN       Labs:  ABG No results for input(s): PHI, PCO2I, PO2I, HCO3I, SO2I, FIO2I in the last 72 hours.      CBC Recent Labs     04/23/20  0203 04/22/20  1122   WBC 4.1 3.9   HGB 12.8 11.9   HCT 39.7 38.1    257   MCV 94.1 95.3   MCH 30.3 11.8        Metabolic  Panel Recent Labs     04/23/20  0203 04/22/20  1122    142   K 4.0 4.6   * 109*   CO2 25 27   * 95   BUN 15 18   CREA 0.74 0.85   CA 8.9 8.7   ALB 3.6 3.4*   SGOT 20 22   ALT 29 30        Pertinent Labs                Piedad Alberto [facility]  4/24/2020

## 2020-04-24 NOTE — PROGRESS NOTES
0700: Verbal shift change report given to KISHAN Cadet (oncoming nurse) by Alejandrina Johnson RN (offgoing nurse). Report included the following information SBAR, Intake/Output, MAR, Recent Results and Cardiac Rhythm NSR.     0735: COVID negative. PerfectServe to Dr. Mojgan Gross regarding test result. Will discontinue Droplet Plus precautions and place transfer orders to Telemetry, per Dr. Mojgan Gross.    0845:   TRANSFER - OUT REPORT:    Verbal report given to KISHAN Blevins on Stuart Donahue  being transferred to [facility] for change in patient condition(COVID Negative)       Report consisted of patients Situation, Background, Assessment and   Recommendations(SBAR). Information from the following report(s) SBAR, Intake/Output, MAR, Recent Results and Cardiac Rhythm NSR was reviewed with the receiving nurse. Lines:   Peripheral IV 04/22/20 Right Antecubital (Active)   Site Assessment Clean, dry, & intact 4/24/2020  7:53 AM   Phlebitis Assessment 0 4/24/2020  7:53 AM   Infiltration Assessment 0 4/24/2020  7:53 AM   Dressing Status Clean, dry, & intact 4/24/2020  7:53 AM   Dressing Type Tape;Transparent 4/24/2020  7:53 AM   Hub Color/Line Status Pink;Capped 4/24/2020  7:53 AM   Action Taken Open ports on tubing capped 4/24/2020  7:53 AM   Alcohol Cap Used Yes 4/24/2020  7:53 AM        Opportunity for questions and clarification was provided. 0920Jolymichael Jose, daughter regarding transfer and plan of care. All questions were answered.

## 2020-04-24 NOTE — PROGRESS NOTES
Problem: Mobility Impaired (Adult and Pediatric)  Goal: *Acute Goals and Plan of Care (Insert Text)  Description: FUNCTIONAL STATUS PRIOR TO ADMISSION: Patient was independent and active without use of DME. Patient is the caregiver for her  who has dementia. She assists him with walking and ADLS. She drives and cooks. She reports 2 falls in the last year. HOME SUPPORT PRIOR TO ADMISSION: The patient lived with her . She also has supportive children. Physical Therapy Goals  Initiated 4/24/2020  1. Patient will move from supine to sit and sit to supine , scoot up and down and roll side to side in bed with independence within 7 day(s). 2.  Patient will transfer from bed to chair and chair to bed with independence using the least restrictive device within 7 day(s). 3.  Patient will perform sit to stand with independence within 7 day(s). 4.  Patient will ambulate with independence for 150 feet with the least restrictive device within 7 day(s). Outcome: Not Met   PHYSICAL THERAPY EVALUATION  Patient: Bess Del Valle (89 y.o. female)  Date: 4/24/2020  Primary Diagnosis: Acute respiratory distress [R06.03]        Precautions:   Fall      ASSESSMENT  Based on the objective data described below, the patient presents with decreased strength, decreased functional mobility, impaired balance, anxiety, mildly unsteady gait, and reports SOB following admission for acute respiratory distress. Patient has been ruled out for COVID-19 with negative test this date. Patient with impaired balance, denies any dizziness although reports history of dizziness with positional changes. Negative for orthostatic hypotension. Patient ambulated in hallway with x 1 assist with slow gait and path deviations, likely due to increased hesitancy and decreased arm swing. Of note, she is the primary caregiver for her  although reports her children have stepped up.      Current Level of Function Impacting Discharge (mobility/balance): CGA without AD    Functional Outcome Measure: The patient scored 19/28 on the Tinetti outcome measure which is indicative of moderate fall risk. Other factors to consider for discharge: caregiver for her , SOB, anxiety     Patient will benefit from skilled therapy intervention to address the above noted impairments. PLAN :  Recommendations and Planned Interventions: bed mobility training, transfer training, gait training, therapeutic exercises, patient and family training/education, and therapeutic activities      Frequency/Duration: Patient will be followed by physical therapy:  3 times a week to address goals. Recommendation for discharge: (in order for the patient to meet his/her long term goals)  Physical therapy at least 2 days/week in the home with support from children    This discharge recommendation:  Has not yet been discussed the attending provider and/or case management    IF patient discharges home will need the following DME: patient owns DME required for discharge         SUBJECTIVE:   Patient stated I still have some pain in my chest from all the coughing. 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)     GERD (gastroesophageal reflux disease)     Hypercholesteremia     Hypertension     Idiopathic small and large fiber sensory neuropathy     Limited peripheral vision of right eye     complication of cataract surgery    Long term current use of anticoagulant therapy     Meniere disease     MGUS (monoclonal gammopathy of unknown significance)     Monoclonal gammopathy of unknown significance     hem/onc:  Dr Lorna Funes [phone]    Osteoporosis     Psoriasis     bilateral Legs    Restless leg     Skin melanoma ([facility] 2012    (Rt ankle) and basal cell (Lt eye) removed    Transient ischemic attack approx 2005    HAS HAD 2 MINISTROKES-NO DEFICITS; neuro Dr Aashish Good. Isabelle Fall (cc)    Unspecified adverse effect of anesthesia     HAS NEEDED PORT OR CUTDOWN FOR RECVNG BLOOD OR LONG-TERM IVs; surgery 7/28/15 w/o any difficulty    Unspecified sleep apnea     does not use CPAP     Past Surgical History:   Procedure Laterality Date    HX APPENDECTOMY      HX CATARACT REMOVAL  2011    Right    HX CERVICAL FUSION  1/28/15    C5-7    HX CHOLECYSTECTOMY  2000    HX GI  2010    liver bx due to liver Bx due to elevated enzymes, liver laceration during Bx, hospitalized 10-12d    HX HEENT  7/28/15    Lt total parotidectomy with facial nerve dissection: Dr Gilma Hill    HX HEENT  11/2015    Right parotidectomy    HX HYSTERECTOMY      HX ORTHOPAEDIC  2009 & 2011    LUMBAR FUSION and revision    HX OTHER SURGICAL  2010    MELANOMA (Rt ankle) and basal cell (Lt eye) removed; q6mo ck by DERMATOLOGY    HX SHOULDER REPLACEMENT Left 08/01/2016    HX THYROIDECTOMY      Right    ND COLON CA SCRN NOT HI RSK IND  1/27/2016         ND INSERTION SUBQ CARDIAC RHYTHM MONITOR W/PRGRMG N/A 11/6/2019    LOOP RECORDER INSERT performed by Nathan Stephen MD at [facility] CARDIAC CATH LAB       Personal factors and/or comorbidities impacting plan of care: caregiver for her , SOB    Home Situation  Home Environment: Apartment  # Steps to Enter: 0  One/Two Story Residence: One story  Living Alone: No  Support Systems: Child(makayla), Spouse/Significant Other/Partner  Patient Expects to be Discharged to[de-identified] Unknown  Current DME Used/Available at Home: Walker, rolling, Cane, straight, Wheelchair, Shower chair, Grab bars  Tub or Shower Type: Shower    EXAMINATION/PRESENTATION/DECISION MAKING:   Critical Behavior:  Neurologic State: (P) Alert  Orientation Level: (P) Oriented X4  Cognition: (P) Follows commands     Hearing: Auditory  Auditory Impairment: Hard of hearing, bilateral  Skin:    Edema:   Range Of Motion:  AROM: Within functional limits           PROM: Within functional limits           Strength:    Strength: Generally decreased, functional                    Tone & Sensation:   Tone: Normal              Sensation: Intact               Coordination:  Coordination: Within functional limits  Vision:      Functional Mobility:  Bed Mobility:  Rolling: Independent  Supine to Sit: Independent     Scooting: Independent  Transfers:  Sit to Stand: Stand-by assistance  Stand to Sit: Stand-by assistance        Bed to Chair: Contact guard assistance              Balance:   Sitting: Intact; Without support  Standing: Impaired; Without support  Standing - Static: Good  Standing - Dynamic : Fair  Ambulation/Gait Training:  Distance (ft): 100 Feet (ft)  Assistive Device: Gait belt  Ambulation - Level of Assistance: Contact guard assistance;Assist x1     Gait Description (WDL): Exceptions to WDL  Gait Abnormalities: Decreased step clearance;Shuffling gait; Path deviations        Base of Support: Narrowed     Speed/Emani: Shuffled; Slow  Step Length: Right shortened;Left shortened                  Functional Measure:  Tinetti test:    Sitting Balance: 1  Arises: 2  Attempts to Rise: 2  Immediate Standing Balance: 1  Standing Balance: 1  Nudged: 0  Eyes Closed: 0  Turn 360 Degrees - Continuous/Discontinuous: 1  Turn 360 Degrees - Steady/Unsteady: 1  Sitting Down: 1  Balance Score: 10 Balance total score  Indication of Gait: 1  R Step Length/Height: 1  L Step Length/Height: 1  R Foot Clearance: 1  L Foot Clearance: 1  Step Symmetry: 1  Step Continuity: 1  Path: 1  Trunk: 1  Walking Time: 0  Gait Score: 9 Gait total score  Total Score: 19/28 Overall total score         Tinetti Tool Score Risk of Falls  <19 = High Fall Risk  19-24 = Moderate Fall Risk  25-28 = Low Fall Risk  Tinetti ME. Performance-Oriented Assessment of Mobility Problems in Elderly Patients. [facility] 66; [id number]. (Scoring Description: PT Bulletin Feb. 10, 1993)    Older adults: Ediedenise Stallworth et al, 2009; n = 1000 [facility] elderly evaluated with ABC, EVANGELIST, ADL, and IADL)  · Mean EVANGELIST score for males aged 69-68 years = 26.21(3.40)  · Mean EVANGELIST score for females age 69-68 years = 25.16(4.30)  · Mean EVANGELIST score for males over 80 years = 23.29(6.02)  · Mean EVANGELIST score for females over 80 years = 17.20(8.32)            Physical Therapy Evaluation Charge Determination   History Examination Presentation Decision-Making   MEDIUM  Complexity : 1-2 comorbidities / personal factors will impact the outcome/ POC  MEDIUM Complexity : 3 Standardized tests and measures addressing body structure, function, activity limitation and / or participation in recreation  MEDIUM Complexity : Evolving with changing characteristics  Other outcome measures Tinetti  MEDIUM      Based on the above components, the patient evaluation is determined to be of the following complexity level: MEDIUM    Activity Tolerance:   Good and SpO2 stable on RA  Please refer to the flowsheet for vital signs taken during this treatment. After treatment patient left in no apparent distress:   Sitting in chair and Call bell within reach    COMMUNICATION/EDUCATION:   The patients plan of care was discussed with: Occupational therapist, Registered nurse, and Case management. Fall prevention education was provided and the patient/caregiver indicated understanding., Patient/family have participated as able in goal setting and plan of care. , and Patient/family agree to work toward stated goals and plan of care.     Thank you for this referral.  Amador Smalls, PT, DPT   Time Calculation: 20 mins

## 2020-04-24 NOTE — PROGRESS NOTES
PT note:     Chart reviewed and spoke with nursing. Patient is currently transferring up to 3253. Of note, patient is negative for COVID-19. Will follow up for PT evaluation.      Virignie Cross, PT, DPT

## 2020-04-24 NOTE — PROGRESS NOTES
TRANSFER - IN REPORT:    Verbal report received from [facility] RN(name) on Bob Allen  being received from [facility](unit) for routine progression of care      Report consisted of patients Situation, Background, Assessment and   Recommendations(SBAR). Information from the following report(s) SBAR, Kardex, Procedure Summary, Intake/Output, MAR and Recent Results was reviewed with the receiving nurse. Opportunity for questions and clarification was provided. Assessment completed upon patients arrival to unit and care assumed.

## 2020-04-24 NOTE — PROGRESS NOTES
Bedside shift change report given to [facility] (oncoming nurse) by Abby Donald RN(offgoing nurse). Report included the following information SBAR, Kardex, Intake/Output and Recent Results.

## 2020-04-25 PROCEDURE — 74011250637 HC RX REV CODE- 250/637: Performed by: FAMILY MEDICINE

## 2020-04-25 PROCEDURE — 74011250636 HC RX REV CODE- 250/636: Performed by: INTERNAL MEDICINE

## 2020-04-25 PROCEDURE — 74011250637 HC RX REV CODE- 250/637: Performed by: INTERNAL MEDICINE

## 2020-04-25 PROCEDURE — 74011636637 HC RX REV CODE- 636/637: Performed by: INTERNAL MEDICINE

## 2020-04-25 PROCEDURE — 74011250637 HC RX REV CODE- 250/637: Performed by: HOSPITALIST

## 2020-04-25 PROCEDURE — 65660000000 HC RM CCU STEPDOWN

## 2020-04-25 PROCEDURE — 94640 AIRWAY INHALATION TREATMENT: CPT

## 2020-04-25 PROCEDURE — 94760 N-INVAS EAR/PLS OXIMETRY 1: CPT

## 2020-04-25 RX ADMIN — ATORVASTATIN CALCIUM 20 MG: 20 TABLET, FILM COATED ORAL at 21:41

## 2020-04-25 RX ADMIN — BENZONATATE 100 MG: 100 CAPSULE ORAL at 08:37

## 2020-04-25 RX ADMIN — ALBUTEROL SULFATE 2 PUFF: 90 AEROSOL, METERED RESPIRATORY (INHALATION) at 11:29

## 2020-04-25 RX ADMIN — PREDNISONE 40 MG: 20 TABLET ORAL at 08:32

## 2020-04-25 RX ADMIN — POLYETHYLENE GLYCOL 3350 17 G: 17 POWDER, FOR SOLUTION ORAL at 08:37

## 2020-04-25 RX ADMIN — ALBUTEROL SULFATE 2 PUFF: 90 AEROSOL, METERED RESPIRATORY (INHALATION) at 07:44

## 2020-04-25 RX ADMIN — PANTOPRAZOLE SODIUM 40 MG: 40 TABLET, DELAYED RELEASE ORAL at 08:32

## 2020-04-25 RX ADMIN — Medication 10 ML: at 05:52

## 2020-04-25 RX ADMIN — HEPARIN SODIUM 5000 UNITS: 5000 INJECTION INTRAVENOUS; SUBCUTANEOUS at 08:32

## 2020-04-25 RX ADMIN — Medication 10 ML: at 21:40

## 2020-04-25 RX ADMIN — ROPINIROLE HYDROCHLORIDE 1 MG: 1 TABLET, FILM COATED ORAL at 21:40

## 2020-04-25 RX ADMIN — LOSARTAN POTASSIUM 25 MG: 25 TABLET ORAL at 08:32

## 2020-04-25 RX ADMIN — CLOPIDOGREL BISULFATE 75 MG: 75 TABLET, FILM COATED ORAL at 21:41

## 2020-04-25 RX ADMIN — ESCITALOPRAM OXALATE 20 MG: 10 TABLET ORAL at 21:41

## 2020-04-25 RX ADMIN — ALBUTEROL SULFATE 2 PUFF: 90 AEROSOL, METERED RESPIRATORY (INHALATION) at 19:15

## 2020-04-25 RX ADMIN — AZITHROMYCIN MONOHYDRATE 500 MG: 250 TABLET ORAL at 08:32

## 2020-04-25 RX ADMIN — VERAPAMIL HYDROCHLORIDE 120 MG: 120 TABLET, FILM COATED, EXTENDED RELEASE ORAL at 08:33

## 2020-04-25 RX ADMIN — HEPARIN SODIUM 5000 UNITS: 5000 INJECTION INTRAVENOUS; SUBCUTANEOUS at 17:33

## 2020-04-25 RX ADMIN — HEPARIN SODIUM 5000 UNITS: 5000 INJECTION INTRAVENOUS; SUBCUTANEOUS at 01:14

## 2020-04-25 RX ADMIN — ALBUTEROL SULFATE 2 PUFF: 90 AEROSOL, METERED RESPIRATORY (INHALATION) at 15:28

## 2020-04-25 RX ADMIN — Medication 10 ML: at 14:00

## 2020-04-25 NOTE — PROGRESS NOTES
Hospitalist Progress Note    NAME: Maria Alejandra Johnson   :  1940   MRN:  [id number]       Assessment / Plan:  Acute respiratory failure with hypoxia POA severe dyspnea, room air sats 80% while walking  Acute bronchitis with bronchospasm POA  Not on  home oxygen   Chest x-ray clear  CTA chest with no PE or airspace disease or COPD changes  Suspect bronchitis with bronchospasm   COVID/Flu/RVP Neg so far  albuterol MDI with prednisone  zithromax 500mg daily   pBNP relatively normal  pulm eval     Exertional chest pain POA   lasting for months  Serial cardiac enzymes neg  Continue Plavix statin and for stress test today per cards   Cardiology on case   Recent echo and ALLIE with LVEF 56 to 60% with no wall motion abnormalities  2019     History of stroke 2019  Left frontal and parietal, questionable cardioembolic  Plavix, statin       Hyperlipidemia POA  Continue Lipitor     Essential hypertension POA   continue verapamil and olmesartan     Restless leg syndrome  Continue primidone and Requip        DVT prophylaxis with heparin SQ     Code status: full code  NOK: children  Dispo: Home once medially stable. PT/OT eval     Subjective:     Chief Complaint / Reason for Physician Visit  Admits general malaise, still wheezing. No fevers/chills    Discussed with RN events overnight. Review of Systems:  Symptom Y/N Comments  Symptom Y/N Comments   Fever/Chills n   Chest Pain n    Poor Appetite    Edema     Cough y   Abdominal Pain n    Sputum n   Joint Pain     SOB/HERNANDES y   Pruritis/Rash     Nausea/vomit n   Tolerating PT/OT     Diarrhea n   Tolerating Diet y    Constipation n   Other       Could NOT obtain due to:      Objective:     VITALS:   Last 24hrs VS reviewed since prior progress note.  Most recent are:  Patient Vitals for the past 24 hrs:   Temp Pulse Resp BP SpO2   20 1130 -- -- -- -- 96 %   20 1120 98.3 °F (36.8 °C) 65 16 133/67 96 %   20 0750 98 °F (36.7 °C) 71 12 120/62 95 % 04/25/20 0745 -- -- -- -- 95 %   04/25/20 0414 98 °F (36.7 °C) 61 17 133/74 92 %   04/24/20 2341 98.1 °F (36.7 °C) 73 18 130/75 93 %   04/24/20 2013 -- -- -- -- 96 %   04/24/20 1932 98.4 °F (36.9 °C) 74 17 135/60 96 %   04/24/20 1608 -- -- -- -- 97 %   04/24/20 1434 98.4 °F (36.9 °C) 73 16 146/62 98 %       Intake/Output Summary (Last 24 hours) at 4/25/2020 1345  Last data filed at 4/25/2020 1332  Gross per 24 hour   Intake 440 ml   Output --   Net 440 ml        PHYSICAL EXAM:  General: WD, WN. Alert, cooperative, no acute distress    EENT:  EOMI. Anicteric sclerae. MMM  Resp:  B/l wheezing. No accessory muscle use  CV:  Regular  rhythm,  No edema  GI:  Soft, Non distended, Non tender.  +Bowel sounds  Neurologic:  Alert and oriented X 3, normal speech,   Psych:   Not anxious nor agitated  Skin:  No rashes. No jaundice    Reviewed most current lab test results and cultures  YES  Reviewed most current radiology test results   YES  Review and summation of old records today    NO  Reviewed patient's current orders and MAR    YES  PMH/ reviewed - no change compared to H&P  ________________________________________________________________________  Care Plan discussed with:    Comments   Patient x    Family      RN x    Care Manager     Consultant                        Multidiciplinary team rounds were held today with , nursing, pharmacist and clinical coordinator. Patient's plan of care was discussed; medications were reviewed and discharge planning was addressed.      ________________________________________________________________________  Total NON critical care TIME: 30   Minutes    Total CRITICAL CARE TIME Spent:   Minutes non procedure based      Comments   >50% of visit spent in counseling and coordination of care     ________________________________________________________________________  Viviane Mitchell DO     Procedures: see electronic medical records for all procedures/Xrays and details which were not copied into this note but were reviewed prior to creation of Plan. LABS:  I reviewed today's most current labs and imaging studies.   Pertinent labs include:  Recent Labs     04/23/20  0203   WBC 4.1   HGB 12.8   HCT 39.7        Recent Labs     04/23/20  0203      K 4.0   *   CO2 25   *   BUN 15   CREA 0.74   CA 8.9   ALB 3.6   TBILI 0.3   SGOT 20   ALT 29       Signed: Siomara Elliott DO

## 2020-04-25 NOTE — PROGRESS NOTES
Home Oxygen Test  Date of test: 4/25  Time of test: 1610    Sa02 95on room air AT REST. Sa02 94 % on room air DURING AMBULATION.           Patient walked around 3 min in the caballero, complains  of weakness and wants to return to the room

## 2020-04-25 NOTE — PROGRESS NOTES
CHRISTIANNE: [facility] PT/OT/SN    [facility] approved referral for [facility] services at discharge.  Placed on AVS.     Betoy Felton, [address] [facility]  [phone]

## 2020-04-25 NOTE — PROGRESS NOTES
Bedside shift change report given to [address] [place] [address] (oncoming nurse) by Juliana Montana RN (offgoing nurse). Report included the following information SBAR, Kardex, Procedure Summary, Intake/Output, MAR and Recent Results.

## 2020-04-25 NOTE — PROGRESS NOTES
Bedside and Verbal shift change report given to Felicity HOLLEY (oncoming nurse) by Mere Carbajal (offgoing nurse). Report included the following information SBAR, Kardex, Intake/Output, MAR and Recent Results.

## 2020-04-26 VITALS
TEMPERATURE: 98.6 F | WEIGHT: 155.2 LBS | HEART RATE: 74 BPM | DIASTOLIC BLOOD PRESSURE: 56 MMHG | SYSTOLIC BLOOD PRESSURE: 125 MMHG | RESPIRATION RATE: 18 BRPM | OXYGEN SATURATION: 96 % | BODY MASS INDEX: 28.56 KG/M2 | HEIGHT: 62 IN

## 2020-04-26 PROCEDURE — 74011250636 HC RX REV CODE- 250/636: Performed by: INTERNAL MEDICINE

## 2020-04-26 PROCEDURE — 74011250637 HC RX REV CODE- 250/637: Performed by: HOSPITALIST

## 2020-04-26 PROCEDURE — 74011636637 HC RX REV CODE- 636/637: Performed by: INTERNAL MEDICINE

## 2020-04-26 PROCEDURE — 94760 N-INVAS EAR/PLS OXIMETRY 1: CPT

## 2020-04-26 PROCEDURE — 74011250637 HC RX REV CODE- 250/637: Performed by: INTERNAL MEDICINE

## 2020-04-26 PROCEDURE — 94640 AIRWAY INHALATION TREATMENT: CPT

## 2020-04-26 RX ORDER — LEVOCETIRIZINE DIHYDROCHLORIDE 5 MG/1
5 TABLET, FILM COATED ORAL
Qty: 30 TAB | Refills: 0 | Status: SHIPPED | OUTPATIENT
Start: 2020-04-26

## 2020-04-26 RX ADMIN — ALBUTEROL SULFATE 2 PUFF: 90 AEROSOL, METERED RESPIRATORY (INHALATION) at 07:23

## 2020-04-26 RX ADMIN — Medication 10 ML: at 05:32

## 2020-04-26 RX ADMIN — VERAPAMIL HYDROCHLORIDE 120 MG: 120 TABLET, FILM COATED, EXTENDED RELEASE ORAL at 08:22

## 2020-04-26 RX ADMIN — PREDNISONE 40 MG: 20 TABLET ORAL at 08:22

## 2020-04-26 RX ADMIN — HEPARIN SODIUM 5000 UNITS: 5000 INJECTION INTRAVENOUS; SUBCUTANEOUS at 16:13

## 2020-04-26 RX ADMIN — BENZONATATE 100 MG: 100 CAPSULE ORAL at 16:12

## 2020-04-26 RX ADMIN — LOSARTAN POTASSIUM 25 MG: 25 TABLET ORAL at 08:22

## 2020-04-26 RX ADMIN — ALBUTEROL SULFATE 2 PUFF: 90 AEROSOL, METERED RESPIRATORY (INHALATION) at 11:16

## 2020-04-26 RX ADMIN — AZITHROMYCIN MONOHYDRATE 500 MG: 250 TABLET ORAL at 08:22

## 2020-04-26 RX ADMIN — HEPARIN SODIUM 5000 UNITS: 5000 INJECTION INTRAVENOUS; SUBCUTANEOUS at 00:30

## 2020-04-26 RX ADMIN — PANTOPRAZOLE SODIUM 40 MG: 40 TABLET, DELAYED RELEASE ORAL at 08:22

## 2020-04-26 RX ADMIN — Medication 10 ML: at 16:12

## 2020-04-26 RX ADMIN — ALBUTEROL SULFATE 2 PUFF: 90 AEROSOL, METERED RESPIRATORY (INHALATION) at 15:51

## 2020-04-26 RX ADMIN — HEPARIN SODIUM 5000 UNITS: 5000 INJECTION INTRAVENOUS; SUBCUTANEOUS at 08:23

## 2020-04-26 RX ADMIN — POLYETHYLENE GLYCOL 3350 17 G: 17 POWDER, FOR SOLUTION ORAL at 08:23

## 2020-04-26 NOTE — DISCHARGE SUMMARY
Hospitalist Discharge Summary     Patient ID:  Silvinao Sorenson  [id number]  67 y.o.  1940 4/22/2020    PCP on record: Huey Anderson MD    Admit date: 4/22/2020  Discharge date and time: 4/26/2020    DISCHARGE DIAGNOSIS:    See below    CONSULTATIONS:  IP CONSULT TO HOSPITALIST  IP CONSULT TO CARDIOLOGY  IP CONSULT TO PULMONOLOGY    Excerpted HPI from H&P of Maria Guadalupe Ying MD:  78-year-old white female     Currently lives with her  who has dementia, she is the primary caregiver  Baseline does not wear oxygen  Note for the past 6 months or so increasing dyspnea on exertion and exertional fatigue  Often walking to the mailbox she has to stop to catch her breath  She also notes occasional substernal chest discomfort that improves with rest  Denies a prior history of coronary artery disease     Last admission 152 1/11/2019 with speech changes  Acute stroke in left frontal and left parietal area, questionable cardioembolic  04% right internal carotid artery stenosis felt to be asymptomatic  ALLIE without embolic source  TTE LVEF 56 to 60%  Treated with aspirin and statin, continue blood pressure medications  A loop recorder was placed by Dr Man Dos Santos     2 to 3 days ago, increasing cough which is nonproductive, especially at night  She was frequent frequently coughing when I was in the room with her  No fevers or chills, headache, sore throat, abdominal pain, nausea vomiting, diarrhea  No current chest pain  Increasingly short of breath when she walks for the past 2 days  No orthopnea or lower extremity edema  Called her PCP when the symptoms did not respond to NyQuil  Referred to the emergency room     Emergency room  Room air sats stable at rest  Actively wheezing per emergency room doctor  [facility] in the hallway, very tachypneic and sats dropped to 88%  Oxygen nasal cannula started  No sirs criteria  No EKG changes and cardiac enzymes negative  proBNP 153  Chest x-ray with no airspace disease or edema  CTA chest with no PE, airspace disease, COPD changes  We were called to admit the patient    ______________________________________________________________________  DISCHARGE SUMMARY/HOSPITAL COURSE:  for full details see H&P, daily progress notes, labs, consult notes. Acute respiratory failure with hypoxia POA severe dyspnea, room air sats 80% while walking POA  Acute bronchitis with bronchospasm POA  -maintaining sats while walking on RA  -dyspnea improved   Chest x-ray clear  CTA chest with no PE or airspace disease or COPD changes  Suspect bronchitis with bronchospasm   COVID/Flu/RVP Neg so far  albuterol MDI with prednisone  zithromax 500mg daily   pBNP relatively normal  pulm eval     Exertional chest pain POA   lasting for months  Serial cardiac enzymes neg  Continue Plavix statin and for stress test today per cards   Cardiology on case   Recent echo and ALLIE with LVEF 56 to 60% with no wall motion abnormalities  Nov 2019     History of stroke November 2019  Left frontal and parietal, questionable cardioembolic  Plavix, statin       Hyperlipidemia POA  Continue Lipitor     Essential hypertension POA   continue verapamil and olmesartan     Restless leg syndrome  Continue primidone and Requip  _______________________________________________________________________  Patient seen and examined by me on discharge day. Pertinent Findings:  Gen:    Not in distress  Chest: Clear lungs  CVS:   Regular rhythm. No edema  Abd:  Soft, not distended, not tender  Neuro:  Alert, oriented x 3  _______________________________________________________________________  DISCHARGE MEDICATIONS:   Current Discharge Medication List      START taking these medications    Details   levocetirizine (Xyzal) 5 mg tablet Take 1 Tab by mouth daily as needed for Allergies.   Qty: 30 Tab, Refills: 0         CONTINUE these medications which have NOT CHANGED    Details   clonazePAM (KlonoPIN) 0.5 mg tablet TAKE 1 TABLET BY MOUTH TWICE DAILY **MAX  2  PER  24  HOURS**  Qty: 60 Tab, Refills: 0    Associated Diagnoses: Anxiety      clopidogreL (PLAVIX) 75 mg tab Take 1 Tab by mouth nightly. Qty: 90 Tab, Refills: 1      pantoprazole (Protonix) 40 mg tablet Take 1 Tab by mouth every morning. Indications: gastroesophageal reflux disease  Qty: 90 Tab, Refills: 1      verapamil ER (CALAN-SR) 120 mg tablet TAKE 1 TABLET BY MOUTH NIGHTLY FOR HIGH BLOOD PRESSURE  Qty: 90 Tab, Refills: 0      olmesartan (BENICAR) 5 mg tablet TAKE 1/2 (ONE-HALF) TABLET BY MOUTH ONCE DAILY  Qty: 30 Tab, Refills: 0    Associated Diagnoses: Essential hypertension      nortriptyline (PAMELOR) 10 mg capsule Take 1-2 Caps by mouth nightly. Qty: 90 Cap, Refills: 3      primidone (MYSOLINE) 50 mg tablet Take 1 Tab by mouth nightly. Qty: 90 Tab, Refills: 1      atorvastatin (LIPITOR) 20 mg tablet Take 20 mg by mouth nightly. multivit/folic acid/vit K1 (ONE-A-DAY WOMEN'S 50 PLUS PO) Take 1 Tab by mouth daily. fluticasone propionate (FLONASE) 50 mcg/actuation nasal spray 2 Sprays by Both Nostrils route daily. Qty: 1 Bottle, Refills: 0    Associated Diagnoses: Bacterial upper respiratory infection      rOPINIRole (REQUIP) 1 mg tablet Take 1 Tab by mouth nightly. Qty: 90 Tab, Refills: 2      escitalopram oxalate (LEXAPRO) 20 mg tablet Take 1 Tab by mouth nightly. Indications: Anxiousness associated with Depression  Qty: 90 Tab, Refills: 4      albuterol (PROVENTIL HFA, VENTOLIN HFA, PROAIR HFA) 90 mcg/actuation inhaler Take 1 Puff by inhalation every four (4) hours as needed for Wheezing. Qty: 1 Inhaler, Refills: 0      polyethylene glycol (MIRALAX) 17 gram packet Take 17 g by mouth daily as needed (constipation). Patient Follow Up Instructions:    Activity: Activity as tolerated  Diet: Resume previous diet    Follow-up with as below    Follow-up Information     Follow up With Specialties Details Why Cindy Adams MD Internal Medicine Schedule an appointment as soon as possible for a visit in 1 week hospital follow-up [address] [facility] [place]  [phone] [address] [facility]  This is your post-acute home healthcare provider for PT, OT, and Nursing. If you do not hear from them within 24-48 hours, please give them a call.   [address] [place] [address]  [phone]        ________________________________________________________________    Risk of deterioration: Moderate    Condition at Discharge:  Stable  __________________________________________________________________    Disposition  Home with family and home health services    ____________________________________________________________________    Code Status: Full Code  ___________________________________________________________________      Total time in minutes spent coordinating this discharge (includes going over instructions, follow-up, prescriptions, and preparing report for sign off to her PCP) :  35 minutes    Signed:  Jenny Barraza,

## 2020-04-26 NOTE — PROGRESS NOTES
Bedside and Verbal shift change report given to Felicity HOLLEY (oncoming nurse) by Salomón Andrea (offgoing nurse). Report included the following information SBAR, Kardex, Intake/Output, MAR and Recent Results.

## 2020-04-27 ENCOUNTER — PATIENT OUTREACH (OUTPATIENT)
Dept: FAMILY MEDICINE CLINIC | Age: 80
End: 2020-04-27

## 2020-04-27 NOTE — PROGRESS NOTES
Patient contacted regarding COVID-19  risk. Care Transition Nurse/ Ambulatory Care Manager contacted the patient by telephone to perform post discharge assessment. Verified name and  with patient as identifiers. Provided introduction to self, and explanation of the CTN/ACM role, and reason for call due to risk factors for infection and/or exposure to COVID-19. Symptoms reviewed with patient who verbalized the following symptoms: no new symptoms and no worsening symptoms. Due to no new or worsening symptoms encounter was not routed to provider for escalation. Patient has following risk factors of: heart failure and acute respiratory failure. CTN/ACM reviewed discharge instructions, medical action plan and red flags such as increased shortness of breath, increasing fever and signs of decompensation with patient who verbalized understanding. Discussed exposure protocols and quarantine with CDC Guidelines What to do if you are sick with coronavirus disease .  Patient was given an opportunity for questions and concerns. The patient agrees to contact the [facility] exposure line [phone], [facility] department [facility]  ([phone]) and PCP office for questions related to their healthcare. CTN/ACM provided contact information for future needs. Reviewed and educated patient on any new and changed medications related to discharge diagnosis. Patient/family/caregiver given information for [employer] and agrees to enroll no  Patient's preferred e-mail:  n/a  Patient's preferred phone number: n/a  Based on Loop alert triggers, patient will be contacted by nurse care manager for worsening symptoms. Plan for follow-up call in 5-7 days based on severity of symptoms and risk factors.

## 2020-04-28 ENCOUNTER — HOME CARE VISIT (OUTPATIENT)
Dept: SCHEDULING | Facility: HOME HEALTH | Age: 80
End: 2020-04-28
Payer: MEDICARE

## 2020-04-28 ENCOUNTER — HOME CARE VISIT (OUTPATIENT)
Dept: HOME HEALTH SERVICES | Facility: HOME HEALTH | Age: 80
End: 2020-04-28
Payer: MEDICARE

## 2020-04-28 PROCEDURE — 400013 HH SOC

## 2020-04-28 PROCEDURE — G0299 HHS/HOSPICE OF RN EA 15 MIN: HCPCS

## 2020-04-29 ENCOUNTER — HOME CARE VISIT (OUTPATIENT)
Dept: SCHEDULING | Facility: HOME HEALTH | Age: 80
End: 2020-04-29
Payer: MEDICARE

## 2020-04-29 VITALS
OXYGEN SATURATION: 91 % | SYSTOLIC BLOOD PRESSURE: 110 MMHG | RESPIRATION RATE: 18 BRPM | DIASTOLIC BLOOD PRESSURE: 62 MMHG | TEMPERATURE: 97.6 F

## 2020-04-29 PROCEDURE — G0151 HHCP-SERV OF PT,EA 15 MIN: HCPCS

## 2020-04-30 VITALS
RESPIRATION RATE: 18 BRPM | HEART RATE: 74 BPM | OXYGEN SATURATION: 97 % | DIASTOLIC BLOOD PRESSURE: 68 MMHG | TEMPERATURE: 97.8 F | SYSTOLIC BLOOD PRESSURE: 130 MMHG

## 2020-05-01 ENCOUNTER — HOME CARE VISIT (OUTPATIENT)
Dept: SCHEDULING | Facility: HOME HEALTH | Age: 80
End: 2020-05-01
Payer: MEDICARE

## 2020-05-01 VITALS
SYSTOLIC BLOOD PRESSURE: 124 MMHG | RESPIRATION RATE: 17 BRPM | DIASTOLIC BLOOD PRESSURE: 72 MMHG | OXYGEN SATURATION: 96 % | HEART RATE: 73 BPM | TEMPERATURE: 98.3 F

## 2020-05-01 PROCEDURE — G0157 HHC PT ASSISTANT EA 15: HCPCS

## 2020-05-01 PROCEDURE — G0300 HHS/HOSPICE OF LPN EA 15 MIN: HCPCS

## 2020-05-01 NOTE — PROGRESS NOTES
Patient having difficulty with sleeping due to ongoing cough. Has tried taking Nightquil which did not ease symptoms. Has virtual visit on Monday with PCP and will discuss other options to help with ongoing cough at night.

## 2020-05-02 ENCOUNTER — HOME CARE VISIT (OUTPATIENT)
Dept: SCHEDULING | Facility: HOME HEALTH | Age: 80
End: 2020-05-02
Payer: MEDICARE

## 2020-05-02 ENCOUNTER — HOME CARE VISIT (OUTPATIENT)
Dept: HOME HEALTH SERVICES | Facility: HOME HEALTH | Age: 80
End: 2020-05-02
Payer: MEDICARE

## 2020-05-02 VITALS
DIASTOLIC BLOOD PRESSURE: 60 MMHG | OXYGEN SATURATION: 96 % | TEMPERATURE: 97 F | RESPIRATION RATE: 18 BRPM | SYSTOLIC BLOOD PRESSURE: 120 MMHG | HEART RATE: 78 BPM

## 2020-05-02 VITALS
TEMPERATURE: 97.9 F | HEART RATE: 91 BPM | DIASTOLIC BLOOD PRESSURE: 84 MMHG | OXYGEN SATURATION: 99 % | SYSTOLIC BLOOD PRESSURE: 130 MMHG | RESPIRATION RATE: 18 BRPM

## 2020-05-02 PROCEDURE — G0152 HHCP-SERV OF OT,EA 15 MIN: HCPCS

## 2020-05-04 ENCOUNTER — HOME CARE VISIT (OUTPATIENT)
Dept: HOME HEALTH SERVICES | Facility: HOME HEALTH | Age: 80
End: 2020-05-04
Payer: MEDICARE

## 2020-05-04 ENCOUNTER — VIRTUAL VISIT (OUTPATIENT)
Dept: INTERNAL MEDICINE CLINIC | Age: 80
End: 2020-05-04

## 2020-05-04 DIAGNOSIS — Z09 HOSPITAL DISCHARGE FOLLOW-UP: ICD-10-CM

## 2020-05-04 DIAGNOSIS — J40 BRONCHITIS: Primary | ICD-10-CM

## 2020-05-04 RX ORDER — METHYLPREDNISOLONE 4 MG/1
TABLET ORAL
Qty: 1 DOSE PACK | Refills: 0 | Status: SHIPPED | OUTPATIENT
Start: 2020-05-04 | End: 2020-05-18

## 2020-05-04 NOTE — PROGRESS NOTES
CC: Hospital Follow Up      HPI:    She is a 67 y.o. female who presents for evaluation of hospital follow    Admit date: 4/22/2020  Discharge date and time: 4/26/2020    DC summary not done  Reviewed chairt  Patient was admitted for shortness of breath  Chest x ray and CTA no airspace disease  Tested for COVID and FLU and negative  Given azithromycin and prednisone  p BNP relatively normal    Patient feels her breathing is not much better, still has dyspnea and feels winded    CT scan done in in the hospital    IMPRESSION:  No evidence for pulmonary embolism. Incidental stable micronodule in the right upper lobe, stable for 18 months. This is an established visit conducted via telemedicine with video. The patient has been instructed that this meets HIPAA criteria and acknowledges and agrees to this method of visitation. Pursuant to the emergency declaration under the [facility] [place]note 2[place], [facility]5 waiver authority and the [employer] Act, this Virtual Visit was conducted, with patient's consent, to reduce the patient's risk of exposure to COVID-19 and provide continuity of care for an established patient. Services were provided through a video synchronous discussion virtually to substitute for in-person clinic visit. ROS:  Constitutional: negative for fevers, chills, + has anorexia  Has dry cough, has dyspnea denies CP   Denies abdominal pain    Past Medical History:   Diagnosis Date    Adverse effect of anesthesia     sleep apnea no cpap machine useage       Anemia     has had iron infusions;  Hem/onc Dr Negrete s [phone]    Anxiety and depression     Bilateral carotid artery stenosis     Diastolic CHF, chronic (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)     GERD (gastroesophageal reflux disease)     Hypercholesteremia     Hypertension     Idiopathic small and large fiber sensory neuropathy     Limited peripheral vision of right eye     complication of cataract surgery    Long term current use of anticoagulant therapy     Meniere disease     MGUS (monoclonal gammopathy of unknown significance)     Monoclonal gammopathy of unknown significance     hem/onc:  Dr Radha Su [phone]    Osteoporosis     Psoriasis     bilateral Legs    Restless leg     Skin melanoma ([address] 2012    (Rt ankle) and basal cell (Lt eye) removed    Transient ischemic attack approx 2005    HAS HAD 2 MINISTROKES-NO DEFICITS; neuro Dr Kim Hidden (cc)    Unspecified adverse effect of anesthesia     HAS NEEDED PORT OR CUTDOWN FOR RECVNG BLOOD OR LONG-TERM IVs; surgery 7/28/15 w/o any difficulty    Unspecified sleep apnea     does not use CPAP       Current Outpatient Medications on File Prior to Visit   Medication Sig Dispense Refill    levocetirizine (Xyzal) 5 mg tablet Take 1 Tab by mouth daily as needed for Allergies. 30 Tab 0    clonazePAM (KlonoPIN) 0.5 mg tablet TAKE 1 TABLET BY MOUTH TWICE DAILY **MAX  2  PER  24  HOURS** 60 Tab 0    clopidogreL (PLAVIX) 75 mg tab Take 1 Tab by mouth nightly. 90 Tab 1    pantoprazole (Protonix) 40 mg tablet Take 1 Tab by mouth every morning. Indications: gastroesophageal reflux disease 90 Tab 1    verapamil ER (CALAN-SR) 120 mg tablet TAKE 1 TABLET BY MOUTH NIGHTLY FOR HIGH BLOOD PRESSURE 90 Tab 0    olmesartan (BENICAR) 5 mg tablet TAKE 1/2 (ONE-HALF) TABLET BY MOUTH ONCE DAILY 30 Tab 0    nortriptyline (PAMELOR) 10 mg capsule Take 1-2 Caps by mouth nightly. 90 Cap 3    primidone (MYSOLINE) 50 mg tablet Take 1 Tab by mouth nightly. 90 Tab 1    atorvastatin (LIPITOR) 20 mg tablet Take 20 mg by mouth nightly.  multivit/folic acid/vit K1 (ONE-A-DAY WOMEN'S 50 PLUS PO) Take 1 Tab by mouth daily.  fluticasone propionate (FLONASE) 50 mcg/actuation nasal spray 2 Sprays by Both Nostrils route daily.  1 Bottle 0    rOPINIRole (REQUIP) 1 mg tablet Take 1 Tab by mouth nightly. 90 Tab 2    escitalopram oxalate (LEXAPRO) 20 mg tablet Take 1 Tab by mouth nightly. Indications: Anxiousness associated with Depression 90 Tab 4    albuterol (PROVENTIL HFA, VENTOLIN HFA, PROAIR HFA) 90 mcg/actuation inhaler Take 1 Puff by inhalation every four (4) hours as needed for Wheezing. 1 Inhaler 0    polyethylene glycol (MIRALAX) 17 gram packet Take 17 g by mouth daily as needed (constipation). No current facility-administered medications on file prior to visit.         Past Surgical History:   Procedure Laterality Date    HX APPENDECTOMY      HX CATARACT REMOVAL  2011    Right    HX CERVICAL FUSION  1/28/15    C5-7    HX CHOLECYSTECTOMY  2000    HX GI  2010    liver bx due to liver Bx due to elevated enzymes, liver laceration during Bx, hospitalized 10-12d    HX HEENT  7/28/15    Lt total parotidectomy with facial nerve dissection: Dr Chema Gonzales HX HEENT  11/2015    Right parotidectomy    HX HYSTERECTOMY      HX ORTHOPAEDIC  2009 & 2011    LUMBAR FUSION and revision    HX OTHER SURGICAL  2010    MELANOMA (Rt ankle) and basal cell (Lt eye) removed; q6mo ck by DERMATOLOGY    HX SHOULDER REPLACEMENT Left 08/01/2016    HX THYROIDECTOMY      Right    MS COLON CA SCRN NOT HI RSK IND  1/27/2016         MS INSERTION SUBQ CARDIAC RHYTHM MONITOR W/PRGRMG N/A 11/6/2019    LOOP RECORDER INSERT performed by Brooke Mcnally MD at [facility] CARD[facility]AC [facility]       Family History   Problem Relation Age of Onset    Alcohol abuse Father     Emphysema Father     Cancer Sister         PANCREATIC    Alcohol abuse Brother     COPD Brother     Other Mother         UNKNOWN CAUSE OF DEATH    Heart Disease Mother     Breast Cancer Maternal Aunt     Anesth Problems Neg Hx      Reviewed and no changes     Social History     Socioeconomic History    Marital status:      Spouse name: Not on file    Number of children: Not on file    Years of education: Not on file    Highest education level: Not on file   Occupational History    Not on file   Social Needs    Financial resource strain: Not on file    Food insecurity     Worry: Not on file     Inability: Not on file    Transportation needs     Medical: Not on file     Non-medical: Not on file   Tobacco Use    Smoking status: Former Smoker     Years: 15.00     Last attempt to quit: 7/15/1975     Years since quittin.8    Smokeless tobacco: Never Used   Substance and Sexual Activity    Alcohol use: Yes     Comment: may have a glass of wine on rare occasion    Drug use: Yes     Types: Prescription, OTC    Sexual activity: Not Currently   Lifestyle    Physical activity     Days per week: Not on file     Minutes per session: Not on file    Stress: Not on file   Relationships    Social connections     Talks on phone: Not on file     Gets together: Not on file     Attends Yazdanism service: Not on file     Active member of club or organization: Not on file     Attends meetings of clubs or organizations: Not on file     Relationship status: Not on file    Intimate partner violence     Fear of current or ex partner: Not on file     Emotionally abused: Not on file     Physically abused: Not on file     Forced sexual activity: Not on file   Other Topics Concern    Not on file   Social History Narrative    Lives in [place] with  of 62 years. Has 2 sons and 1 daughter. Used to work in a bank. Likes to garden and sew.              Visit Vitals  LMP  (LMP Unknown)       Physical Examination:   Gen: mildly  appearing female  HEENT: normal conjunctiva, no audible congestion, patient does not see oral erythema, has MMM  Neck: patient does not feel enlarged or tender LAD or masses  Resp: slight increased respiratory effort RR 18, speaking in full sentences but taking pauses at end of sentences  CV: patient does not feel palpitations or heart irregularity  Abd: patient does not feel abdominal tenderness or mass, patient does not notice distension  Extrem: patient does not see swelling in ankles or joints. Neuro: Alert and oriented, able to answer questions without difficulty, able to move all extremities and walk normally          Lab Results   Component Value Date/Time    WBC 4.1 04/23/2020 02:03 AM    HGB 12.8 04/23/2020 02:03 AM    HCT 39.7 04/23/2020 02:03 AM    PLATELET 085 25/83/6958 02:03 AM    MCV 94.1 04/23/2020 02:03 AM     Lab Results   Component Value Date/Time    Sodium 140 04/23/2020 02:03 AM    Potassium 4.0 04/23/2020 02:03 AM    Chloride 109 (H) 04/23/2020 02:03 AM    CO2 25 04/23/2020 02:03 AM    Anion gap 6 04/23/2020 02:03 AM    Glucose 138 (H) 04/23/2020 02:03 AM    BUN 15 04/23/2020 02:03 AM    Creatinine 0.74 04/23/2020 02:03 AM    BUN/Creatinine ratio 20 04/23/2020 02:03 AM    GFR est AA >60 04/23/2020 02:03 AM    GFR est non-AA >60 04/23/2020 02:03 AM    Calcium 8.9 04/23/2020 02:03 AM     Lab Results   Component Value Date/Time    Cholesterol, total 186 11/06/2019 02:16 AM    HDL Cholesterol 70 11/06/2019 02:16 AM    LDL, calculated 79.2 11/06/2019 02:16 AM    VLDL, calculated 36.8 11/06/2019 02:16 AM    Triglyceride 184 (H) 11/06/2019 02:16 AM    CHOL/HDL Ratio 2.7 11/06/2019 02:16 AM     Lab Results   Component Value Date/Time    TSH 2.43 11/06/2019 02:16 AM     No results found for: PSA, Nava Carranza, [id number], [id number], PSALT  Lab Results   Component Value Date/Time    Hemoglobin A1c 5.3 11/06/2019 02:16 AM     No results found for: Joyce Ng VD3RIA    Lab Results   Component Value Date/Time    ALT (SGPT) 29 04/23/2020 02:03 AM    AST (SGOT) 20 04/23/2020 02:03 AM    Alk.  phosphatase 110 04/23/2020 02:03 AM    Bilirubin, total 0.3 04/23/2020 02:03 AM           Assessment/Plan:   Hospital discharge follow-up   Bronchitis  - patient is reporting dyspnea / I advised her to return to the hospital since she is short of breath, she had recent admission for dyspnea and had work up that was negative including COVID testing and CTA she feels her symptoms are unchanged from discharge and still having shortness of breath. I advised her to return to hospital since she appears short of breath this was discussed at length with family. She refuses to go. I sent in medrol dose pack  - methylPREDNISolone (MEDROL DOSEPACK) 4 mg tablet; Follow package directions  Dispense: 1 Dose Pack; Refill: 0  Advised to use albuterol as needed  Given recent completion of antibiotic and lack of productive cough I did not send in another antibiotic      Follow up in one week      Bienvenido Ayala MD    This is an established visit conducted via real time video and audio telemedicine. The patient has been instructed that this meets HIPAA criteria and acknowledges and agrees to this method of visitation.

## 2020-05-04 NOTE — PROGRESS NOTES
Reviewed record in preparation for visit and have obtained necessary documentation. Identified pt with two pt identifiers(name and ). Chief Complaint   Patient presents with   [facility] Follow Up       Health Maintenance Due   Topic Date Due    Eye Exam  1950    DTaP/Tdap/Td  (1 - Tdap) 1962    Shingles Vaccine (1 of 2) 1991    Annual Well Visit  2020       Ms. Gema Gore has a reminder for a \"due or due soon\" health maintenance. I have asked that she discuss this further with her primary care provider for follow-up on this health maintenance. Coordination of Care Questionnaire:  :     1) Have you been to an emergency room, urgent care clinic since your last visit? no   Hospitalized since your last visit? no             2) Have you seen or consulted any other health care providers outside of [address] [place] [address] since your last visit? no  (Include any pap smears or colon screenings in this section.)    3) In the event something were to happen to you and you were unable to speak on your behalf, do you have an Advance Directive/ Living Will in place stating your wishes? NO    Do you have an Advance Directive on file? no    4) Are you interested in receiving information on Advance Directives? NO    Patient is accompanied by self I have received verbal consent from Becky Armas to discuss any/all medical information while they are present in the room.

## 2020-05-05 ENCOUNTER — HOME CARE VISIT (OUTPATIENT)
Dept: SCHEDULING | Facility: HOME HEALTH | Age: 80
End: 2020-05-05
Payer: MEDICARE

## 2020-05-05 PROCEDURE — G0299 HHS/HOSPICE OF RN EA 15 MIN: HCPCS

## 2020-05-06 ENCOUNTER — HOME CARE VISIT (OUTPATIENT)
Dept: SCHEDULING | Facility: HOME HEALTH | Age: 80
End: 2020-05-06
Payer: MEDICARE

## 2020-05-06 ENCOUNTER — HOME CARE VISIT (OUTPATIENT)
Dept: HOME HEALTH SERVICES | Facility: HOME HEALTH | Age: 80
End: 2020-05-06
Payer: MEDICARE

## 2020-05-06 VITALS
RESPIRATION RATE: 22 BRPM | OXYGEN SATURATION: 97 % | SYSTOLIC BLOOD PRESSURE: 106 MMHG | TEMPERATURE: 97.8 F | HEART RATE: 77 BPM | DIASTOLIC BLOOD PRESSURE: 70 MMHG

## 2020-05-06 VITALS
DIASTOLIC BLOOD PRESSURE: 74 MMHG | HEART RATE: 61 BPM | OXYGEN SATURATION: 97 % | TEMPERATURE: 98.2 F | SYSTOLIC BLOOD PRESSURE: 122 MMHG

## 2020-05-06 PROCEDURE — G0157 HHC PT ASSISTANT EA 15: HCPCS

## 2020-05-06 PROCEDURE — G0158 HHC OT ASSISTANT EA 15: HCPCS

## 2020-05-07 ENCOUNTER — HOME CARE VISIT (OUTPATIENT)
Dept: SCHEDULING | Facility: HOME HEALTH | Age: 80
End: 2020-05-07
Payer: MEDICARE

## 2020-05-07 ENCOUNTER — HOME CARE VISIT (OUTPATIENT)
Dept: HOME HEALTH SERVICES | Facility: HOME HEALTH | Age: 80
End: 2020-05-07
Payer: MEDICARE

## 2020-05-07 VITALS
DIASTOLIC BLOOD PRESSURE: 70 MMHG | OXYGEN SATURATION: 95 % | HEART RATE: 70 BPM | SYSTOLIC BLOOD PRESSURE: 118 MMHG | TEMPERATURE: 98.2 F

## 2020-05-07 VITALS
RESPIRATION RATE: 17 BRPM | OXYGEN SATURATION: 97 % | DIASTOLIC BLOOD PRESSURE: 74 MMHG | HEART RATE: 61 BPM | TEMPERATURE: 98.2 F | SYSTOLIC BLOOD PRESSURE: 122 MMHG

## 2020-05-07 PROCEDURE — G0158 HHC OT ASSISTANT EA 15: HCPCS

## 2020-05-08 ENCOUNTER — HOME CARE VISIT (OUTPATIENT)
Dept: SCHEDULING | Facility: HOME HEALTH | Age: 80
End: 2020-05-08
Payer: MEDICARE

## 2020-05-08 ENCOUNTER — HOME CARE VISIT (OUTPATIENT)
Dept: HOME HEALTH SERVICES | Facility: HOME HEALTH | Age: 80
End: 2020-05-08
Payer: MEDICARE

## 2020-05-08 VITALS
TEMPERATURE: 99.3 F | RESPIRATION RATE: 22 BRPM | OXYGEN SATURATION: 93 % | HEART RATE: 70 BPM | SYSTOLIC BLOOD PRESSURE: 130 MMHG | DIASTOLIC BLOOD PRESSURE: 80 MMHG

## 2020-05-08 PROCEDURE — G0299 HHS/HOSPICE OF RN EA 15 MIN: HCPCS

## 2020-05-08 PROCEDURE — G0157 HHC PT ASSISTANT EA 15: HCPCS

## 2020-05-09 VITALS
HEART RATE: 74 BPM | TEMPERATURE: 98.6 F | SYSTOLIC BLOOD PRESSURE: 122 MMHG | OXYGEN SATURATION: 95 % | RESPIRATION RATE: 18 BRPM | DIASTOLIC BLOOD PRESSURE: 74 MMHG

## 2020-05-11 ENCOUNTER — APPOINTMENT (OUTPATIENT)
Dept: GENERAL RADIOLOGY | Age: 80
DRG: 871 | End: 2020-05-11
Attending: EMERGENCY MEDICINE
Payer: MEDICARE

## 2020-05-11 ENCOUNTER — HOSPITAL ENCOUNTER (INPATIENT)
Age: 80
LOS: 7 days | Discharge: HOME HEALTH | DRG: 871 | End: 2020-05-18
Attending: EMERGENCY MEDICINE | Admitting: INTERNAL MEDICINE
Payer: MEDICARE

## 2020-05-11 ENCOUNTER — NURSE TRIAGE (OUTPATIENT)
Dept: OTHER | Facility: CLINIC | Age: 80
End: 2020-05-11

## 2020-05-11 DIAGNOSIS — J96.21 ACUTE ON CHRONIC RESPIRATORY FAILURE WITH HYPOXIA (HCC): Primary | ICD-10-CM

## 2020-05-11 DIAGNOSIS — F41.9 ANXIETY: ICD-10-CM

## 2020-05-11 PROBLEM — A41.9 SEPSIS (HCC): Status: ACTIVE | Noted: 2020-05-11

## 2020-05-11 LAB
ALBUMIN SERPL-MCNC: 2.8 G/DL (ref 3.5–5)
ALBUMIN/GLOB SERPL: 0.7 {RATIO} (ref 1.1–2.2)
ALP SERPL-CCNC: 96 U/L (ref 45–117)
ALT SERPL-CCNC: 24 U/L (ref 12–78)
ANION GAP SERPL CALC-SCNC: 8 MMOL/L (ref 5–15)
APPEARANCE UR: CLEAR
AST SERPL-CCNC: 27 U/L (ref 15–37)
BACTERIA URNS QL MICRO: NEGATIVE /HPF
BASOPHILS # BLD: 0 K/UL (ref 0–0.1)
BASOPHILS NFR BLD: 0 % (ref 0–1)
BILIRUB SERPL-MCNC: 0.6 MG/DL (ref 0.2–1)
BILIRUB UR QL: NEGATIVE
BNP SERPL-MCNC: 310 PG/ML
BUN SERPL-MCNC: 25 MG/DL (ref 6–20)
BUN/CREAT SERPL: 24 (ref 12–20)
CALCIUM SERPL-MCNC: 8.2 MG/DL (ref 8.5–10.1)
CHLORIDE SERPL-SCNC: 102 MMOL/L (ref 97–108)
CK SERPL-CCNC: 33 U/L (ref 26–192)
CO2 SERPL-SCNC: 26 MMOL/L (ref 21–32)
COLOR UR: ABNORMAL
CREAT SERPL-MCNC: 1.06 MG/DL (ref 0.55–1.02)
DIFFERENTIAL METHOD BLD: NORMAL
EOSINOPHIL # BLD: 0 K/UL (ref 0–0.4)
EOSINOPHIL NFR BLD: 1 % (ref 0–7)
EPITH CASTS URNS QL MICRO: ABNORMAL /LPF
ERYTHROCYTE [DISTWIDTH] IN BLOOD BY AUTOMATED COUNT: 13.6 % (ref 11.5–14.5)
GLOBULIN SER CALC-MCNC: 4 G/DL (ref 2–4)
GLUCOSE SERPL-MCNC: 75 MG/DL (ref 65–100)
GLUCOSE UR STRIP.AUTO-MCNC: NEGATIVE MG/DL
HCT VFR BLD AUTO: 40.6 % (ref 35–47)
HGB BLD-MCNC: 13.2 G/DL (ref 11.5–16)
HGB UR QL STRIP: NEGATIVE
HYALINE CASTS URNS QL MICRO: ABNORMAL /LPF (ref 0–5)
IMM GRANULOCYTES # BLD AUTO: 0 K/UL (ref 0–0.04)
IMM GRANULOCYTES NFR BLD AUTO: 0 % (ref 0–0.5)
KETONES UR QL STRIP.AUTO: NEGATIVE MG/DL
LACTATE SERPL-SCNC: 1.2 MMOL/L (ref 0.4–2)
LEUKOCYTE ESTERASE UR QL STRIP.AUTO: ABNORMAL
LYMPHOCYTES # BLD: 1 K/UL (ref 0.8–3.5)
LYMPHOCYTES NFR BLD: 26 % (ref 12–49)
MCH RBC QN AUTO: 30.2 PG (ref 26–34)
MCHC RBC AUTO-ENTMCNC: 32.5 G/DL (ref 30–36.5)
MCV RBC AUTO: 92.9 FL (ref 80–99)
MONOCYTES # BLD: 0.5 K/UL (ref 0–1)
MONOCYTES NFR BLD: 12 % (ref 5–13)
NEUTS SEG # BLD: 2.4 K/UL (ref 1.8–8)
NEUTS SEG NFR BLD: 61 % (ref 32–75)
NITRITE UR QL STRIP.AUTO: NEGATIVE
NRBC # BLD: 0 K/UL (ref 0–0.01)
NRBC BLD-RTO: 0 PER 100 WBC
PH UR STRIP: 6 [PH] (ref 5–8)
PLATELET # BLD AUTO: 254 K/UL (ref 150–400)
PMV BLD AUTO: 9.8 FL (ref 8.9–12.9)
POTASSIUM SERPL-SCNC: 3.7 MMOL/L (ref 3.5–5.1)
PROT SERPL-MCNC: 6.8 G/DL (ref 6.4–8.2)
PROT UR STRIP-MCNC: NEGATIVE MG/DL
RBC # BLD AUTO: 4.37 M/UL (ref 3.8–5.2)
RBC #/AREA URNS HPF: ABNORMAL /HPF (ref 0–5)
SODIUM SERPL-SCNC: 136 MMOL/L (ref 136–145)
SP GR UR REFRACTOMETRY: 1.01 (ref 1–1.03)
TROPONIN I SERPL-MCNC: <0.05 NG/ML
UA: UC IF INDICATED,UAUC: ABNORMAL
UROBILINOGEN UR QL STRIP.AUTO: 1 EU/DL (ref 0.2–1)
WBC # BLD AUTO: 3.9 K/UL (ref 3.6–11)
WBC URNS QL MICRO: ABNORMAL /HPF (ref 0–4)

## 2020-05-11 PROCEDURE — 36415 COLL VENOUS BLD VENIPUNCTURE: CPT

## 2020-05-11 PROCEDURE — 77030038269 HC DRN EXT URIN PURWCK BARD -A

## 2020-05-11 PROCEDURE — 93005 ELECTROCARDIOGRAM TRACING: CPT

## 2020-05-11 PROCEDURE — 74011250636 HC RX REV CODE- 250/636: Performed by: EMERGENCY MEDICINE

## 2020-05-11 PROCEDURE — 81001 URINALYSIS AUTO W/SCOPE: CPT

## 2020-05-11 PROCEDURE — 85025 COMPLETE CBC W/AUTO DIFF WBC: CPT

## 2020-05-11 PROCEDURE — 96365 THER/PROPH/DIAG IV INF INIT: CPT

## 2020-05-11 PROCEDURE — 87635 SARS-COV-2 COVID-19 AMP PRB: CPT

## 2020-05-11 PROCEDURE — 0100U RESPIRATORY PANEL,PCR,NASOPHARYNGEAL: CPT

## 2020-05-11 PROCEDURE — 65660000000 HC RM CCU STEPDOWN

## 2020-05-11 PROCEDURE — 96361 HYDRATE IV INFUSION ADD-ON: CPT

## 2020-05-11 PROCEDURE — 71045 X-RAY EXAM CHEST 1 VIEW: CPT

## 2020-05-11 PROCEDURE — 84484 ASSAY OF TROPONIN QUANT: CPT

## 2020-05-11 PROCEDURE — 87040 BLOOD CULTURE FOR BACTERIA: CPT

## 2020-05-11 PROCEDURE — 80053 COMPREHEN METABOLIC PANEL: CPT

## 2020-05-11 PROCEDURE — 83605 ASSAY OF LACTIC ACID: CPT

## 2020-05-11 PROCEDURE — 83880 ASSAY OF NATRIURETIC PEPTIDE: CPT

## 2020-05-11 PROCEDURE — 99285 EMERGENCY DEPT VISIT HI MDM: CPT

## 2020-05-11 PROCEDURE — 82550 ASSAY OF CK (CPK): CPT

## 2020-05-11 RX ORDER — ACETAMINOPHEN 325 MG/1
650 TABLET ORAL
Status: DISCONTINUED | OUTPATIENT
Start: 2020-05-11 | End: 2020-05-18 | Stop reason: HOSPADM

## 2020-05-11 RX ORDER — LEVOFLOXACIN 5 MG/ML
750 INJECTION, SOLUTION INTRAVENOUS
Status: COMPLETED | OUTPATIENT
Start: 2020-05-11 | End: 2020-05-12

## 2020-05-11 RX ORDER — SODIUM CHLORIDE 0.9 % (FLUSH) 0.9 %
5-10 SYRINGE (ML) INJECTION AS NEEDED
Status: DISCONTINUED | OUTPATIENT
Start: 2020-05-11 | End: 2020-05-18 | Stop reason: HOSPADM

## 2020-05-11 RX ORDER — ONDANSETRON 2 MG/ML
4 INJECTION INTRAMUSCULAR; INTRAVENOUS
Status: DISCONTINUED | OUTPATIENT
Start: 2020-05-11 | End: 2020-05-18 | Stop reason: HOSPADM

## 2020-05-11 RX ADMIN — SODIUM CHLORIDE 1000 ML: 900 INJECTION, SOLUTION INTRAVENOUS at 19:50

## 2020-05-11 RX ADMIN — LEVOFLOXACIN 750 MG: 5 INJECTION, SOLUTION INTRAVENOUS at 22:55

## 2020-05-11 NOTE — ED NOTES
Report received from [facility], [address] [place] [address]. They advised of the patient's chief complaint, current status, orders completed (to include IV access/medications/radiology testing), outstanding orders that still need to be completed, and the treatment plan. Questions asked and answered prior to assumption of care.

## 2020-05-11 NOTE — TELEPHONE ENCOUNTER
Pt daughter who is her HCPOA calling on her behalf. She reports her mother is having chest discomfort at home with a bad cough. Pt daughter reports labored breathing as well. She was unable to answer all the assessment questions and the patient has dementia. Reason for Disposition   Chest pain lasting longer than 5 minutes    Protocols used: CHEST PAIN-ADULT-[place]    Pt daughter advised to take her to the ER now for further evaluation and management of CP and SOB.

## 2020-05-11 NOTE — ED NOTES
Pt arrives to the ED AAOX4 with a c/c of SOB and CP onset a few days ago. Pt additionally endorses generalized weakness of same onset. Pt was seen by PCP who referred her to the ED. Pt is noted in stable condition, now in ED room with side rail up, bed to lowest position and call light within reach. Will continue to monitor and wait for EDP evaluation.

## 2020-05-11 NOTE — ED NOTES
MD notified that the patient had no blood pressure documented for her stay so I went to check on her and do a full set of vitals. First reading was 82/64 and the second after changing arms was 79/67. He stated that he will be in shortly.

## 2020-05-11 NOTE — ED PROVIDER NOTES
EMERGENCY DEPARTMENT HISTORY AND PHYSICAL EXAM      Date: 5/11/2020  Patient Name: Ector Zelaya    History of Presenting Illness     Chief Complaint   Patient presents with    Generalized Body Aches     x several days, referred by her PCP     Shortness of Breath       History Provided By: Patient    HPI: Ector Zelaya, 67 y.o. female with history of hypertension, recent stroke 1/2019 presents to the ED with cc of shortness of breath and generalized weakness. patient had recent admission 4/22/20-4/26/20 for similar presentation with dyspnea on exertion and hypoxia. She states that since discharge on 4/26 she has had progressive decline. She states that she is now no longer able to walk around her small home. She is the MS has been increasingly more difficult to care for him given her primary caregiver for her bedbound ill  decline in function. She denies any fevers. She states that there has been no change to her chronic cough. She states that she does not have much of an appetite and while she has still been able to eat and drink, she has not been tolerating as much p.o. as she is used to. Specifically denies any abdominal pain, nausea, vomiting, urinary symptoms, or chest pain. She was referred to the ED by her PCP. There are no other complaints, changes, or physical findings at this time. PCP: Joe Hopkins MD    No current facility-administered medications on file prior to encounter. Current Outpatient Medications on File Prior to Encounter   Medication Sig Dispense Refill    methylPREDNISolone (MEDROL DOSEPACK) 4 mg tablet Follow package directions 1 Dose Pack 0    clonazePAM (KlonoPIN) 0.5 mg tablet TAKE 1 TABLET BY MOUTH TWICE DAILY **MAX  2  PER  24  HOURS** 60 Tab 0    clopidogreL (PLAVIX) 75 mg tab Take 1 Tab by mouth nightly. 90 Tab 1    pantoprazole (Protonix) 40 mg tablet Take 1 Tab by mouth every morning.  Indications: gastroesophageal reflux disease 90 Tab 1    verapamil ER (CALAN-SR) 120 mg tablet TAKE 1 TABLET BY MOUTH NIGHTLY FOR HIGH BLOOD PRESSURE 90 Tab 0    olmesartan (BENICAR) 5 mg tablet TAKE 1/2 (ONE-HALF) TABLET BY MOUTH ONCE DAILY 30 Tab 0    nortriptyline (PAMELOR) 10 mg capsule Take 1-2 Caps by mouth nightly. 90 Cap 3    atorvastatin (LIPITOR) 20 mg tablet Take 20 mg by mouth nightly.  multivit/folic acid/vit K1 (ONE-A-DAY WOMEN'S 50 PLUS PO) Take 1 Tab by mouth daily.  rOPINIRole (REQUIP) 1 mg tablet Take 1 Tab by mouth nightly. 90 Tab 2    escitalopram oxalate (LEXAPRO) 20 mg tablet Take 1 Tab by mouth nightly. Indications: Anxiousness associated with Depression 90 Tab 4    albuterol (PROVENTIL HFA, VENTOLIN HFA, PROAIR HFA) 90 mcg/actuation inhaler Take 1 Puff by inhalation every four (4) hours as needed for Wheezing. 1 Inhaler 0    polyethylene glycol (MIRALAX) 17 gram packet Take 17 g by mouth daily as needed (constipation).  levocetirizine (Xyzal) 5 mg tablet Take 1 Tab by mouth daily as needed for Allergies. 30 Tab 0    primidone (MYSOLINE) 50 mg tablet Take 1 Tab by mouth nightly. 90 Tab 1    fluticasone propionate (FLONASE) 50 mcg/actuation nasal spray 2 Sprays by Both Nostrils route daily. 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)     GERD (gastroesophageal reflux disease)     Hypercholesteremia     Hypertension     Idiopathic small and large fiber sensory neuropathy     Limited peripheral vision of right eye     complication of cataract surgery    Long term current use of anticoagulant therapy     Meniere disease     MGUS (monoclonal gammopathy of unknown significance)     Monoclonal gammopathy of unknown significance     hem/onc:  Dr Austen Weldon [phone]    Osteoporosis     Psoriasis     bilateral Legs    Restless leg     Skin melanoma ([address] 2012    (Rt ankle) and basal cell (Lt eye) removed    Transient ischemic attack approx 2005    HAS HAD 2 MINISTROKES-NO DEFICITS; neuro Dr Brianna Anthony (cc)    Unspecified adverse effect of anesthesia     HAS NEEDED PORT OR CUTDOWN FOR RECVNG BLOOD OR LONG-TERM IVs; surgery 7/28/15 w/o any difficulty    Unspecified sleep apnea     does not use CPAP       Past Surgical History:  Past Surgical History:   Procedure Laterality Date    HX APPENDECTOMY      HX CATARACT REMOVAL  2011    Right    HX CERVICAL FUSION  1/28/15    C5-7    HX CHOLECYSTECTOMY  2000    HX GI  2010    liver bx due to liver Bx due to elevated enzymes, liver laceration during Bx, hospitalized 10-12d    HX HEENT  7/28/15    Lt total parotidectomy with facial nerve dissection: Dr Ron Lock HX HEENT  11/2015    Right parotidectomy    HX HYSTERECTOMY      HX ORTHOPAEDIC  2009 & 2011    LUMBAR FUSION and revision    HX OTHER SURGICAL  2010    MELANOMA (Rt ankle) and basal cell (Lt eye) removed; q6mo ck by DERMATOLOGY    HX SHOULDER REPLACEMENT Left 08/01/2016    HX THYROIDECTOMY      Right    WA COLON CA SCRN NOT HI RSK IND  1/27/2016         WA INSERTION SUBQ CARDIAC RHYTHM MONITOR W/PRGRMG N/A 11/6/2019    LOOP RECORDER INSERT performed by Kristofer Canales MD at [facility] CARD[facility]AC C[facility]       Family History:  Family History   Problem Relation Age of Onset    Alcohol abuse Father     Emphysema Father     Cancer Sister         PANCREATIC    Alcohol abuse Brother     COPD Brother     Other Mother         UNKNOWN CAUSE OF DEATH    Heart Disease Mother     Breast Cancer Maternal Aunt     Anesth Problems Neg Hx        Social History:  Social History     Tobacco Use    Smoking status: Former Smoker     Years: 15.00     Last attempt to quit: 7/15/1975     Years since quittin.8    Smokeless tobacco: Never Used   Substance Use Topics    Alcohol use: Yes     Comment: may have a glass of wine on rare occasion    Drug use: Yes     Types: Prescription, OTC       Allergies: Allergies   Allergen Reactions    Aspirin Nausea and Vomiting    Bee Sting [Sting, Bee] Hives and Shortness of Breath     WASP STING    Codeine Nausea and Vomiting    Hydrocodone Rash     And sedation    Lisinopril Cough    Morphine Rash    Nsaids (Non-Steroidal Anti-Inflammatory Drug) Nausea and Vomiting    Oxycodone Rash     On her back    Penicillins Hives, Shortness of Breath and Swelling         Review of Systems   Review of Systems   Constitutional: Positive for appetite change and fatigue. Negative for chills and fever. HENT: Negative. Eyes: Negative for visual disturbance. Respiratory: Positive for cough and shortness of breath. Cardiovascular: Negative for chest pain and leg swelling. Gastrointestinal: Negative for abdominal pain, nausea and vomiting. Genitourinary: Negative. Musculoskeletal: Negative for back pain and gait problem. Skin: Negative for color change and rash. Neurological: Positive for weakness. Negative for dizziness, light-headedness and headaches. Hematological: Does not bruise/bleed easily. All other systems reviewed and are negative. Physical Exam   Physical Exam  Vitals signs and nursing note reviewed. Constitutional:       General: She is not in acute distress. Appearance: Normal appearance. She is not ill-appearing or toxic-appearing. Comments: Appears elderly, thin, frail   HENT:      Head: Normocephalic and atraumatic. Nose: Nose normal.      Mouth/Throat:      Mouth: Mucous membranes are moist.   Eyes:      Extraocular Movements: Extraocular movements intact. Pupils: Pupils are equal, round, and reactive to light. Neck:      Musculoskeletal: Normal range of motion and neck supple. Cardiovascular:      Rate and Rhythm: Normal rate and regular rhythm. Heart sounds: No murmur. Pulmonary:      Effort: Pulmonary effort is normal. No respiratory distress. Breath sounds: Normal breath sounds. No wheezing. Abdominal:      General: There is no distension. Palpations: Abdomen is soft. Tenderness: There is no abdominal tenderness. There is no guarding or rebound. Musculoskeletal: Normal range of motion. General: No swelling or tenderness. Right lower leg: No edema. Left lower leg: No edema. Skin:     General: Skin is warm and dry. Coloration: Skin is not pale. Findings: No erythema. Neurological:      General: No focal deficit present. Mental Status: She is alert and oriented to person, place, and time.          Diagnostic Study Results     Labs -     Recent Results (from the past 12 hour(s))   METABOLIC PANEL, BASIC    Collection Time: 05/12/20  2:11 AM   Result Value Ref Range    Sodium 137 136 - 145 mmol/L    Potassium 3.8 3.5 - 5.1 mmol/L    Chloride 108 97 - 108 mmol/L    CO2 24 21 - 32 mmol/L    Anion gap 5 5 - 15 mmol/L    Glucose 96 65 - 100 mg/dL    BUN 19 6 - 20 MG/DL    Creatinine 0.82 0.55 - 1.02 MG/DL    BUN/Creatinine ratio 23 (H) 12 - 20      GFR est AA >60 >60 ml/min/1.73m2    GFR est non-AA >60 >60 ml/min/1.73m2    Calcium 7.8 (L) 8.5 - 10.1 MG/DL   CBC WITH AUTOMATED DIFF    Collection Time: 05/12/20  2:11 AM   Result Value Ref Range    WBC 2.7 (L) 3.6 - 11.0 K/uL    RBC 3.92 3.80 - 5.20 M/uL    HGB 11.8 11.5 - 16.0 g/dL    HCT 36.0 35.0 - 47.0 %    MCV 91.8 80.0 - 99.0 FL    MCH 30.1 26.0 - 34.0 PG    MCHC 32.8 30.0 - 36.5 g/dL    RDW 13.7 11.5 - 14.5 %    PLATELET 363 664 - 953 K/uL    MPV 9.8 8.9 - 12.9 FL    NRBC 0.0 0  WBC    ABSOLUTE NRBC 0.00 0.00 - 0.01 K/uL    NEUTROPHILS 48 32 - 75 %    LYMPHOCYTES 39 12 - 49 %    MONOCYTES 13 5 - 13 %    EOSINOPHILS 0 0 - 7 %    BASOPHILS 0 0 - 1 %    IMMATURE GRANULOCYTES 0 0.0 - 0.5 %    ABS. NEUTROPHILS 1.3 (L) 1.8 - 8.0 K/UL    ABS. LYMPHOCYTES 1.1 0.8 - 3.5 K/UL    ABS. MONOCYTES 0.4 0.0 - 1.0 K/UL    ABS. EOSINOPHILS 0.0 0.0 - 0.4 K/UL    ABS. BASOPHILS 0.0 0.0 - 0.1 K/UL    ABS. IMM. GRANS. 0.0 0.00 - 0.04 K/UL    DF AUTOMATED     TROPONIN I    Collection Time: 05/12/20  2:11 AM   Result Value Ref Range    Troponin-I, Qt. <0.05 <0.05 ng/mL   TROPONIN I    Collection Time: 05/12/20  8:52 AM   Result Value Ref Range    Troponin-I, Qt. <0.05 <0.05 ng/mL       Radiologic Studies -   XR CHEST PORT   Final Result   IMPRESSION:   Patchy bibasilar atelectasis or infiltrate, right greater than left, with a mid   inspiratory effort. Inspiratory two-view radiograph is recommended when   clinically possible. .  . CT Results  (Last 48 hours)    None        CXR Results  (Last 48 hours)               05/11/20 1855  XR CHEST PORT Final result    Impression:  IMPRESSION:   Patchy bibasilar atelectasis or infiltrate, right greater than left, with a mid   inspiratory effort. Inspiratory two-view radiograph is recommended when   clinically possible. .  . Narrative:  INDICATION:  SOB        EXAM: Chest single view. COMPARISON: 4/22/2020. FINDINGS: A single frontal view of the chest at 1837 hours shows patchy   bibasilar atelectasis or infiltrate, right greater than left, with a mid   inspiratory effort and mild diffuse interstitial prominence. .  The heart,   mediastinum and pulmonary vasculature are stable . The bony thorax is   unremarkable for age, except for stable cervical fusion and left shoulder   arthroplasty. . . Medical Decision Making   I am the first provider for this patient. I reviewed the vital signs, available nursing notes, past medical history, past surgical history, family history and social history. Vital Signs-Reviewed the patient's vital signs.   Patient Vitals for the past 12 hrs:   Temp Pulse Resp BP SpO2 05/12/20 1057 98.4 °F (36.9 °C) 83 18 98/61 98 %   05/12/20 0800 98.4 °F (36.9 °C) 78 18 130/67 98 %   05/12/20 0345 97.6 °F (36.4 °C) 78 16 118/63 94 %   05/12/20 0200  81  118/61 94 %       EKG interpretation: (Preliminary)  EKG per my interpretation normal sinus rhythm, rate 81 bpm, normal axis, no acute ischemic changes. Records Reviewed: Nursing Notes, Old Medical Records, Previous Radiology Studies and Previous Laboratory Studies  I reviewed discharge summary from 4/26/2020    Provider Notes (Medical Decision Making):   67-year-old female here with generalized weakness, persistent cough, dyspnea on exertion. Patient appears clinically well and nontoxic upon arrival, however she is noted to have hypotension immediately upon arrival with blood pressure 70/45, she was treated with 1 L IV fluid bolus and had immediate response and no longer had any further hypotension in the ED. She is feeling much improved after this fluid resuscitation and I do suspect dehydration as a component to her presentation of generalized weakness. She is afebrile and there is no increased work of breathing in the ED. There is no significant leukocytosis or metabolic abnormality. Troponin is negative and proBNP within normal limits. Chest x-ray however shows increased patchy infiltrate right greater than left, however patient overall appears well and I did not appreciate a increased respiratory effort. I attempted to ambulate patient in an effort to discharge home, however patient was unable to walk more than 12 feet before she became extremely dyspneic and had to sit down to rest.  At this time she was noted to have oxygen desaturation from 95 to 100% on room air down to 80 to 85% on room air. For this significant dyspnea on exertion, hypoxia with any sort of minimal effort, and chest x-ray findings I feel that patient will require readmission.   She already had negative COVID-19 test during her last admission, however I will repeat today. Patient will be empirically covered with Levaquin given her penicillin allergy. ED Course:   Initial assessment performed. The patients presenting problems have been discussed, and they are in agreement with the care plan formulated and outlined with them. I have encouraged them to ask questions as they arise throughout their visit. Admission Note:  Patient is being admitted to the hospital by Dr. Belkys Haines, Service: Hospitalist.  The results of their tests and reasons for their admission have been discussed with them and available family. They convey agreement and understanding for the need to be admitted and for their admission diagnosis. Disposition:  Admit    Diagnosis     Clinical Impression:   1. Acute on chronic respiratory failure with hypoxia (HCC)        Attestations:    Gene Martins MD    Please note that this dictation was completed with [employer], the computer voice recognition software. Quite often unanticipated grammatical, syntax, homophones, and other interpretive errors are inadvertently transcribed by the computer software. Please disregard these errors. Please excuse any errors that have escaped final proofreading. Thank you.

## 2020-05-12 ENCOUNTER — HOME CARE VISIT (OUTPATIENT)
Dept: SCHEDULING | Facility: HOME HEALTH | Age: 80
End: 2020-05-12
Payer: MEDICARE

## 2020-05-12 ENCOUNTER — APPOINTMENT (OUTPATIENT)
Dept: NON INVASIVE DIAGNOSTICS | Age: 80
DRG: 871 | End: 2020-05-12
Attending: INTERNAL MEDICINE
Payer: MEDICARE

## 2020-05-12 LAB
ANION GAP SERPL CALC-SCNC: 5 MMOL/L (ref 5–15)
ATRIAL RATE: 81 BPM
AV VELOCITY RATIO: 0.89
B PERT DNA SPEC QL NAA+PROBE: NOT DETECTED
BASOPHILS # BLD: 0 K/UL (ref 0–0.1)
BASOPHILS NFR BLD: 0 % (ref 0–1)
BORDETELLA PARAPERTUSSIS PCR, BORPAR: NOT DETECTED
BUN SERPL-MCNC: 19 MG/DL (ref 6–20)
BUN/CREAT SERPL: 23 (ref 12–20)
C PNEUM DNA SPEC QL NAA+PROBE: NOT DETECTED
CALCIUM SERPL-MCNC: 7.8 MG/DL (ref 8.5–10.1)
CALCULATED P AXIS, ECG09: 18 DEGREES
CALCULATED R AXIS, ECG10: 59 DEGREES
CALCULATED T AXIS, ECG11: 32 DEGREES
CHLORIDE SERPL-SCNC: 108 MMOL/L (ref 97–108)
CO2 SERPL-SCNC: 24 MMOL/L (ref 21–32)
CREAT SERPL-MCNC: 0.82 MG/DL (ref 0.55–1.02)
DIAGNOSIS, 93000: NORMAL
DIFFERENTIAL METHOD BLD: ABNORMAL
ECHO AV AREA PEAK VELOCITY: 1.8 CM2
ECHO AV PEAK GRADIENT: 8.3 MMHG
ECHO AV PEAK VELOCITY: 143.83 CM/S
ECHO EST RA PRESSURE: 10 MMHG
ECHO LA AREA 4C: 13.3 CM2
ECHO LA VOL 4C: 28.15 ML (ref 22–52)
ECHO LA VOLUME INDEX A4C: 16.79 ML/M2 (ref 16–28)
ECHO LV E' LATERAL VELOCITY: 9.32 CM/S
ECHO LV E' SEPTAL VELOCITY: 7.28 CM/S
ECHO LV EDV A4C: 47.6 ML
ECHO LV EDV INDEX A4C: 28.4 ML/M2
ECHO LV EJECTION FRACTION A4C: 74 %
ECHO LV ESV A4C: 12.5 ML
ECHO LV ESV INDEX A4C: 7.5 ML/M2
ECHO LV INTERNAL DIMENSION DIASTOLIC MMODE: 4.01 CM
ECHO LV INTERNAL DIMENSION SYSTOLIC MMODE: 2.54 CM
ECHO LV IVSD MMODE: 0.89 CM
ECHO LV IVSS MMODE: 1.18 CM
ECHO LV POSTERIOR WALL DIASTOLIC MMODE: 0.84 CM
ECHO LV POSTERIOR WALL SYSTOLIC MMODE: 1.27 CM
ECHO LVOT DIAM: 1.61 CM
ECHO LVOT PEAK GRADIENT: 6.6 MMHG
ECHO LVOT PEAK VELOCITY: 128.08 CM/S
ECHO MV A VELOCITY: 71.5 CM/S
ECHO MV E DECELERATION TIME (DT): 328.4 MS
ECHO MV E VELOCITY: 85.36 CM/S
ECHO MV E/A RATIO: 1.19
ECHO MV E/E' LATERAL: 9.16
ECHO MV E/E' RATIO (AVERAGED): 10.44
ECHO MV E/E' SEPTAL: 11.73
ECHO PULMONARY ARTERY SYSTOLIC PRESSURE (PASP): 35.1 MMHG
ECHO PV MAX VELOCITY: 81.05 CM/S
ECHO PV PEAK GRADIENT: 2.6 MMHG
ECHO RIGHT VENTRICULAR SYSTOLIC PRESSURE (RVSP): 35.1 MMHG
ECHO RV INTERNAL DIMENSION: 2.25 CM
ECHO TV A WAVE: 33.66 CM/S
ECHO TV E WAVE: 41.55 CM/S
ECHO TV EROA: 0.8
ECHO TV REGURGITANT MAX VELOCITY: 250.49 CM/S
ECHO TV REGURGITANT PEAK GRADIENT: 25.1 MMHG
EOSINOPHIL # BLD: 0 K/UL (ref 0–0.4)
EOSINOPHIL NFR BLD: 0 % (ref 0–7)
ERYTHROCYTE [DISTWIDTH] IN BLOOD BY AUTOMATED COUNT: 13.7 % (ref 11.5–14.5)
FLUAV H1 2009 PAND RNA SPEC QL NAA+PROBE: NOT DETECTED
FLUAV H1 RNA SPEC QL NAA+PROBE: NOT DETECTED
FLUAV H3 RNA SPEC QL NAA+PROBE: NOT DETECTED
FLUAV SUBTYP SPEC NAA+PROBE: NOT DETECTED
FLUBV RNA SPEC QL NAA+PROBE: NOT DETECTED
GLUCOSE SERPL-MCNC: 96 MG/DL (ref 65–100)
HADV DNA SPEC QL NAA+PROBE: NOT DETECTED
HCOV 229E RNA SPEC QL NAA+PROBE: NOT DETECTED
HCOV HKU1 RNA SPEC QL NAA+PROBE: NOT DETECTED
HCOV NL63 RNA SPEC QL NAA+PROBE: NOT DETECTED
HCOV OC43 RNA SPEC QL NAA+PROBE: NOT DETECTED
HCT VFR BLD AUTO: 36 % (ref 35–47)
HGB BLD-MCNC: 11.8 G/DL (ref 11.5–16)
HMPV RNA SPEC QL NAA+PROBE: NOT DETECTED
HPIV1 RNA SPEC QL NAA+PROBE: NOT DETECTED
HPIV2 RNA SPEC QL NAA+PROBE: NOT DETECTED
HPIV3 RNA SPEC QL NAA+PROBE: NOT DETECTED
HPIV4 RNA SPEC QL NAA+PROBE: NOT DETECTED
IMM GRANULOCYTES # BLD AUTO: 0 K/UL (ref 0–0.04)
IMM GRANULOCYTES NFR BLD AUTO: 0 % (ref 0–0.5)
LVFS: 36.69 %
LYMPHOCYTES # BLD: 1.1 K/UL (ref 0.8–3.5)
LYMPHOCYTES NFR BLD: 39 % (ref 12–49)
M PNEUMO DNA SPEC QL NAA+PROBE: NOT DETECTED
MCH RBC QN AUTO: 30.1 PG (ref 26–34)
MCHC RBC AUTO-ENTMCNC: 32.8 G/DL (ref 30–36.5)
MCV RBC AUTO: 91.8 FL (ref 80–99)
MONOCYTES # BLD: 0.4 K/UL (ref 0–1)
MONOCYTES NFR BLD: 13 % (ref 5–13)
MV DEC SLOPE: 2.6
NEUTS SEG # BLD: 1.3 K/UL (ref 1.8–8)
NEUTS SEG NFR BLD: 48 % (ref 32–75)
NRBC # BLD: 0 K/UL (ref 0–0.01)
NRBC BLD-RTO: 0 PER 100 WBC
P-R INTERVAL, ECG05: 138 MS
PLATELET # BLD AUTO: 205 K/UL (ref 150–400)
PMV BLD AUTO: 9.8 FL (ref 8.9–12.9)
POTASSIUM SERPL-SCNC: 3.8 MMOL/L (ref 3.5–5.1)
Q-T INTERVAL, ECG07: 358 MS
QRS DURATION, ECG06: 74 MS
QTC CALCULATION (BEZET), ECG08: 415 MS
RBC # BLD AUTO: 3.92 M/UL (ref 3.8–5.2)
RSV RNA SPEC QL NAA+PROBE: NOT DETECTED
RV+EV RNA SPEC QL NAA+PROBE: NOT DETECTED
SARS-COV-2, COV2: DETECTED
SODIUM SERPL-SCNC: 137 MMOL/L (ref 136–145)
SPECIMEN SOURCE, FCOV2M: ABNORMAL
TROPONIN I SERPL-MCNC: <0.05 NG/ML
VENTRICULAR RATE, ECG03: 81 BPM
WBC # BLD AUTO: 2.7 K/UL (ref 3.6–11)

## 2020-05-12 PROCEDURE — 85025 COMPLETE CBC W/AUTO DIFF WBC: CPT

## 2020-05-12 PROCEDURE — 74011250637 HC RX REV CODE- 250/637: Performed by: INTERNAL MEDICINE

## 2020-05-12 PROCEDURE — 36415 COLL VENOUS BLD VENIPUNCTURE: CPT

## 2020-05-12 PROCEDURE — 84484 ASSAY OF TROPONIN QUANT: CPT

## 2020-05-12 PROCEDURE — 74011250636 HC RX REV CODE- 250/636: Performed by: INTERNAL MEDICINE

## 2020-05-12 PROCEDURE — 80048 BASIC METABOLIC PNL TOTAL CA: CPT

## 2020-05-12 PROCEDURE — 65660000000 HC RM CCU STEPDOWN

## 2020-05-12 PROCEDURE — 93306 TTE W/DOPPLER COMPLETE: CPT

## 2020-05-12 RX ORDER — PANTOPRAZOLE SODIUM 40 MG/1
40 TABLET, DELAYED RELEASE ORAL
Status: DISCONTINUED | OUTPATIENT
Start: 2020-05-13 | End: 2020-05-18 | Stop reason: HOSPADM

## 2020-05-12 RX ORDER — LEVOFLOXACIN 5 MG/ML
750 INJECTION, SOLUTION INTRAVENOUS
Status: DISCONTINUED | OUTPATIENT
Start: 2020-05-14 | End: 2020-05-13

## 2020-05-12 RX ORDER — ATORVASTATIN CALCIUM 20 MG/1
20 TABLET, FILM COATED ORAL
Status: DISCONTINUED | OUTPATIENT
Start: 2020-05-12 | End: 2020-05-18 | Stop reason: HOSPADM

## 2020-05-12 RX ORDER — CLONAZEPAM 0.5 MG/1
0.5 TABLET ORAL 2 TIMES DAILY
Status: DISCONTINUED | OUTPATIENT
Start: 2020-05-12 | End: 2020-05-17

## 2020-05-12 RX ORDER — PRIMIDONE 50 MG/1
50 TABLET ORAL
Status: DISCONTINUED | OUTPATIENT
Start: 2020-05-12 | End: 2020-05-18 | Stop reason: HOSPADM

## 2020-05-12 RX ORDER — ALBUTEROL SULFATE 90 UG/1
2 AEROSOL, METERED RESPIRATORY (INHALATION)
Status: DISCONTINUED | OUTPATIENT
Start: 2020-05-12 | End: 2020-05-18 | Stop reason: HOSPADM

## 2020-05-12 RX ORDER — CLOPIDOGREL BISULFATE 75 MG/1
75 TABLET ORAL
Status: DISCONTINUED | OUTPATIENT
Start: 2020-05-12 | End: 2020-05-18 | Stop reason: HOSPADM

## 2020-05-12 RX ORDER — IPRATROPIUM BROMIDE AND ALBUTEROL SULFATE 2.5; .5 MG/3ML; MG/3ML
3 SOLUTION RESPIRATORY (INHALATION)
Status: DISCONTINUED | OUTPATIENT
Start: 2020-05-12 | End: 2020-05-12

## 2020-05-12 RX ORDER — SODIUM CHLORIDE 0.9 % (FLUSH) 0.9 %
5-40 SYRINGE (ML) INJECTION AS NEEDED
Status: DISCONTINUED | OUTPATIENT
Start: 2020-05-12 | End: 2020-05-18 | Stop reason: HOSPADM

## 2020-05-12 RX ORDER — SODIUM CHLORIDE 0.9 % (FLUSH) 0.9 %
5-40 SYRINGE (ML) INJECTION EVERY 8 HOURS
Status: DISCONTINUED | OUTPATIENT
Start: 2020-05-12 | End: 2020-05-18 | Stop reason: HOSPADM

## 2020-05-12 RX ORDER — NORTRIPTYLINE HYDROCHLORIDE 10 MG/1
10-20 CAPSULE ORAL
Status: DISCONTINUED | OUTPATIENT
Start: 2020-05-12 | End: 2020-05-18 | Stop reason: HOSPADM

## 2020-05-12 RX ORDER — ENOXAPARIN SODIUM 100 MG/ML
40 INJECTION SUBCUTANEOUS DAILY
Status: DISCONTINUED | OUTPATIENT
Start: 2020-05-12 | End: 2020-05-18 | Stop reason: HOSPADM

## 2020-05-12 RX ORDER — ALBUTEROL SULFATE 0.83 MG/ML
2.5 SOLUTION RESPIRATORY (INHALATION)
Status: DISCONTINUED | OUTPATIENT
Start: 2020-05-12 | End: 2020-05-12

## 2020-05-12 RX ORDER — BISACODYL 5 MG
5 TABLET, DELAYED RELEASE (ENTERIC COATED) ORAL DAILY PRN
Status: DISCONTINUED | OUTPATIENT
Start: 2020-05-12 | End: 2020-05-18 | Stop reason: HOSPADM

## 2020-05-12 RX ORDER — ROPINIROLE 0.25 MG/1
1 TABLET, FILM COATED ORAL
Status: DISCONTINUED | OUTPATIENT
Start: 2020-05-12 | End: 2020-05-18 | Stop reason: HOSPADM

## 2020-05-12 RX ORDER — SODIUM CHLORIDE 9 MG/ML
50 INJECTION, SOLUTION INTRAVENOUS CONTINUOUS
Status: DISCONTINUED | OUTPATIENT
Start: 2020-05-12 | End: 2020-05-17

## 2020-05-12 RX ORDER — ESCITALOPRAM OXALATE 10 MG/1
20 TABLET ORAL
Status: DISCONTINUED | OUTPATIENT
Start: 2020-05-12 | End: 2020-05-18 | Stop reason: HOSPADM

## 2020-05-12 RX ADMIN — ATORVASTATIN CALCIUM 20 MG: 20 TABLET, FILM COATED ORAL at 21:14

## 2020-05-12 RX ADMIN — CLOPIDOGREL BISULFATE 75 MG: 75 TABLET ORAL at 21:15

## 2020-05-12 RX ADMIN — Medication 10 ML: at 02:23

## 2020-05-12 RX ADMIN — Medication 10 ML: at 21:16

## 2020-05-12 RX ADMIN — ESCITALOPRAM OXALATE 20 MG: 10 TABLET ORAL at 21:15

## 2020-05-12 RX ADMIN — ROPINIROLE HYDROCHLORIDE 1 MG: 0.25 TABLET, FILM COATED ORAL at 21:26

## 2020-05-12 RX ADMIN — ENOXAPARIN SODIUM 40 MG: 40 INJECTION SUBCUTANEOUS at 08:45

## 2020-05-12 RX ADMIN — Medication 10 ML: at 14:58

## 2020-05-12 RX ADMIN — Medication 10 ML: at 05:25

## 2020-05-12 RX ADMIN — SODIUM CHLORIDE 75 ML/HR: 900 INJECTION, SOLUTION INTRAVENOUS at 02:23

## 2020-05-12 RX ADMIN — NORTRIPTYLINE HYDROCHLORIDE 20 MG: 10 CAPSULE ORAL at 21:15

## 2020-05-12 RX ADMIN — SODIUM CHLORIDE 75 ML/HR: 900 INJECTION, SOLUTION INTRAVENOUS at 17:24

## 2020-05-12 RX ADMIN — PRIMIDONE 50 MG: 50 TABLET ORAL at 21:15

## 2020-05-12 RX ADMIN — CLONAZEPAM 0.5 MG: 0.5 TABLET ORAL at 21:27

## 2020-05-12 NOTE — HOME CARE
Please note that this patient was open to [address] [place] [address] services at the time of hospital admission. If services will be needed at discharge, please order to resume them.  Juan Alberto Morales RN, [phone]

## 2020-05-12 NOTE — PROGRESS NOTES
Bedside and Verbal shift change report given to Anastasiia Trevino RN (oncoming nurse) by Leah Aldrich (offgoing nurse). Report included the following information SBAR, Kardex, Intake/Output, MAR, Recent Results and Med Rec Status. 0800: Shift report and assessment completed. Patient resting in bed, no complaints of pain. Patient stated \"It is hard to catch my breath\", patient on room air at 98% oxygen saturation. 1100: Patient comfortable in bed, very tired, room air, no complaints of pain. 1600: RN notified by lab that pt is covid 19 positive. Will remain on droplet plus contact. 1700: Ambulated to bathroom. 1 urine occurrence. Patient tolerated ambulating fairly.

## 2020-05-12 NOTE — ED NOTES
Patient failed road test. She became short of breath and dizzy after taking 12-15 steps. Oxygen saturations were 83-85% on room air during ambulation and 85-88% on room air after sitting down. MD notified.

## 2020-05-12 NOTE — ED NOTES
MD requesting patient to be walked with a pulse-ox. If patient is able to ambulate without dropping her oxygen saturation then she will be able to go home. Patient will be admitted if unable to do so.

## 2020-05-12 NOTE — H&P
Hospitalist Admission Note    NAME: Nevin Jacob   :  1940   MRN:  [id number]     Date/Time:  2020 11:31 PM    Patient PCP: Juana Segal MD  ______________________________________________________________________  Given the patient's current clinical presentation, I have a high level of concern for decompensation if discharged from the emergency department. Complex decision making was performed, which includes reviewing the patient's available past medical records, laboratory results, and x-ray films. My assessment of this patient's clinical condition and my plan of care is as follows. Assessment / Plan:  Acute hypoxic respiratory failure secondary to pneumonia   Admit patient to stepdown   Start patient on IV antibiotic Levaquin   Follow-up repeat COVID-19   Oxygen supplement   DuoNeb nebulizer    Sepsis most likely secondary to pneumonia   Start patient on IV fluid   Follow-up blood culture   Follow-up COVID-19    Hyperlipidemia  Continue Lipitor    Hypertensionhold antihypertensive medication but the pressure low    Restless leg syndrome   Continue home medic Acacian primidone and requip     GERD continue PPI      Code Status: Full   Surrogate Decision Maker:    DVT Prophylaxis: Lovenox   GI Prophylaxis: not indicated          Subjective:   CHIEF COMPLAINT: SOB    HISTORY OF PRESENT ILLNESS:     67years old female from home with past medical history significant for hypertension, depression, restless leg syndrome, skin melanoma presented to the hospital for evaluation of shortness of breath associated with generalized weakness started several days ago, patient was recently discharged from the hospital on  was admitted for acute hypoxic respiratory failure, patient denies any fever any chills, denies any productive cough, chest x-ray was done and showed patchy bibasilar atelectasis or infiltrate.     We were asked to admit for work up and evaluation of the above problems. 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)     GERD (gastroesophageal reflux disease)     Hypercholesteremia     Hypertension     Idiopathic small and large fiber sensory neuropathy     Limited peripheral vision of right eye     complication of cataract surgery    Long term current use of anticoagulant therapy     Meniere disease     MGUS (monoclonal gammopathy of unknown significance)     Monoclonal gammopathy of unknown significance     hem/onc:  Dr Wilfredo Bae [phone]    Osteoporosis     Psoriasis     bilateral Legs    Restless leg     Skin melanoma ([facility] [address] 2012    (Rt ankle) and basal cell (Lt eye) removed    Transient ischemic attack approx 2005    HAS HAD 2 MINISTROKES-NO DEFICITS; neuro Dr Ina Herron (cc)    Unspecified adverse effect of anesthesia     HAS NEEDED PORT OR CUTDOWN FOR RECVNG BLOOD OR LONG-TERM IVs; surgery 7/28/15 w/o any difficulty    Unspecified sleep apnea     does not use CPAP        Past Surgical History:   Procedure Laterality Date    HX APPENDECTOMY      HX CATARACT REMOVAL  2011    Right    HX CERVICAL FUSION  1/28/15    C5-7    HX CHOLECYSTECTOMY  2000    HX GI  2010    liver bx due to liver Bx due to elevated enzymes, liver laceration during Bx, hospitalized 10-12d    HX HEENT  7/28/15    Lt total parotidectomy with facial nerve dissection: Dr Sterling Cedeno HX HEENT  11/2015    Right parotidectomy    HX HYSTERECTOMY      HX ORTHOPAEDIC  2009 & 2011    LUMBAR FUSION and revision    HX OTHER SURGICAL  2010    MELANOMA (Rt ankle) and basal cell (Lt eye) removed; q6mo ck by DERMATOLOGY    HX SHOULDER REPLACEMENT Left 08/01/2016    HX THYROIDECTOMY      Right    VA COLON CA SCRN NOT HI RSK IND  2016         VA INSERTION SUBQ CARDIAC RHYTHM MONITOR W/PRGRMG N/A 2019    LOOP RECORDER INSERT performed by Yayo Lima MD at [facility] CARDIAC C[facility]       Social History     Tobacco Use    Smoking status: Former Smoker     Years: 15.00     Last attempt to quit: 7/15/1975     Years since quittin.8    Smokeless tobacco: Never Used   Substance Use Topics    Alcohol use: Yes     Comment: may have a glass of wine on rare occasion        Family History   Problem Relation Age of Onset    Alcohol abuse Father     Emphysema Father     Cancer Sister         PANCREATIC    Alcohol abuse Brother     COPD Brother     Other Mother         UNKNOWN CAUSE OF DEATH    Heart Disease Mother     Breast Cancer Maternal Aunt     Anesth Problems Neg Hx      Allergies   Allergen Reactions    Aspirin Nausea and Vomiting    Bee Sting [Sting, Bee] Hives and Shortness of Breath     WASP STING    Codeine Nausea and Vomiting    Hydrocodone Rash     And sedation    Lisinopril Cough    Morphine Rash    Nsaids (Non-Steroidal Anti-Inflammatory Drug) Nausea and Vomiting    Oxycodone Rash     On her back    Penicillins Hives, Shortness of Breath and Swelling        Prior to Admission medications    Medication Sig Start Date End Date Taking? Authorizing Provider   methylPREDNISolone (MEDROL DOSEPACK) 4 mg tablet Follow package directions 20   Appa Claudia Nguyen MD   levocetirizine (Xyzal) 5 mg tablet Take 1 Tab by mouth daily as needed for Allergies. 20   Daniela Anderson Davin, DO   clonazePAM (KlonoPIN) 0.5 mg tablet TAKE 1 TABLET BY MOUTH TWICE DAILY **MAX  2  PER  24  HOURS** 20   Appa Vandana Egan MD   clopidogreL (PLAVIX) 75 mg tab Take 1 Tab by mouth nightly. 20   Appa Claudia Nguyen MD   pantoprazole (Protonix) 40 mg tablet Take 1 Tab by mouth every morning.  Indications: gastroesophageal reflux disease 20   Appa Delsie Harada, MD   verapamil ER (CALAN-SR) 120 mg tablet TAKE 1 TABLET BY MOUTH NIGHTLY FOR HIGH BLOOD PRESSURE 3/24/20   Appa Vandana Egan MD   olmesartan (BENICAR) 5 mg tablet TAKE 1/2 (ONE-HALF) TABLET BY MOUTH ONCE DAILY 2/10/20   Appa Delsie Harada, MD   nortriptyline (PAMELOR) 10 mg capsule Take 1-2 Caps by mouth nightly. 1/20/20   Appa Delsie Harada, MD   primidone (MYSOLINE) 50 mg tablet Take 1 Tab by mouth nightly. 1/6/20   Appa Delsie Harada, MD   atorvastatin (LIPITOR) 20 mg tablet Take 20 mg by mouth nightly. Provider, Historical   multivit/folic acid/vit K1 (ONE-A-DAY WOMEN'S 50 PLUS PO) Take 1 Tab by mouth daily. Provider, Historical   fluticasone propionate (FLONASE) 50 mcg/actuation nasal spray 2 Sprays by Both Nostrils route daily. 10/31/19   Stephania Weaver NP   rOPINIRole (REQUIP) 1 mg tablet Take 1 Tab by mouth nightly. 8/19/19   Appa Delsie Harada, MD   escitalopram oxalate (LEXAPRO) 20 mg tablet Take 1 Tab by mouth nightly. Indications: Anxiousness associated with Depression 6/4/19   Appa Delsie Harada, MD   albuterol (PROVENTIL HFA, VENTOLIN HFA, PROAIR HFA) 90 mcg/actuation inhaler Take 1 Puff by inhalation every four (4) hours as needed for Wheezing. 7/13/18   Emma Soto MD   polyethylene glycol (MIRALAX) 17 gram packet Take 17 g by mouth daily as needed (constipation). Provider, Historical       REVIEW OF SYSTEMS:     I am not able to complete the review of systems because:    The patient is intubated and sedated    The patient has altered mental status due to his acute medical problems    The patient has baseline aphasia from prior stroke(s)    The patient has baseline dementia and is not reliable historian    The patient is in acute medical distress and unable to provide information           Total of 12 systems reviewed as follows:       POSITIVE= underlined text  Negative = text not underlined  General:  fever, chills, sweats, generalized weakness, weight loss/gain,      loss of appetite   Eyes:    blurred vision, eye pain, loss of vision, double vision  ENT:    rhinorrhea, pharyngitis   Respiratory:   cough, sputum production, SOB, HERNANDES, wheezing, pleuritic pain   Cardiology:   chest pain, palpitations, orthopnea, PND, edema, syncope   Gastrointestinal:  abdominal pain , N/V, diarrhea, dysphagia, constipation, bleeding   Genitourinary:  frequency, urgency, dysuria, hematuria, incontinence   Muskuloskeletal :  arthralgia, myalgia, back pain  Hematology:  easy bruising, nose or gum bleeding, lymphadenopathy   Dermatological: rash, ulceration, pruritis, color change / jaundice  Endocrine:   hot flashes or polydipsia   Neurological:  headache, dizziness, confusion, focal weakness, paresthesia,     Speech difficulties, memory loss, gait difficulty  Psychological: Feelings of anxiety, depression, agitation    Objective:   VITALS:    Visit Vitals  /50 (BP 1 Location: Left arm, BP Patient Position: At rest;Sitting)   Pulse 81   Temp 98.8 °F (37.1 °C)   Resp 20   Ht 5' 2\" (1.575 m)   Wt 65.4 kg (144 lb 2.9 oz)   SpO2 92%   BMI 26.37 kg/m²       PHYSICAL EXAM:    General:    Alert, cooperative, no distress, appears stated age. HEENT: Atraumatic, anicteric sclerae, pink conjunctivae     No oral ulcers, mucosa moist, throat clear, dentition fair  Neck:  Supple, symmetrical,  thyroid: non tender  Lungs:   Clear to auscultation bilaterally. No Wheezing or Rhonchi. No rales. Chest wall:  No tenderness  No Accessory muscle use. Heart:   Regular  rhythm,  No  murmur   No edema  Abdomen:   Soft, non-tender. Not distended. Bowel sounds normal  Extremities: No cyanosis. No clubbing,      Skin turgor normal, Capillary refill normal, Radial dial pulse 2+  Skin:     Not pale. Not Jaundiced  No rashes   Psych:  Good insight. Not depressed. Not anxious or agitated. Neurologic: EOMs intact. No facial asymmetry. No aphasia or slurred speech.  Symmetrical strength, Sensation grossly intact. Alert and oriented X 4.     _______________________________________________________________________  Care Plan discussed with:    Comments   Patient y    Family      RN y    Care Manager                    Consultant:      _______________________________________________________________________  Expected  Disposition:   Home with Family y   HH/PT/OT/RN    SNF/LTC    JHONNY    ________________________________________________________________________  TOTAL TIME:  61  Minutes    Critical Care Provided     Minutes non procedure based      Comments    y Reviewed previous records   >50% of visit spent in counseling and coordination of care y Discussion with patient and/or family and questions answered       ________________________________________________________________________  Signed: Gwendolyn Flores MD    Procedures: see electronic medical records for all procedures/Xrays and details which were not copied into this note but were reviewed prior to creation of Plan.     LAB DATA REVIEWED:    Recent Results (from the past 24 hour(s))   EKG, 12 LEAD, INITIAL    Collection Time: 05/11/20  4:46 PM   Result Value Ref Range    Ventricular Rate 81 BPM    Atrial Rate 81 BPM    P-R Interval 138 ms    QRS Duration 74 ms    Q-T Interval 358 ms    QTC Calculation (Bezet) 415 ms    Calculated P Axis 18 degrees    Calculated R Axis 59 degrees    Calculated T Axis 32 degrees    Diagnosis       Normal sinus rhythm with sinus arrhythmia  Normal ECG  When compared with ECG of 22-APR-2020 10:32,  premature atrial complexes are no longer present     CBC WITH AUTOMATED DIFF    Collection Time: 05/11/20  5:06 PM   Result Value Ref Range    WBC 3.9 3.6 - 11.0 K/uL    RBC 4.37 3.80 - 5.20 M/uL    HGB 13.2 11.5 - 16.0 g/dL    HCT 40.6 35.0 - 47.0 %    MCV 92.9 80.0 - 99.0 FL    MCH 30.2 26.0 - 34.0 PG    MCHC 32.5 30.0 - 36.5 g/dL    RDW 13.6 11.5 - 14.5 %    PLATELET 729 386 - 314 K/uL    MPV 9.8 8.9 - 12.9 FL    NRBC 0.0 0  WBC    ABSOLUTE NRBC 0.00 0.00 - 0.01 K/uL    NEUTROPHILS 61 32 - 75 %    LYMPHOCYTES 26 12 - 49 %    MONOCYTES 12 5 - 13 %    EOSINOPHILS 1 0 - 7 %    BASOPHILS 0 0 - 1 %    IMMATURE GRANULOCYTES 0 0.0 - 0.5 %    ABS. NEUTROPHILS 2.4 1.8 - 8.0 K/UL    ABS. LYMPHOCYTES 1.0 0.8 - 3.5 K/UL    ABS. MONOCYTES 0.5 0.0 - 1.0 K/UL    ABS. EOSINOPHILS 0.0 0.0 - 0.4 K/UL    ABS. BASOPHILS 0.0 0.0 - 0.1 K/UL    ABS. IMM. GRANS. 0.0 0.00 - 0.04 K/UL    DF AUTOMATED     METABOLIC PANEL, COMPREHENSIVE    Collection Time: 05/11/20  5:06 PM   Result Value Ref Range    Sodium 136 136 - 145 mmol/L    Potassium 3.7 3.5 - 5.1 mmol/L    Chloride 102 97 - 108 mmol/L    CO2 26 21 - 32 mmol/L    Anion gap 8 5 - 15 mmol/L    Glucose 75 65 - 100 mg/dL    BUN 25 (H) 6 - 20 MG/DL    Creatinine 1.06 (H) 0.55 - 1.02 MG/DL    BUN/Creatinine ratio 24 (H) 12 - 20      GFR est AA >60 >60 ml/min/1.73m2    GFR est non-AA 50 (L) >60 ml/min/1.73m2    Calcium 8.2 (L) 8.5 - 10.1 MG/DL    Bilirubin, total 0.6 0.2 - 1.0 MG/DL    ALT (SGPT) 24 12 - 78 U/L    AST (SGOT) 27 15 - 37 U/L    Alk.  phosphatase 96 45 - 117 U/L    Protein, total 6.8 6.4 - 8.2 g/dL    Albumin 2.8 (L) 3.5 - 5.0 g/dL    Globulin 4.0 2.0 - 4.0 g/dL    A-G Ratio 0.7 (L) 1.1 - 2.2     TROPONIN I    Collection Time: 05/11/20  5:06 PM   Result Value Ref Range    Troponin-I, Qt. <0.05 <0.05 ng/mL   CK W/ REFLX CKMB    Collection Time: 05/11/20  5:06 PM   Result Value Ref Range    CK 33 26 - 192 U/L   LACTIC ACID    Collection Time: 05/11/20  5:06 PM   Result Value Ref Range    Lactic acid 1.2 0.4 - 2.0 MMOL/L   NT-PRO BNP    Collection Time: 05/11/20  5:06 PM   Result Value Ref Range    NT pro- <450 PG/ML   URINALYSIS W/ REFLEX CULTURE    Collection Time: 05/11/20  9:15 PM   Result Value Ref Range    Color YELLOW/STRAW      Appearance CLEAR CLEAR      Specific gravity 1.012 1.003 - 1.030      pH (UA) 6.0 5.0 - 8.0      Protein Negative NEG mg/dL    Glucose Negative NEG mg/dL Ketone Negative NEG mg/dL    Bilirubin Negative NEG      Blood Negative NEG      Urobilinogen 1.0 0.2 - 1.0 EU/dL    Nitrites Negative NEG      Leukocyte Esterase SMALL (A) NEG      WBC 0-4 0 - 4 /hpf    RBC 0-5 0 - 5 /hpf    Epithelial cells FEW FEW /lpf    Bacteria Negative NEG /hpf    UA:UC IF INDICATED CULTURE NOT INDICATED BY UA RESULT CNI      Hyaline cast 2-5 0 - 5 /lpf   SARS-COV-2    Collection Time: 05/11/20 10:50 PM   Result Value Ref Range    Specimen source Nasopharyngeal      SARS-CoV-2 PENDING     SARS-CoV-2 PENDING     COVID-19 PENDING NEG

## 2020-05-12 NOTE — PROGRESS NOTES
Reason for Readmission: Patient came to ed for complaint of generalized body aches and shortness of breath. PMHX significant for  ischemic stroke   Patient was here from 4/22/20 to 4/26/20 for acute respiratory failure with hypoxia. RUR Score/Risk Level:   16%      PCP: First and Last name:  Isabella Mitchell     Name of Practice: [address] [place] [address],[address]. Are you a current patient: Yes/No:  Yes     Approximate date of last visit:  Had a virtual visit on 5/4/20. Is a Care Conference indicated:  Discussed with medical team.       Did you attend your follow up appointment (s): If not, why not: Yes         Resources/supports as identified by patient/family:  Patient has supportive children and has  who has dementia. The Patient is her 's  caregiver. Top Challenges facing patient (as identified by patient/family and CM): Finances/Medication cost?   Patient denies problems with finances or obtaining her medications       Transportation    She drives. Support system or lack thereof? She has supportive family. Living arrangements? She lives with her  who has dementia . Patient is primary caregiver to the . Self-care/ADLs/Cognition? Patient was independent prior to coming to the hospital.          Current Advanced Directive/Advance Care Plan: On File and she states they are still the people she wants and they are in agreement. Plan for utilizing home health:  Patient is open presently to [employer] for pt/ot and nursing. Transition of Care Plan:    Based on readmission, the patient's previous Plan of Care   has been evaluated and/or modified. The current Transition of Care Plan is:  Patient is planning to return home with family and home health services when medically stable.   Covid 19 results are pending at this time. Care Management Interventions  PCP Verified by CM: Yes  Transition of Care Consult (CM Consult): Discharge Planning  Discharge Durable Medical Equipment: (Has rolling walker and cane however she does not use them. )  Current Support Network: Lives with Spouse  Confirm Follow Up Transport: Family  Discharge Location  Discharge Placement: Home             Tanika Colon RN BSN CRM        [phone]

## 2020-05-12 NOTE — PROGRESS NOTES
Problem: General Medical Care Plan  Goal: *Vital signs within specified parameters  Outcome: Progressing Towards Goal  Goal: *Labs within defined limits  Outcome: Progressing Towards Goal  Goal: *Absence of infection signs and symptoms  Outcome: Progressing Towards Goal  Goal: *Optimal pain control at patient's stated goal  Outcome: Progressing Towards Goal  Goal: *Skin integrity maintained  Outcome: Progressing Towards Goal  Goal: *Fluid volume balance  Outcome: Progressing Towards Goal     Problem: Activity Intolerance  Goal: *Oxygen saturation during activity within specified parameters  Outcome: Progressing Towards Goal  Goal: *Able to remain out of bed as prescribed  Outcome: Progressing Towards Goal     Problem: Patient Education: Go to Patient Education Activity  Goal: Patient/Family Education  Outcome: Progressing Towards Goal     Problem: Falls - Risk of  Goal: *Absence of Falls  Description: Document Danney Mess Fall Risk and appropriate interventions in the flowsheet.   Outcome: Progressing Towards Goal  Note: Fall Risk Interventions:  Mobility Interventions: Bed/chair exit alarm, Communicate number of staff needed for ambulation/transfer         Medication Interventions: Teach patient to arise slowly, Bed/chair exit alarm    Elimination Interventions: Call light in reach    History of Falls Interventions: Bed/chair exit alarm         Problem: Patient Education: Go to Patient Education Activity  Goal: Patient/Family Education  Outcome: Progressing Towards Goal     Problem: Patient Education: Go to Patient Education Activity  Goal: Patient/Family Education  Outcome: Progressing Towards Goal     Problem: Heart Failure: Day 1  Goal: Activity/Safety  Outcome: Progressing Towards Goal  Goal: Treatments/Interventions/Procedures  Outcome: Progressing Towards Goal  Goal: Psychosocial  Outcome: Progressing Towards Goal  Goal: *Oxygen saturation within defined limits  Outcome: Progressing Towards Goal  Goal: *Hemodynamically stable  Outcome: Progressing Towards Goal  Goal: *Anxiety reduced or absent  Outcome: Progressing Towards Goal

## 2020-05-12 NOTE — ED NOTES
Pt stable. Verbal report given to Nicho Medina RN on PCU. All pending orders & medications reviewed at this time.

## 2020-05-12 NOTE — PROGRESS NOTES
0020- Pt rcvd into 2250 from ED. Pt connected to cardiac monitor and VS obtained; VSS, NAD noted. Pt denies complaints at this time; reports HERNANDES. Pt ambulatory to restroom with stead, even gait. Pt educated on use of call light and oriented to room. No questions or concerns verbalized by pt at this time. SR up x3. Bed in lowest, locked position. Call light within reach. Will continue to monitor. Jayden- ED charge called r/t no H&P and order to notify hospitalist upon arrival to floor. Charge RN to attempt to find MD Panda Lechuga at this time. 8100- Call returned from primary ED RN. Per ED RN, MD Panda Lechuga evaluated pt in ED.     6306- Pt sleeping quietly in bed at this time. Pt remains on cardiac monitor; VSS, NAD noted. SR up x3. Bed in lowest, locked position. Call light within reach. Will continue to monitor. 5397- Pt resting in bed at this time watching TV. Pt denies complaints. VSS, NAD noted. Call light within reach. Will continue to monitor. 8239- Report given to Johnny Jason RN. Pt resting in bed at this time. No questions or concerns verbalized by oncoming RN r/t report. VSS, NAD noted upon transfer of care to oncoming RN.

## 2020-05-12 NOTE — PROGRESS NOTES
1920- Bedside report completed with ALEXIS IVY RN. Pt drowsy but arousable to verbal stimuli. Pt denies complaints. No questions or concerns verbalized by PT r/t POC. IV infusion verified with ALEXIS IVY RN. Pt on frequent VS; VSS, NAD noted. Pt denies SOB. Pt educated on call light use and to call before getting up; Pt able to verbalize understanding. SR up x3. Bed in lowest, locked position. Call light within reach. Will continue to monitor. 2240- Pt resting in bed at this time. Pt denies complaints. VSS, NAD noted. Call light within reach. Will continue to monitor. 2340- Pt satting 88% on RA. Pt sleeping in bed. Pt placed on 2L/NC. Will continue to monitor. 0320- Pt sleeping in bed at this time. VSS, NAD noted. 0600- Pt resting in bed at this time. IVF infusing at prescribed rate. Pt on frequent VS. VSS, NAD noted. Will continue to monitor. 0700- Report completed with ALEXIS IVY RN. Pt sleeping in bed at this time. IVF infusing at prescribed rate. Pt denies complaints. No questions or concerns verbalized by ALEXIS IVY RN. VSS, NAD noted upon transfer of care to AM RITU

## 2020-05-12 NOTE — ED NOTES
Report given to KISHAN Thomas. They were informed of patient chief complaint, current status, orders completed (to include IV access/medications/radiology testing), outstanding orders that still need to be completed, and the treatment plan. Ensured no questions or concerns regarding the patient prior to departure.

## 2020-05-12 NOTE — PROGRESS NOTES
Hospitalist Progress Note    NAME: Becky Armas   :  1940   MRN:  [id number]     I reviewed pertinent labs and imaging, and discussed /agreed on the plan of care with Dr. Deonna Jonse. Assessment / Plan:  Acute Hypoxic Respiratory Failure Secondary to Pneumonia   Continue IV Levaquin 750 mg IV Q 48 hours    Chest X-ray  IMPRESSION:  Patchy bibasilar atelectasis or infiltrate, right greater than left, with a mid  inspiratory effort. Inspiratory two-view radiograph is recommended when  clinically possible.      Repeat COVID-19 Test Pending      COVID-Negative on       Currently on room air 94% oxygen saturation      Continue Combivent       Continue Albuterol Inhaler as needed for wheezing  Sepsis secondary to Pneumonia       Continue patient on IV fluid        Blood cultures negative x 14 hours        Chest x-ray bibasilar atelectasis or infiltrate, right greater than left         Continue IV Levaquin 750 mg IV Q 48 hours  Hyperlipidemia         Continue Lipitor  Hypertension      Antihypertensive medications on hold for low blood pressure      Stable Blood Pressure        Monitor  Restless Leg Syndrome     Continue Home medication Acacian Primidone and requip  GERD      Protonix 40 mg Daily  Hx of TIA x 2 No deficits     Continue Plavix daily    25.0 - 29.9 Overweight / Body mass index is 26.85 kg/m². Code status: Full  Prophylaxis: Lovenox  Recommended Disposition:  PT, OT, RN     Subjective:     Chief Complaint / Reason for Physician Visit  Patient resting comfortably but complains of shortness of breath. She is on room air at 94% oxygen saturation level. She states she was recently in the hospital in April with similar symptoms. She was COVID Negative on . 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 She states she has not been exposed to COVID-19 that she is aware of. She does complain of increased fatigue but denies cough or chest pain. She has been afebrile.  She states her appetite has decreased lately. Discussed with RN events overnight. Review of Systems:  Symptom Y/N Comments  Symptom Y/N Comments   Fever/Chills n   Chest Pain n    Poor Appetite y   Edema n    Cough n   Abdominal Pain     Sputum n   Joint Pain n    SOB/HERNANDES y   Pruritis/Rash n    Nausea/vomit n   Tolerating PT/OT     Diarrhea    Tolerating Diet y    Constipation    Other       Could NOT obtain due to:      Objective:     VITALS:   Last 24hrs VS reviewed since prior progress note. Most recent are:  Patient Vitals for the past 24 hrs:   Temp Pulse Resp BP SpO2   05/12/20 1510 98.1 °F (36.7 °C) 90 20 116/62 98 %   05/12/20 1057 98.4 °F (36.9 °C) 83 18 98/61 98 %   05/12/20 0800 98.4 °F (36.9 °C) 78 18 130/67 98 %   05/12/20 0345 97.6 °F (36.4 °C) 78 16 118/63 94 %   05/12/20 0200  81  118/61 94 %   05/12/20 0015 98.5 °F (36.9 °C) 82 22 102/51 93 %   05/11/20 2345  82 21 109/56 96 %   05/11/20 2330  80 16 94/57 91 %   05/11/20 2315  79 15 110/57 91 %   05/11/20 2300  78 15 102/56 93 %   05/11/20 2248 98.8 °F (37.1 °C) 81 20 104/50 92 %   05/11/20 2245  80 15 104/50 93 %   05/11/20 2215  87 25 108/62 92 %   05/11/20 2200  80 19 107/56 91 %   05/11/20 2145  79 19 115/56 93 %   05/11/20 2130  80 16 112/56 91 %   05/11/20 2115  82 17 127/61 (!) 83 %   05/11/20 2109 98 °F (36.7 °C) 91 18 107/62 93 %   05/11/20 2100  83 20 107/62 92 %   05/11/20 2045  92 23 115/58    05/11/20 1946  84 18 (!) 79/50    05/11/20 1933 97.8 °F (36.6 °C) 81 16 (!) 79/67 94 %   05/11/20 1639 98.7 °F (37.1 °C) (!) 107 24  (!) 88 %       Intake/Output Summary (Last 24 hours) at 5/12/2020 1546  Last data filed at 5/12/2020 0020  Gross per 24 hour   Intake 1200 ml   Output    Net 1200 ml        PHYSICAL EXAM:  General:  Alert, cooperative, no acute distress, frail, elderly female  EENT:             Anicteric sclerae. normocephalic  Resp:  Crackles bilateral lower bases,    No accessory muscle use  CV:  Regular  rhythm,  No edema  GI:  Soft, Non distended, Non tender.  +Bowel sounds  Neurologic:  Alert and oriented X 3, normal speech,   Psych:   Good insight. Not anxious nor agitated  Skin:  No rashes. No jaundice    Reviewed most current lab test results and cultures  YES  Reviewed most current radiology test results   YES  Review and summation of old records today    NO  Reviewed patient's current orders and MAR    YES  PMH/SH reviewed - no change compared to H&P  ________________________________________________________________________  Care Plan discussed with:    Comments   Patient y    Family      RN y    Care Manager     Consultant                        Multidiciplinary team rounds were held today with , nursing, pharmacist and clinical coordinator. Patient's plan of care was discussed; medications were reviewed and discharge planning was addressed. ________________________________________________________________________  ___________________  Yoshi Aguilar NP     Procedures: see electronic medical records for all procedures/Xrays and details which were not copied into this note but were reviewed prior to creation of Plan. LABS:  I reviewed today's most current labs and imaging studies.   Pertinent labs include:  Recent Labs     05/12/20  0211 05/11/20  1706   WBC 2.7* 3.9   HGB 11.8 13.2   HCT 36.0 40.6    254     Recent Labs     05/12/20  0211 05/11/20  1706    136   K 3.8 3.7    102   CO2 24 26   GLU 96 75   BUN 19 25*   CREA 0.82 1.06*   CA 7.8* 8.2*   ALB  --  2.8*   TBILI  --  0.6   SGOT  --  27   ALT  --  24       Signed: Yoshi Aguilar NP

## 2020-05-13 ENCOUNTER — PATIENT OUTREACH (OUTPATIENT)
Dept: FAMILY MEDICINE CLINIC | Age: 80
End: 2020-05-13

## 2020-05-13 LAB
ANION GAP SERPL CALC-SCNC: 6 MMOL/L (ref 5–15)
BASOPHILS # BLD: 0 K/UL (ref 0–0.1)
BASOPHILS NFR BLD: 0 % (ref 0–1)
BUN SERPL-MCNC: 12 MG/DL (ref 6–20)
BUN/CREAT SERPL: 17 (ref 12–20)
CALCIUM SERPL-MCNC: 7.5 MG/DL (ref 8.5–10.1)
CHLORIDE SERPL-SCNC: 111 MMOL/L (ref 97–108)
CO2 SERPL-SCNC: 23 MMOL/L (ref 21–32)
CREAT SERPL-MCNC: 0.69 MG/DL (ref 0.55–1.02)
CRP SERPL HS-MCNC: >9.5 MG/L
DIFFERENTIAL METHOD BLD: ABNORMAL
EOSINOPHIL # BLD: 0.1 K/UL (ref 0–0.4)
EOSINOPHIL NFR BLD: 2 % (ref 0–7)
ERYTHROCYTE [DISTWIDTH] IN BLOOD BY AUTOMATED COUNT: 13.9 % (ref 11.5–14.5)
GLUCOSE SERPL-MCNC: 86 MG/DL (ref 65–100)
HCT VFR BLD AUTO: 34.7 % (ref 35–47)
HGB BLD-MCNC: 11.3 G/DL (ref 11.5–16)
IMM GRANULOCYTES # BLD AUTO: 0 K/UL (ref 0–0.04)
IMM GRANULOCYTES NFR BLD AUTO: 0 % (ref 0–0.5)
LYMPHOCYTES # BLD: 1 K/UL (ref 0.8–3.5)
LYMPHOCYTES NFR BLD: 35 % (ref 12–49)
MCH RBC QN AUTO: 30.1 PG (ref 26–34)
MCHC RBC AUTO-ENTMCNC: 32.6 G/DL (ref 30–36.5)
MCV RBC AUTO: 92.3 FL (ref 80–99)
MONOCYTES # BLD: 0.4 K/UL (ref 0–1)
MONOCYTES NFR BLD: 12 % (ref 5–13)
NEUTS SEG # BLD: 1.5 K/UL (ref 1.8–8)
NEUTS SEG NFR BLD: 51 % (ref 32–75)
NRBC # BLD: 0 K/UL (ref 0–0.01)
NRBC BLD-RTO: 0 PER 100 WBC
PLATELET # BLD AUTO: 212 K/UL (ref 150–400)
PMV BLD AUTO: 9.4 FL (ref 8.9–12.9)
POTASSIUM SERPL-SCNC: 3.7 MMOL/L (ref 3.5–5.1)
PROCALCITONIN SERPL-MCNC: <0.05 NG/ML
RBC # BLD AUTO: 3.76 M/UL (ref 3.8–5.2)
SODIUM SERPL-SCNC: 140 MMOL/L (ref 136–145)
WBC # BLD AUTO: 3 K/UL (ref 3.6–11)

## 2020-05-13 PROCEDURE — 74011250636 HC RX REV CODE- 250/636: Performed by: INTERNAL MEDICINE

## 2020-05-13 PROCEDURE — 86141 C-REACTIVE PROTEIN HS: CPT

## 2020-05-13 PROCEDURE — 85025 COMPLETE CBC W/AUTO DIFF WBC: CPT

## 2020-05-13 PROCEDURE — 80048 BASIC METABOLIC PNL TOTAL CA: CPT

## 2020-05-13 PROCEDURE — 65660000000 HC RM CCU STEPDOWN

## 2020-05-13 PROCEDURE — 74011250637 HC RX REV CODE- 250/637: Performed by: INTERNAL MEDICINE

## 2020-05-13 PROCEDURE — 74011250636 HC RX REV CODE- 250/636: Performed by: NURSE PRACTITIONER

## 2020-05-13 PROCEDURE — 94760 N-INVAS EAR/PLS OXIMETRY 1: CPT

## 2020-05-13 PROCEDURE — 74011250637 HC RX REV CODE- 250/637: Performed by: NURSE PRACTITIONER

## 2020-05-13 PROCEDURE — 84145 PROCALCITONIN (PCT): CPT

## 2020-05-13 PROCEDURE — 36415 COLL VENOUS BLD VENIPUNCTURE: CPT

## 2020-05-13 RX ORDER — ZINC SULFATE 50(220)MG
1 CAPSULE ORAL DAILY
Status: DISCONTINUED | OUTPATIENT
Start: 2020-05-13 | End: 2020-05-18 | Stop reason: HOSPADM

## 2020-05-13 RX ORDER — LEVOFLOXACIN 5 MG/ML
750 INJECTION, SOLUTION INTRAVENOUS EVERY 24 HOURS
Status: COMPLETED | OUTPATIENT
Start: 2020-05-13 | End: 2020-05-16

## 2020-05-13 RX ADMIN — CLONAZEPAM 0.5 MG: 0.5 TABLET ORAL at 17:58

## 2020-05-13 RX ADMIN — LEVOFLOXACIN 750 MG: 5 INJECTION, SOLUTION INTRAVENOUS at 10:00

## 2020-05-13 RX ADMIN — ATORVASTATIN CALCIUM 20 MG: 20 TABLET, FILM COATED ORAL at 21:33

## 2020-05-13 RX ADMIN — NORTRIPTYLINE HYDROCHLORIDE 20 MG: 10 CAPSULE ORAL at 21:38

## 2020-05-13 RX ADMIN — ESCITALOPRAM OXALATE 20 MG: 10 TABLET ORAL at 21:33

## 2020-05-13 RX ADMIN — ZINC SULFATE 220 MG (50 MG) CAPSULE 1 CAPSULE: CAPSULE at 09:03

## 2020-05-13 RX ADMIN — PANTOPRAZOLE SODIUM 40 MG: 40 TABLET, DELAYED RELEASE ORAL at 08:27

## 2020-05-13 RX ADMIN — SODIUM CHLORIDE 75 ML/HR: 900 INJECTION, SOLUTION INTRAVENOUS at 19:54

## 2020-05-13 RX ADMIN — AZITHROMYCIN MONOHYDRATE 500 MG: 500 INJECTION, POWDER, LYOPHILIZED, FOR SOLUTION INTRAVENOUS at 09:03

## 2020-05-13 RX ADMIN — PRIMIDONE 50 MG: 50 TABLET ORAL at 21:33

## 2020-05-13 RX ADMIN — ENOXAPARIN SODIUM 40 MG: 40 INJECTION SUBCUTANEOUS at 08:26

## 2020-05-13 RX ADMIN — ROPINIROLE HYDROCHLORIDE 1 MG: 0.25 TABLET, FILM COATED ORAL at 21:33

## 2020-05-13 RX ADMIN — Medication 10 ML: at 14:00

## 2020-05-13 RX ADMIN — Medication 10 ML: at 21:33

## 2020-05-13 RX ADMIN — CLOPIDOGREL BISULFATE 75 MG: 75 TABLET ORAL at 21:33

## 2020-05-13 NOTE — PROGRESS NOTES
Problem: General Medical Care Plan  Goal: *Vital signs within specified parameters  Outcome: Progressing Towards Goal  Goal: *Labs within defined limits  Outcome: Progressing Towards Goal  Goal: *Absence of infection signs and symptoms  Outcome: Progressing Towards Goal  Goal: *Optimal pain control at patient's stated goal  Outcome: Progressing Towards Goal  Goal: *Skin integrity maintained  Outcome: Progressing Towards Goal  Goal: *Fluid volume balance  Outcome: Progressing Towards Goal     Problem: Activity Intolerance  Goal: *Oxygen saturation during activity within specified parameters  Outcome: Progressing Towards Goal  Goal: *Able to remain out of bed as prescribed  Outcome: Progressing Towards Goal     Problem: Patient Education: Go to Patient Education Activity  Goal: Patient/Family Education  Outcome: Progressing Towards Goal     Problem: Falls - Risk of  Goal: *Absence of Falls  Description: Document Kameron Reeves Fall Risk and appropriate interventions in the flowsheet.   Outcome: Progressing Towards Goal  Note: Fall Risk Interventions:  Mobility Interventions: Bed/chair exit alarm, Communicate number of staff needed for ambulation/transfer         Medication Interventions: Bed/chair exit alarm, Teach patient to arise slowly, Patient to call before getting OOB    Elimination Interventions: Bed/chair exit alarm, Call light in reach, Patient to call for help with toileting needs, Toileting schedule/hourly rounds    History of Falls Interventions: Bed/chair exit alarm         Problem: Patient Education: Go to Patient Education Activity  Goal: Patient/Family Education  Outcome: Progressing Towards Goal     Problem: Patient Education: Go to Patient Education Activity  Goal: Patient/Family Education  Outcome: Progressing Towards Goal     Problem: Heart Failure: Day 1  Goal: Activity/Safety  Outcome: Progressing Towards Goal  Goal: Treatments/Interventions/Procedures  Outcome: Progressing Towards Goal  Goal: Psychosocial  Outcome: Progressing Towards Goal  Goal: *Oxygen saturation within defined limits  Outcome: Progressing Towards Goal  Goal: *Hemodynamically stable  Outcome: Progressing Towards Goal  Goal: *Anxiety reduced or absent  Outcome: Progressing Towards Goal     Problem: Pressure Injury - Risk of  Goal: *Prevention of pressure injury  Description: Document Mega Scale and appropriate interventions in the flowsheet.   Outcome: Progressing Towards Goal  Note: Pressure Injury Interventions:  Sensory Interventions: Assess changes in LOC         Activity Interventions: Assess need for specialty bed, PT/OT evaluation    Mobility Interventions: Assess need for specialty bed, PT/OT evaluation    Nutrition Interventions: Document food/fluid/supplement intake, Discuss nutritional consult with provider    Friction and Shear Interventions: HOB 30 degrees or less, Lift team/patient mobility team                Problem: Patient Education: Go to Patient Education Activity  Goal: Patient/Family Education  Outcome: Progressing Towards Goal

## 2020-05-13 NOTE — PROGRESS NOTES
Hospitalist Progress Note    NAME: Diana Navarro   :  1940   MRN:  [id number]     I reviewed pertinent labs and imaging, and discussed /agreed on the plan of care with Dr. Dai Sandy. Assessment / Plan:  Acute Hypoxic Respiratory Failure Secondary to Pneumonia   Continue IV Levaquin 750 mg IV Q 48 hours    Chest X-ray  IMPRESSION:  Patchy bibasilar atelectasis or infiltrate, right greater than left, with a mid  inspiratory effort. Inspiratory two-view radiograph is recommended when  clinically possible.      Repeat COVID-19 Test Positive       COVID-Negative on       Currently on room air 94%-100% oxygen saturation      Continue Combivent       Continue Albuterol Inhaler as needed for wheezing  Sepsis secondary to Pneumonia       Continue patient on IV fluid        Blood cultures negative x 14 hours        Chest x-ray bibasilar atelectasis or infiltrate, right greater than left         Continue IV Levaquin 750 mg IV Q 24 hours/ Pharmacy monitoring  Hyperlipidemia         Continue Lipitor  Hypertension      Antihypertensive medications on hold for low blood pressure      Stable Blood Pressure        Monitor  Restless Leg Syndrome     Continue Home medication Acacian Primidone and requip  GERD      Protonix 40 mg Daily  Hx of TIA x 2 No deficits     Continue Plavix daily   COVID- 19    Azithromycin 500 mg IV daily     Zinc 220 mg daily     Oxygen NC as needed for oxygen saturation level less than 92%      25.0 - 29.9 Overweight / Body mass index is 26.85 kg/m². 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 She states he has been admitted to the hospital as well. She is concerned. Her family is with him. She is complaining of  Increased fatigue and shortness of breath with minimal exertion. Her appetite is poor. Will add dietary supplements. She is afebrile  Discussed with RN events overnight. Review of Systems:  Symptom Y/N Comments  Symptom Y/N Comments   Fever/Chills n   Chest Pain n    Poor Appetite y   Edema n    Cough n   Abdominal Pain n    Sputum     Joint Pain     SOB/HERNANDES y With exertion  Pruritis/Rash n    Nausea/vomit n   Tolerating PT/OT     Diarrhea n   Tolerating Diet y Poor appetite   Constipation n   Other       Could NOT obtain due to:      Objective:     VITALS:   Last 24hrs VS reviewed since prior progress note. Most recent are:  Patient Vitals for the past 24 hrs:   Temp Pulse Resp BP SpO2   05/13/20 1500  77 20 138/55 99 %   05/13/20 1040 97.8 °F (36.6 °C) 78 20 102/53 100 %   05/13/20 0700 98 °F (36.7 °C) 69 20 143/66 92 %   05/13/20 0600    124/63    05/13/20 0500  68  106/56 93 %   05/13/20 0400  74  97/83 93 %   05/13/20 0300 98.3 °F (36.8 °C) 71 22 111/50 95 %   05/13/20 0200  74  112/61 96 %   05/13/20 0100  82  104/51 95 %   05/12/20 2300 98.1 °F (36.7 °C) 76 22 108/65 92 %   05/12/20 2200  83  124/70 92 %   05/12/20 2100    110/55    05/12/20 1910 98 °F (36.7 °C) 89 20 112/60 100 %   05/12/20 1900 98 °F (36.7 °C) 85 22 101/58 98 %     No intake or output data in the 24 hours ending 05/13/20 1711     PHYSICAL EXAM:  General:           Alert, cooperative, no acute distress, frail, elderly female  EENT:              Anicteric sclerae. normocephalic  Resp:               Crackles bilateral lower bases,    No accessory muscle use  CV:                  Regular  rhythm,  No edema  GI:                   Soft, Non distended, Non tender.  +Bowel sounds  Neurologic:      Alert and oriented X 3, normal speech,   Psych:             Good insight. Not anxious nor agitated  Skin:                No rashes.   No jaundice    Reviewed most current lab test results and cultures  YES  Reviewed most current radiology test results   YES  Review and summation of old records today    NO  Reviewed patient's current orders and MAR    YES  PMH/SH reviewed - no change compared to H&P  ________________________________________________________________________  Care Plan discussed with:    Comments   Patient y    Family      RN y    Care Manager     Consultant                        Multidiciplinary team rounds were held today with , nursing, pharmacist and clinical coordinator. Patient's plan of care was discussed; medications were reviewed and discharge planning was addressed. ________________________________________________________________________    ________________________________________________________________________  Ellender Lobe, NP     Procedures: see electronic medical records for all procedures/Xrays and details which were not copied into this note but were reviewed prior to creation of Plan. LABS:  I reviewed today's most current labs and imaging studies.   Pertinent labs include:  Recent Labs     05/13/20  0155 05/12/20  0211 05/11/20  1706   WBC 3.0* 2.7* 3.9   HGB 11.3* 11.8 13.2   HCT 34.7* 36.0 40.6    205 254     Recent Labs     05/13/20  0155 05/12/20  0211 05/11/20  1706    137 136   K 3.7 3.8 3.7   * 108 102   CO2 23 24 26   GLU 86 96 75   BUN 12 19 25*   CREA 0.69 0.82 1.06*   CA 7.5* 7.8* 8.2*   ALB  --   --  2.8*   TBILI  --   --  0.6   SGOT  --   --  27   ALT  --   --  24       Signed: Nahun Haq NP

## 2020-05-13 NOTE — PROGRESS NOTES
Patient resolved from Transition of Care episode on 5/13/2020. ACM/CTN was unsuccessful at contacting this patient today. Patient/family was provided the following resources and education related to COVID-19 during the initial call:                         Signs, symptoms and red flags related to COVID-19            CDC exposure and quarantine guidelines            [facility] exposure contact - [phone]            Contact for their local Department of Health                 Patient admitted to hospital on 5/11/2020     No further outreach scheduled with this CTN/ACM. Episode of Care resolved. Patient has this CTN/ACM contact information if future needs arise.

## 2020-05-13 NOTE — PROGRESS NOTES
Pharmacy Automatic Renal Dosing Protocol - Antimicrobials    Indication for Antimicrobials: pneumonia     Current Regimen of Each Antimicrobial:   Levofloxacin 750 mg every 48 hours (Start Date ; day 3)    Previous Antimicrobial Therapy:   N/a    Significant Cultures:    blood: NGTD x 14 hours- pending   resp panel negative   sars-cov-2 pending     sars-cov-2 negative    Radiology / Imaging results: (X-ray, CT scan or MRI):    chest XR: Patchy bibasilar atelectasis or infiltrate, right greater than left, with a mid inspiratory effort. Inspiratory two-view radiograph is recommended when clinically possible. Paralysis, amputations, malnutrition: none noted    Labs:  Recent Labs     20  0155 20  0211 20  1706   CREA 0.69 0.82 1.06*   BUN 12 19 25*   WBC 3.0* 2.7* 3.9     Temp (24hrs), Av.1 °F (36.7 °C), Min:98 °F (36.7 °C), Max:98.4 °F (36.9 °C)    Creatinine Clearance (mL/min) or Dialysis: 53 mL/min (IBW)    Impression/Plan:   Will change to every 24 hours dosing for CrCl > 50 mL/min per renal dosing protocol. Antimicrobial stop date 5 days     Pharmacy will follow daily and adjust medications as appropriate for renal function and/or serum levels.     Thank you,  NITIN FriasD

## 2020-05-13 NOTE — CONSULTS
Assessment/Plan:    Hypertension  BP is stable  Continue Lisinopril 20 mg daily  CAD S/P percutaneous coronary angioplasty  Patient is asymptomatic  Myoview sterss  test done in May 2017 showed no evidence of cardiac ischemia  Continue aspirin 81 mg daily, statin  Hyperlipidemia  Continue Simvastatin 80 mg daily  Check CMP, lipid panel  Follow a low-cholesterol diet, regular exercise  HM : recommended to schedule screening colonoscopy  Check PSA level  Patient declined Pneumovax vaccination  Schedule follow-up office visit in 1 year or call sooner with any acute problems  Diagnoses and all orders for this visit:    Essential hypertension  -     CBC and differential; Future  -     Comprehensive metabolic panel; Future  -     TSH, 3rd generation with T4 reflex; Future    CAD S/P percutaneous coronary angioplasty    Mixed hyperlipidemia  -     Lipid panel; Future    Screening for prostate cancer  -     PSA, Total Screen; Future    Screening for colon cancer  -     Ambulatory referral to Colorectal Surgery; Future          Subjective:      Patient ID: Antony Drake is a 59 y o  male  HPI      Patient is a 81-year-old male with HTN, Hyperlipidemia, CAD, H/o MI in 2004, cardiac cath with stent placement  He presents for a routine  follow-up office visit  HTN -blood pressure is stable  Patient takes Lisinopril 20 mg daily  Denies chest pain, shortness of breath dizziness  Patient was previously seen by cardiologist Dr Adia Rothman who recommended nuclear stress test every 2 years  Patient had stress test done last May which was negative for cardiac ischemia  Hyperlipidemia -currently taking Simvastatin 80 mg daily  Denies diarrhea side effects from statin therapy  Patient states that he had blood work done  for [facility] in 2/18, will bring a copy for review  Colonoscopy done over 10 years ago  No family history of colon cancer  Patient stopped smoking in 1990  Pulmonary:  Consulted by hospitalist NP because patient is COVID-19 positive. She is on room air and in no respiratory distress. Conservative management for her COVID is indicated, and there is no current need for pulmonology consultation for a patient on room air and in no respiratory distress. Will be available PRN if there is a change in status. I am not clear what her indication for admission is.    Yoon Hartman MD Refusing Pneumovax vaccination  The following portions of the patient's history were reviewed and updated as appropriate: allergies, current medications, past family history, past medical history, past social history, past surgical history and problem list     Review of Systems   Constitutional: Negative for activity change, appetite change, chills, fatigue and fever  HENT: Negative  Eyes: Negative for pain, discharge, redness, itching and visual disturbance  Respiratory: Negative for cough, chest tightness, shortness of breath and wheezing  Cardiovascular: Negative for chest pain, palpitations and leg swelling  Gastrointestinal: Negative for abdominal pain, blood in stool, constipation, diarrhea, nausea and vomiting  Genitourinary: Negative for difficulty urinating, dysuria, flank pain, frequency and hematuria  Musculoskeletal: Negative for arthralgias, back pain, joint swelling and myalgias  Skin: Negative for rash and wound  Neurological: Negative for dizziness, syncope, weakness and headaches  Hematological: Negative for adenopathy  Does not bruise/bleed easily  Psychiatric/Behavioral: Negative  Objective:      /72   Pulse 72   Temp 98 °F (36 7 °C) (Tympanic)   Resp 16   Ht 5' 6" (1 676 m)   Wt 88 kg (194 lb)   BMI 31 31 kg/m²          Physical Exam   Constitutional: He appears well-developed and well-nourished  HENT:   Head: Normocephalic and atraumatic  Right Ear: External ear normal    Left Ear: External ear normal    Mouth/Throat: Oropharynx is clear and moist    Eyes: Conjunctivae are normal  Pupils are equal, round, and reactive to light  Neck: Normal range of motion  Neck supple  No JVD present  Cardiovascular: Normal rate, regular rhythm and normal heart sounds  No murmur heard  No carotid bruits BL  No BL LE edema  No abdominal bruits   Pulmonary/Chest: Effort normal and breath sounds normal  He has no wheezes  He has no rales     Abdominal: Soft  Bowel sounds are normal  There is no tenderness  Musculoskeletal: Normal range of motion  He exhibits no edema, tenderness or deformity  Skin: Skin is warm and dry  No rash noted  Psychiatric: He has a normal mood and affect  Nursing note and vitals reviewed

## 2020-05-13 NOTE — PROGRESS NOTES
Bedside and Verbal shift change report given to Chantell Arreguin RN (oncoming nurse) by Alix Iraheta (offgoing nurse). Report included the following information SBAR, Kardex, Intake/Output, MAR, Recent Results and Med Rec Status. 0745: Patient report and assessment completed. 0800: Patient awake in bed, alert, and eating breakfast. Held morning scheduled klonopin. Patient slept all day on 5/12 and drowsy all night. MD notified. 0900: Ambulated to bathroom, standby assist, patient tolerated fairly.

## 2020-05-14 LAB
ANION GAP SERPL CALC-SCNC: 7 MMOL/L (ref 5–15)
BASOPHILS # BLD: 0 K/UL (ref 0–0.1)
BASOPHILS NFR BLD: 0 % (ref 0–1)
BUN SERPL-MCNC: 7 MG/DL (ref 6–20)
BUN/CREAT SERPL: 13 (ref 12–20)
CALCIUM SERPL-MCNC: 7.7 MG/DL (ref 8.5–10.1)
CHLORIDE SERPL-SCNC: 110 MMOL/L (ref 97–108)
CO2 SERPL-SCNC: 22 MMOL/L (ref 21–32)
CREAT SERPL-MCNC: 0.56 MG/DL (ref 0.55–1.02)
DIFFERENTIAL METHOD BLD: ABNORMAL
EOSINOPHIL # BLD: 0 K/UL (ref 0–0.4)
EOSINOPHIL NFR BLD: 1 % (ref 0–7)
ERYTHROCYTE [DISTWIDTH] IN BLOOD BY AUTOMATED COUNT: 13.4 % (ref 11.5–14.5)
GLUCOSE SERPL-MCNC: 102 MG/DL (ref 65–100)
HCT VFR BLD AUTO: 34.8 % (ref 35–47)
HGB BLD-MCNC: 11.5 G/DL (ref 11.5–16)
IMM GRANULOCYTES # BLD AUTO: 0 K/UL (ref 0–0.04)
IMM GRANULOCYTES NFR BLD AUTO: 0 % (ref 0–0.5)
LYMPHOCYTES # BLD: 0.7 K/UL (ref 0.8–3.5)
LYMPHOCYTES NFR BLD: 22 % (ref 12–49)
MCH RBC QN AUTO: 30.1 PG (ref 26–34)
MCHC RBC AUTO-ENTMCNC: 33 G/DL (ref 30–36.5)
MCV RBC AUTO: 91.1 FL (ref 80–99)
MONOCYTES # BLD: 0.2 K/UL (ref 0–1)
MONOCYTES NFR BLD: 5 % (ref 5–13)
NEUTS BAND NFR BLD MANUAL: 1 %
NEUTS SEG # BLD: 2.5 K/UL (ref 1.8–8)
NEUTS SEG NFR BLD: 71 % (ref 32–75)
NRBC # BLD: 0 K/UL (ref 0–0.01)
NRBC BLD-RTO: 0 PER 100 WBC
PLATELET # BLD AUTO: 249 K/UL (ref 150–400)
PMV BLD AUTO: 9.4 FL (ref 8.9–12.9)
POTASSIUM SERPL-SCNC: 3.3 MMOL/L (ref 3.5–5.1)
RBC # BLD AUTO: 3.82 M/UL (ref 3.8–5.2)
RBC MORPH BLD: ABNORMAL
SODIUM SERPL-SCNC: 139 MMOL/L (ref 136–145)
WBC # BLD AUTO: 3.4 K/UL (ref 3.6–11)

## 2020-05-14 PROCEDURE — 65660000000 HC RM CCU STEPDOWN

## 2020-05-14 PROCEDURE — 80048 BASIC METABOLIC PNL TOTAL CA: CPT

## 2020-05-14 PROCEDURE — 74011250637 HC RX REV CODE- 250/637: Performed by: INTERNAL MEDICINE

## 2020-05-14 PROCEDURE — 36415 COLL VENOUS BLD VENIPUNCTURE: CPT

## 2020-05-14 PROCEDURE — 74011250636 HC RX REV CODE- 250/636: Performed by: NURSE PRACTITIONER

## 2020-05-14 PROCEDURE — 74011250637 HC RX REV CODE- 250/637: Performed by: NURSE PRACTITIONER

## 2020-05-14 PROCEDURE — 85025 COMPLETE CBC W/AUTO DIFF WBC: CPT

## 2020-05-14 PROCEDURE — 74011250636 HC RX REV CODE- 250/636: Performed by: INTERNAL MEDICINE

## 2020-05-14 RX ORDER — POTASSIUM CHLORIDE 20 MEQ/1
40 TABLET, EXTENDED RELEASE ORAL
Status: COMPLETED | OUTPATIENT
Start: 2020-05-14 | End: 2020-05-14

## 2020-05-14 RX ADMIN — ZINC SULFATE 220 MG (50 MG) CAPSULE 1 CAPSULE: CAPSULE at 08:02

## 2020-05-14 RX ADMIN — CLONAZEPAM 0.5 MG: 0.5 TABLET ORAL at 17:25

## 2020-05-14 RX ADMIN — ATORVASTATIN CALCIUM 20 MG: 20 TABLET, FILM COATED ORAL at 21:36

## 2020-05-14 RX ADMIN — NORTRIPTYLINE HYDROCHLORIDE 20 MG: 10 CAPSULE ORAL at 21:39

## 2020-05-14 RX ADMIN — PANTOPRAZOLE SODIUM 40 MG: 40 TABLET, DELAYED RELEASE ORAL at 08:02

## 2020-05-14 RX ADMIN — CLOPIDOGREL BISULFATE 75 MG: 75 TABLET ORAL at 21:36

## 2020-05-14 RX ADMIN — ESCITALOPRAM OXALATE 20 MG: 10 TABLET ORAL at 21:36

## 2020-05-14 RX ADMIN — ROPINIROLE HYDROCHLORIDE 1 MG: 0.25 TABLET, FILM COATED ORAL at 21:36

## 2020-05-14 RX ADMIN — PRIMIDONE 50 MG: 50 TABLET ORAL at 21:36

## 2020-05-14 RX ADMIN — LEVOFLOXACIN 750 MG: 5 INJECTION, SOLUTION INTRAVENOUS at 10:47

## 2020-05-14 RX ADMIN — CLONAZEPAM 0.5 MG: 0.5 TABLET ORAL at 08:02

## 2020-05-14 RX ADMIN — Medication 10 ML: at 06:00

## 2020-05-14 RX ADMIN — SODIUM CHLORIDE 75 ML/HR: 900 INJECTION, SOLUTION INTRAVENOUS at 06:22

## 2020-05-14 RX ADMIN — ENOXAPARIN SODIUM 40 MG: 40 INJECTION SUBCUTANEOUS at 08:02

## 2020-05-14 RX ADMIN — AZITHROMYCIN MONOHYDRATE 500 MG: 500 INJECTION, POWDER, LYOPHILIZED, FOR SOLUTION INTRAVENOUS at 08:03

## 2020-05-14 RX ADMIN — Medication 10 ML: at 14:18

## 2020-05-14 RX ADMIN — POTASSIUM CHLORIDE 40 MEQ: 1500 TABLET, EXTENDED RELEASE ORAL at 17:25

## 2020-05-14 RX ADMIN — Medication 10 ML: at 21:36

## 2020-05-14 NOTE — PROGRESS NOTES
Spiritual Care Assessment/Progress Note  [facility]      NAME: Jeannie Wooten      MRN: [id number]  AGE: 67 y.o.  SEX: female  Anglican Affiliation: Synagogue   Language: English     5/14/2020     Total Time (in minutes): 10     Spiritual Assessment begun in [facility] PROGRESSIVE CARE through conversation with:         [x]Patient        [] Family    [] Friend(s)        Reason for Consult: Initial/Spiritual assessment, patient floor     Spiritual beliefs: (Please include comment if needed)     [] Identifies with a elysia tradition:         [] Supported by a elysia community:            [] Claims no spiritual orientation:           [] Seeking spiritual identity:                [] Adheres to an individual form of spirituality:           [] Not able to assess:                           Identified resources for coping:      [x] Prayer                               [] Music                  [] Guided Imagery     [x] Family/friends                 [] Pet visits     [] Devotional reading                         [] Unknown     [] Other:                                              Interventions offered during this visit: (See comments for more details)    Patient Interventions: Affirmation of elysia, Affirmation of emotions/emotional suffering, Bridging, Iconic (affirming the presence of God/Higher Power), Initial/Spiritual assessment, patient floor, Prayer (assurance of), Anglican beliefs/image of God discussed, Normalization of emotional/spiritual concerns           Plan of Care:     [] Support spiritual and/or cultural needs    [] Support AMD and/or advance care planning process      [] Support grieving process   [] Coordinate Rites and/or Rituals    [] Coordination with community clergy   [] No spiritual needs identified at this time   [] Detailed Plan of Care below (See Comments)  [] Make referral to Music Therapy  [] Make referral to Pet Therapy     [] Make referral to Addiction services  [] Make referral to [facility]  [] Make referral to Spiritual Care Partner  [] No future visits requested        [x] Follow up visits as needed     Comments: The purpose of the visit was to do a spiritual assessment on the patient. The patient had just finished having a zoom video meeting with her , and she mentioned she felt sad because she could not do anything for him. She mentioned that he was non-responsive to her during the visit. The patient expressed having the support of family and friends. She shared that though she could not do anything to help her  she believes that God is with them both. She expressed having elysia in God, and that her prayers strengthen her. The  provided spiritual care and comfort. As well the  companioned the patient through a difficult moment. [address] [facility] Herlinda Hollingsworth.    paging service [phone])

## 2020-05-14 NOTE — PROGRESS NOTES
Bedside and Verbal shift change report given to Shane Carlton (oncoming nurse) by Jorge (offgoing nurse). Report included the following information SBAR, Kardex, Intake/Output, Recent Results and Quality Measures.

## 2020-05-14 NOTE — PROGRESS NOTES
Hospitalist Progress Note    NAME: Becky Armas   :  1940   MRN:  [id number]     I reviewed pertinent labs and imaging, and discussed /agreed on the plan of care with Dr. Deonna Jones. Spoke at length with patient's daughter Delmer Beltran with up dated plan of care. Discussed discharge planning. The patient lives with her elderly  who was admitted to the hospital yesterday with possible COVID. Prior to admission she did have [address]. Case management assisting with discharge planning. The daughter does not think the patient can be alone in the house and no family member is available to stay with her. The family has all been tested for COVID and results are pending. Patient may need SNF placement. Will discuss with case management. Assessment / Plan:   Acute Hypoxic Respiratory Failure Secondary to Pneumonia   Continue IV Levaquin 750 mg IV Q 24  hours    Chest X-ray  IMPRESSION:  Patchy bibasilar atelectasis or infiltrate, right greater than left, with a mid  inspiratory effort.  Inspiratory two-view radiograph is recommended when  clinically possible.      Repeat COVID-19 Test Positive       COVID-Negative on       Currently on room air 92%-100% oxygen saturation      Continue Combivent       Continue Albuterol Inhaler as needed for wheezing       Ambulating Pulse Oximetry       Resolved  Sepsis secondary to Pneumonia       Continue patient on IV fluid        Blood cultures negative x 3 days        Chest x-ray bibasilar atelectasis or infiltrate, right greater than left         Continue IV Levaquin 750 mg IV Q 24 hours/ Pharmacy monitoring         Repeat chest x-ray in the am        Pulse Ox < 95%         resolved  Hyperlipidemia         Continue Lipitor  Hypertension      Antihypertensive medications on hold for low blood pressure      Stable Blood Pressure        Monitor  Restless Leg Syndrome     Continue Home medication Acacian Primidone and requip  GERD      Protonix 40 mg Daily  Hx of TIA x 2 No deficits     Continue Plavix daily   COVID- 19    Azithromycin 500 mg IV daily     Zinc 220 mg daily     Oxygen NC as needed for oxygen saturation level less than 92%         25.0 - 29.9 Overweight / Body mass index is 28.4 kg/m². Code status: Full  Prophylaxis: Lovenox  Recommended Disposition: SNF/LTC     Subjective:     Chief Complaint / Reason for Physician Visit  Patient seen resting at this time. She states she does feel short of breath with exertion. She states she is concerned about her  who was admitted yesterday for possible COVID. She is his care taker. She states she does feel very weak. PT/OT have been consulted for further evaluation. Her daughter states concern about the patient going home with Home Health. She did have [address] prior to admission. The daughter states she does not have any family members who could stay with her. Will discuss with case management in the am.  Discussed with RN events overnight. Review of Systems:  Symptom Y/N Comments  Symptom Y/N Comments   Fever/Chills n   Chest Pain n    Poor Appetite y   Edema n    Cough n   Abdominal Pain n    Sputum n   Joint Pain     SOB/HERNANDES y With exertion  Pruritis/Rash n    Nausea/vomit n   Tolerating PT/OT     Diarrhea y   Tolerating Diet y Poor appetite   Constipation n   Other       Could NOT obtain due to:      Objective:     VITALS:   Last 24hrs VS reviewed since prior progress note.  Most recent are:  Patient Vitals for the past 24 hrs:   Temp Pulse Resp BP SpO2   05/14/20 1530 98.2 °F (36.8 °C) 80 18 149/72 92 %   05/14/20 1518  78   93 %   05/14/20 1200  75  153/70 96 %   05/14/20 1100 98.2 °F (36.8 °C) 79 18 (!) 138/100 98 %   05/14/20 0700 98.3 °F (36.8 °C) 69 16 139/76 99 %   05/14/20 0400 98.6 °F (37 °C) 75 16 118/58 100 %   05/14/20 0000 98.4 °F (36.9 °C) 74 16 137/60 100 %   05/13/20 2300  81  136/68 98 %   05/13/20 2200  86  142/77 96 %   05/13/20 2100  80  (!) 150/122 100 %   05/13/20 2000 98.4 °F (36.9 °C) 76 18 142/68 100 %   05/13/20 1900  88  124/66        Intake/Output Summary (Last 24 hours) at 5/14/2020 1629  Last data filed at 5/14/2020 2133  Gross per 24 hour   Intake 4388.75 ml   Output 600 ml   Net 3788.75 ml        PHYSICAL EXAM:    General:           Alert, cooperative, no acute distress, frail, elderly female  EENT:              Anicteric sclerae. normocephalic  Resp:               Crackles bilateral lower bases,    No accessory muscle use  CV:                  Regular  rhythm,  No edema  GI:                   Soft, Non distended, Non tender.  +Bowel sounds  Neurologic:      Alert and oriented X 3, normal speech,   Psych:             Good insight. Not anxious nor agitated, flat affect  Skin:                No rashes.  No jaundice     Reviewed most current lab test results and cultures  YES  Reviewed most current radiology test results   YES  Review and summation of old records today    NO  Reviewed patient's current orders and MAR    YES  PMH/ reviewed - no change compared to H&P  ________________________________________________________________________  Care Plan discussed with:    Comments   Patient y    Family  y Daughter Brian Martin   KISHAN y    Care Manager     Consultant                        Multidiciplinary team rounds were held today with , nursing, pharmacist and clinical coordinator. Patient's plan of care was discussed; medications were reviewed and discharge planning was addressed. ________________________________________________________________________    ________________________________________________________________________  Dread Harness, NP     Procedures: see electronic medical records for all procedures/Xrays and details which were not copied into this note but were reviewed prior to creation of Plan. LABS:  I reviewed today's most current labs and imaging studies.   Pertinent labs include:  Recent Labs 05/14/20 1428 05/13/20  0155 05/12/20  0211   WBC 3.4* 3.0* 2.7*   HGB 11.5 11.3* 11.8   HCT 34.8* 34.7* 36.0    212 205     Recent Labs     05/14/20 1428 05/13/20 0155 05/12/20  0211 05/11/20  1706    140 137 136   K 3.3* 3.7 3.8 3.7   * 111* 108 102   CO2 22 23 24 26   * 86 96 75   BUN 7 12 19 25*   CREA 0.56 0.69 0.82 1.06*   CA 7.7* 7.5* 7.8* 8.2*   ALB  --   --   --  2.8*   TBILI  --   --   --  0.6   SGOT  --   --   --  27   ALT  --   --   --  24       Signed: Dread Drummond NP

## 2020-05-14 NOTE — PROGRESS NOTES
CHRISTIANNE PLAN-- Home with home health services vs SNF. Spoke with patient re dcp. She was open to [facility] prior to coming to the hospital. Spoke with NP and will obtain pt/ot consult. Patient in agreement. Orders entered. Spoke with patient's daughter after getting permission from patient and she would like to talk with md/np re medical update. NP states she will reach out to the daughter. Tanika Colon RN BSN CRM        [phone]

## 2020-05-14 NOTE — PROGRESS NOTES
Patient rested during shift. Emotional during first part of shift as  was in ER. Patient safety and pain assessed frequently. No issues over night. Patient remains on IVFs and NO O2. Anticipate transfer or DC. Patient able to ambulate in bathroom and perform self care with stand by assist. VSS. Will continue to monitor.

## 2020-05-14 NOTE — CDMP QUERY
Dr Juliet Jiang: 
 
Patient admitted with Acute Hypoxic Respiratory Failure Secondary to Pneumonia . Noted documentation of \"on RA no resp distress\" by Dr Lala Lucio. Please address the documentation of this diagnosis in one of the following ways in medical record:  
  
? -Please provide the clinical indicators (such as the ones below) to support the documented diagnosis of Acute Respiratory Failure Air hunger/gasping; Use of accessory muscles of respiration/increased work of breathing; Sternal or intercostal retractions; Stridor; Inability to speak in full sentences; Cyanosis; Respirations <12 or >25; Pulse ox <90% RA or <95% on O2; pH <7.35 or >7.45; pO2 < 60 mm Hg (or 10mm below COPD patient's baseline); pCO2 >50mm Hg (or 10mm above COPD patient's baseline) ? -Acute Respiratory Failure ruled out after study ? Acute respiratory distress POA, resolved ? -Other, please specify ? -Unable to determine The medical record reflects the following: 
   Risk Factors: 79yoF w/ Pneumonia, COVID-19+, hypotension Clinical Indicators:c/o HERNANDES & hypoxia since 4/26 discharge,  Crackles bilateral lower bases,    No accessory muscle use\RR  22  92% 
on ambulation sats down to 85 on RA  
ED: no increased work of breathing noted;  extremely dyspneic and had to sit down to rest.  At this time she was noted to have oxygen desaturation from 95 to 100% on room air down to 80 to 85% on room air in the ED. Treatment: no supplemental O2 use, pulse oximetry, CXR. Thank you,  
Carina Roger, [facility], [address] [place] [address], [address] [place] [address]  
[phone]

## 2020-05-15 ENCOUNTER — APPOINTMENT (OUTPATIENT)
Dept: GENERAL RADIOLOGY | Age: 80
DRG: 871 | End: 2020-05-15
Attending: NURSE PRACTITIONER
Payer: MEDICARE

## 2020-05-15 LAB
ANION GAP SERPL CALC-SCNC: 5 MMOL/L (ref 5–15)
BASOPHILS # BLD: 0 K/UL (ref 0–0.1)
BASOPHILS NFR BLD: 0 % (ref 0–1)
BUN SERPL-MCNC: 6 MG/DL (ref 6–20)
BUN/CREAT SERPL: 12 (ref 12–20)
CALCIUM SERPL-MCNC: 7.9 MG/DL (ref 8.5–10.1)
CHLORIDE SERPL-SCNC: 109 MMOL/L (ref 97–108)
CO2 SERPL-SCNC: 24 MMOL/L (ref 21–32)
CREAT SERPL-MCNC: 0.51 MG/DL (ref 0.55–1.02)
DIFFERENTIAL METHOD BLD: ABNORMAL
EOSINOPHIL # BLD: 0.1 K/UL (ref 0–0.4)
EOSINOPHIL NFR BLD: 4 % (ref 0–7)
ERYTHROCYTE [DISTWIDTH] IN BLOOD BY AUTOMATED COUNT: 13.3 % (ref 11.5–14.5)
GLUCOSE SERPL-MCNC: 80 MG/DL (ref 65–100)
HCT VFR BLD AUTO: 32.6 % (ref 35–47)
HGB BLD-MCNC: 10.5 G/DL (ref 11.5–16)
IMM GRANULOCYTES # BLD AUTO: 0 K/UL (ref 0–0.04)
IMM GRANULOCYTES NFR BLD AUTO: 1 % (ref 0–0.5)
LYMPHOCYTES # BLD: 1 K/UL (ref 0.8–3.5)
LYMPHOCYTES NFR BLD: 28 % (ref 12–49)
MCH RBC QN AUTO: 29.6 PG (ref 26–34)
MCHC RBC AUTO-ENTMCNC: 32.2 G/DL (ref 30–36.5)
MCV RBC AUTO: 91.8 FL (ref 80–99)
MONOCYTES # BLD: 0.4 K/UL (ref 0–1)
MONOCYTES NFR BLD: 11 % (ref 5–13)
NEUTS SEG # BLD: 2.1 K/UL (ref 1.8–8)
NEUTS SEG NFR BLD: 56 % (ref 32–75)
NRBC # BLD: 0 K/UL (ref 0–0.01)
NRBC BLD-RTO: 0 PER 100 WBC
PLATELET # BLD AUTO: 258 K/UL (ref 150–400)
PMV BLD AUTO: 9.6 FL (ref 8.9–12.9)
POTASSIUM SERPL-SCNC: 3.5 MMOL/L (ref 3.5–5.1)
RBC # BLD AUTO: 3.55 M/UL (ref 3.8–5.2)
SODIUM SERPL-SCNC: 138 MMOL/L (ref 136–145)
WBC # BLD AUTO: 3.7 K/UL (ref 3.6–11)

## 2020-05-15 PROCEDURE — 80048 BASIC METABOLIC PNL TOTAL CA: CPT

## 2020-05-15 PROCEDURE — 97116 GAIT TRAINING THERAPY: CPT

## 2020-05-15 PROCEDURE — 74011250636 HC RX REV CODE- 250/636: Performed by: INTERNAL MEDICINE

## 2020-05-15 PROCEDURE — 97161 PT EVAL LOW COMPLEX 20 MIN: CPT

## 2020-05-15 PROCEDURE — 97535 SELF CARE MNGMENT TRAINING: CPT | Performed by: OCCUPATIONAL THERAPIST

## 2020-05-15 PROCEDURE — 74011250637 HC RX REV CODE- 250/637: Performed by: EMERGENCY MEDICINE

## 2020-05-15 PROCEDURE — 65660000000 HC RM CCU STEPDOWN

## 2020-05-15 PROCEDURE — 97165 OT EVAL LOW COMPLEX 30 MIN: CPT | Performed by: OCCUPATIONAL THERAPIST

## 2020-05-15 PROCEDURE — 71045 X-RAY EXAM CHEST 1 VIEW: CPT

## 2020-05-15 PROCEDURE — 74011250636 HC RX REV CODE- 250/636: Performed by: NURSE PRACTITIONER

## 2020-05-15 PROCEDURE — 85025 COMPLETE CBC W/AUTO DIFF WBC: CPT

## 2020-05-15 PROCEDURE — 36415 COLL VENOUS BLD VENIPUNCTURE: CPT

## 2020-05-15 PROCEDURE — 74011250637 HC RX REV CODE- 250/637: Performed by: NURSE PRACTITIONER

## 2020-05-15 PROCEDURE — 74011250637 HC RX REV CODE- 250/637: Performed by: INTERNAL MEDICINE

## 2020-05-15 RX ADMIN — Medication 10 ML: at 14:00

## 2020-05-15 RX ADMIN — PANTOPRAZOLE SODIUM 40 MG: 40 TABLET, DELAYED RELEASE ORAL at 08:01

## 2020-05-15 RX ADMIN — ROPINIROLE HYDROCHLORIDE 1 MG: 0.25 TABLET, FILM COATED ORAL at 22:52

## 2020-05-15 RX ADMIN — ACETAMINOPHEN 650 MG: 325 TABLET, FILM COATED ORAL at 08:01

## 2020-05-15 RX ADMIN — ENOXAPARIN SODIUM 40 MG: 40 INJECTION SUBCUTANEOUS at 08:01

## 2020-05-15 RX ADMIN — CLONAZEPAM 0.5 MG: 0.5 TABLET ORAL at 17:57

## 2020-05-15 RX ADMIN — ZINC SULFATE 220 MG (50 MG) CAPSULE 1 CAPSULE: CAPSULE at 08:01

## 2020-05-15 RX ADMIN — Medication 10 ML: at 22:00

## 2020-05-15 RX ADMIN — Medication 10 ML: at 06:00

## 2020-05-15 RX ADMIN — CLONAZEPAM 0.5 MG: 0.5 TABLET ORAL at 08:01

## 2020-05-15 RX ADMIN — NORTRIPTYLINE HYDROCHLORIDE 10 MG: 10 CAPSULE ORAL at 22:52

## 2020-05-15 RX ADMIN — ESCITALOPRAM OXALATE 20 MG: 10 TABLET ORAL at 22:52

## 2020-05-15 RX ADMIN — PRIMIDONE 50 MG: 50 TABLET ORAL at 22:54

## 2020-05-15 RX ADMIN — CLOPIDOGREL BISULFATE 75 MG: 75 TABLET ORAL at 22:54

## 2020-05-15 RX ADMIN — LEVOFLOXACIN 750 MG: 5 INJECTION, SOLUTION INTRAVENOUS at 10:47

## 2020-05-15 RX ADMIN — ATORVASTATIN CALCIUM 20 MG: 20 TABLET, FILM COATED ORAL at 22:54

## 2020-05-15 RX ADMIN — SODIUM CHLORIDE 50 ML/HR: 900 INJECTION, SOLUTION INTRAVENOUS at 05:30

## 2020-05-15 NOTE — PROGRESS NOTES
Hospitalist Progress Note    NAME: Becky Armas   :  1940   MRN:  [id number]     I reviewed pertinent labs and imaging, and discussed /agreed on the plan of care with Dr. Deonna Jones. Spoke at length with patient's daughter Delmer Beltran with up dated plan of care. Discussed discharge planning. The patient lives with her elderly  who was admitted to the hospital yesterday with possible COVID. Prior to admission she did have [address]. Case management assisting with discharge planning. The daughter does not think the patient can be alone in the house and no family member is available to stay with her. The family has all been tested for COVID and results are pending. Patient may need SNF placement. Will discuss with case management  Assessment / Plan:   Acute Hypoxic Respiratory Failure Secondary to Pneumonia   Continue IV Levaquin 750 mg IV Q 24  hours    Chest X-ray  IMPRESSION:  Patchy bibasilar atelectasis or infiltrate, right greater than left, with a mid  inspiratory effort.  Inspiratory two-view radiograph is recommended when  clinically possible.      Repeat COVID-19 Test Positive       COVID-Negative on       Currently on room air 92%-100% oxygen saturation      Continue Combivent       Continue Albuterol Inhaler as needed for wheezing       Ambulating Pulse Oximetry       Resolved  Sepsis secondary to Pneumonia       Continue patient on IV fluid        Blood cultures negative x 3 days        Chest x-ray bibasilar atelectasis or infiltrate, right greater than left         Continue IV Levaquin 750 mg IV Q 24 hours/ Pharmacy monitoring         Repeat chest x-ray in the am        Pulse Ox < 95%         resolved  Hyperlipidemia         Continue Lipitor  Hypertension      Antihypertensive medications on hold for low blood pressure      Stable Blood Pressure        Monitor  Restless Leg Syndrome     Continue Home medication Acacian Primidone and requip  GERD      Protonix 40 mg Daily  Hx of TIA x 2 No deficits     Continue Plavix daily   COVID- 19    Azithromycin 500 mg IV daily     Zinc 220 mg daily     Oxygen NC as needed for oxygen saturation level less than 92%         25.0 - 29.9 Overweight / Body mass index is 28.4 kg/m². Code status: Full  Prophylaxis: Lovenox  Recommended Disposition:  PT, OT, RN     Subjective:     Chief Complaint / Reason for Physician Visit   Patient remains weak and concerned about her . She was seen by PT/OT. Will get an ambulating pulse oximetry. Family is unwilling to take the patient home at this time they are concerned about her positive COVID and she does live with elderly spouse who is currently in the hospital with positive COVID. Discussed with RN events overnight. Review of Systems:  Symptom Y/N Comments  Symptom Y/N Comments   Fever/Chills n   Chest Pain n    Poor Appetite y   Edema n    Cough n   Abdominal Pain n    Sputum n   Joint Pain     SOB/HERNANDES y With exertion  Pruritis/Rash n    Nausea/vomit n   Tolerating PT/OT     Diarrhea n   Tolerating Diet y Poor appetite   Constipation n   Other       Could NOT obtain due to:      Objective:     VITALS:   Last 24hrs VS reviewed since prior progress note.  Most recent are:  Patient Vitals for the past 24 hrs:   Temp Pulse Resp BP SpO2   05/15/20 1446 98.2 °F (36.8 °C) 89 16 122/55 96 %   05/15/20 1440  91  136/67 95 %   05/15/20 1439  92  136/67 93 %   05/15/20 1433  97  93/53    05/15/20 1431  89  125/72 96 %   05/15/20 1429  82  128/75 95 %   05/15/20 1047 98.1 °F (36.7 °C) 73 16 98/50 94 %   05/15/20 0745  87  150/81 94 %   05/15/20 0700 98.1 °F (36.7 °C) 74 16 142/65 93 %   05/15/20 0600  79  144/61 93 %   05/15/20 0500  74  137/64 92 %   05/15/20 0400 98.4 °F (36.9 °C) 76 16 136/81 92 %   05/15/20 0200  79  137/79 92 %   05/15/20 0100  77  134/67 91 %   05/15/20 0000 98.5 °F (36.9 °C) 80 16 143/62 91 %   05/14/20 2300  87   94 %   05/14/20 2200  84  169/82 95 %   05/14/20 2100  79  108/85 96 %   05/14/20 2000 98.3 °F (36.8 °C) 81 16 150/76 95 %       Intake/Output Summary (Last 24 hours) at 5/15/2020 1849  Last data filed at 5/15/2020 1959  Gross per 24 hour   Intake 2206.25 ml   Output 300 ml   Net 1906.25 ml        PHYSICAL EXAM:  General:           Alert, cooperative, no acute distress, frail, elderly female  EENT:              Anicteric sclerae. normocephalic  Resp:               Crackles bilateral lower bases,    No accessory muscle use  CV:                  Regular  rhythm,  No edema  GI:                   Soft, Non distended, Non tender.  +Bowel sounds  Neurologic:      Alert and oriented X 3, normal speech,   Psych:             Good insight. Not anxious nor agitated, flat affect  Skin:                No rashes.  No jaundice       Reviewed most current lab test results and cultures  YES  Reviewed most current radiology test results   YES  Review and summation of old records today    NO  Reviewed patient's current orders and MAR    YES  PMH/SH reviewed - no change compared to H&P  ________________________________________________________________________  Care Plan discussed with:    Comments   Patient y    Family      RN y    Care Manager     Consultant                        Multidiciplinary team rounds were held today with , nursing, pharmacist and clinical coordinator. Patient's plan of care was discussed; medications were reviewed and discharge planning was addressed. ________________________________________________________________________  _____  Magdaleno Carrillo NP     Procedures: see electronic medical records for all procedures/Xrays and details which were not copied into this note but were reviewed prior to creation of Plan. LABS:  I reviewed today's most current labs and imaging studies.   Pertinent labs include:  Recent Labs     05/15/20  0318 05/14/20  1428 05/13/20  0155   WBC 3.7 3.4* 3.0*   HGB 10.5* 11.5 11.3*   HCT 32.6* 34.8* 34.7*    249 212     Recent Labs     05/15/20  0318 05/14/20  1428 05/13/20  0155    139 140   K 3.5 3.3* 3.7   * 110* 111*   CO2 24 22 23   GLU 80 102* 86   BUN 6 7 12   CREA 0.51* 0.56 0.69   CA 7.9* 7.7* 7.5*       Signed: Donal Mandel, NP

## 2020-05-15 NOTE — PROGRESS NOTES
Problem: Mobility Impaired (Adult and Pediatric)  Goal: *Acute Goals and Plan of Care (Insert Text)  Description: FUNCTIONAL STATUS PRIOR TO ADMISSION: Patient was hospitalized approx 3 weeks ago with respiratory sxs, reports when she discharged she was ambulatory for household distances without AD  and was receiving HHPT, had not walked outdoors. 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 Daughter will be able to provide assistance, usually patient is indep. Physical Therapy Goals  Initiated 5/15/2020  1. Patient will move from supine to sit and sit to supine  in bed with independence within 7 day(s). 2.  Patient will transfer from bed to chair and chair to bed with independence using the least restrictive device within 7 day(s). 3.  Patient will perform sit to stand with independence within 7 day(s). 4.  Patient will ambulate with supervision/set-up for 100 feet with the least restrictive device within 7 day(s). Outcome: Not Met   PHYSICAL THERAPY EVALUATION  Patient: Kathleen Chin (1 y.o. female)  Date: 5/15/2020  Primary Diagnosis: Sepsis ([address] [A41.9]        Precautions:   Fall(droplet +)      ASSESSMENT  Based on the objective data described below, the patient presents with decreased tolerance of activity, general weakness, mild unsteadiness, and impaired ability to transfer and ambulate. Patient received in bed and agreeable to participate, able to come to edge of bed with SBA and sitting balance is intact. Patient able to ambulate with supervision to  bathroom and to door of room , approx 40 feet with mild unsteadiness noted with fatigue. Patient was SOB post activity but VSS. Instructed in deep breathing for recovery. Patient left up in chair with call bell in reach. Patient should be able to discharge home if daughter can provide assist, and resume HHPT.     Current Level of Function Impacting Discharge (mobility/balance): SBA to supervision    Functional Outcome Measure: The patient scored 70/100 on the Barthel outcome measure which is indicative of mild functional impairment      Other factors to consider for discharge: fatigue, increased falls risk     Patient will benefit from skilled therapy intervention to address the above noted impairments. PLAN :  Recommendations and Planned Interventions: bed mobility training, transfer training, gait training, therapeutic exercises, patient and family training/education, and therapeutic activities      Frequency/Duration: Patient will be followed by physical therapy:  3 times a week to address goals. Recommendation for discharge: (in order for the patient to meet his/her long term goals)  Physical therapy at least 2 days/week in the home AND ensure assist and/or supervision for safety with mobility     This discharge recommendation:  A follow-up discussion with the attending provider and/or case management is planned    IF patient discharges home will need the following DME: patient owns DME required for discharge         SUBJECTIVE:   Patient stated my breathing isn't good. 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)     GERD (gastroesophageal reflux disease)     Hypercholesteremia     Hypertension     Idiopathic small and large fiber sensory neuropathy     Limited peripheral vision of right eye     complication of cataract surgery    Long term current use of anticoagulant therapy     Meniere disease     MGUS (monoclonal gammopathy of unknown significance)     Monoclonal gammopathy of unknown significance     hem/onc: Dr Ahmet Brunson [phone]    Osteoporosis     Psoriasis     bilateral Legs    Restless leg     Skin melanoma ([facility] [address] 2012    (Rt ankle) and basal cell (Lt eye) removed    Transient ischemic attack approx 2005    HAS HAD 2 MINISTROKES-NO DEFICITS; neuro Dr Mya Lazcano. Reyes Campo (cc)    Unspecified adverse effect of anesthesia     HAS NEEDED PORT OR CUTDOWN FOR RECVNG BLOOD OR LONG-TERM IVs; surgery 7/28/15 w/o any difficulty    Unspecified sleep apnea     does not use CPAP     Past Surgical History:   Procedure Laterality Date    HX APPENDECTOMY      HX CATARACT REMOVAL  2011    Right    HX CERVICAL FUSION  1/28/15    C5-7    HX CHOLECYSTECTOMY  2000    HX GI  2010    liver bx due to liver Bx due to elevated enzymes, liver laceration during Bx, hospitalized 10-12d    HX HEENT  7/28/15    Lt total parotidectomy with facial nerve dissection: Dr Reji eMlo    HX HEENT  11/2015    Right parotidectomy    HX HYSTERECTOMY      HX ORTHOPAEDIC  2009 & 2011    LUMBAR FUSION and revision    HX OTHER SURGICAL  2010    MELANOMA (Rt ankle) and basal cell (Lt eye) removed; q6mo ck by DERMATOLOGY    HX SHOULDER REPLACEMENT Left 08/01/2016    HX THYROIDECTOMY      Right    AL COLON CA SCRN NOT HI RSK IND  1/27/2016         AL INSERTION SUBQ CARDIAC RHYTHM MONITOR W/PRGRMG N/A 11/6/2019    LOOP RECORDER INSERT performed by Radha Carmen MD at [facility] CARDIAC CATH LAB       Personal factors and/or comorbidities impacting plan of care: fatigue, increased falls risk    Home Situation  Home Environment: Private residence  # Steps to Enter: 0  One/Two Story Residence: One story  Living Alone: No  Support Systems: Family member(s)(daughter has been assisting, pt cares with )  Patient Expects to be Discharged to[de-identified] Private residence  Current DME Used/Available at Home: Shower chair  Tub or Shower Type: Shower    EXAMINATION/PRESENTATION/DECISION MAKING:   Critical Behavior:  Neurologic State: Alert  Orientation Level: Oriented X4  Cognition: Appropriate decision making, Appropriate for age attention/concentration, Appropriate safety awareness  Safety/Judgement: Awareness of environment, Fall prevention, Insight into deficits, Home safety  Hearing: Auditory  Auditory Impairment: Hard of hearing, bilateral, Hearing aid(s)  Hearing Aids/Status: At bedside  Range Of Motion:  AROM: Within functional limits           PROM: Within functional limits           Strength:    Strength: Generally decreased, functional                    Tone & Sensation:   Tone: Normal              Sensation: Intact               Coordination:  Coordination: Within functional limits  Vision:   Corrective Lenses: Glasses  Functional Mobility:  Bed Mobility:  Rolling: Supervision  Supine to Sit: Supervision     Scooting: Supervision  Transfers:  Sit to Stand: Stand-by assistance  Stand to Sit: Stand-by assistance        Bed to Chair: Contact guard assistance              Balance:   Sitting: Intact  Standing: Impaired  Standing - Static: Good  Standing - Dynamic : Fair  Ambulation/Gait Training:  Distance (ft): 40 Feet (ft)  Assistive Device: Gait belt  Ambulation - Level of Assistance: Supervision        Gait Abnormalities: Decreased step clearance              Speed/Emani: Pace decreased (<100 feet/min)  Step Length: Left shortened;Right shortened                     Stairs:                Functional Measure:  Barthel Index:    Bathin  Bladder: 10  Bowels: 10  Groomin  Dressin  Feeding: 10  Mobility: 10  Stairs: 5  Toilet Use: 5  Transfer (Bed to Chair and Back): 10  Total: 70/100       The Barthel ADL Index: Guidelines  1. The index should be used as a record of what a patient does, not as a record of what a patient could do. 2. The main aim is to establish degree of independence from any help, physical or verbal, however minor and for whatever reason. 3. The need for supervision renders the patient not independent.   4. A patient's performance should be established using the best available evidence. Asking the patient, friends/relatives and nurses are the usual sources, but direct observation and common sense are also important. However direct testing is not needed. 5. Usually the patient's performance over the preceding 24-48 hours is important, but occasionally longer periods will be relevant. 6. Middle categories imply that the patient supplies over 50 per cent of the effort. 7. Use of aids to be independent is allowed. Effie Osorio Barthel, D.W. ([id number]). Functional evaluation: the Barthel Index. [address] [facility] (14)2. ALONSO Reed, Epifanio Holland., Juan Pantoja., [facility], [facility] (1999). Measuring the change indisability after inpatient rehabilitation; comparison of the responsiveness of the Barthel Index and Functional [place] Measure. Journal of Neurology, Neurosurgery, and Psychiatry, 66(4), 120-158. Joni Zamora, NHuiJ.A, MILLER Rizo, & Andreas Miller M.A. (2004.) Assessment of post-stroke quality of life in cost-effectiveness studies: The usefulness of the Barthel Index and the EuroQoL-5D. Quality of Life Research, 15, 710-28           Physical Therapy Evaluation Charge Determination   History Examination Presentation Decision-Making   LOW Complexity : Zero comorbidities / personal factors that will impact the outcome / POC LOW Complexity : 1-2 Standardized tests and measures addressing body structure, function, activity limitation and / or participation in recreation  LOW Complexity : Stable, uncomplicated  LOW Complexity : FOTO score of       Based on the above components, the patient evaluation is determined to be of the following complexity level: LOW     Pain Rating:      Activity Tolerance:   Fair  Please refer to the flowsheet for vital signs taken during this treatment.     After treatment patient left in no apparent distress:   Sitting in chair and Call bell within reach    COMMUNICATION/EDUCATION:   The patients plan of care was discussed with: Occupational therapist and Registered nurse. Fall prevention education was provided and the patient/caregiver indicated understanding. and Patient/family agree to work toward stated goals and plan of care.     Thank you for this referral.  Roberto Singh, PT   Time Calculation: 33 mins

## 2020-05-15 NOTE — PROGRESS NOTES
Problem: Self Care Deficits Care Plan (Adult)  Goal: *Acute Goals and Plan of Care (Insert Text)  Description: FUNCTIONAL STATUS PRIOR TO ADMISSION: cares for  whom has dementia, ambulated without assist devices and performed all ADLS and IADLS without assist to include driving    [address]: The patient lived with Chava Emerson but did not require assist.    Occupational Therapy Goals:  Initiated 5/15/2020  1. Patient will perform grooming standing with modified independence within 7 days. 2. Patient will perform upper body dressing and lower body dressing with modified independence within 7 days. 3. Patient will perform toileting with modified independence within 7 days. 4. Patient will perform bathing with modified independence within 7 days. 5. Patient will transfer from toilet with modified independence using the least restrictive device and appropriate durable medical equipment within 7 days. Outcome: Progressing Towards Goal   OCCUPATIONAL THERAPY EVALUATION  Patient: Tali Smoker (65 y.o. female)  Date: 5/15/2020  Primary Diagnosis: Sepsis ([facility] [address] [A41.9]        Precautions:   Fall(droplet +)    ASSESSMENT  Based on the objective data described below, the patient presents with general weakness and decreased endurance with +COVID-19. 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 Pt was orthostatic but was asymptomatic and BP improved once seated. O2 sat on room air was above 92% this session. Pt will need assist with IADLS. Current Level of Function Impacting Discharge (ADLs/self-care): SBA to CGA for mobility without assist devices, CGA for ADLS    Functional Outcome Measure: The patient scored 70/100 on the barthel outcome measure which is indicative of minimal decline in mobility and ADLS.     Other factors to consider for discharge: needs assist with IADLS     Patient will benefit from skilled therapy intervention to address the above noted impairments. PLAN :  Recommendations and Planned Interventions: self care training, functional mobility training, balance training, patient education, home safety training, and family training/education    Frequency/Duration: Patient will be followed by occupational therapy 4 times a week to address goals. Recommendation for discharge: (in order for the patient to meet his/her long term goals)  Occupational therapy at least 2 days/week in the home AND ensure assist and/or supervision for safety with IADLs      This discharge recommendation:  Has been made in collaboration with the attending provider and/or case management    IF patient discharges home will need the following DME: none       SUBJECTIVE:   Patient stated That was harder than I thought. 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)     GERD (gastroesophageal reflux disease)     Hypercholesteremia     Hypertension     Idiopathic small and large fiber sensory neuropathy     Limited peripheral vision of right eye     complication of cataract surgery    Long term current use of anticoagulant therapy     Meniere disease     MGUS (monoclonal gammopathy of unknown significance)     Monoclonal gammopathy of unknown significance     hem/onc:  Dr Kelby Huynh [phone]    Osteoporosis     Psoriasis     bilateral Legs    Restless leg     Skin melanoma ([facility] [address] 2012    (Rt ankle) and basal cell (Lt eye) removed    Transient ischemic attack approx 2005    HAS HAD 2 MINISTROKES-NO DEFICITS; neuro Dr Gan Links (cc)    Unspecified adverse effect of anesthesia HAS NEEDED PORT OR CUTDOWN FOR RECVNG BLOOD OR LONG-TERM IVs; surgery 7/28/15 w/o any difficulty    Unspecified sleep apnea     does not use CPAP     Past Surgical History:   Procedure Laterality Date    HX APPENDECTOMY      HX CATARACT REMOVAL  2011    Right    HX CERVICAL FUSION  1/28/15    C5-7    HX CHOLECYSTECTOMY  2000    HX GI  2010    liver bx due to liver Bx due to elevated enzymes, liver laceration during Bx, hospitalized 10-12d    HX HEENT  7/28/15    Lt total parotidectomy with facial nerve dissection: Dr Carina Whipple  11/2015    Right parotidectomy    HX HYSTERECTOMY      HX ORTHOPAEDIC  2009 & 2011    LUMBAR FUSION and revision    HX OTHER SURGICAL  2010    MELANOMA (Rt ankle) and basal cell (Lt eye) removed; q6mo ck by DERMATOLOGY    HX SHOULDER REPLACEMENT Left 08/01/2016    HX THYROIDECTOMY      Right    KS COLON CA SCRN NOT HI RSK IND  1/27/2016         [place] INSERTION SUBQ CARDIAC RHYTHM MONITOR W/PRGRMG N/A 11/6/2019    LOOP RECORDER INSERT performed by Angélica Gaitan MD at [place] [id number] LAB       Expanded or extensive additional review of patient history:     Home Situation  Home Environment: Private residence  # Steps to Enter: 0  One/Two Story Residence: One story  Living Alone: No  Support Systems: Family member(s)(daughter has been assisting, pt cares with )  Patient Expects to be Discharged to[de-identified] Private residence  Current DME Used/Available at Home: Shower chair  Tub or Shower Type: Shower    Hand dominance: Right    EXAMINATION OF PERFORMANCE DEFICITS:  Cognitive/Behavioral Status:  Neurologic State: Alert  Orientation Level: Oriented X4  Cognition: Appropriate decision making; Appropriate for age attention/concentration; Appropriate safety awareness  Perception: Appears intact  Perseveration: No perseveration noted  Safety/Judgement: Awareness of environment; Fall prevention; Insight into deficits;Home safety      Hearing:   Auditory  Auditory Impairment: Hard of hearing, bilateral, Hearing aid(s)  Hearing Aids/Status: At bedside    Vision/Perceptual:                                Corrective Lenses: Glasses    Range of Motion:    AROM: Within functional limits  PROM: Within functional limits                      Strength:    Strength: Generally decreased, functional                Coordination:  Coordination: Within functional limits  Fine Motor Skills-Upper: Left Intact; Right Intact    Gross Motor Skills-Upper: Left Intact; Right Intact    Tone & Sensation:    Tone: Normal  Sensation: Intact                      Balance:  Sitting: Intact  Standing: Impaired  Standing - Static: Good  Standing - Dynamic : Fair    Functional Mobility and Transfers for ADLs:  Bed Mobility:  Rolling: Supervision  Supine to Sit: Supervision  Scooting: Supervision    Transfers:  Sit to Stand: Stand-by assistance  Stand to Sit: Stand-by assistance  Bed to Chair: Contact guard assistance  Bathroom Mobility: Contact guard assistance  Toilet Transfer : Contact guard assistance    ADL Assessment:  Feeding: Independent    Oral Facial Hygiene/Grooming: Contact guard assistance    Bathing: Contact guard assistance    Upper Body Dressing: Contact guard assistance    Lower Body Dressing: Contact guard assistance    Toileting: Contact guard assistance                ADL Intervention and task modifications:  Mobilized to bathroom without assist devices with CGA. Performed toileting and grooming with CGA and then mobilized to door and back to bedside chair with CGA. Pt was fatigued after this. Educated pt on pacing, sitting to perform tasks, PLB and energy conservation with ADLs. Cognitive Retraining  Safety/Judgement: Awareness of environment; Fall prevention; Insight into deficits;Home safety      Functional Measure:  Barthel Index:    Bathin  Bladder: 10  Bowels: 10  Groomin  Dressin  Feeding: 10  Mobility: 10  Stairs: 5  Toilet Use: 5  Transfer (Bed to Chair and Back): 10  Total: 70/100 The Barthel ADL Index: Guidelines  1. The index should be used as a record of what a patient does, not as a record of what a patient could do. 2. The main aim is to establish degree of independence from any help, physical or verbal, however minor and for whatever reason. 3. The need for supervision renders the patient not independent. 4. A patient's performance should be established using the best available evidence. Asking the patient, friends/relatives and nurses are the usual sources, but direct observation and common sense are also important. However direct testing is not needed. 5. Usually the patient's performance over the preceding 24-48 hours is important, but occasionally longer periods will be relevant. 6. Middle categories imply that the patient supplies over 50 per cent of the effort. 7. Use of aids to be independent is allowed. Ashley Johns., Barthel, MYKEL. ([id number]). Functional evaluation: the Barthel Index. [address] [facility] (14)2. ALONSO Hinds, Stephanie Wallace., Rachel Glover., [place], [address] [place] [address] (1999). Measuring the change indisability after inpatient rehabilitation; comparison of the responsiveness of the Barthel Index and Functional [place] Measure. Journal of Neurology, Neurosurgery, and Psychiatry, 66(4), 725-258. Tadeo Sr, N.J.A, MILLER Rizo, & Jaren Wellington MHuiA. (2004.) Assessment of post-stroke quality of life in cost-effectiveness studies: The usefulness of the Barthel Index and the EuroQoL-5D.  Quality of Life Research, 15, 583-40         Occupational Therapy Evaluation Charge Determination   History Examination Decision-Making   LOW Complexity : Brief history review  LOW Complexity : 1-3 performance deficits relating to physical, cognitive , or psychosocial skils that result in activity limitations and / or participation restrictions  LOW Complexity : No comorbidities that affect functional and no verbal or physical assistance needed to complete eval tasks Based on the above components, the patient evaluation is determined to be of the following complexity level: LOW   Pain Ratin/10    Activity Tolerance:   Fair and observed SOB with activity  Please refer to the flowsheet for vital signs taken during this treatment. After treatment patient left in no apparent distress:    Sitting in chair and Call bell within reach    COMMUNICATION/EDUCATION:   The patients plan of care was discussed with: Physical therapist, Registered nurse, and patient. Home safety education was provided and the patient/caregiver indicated understanding., Patient/family have participated as able in goal setting and plan of care. , and Patient/family agree to work toward stated goals and plan of care. This patients plan of care is appropriate for delegation to [facility].     Thank you for this referral.  Shea Farley, OTR/L  Time Calculation: 24 mins

## 2020-05-15 NOTE — PROGRESS NOTES
Hospitalist Progress Note    NAME: Diana Navarro   :  1940   MRN:  [id number]     I reviewed pertinent labs and imaging, and discussed /agreed on the plan of care with Dr. Dai Sandy. Spoke at length with patient's daughter Elaine Corona with up dated plan of care. Discussed discharge planning. The patient lives with her elderly  who was admitted to the hospital  with positive COVID. Prior to admission she did have [address]. Case management assisting with discharge planning. The daughter does not think the patient can be alone in the house and no family member is available to stay with her. The family has all been tested for COVID and results are pending. Patient may need SNF placement. Assessment / Plan:  Acute Hypoxic Respiratory Failure Secondary to Pneumonia   Continue IV Levaquin 750 mg IV Q 24  hours    Chest X-ray  IMPRESSION:  Patchy bibasilar atelectasis or infiltrate, right greater than left, with a mid  inspiratory effort. Inspiratory two-view radiograph is recommended when  clinically possible.      Repeat COVID-19 Test Positive       COVID-Negative on       Currently on room air 92%-100% oxygen saturation      Continue Combivent       Continue Albuterol Inhaler as needed for wheezing       Ambulating Pulse Oximetry       Resolved       Ambulating pulse oximetry did not drop below 93% on Room Air       Repeat Chest x-ray: IMPRESSION:  1.  Minimal opacities in both bases may represent atelectasis or airspace disease  Sepsis secondary to Pneumonia       Continue patient on IV fluid        Blood cultures negative x 3 days        Chest x-ray bibasilar atelectasis or infiltrate, right greater than left         Continue IV Levaquin 750 mg IV Q 24 hours/ Pharmacy monitoring         Repeat chest x-ray in the am        Pulse Ox < 95%         resolved  Hyperlipidemia         Continue Lipitor  Hypertension      Antihypertensive medications on hold for low blood pressure      Stable Blood Pressure        Monitor  Restless Leg Syndrome     Continue Home medication Acacian Primidone and requip  GERD      Protonix 40 mg Daily  Hx of TIA x 2 No deficits     Continue Plavix daily   COVID- 19    Azithromycin 500 mg IV daily     Zinc 220 mg daily     Oxygen NC as needed for oxygen saturation level less than 92%         25.0 - 29.9 Overweight / Body mass index is 29.06 kg/m². Code status: Full  Prophylaxis: Lovenox  Recommended Disposition:  PT, OT, RN     Subjective:     Chief Complaint / Reason for Physician Visit  Patient resting comfortably, she states she is sleepy . Discussed with RN events overnight. Review of Systems:  Symptom Y/N Comments  Symptom Y/N Comments   Fever/Chills n   Chest Pain n    Poor Appetite y   Edema n    Cough n   Abdominal Pain n    Sputum n   Joint Pain n    SOB/HERNANDES y With exertion  Pruritis/Rash n    Nausea/vomit n   Tolerating PT/OT     Diarrhea n   Tolerating Diet     Constipation n   Other       Could NOT obtain due to:      Objective:     VITALS:   Last 24hrs VS reviewed since prior progress note. Most recent are:  Patient Vitals for the past 24 hrs:   Temp Pulse Resp BP SpO2   05/16/20 1500 98.2 °F (36.8 °C) 75 20 141/71 94 %   05/16/20 1441     97 %   05/16/20 1439     95 %   05/16/20 1402     96 %   05/16/20 1400  75  137/60    05/16/20 1136 98.2 °F (36.8 °C) 83 19 146/77 95 %   05/16/20 0745 98.3 °F (36.8 °C) 82 16 158/67 94 %   05/16/20 0225 98 °F (36.7 °C) 89 14 135/64    05/15/20 2300 97.8 °F (36.6 °C) 93 16 154/80 96 %   05/15/20 2258    165/77    05/15/20 2022 98.1 °F (36.7 °C) 81 18 185/83 99 %     No intake or output data in the 24 hours ending 05/16/20 1819     PHYSICAL EXAM:  General:           Alert, cooperative, no acute distress, frail, elderly female  EENT:              Anicteric sclerae. normocephalic  Resp:               Crackles bilateral lower bases,    No accessory muscle use  CV:                  Regular  rhythm,  No edema  GI:                   Soft, Non distended, Non tender.  +Bowel sounds  Neurologic:      Alert and oriented X 3, normal speech,   Psych:             Good insight. Not anxious nor agitated, flat affect  Skin:                No rashes.  No jaundice     Reviewed most current lab test results and cultures  YES  Reviewed most current radiology test results   YES  Review and summation of old records today    NO  Reviewed patient's current orders and MAR    YES  PMH/SH reviewed - no change compared to H&P  ________________________________________________________________________  Care Plan discussed with:    Comments   Patient y    Family      RN y    Care Manager     Consultant                        Multidiciplinary team rounds were held today with , nursing, pharmacist and clinical coordinator. Patient's plan of care was discussed; medications were reviewed and discharge planning was addressed. ________________________________________________________________________    ________________________________________________________________________  Dread Drummond NP     Procedures: see electronic medical records for all procedures/Xrays and details which were not copied into this note but were reviewed prior to creation of Plan. LABS:  I reviewed today's most current labs and imaging studies.   Pertinent labs include:  Recent Labs     05/16/20  0348 05/15/20  0318 05/14/20  1428   WBC 3.9 3.7 3.4*   HGB 10.8* 10.5* 11.5   HCT 32.9* 32.6* 34.8*    258 249     Recent Labs     05/16/20  0348 05/15/20  0318 05/14/20  1428    138 139   K 3.4* 3.5 3.3*   * 109* 110*   CO2 24 24 22   GLU 79 80 102*   BUN 8 6 7   CREA 0.54* 0.51* 0.56   CA 7.9* 7.9* 7.7*       Signed: rDead Drummond, NP

## 2020-05-15 NOTE — PROGRESS NOTES
Bedside and Verbal shift change report given to Willy Vu (oncoming nurse) by Andrea Lara (offgoing nurse). Report included the following information SBAR, Kardex, Intake/Output, MAR and Quality Measures.      1420 Physical therapy working with patient     [address] [place] about patient to see about possible discharge

## 2020-05-16 LAB
ANION GAP SERPL CALC-SCNC: 7 MMOL/L (ref 5–15)
BASOPHILS # BLD: 0 K/UL (ref 0–0.1)
BASOPHILS NFR BLD: 0 % (ref 0–1)
BUN SERPL-MCNC: 8 MG/DL (ref 6–20)
BUN/CREAT SERPL: 15 (ref 12–20)
CALCIUM SERPL-MCNC: 7.9 MG/DL (ref 8.5–10.1)
CHLORIDE SERPL-SCNC: 109 MMOL/L (ref 97–108)
CO2 SERPL-SCNC: 24 MMOL/L (ref 21–32)
CREAT SERPL-MCNC: 0.54 MG/DL (ref 0.55–1.02)
DIFFERENTIAL METHOD BLD: ABNORMAL
EOSINOPHIL # BLD: 0.2 K/UL (ref 0–0.4)
EOSINOPHIL NFR BLD: 4 % (ref 0–7)
ERYTHROCYTE [DISTWIDTH] IN BLOOD BY AUTOMATED COUNT: 13.5 % (ref 11.5–14.5)
GLUCOSE SERPL-MCNC: 79 MG/DL (ref 65–100)
HCT VFR BLD AUTO: 32.9 % (ref 35–47)
HGB BLD-MCNC: 10.8 G/DL (ref 11.5–16)
IMM GRANULOCYTES # BLD AUTO: 0 K/UL (ref 0–0.04)
IMM GRANULOCYTES NFR BLD AUTO: 1 % (ref 0–0.5)
LYMPHOCYTES # BLD: 1 K/UL (ref 0.8–3.5)
LYMPHOCYTES NFR BLD: 25 % (ref 12–49)
MCH RBC QN AUTO: 30.2 PG (ref 26–34)
MCHC RBC AUTO-ENTMCNC: 32.8 G/DL (ref 30–36.5)
MCV RBC AUTO: 91.9 FL (ref 80–99)
MONOCYTES # BLD: 0.5 K/UL (ref 0–1)
MONOCYTES NFR BLD: 11 % (ref 5–13)
NEUTS SEG # BLD: 2.3 K/UL (ref 1.8–8)
NEUTS SEG NFR BLD: 59 % (ref 32–75)
NRBC # BLD: 0 K/UL (ref 0–0.01)
NRBC BLD-RTO: 0 PER 100 WBC
PLATELET # BLD AUTO: 283 K/UL (ref 150–400)
PMV BLD AUTO: 9.4 FL (ref 8.9–12.9)
POTASSIUM SERPL-SCNC: 3.4 MMOL/L (ref 3.5–5.1)
RBC # BLD AUTO: 3.58 M/UL (ref 3.8–5.2)
SODIUM SERPL-SCNC: 140 MMOL/L (ref 136–145)
WBC # BLD AUTO: 3.9 K/UL (ref 3.6–11)

## 2020-05-16 PROCEDURE — 74011250637 HC RX REV CODE- 250/637: Performed by: EMERGENCY MEDICINE

## 2020-05-16 PROCEDURE — 65660000000 HC RM CCU STEPDOWN

## 2020-05-16 PROCEDURE — 36415 COLL VENOUS BLD VENIPUNCTURE: CPT

## 2020-05-16 PROCEDURE — 85025 COMPLETE CBC W/AUTO DIFF WBC: CPT

## 2020-05-16 PROCEDURE — 74011250636 HC RX REV CODE- 250/636: Performed by: INTERNAL MEDICINE

## 2020-05-16 PROCEDURE — 74011250637 HC RX REV CODE- 250/637: Performed by: INTERNAL MEDICINE

## 2020-05-16 PROCEDURE — 80048 BASIC METABOLIC PNL TOTAL CA: CPT

## 2020-05-16 PROCEDURE — 74011250637 HC RX REV CODE- 250/637: Performed by: NURSE PRACTITIONER

## 2020-05-16 PROCEDURE — 74011250636 HC RX REV CODE- 250/636: Performed by: NURSE PRACTITIONER

## 2020-05-16 RX ADMIN — PANTOPRAZOLE SODIUM 40 MG: 40 TABLET, DELAYED RELEASE ORAL at 08:03

## 2020-05-16 RX ADMIN — ACETAMINOPHEN 650 MG: 325 TABLET, FILM COATED ORAL at 14:49

## 2020-05-16 RX ADMIN — PRIMIDONE 50 MG: 50 TABLET ORAL at 22:02

## 2020-05-16 RX ADMIN — Medication 10 ML: at 06:00

## 2020-05-16 RX ADMIN — ROPINIROLE HYDROCHLORIDE 1 MG: 0.25 TABLET, FILM COATED ORAL at 22:02

## 2020-05-16 RX ADMIN — Medication 10 ML: at 22:00

## 2020-05-16 RX ADMIN — ZINC SULFATE 220 MG (50 MG) CAPSULE 1 CAPSULE: CAPSULE at 08:03

## 2020-05-16 RX ADMIN — NORTRIPTYLINE HYDROCHLORIDE 10 MG: 10 CAPSULE ORAL at 22:00

## 2020-05-16 RX ADMIN — CLONAZEPAM 0.5 MG: 0.5 TABLET ORAL at 18:29

## 2020-05-16 RX ADMIN — ATORVASTATIN CALCIUM 20 MG: 20 TABLET, FILM COATED ORAL at 22:02

## 2020-05-16 RX ADMIN — CLONAZEPAM 0.5 MG: 0.5 TABLET ORAL at 08:03

## 2020-05-16 RX ADMIN — CLOPIDOGREL BISULFATE 75 MG: 75 TABLET ORAL at 22:01

## 2020-05-16 RX ADMIN — ESCITALOPRAM OXALATE 20 MG: 10 TABLET ORAL at 22:02

## 2020-05-16 RX ADMIN — SODIUM CHLORIDE 50 ML/HR: 900 INJECTION, SOLUTION INTRAVENOUS at 01:56

## 2020-05-16 RX ADMIN — ENOXAPARIN SODIUM 40 MG: 40 INJECTION SUBCUTANEOUS at 08:03

## 2020-05-16 RX ADMIN — Medication 10 ML: at 14:00

## 2020-05-16 NOTE — PROGRESS NOTES
Bedside and Verbal shift change report given to [address] (oncoming nurse) by Gene Ribeiro (offgoing nurse). Report included the following information SBAR, Kardex, Intake/Output, MAR, Recent Results and Med Rec Status. 0715: Patient report and assessment completed. Patient resting in bed. 0830: Patient ambulated to bathroom, no assistance needed. Sitting in bed eating breakfast on room air sats @ 95%. 1445: Performed ambulating pulse oximetry, patient sats did not drop below 93%.

## 2020-05-16 NOTE — PROGRESS NOTES
Problem: General Medical Care Plan  Goal: *Vital signs within specified parameters  Outcome: Progressing Towards Goal  Goal: *Labs within defined limits  Outcome: Progressing Towards Goal  Goal: *Absence of infection signs and symptoms  Outcome: Progressing Towards Goal  Goal: *Optimal pain control at patient's stated goal  Outcome: Progressing Towards Goal  Goal: *Skin integrity maintained  Outcome: Progressing Towards Goal  Goal: *Fluid volume balance  Outcome: Progressing Towards Goal     Problem: Activity Intolerance  Goal: *Oxygen saturation during activity within specified parameters  Outcome: Progressing Towards Goal  Goal: *Able to remain out of bed as prescribed  Outcome: Progressing Towards Goal     Problem: Patient Education: Go to Patient Education Activity  Goal: Patient/Family Education  Outcome: Progressing Towards Goal     Problem: Falls - Risk of  Goal: *Absence of Falls  Description: Document Venancio Martinez Fall Risk and appropriate interventions in the flowsheet.   Outcome: Progressing Towards Goal  Note: Fall Risk Interventions:  Mobility Interventions: Bed/chair exit alarm, Patient to call before getting OOB         Medication Interventions: Bed/chair exit alarm, Teach patient to arise slowly, Assess postural VS orthostatic hypotension    Elimination Interventions: Bed/chair exit alarm, Call light in reach, Patient to call for help with toileting needs    History of Falls Interventions: Bed/chair exit alarm         Problem: Patient Education: Go to Patient Education Activity  Goal: Patient/Family Education  Outcome: Progressing Towards Goal     Problem: Patient Education: Go to Patient Education Activity  Goal: Patient/Family Education  Outcome: Progressing Towards Goal     Problem: Heart Failure: Day 1  Goal: Activity/Safety  Outcome: Progressing Towards Goal  Goal: Treatments/Interventions/Procedures  Outcome: Progressing Towards Goal  Goal: Psychosocial  Outcome: Progressing Towards Goal  Goal: *Oxygen saturation within defined limits  Outcome: Progressing Towards Goal  Goal: *Hemodynamically stable  Outcome: Progressing Towards Goal  Goal: *Anxiety reduced or absent  Outcome: Progressing Towards Goal     Problem: Pressure Injury - Risk of  Goal: *Prevention of pressure injury  Description: Document Mega Scale and appropriate interventions in the flowsheet.   Outcome: Progressing Towards Goal  Note: Pressure Injury Interventions:  Sensory Interventions: Assess changes in LOC, Assess need for specialty bed, Float heels         Activity Interventions: Increase time out of bed    Mobility Interventions: HOB 30 degrees or less    Nutrition Interventions: Document food/fluid/supplement intake    Friction and Shear Interventions: Lift sheet, HOB 30 degrees or less                Problem: Patient Education: Go to Patient Education Activity  Goal: Patient/Family Education  Outcome: Progressing Towards Goal     Problem: Patient Education: Go to Patient Education Activity  Goal: Patient/Family Education  Outcome: Progressing Towards Goal     Problem: Patient Education: Go to Patient Education Activity  Goal: Patient/Family Education  Outcome: Progressing Towards Goal

## 2020-05-16 NOTE — PROGRESS NOTES
Bedside and Verbal shift change report given to Ky (oncoming nurse) by Kirk Scott (offgoing nurse). Report included the following information SBAR, Kardex, Intake/Output, MAR, Recent Results and Cardiac Rhythm sinus. 9295: Pt had uneventful night. Pt resting quietly in bed. No c.o of pain. VSS.

## 2020-05-17 LAB
BACTERIA SPEC CULT: NORMAL
BACTERIA SPEC CULT: NORMAL
SERVICE CMNT-IMP: NORMAL
SERVICE CMNT-IMP: NORMAL

## 2020-05-17 PROCEDURE — 74011250637 HC RX REV CODE- 250/637: Performed by: INTERNAL MEDICINE

## 2020-05-17 PROCEDURE — 74011250636 HC RX REV CODE- 250/636: Performed by: INTERNAL MEDICINE

## 2020-05-17 PROCEDURE — 65660000000 HC RM CCU STEPDOWN

## 2020-05-17 PROCEDURE — 74011250637 HC RX REV CODE- 250/637: Performed by: NURSE PRACTITIONER

## 2020-05-17 RX ORDER — HYDRALAZINE HYDROCHLORIDE 20 MG/ML
10 INJECTION INTRAMUSCULAR; INTRAVENOUS
Status: DISCONTINUED | OUTPATIENT
Start: 2020-05-17 | End: 2020-05-18 | Stop reason: HOSPADM

## 2020-05-17 RX ORDER — POTASSIUM CHLORIDE 20 MEQ/1
40 TABLET, EXTENDED RELEASE ORAL
Status: COMPLETED | OUTPATIENT
Start: 2020-05-17 | End: 2020-05-17

## 2020-05-17 RX ORDER — CLONAZEPAM 0.5 MG/1
0.25 TABLET ORAL 2 TIMES DAILY
Status: DISCONTINUED | OUTPATIENT
Start: 2020-05-17 | End: 2020-05-18 | Stop reason: HOSPADM

## 2020-05-17 RX ADMIN — PRIMIDONE 50 MG: 50 TABLET ORAL at 21:57

## 2020-05-17 RX ADMIN — Medication 10 ML: at 06:00

## 2020-05-17 RX ADMIN — ROPINIROLE HYDROCHLORIDE 1 MG: 0.25 TABLET, FILM COATED ORAL at 21:56

## 2020-05-17 RX ADMIN — ESCITALOPRAM OXALATE 20 MG: 10 TABLET ORAL at 21:56

## 2020-05-17 RX ADMIN — ENOXAPARIN SODIUM 40 MG: 40 INJECTION SUBCUTANEOUS at 08:19

## 2020-05-17 RX ADMIN — Medication 10 ML: at 22:00

## 2020-05-17 RX ADMIN — ATORVASTATIN CALCIUM 20 MG: 20 TABLET, FILM COATED ORAL at 21:57

## 2020-05-17 RX ADMIN — CLOPIDOGREL BISULFATE 75 MG: 75 TABLET ORAL at 21:57

## 2020-05-17 RX ADMIN — PANTOPRAZOLE SODIUM 40 MG: 40 TABLET, DELAYED RELEASE ORAL at 08:23

## 2020-05-17 RX ADMIN — POTASSIUM CHLORIDE 40 MEQ: 20 TABLET, EXTENDED RELEASE ORAL at 16:45

## 2020-05-17 RX ADMIN — ZINC SULFATE 220 MG (50 MG) CAPSULE 1 CAPSULE: CAPSULE at 08:23

## 2020-05-17 RX ADMIN — NORTRIPTYLINE HYDROCHLORIDE 10 MG: 10 CAPSULE ORAL at 21:56

## 2020-05-17 RX ADMIN — Medication 10 ML: at 16:47

## 2020-05-17 RX ADMIN — CLONAZEPAM 0.25 MG: 0.5 TABLET ORAL at 18:12

## 2020-05-17 RX ADMIN — DIPHENHYDRAMINE HYDROCHLORIDE, ZINC ACETATE: 2; .1 CREAM TOPICAL at 22:04

## 2020-05-17 NOTE — PROGRESS NOTES
Problem: General Medical Care Plan  Goal: *Vital signs within specified parameters  Outcome: Progressing Towards Goal  Goal: *Labs within defined limits  Outcome: Progressing Towards Goal  Goal: *Absence of infection signs and symptoms  Outcome: Progressing Towards Goal  Goal: *Optimal pain control at patient's stated goal  Outcome: Progressing Towards Goal  Goal: *Skin integrity maintained  Outcome: Progressing Towards Goal  Goal: *Fluid volume balance  Outcome: Progressing Towards Goal     Problem: Activity Intolerance  Goal: *Oxygen saturation during activity within specified parameters  Outcome: Progressing Towards Goal  Goal: *Able to remain out of bed as prescribed  Outcome: Progressing Towards Goal     Problem: Patient Education: Go to Patient Education Activity  Goal: Patient/Family Education  Outcome: Progressing Towards Goal     Problem: Falls - Risk of  Goal: *Absence of Falls  Description: Document Shaw Sol Fall Risk and appropriate interventions in the flowsheet.   Outcome: Progressing Towards Goal  Note: Fall Risk Interventions:  Mobility Interventions: Patient to call before getting OOB, Strengthening exercises (ROM-active/passive)         Medication Interventions: Bed/chair exit alarm, Evaluate medications/consider consulting pharmacy    Elimination Interventions: Call light in reach    History of Falls Interventions: Bed/chair exit alarm         Problem: Patient Education: Go to Patient Education Activity  Goal: Patient/Family Education  Outcome: Progressing Towards Goal     Problem: Patient Education: Go to Patient Education Activity  Goal: Patient/Family Education  Outcome: Progressing Towards Goal     Problem: Heart Failure: Day 1  Goal: Activity/Safety  Outcome: Progressing Towards Goal  Goal: Treatments/Interventions/Procedures  Outcome: Progressing Towards Goal  Goal: Psychosocial  Outcome: Progressing Towards Goal  Goal: *Oxygen saturation within defined limits  Outcome: Progressing Towards Goal  Goal: *Hemodynamically stable  Outcome: Progressing Towards Goal  Goal: *Anxiety reduced or absent  Outcome: Progressing Towards Goal     Problem: Pressure Injury - Risk of  Goal: *Prevention of pressure injury  Description: Document Mega Scale and appropriate interventions in the flowsheet.   Outcome: Progressing Towards Goal  Note: Pressure Injury Interventions:  Sensory Interventions: Assess changes in LOC, Discuss PT/OT consult with provider         Activity Interventions: Increase time out of bed    Mobility Interventions: HOB 30 degrees or less    Nutrition Interventions: Document food/fluid/supplement intake    Friction and Shear Interventions: Apply protective barrier, creams and emollients                Problem: Patient Education: Go to Patient Education Activity  Goal: Patient/Family Education  Outcome: Progressing Towards Goal     Problem: Patient Education: Go to Patient Education Activity  Goal: Patient/Family Education  Outcome: Progressing Towards Goal     Problem: Patient Education: Go to Patient Education Activity  Goal: Patient/Family Education  Outcome: Progressing Towards Goal

## 2020-05-17 NOTE — PROGRESS NOTES
Hospitalist Progress Note    NAME: Silviano Rutherford   :  1940   MRN:  [id number]     I reviewed pertinent labs and imaging, and discussed /agreed on the plan of care with Dr. Carol Ann Sanchez. Assessment / Plan:  Acute Hypoxic Respiratory Failure Secondary to Pneumonia   Continue IV Levaquin 750 mg IV Q 24  hours    Chest X-ray  IMPRESSION:  Patchy bibasilar atelectasis or infiltrate, right greater than left, with a mid  inspiratory effort. Inspiratory two-view radiograph is recommended when  clinically possible.      Repeat COVID-19 Test Positive       COVID-Negative on       Currently on room air 92%-100% oxygen saturation      Continue Combivent       Continue Albuterol Inhaler as needed for wheezing       Ambulating Pulse Oximetry       Resolved  Sepsis secondary to Pneumonia       Continue patient on IV fluid        Blood cultures negative x 3 days        Chest x-ray bibasilar atelectasis or infiltrate, right greater than left         Continue IV Levaquin 750 mg IV Q 24 hours/ Pharmacy monitoring         Repeat chest x-ray in the am        Pulse Ox < 95%         resolved  Hyperlipidemia         Continue Lipitor  Hypertension      Antihypertensive medications on hold for low blood pressure      Stable Blood Pressure        Monitor  Restless Leg Syndrome     Continue Home medication Acacian Primidone and requip  GERD      Protonix 40 mg Daily  Hx of TIA x 2 No deficits     Continue Plavix daily   COVID- 19    Azithromycin 500 mg IV daily     Zinc 220 mg daily     Oxygen NC as needed for oxygen saturation level less than 92%               25.0 - 29.9 Overweight / Body mass index is 29.35 kg/m². Code status: Full  Prophylaxis: Lovenox  Recommended Disposition:  PT, OT, RN     Subjective:     Chief Complaint / Reason for Physician Visit  Patient sitting up eating dinner. She is feeling better and states she is ready to go home. She denies shortness of breath or chest pain.  She states her son lives around the corner from her and is willing to check on her. She states she does not feel as if she needs home health. Will anticipate discharge in the am..  Discussed with RN events overnight. Review of Systems:  Symptom Y/N Comments  Symptom Y/N Comments   Fever/Chills n   Chest Pain n    Poor Appetite y   Edema n    Cough n   Abdominal Pain n    Sputum n   Joint Pain     SOB/HERNANDES n denies  Pruritis/Rash n    Nausea/vomit n   Tolerating PT/OT     Diarrhea n   Tolerating Diet y Poor appetite   Constipation    Other       Could NOT obtain due to:      Objective:     VITALS:   Last 24hrs VS reviewed since prior progress note. Most recent are:  Patient Vitals for the past 24 hrs:   Temp Pulse Resp BP SpO2   05/17/20 1643  100 20 134/60 97 %   05/17/20 1100 97.7 °F (36.5 °C) 75  109/60 95 %   05/17/20 0901  81   94 %   05/17/20 0900    122/61    05/17/20 0730 97.8 °F (36.6 °C) 74 18 174/85 93 %   05/17/20 0404 98.1 °F (36.7 °C) 71 16 154/82 94 %   05/16/20 2209 97.9 °F (36.6 °C) 82 16 (!) 165/93 (!) 89 %   05/16/20 1900 97.8 °F (36.6 °C) 82 18 137/65 96 %       Intake/Output Summary (Last 24 hours) at 5/17/2020 1735  Last data filed at 5/17/2020 0404  Gross per 24 hour   Intake 240 ml   Output    Net 240 ml        PHYSICAL EXAM:  General:  Alert, cooperative, no acute distress  , frail, elderly female  EENT:   Anicteric sclerae. normocephalic  Resp:  CTA bilaterally, no wheezing or rales. No accessory muscle use  CV:  Regular  rhythm,  No edema  GI:  Soft, Non distended, Non tender.  +Bowel sounds  Neurologic:  Alert and oriented X 3, normal speech,   Psych:   Good insight. Not anxious nor agitated  Skin:  No rashes.   No jaundice    Reviewed most current lab test results and cultures  YES  Reviewed most current radiology test results   YES  Review and summation of old records today    NO  Reviewed patient's current orders and MAR    YES  PMH/SH reviewed - no change compared to H&P  ________________________________________________________________________  Care Plan discussed with:    Comments   Patient y    Family  y Melba Felipe [phone]   RN y    Care Manager     Consultant                        Multidiciplinary team rounds were held today with , nursing, pharmacist and clinical coordinator. Patient's plan of care was discussed; medications were reviewed and discharge planning was addressed. ________________________________________________________________________    ________________________________________________________________________  Mendy Aldrich NP     Procedures: see electronic medical records for all procedures/Xrays and details which were not copied into this note but were reviewed prior to creation of Plan. LABS:  I reviewed today's most current labs and imaging studies.   Pertinent labs include:  Recent Labs     05/16/20  0348 05/15/20  0318   WBC 3.9 3.7   HGB 10.8* 10.5*   HCT 32.9* 32.6*    258     Recent Labs     05/16/20  0348 05/15/20  0318    138   K 3.4* 3.5   * 109*   CO2 24 24   GLU 79 80   BUN 8 6   CREA 0.54* 0.51*   CA 7.9* 7.9*       Signed: Mendy Aldrich NP

## 2020-05-17 NOTE — PROGRESS NOTES
Bedside and Verbal shift change report given to [address] (oncoming nurse) by Kathie La (offgoing nurse). Report included the following information SBAR, Kardex, Intake/Output, MAR, Recent Results and Med Rec Status. 0730: patient assessment complete. Awake in bed, breakfast served. 1715: Patient stated rash down posterior back, red and itchy. MD notified and benadryl cream ordered.

## 2020-05-17 NOTE — PROGRESS NOTES
Bedside and Verbal shift change report given to Ky (oncoming nurse) by Delbert De León RN (offgoing nurse). Report included the following information SBAR, Kardex, Intake/Output, MAR, Recent Results and Cardiac Rhythm sinus. 2145: medication not in patient specific bin or in tube station. Messaged pharmacy about patient specific medication. 2217: pharmacy returned messaged saying to check patient bin. 0000: Pharmacy messaged back that It was delivered to bin, the medication is not there. Asked pharmacy to send medication. 0025: Medication given. 1910: Pt sleeping. VSS. No co of pain.  Report given to [facility]

## 2020-05-17 NOTE — PROGRESS NOTES
Bedside and Verbal shift change report given to Ky (oncoming nurse) by Rosibel Hannon (offgoing nurse). Report included the following information SBAR, Kardex, Intake/Output, MAR, Recent Results and Cardiac Rhythm sinus. 2200: Placed anti-itch cream on patients back. Pt is red and inflamed covering her lower back and buttock. Looks like it could be a medication reaction. Pt having no other symptoms besides hot and itchy skin. Pt said it started last night, will look into if anything different was given. Will continue to monitor and follow up with day shift to make hospitalist aware.

## 2020-05-17 NOTE — PROGRESS NOTES
Problem: General Medical Care Plan  Goal: *Vital signs within specified parameters  Outcome: Progressing Towards Goal  Goal: *Absence of infection signs and symptoms  Outcome: Progressing Towards Goal  Goal: *Optimal pain control at patient's stated goal  Outcome: Progressing Towards Goal  Goal: *Skin integrity maintained  Outcome: Progressing Towards Goal  Goal: *Fluid volume balance  Outcome: Progressing Towards Goal     Problem: Activity Intolerance  Goal: *Oxygen saturation during activity within specified parameters  Outcome: Progressing Towards Goal  Goal: *Able to remain out of bed as prescribed  Outcome: Progressing Towards Goal     Problem: Falls - Risk of  Goal: *Absence of Falls  Description: Document Tse Reason Fall Risk and appropriate interventions in the flowsheet.   Outcome: Progressing Towards Goal  Note: Fall Risk Interventions:  Mobility Interventions: Patient to call before getting OOB, Strengthening exercises (ROM-active/passive)         Medication Interventions: Teach patient to arise slowly, Patient to call before getting OOB    Elimination Interventions: Bed/chair exit alarm, Patient to call for help with toileting needs, Call light in reach    History of Falls Interventions: Room close to nurse's station

## 2020-05-18 ENCOUNTER — OFFICE VISIT (OUTPATIENT)
Dept: CARDIOLOGY CLINIC | Age: 80
End: 2020-05-18

## 2020-05-18 VITALS
WEIGHT: 158.51 LBS | BODY MASS INDEX: 29.17 KG/M2 | SYSTOLIC BLOOD PRESSURE: 153 MMHG | HEIGHT: 62 IN | HEART RATE: 85 BPM | DIASTOLIC BLOOD PRESSURE: 77 MMHG | OXYGEN SATURATION: 94 % | TEMPERATURE: 97.9 F | RESPIRATION RATE: 18 BRPM

## 2020-05-18 DIAGNOSIS — I63.30 CEREBROVASCULAR ACCIDENT (CVA) DUE TO THROMBOSIS OF CEREBRAL ARTERY (HCC): Primary | ICD-10-CM

## 2020-05-18 DIAGNOSIS — Z45.09 ENCOUNTER FOR LOOP RECORDER CHECK: ICD-10-CM

## 2020-05-18 PROBLEM — A41.9 SEPSIS (HCC): Status: RESOLVED | Noted: 2020-05-11 | Resolved: 2020-05-18

## 2020-05-18 PROBLEM — E55.9 VITAMIN D DEFICIENCY: Status: RESOLVED | Noted: 2018-06-07 | Resolved: 2020-05-18

## 2020-05-18 PROBLEM — L25.9 CONTACT DERMATITIS: Status: ACTIVE | Noted: 2020-05-18

## 2020-05-18 PROBLEM — U07.1 COVID-19: Status: ACTIVE | Noted: 2020-05-18

## 2020-05-18 PROCEDURE — 74011250637 HC RX REV CODE- 250/637: Performed by: INTERNAL MEDICINE

## 2020-05-18 PROCEDURE — 74011250636 HC RX REV CODE- 250/636: Performed by: INTERNAL MEDICINE

## 2020-05-18 PROCEDURE — 74011250637 HC RX REV CODE- 250/637: Performed by: NURSE PRACTITIONER

## 2020-05-18 RX ORDER — POTASSIUM CHLORIDE 20 MEQ/1
40 TABLET, EXTENDED RELEASE ORAL
Status: DISCONTINUED | OUTPATIENT
Start: 2020-05-18 | End: 2020-05-18 | Stop reason: HOSPADM

## 2020-05-18 RX ORDER — PANTOPRAZOLE SODIUM 40 MG/1
40 TABLET, DELAYED RELEASE ORAL
Qty: 30 TAB | Refills: 0 | Status: SHIPPED | OUTPATIENT
Start: 2020-05-18 | End: 2020-10-27

## 2020-05-18 RX ORDER — CLONAZEPAM 0.5 MG/1
0.25 TABLET ORAL 2 TIMES DAILY
Qty: 15 TAB | Refills: 0 | Status: SHIPPED | OUTPATIENT
Start: 2020-05-18 | End: 2020-06-03

## 2020-05-18 RX ADMIN — CLONAZEPAM 0.25 MG: 0.5 TABLET ORAL at 08:49

## 2020-05-18 RX ADMIN — ZINC SULFATE 220 MG (50 MG) CAPSULE 1 CAPSULE: CAPSULE at 08:49

## 2020-05-18 RX ADMIN — PANTOPRAZOLE SODIUM 40 MG: 40 TABLET, DELAYED RELEASE ORAL at 08:49

## 2020-05-18 RX ADMIN — ENOXAPARIN SODIUM 40 MG: 40 INJECTION SUBCUTANEOUS at 08:50

## 2020-05-18 RX ADMIN — Medication 10 ML: at 06:00

## 2020-05-18 NOTE — DISCHARGE INSTRUCTIONS
Patient Education        Anxiety Disorder: Care Instructions  Your Care Instructions    Anxiety is a normal reaction to stress. Difficult situations can cause you to have symptoms such as sweaty palms and a nervous feeling. In an anxiety disorder, the symptoms are far more severe. Constant worry, muscle tension, trouble sleeping, nausea and diarrhea, and other symptoms can make normal daily activities difficult or impossible. These symptoms may occur for no reason, and they can affect your work, school, or social life. Medicines, counseling, and self-care can all help. Follow-up care is a key part of your treatment and safety. Be sure to make and go to all appointments, and call your doctor if you are having problems. It's also a good idea to know your test results and keep a list of the medicines you take. How can you care for yourself at home? · Take medicines exactly as directed. Call your doctor if you think you are having a problem with your medicine. · Go to your counseling sessions and follow-up appointments. · Recognize and accept your anxiety. Then, when you are in a situation that makes you anxious, say to yourself, \"This is not an emergency. I feel uncomfortable, but I am not in danger. I can keep going even if I feel anxious. \"  · Be kind to your body:  ? Relieve tension with exercise or a massage. ? Get enough rest.  ? Avoid alcohol, caffeine, nicotine, and illegal drugs. They can increase your anxiety level and cause sleep problems. ? Learn and do relaxation techniques. See below for more about these techniques. · Engage your mind. Get out and do something you enjoy. Go to a funny movie, or take a walk or hike. Plan your day. Having too much or too little to do can make you anxious. · Keep a record of your symptoms. Discuss your fears with a good friend or family member, or join a support group for people with similar problems. Talking to others sometimes relieves stress.   · Get involved in social groups, or volunteer to help others. Being alone sometimes makes things seem worse than they are. · Get at least 30 minutes of exercise on most days of the week to relieve stress. Walking is a good choice. You also may want to do other activities, such as running, swimming, cycling, or playing tennis or team sports. Relaxation techniques  Do relaxation exercises 10 to 20 minutes a day. You can play soothing, relaxing music while you do them, if you wish. · Tell others in your house that you are going to do your relaxation exercises. Ask them not to disturb you. · Find a comfortable place, away from all distractions and noise. · Lie down on your back, or sit with your back straight. · Focus on your breathing. Make it slow and steady. · Breathe in through your nose. Breathe out through either your nose or mouth. · Breathe deeply, filling up the area between your navel and your rib cage. Breathe so that your belly goes up and down. · Do not hold your breath. · Breathe like this for 5 to 10 minutes. Notice the feeling of calmness throughout your whole body. As you continue to breathe slowly and deeply, relax by doing the following for another 5 to 10 minutes:  · Tighten and relax each muscle group in your body. You can begin at your toes and work your way up to your head. · Imagine your muscle groups relaxing and becoming heavy. · Empty your mind of all thoughts. · Let yourself relax more and more deeply. · Become aware of the state of calmness that surrounds you. · When your relaxation time is over, you can bring yourself back to alertness by moving your fingers and toes and then your hands and feet and then stretching and moving your entire body. Sometimes people fall asleep during relaxation, but they usually wake up shortly afterward. · Always give yourself time to return to full alertness before you drive a car or do anything that might cause an accident if you are not fully alert.  Never play a relaxation tape while you drive a car. When should you call for help? Call 911 anytime you think you may need emergency care. For example, call if:    · You feel you cannot stop from hurting yourself or someone else.   Isa Porras the numbers for these national suicide hotlines: 1-958-960-TALK (6-587.934.7058) and 4-721-UNWYSAX (5-665.905.6898). If you or someone you know talks about suicide or feeling hopeless, get help right away.   Watch closely for changes in your health, and be sure to contact your doctor if:    · You have anxiety or fear that affects your life.     · You have symptoms of anxiety that are new or different from those you had before. Where can you learn more? Go to http://traeTwoFjoseline.info/  Enter P754 in the search box to learn more about \"Anxiety Disorder: Care Instructions. \"  Current as of: May 28, 2019Content Version: 12.4  © 3461-3464 Healthwise, Incorporated. Care instructions adapted under license by [employer] (which disclaims liability or warranty for this information). If you have questions about a medical condition or this instruction, always ask your healthcare professional. [place] [address] any warranty or liability for your use of this information. Patient Education        Learning About Being High-Risk for COVID-19  Who is at high risk? COVID-19 causes a mild illness in many people who have it. But certain things may increase your risk for more serious illness. These include:  · Being age 72 or older. · Smoking. · Living in a long-term care facility. · Having ongoing serious health issues. Some examples are:  ? Chronic lung disease or asthma. ? Heart problems. ? A weakened immune system. ? Cancer treatment. ? Diabetes. This is not a complete list. If you have a chronic health problem, ask your doctor if you should take extra precautions during the outbreak.   If you are pregnant, it's safest to consider yourself at higher risk. It's not known yet whether COVID-19 is dangerous for you or your baby. But pregnancy increases the risk for serious illness from viruses similar to COVID-19. How do you stay safe? · Stay home. ? Stay home as much as you can. This may be the easiest way to avoid exposure, as long as no one else in your household has the virus. ? If there are a lot of COVID-19 cases in your community, do not leave your home except to seek medical care. ? Limit visitors right now. It's especially important to avoid contact with anyone who is sick or who might have been exposed. Remember that people may have been exposed without knowing it or having any symptoms. ? Have enough food, medicines, and other supplies on hand so that you don't have to go out. Try some of these options if you don't have what you need. ? Use delivery and takeout services for groceries and meals. ? Have a healthy family member, friend, or neighbor shop for you. ? Ask your doctor for extra prescription medicine. ? Routinely clean and disinfect high-touch surfaces. These include countertops, faucets, door handles, doorknobs, and phones. ? Do not travel. · Wash your hands often and well. ? Wash your hands often, especially after you cough or sneeze. Use soap and water, and scrub for at least 20 seconds. If soap and water aren't available, use an alcohol-based hand . · Be extra careful if you have to go out. ? Avoid crowds and crowded places. Try to keep 6 feet of space between yourself and others. ? Don't use public transportation, ride-shares, or taxis unless you have no choice. ? Try not to touch things that many other people have touched. Door handles, elevator buttons, shopping cart handles, and handrails on escalators get a lot of touches. ? Carry tissues or paper towels with you. If you must touch something, you'll be able to protect your hands. ? Don't shake hands with anyone.  Try a friendly wave instead. ? Don't touch your face, and wash your hands often. ? Wash your hands again as soon as you get home. · Know when to call your doctor. ? Call a doctor if you have symptoms of COVID-19 (fever, cough, shortness of breath). If you are told to get testing or care and must go out, wear a cloth face cover. Current as of: April 15, 2020               Content Version: 12.4  © 2006-2020 [employer]. Care instructions adapted under license by your healthcare professional. If you have questions about a medical condition or this instruction, always ask your healthcare professional. [address] any warranty or liability for your use of this information. Patient Education        Learning About COVID-19 and Social Distancing  What is it? Social distancing means putting space between yourself and other people. The recommended distance is 6 feet, or about 2 meters. This also means staying away from any place where people may gather, such as [place] or other public gathering places. Why is it important? Social distancing is the best way to reduce the spread of COVID-19. This virus seems to spread from person to person through droplets from coughing and sneezing. So if you keep your distance from others, you're less likely to get it or spread it. And social distancing is important for everyone, not just those who are at high risk of infection, like older people. You might have the virus but not have symptoms. You could then give the infection to someone you come into contact with. How is it done? Putting 6 feet, or about 2 meters, between you and other people is the recommended distance. Also stay away from any place where people may gather, such as [place] or other public gathering places. So if possible:  · Work from home, and keep your kids at home. · Don't travel if you don't have to, and avoid public transportation if you can.   · Limit shopping to essentials, like food and medicines. · Wear a cloth face cover if you have to go to a public place like the grocery store or pharmacy. · Don't eat in restaurants. (You can still get takeout or food deliveries.)  · Avoid crowds and busy places. Follow stay-at-home orders or other directions for your area. Current as of: April 15, 2020               Content Version: 12.4  © 2006-2020 [employer]. Care instructions adapted under license by your healthcare professional. If you have questions about a medical condition or this instruction, always ask your healthcare professional. [address] any warranty or liability for your use of this information. Patient Education        Dermatitis: Care Instructions  Your Care Instructions  Dermatitis is the general name used for any rash or inflammation of the skin. Different kinds of dermatitis cause different kinds of rashes. Common causes of a rash include new medicines, plants (such as poison oak or poison ivy), heat, and stress. Certain illnesses can also cause a rash. An allergic reaction to something that touches your skin, such as latex, nickel, or poison ivy, is called contact dermatitis. Contact dermatitis may also be caused by something that irritates the skin, such as bleach, a chemical, or soap. These types of rashes cannot be spread from person to person. How long your rash will last depends on what caused it. Rashes may last a few days or months. Follow-up care is a key part of your treatment and safety. Be sure to make and go to all appointments, and call your doctor if you are having problems. It's also a good idea to know your test results and keep a list of the medicines you take. How can you care for yourself at home? · Do not scratch the rash. Cut your nails short, and file them smooth. Or wear gloves if this helps keep you from scratching. · Wash the area with water only. Pat dry.   · Put cold, wet cloths on the rash to reduce itching. · Keep cool, and stay out of the sun. · Leave the rash open to the air as much as possible. · If the rash itches, use hydrocortisone cream. Follow the directions on the label. Calamine lotion may help for plant rashes. · Take an over-the-counter antihistamine, such as diphenhydramine (Benadryl) or loratadine (Claritin), to help calm the itching. Read and follow all instructions on the label. · If your doctor prescribed a cream, use it as directed. If your doctor prescribed medicine, take it exactly as directed. When should you call for help? Call your doctor now or seek immediate medical care if:    · You have symptoms of infection, such as:  ? Increased pain, swelling, warmth, or redness. ? Red streaks leading from the area. ? Pus draining from the area. ? A fever.     · You have joint pain along with the rash.    Watch closely for changes in your health, and be sure to contact your doctor if:    · Your rash is changing or getting worse.     · You are not getting better as expected. Where can you learn more? Go to http://trae-joseline.info/  Enter F270 in the search box to learn more about \"Dermatitis: Care Instructions. \"  Current as of: October 30, 2019Content Version: 12.4  © 1737-5943 Healthwise, Incorporated. Care instructions adapted under license by [employer] (which disclaims liability or warranty for this information). If you have questions about a medical condition or this instruction, always ask your healthcare professional. [place] [address] any warranty or liability for your use of this information. Patient Discharge Instructions     Pt Name  Tanner Aragon   Date of Birth 1940   Age  67 y.o.    Medical Record Number  [id number]   PCP Heather Salmeron MD    Admit date:  5/11/2020 @    [address] [place] [address]    Room Number  2120/   Date of Discharge 5/18/2020     Admission Diagnoses:     COVID-19 Allergies   Allergen Reactions    Aspirin Nausea and Vomiting    Bee Sting [Sting, Bee] Hives and Shortness of Breath     WASP STING    Codeine Nausea and Vomiting    Hydrocodone Rash     And sedation    Lisinopril Cough    Morphine Rash    Nsaids (Non-Steroidal Anti-Inflammatory Drug) Nausea and Vomiting    Oxycodone Rash     On her back    Penicillins Hives, Shortness of Breath and Swelling        You were admitted to [address] for  COVID-19    YOUR OTHER MEDICAL DIAGNOSES INCLUDE (BUT NOT LIMITED TO ):  Present on Admission:   (Resolved) Sepsis ([address]   COVID-19   Lumbar stenosis   Hyperthyroidism   (Resolved) Vitamin D deficiency   Diastolic CHF, chronic (HCC)  Contact Dermatitis    DIET:  Cardiac Diet   Oral Nutritional Supplements: Ensure Complete or Ensure Plus  Supplement Frequency: Three times daily    Recommended activity: Activity as tolerated  Follow up : Follow-up Information     Follow up With Specialties Details Why Contact Info    Phuong Ennis MD Internal Medicine In 1 week  [address] [place] [address]  [phone]                   · It is important that you take the medication exactly as they are prescribed. · Keep your medication in the bottles provided by the pharmacist and keep a list of the medication names, dosages, and times to be taken in your wallet. · Do not take other medications without consulting your doctor. ADDITIONAL INFORMATION: If you experience any of the following symptoms or have any health problem not listed below, then please call your primary care physician or return to the emergency room if you cannot get hold of your doctor: Fever, chills, nausea, vomiting, diarrhea, change in mentation, falling, bleeding, shortness of breath.       I understand that if any problems occur once I am discharged, I am supposed to call my Primary care physician for further care or seek help in the Emergency Department at the nearest Healthcare facility. I have had an opportunity to discuss my clinical issues with my doctor and nursing staff. I understand and acknowledge receipt of the above instructions. [de-identified] or R.N.'s Signature                                                            Date/Time                                                                                                                                              Patient or Representative Signature                                                 Date/Time                Advance Care Planning  People with COVID-19 may have no symptoms, mild symptoms, such as fever, cough, and shortness of breath or they may have more severe illness, developing severe and fatal pneumonia. As a result, Advance Care Planning with attention to naming a health care decision maker (someone you trust to make healthcare decisions for you if you could not speak for yourself) and sharing other health care preferences is important BEFORE a possible health crisis. Please contact your Primary Care Provider to discuss Advance Care Planning. Preventing the Spread of Coronavirus Disease 2019 in Homes and Residential Communities  For the most recent information go to [email or web address]    Prevention steps for People with confirmed or suspected COVID-19 (including persons under investigation) who do not need to be hospitalized  and   People with confirmed COVID-19 who were hospitalized and determined to be medically stable to go home    Your healthcare provider and public health staff will evaluate whether you can be cared for at home.  If it is determined that you do not need to be hospitalized and can be isolated at home, you will be monitored by staff from your local or state health department. You should follow the prevention steps below until a healthcare provider or local or state health department says you can return to your normal activities. Stay home except to get medical care  People who are mildly ill with COVID-19 are able to isolate at home during their illness. You should restrict activities outside your home, except for getting medical care. Do not go to work, school, or public areas. Avoid using public transportation, ride-sharing, or taxis. Separate yourself from other people and animals in your home  People: As much as possible, you should stay in a specific room and away from other people in your home. Also, you should use a separate bathroom, if available. Animals: You should restrict contact with pets and other animals while you are sick with COVID-19, just like you would around other people. Although there have not been reports of pets or other animals becoming sick with COVID-19, it is still recommended that people sick with COVID-19 limit contact with animals until more information is known about the virus. When possible, have another member of your household care for your animals while you are sick. If you are sick with COVID-19, avoid contact with your pet, including petting, snuggling, being kissed or licked, and sharing food. If you must care for your pet or be around animals while you are sick, wash your hands before and after you interact with pets and wear a facemask. Call ahead before visiting your doctor  If you have a medical appointment, call the healthcare provider and tell them that you have or may have COVID-19. This will help the healthcare providers office take steps to keep other people from getting infected or exposed. Wear a facemask  You should wear a facemask when you are around other people (e.g., sharing a room or vehicle) or pets and before you enter a healthcare providers office.  If you are not able to wear a facemask (for example, because it causes trouble breathing), then people who live with you should not stay in the same room with you, or they should wear a facemask if they enter your room. Cover your coughs and sneezes  Cover your mouth and nose with a tissue when you cough or sneeze. Throw used tissues in a lined trash can. Immediately wash your hands with soap and water for at least 20 seconds or, if soap and water are not available, clean your hands with an alcohol-based hand  that contains at least 60% alcohol. Clean your hands often  Wash your hands often with soap and water for at least 20 seconds, especially after blowing your nose, coughing, or sneezing; going to the bathroom; and before eating or preparing food. If soap and water are not readily available, use an alcohol-based hand  with at least 60% alcohol, covering all surfaces of your hands and rubbing them together until they feel dry. Soap and water are the best option if hands are visibly dirty. Avoid touching your eyes, nose, and mouth with unwashed hands. Avoid sharing personal household items  You should not share dishes, drinking glasses, cups, eating utensils, towels, or bedding with other people or pets in your home. After using these items, they should be washed thoroughly with soap and water. Clean all high-touch surfaces everyday  High touch surfaces include counters, tabletops, doorknobs, bathroom fixtures, toilets, phones, keyboards, tablets, and bedside tables. Also, clean any surfaces that may have blood, stool, or body fluids on them. Use a household cleaning spray or wipe, according to the label instructions. Labels contain instructions for safe and effective use of the cleaning product including precautions you should take when applying the product, such as wearing gloves and making sure you have good ventilation during use of the product.   Monitor your symptoms  Seek prompt medical attention if your illness is worsening (e.g., difficulty breathing). Before seeking care, call your healthcare provider and tell them that you have, or are being evaluated for, COVID-19. Put on a facemask before you enter the facility. These steps will help the healthcare providers office to keep other people in the office or waiting room from getting infected or exposed. Ask your healthcare provider to call the local or state health department. Persons who are placed under active monitoring or facilitated self-monitoring should follow instructions provided by their local health department or occupational health professionals, as appropriate. When working with your local health department check their available hours. If you have a medical emergency and need to call 911, notify the dispatch personnel that you have, or are being evaluated for COVID-19. If possible, put on a facemask before emergency medical services arrive. Discontinuing home isolation  Patients with confirmed COVID-19 should remain under home isolation precautions until the risk of secondary transmission to others is thought to be low. The decision to discontinue home isolation precautions should be made on a case-by-case basis, in consultation with healthcare providers and state and local health departments.

## 2020-05-18 NOTE — DISCHARGE SUMMARY
Hospitalist Discharge Summary     Patient ID:  Jacque Dockery  [id number]  67 y.o.  1940 5/11/2020    PCP on record: Edwina Pompa MD    Admit date: 5/11/2020  Discharge date and time: 5/18/2020    DISCHARGE DIAGNOSIS:    Sepsis (Resolved)  COVID-19  Lumbar Stenosis  Hyperthyroidism  Vitamin D deficiency (Improving)  Chronic diastolic CHF    CONSULTATIONS:  IP CONSULT TO HOSPITALIST  IP CONSULT TO PULMONOLOGY    Excerpted HPI from H&P of He Wang MD:  67years old female from home with past medical history significant for hypertension, depression, restless leg syndrome, skin melanoma presented to the hospital for evaluation of shortness of breath associated with generalized weakness started several days ago, patient was recently discharged from the hospital on April 26 was admitted for acute hypoxic respiratory failure, patient denies any fever any chills, denies any productive cough, chest x-ray was done and showed patchy bibasilar atelectasis or infiltrate.       ______________________________________________________________________  DISCHARGE SUMMARY/HOSPITAL COURSE:  for full details see H&P, daily progress notes, labs, consult notes. The patient is a 67year old female with a past medical history of hypertension, depression, anxiety, restless leg syndrome. She was admitted to the hospital in April and discharged with a Negative COVID. She presented to the hospital with worsening of symptoms. She was afebrile. She did complain of shortness of breath. She remained on room air. She was retested for COVID-19 and was positive. She was started on Azithromycin, and zinc. She was seen by Pulmonary with no further workup needed. She was seen by PT/OT and had a ambulating pulse oximetry. Her oxygen level on room air stayed about 94%. She does complain of a erythematous rash on her back. Discussed Contact Dermatitis and Benadryl cream ordered.  She does not have rash any where else on her body. Her appetite has improved. Discussed discharge with patient and her son Ed. Sepsis Secondary to Pneumonia:  Resolved, antibiotic therapy completed    COVID-19: Continue self quarantine at home, Room air with oxygen saturation level 94-97%, Afebrile,    Hyperthyroidism: Stable, monitor     Vitamin D deficiency: Continue Vitamin D 2, Follow up with PCP for monitoring    Contact Dermatitis: Benadryl Cream BID as needed for itching, Follow up with PCP for resolution    Acute Respiratory Hypoxic Respiratory Failure: Resolved  _______________________________________________________________________  Patient seen and examined by me on discharge day. Pertinent Findings:  Gen:    Not in distress, cooperative, elderly, female  Chest: Clear lungs, no wheeze or rales noted on auscultation  CVS:   Regular rhythm. No edema  Abd:  Soft, not distended, not tender  Neuro:  Alert and Oriented x4 , not anxious,  _______________________________________________________________________  DISCHARGE MEDICATIONS:   Current Discharge Medication List      START taking these medications    Details   diphenhydrAMINE-zinc acetate 2%-0.1% (BENADRYL) 2-0.1 % topical cream Apply  to affected area three (3) times daily as needed for Itching. Indications: skin irritation  Qty: 30 g, Refills: 0         CONTINUE these medications which have CHANGED    Details   clonazePAM (KlonoPIN) 0.5 mg tablet Take 0.5 Tabs by mouth two (2) times a day. Max Daily Amount: 0.5 mg.  Qty: 15 Tab, Refills: 0    Associated Diagnoses: Anxiety      pantoprazole (Protonix) 40 mg tablet Take 1 Tab by mouth Daily (before breakfast). Indications: gastroesophageal reflux disease  Qty: 30 Tab, Refills: 0         CONTINUE these medications which have NOT CHANGED    Details   clopidogreL (PLAVIX) 75 mg tab Take 1 Tab by mouth nightly.   Qty: 90 Tab, Refills: 1      verapamil ER (CALAN-SR) 120 mg tablet TAKE 1 TABLET BY MOUTH NIGHTLY FOR HIGH BLOOD PRESSURE  Qty: 90 Tab, Refills: 0      olmesartan (BENICAR) 5 mg tablet TAKE 1/2 (ONE-HALF) TABLET BY MOUTH ONCE DAILY  Qty: 30 Tab, Refills: 0    Associated Diagnoses: Essential hypertension      nortriptyline (PAMELOR) 10 mg capsule Take 1-2 Caps by mouth nightly. Qty: 90 Cap, Refills: 3      atorvastatin (LIPITOR) 20 mg tablet Take 20 mg by mouth nightly. multivit/folic acid/vit K1 (ONE-A-DAY WOMEN'S 50 PLUS PO) Take 1 Tab by mouth daily. rOPINIRole (REQUIP) 1 mg tablet Take 1 Tab by mouth nightly. Qty: 90 Tab, Refills: 2      escitalopram oxalate (LEXAPRO) 20 mg tablet Take 1 Tab by mouth nightly. Indications: Anxiousness associated with Depression  Qty: 90 Tab, Refills: 4      albuterol (PROVENTIL HFA, VENTOLIN HFA, PROAIR HFA) 90 mcg/actuation inhaler Take 1 Puff by inhalation every four (4) hours as needed for Wheezing. Qty: 1 Inhaler, Refills: 0      polyethylene glycol (MIRALAX) 17 gram packet Take 17 g by mouth daily as needed (constipation). levocetirizine (Xyzal) 5 mg tablet Take 1 Tab by mouth daily as needed for Allergies. Qty: 30 Tab, Refills: 0      primidone (MYSOLINE) 50 mg tablet Take 1 Tab by mouth nightly. Qty: 90 Tab, Refills: 1      fluticasone propionate (FLONASE) 50 mcg/actuation nasal spray 2 Sprays by Both Nostrils route daily. Qty: 1 Bottle, Refills: 0    Associated Diagnoses: Bacterial upper respiratory infection         STOP taking these medications       methylPREDNISolone (MEDROL DOSEPACK) 4 mg tablet Comments:   Reason for Stopping:                 Patient Follow Up Instructions: Activity: Activity as tolerated  Diet: Cardiac Diet  Wound Care: None needed    Follow-up with  in 1 week.   Follow-up tests/labs BMP/ Monitor Potassium level in one week  Follow-up Information     Follow up With Specialties Details Why Contact Info    Francy Chamberlain MD Internal Medicine In 1 week  [address] [facility]  [phone] ________________________________________________________________    Risk of deterioration: Low    Condition at Discharge:  Stable  __________________________________________________________________    Disposition  Home with family, no needs    ____________________________________________________________________    Code Status: Full Code  ___________________________________________________________________      Total time in minutes spent coordinating this discharge (includes going over instructions, follow-up, prescriptions, and preparing report for sign off to her PCP) :  45 minutes    Signed:  Yoshi Aguilar NP

## 2020-05-18 NOTE — PROGRESS NOTES
Farhana Plan:    D/C Home with  - JOHNATHAN with [address] [facility]  Son to provide transportation  F/U appointment scheduled- [facility] to call to schedule and PCP to arrange. CM called to discuss D/C plan with patient. Pt agreed with going home with [facility]- [address] [facility]. Pt shared family lives close by with daughter up the road and son about 1 1/2 mile away and will be checking on her. Also spoke with Nurse Letty Espinoza, who shared pt has been ambulating in the room well and independent with meals. In no distress prior to d/c. CM shared with patient that family has concerns about her being d/c home. Pt is caregiver to her , who is also hosptialized at [facility] with COVID-19. CM discussed with pt the 2nd IM letter, her right to appeal the d/c decision. Pt verbalized understanding and a copy was provided to pt. Verbal consent obtained due to COVID-19 precautions. Care Management Interventions  PCP Verified by CM: Yes  Mode of Transport at Discharge:  Other (see comment)(Son to transport)  Hospital Transport Time of Discharge: 1100  Transition of Care Consult (CM Consult): Home Health, Discharge Planning(Resumption of care with [facility])  [facility] Yes  Discharge Durable Medical Equipment: (Has rolling walker and cane however she does not use them. )  Current Support Network: Family Lives [place], Lives with Spouse  Confirm Follow Up Transport: Family  Discharge Location  Discharge Placement: Home with home health    [address]  Phone: [phone]

## 2020-05-18 NOTE — PROGRESS NOTES
Problem: General Medical Care Plan  Goal: *Vital signs within specified parameters  Outcome: Resolved/Met  Goal: *Labs within defined limits  Outcome: Resolved/Met  Goal: *Absence of infection signs and symptoms  Outcome: Resolved/Met  Goal: *Optimal pain control at patient's stated goal  Outcome: Resolved/Met  Goal: *Skin integrity maintained  Outcome: Resolved/Met  Goal: *Fluid volume balance  Outcome: Resolved/Met     Problem: Activity Intolerance  Goal: *Oxygen saturation during activity within specified parameters  Outcome: Resolved/Met  Goal: *Able to remain out of bed as prescribed  Outcome: Resolved/Met     Problem: Patient Education: Go to Patient Education Activity  Goal: Patient/Family Education  Outcome: Resolved/Met     Problem: Falls - Risk of  Goal: *Absence of Falls  Description: Document Dany Fall Risk and appropriate interventions in the flowsheet.   Outcome: Resolved/Met  Note: Fall Risk Interventions:  Mobility Interventions: Bed/chair exit alarm         Medication Interventions: Assess postural VS orthostatic hypotension, Bed/chair exit alarm    Elimination Interventions: Bed/chair exit alarm, Call light in reach    History of Falls Interventions: Bed/chair exit alarm         Problem: Patient Education: Go to Patient Education Activity  Goal: Patient/Family Education  Outcome: Resolved/Met     Problem: Patient Education: Go to Patient Education Activity  Goal: Patient/Family Education  Outcome: Resolved/Met     Problem: Heart Failure: Day 1  Goal: Activity/Safety  Outcome: Resolved/Met  Goal: Treatments/Interventions/Procedures  Outcome: Resolved/Met  Goal: Psychosocial  Outcome: Resolved/Met  Goal: *Oxygen saturation within defined limits  Outcome: Resolved/Met  Goal: *Hemodynamically stable  Outcome: Resolved/Met  Goal: *Anxiety reduced or absent  Outcome: Resolved/Met     Problem: Pressure Injury - Risk of  Goal: *Prevention of pressure injury  Description: Document Mega Scale and appropriate interventions in the flowsheet.   Outcome: Resolved/Met  Note: Pressure Injury Interventions:  Sensory Interventions: Assess changes in LOC, Discuss PT/OT consult with provider    Moisture Interventions: Absorbent underpads    Activity Interventions: Increase time out of bed    Mobility Interventions: HOB 30 degrees or less    Nutrition Interventions: Document food/fluid/supplement intake, Offer support with meals,snacks and hydration    Friction and Shear Interventions: Apply protective barrier, creams and emollients                Problem: Patient Education: Go to Patient Education Activity  Goal: Patient/Family Education  Outcome: Resolved/Met     Problem: Patient Education: Go to Patient Education Activity  Goal: Patient/Family Education  Outcome: Resolved/Met     Problem: Patient Education: Go to Patient Education Activity  Goal: Patient/Family Education  Outcome: Resolved/Met

## 2020-05-18 NOTE — PROGRESS NOTES
0715: Verbal shift change report given to Lillie Locke RN (oncoming nurse) by Zia Hernández RN (offgoing nurse). Report included the following information SBAR, Kardex, ED Summary, Intake/Output, MAR, Recent Results and Cardiac Rhythm NSR. VS taken; MEWS score 1. Pt resting in bed, c/o rash on back. RN observed hot and red in appearance, pt states it is itchy. 0800: NP reached out via [employer] re: rash on back. Requested orders for IV benadryl. 1000: Discharge paperwork discussed w/ pt. No questions at this time. Son scheduled to pick pt up at 1100.     1100: Pt dishcarged, VSS, pt in NAD.

## 2020-05-19 ENCOUNTER — PATIENT OUTREACH (OUTPATIENT)
Dept: FAMILY MEDICINE CLINIC | Age: 80
End: 2020-05-19

## 2020-05-19 NOTE — PROGRESS NOTES
Patient contacted regarding COVID-19 diagnosis. Care Transition Nurse/ Ambulatory Care Manager contacted the patient by telephone to perform post discharge assessment. Verified name and  with patient as identifiers. Provided introduction to self, and explanation of the CTN/ACM role, and reason for call due to risk factors for infection and/or exposure to COVID-19. Symptoms reviewed with patient who verbalized the following symptoms: no new symptoms and no worsening symptoms. Due to no new or worsening symptoms encounter was not routed to provider for escalation. Patient has following risk factors of: heart failure and acute respiratory failure. CTN/ACM reviewed discharge instructions, medical action plan and red flags such as increased shortness of breath, increasing fever and signs of decompensation with patient who verbalized understanding. Discussed exposure protocols and quarantine with CDC Guidelines What to do if you are sick with coronavirus disease .  Patient was given an opportunity for questions and concerns. The patient agrees to contact the [facility] exposure line [phone], [facility] department [facility]  ([phone]) and PCP office for questions related to their healthcare. CTN/ACM provided contact information for future needs. Reviewed and educated patient on any new and changed medications related to discharge diagnosis. Patient/family/caregiver given information for [employer] and agrees to enroll no  Patient's preferred e-mail:  n/a  Patient's preferred phone number: n/a  Based on Loop alert triggers, patient will be contacted by nurse care manager for worsening symptoms. Plan for follow-up call in 5-7 days based on severity of symptoms and risk factors.

## 2020-05-20 ENCOUNTER — HOME CARE VISIT (OUTPATIENT)
Dept: SCHEDULING | Facility: HOME HEALTH | Age: 80
End: 2020-05-20
Payer: MEDICARE

## 2020-05-20 PROCEDURE — G0299 HHS/HOSPICE OF RN EA 15 MIN: HCPCS

## 2020-05-21 ENCOUNTER — HOME CARE VISIT (OUTPATIENT)
Dept: SCHEDULING | Facility: HOME HEALTH | Age: 80
End: 2020-05-21
Payer: MEDICARE

## 2020-05-21 VITALS
RESPIRATION RATE: 18 BRPM | HEART RATE: 82 BPM | TEMPERATURE: 96.4 F | OXYGEN SATURATION: 98 % | DIASTOLIC BLOOD PRESSURE: 63 MMHG | SYSTOLIC BLOOD PRESSURE: 105 MMHG

## 2020-05-21 PROCEDURE — G0151 HHCP-SERV OF PT,EA 15 MIN: HCPCS

## 2020-05-22 ENCOUNTER — HOME CARE VISIT (OUTPATIENT)
Dept: SCHEDULING | Facility: HOME HEALTH | Age: 80
End: 2020-05-22
Payer: MEDICARE

## 2020-05-22 PROCEDURE — G0299 HHS/HOSPICE OF RN EA 15 MIN: HCPCS

## 2020-05-23 VITALS
DIASTOLIC BLOOD PRESSURE: 70 MMHG | SYSTOLIC BLOOD PRESSURE: 122 MMHG | TEMPERATURE: 98.1 F | RESPIRATION RATE: 18 BRPM | HEART RATE: 72 BPM

## 2020-05-23 VITALS
RESPIRATION RATE: 26 BRPM | HEART RATE: 114 BPM | SYSTOLIC BLOOD PRESSURE: 110 MMHG | TEMPERATURE: 98.4 F | OXYGEN SATURATION: 92 % | DIASTOLIC BLOOD PRESSURE: 60 MMHG

## 2020-05-25 ENCOUNTER — HOME CARE VISIT (OUTPATIENT)
Dept: SCHEDULING | Facility: HOME HEALTH | Age: 80
End: 2020-05-25
Payer: MEDICARE

## 2020-05-25 PROCEDURE — G0151 HHCP-SERV OF PT,EA 15 MIN: HCPCS

## 2020-05-26 ENCOUNTER — HOME CARE VISIT (OUTPATIENT)
Dept: HOME HEALTH SERVICES | Facility: HOME HEALTH | Age: 80
End: 2020-05-26
Payer: MEDICARE

## 2020-05-26 ENCOUNTER — PATIENT OUTREACH (OUTPATIENT)
Dept: FAMILY MEDICINE CLINIC | Age: 80
End: 2020-05-26

## 2020-05-26 ENCOUNTER — HOME CARE VISIT (OUTPATIENT)
Dept: SCHEDULING | Facility: HOME HEALTH | Age: 80
End: 2020-05-26
Payer: MEDICARE

## 2020-05-26 VITALS
HEART RATE: 84 BPM | DIASTOLIC BLOOD PRESSURE: 80 MMHG | SYSTOLIC BLOOD PRESSURE: 120 MMHG | RESPIRATION RATE: 18 BRPM | OXYGEN SATURATION: 95 % | TEMPERATURE: 98.1 F

## 2020-05-26 VITALS
DIASTOLIC BLOOD PRESSURE: 73 MMHG | TEMPERATURE: 97.4 F | HEART RATE: 60 BPM | SYSTOLIC BLOOD PRESSURE: 122 MMHG | OXYGEN SATURATION: 98 %

## 2020-05-26 VITALS
HEART RATE: 78 BPM | OXYGEN SATURATION: 96 % | TEMPERATURE: 97.6 F | DIASTOLIC BLOOD PRESSURE: 72 MMHG | RESPIRATION RATE: 18 BRPM | SYSTOLIC BLOOD PRESSURE: 124 MMHG

## 2020-05-26 PROCEDURE — G0300 HHS/HOSPICE OF LPN EA 15 MIN: HCPCS

## 2020-05-26 PROCEDURE — G0152 HHCP-SERV OF OT,EA 15 MIN: HCPCS

## 2020-05-27 ENCOUNTER — HOME CARE VISIT (OUTPATIENT)
Dept: SCHEDULING | Facility: HOME HEALTH | Age: 80
End: 2020-05-27
Payer: MEDICARE

## 2020-05-27 DIAGNOSIS — I10 ESSENTIAL HYPERTENSION: ICD-10-CM

## 2020-05-27 PROCEDURE — G0151 HHCP-SERV OF PT,EA 15 MIN: HCPCS

## 2020-05-27 RX ORDER — OLMESARTAN MEDOXOMIL 5 MG/1
TABLET ORAL
Qty: 30 TAB | Refills: 0 | Status: SHIPPED | OUTPATIENT
Start: 2020-05-27 | End: 2020-06-19 | Stop reason: SDUPTHER

## 2020-05-28 VITALS
SYSTOLIC BLOOD PRESSURE: 132 MMHG | HEART RATE: 78 BPM | RESPIRATION RATE: 18 BRPM | DIASTOLIC BLOOD PRESSURE: 78 MMHG | OXYGEN SATURATION: 97 % | TEMPERATURE: 97.8 F

## 2020-05-29 ENCOUNTER — HOME CARE VISIT (OUTPATIENT)
Dept: SCHEDULING | Facility: HOME HEALTH | Age: 80
End: 2020-05-29
Payer: MEDICARE

## 2020-05-29 PROCEDURE — 400013 HH SOC

## 2020-05-29 PROCEDURE — G0300 HHS/HOSPICE OF LPN EA 15 MIN: HCPCS

## 2020-05-30 VITALS
OXYGEN SATURATION: 98 % | HEART RATE: 88 BPM | DIASTOLIC BLOOD PRESSURE: 62 MMHG | TEMPERATURE: 64.4 F | RESPIRATION RATE: 18 BRPM | SYSTOLIC BLOOD PRESSURE: 120 MMHG

## 2020-06-01 ENCOUNTER — HOME CARE VISIT (OUTPATIENT)
Dept: SCHEDULING | Facility: HOME HEALTH | Age: 80
End: 2020-06-01
Payer: MEDICARE

## 2020-06-01 PROCEDURE — G0151 HHCP-SERV OF PT,EA 15 MIN: HCPCS

## 2020-06-02 ENCOUNTER — HOME CARE VISIT (OUTPATIENT)
Dept: SCHEDULING | Facility: HOME HEALTH | Age: 80
End: 2020-06-02
Payer: MEDICARE

## 2020-06-02 VITALS
RESPIRATION RATE: 18 BRPM | OXYGEN SATURATION: 97 % | HEART RATE: 75 BPM | DIASTOLIC BLOOD PRESSURE: 60 MMHG | SYSTOLIC BLOOD PRESSURE: 110 MMHG | TEMPERATURE: 97.8 F

## 2020-06-02 VITALS — HEART RATE: 80 BPM | OXYGEN SATURATION: 95 % | TEMPERATURE: 98 F | RESPIRATION RATE: 20 BRPM

## 2020-06-02 PROCEDURE — G0299 HHS/HOSPICE OF RN EA 15 MIN: HCPCS

## 2020-06-03 ENCOUNTER — HOME CARE VISIT (OUTPATIENT)
Dept: SCHEDULING | Facility: HOME HEALTH | Age: 80
End: 2020-06-03
Payer: MEDICARE

## 2020-06-03 ENCOUNTER — PATIENT OUTREACH (OUTPATIENT)
Dept: FAMILY MEDICINE CLINIC | Age: 80
End: 2020-06-03

## 2020-06-03 DIAGNOSIS — F41.9 ANXIETY: ICD-10-CM

## 2020-06-03 PROCEDURE — G0151 HHCP-SERV OF PT,EA 15 MIN: HCPCS

## 2020-06-03 RX ORDER — CLONAZEPAM 0.5 MG/1
TABLET ORAL
Qty: 60 TAB | Refills: 0 | Status: SHIPPED | OUTPATIENT
Start: 2020-06-03 | End: 2020-07-03

## 2020-06-03 RX ORDER — ROPINIROLE 1 MG/1
TABLET, FILM COATED ORAL
Qty: 90 TAB | Refills: 0 | Status: SHIPPED | OUTPATIENT
Start: 2020-06-03 | End: 2020-06-25

## 2020-06-04 ENCOUNTER — TELEPHONE (OUTPATIENT)
Dept: INTERNAL MEDICINE CLINIC | Age: 80
End: 2020-06-04

## 2020-06-04 VITALS
HEART RATE: 80 BPM | RESPIRATION RATE: 18 BRPM | OXYGEN SATURATION: 96 % | SYSTOLIC BLOOD PRESSURE: 120 MMHG | DIASTOLIC BLOOD PRESSURE: 70 MMHG | TEMPERATURE: 97.8 F

## 2020-06-04 NOTE — TELEPHONE ENCOUNTER
Caller's first and last name:Teri /daughter       Reason for call:Teri has been trying to send a fax all morning but it doesn't go through. Please advise pt.        Callback required yes/no and why:yes       Best contact number(s):[phone]       Details to clarify the request:       Michael Rendon 26

## 2020-06-08 ENCOUNTER — HOME CARE VISIT (OUTPATIENT)
Dept: SCHEDULING | Facility: HOME HEALTH | Age: 80
End: 2020-06-08
Payer: MEDICARE

## 2020-06-08 ENCOUNTER — TELEPHONE (OUTPATIENT)
Dept: INTERNAL MEDICINE CLINIC | Age: 80
End: 2020-06-08

## 2020-06-08 PROCEDURE — G0151 HHCP-SERV OF PT,EA 15 MIN: HCPCS

## 2020-06-08 NOTE — TELEPHONE ENCOUNTER
MD Branden Bell LPN   Caller: Unspecified (Today, 10:53 AM)             Yes no longer needs isolation      Spoke with Janie Sanchez with [facility]  Advised per Dr. Emily Clark that patient no longer needs isolation. Janie Sanchez verbalized understanding of information discussed w/ no further questions at this time.

## 2020-06-08 NOTE — TELEPHONE ENCOUNTER
#[phone] please use this number to call pt back yet today she ask. Pt needs to leave and needs a call back.

## 2020-06-09 ENCOUNTER — HOME CARE VISIT (OUTPATIENT)
Dept: SCHEDULING | Facility: HOME HEALTH | Age: 80
End: 2020-06-09
Payer: MEDICARE

## 2020-06-09 ENCOUNTER — HOME CARE VISIT (OUTPATIENT)
Dept: HOME HEALTH SERVICES | Facility: HOME HEALTH | Age: 80
End: 2020-06-09
Payer: MEDICARE

## 2020-06-09 VITALS
TEMPERATURE: 98.4 F | RESPIRATION RATE: 20 BRPM | HEART RATE: 77 BPM | DIASTOLIC BLOOD PRESSURE: 80 MMHG | OXYGEN SATURATION: 93 % | SYSTOLIC BLOOD PRESSURE: 120 MMHG

## 2020-06-09 VITALS
RESPIRATION RATE: 18 BRPM | TEMPERATURE: 97.8 F | SYSTOLIC BLOOD PRESSURE: 120 MMHG | OXYGEN SATURATION: 96 % | DIASTOLIC BLOOD PRESSURE: 70 MMHG | HEART RATE: 82 BPM

## 2020-06-09 PROCEDURE — G0299 HHS/HOSPICE OF RN EA 15 MIN: HCPCS

## 2020-06-10 ENCOUNTER — HOME CARE VISIT (OUTPATIENT)
Dept: SCHEDULING | Facility: HOME HEALTH | Age: 80
End: 2020-06-10
Payer: MEDICARE

## 2020-06-10 ENCOUNTER — TELEPHONE (OUTPATIENT)
Dept: CARDIOLOGY CLINIC | Age: 80
End: 2020-06-10

## 2020-06-10 PROCEDURE — G0151 HHCP-SERV OF PT,EA 15 MIN: HCPCS

## 2020-06-10 NOTE — TELEPHONE ENCOUNTER
Nitin Granados from Dr. Austin Frank calling to speak to the nurse. Pt need to get some teeth pulled and Nitin Granados would like to know if the pt can come off plavix. Please give her a call at [phone].        Thanks

## 2020-06-10 NOTE — TELEPHONE ENCOUNTER
[facility], [address] [facility] [facility] [address]             Caller's first and last name: Nishi Phelps ( [address]  )   Reason for call: Questions   Callback required yes/no and why: Yes   Best contact number(s):  [phone]   Details to clarify the request: Caller stated that Dr. Yasmeen Stinson from Dr. Jarrett Etienne office. Would like a phone call in regards to pt getting 5 teeth pulled on 6/15/2020 at 11:45 am. Caller stated that he would like a phone call right away.      Copy/Paste  ENVERA

## 2020-06-10 NOTE — TELEPHONE ENCOUNTER
Vita De La Cruz Edu, this message came to Dr Carol Moy, however in reviewing the chart, you prescribe the Plavix  Thank You

## 2020-06-10 NOTE — TELEPHONE ENCOUNTER
Spoke with Diane Hays with Dr. Gomez American office. Diane Hays states that the patient needs to have 5 teeth extracted on 06/15/2020 and Dr. Delbert Bal needs to know if it is safe for the patient to hold her plavix prior to the extraction. 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 Randi Mems to call patients cardiologist for verification. Pt verbalized understanding of information discussed w/ no further questions at this time.

## 2020-06-11 VITALS
TEMPERATURE: 97.8 F | HEART RATE: 82 BPM | OXYGEN SATURATION: 98 % | DIASTOLIC BLOOD PRESSURE: 80 MMHG | SYSTOLIC BLOOD PRESSURE: 120 MMHG | RESPIRATION RATE: 18 BRPM

## 2020-06-11 NOTE — TELEPHONE ENCOUNTER
Jeny Penn MD  You 16 minutes ago (8:57 AM)      Yes can come off for 7 days prior to procedure then 1 day after resume    Routing comment

## 2020-06-11 NOTE — TELEPHONE ENCOUNTER
Spoke with Diane Hays at Dr Erich Castellanos and advised of Dr David Smallwood orders.  Faxed orders per Diane Hays request.

## 2020-06-16 ENCOUNTER — HOME CARE VISIT (OUTPATIENT)
Dept: HOME HEALTH SERVICES | Facility: HOME HEALTH | Age: 80
End: 2020-06-16
Payer: MEDICARE

## 2020-06-16 ENCOUNTER — HOME CARE VISIT (OUTPATIENT)
Dept: SCHEDULING | Facility: HOME HEALTH | Age: 80
End: 2020-06-16
Payer: MEDICARE

## 2020-06-16 VITALS
RESPIRATION RATE: 20 BRPM | BODY MASS INDEX: 25.61 KG/M2 | TEMPERATURE: 97.8 F | WEIGHT: 140 LBS | OXYGEN SATURATION: 96 % | HEART RATE: 67 BPM | SYSTOLIC BLOOD PRESSURE: 110 MMHG | DIASTOLIC BLOOD PRESSURE: 80 MMHG

## 2020-06-16 PROCEDURE — G0299 HHS/HOSPICE OF RN EA 15 MIN: HCPCS

## 2020-06-17 ENCOUNTER — HOME CARE VISIT (OUTPATIENT)
Dept: HOME HEALTH SERVICES | Facility: HOME HEALTH | Age: 80
End: 2020-06-17
Payer: MEDICARE

## 2020-06-18 ENCOUNTER — HOME CARE VISIT (OUTPATIENT)
Dept: HOME HEALTH SERVICES | Facility: HOME HEALTH | Age: 80
End: 2020-06-18
Payer: MEDICARE

## 2020-06-18 ENCOUNTER — HOME CARE VISIT (OUTPATIENT)
Dept: SCHEDULING | Facility: HOME HEALTH | Age: 80
End: 2020-06-18
Payer: MEDICARE

## 2020-06-18 PROCEDURE — G0299 HHS/HOSPICE OF RN EA 15 MIN: HCPCS

## 2020-06-19 DIAGNOSIS — I10 ESSENTIAL HYPERTENSION: ICD-10-CM

## 2020-06-19 RX ORDER — NORTRIPTYLINE HYDROCHLORIDE 10 MG/1
CAPSULE ORAL
Qty: 90 CAP | Refills: 0 | Status: SHIPPED | OUTPATIENT
Start: 2020-06-19 | End: 2020-09-15

## 2020-06-19 RX ORDER — OLMESARTAN MEDOXOMIL 5 MG/1
2.5 TABLET ORAL DAILY
Qty: 60 TAB | Refills: 0 | Status: SHIPPED | OUTPATIENT
Start: 2020-06-19 | End: 2021-04-13 | Stop reason: ALTCHOICE

## 2020-06-19 NOTE — TELEPHONE ENCOUNTER
CP: Tom Shepard MD    Last appt: 6/8/2020  Future Appointments   Date Time Provider Department Center   6/24/2020 To Be Determined Marylin Duran [place] [address]   6/26/2020 10:30 AM Liseth Nguyen MD [place]    7/22/2020 11:30 AM [place]  [facility]   8/10/2020  8:00 AM [facility] [address] [facility]   11/19/2020 10:00 AM Sara Lopez, ANP [facility] SCHED       Requested Prescriptions     Pending Prescriptions Disp Refills    olmesartan (BENICAR) 5 mg tablet 60 Tab 0     Sig: Take 0.5 Tabs by mouth daily.

## 2020-06-20 VITALS
HEART RATE: 71 BPM | SYSTOLIC BLOOD PRESSURE: 110 MMHG | TEMPERATURE: 98 F | DIASTOLIC BLOOD PRESSURE: 70 MMHG | OXYGEN SATURATION: 94 % | RESPIRATION RATE: 22 BRPM

## 2020-06-23 ENCOUNTER — HOME CARE VISIT (OUTPATIENT)
Dept: HOME HEALTH SERVICES | Facility: HOME HEALTH | Age: 80
End: 2020-06-23
Payer: MEDICARE

## 2020-06-23 ENCOUNTER — HOME CARE VISIT (OUTPATIENT)
Dept: SCHEDULING | Facility: HOME HEALTH | Age: 80
End: 2020-06-23
Payer: MEDICARE

## 2020-06-23 VITALS
DIASTOLIC BLOOD PRESSURE: 80 MMHG | TEMPERATURE: 97.8 F | OXYGEN SATURATION: 97 % | SYSTOLIC BLOOD PRESSURE: 120 MMHG | HEART RATE: 79 BPM | RESPIRATION RATE: 20 BRPM

## 2020-06-23 PROCEDURE — G0299 HHS/HOSPICE OF RN EA 15 MIN: HCPCS

## 2020-06-25 RX ORDER — ROPINIROLE 1 MG/1
TABLET, FILM COATED ORAL
Qty: 90 TAB | Refills: 0 | Status: SHIPPED | OUTPATIENT
Start: 2020-06-25 | End: 2020-12-05

## 2020-06-26 ENCOUNTER — VIRTUAL VISIT (OUTPATIENT)
Dept: INTERNAL MEDICINE CLINIC | Age: 80
End: 2020-06-26

## 2020-06-26 DIAGNOSIS — D47.2 MGUS (MONOCLONAL GAMMOPATHY OF UNKNOWN SIGNIFICANCE): ICD-10-CM

## 2020-06-26 DIAGNOSIS — Z86.73 HX OF COMPLETED STROKE: ICD-10-CM

## 2020-06-26 DIAGNOSIS — I10 ESSENTIAL HYPERTENSION: ICD-10-CM

## 2020-06-26 DIAGNOSIS — G89.29 CHRONIC RIGHT SHOULDER PAIN: Primary | ICD-10-CM

## 2020-06-26 DIAGNOSIS — Z00.00 MEDICARE ANNUAL WELLNESS VISIT, SUBSEQUENT: ICD-10-CM

## 2020-06-26 DIAGNOSIS — F41.9 ANXIETY: ICD-10-CM

## 2020-06-26 DIAGNOSIS — M25.511 CHRONIC RIGHT SHOULDER PAIN: Primary | ICD-10-CM

## 2020-06-26 NOTE — PROGRESS NOTES
CC: Follow-up (COVID- 23)      HPI:    She is a 67 y.o. female who presents for evaluation of  Follow up     Admit date: 4/22/2020  Discharge date and time: 4/26/2020  Admitted for covid   Reviewed chart  Patient was admitted for shortness of breath  Chest x ray and CTA no airspace disease  Tested for COVID and FLU and negative  Given azithromycin and prednisone  p BNP relatively normal    Patient feels her breathing is much better, seen in may after discharge and was still having dyspnea, I prescribed medrol dose pack and improved. Feels well      Reports having pain in right arm and shoulder pain hurts all the time. From the shoulder down the elbow and radiates to the wrist. Pain for a while at least one month. Patient has a hx of anxiety which is controlled on clonazepam BID and lexapro 20mg daily       High blood pressure currently on benicar 5mg not checking blood pressure and verapamil 120mg daily   Denies CP and dyspnea    Restless leg syndrome checking requip     Taking plavix for a hx of stroke    Sees Dr Clementine Richey fro MGUS        This is an established visit conducted via telemedicine with phone only. The patient has been instructed that this meets HIPAA criteria and acknowledges and agrees to this method of visitation. Pursuant to the emergency declaration under the [facility], 1135 waiver authority and the [employer] Act, this Virtual Visit was conducted, with patient's consent, to reduce the patient's risk of exposure to COVID-19 and provide continuity of care for an established patient. 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)     GERD (gastroesophageal reflux disease)     Hypercholesteremia     Hypertension     Idiopathic small and large fiber sensory neuropathy     Limited peripheral vision of right eye     complication of cataract surgery    Long term current use of anticoagulant therapy     Meniere disease     MGUS (monoclonal gammopathy of unknown significance)     Monoclonal gammopathy of unknown significance     hem/onc:  Dr Nino Scott [phone]    Osteoporosis     Psoriasis     bilateral Legs    Restless leg     [facility]    (Rt ankle) and basal cell (Lt eye) removed    Transient ischemic attack approx 2005    HAS HAD 2 MINISTROKES-NO DEFICITS; neuro Dr Cleopatra Simpson (cc)    Unspecified adverse effect of anesthesia     HAS NEEDED PORT OR CUTDOWN FOR RECVNG BLOOD OR LONG-TERM IVs; surgery 7/28/15 w/o any difficulty    Unspecified sleep apnea     does not use CPAP       Current Outpatient Medications on File Prior to Visit   Medication Sig Dispense Refill    rOPINIRole (REQUIP) 1 mg tablet Take 1 tablet by mouth nightly 90 Tab 0    olmesartan (BENICAR) 5 mg tablet Take 0.5 Tabs by mouth daily. 60 Tab 0    nortriptyline (PAMELOR) 10 mg capsule TAKE 1 TO 2 CAPSULES BY MOUTH NIGHTLY 90 Cap 0    ipratropium-albuteroL (Combivent Respimat)  mcg/actuation inhaler Take 1 Puff by inhalation every four (4) hours as needed for Shortness of Breath.  clonazePAM (KlonoPIN) 0.5 mg tablet TAKE 1 TABLET BY MOUTH TWICE DAILY . MAX  2  PER  24  HOURS 60 Tab 0    diphenhydrAMINE-zinc acetate 2%-0.1% (BENADRYL) 2-0.1 % topical cream Apply  to affected area three (3) times daily as needed for Itching. apply a thin layer   Indications: skin irritation 30 g 0    pantoprazole (Protonix) 40 mg tablet Take 1 Tab by mouth Daily (before breakfast). Indications: gastroesophageal reflux disease 30 Tab 0    clopidogreL (PLAVIX) 75 mg tab Take 1 Tab by mouth nightly. (Patient taking differently: Take 1 Tab by mouth nightly. Plavix on hold for a week pending dental surgery. 6/16/20)) 90 Tab 1    verapamil ER (CALAN-SR) 120 mg tablet TAKE 1 TABLET BY MOUTH NIGHTLY FOR HIGH BLOOD PRESSURE 90 Tab 0    atorvastatin (LIPITOR) 20 mg tablet Take 20 mg by mouth nightly.  multivit/folic acid/vit K1 (ONE-A-DAY WOMEN'S 50 PLUS PO) Take 1 Tab by mouth daily.  escitalopram oxalate (LEXAPRO) 20 mg tablet Take 1 Tab by mouth nightly. Indications: Anxiousness associated with Depression 90 Tab 4    albuterol (PROVENTIL HFA, VENTOLIN HFA, PROAIR HFA) 90 mcg/actuation inhaler Take 1 Puff by inhalation every four (4) hours as needed for Wheezing. 1 Inhaler 0    polyethylene glycol (MIRALAX) 17 gram packet Take 17 g by mouth daily as needed (constipation).  levocetirizine (Xyzal) 5 mg tablet Take 1 Tab by mouth daily as needed for Allergies. 30 Tab 0    primidone (MYSOLINE) 50 mg tablet Take 1 Tab by mouth nightly. 90 Tab 1    fluticasone propionate (FLONASE) 50 mcg/actuation nasal spray 2 Sprays by Both Nostrils route daily. 1 Bottle 0     No current facility-administered medications on file prior to visit.         Past Surgical History:   Procedure Laterality Date    HX APPENDECTOMY      HX CATARACT REMOVAL  2011    Right    HX CERVICAL FUSION  1/28/15    C5-7    HX CHOLECYSTECTOMY  2000    HX GI  2010    liver bx due to liver Bx due to elevated enzymes, liver laceration during Bx, hospitalized 10-12d    HX HEENT  7/28/15    Lt total parotidectomy with facial nerve dissection: Dr Yeimi Mike HX HEENT  11/2015    Right parotidectomy    HX HYSTERECTOMY      HX ORTHOPAEDIC  2009 & 2011    LUMBAR FUSION and revision    HX OTHER SURGICAL  2010    MELANOMA (Rt ankle) and basal cell (Lt eye) removed; q6mo ck by DERMATOLOGY    HX SHOULDER REPLACEMENT Left 2016    HX THYROIDECTOMY      Right    IL COLON CA SCRN NOT HI RSK IND  2016         IL INSERTION SUBQ CARDIAC RHYTHM MONITOR W/PRGRMG N/A 2019    LOOP RECORDER INSERT performed by Feliberto Willams MD at [facility] CARDIAC C[facility]       Family History   Problem Relation Age of Onset    Alcohol abuse Father     Emphysema Father     Cancer Sister         PANCREATIC    Alcohol abuse Brother     COPD Brother     Other Mother         UNKNOWN CAUSE OF DEATH    Heart Disease Mother     Breast Cancer Maternal Aunt     Anesth Problems Neg Hx      Reviewed and no changes     Social History     Socioeconomic History    Marital status:      Spouse name: Not on file    Number of children: Not on file    Years of education: Not on file    Highest education level: Not on file   Occupational History    Not on file   Social Needs    Financial resource strain: Not on file    Food insecurity     Worry: Not on file     Inability: Not on file    Transportation needs     Medical: Not on file     Non-medical: Not on file   Tobacco Use    Smoking status: Former Smoker     Years: 15.00     Last attempt to quit: 7/15/1975     Years since quittin.9    Smokeless tobacco: Never Used   Substance and Sexual Activity    Alcohol use: Yes     Comment: may have a glass of wine on rare occasion    Drug use: Yes     Types: Prescription, OTC    Sexual activity: Not Currently   Lifestyle    Physical activity     Days per week: Not on file     Minutes per session: Not on file    Stress: Not on file   Relationships    Social connections     Talks on phone: Not on file     Gets together: Not on file     Attends Yarsanism service: Not on file     Active member of club or organization: Not on file     Attends meetings of clubs or organizations: Not on file     Relationship status: Not on file    Intimate partner violence     Fear of current or ex partner: Not on file     Emotionally abused: Not on file Physically abused: Not on file     Forced sexual activity: Not on file   Other Topics Concern    Not on file   Social History Narrative    Lives in [place] with  of 62 years. Has 2 sons and 1 daughter. Used to work in a bank. Likes to garden and [employer]. Visit Vitals  LMP  (LMP Unknown)       Physical Examination:   Gen: mildly  appearing female  HEENT: normal conjunctiva, no audible congestion, patient does not see oral erythema, has MMM  Neck: patient does not feel enlarged or tender LAD or masses  Resp: slight increased respiratory effort RR 18, speaking in full sentences but taking pauses at end of sentences  CV: patient does not feel palpitations or heart irregularity  Abd: patient does not feel abdominal tenderness or mass, patient does not notice distension  Extrem: patient does not see swelling in ankles or joints.    Neuro: Alert and oriented, able to answer questions without difficulty, able to move all extremities and walk normally          Lab Results   Component Value Date/Time    WBC 3.9 05/16/2020 03:48 AM    HGB 10.8 (L) 05/16/2020 03:48 AM    HCT 32.9 (L) 05/16/2020 03:48 AM    PLATELET 380 01/40/3320 03:48 AM    MCV 91.9 05/16/2020 03:48 AM     Lab Results   Component Value Date/Time    Sodium 140 05/16/2020 03:48 AM    Potassium 3.4 (L) 05/16/2020 03:48 AM    Chloride 109 (H) 05/16/2020 03:48 AM    CO2 24 05/16/2020 03:48 AM    Anion gap 7 05/16/2020 03:48 AM    Glucose 79 05/16/2020 03:48 AM    BUN 8 05/16/2020 03:48 AM    Creatinine 0.54 (L) 05/16/2020 03:48 AM    BUN/Creatinine ratio 15 05/16/2020 03:48 AM    GFR est AA >60 05/16/2020 03:48 AM    GFR est non-AA >60 05/16/2020 03:48 AM    Calcium 7.9 (L) 05/16/2020 03:48 AM     Lab Results   Component Value Date/Time    Cholesterol, total 186 11/06/2019 02:16 AM    HDL Cholesterol 70 11/06/2019 02:16 AM    LDL, calculated 79.2 11/06/2019 02:16 AM    VLDL, calculated 36.8 11/06/2019 02:16 AM    Triglyceride 184 (H) 11/06/2019 02:16 AM    CHOL/HDL Ratio 2.7 11/06/2019 02:16 AM     Lab Results   Component Value Date/Time    TSH 2.43 11/06/2019 02:16 AM     No results found for: Yodit KHAN, [id number], [id number], PSALT  Lab Results   Component Value Date/Time    Hemoglobin A1c 5.3 11/06/2019 02:16 AM     No results found for: Amalia Mejiamigue, [id number]    Lab Results   Component Value Date/Time    ALT (SGPT) 24 05/11/2020 05:06 PM    Alk. phosphatase 96 05/11/2020 05:06 PM    Bilirubin, total 0.6 05/11/2020 05:06 PM                   Kadi Franklin MD    This is an established visit conducted via real time video and audio telemedicine. The patient has been instructed that this meets HIPAA criteria and acknowledges and agrees to this method of visitation. This is the Subsequent Medicare Annual Wellness Exam, performed 12 months or more after the Initial AWV or the last Subsequent AWV    Consent: Geovanny Jimenez, who was seen by synchronous (real-time) audio only technology, and/or her healthcare decision maker, is aware that this patient-initiated, Telehealth encounter on 6/26/2020 is a billable service. While AWVs are fully covered by Medicare, any services rendered on this date that are not included in an AWV are subject to additional billing, with coverage as determined by her insurance carrier. She is aware that she may receive a bill for any such additional services and has provided verbal consent to proceed: Yes. I have reviewed the patient's medical history in detail and updated the computerized patient record.      Depression Risk Factor Screening:     3 most recent PHQ Screens 2/28/2020   PHQ Not Done -   Little interest or pleasure in doing things Not at all   Feeling down, depressed, irritable, or hopeless Not at all   Total Score PHQ 2 0       Alcohol Risk Factor Screening:   Do you average 1 drink per night or more than 7 drinks a week:  No    On any one occasion in the past three months have you have had more than 3 drinks containing alcohol:  No      Functional Ability and Level of Safety:   Hearing: Hearing is good. Activities of Daily Living: The home contains: grab bars  Patient does total self care     Ambulation: with no difficulty     Fall Risk:  Fall Risk Assessment, last 12 mths 6/26/2020   Able to walk? Yes   Fall in past 12 months? No   Fall with injury? -   Number of falls in past 12 months -   Fall Risk Score -     Abuse Screen:  Patient is not abused       Cognitive Screening   Has your family/caregiver stated any concerns about your memory: no    Cognitive Screening: Normal - Verbal Fluency Test    Patient Care Team   Patient Care Team:  Sherman Moreno MD as PCP - General (Internal Medicine)  Sherman Moreno MD as PCP - [facility] Empaneled Provider  Jesus Lundberg MD as Surgeon (General Surgery)  Ale Ray MD (Neurology)  Virginia Valdivia MD (Hematology and Oncology)  Juanita Blevins MD (Dermatology)    Assessment/Plan   Education and counseling provided:  Are appropriate based on today's review and evaluation    Diagnoses and all orders for this visit:    1. Chronic right shoulder pain  -     REFERRAL TO ORTHOPEDICS    2. Essential hypertension  -   Controlled on benicar and verapamil     3. Anxiety: controlled on clonazepam and SSRI    4. Hx of completed stroke on plavix and statin     5. MGUS (monoclonal gammopathy of unknown significance) followed by Dr Jimenez Eye    6. Medicare annual wellness visit, subsequent        Health Maintenance Due   Topic Date Due    Eye Exam Retinal or Dilated  09/23/1950    DTaP/Tdap/Td series (1 - Tdap) 09/23/1962    Shingrix Vaccine Age 50> (1 of 2) 09/23/1991    Pneumococcal 65+ years (2 of 2 - PPSV23) 06/04/2020    Medicare Yearly Exam  06/04/2020       Francia Lomas is a 67 y.o. female who was evaluated by an audio only encounter for concerns as above.  Patient identification was verified prior to start of the visit. A caregiver was present when appropriate. Due to this being a TeleHealth encounter (During ZLJ-39 public health emergency), evaluation of the following organ systems was limited: Vitals/Constitutional/EENT/Resp/CV/GI//MS/Neuro/Skin/Heme-Lymph-Imm. Pursuant to the emergency declaration under the [address] [place] [address], [facility] waiver authority and the [employer] Act, this Virtual Visit was conducted, with patient's (and/or legal guardian's) consent, to reduce the patient's risk of exposure to COVID-19 and provide necessary medical care. Services were provided through a synchronous discussion virtually to substitute for in-person clinic visit. I was at home. The patient was at home.       Spent 23 minutes on the phone with patient     Sam Wharton MD

## 2020-06-26 NOTE — PATIENT INSTRUCTIONS
This is an established visit conducted via telemedicine. The patient has been instructed that this meets HIPAA criteria and acknowledges and agrees to this method of visitation. [place] George, [place] 
82/[birth year]54 
89:34 AM 
Chief Complaint Patient presents with  Follow-up COVID- 19 Medicare Wellness Visit, Female The best way to live healthy is to have a lifestyle where you eat a well-balanced diet, exercise regularly, limit alcohol use, and quit all forms of tobacco/nicotine, if applicable. Regular preventive services are another way to keep healthy. Preventive services (vaccines, screening tests, monitoring & exams) can help personalize your care plan, which helps you manage your own care. Screening tests can find health problems at the earliest stages, when they are easiest to treat. [facility] follows the current, evidence-based guidelines published by the [facility] ([facility]) when recommending preventive services for our patients. Because we follow these guidelines, sometimes recommendations change over time as research supports it. (For example, mammograms used to be recommended annually. Even though Medicare will still pay for an annual mammogram, the newer guidelines recommend a mammogram every two years for women of average risk). Of course, you and your doctor may decide to screen more often for some diseases, based on your risk and your co-morbidities (chronic disease you are already diagnosed with). Preventive services for you include: - Medicare offers their members a free annual wellness visit, which is time for you and your primary care provider to discuss and plan for your preventive service needs. Take advantage of this benefit every year! 
-All adults over the age of 72 should receive the recommended pneumonia vaccines.  Current USPSTF guidelines recommend a series of two vaccines for the best pneumonia protection.  
-All adults should have a flu vaccine yearly and a tetanus vaccine every 10 years.  
-All adults age 48 and older should receive the shingles vaccines (series of two vaccines). -All adults age 38-68 who are overweight should have a diabetes screening test once every three years.  
-All adults born between [birth year] and 1965 should be screened once for Hepatitis C. 
-Other screening tests and preventive services for persons with diabetes include: an eye exam to screen for diabetic retinopathy, a kidney function test, a foot exam, and stricter control over your cholesterol.  
-Cardiovascular screening for adults with routine risk involves an electrocardiogram (ECG) at intervals determined by your doctor.  
-Colorectal cancer screenings should be done for adults age 54-65 with no increased risk factors for colorectal cancer. There are a number of acceptable methods of screening for this type of cancer. Each test has its own benefits and drawbacks. Discuss with your doctor what is most appropriate for you during your annual wellness visit. The different tests include: colonoscopy (considered the best screening method), a fecal occult blood test, a fecal DNA test, and sigmoidoscopy. 
 
-A bone mass density test is recommended when a woman turns 65 to screen for osteoporosis. This test is only recommended one time, as a screening. Some providers will use this same test as a disease monitoring tool if you already have osteoporosis. -Breast cancer screenings are recommended every other year for women of normal risk, age 54-69. 
-Cervical cancer screenings for women over age 72 are only recommended with certain risk factors. Here is a list of your current Health Maintenance items (your personalized list of preventive services) with a due date: 
Health Maintenance Due Topic Date Due  Eye Exam  09/23/1950  
 DTaP/Tdap/Td  (1 - Tdap) 09/23/1962  Shingles Vaccine (1 of 2) 09/23/1991  Pneumococcal Vaccine (2 of 2 - PPSV23) 06/04/2020 [address] [place] [address] Annual Well Visit  06/04/2020

## 2020-07-02 DIAGNOSIS — F41.9 ANXIETY: ICD-10-CM

## 2020-07-03 RX ORDER — CLONAZEPAM 0.5 MG/1
TABLET ORAL
Qty: 60 TAB | Refills: 0 | Status: SHIPPED | OUTPATIENT
Start: 2020-07-03 | End: 2020-08-04

## 2020-07-08 DIAGNOSIS — M25.511 RIGHT SHOULDER PAIN, UNSPECIFIED CHRONICITY: Primary | ICD-10-CM

## 2020-07-09 ENCOUNTER — HOSPITAL ENCOUNTER (OUTPATIENT)
Dept: GENERAL RADIOLOGY | Age: 80
Discharge: HOME | End: 2020-07-09
Payer: MEDICARE

## 2020-07-09 DIAGNOSIS — M25.511 RIGHT SHOULDER PAIN, UNSPECIFIED CHRONICITY: ICD-10-CM

## 2020-07-09 PROCEDURE — 73030 X-RAY EXAM OF SHOULDER: CPT

## 2020-07-10 ENCOUNTER — VIRTUAL VISIT (OUTPATIENT)
Dept: ORTHOPEDIC SURGERY | Age: 80
End: 2020-07-10

## 2020-07-10 DIAGNOSIS — M75.41 IMPINGEMENT SYNDROME OF SHOULDER, RIGHT: Primary | ICD-10-CM

## 2020-07-10 NOTE — PROGRESS NOTES
Patient7/10/2020      CC: Right shoulder pain    HPI:      This is a 67y.o. year old patient who complains of right shoulder pain. This pain has been going on for several months. This is activity dependent pain. The pain is in the shoulder, anteriorly as well as laterally. This pain is worse with activity and better with rest.  The pain is severe in nature. So far, the patient has tried tylenol. The patient is right hand dominant. 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)     GERD (gastroesophageal reflux disease)     Hypercholesteremia     Hypertension     Idiopathic small and large fiber sensory neuropathy     Limited peripheral vision of right eye     complication of cataract surgery    Long term current use of anticoagulant therapy     Meniere disease     MGUS (monoclonal gammopathy of unknown significance)     Monoclonal gammopathy of unknown significance     hem/onc:  Dr Vandana Garrett [phone]    Osteoporosis     Psoriasis     bilateral Legs    Restless leg     Skin melanoma ([facility] [address] 2012    (Rt ankle) and basal cell (Lt eye) removed    Transient ischemic attack approx 2005    HAS HAD 2 MINISTROKES-NO DEFICITS; neuro Dr Haynes Dillwyn (cc)    Unspecified adverse effect of anesthesia     HAS NEEDED PORT OR CUTDOWN FOR RECVNG BLOOD OR LONG-TERM IVs; surgery 7/28/15 w/o any difficulty    Unspecified sleep apnea     does not use CPAP       PSxHx:  Past Surgical History:   Procedure Laterality Date    HX APPENDECTOMY      HX CATARACT REMOVAL  2011    Right    HX CERVICAL FUSION  1/28/15    C5-7    HX CHOLECYSTECTOMY  2000    HX GI  2010    liver bx due to liver Bx due to elevated enzymes, liver laceration during Bx, hospitalized 10-12d    HX HEENT  7/28/15    Lt total parotidectomy with facial nerve dissection: Dr Ross Payment HX HEENT  11/2015    Right parotidectomy    HX HYSTERECTOMY      HX ORTHOPAEDIC  2009 & 2011    LUMBAR FUSION and revision    HX OTHER SURGICAL  2010    MELANOMA (Rt ankle) and basal cell (Lt eye) removed; q6mo ck by DERMATOLOGY    HX SHOULDER REPLACEMENT Left 08/01/2016    HX THYROIDECTOMY      Right    NE COLON CA SCRN NOT HI RSK IND  1/27/2016         NE INSERTION SUBQ CARDIAC RHYTHM MONITOR W/PRGRMG N/A 11/6/2019    LOOP RECORDER INSERT performed by Janet Phipps MD at [facility] CARDIAC C[facility]       Meds:    Current Outpatient Medications:     clonazePAM (KlonoPIN) 0.5 mg tablet, TAKE 1 TABLET BY MOUTH TWICE DAILY . MAX  2  PER  24  HOURS, Disp: 60 Tab, Rfl: 0    rOPINIRole (REQUIP) 1 mg tablet, Take 1 tablet by mouth nightly, Disp: 90 Tab, Rfl: 0    olmesartan (BENICAR) 5 mg tablet, Take 0.5 Tabs by mouth daily. , Disp: 60 Tab, Rfl: 0    nortriptyline (PAMELOR) 10 mg capsule, TAKE 1 TO 2 CAPSULES BY MOUTH NIGHTLY, Disp: 90 Cap, Rfl: 0    ipratropium-albuteroL (Combivent Respimat)  mcg/actuation inhaler, Take 1 Puff by inhalation every four (4) hours as needed for Shortness of Breath., Disp: , Rfl:     diphenhydrAMINE-zinc acetate 2%-0.1% (BENADRYL) 2-0.1 % topical cream, Apply  to affected area three (3) times daily as needed for Itching. apply a thin layer   Indications: skin irritation, Disp: 30 g, Rfl: 0    pantoprazole (Protonix) 40 mg tablet, Take 1 Tab by mouth Daily (before breakfast). Indications: gastroesophageal reflux disease, Disp: 30 Tab, Rfl: 0    levocetirizine (Xyzal) 5 mg tablet, Take 1 Tab by mouth daily as needed for Allergies. , Disp: 30 Tab, Rfl: 0    clopidogreL (PLAVIX) 75 mg tab, Take 1 Tab by mouth nightly. (Patient taking differently: Take 1 Tab by mouth nightly. Plavix on hold for a week pending dental surgery. 20)), Disp: 90 Tab, Rfl: 1    verapamil ER (CALAN-SR) 120 mg tablet, TAKE 1 TABLET BY MOUTH NIGHTLY FOR HIGH BLOOD PRESSURE, Disp: 90 Tab, Rfl: 0    primidone (MYSOLINE) 50 mg tablet, Take 1 Tab by mouth nightly., Disp: 90 Tab, Rfl: 1    atorvastatin (LIPITOR) 20 mg tablet, Take 20 mg by mouth nightly., Disp: , Rfl:     multivit/folic acid/vit K1 (ONE-A-DAY WOMEN'S 50 PLUS PO), Take 1 Tab by mouth daily. , Disp: , Rfl:     fluticasone propionate (FLONASE) 50 mcg/actuation nasal spray, 2 Sprays by Both Nostrils route daily. , Disp: 1 Bottle, Rfl: 0    escitalopram oxalate (LEXAPRO) 20 mg tablet, Take 1 Tab by mouth nightly. Indications: Anxiousness associated with Depression, Disp: 90 Tab, Rfl: 4    albuterol (PROVENTIL HFA, VENTOLIN HFA, PROAIR HFA) 90 mcg/actuation inhaler, Take 1 Puff by inhalation every four (4) hours as needed for Wheezing., Disp: 1 Inhaler, Rfl: 0    polyethylene glycol (MIRALAX) 17 gram packet, Take 17 g by mouth daily as needed (constipation). , Disp: , Rfl:     All:  Allergies   Allergen Reactions    Aspirin Nausea and Vomiting    Bee Sting [Sting, Bee] Hives and Shortness of Breath     WASP STING    Codeine Nausea and Vomiting    Hydrocodone Rash     And sedation    Lisinopril Cough    Morphine Rash    Nsaids (Non-Steroidal Anti-Inflammatory Drug) Nausea and Vomiting    Oxycodone Rash     On her back    Penicillins Hives, Shortness of Breath and Swelling       Social Hx:  Social History     Socioeconomic History    Marital status:      Spouse name: Not on file    Number of children: Not on file    Years of education: Not on file    Highest education level: Not on file   Tobacco Use    Smoking status: Former Smoker     Years: 15.00     Last attempt to quit: 7/15/1975     Years since quittin.0    Smokeless tobacco: Never Used   Substance and Sexual Activity    Alcohol use: Yes     Comment: may have a glass of wine on rare occasion    Drug use:  Yes Types: Prescription, OTC    Sexual activity: Not Currently   Social History Narrative    Lives in [place] with  of 62 years. Has 2 sons and 1 daughter. Used to work in a bank. Likes to garden and [employer]. Family Hx:  Family History   Problem Relation Age of Onset    Alcohol abuse Father     Emphysema Father     Cancer Sister         PANCREATIC    Alcohol abuse Brother     COPD Brother     Other Mother         UNKNOWN CAUSE OF DEATH    Heart Disease Mother     Breast Cancer Maternal Aunt     Anesth Problems Neg Hx          Review of Systems:       General: Denies headache, lethargy, fever, weight loss  Ears/Nose/Throat: Denies ear discharge, drainage, nosebleeds, hoarse voice, dental problems  Cardiovascular: Denies chest pain, shortness of breath  Lungs: Denies chest pain, breathing problems, wheezing, pneumonia  Stomach: Denies stomach pain, heartburn, constipation, irritable bowel  Skin: Denies rash, sores, open wounds  Musculoskeletal: Right shoulder pain which is activity dependent, it is anteriorly on the shoulder  Genitourinary: Denies dysuria, hematuria, polyuria  Gastrointestinal: Denies constipation, obstipation, diarrhea  Neurological: Denies changes in sight, smell, hearing, taste, seizures. Denies loss of consciousness. Psychiatric: Denies depression, sleep pattern changes, anxiety, change in personality  Endocrine: Denies mood swings, heat or cold intolerance  Hematologic/Lymphatic: Denies anemia, purpura, petechia  Allergic/Immunologic: Denies swelling of throat, pain or swelling at lymph nodes      Physical Examination:    There were no vitals taken for this visit. General: AOX3, no apparent distress  Psychiatric: mood and affect appropriate         Diagnostics:    Pertinent Xrays:  Xrays are available of the shoulder. These indicate no fractures, dislocations, or osseous abnormalities. Soft tissue is within expected limits.  Mild AC joint arthrosis    Assessment: Impingement syndrome, right shoulder    Plan: This patient and I did discuss options for this particular pathology. As there is no indication of overt tearing of the rotator cuff, and that in the setting conservative treatment is the standard of care, I have recommended that some combination of oral analgesics, ideally anti-inflammatories, physical therapy exercises, subacromial injections perhaps other topical forms of treatment would be the first-line of treatment for this particular problem. Patient stated their understanding the pathology as well as the anatomy and treatment options. We have agreed to injection, PT. The patient will follow-up approximately 4 to 6 weeks for a clinical check should any symptoms remain. No x-rays are necessary on follow-up. Procedures performed: none    I was in office while conducting this encounter. Consent:  She and/or her healthcare decision maker is aware that this patient-initiated Telehealth encounter is a billable service, with coverage as determined by her insurance carrier. She is aware that she may receive a bill and has provided verbal consent to proceed: Yes    This virtual visit was conducted telephone encounter only. -  I affirm this is a Patient Initiated Episode with an Established Patient who has not had a related appointment within my department in the past 7 days or scheduled within the next 24 hours. Note: this encounter is not billable if this call serves to triage the patient into an appointment for the relevant concern. Total Time: minutes: 21-30 minutes. Ms. Gia Mccoy has a reminder for a \"due or due soon\" health maintenance. I have asked that she contact her primary care provider for follow-up on this health maintenance.

## 2020-07-11 ENCOUNTER — APPOINTMENT (OUTPATIENT)
Dept: CT IMAGING | Age: 80
End: 2020-07-11
Attending: EMERGENCY MEDICINE
Payer: MEDICARE

## 2020-07-11 ENCOUNTER — HOSPITAL ENCOUNTER (EMERGENCY)
Age: 80
Discharge: HOME | End: 2020-07-11
Attending: EMERGENCY MEDICINE
Payer: MEDICARE

## 2020-07-11 VITALS
HEART RATE: 87 BPM | BODY MASS INDEX: 25.73 KG/M2 | TEMPERATURE: 98.3 F | DIASTOLIC BLOOD PRESSURE: 59 MMHG | WEIGHT: 140.65 LBS | SYSTOLIC BLOOD PRESSURE: 115 MMHG | RESPIRATION RATE: 13 BRPM | OXYGEN SATURATION: 95 %

## 2020-07-11 DIAGNOSIS — S09.90XA CLOSED HEAD INJURY, INITIAL ENCOUNTER: ICD-10-CM

## 2020-07-11 DIAGNOSIS — S06.5XAA SUBDURAL HEMATOMA: Primary | ICD-10-CM
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
## 2020-07-11 LAB
ANION GAP SERPL CALC-SCNC: 6 MMOL/L (ref 5–15)
APTT PPP: 23.9 SEC (ref 22.1–32)
BUN SERPL-MCNC: 19 MG/DL (ref 6–20)
BUN/CREAT SERPL: 24 (ref 12–20)
CALCIUM SERPL-MCNC: 8.9 MG/DL (ref 8.5–10.1)
CHLORIDE SERPL-SCNC: 108 MMOL/L (ref 97–108)
CO2 SERPL-SCNC: 26 MMOL/L (ref 21–32)
COMMENT, HOLDF: NORMAL
CREAT SERPL-MCNC: 0.78 MG/DL (ref 0.55–1.02)
ERYTHROCYTE [DISTWIDTH] IN BLOOD BY AUTOMATED COUNT: 12.9 % (ref 11.5–14.5)
GLUCOSE SERPL-MCNC: 117 MG/DL (ref 65–100)
HCT VFR BLD AUTO: 37.2 % (ref 35–47)
HGB BLD-MCNC: 11.7 G/DL (ref 11.5–16)
INR PPP: 1 (ref 0.9–1.1)
MCH RBC QN AUTO: 30.3 PG (ref 26–34)
MCHC RBC AUTO-ENTMCNC: 31.5 G/DL (ref 30–36.5)
MCV RBC AUTO: 96.4 FL (ref 80–99)
NRBC # BLD: 0 K/UL (ref 0–0.01)
NRBC BLD-RTO: 0 PER 100 WBC
PLATELET # BLD AUTO: 268 K/UL (ref 150–400)
PMV BLD AUTO: 10.3 FL (ref 8.9–12.9)
POTASSIUM SERPL-SCNC: 4 MMOL/L (ref 3.5–5.1)
PROTHROMBIN TIME: 10.4 SEC (ref 9–11.1)
RBC # BLD AUTO: 3.86 M/UL (ref 3.8–5.2)
SAMPLES BEING HELD,HOLD: NORMAL
SODIUM SERPL-SCNC: 140 MMOL/L (ref 136–145)
THERAPEUTIC RANGE,PTTT: NORMAL SECS (ref 58–77)
WBC # BLD AUTO: 4.5 K/UL (ref 3.6–11)

## 2020-07-11 PROCEDURE — 36415 COLL VENOUS BLD VENIPUNCTURE: CPT

## 2020-07-11 PROCEDURE — 80048 BASIC METABOLIC PNL TOTAL CA: CPT

## 2020-07-11 PROCEDURE — 85730 THROMBOPLASTIN TIME PARTIAL: CPT

## 2020-07-11 PROCEDURE — 70450 CT HEAD/BRAIN W/O DYE: CPT

## 2020-07-11 PROCEDURE — 74011250637 HC RX REV CODE- 250/637: Performed by: EMERGENCY MEDICINE

## 2020-07-11 PROCEDURE — 85610 PROTHROMBIN TIME: CPT

## 2020-07-11 PROCEDURE — 99284 EMERGENCY DEPT VISIT MOD MDM: CPT

## 2020-07-11 PROCEDURE — 85027 COMPLETE CBC AUTOMATED: CPT

## 2020-07-11 RX ORDER — ACETAMINOPHEN 500 MG
1000 TABLET ORAL ONCE
Status: COMPLETED | OUTPATIENT
Start: 2020-07-11 | End: 2020-07-11

## 2020-07-11 RX ADMIN — ACETAMINOPHEN 1000 MG: 500 TABLET ORAL at 13:57

## 2020-07-11 NOTE — ED PROVIDER NOTES
EMERGENCY DEPARTMENT HISTORY AND PHYSICAL EXAM      Date: 7/11/2020  Patient Name: Nikko Scales    History of Presenting Illness     Chief Complaint   Patient presents with    Headache     Ambulatory into the ED with c/o HA - missed a step and fell 2 days ago. Denies LOC. Taking Plavix.  Head Injury       History Provided By: Patient    HPI: Nikko Scales, 67 y.o. female  presents to the ED with cc of headache. Patient states she fell about 2 days ago and missed a step. She hit her head. She denies loss of consciousness. No neck pain. She is been having some mild frontal headaches. She denies nausea or vomiting. No extremity pain or injury. She does take Plavix for history of stroke. No aspirin taken. 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)     GERD (gastroesophageal reflux disease)     Hypercholesteremia     Hypertension     Idiopathic small and large fiber sensory neuropathy     Limited peripheral vision of right eye     complication of cataract surgery    Long term current use of anticoagulant therapy     Meniere disease     MGUS (monoclonal gammopathy of unknown significance)     Monoclonal gammopathy of unknown significance     [facility] [phone]    Osteoporosis     Psoriasis     bilateral Legs    Restless leg     Skin melanoma ([facility] [address] 2012    (Rt ankle) and basal cell (Lt eye) removed    Transient ischemic attack approx 2005    HAS HAD 2 MINISTROKES-NO DEFICITS; neuro Dr Russell Oliveira (cc)    Unspecified adverse effect of anesthesia     HAS NEEDED PORT OR CUTDOWN FOR RECVNG BLOOD OR LONG-TERM IVs; surgery 7/28/15 w/o any difficulty    Unspecified sleep apnea     does not use CPAP       Past Surgical History:  Past Surgical History:   Procedure Laterality Date    HX APPENDECTOMY      HX CATARACT REMOVAL  2011    Right    HX CERVICAL FUSION  1/28/15    C5-7    HX CHOLECYSTECTOMY  2000    HX GI  2010    liver bx due to liver Bx due to elevated enzymes, liver laceration during Bx, hospitalized 10-12d    HX HEENT  7/28/15    Lt total parotidectomy with facial nerve dissection: Dr Dali Fung HX HEENT  11/2015    Right parotidectomy    HX HYSTERECTOMY      HX ORTHOPAEDIC  2009 & 2011    LUMBAR FUSION and revision    HX OTHER SURGICAL  2010    MELANOMA (Rt ankle) and basal cell (Lt eye) removed; q6mo ck by DERMATOLOGY    HX SHOULDER REPLACEMENT Left 08/01/2016    HX THYROIDECTOMY      Right    HI COLON CA SCRN NOT HI RSK IND  1/27/2016         HI INSERTION SUBQ CARDIAC RHYTHM MONITOR W/PRGRMG N/A 11/6/2019    LOOP RECORDER INSERT performed by Bertrand Hutchinson MD at [facility] CARDIAC CATH LAB       Medications:  No current facility-administered medications on file prior to encounter. Current Outpatient Medications on File Prior to Encounter   Medication Sig Dispense Refill    clonazePAM (KlonoPIN) 0.5 mg tablet TAKE 1 TABLET BY MOUTH TWICE DAILY . MAX  2  PER  24  HOURS 60 Tab 0    rOPINIRole (REQUIP) 1 mg tablet Take 1 tablet by mouth nightly 90 Tab 0    olmesartan (BENICAR) 5 mg tablet Take 0.5 Tabs by mouth daily. 60 Tab 0    nortriptyline (PAMELOR) 10 mg capsule TAKE 1 TO 2 CAPSULES BY MOUTH NIGHTLY 90 Cap 0    ipratropium-albuteroL (Combivent Respimat)  mcg/actuation inhaler Take 1 Puff by inhalation every four (4) hours as needed for Shortness of Breath.  diphenhydrAMINE-zinc acetate 2%-0.1% (BENADRYL) 2-0.1 % topical cream Apply  to affected area three (3) times daily as needed for Itching.  apply a thin layer   Indications: skin irritation 30 g 0    pantoprazole (Protonix) 40 mg tablet Take 1 Tab by mouth Daily (before breakfast). Indications: gastroesophageal reflux disease 30 Tab 0    levocetirizine (Xyzal) 5 mg tablet Take 1 Tab by mouth daily as needed for Allergies. 30 Tab 0    clopidogreL (PLAVIX) 75 mg tab Take 1 Tab by mouth nightly. (Patient taking differently: Take 1 Tab by mouth nightly. Plavix on hold for a week pending dental surgery. 20)) 90 Tab 1    verapamil ER (CALAN-SR) 120 mg tablet TAKE 1 TABLET BY MOUTH NIGHTLY FOR HIGH BLOOD PRESSURE 90 Tab 0    primidone (MYSOLINE) 50 mg tablet Take 1 Tab by mouth nightly. 90 Tab 1    atorvastatin (LIPITOR) 20 mg tablet Take 20 mg by mouth nightly.  multivit/folic acid/vit K1 (ONE-A-DAY WOMEN'S 50 PLUS PO) Take 1 Tab by mouth daily.  fluticasone propionate (FLONASE) 50 mcg/actuation nasal spray 2 Sprays by Both Nostrils route daily. 1 Bottle 0    escitalopram oxalate (LEXAPRO) 20 mg tablet Take 1 Tab by mouth nightly. Indications: Anxiousness associated with Depression 90 Tab 4    albuterol (PROVENTIL HFA, VENTOLIN HFA, PROAIR HFA) 90 mcg/actuation inhaler Take 1 Puff by inhalation every four (4) hours as needed for Wheezing. 1 Inhaler 0    polyethylene glycol (MIRALAX) 17 gram packet Take 17 g by mouth daily as needed (constipation).          Family History:  Family History   Problem Relation Age of Onset    Alcohol abuse Father     Emphysema Father     Cancer Sister         PANCREATIC    Alcohol abuse Brother     COPD Brother     Other Mother         UNKNOWN CAUSE OF DEATH    Heart Disease Mother     Breast Cancer Maternal Aunt     Anesth Problems Neg Hx        Social History:  Social History     Tobacco Use    Smoking status: Former Smoker     Years: 15.00     Last attempt to quit: 7/15/1975     Years since quittin.0    Smokeless tobacco: Never Used   Substance Use Topics    Alcohol use: Yes     Comment: may have a glass of wine on rare occasion    Drug use: Yes     Types: Prescription, OTC Allergies: Allergies   Allergen Reactions    Aspirin Nausea and Vomiting    Bee Sting [Sting, Bee] Hives and Shortness of Breath     WASP STING    Codeine Nausea and Vomiting    Hydrocodone Rash     And sedation    Lisinopril Cough    Morphine Rash    Nsaids (Non-Steroidal Anti-Inflammatory Drug) Nausea and Vomiting    Oxycodone Rash     On her back    Penicillins Hives, Shortness of Breath and Swelling       All the above components of the past  history are auto-populated from the electronic record. They have been reviewed and the patient has been interviewed for any pertinent past history that pertains to the patient's chief complaint and reason for visit. Not all pre-populated components may be accurate at the time this note was generated. Review of Systems   Review of Systems   Constitutional: Negative for chills and fever. HENT: Negative for congestion, ear pain, rhinorrhea, sore throat and trouble swallowing. Eyes: Negative for visual disturbance. Respiratory: Negative for cough, chest tightness and shortness of breath. Cardiovascular: Negative for chest pain and palpitations. Gastrointestinal: Negative for abdominal pain, blood in stool, constipation, diarrhea, nausea and vomiting. Genitourinary: Negative for decreased urine volume, difficulty urinating, dysuria and frequency. Musculoskeletal: Negative for back pain and neck pain. Skin: Negative for color change and rash. Neurological: Positive for headaches. Negative for dizziness, weakness and light-headedness. Physical Exam   Physical Exam  Vitals signs and nursing note reviewed. Constitutional:       General: She is not in acute distress. Appearance: She is well-developed. She is not ill-appearing. Eyes:      Conjunctiva/sclera: Conjunctivae normal.   Neck:      Musculoskeletal: Neck supple. Cardiovascular:      Rate and Rhythm: Normal rate and regular rhythm.    Pulmonary:      Effort: Pulmonary effort is normal. No accessory muscle usage or respiratory distress. Breath sounds: Normal breath sounds. Abdominal:      General: There is no distension. Palpations: Abdomen is soft. Tenderness: There is no abdominal tenderness. Lymphadenopathy:      Cervical: No cervical adenopathy. Skin:     General: Skin is warm and dry. Neurological:      Mental Status: She is alert and oriented to person, place, and time. Cranial Nerves: No cranial nerve deficit. Sensory: No sensory deficit. Diagnostic Study Results     Labs -     Recent Results (from the past 24 hour(s))   SAMPLES BEING HELD    Collection Time: 07/11/20 11:36 AM   Result Value Ref Range    SAMPLES BEING HELD  BLUE, PST, LAV     COMMENT        Add-on orders for these samples will be processed based on acceptable specimen integrity and analyte stability, which may vary by analyte.    PROTHROMBIN TIME + INR    Collection Time: 07/11/20 11:36 AM   Result Value Ref Range    INR 1.0 0.9 - 1.1      Prothrombin time 10.4 9.0 - 11.1 sec   PTT    Collection Time: 07/11/20 11:36 AM   Result Value Ref Range    aPTT 23.9 22.1 - 32.0 sec    aPTT, therapeutic range     58.0 - 77.0 SECS   CBC W/O DIFF    Collection Time: 07/11/20 11:36 AM   Result Value Ref Range    WBC 4.5 3.6 - 11.0 K/uL    RBC 3.86 3.80 - 5.20 M/uL    HGB 11.7 11.5 - 16.0 g/dL    HCT 37.2 35.0 - 47.0 %    MCV 96.4 80.0 - 99.0 FL    MCH 30.3 26.0 - 34.0 PG    MCHC 31.5 30.0 - 36.5 g/dL    RDW 12.9 11.5 - 14.5 %    PLATELET 045 681 - 720 K/uL    MPV 10.3 8.9 - 12.9 FL    NRBC 0.0 0  WBC    ABSOLUTE NRBC 0.00 0.00 - 2.71 K/uL   METABOLIC PANEL, BASIC    Collection Time: 07/11/20 11:36 AM   Result Value Ref Range    Sodium 140 136 - 145 mmol/L    Potassium 4.0 3.5 - 5.1 mmol/L    Chloride 108 97 - 108 mmol/L    CO2 26 21 - 32 mmol/L    Anion gap 6 5 - 15 mmol/L    Glucose 117 (H) 65 - 100 mg/dL    BUN 19 6 - 20 MG/DL    Creatinine 0.78 0.55 - 1.02 MG/DL BUN/Creatinine ratio 24 (H) 12 - 20      GFR est AA >60 >60 ml/min/1.73m2    GFR est non-AA >60 >60 ml/min/1.73m2    Calcium 8.9 8.5 - 10.1 MG/DL       Radiologic Studies -   CT HEAD WO CONT   Final Result   IMPRESSION:    Small left frontal acute subdural hematoma. Left posterior parietal soft tissue   swelling. The findings were called to Dr. Lenore Matthew on 7/11/2020 at 12:07 PM by myself. [phone]              CT Results  (Last 48 hours)               07/11/20 1158  CT HEAD WO CONT Final result    Impression:  IMPRESSION:    Small left frontal acute subdural hematoma. Left posterior parietal soft tissue   swelling. The findings were called to Dr. Lenore Matthew on 7/11/2020 at 12:07 PM by myself. [phone]               Narrative:  EXAM: CT HEAD WO CONT       INDICATION: Status post fall with headache       COMPARISON: 1/31/2020. CONTRAST: None. TECHNIQUE: Unenhanced CT of the head was performed using 5 mm images. Brain and   bone windows were generated. Coronal and sagittal reformats. CT dose reduction   was achieved through use of a standardized protocol tailored for this   examination and automatic exposure control for dose modulation. FINDINGS:   The ventricles and sulci are normal in size, shape and configuration. . There is   no significant white matter disease. 6 mm left frontal acute subdural hematoma   is noted without substantial mass effect. The basilar cisterns are open. No CT   evidence of acute infarct. The bone windows demonstrate no abnormalities. The visualized portions of the   paranasal sinuses and mastoid air cells are clear. Left posterior parietal soft   tissue swelling is noted. CXR Results  (Last 48 hours)    None            Medical Decision Making     I reviewed the vital signs, available nursing notes, past medical history, past surgical history, family history and social history. Vital Signs-Reviewed the patient's vital signs.   Patient Vitals for the past 24 hrs:   Temp Pulse Resp BP SpO2   07/11/20 1145    115/59 95 %   07/11/20 1130    122/80 97 %   07/11/20 1112 98.3 °F (36.8 °C) 87 13 144/56 100 %         Records Reviewed: Nursing notes for today's visit have been reviewed. I have also reviewed most recent medical records pertinent to today's complaints, if available in our medical record system. I have also reviewed all labs and imaging results from previous results in comparison to results obtained today. If an EKG was obtained today, it has been compared to previous EKGs, if available. If arriving via EMS, the EMS report has been reviewed if made available to us within the patient's time in the emergency department. Provider Notes (Medical Decision Making):   Patient presents with mild to moderate headaches after a fall and head injury 2 days ago. The CT scan shows a small frontal subdural hematoma. Consulted with neurosurgery who advised that she can go home since injury occurred 2 days ago. Recommend stopping Plavix for at least 2 weeks. Follow-up with neurosurgery in office in 1 week with a head CT prior to appointment. ED Course:   Initial assessment performed. The patients presenting problems have been discussed, and they are in agreement with the care plan formulated and outlined with them. I have encouraged them to ask questions as they arise throughout their visit. ED Course as of Jul 11 1351   Sat Jul 11, 2020   1208 Call from radiology - small frontal SDH, no shift due to atrophy     [JOANNA]      ED Course User Index  [JOANNA] Valeriy Palm MD       Orders Placed This Encounter    CT HEAD WO CONT    Hold Sample    PROTHROMBIN TIME + INR    PTT    CBC W/O DIFF    BASIC METABOLIC PANEL    acetaminophen (TYLENOL) tablet 1,000 mg    IP CONSULT TO NEUROSURGERY       Procedures      Critical Care Time:   0    Disposition:  Discharge    The patient's emergency department evaluation is now complete.   I have reviewed all labs, imaging, and pertinent information. I have discussed all results with the patient and/or family. Based on our evaluation today I do believe that the patient is safe to be discharged home. The patient has been provided with at home instructions that are pertinent to their complaint today, although these may not be specific to the exact diagnosis. I have reviewed the patient's home medications and attempted to reconcile if not already done so by pharmacy or nursing staff. I have discussed all new prescriptions with the patient. The patient has been encouraged to follow-up with primary care doctor and/or specialist, and these have been discussed with the patient. The patient has been advised that they may return to the emergency department if they have any worsening symptoms and or new symptoms that are of concern to them. Verbal discharge instructions may have also been provided to the patient that may not be specifically contained in the written discharge instructions. The patient has been given opportunity to ask questions prior to discharge. PLAN:  1. Current Discharge Medication List        2. Follow-up Information     Follow up With Specialties Details Why Contact Info    Suyapa Urban MD Neurosurgery Schedule an appointment as soon as possible for a visit in 1 week Will need head CT prior to appointment [facility] [id number]  [phone]          Return to ED if worse     Diagnosis     Clinical Impression:   1. Subdural hematoma ([address]    2. Fall, initial encounter    3. Closed head injury, initial encounter            This note will not be viewable in [email or web address].

## 2020-07-11 NOTE — ED NOTES
Kathie HOLLEY reviewed discharge instructions with the patient. The patient verbalized understanding. All questions and concerns were addressed. The patient declined a wheelchair and is discharged ambulatory in the care of family members with instructions and prescriptions in hand. Pt is alert and oriented x 4. Respirations are clear and unlabored.

## 2020-07-11 NOTE — DISCHARGE INSTRUCTIONS
Hold your Plavix for at least 2 weeks. After your follow-up appointment with neurosurgery they will advise you when to restart this medication. Return if you have any worsening headaches, vomiting, numbness or weakness. Thank you for visiting our emergency department today. We all do hope that we were able to assist you in your emergent needs today. Please read over your discharge instructions as these contain pertinent information to help you in the healing process. These instructions include a list of prescriptions you were given today. Follow-up information is also noted on your discharge papers. There are attached instructions and information pertaining to the reason why you were seen in the emergency department today. These discharge instructions may not be for exactly why you were here, but may be the closest available instructions that we have. These include important advice for things that you can do at home to feel better, and reasons to return to the emergency department. The evaluation and treatment you received in the emergency department is not always definitive care. If follow-up with your primary care doctor or specialist was recommended, it is important that you make these appointments for follow-up care. You may need further testing, procedures, and/or medications to help you feel better. Further tests may be required that are not available in the emergency department. Failure to make these follow-up appointments may jeopardize your health. The emergency department is here for emergent stabilization and evaluation of life and limb threatening illness and/or injuries. Further care through a specialist or primary care doctor may be required to assist in your healing and complete your treatment and/or evaluation. We may not always be able to make a diagnosis in the emergency department, or things may change that will alter your diagnosis.   Our primary goal is to ensure that nothing serious is occurring and that you are stable to continue your treatment and evaluation at home as an outpatient. Of course, if things change, and you feel worse, you are always encouraged to return to the emergency department for re-evaluation. Lab Results Today:  Recent Results (from the past 8 hour(s))   SAMPLES BEING HELD    Collection Time: 07/11/20 11:36 AM   Result Value Ref Range    SAMPLES BEING HELD  BLUE, PST, LAV     COMMENT        Add-on orders for these samples will be processed based on acceptable specimen integrity and analyte stability, which may vary by analyte. PROTHROMBIN TIME + INR    Collection Time: 07/11/20 11:36 AM   Result Value Ref Range    INR 1.0 0.9 - 1.1      Prothrombin time 10.4 9.0 - 11.1 sec   PTT    Collection Time: 07/11/20 11:36 AM   Result Value Ref Range    aPTT 23.9 22.1 - 32.0 sec    aPTT, therapeutic range     58.0 - 77.0 SECS   CBC W/O DIFF    Collection Time: 07/11/20 11:36 AM   Result Value Ref Range    WBC 4.5 3.6 - 11.0 K/uL    RBC 3.86 3.80 - 5.20 M/uL    HGB 11.7 11.5 - 16.0 g/dL    HCT 37.2 35.0 - 47.0 %    MCV 96.4 80.0 - 99.0 FL    MCH 30.3 26.0 - 34.0 PG    MCHC 31.5 30.0 - 36.5 g/dL    RDW 12.9 11.5 - 14.5 %    PLATELET 174 061 - 081 K/uL    MPV 10.3 8.9 - 12.9 FL    NRBC 0.0 0  WBC    ABSOLUTE NRBC 0.00 0.00 - 5.72 K/uL   METABOLIC PANEL, BASIC    Collection Time: 07/11/20 11:36 AM   Result Value Ref Range    Sodium 140 136 - 145 mmol/L    Potassium 4.0 3.5 - 5.1 mmol/L    Chloride 108 97 - 108 mmol/L    CO2 26 21 - 32 mmol/L    Anion gap 6 5 - 15 mmol/L    Glucose 117 (H) 65 - 100 mg/dL    BUN 19 6 - 20 MG/DL    Creatinine 0.78 0.55 - 1.02 MG/DL    BUN/Creatinine ratio 24 (H) 12 - 20      GFR est AA >60 >60 ml/min/1.73m2    GFR est non-AA >60 >60 ml/min/1.73m2    Calcium 8.9 8.5 - 10.1 MG/DL        Radiology Results Today:  Ct Head Wo Cont    Result Date: 7/11/2020  IMPRESSION: Small left frontal acute subdural hematoma.  Left posterior parietal soft tissue swelling. The findings were called to Dr. Cheyanne Manuel on 7/11/2020 at 12:07 PM by myself.   Ruperto Beck

## 2020-07-11 NOTE — PROGRESS NOTES
Spoke to er md and ct reviewed. Pt fell 48 hrs ago. No loc or neuro change. Ct with small left frontal sdh without mass effect. Recommend hold plavix for 2-3 weeks. Repeat ct as outpt in 1 week.   Return to er if worsening symptoms

## 2020-07-12 RX ORDER — ATORVASTATIN CALCIUM 20 MG/1
TABLET, FILM COATED ORAL
Qty: 90 TAB | Refills: 0 | Status: SHIPPED | OUTPATIENT
Start: 2020-07-12 | End: 2020-10-27

## 2020-07-16 ENCOUNTER — OFFICE VISIT (OUTPATIENT)
Dept: ORTHOPEDIC SURGERY | Age: 80
End: 2020-07-16

## 2020-07-16 VITALS
TEMPERATURE: 96.7 F | DIASTOLIC BLOOD PRESSURE: 69 MMHG | HEART RATE: 85 BPM | WEIGHT: 139 LBS | HEIGHT: 62 IN | SYSTOLIC BLOOD PRESSURE: 107 MMHG | BODY MASS INDEX: 25.58 KG/M2 | OXYGEN SATURATION: 98 %

## 2020-07-16 DIAGNOSIS — M75.41 IMPINGEMENT SYNDROME OF SHOULDER, RIGHT: Primary | ICD-10-CM

## 2020-07-16 RX ORDER — BETAMETHASONE SODIUM PHOSPHATE AND BETAMETHASONE ACETATE 3; 3 MG/ML; MG/ML
6 INJECTION, SUSPENSION INTRA-ARTICULAR; INTRALESIONAL; INTRAMUSCULAR; SOFT TISSUE ONCE
Qty: 1 ML | Refills: 0
Start: 2020-07-16 | End: 2020-07-16

## 2020-07-16 NOTE — PROGRESS NOTES
PROCEDURE NOTE :    Consent was obtained from the patient. The correct site was identified after confirmation with the patient. Following identification and confirmation of the correct site with the patient, the area was prepped in the normal sterile fashion. An injection of a mixture of 6 mg of betamethasone and 1% lidocaine without epinephrine was administered to the right shoulder subacromial space. The needle was then withdrawn and the injection site dressed with a sterile bandage at the conclusion. The procedure was well tolerated by the patient. No immediate adverse reactions were noted. Post injection instructions were given.

## 2020-07-16 NOTE — PROGRESS NOTES
Identified pt with two pt identifiers (name and ). Reviewed chart in preparation for visit and have obtained necessary documentation. Rebeca Strauss is a 67 y.o. female  Chief Complaint   Patient presents with    Joint Or Tendon Injection     RT shoulder     Visit Vitals  /69 (BP 1 Location: Left arm, BP Patient Position: Sitting)   Pulse 85   Temp (!) 96.7 °F (35.9 °C) (Temporal)   Ht 5' 2\" (1.575 m)   Wt 139 lb (63 kg)   LMP  (LMP Unknown)   SpO2 98%   BMI 25.42 kg/m²     1. Have you been to the ER, urgent care clinic since your last visit? Hospitalized since your last visit? No    2. Have you seen or consulted any other health care providers outside of the [address] [place] [address] since your last visit? Include any pap smears or colon screening.  No

## 2020-07-20 ENCOUNTER — HOSPITAL ENCOUNTER (OUTPATIENT)
Dept: CT IMAGING | Age: 80
End: 2020-07-20
Attending: NEUROLOGICAL SURGERY
Payer: MEDICARE

## 2020-07-20 ENCOUNTER — HOSPITAL ENCOUNTER (OUTPATIENT)
Dept: CT IMAGING | Age: 80
Discharge: HOME | End: 2020-07-20
Attending: NEUROLOGICAL SURGERY
Payer: MEDICARE

## 2020-07-20 DIAGNOSIS — S06.5XAA SUBDURAL HEMATOMA: ICD-10-CM

## 2020-07-20 PROCEDURE — 70450 CT HEAD/BRAIN W/O DYE: CPT

## 2020-07-20 PROCEDURE — 72131 CT LUMBAR SPINE W/O DYE: CPT

## 2020-08-03 DIAGNOSIS — F41.9 ANXIETY: ICD-10-CM

## 2020-08-04 RX ORDER — CLONAZEPAM 0.5 MG/1
TABLET ORAL
Qty: 60 TAB | Refills: 0 | Status: SHIPPED | OUTPATIENT
Start: 2020-08-04 | End: 2020-09-03

## 2020-08-10 ENCOUNTER — OFFICE VISIT (OUTPATIENT)
Dept: CARDIOLOGY CLINIC | Age: 80
End: 2020-08-10
Payer: MEDICARE

## 2020-08-10 DIAGNOSIS — Z45.09 ENCOUNTER FOR LOOP RECORDER CHECK: ICD-10-CM

## 2020-08-10 DIAGNOSIS — I63.30 CEREBROVASCULAR ACCIDENT (CVA) DUE TO THROMBOSIS OF CEREBRAL ARTERY (HCC): Primary | ICD-10-CM

## 2020-08-10 PROCEDURE — 93298 REM INTERROG DEV EVAL SCRMS: CPT | Performed by: INTERNAL MEDICINE

## 2020-08-19 ENCOUNTER — HOSPITAL ENCOUNTER (OUTPATIENT)
Dept: CT IMAGING | Age: 80
Discharge: HOME | End: 2020-08-19
Attending: NEUROLOGICAL SURGERY
Payer: MEDICARE

## 2020-08-19 DIAGNOSIS — S06.5XAA SUBDURAL HEMATOMA: ICD-10-CM

## 2020-08-19 PROCEDURE — 70450 CT HEAD/BRAIN W/O DYE: CPT

## 2020-09-02 DIAGNOSIS — F41.9 ANXIETY: ICD-10-CM

## 2020-09-03 RX ORDER — CLONAZEPAM 0.5 MG/1
TABLET ORAL
Qty: 60 TAB | Refills: 0 | Status: SHIPPED | OUTPATIENT
Start: 2020-09-03 | End: 2020-10-02

## 2020-09-03 RX ORDER — ESCITALOPRAM OXALATE 20 MG/1
TABLET ORAL
Qty: 90 TAB | Refills: 0 | Status: SHIPPED | OUTPATIENT
Start: 2020-09-03 | End: 2020-12-05

## 2020-09-03 RX ORDER — VERAPAMIL HYDROCHLORIDE 120 MG/1
TABLET, FILM COATED, EXTENDED RELEASE ORAL
Qty: 90 TAB | Refills: 0 | Status: SHIPPED | OUTPATIENT
Start: 2020-09-03 | End: 2020-12-16

## 2020-09-08 ENCOUNTER — HOSPITAL ENCOUNTER (OUTPATIENT)
Dept: MAMMOGRAPHY | Age: 80
Discharge: HOME | End: 2020-09-08
Attending: INTERNAL MEDICINE
Payer: MEDICARE

## 2020-09-08 DIAGNOSIS — Z12.31 VISIT FOR SCREENING MAMMOGRAM: ICD-10-CM

## 2020-09-08 PROCEDURE — 77067 SCR MAMMO BI INCL CAD: CPT

## 2020-09-15 RX ORDER — NORTRIPTYLINE HYDROCHLORIDE 10 MG/1
CAPSULE ORAL
Qty: 90 CAP | Refills: 0 | Status: SHIPPED | OUTPATIENT
Start: 2020-09-15 | End: 2020-10-29

## 2020-09-23 ENCOUNTER — TELEPHONE (OUTPATIENT)
Dept: INTERNAL MEDICINE CLINIC | Age: 80
End: 2020-09-23

## 2020-09-23 NOTE — TELEPHONE ENCOUNTER
----- Message from Jason Baker sent at 9/23/2020  9:06 AM EDT -----  Regarding: Dr. Lillian Umanzor first and last name: Sincere Burroughs  Reason for call: Pt had requested a document be sent to [facility] that was never received. Requesting the Customer Medical Report on her be faxed to [employer] at fax # [phone] or fax # [phone]. Phone # to [facility] dept is [phone]. Pt needs this completed as it affects her 's license.   Callback required yes/no and why: yes  Best contact number(s): [phone]  Details to clarify the request: n/a

## 2020-09-25 ENCOUNTER — TELEPHONE (OUTPATIENT)
Dept: INTERNAL MEDICINE CLINIC | Age: 80
End: 2020-09-25

## 2020-09-25 NOTE — TELEPHONE ENCOUNTER
Spoke with patients daughter Chong Valiente. Two pt identifiers confirmed. Chong Valiente advised that patients form was faxed to [facility] as well, and confirmation received. Chong Valiente verbalized understanding of information discussed w/ no further questions at this time.

## 2020-09-25 NOTE — TELEPHONE ENCOUNTER
#[phone] daughter, Guicho Alas states she just received medical report for [facility]. Was the form ever faxed to [facility]? She needs to know this as soon as possible please.

## 2020-09-25 NOTE — TELEPHONE ENCOUNTER
Forms faxed to [facility] and patients daughter at patients request.  Confirmation received

## 2020-10-01 DIAGNOSIS — F41.9 ANXIETY: ICD-10-CM

## 2020-10-02 RX ORDER — CLONAZEPAM 0.5 MG/1
TABLET ORAL
Qty: 60 TAB | Refills: 0 | Status: SHIPPED | OUTPATIENT
Start: 2020-10-02 | End: 2020-10-31

## 2020-10-12 ENCOUNTER — TELEPHONE (OUTPATIENT)
Dept: INTERNAL MEDICINE CLINIC | Age: 80
End: 2020-10-12
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
## 2020-10-13 NOTE — TELEPHONE ENCOUNTER
Spoke with patient. Two pt identifiers confirmed. Patient offered an appointment with Dr. Shavonne Corea on 10/26/2020. Appointment accepted. Patient advised if anything changes or if unable to keep this appointment to call the office  Pt verbalized understanding of information discussed w/ no further questions at this time.
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
## 2020-10-26 ENCOUNTER — OFFICE VISIT (OUTPATIENT)
Dept: INTERNAL MEDICINE CLINIC | Age: 80
End: 2020-10-26
Payer: MEDICARE

## 2020-10-26 VITALS
WEIGHT: 139.2 LBS | HEIGHT: 62 IN | DIASTOLIC BLOOD PRESSURE: 81 MMHG | HEART RATE: 87 BPM | OXYGEN SATURATION: 97 % | TEMPERATURE: 95.3 F | BODY MASS INDEX: 25.62 KG/M2 | RESPIRATION RATE: 16 BRPM | SYSTOLIC BLOOD PRESSURE: 128 MMHG

## 2020-10-26 DIAGNOSIS — G25.0 BENIGN ESSENTIAL TREMOR SYNDROME: ICD-10-CM

## 2020-10-26 DIAGNOSIS — Z86.73 HX OF COMPLETED STROKE: Primary | ICD-10-CM

## 2020-10-26 DIAGNOSIS — E11.42 DIABETIC PERIPHERAL NEUROPATHY ASSOCIATED WITH TYPE 2 DIABETES MELLITUS (HCC): ICD-10-CM
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
## 2020-10-26 DIAGNOSIS — I10 ESSENTIAL HYPERTENSION: ICD-10-CM

## 2020-10-26 DIAGNOSIS — G60.8 IDIOPATHIC SMALL AND LARGE FIBER SENSORY NEUROPATHY: ICD-10-CM

## 2020-10-26 PROCEDURE — G8536 NO DOC ELDER MAL SCRN: HCPCS | Performed by: INTERNAL MEDICINE

## 2020-10-26 PROCEDURE — G8752 SYS BP LESS 140: HCPCS | Performed by: INTERNAL MEDICINE

## 2020-10-26 PROCEDURE — 99214 OFFICE O/P EST MOD 30 MIN: CPT | Performed by: INTERNAL MEDICINE

## 2020-10-26 PROCEDURE — 1101F PT FALLS ASSESS-DOCD LE1/YR: CPT | Performed by: INTERNAL MEDICINE

## 2020-10-26 PROCEDURE — G8432 DEP SCR NOT DOC, RNG: HCPCS | Performed by: INTERNAL MEDICINE

## 2020-10-26 PROCEDURE — G8419 CALC BMI OUT NRM PARAM NOF/U: HCPCS | Performed by: INTERNAL MEDICINE

## 2020-10-26 PROCEDURE — G8427 DOCREV CUR MEDS BY ELIG CLIN: HCPCS | Performed by: INTERNAL MEDICINE

## 2020-10-26 PROCEDURE — 1090F PRES/ABSN URINE INCON ASSESS: CPT | Performed by: INTERNAL MEDICINE

## 2020-10-26 PROCEDURE — G8754 DIAS BP LESS 90: HCPCS | Performed by: INTERNAL MEDICINE

## 2020-10-26 NOTE — PATIENT INSTRUCTIONS
Office Policies Phone calls/patient messages: Please allow up to 24 hours for someone in the office to contact you about your call or message. Be mindful your provider may be out of the office or your message may require further review. We encourage you to use [employer] for your messages as this is a faster, more efficient way to communicate with our office Medication Refills: 
         
Prescription medications require 48-72 business hours to process. We encourage you to use [employer] for your refills. For controlled medications: Please allow 72 business hours to process. Certain medications may require you to  a written prescription at our office. NO narcotic/controlled medications will be prescribed after 4pm Monday through Friday or on weekends Form/Paperwork Completion: 
         
Please note a $25 fee may incur for all paperwork for completed by our providers. We ask that you allow 7-10 business days. Pre-payment is due prior to picking up/faxing the completed form. You may also download your forms to [employer] to have your doctor print off. 
 
 
1. Have you been to the ER, urgent care clinic since your last visit? Hospitalized since your last visit?no 2. Have you seen or consulted any other health care providers outside of the [address] [place] [address] since your last visit? Include any pap smears or colon screening.  no
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
## 2020-10-27 RX ORDER — PANTOPRAZOLE SODIUM 40 MG/1
TABLET, DELAYED RELEASE ORAL
Qty: 90 TAB | Refills: 0 | Status: SHIPPED | OUTPATIENT
Start: 2020-10-27 | End: 2021-01-23

## 2020-10-27 RX ORDER — ATORVASTATIN CALCIUM 20 MG/1
TABLET, FILM COATED ORAL
Qty: 90 TAB | Refills: 0 | Status: SHIPPED | OUTPATIENT
Start: 2020-10-27 | End: 2021-01-23

## 2020-10-29 RX ORDER — NORTRIPTYLINE HYDROCHLORIDE 10 MG/1
CAPSULE ORAL
Qty: 90 CAP | Refills: 0 | Status: SHIPPED | OUTPATIENT
Start: 2020-10-29 | End: 2020-12-16

## 2020-10-31 DIAGNOSIS — F41.9 ANXIETY: ICD-10-CM

## 2020-10-31 RX ORDER — CLONAZEPAM 0.5 MG/1
TABLET ORAL
Qty: 60 TAB | Refills: 0 | Status: SHIPPED | OUTPATIENT
Start: 2020-10-31 | End: 2020-12-04

## 2020-11-09 ENCOUNTER — OFFICE VISIT (OUTPATIENT)
Dept: CARDIOLOGY CLINIC | Age: 80
End: 2020-11-09
Payer: MEDICARE

## 2020-11-09 DIAGNOSIS — I63.30 CEREBROVASCULAR ACCIDENT (CVA) DUE TO THROMBOSIS OF CEREBRAL ARTERY (HCC): Primary | ICD-10-CM

## 2020-11-09 DIAGNOSIS — Z45.09 ENCOUNTER FOR LOOP RECORDER CHECK: ICD-10-CM

## 2020-11-09 PROCEDURE — 93298 REM INTERROG DEV EVAL SCRMS: CPT | Performed by: INTERNAL MEDICINE

## 2020-11-19 ENCOUNTER — OFFICE VISIT (OUTPATIENT)
Dept: CARDIOLOGY CLINIC | Age: 80
End: 2020-11-19
Payer: MEDICARE

## 2020-11-19 VITALS
OXYGEN SATURATION: 99 % | RESPIRATION RATE: 16 BRPM | DIASTOLIC BLOOD PRESSURE: 60 MMHG | BODY MASS INDEX: 26.44 KG/M2 | SYSTOLIC BLOOD PRESSURE: 110 MMHG | HEIGHT: 62 IN | HEART RATE: 64 BPM | WEIGHT: 143.7 LBS

## 2020-11-19 DIAGNOSIS — I50.32 DIASTOLIC CHF, CHRONIC (HCC): ICD-10-CM

## 2020-11-19 DIAGNOSIS — Z45.09 ENCOUNTER FOR LOOP RECORDER CHECK: ICD-10-CM

## 2020-11-19 DIAGNOSIS — I63.30 CEREBROVASCULAR ACCIDENT (CVA) DUE TO THROMBOSIS OF CEREBRAL ARTERY (HCC): Primary | ICD-10-CM

## 2020-11-19 PROCEDURE — G8419 CALC BMI OUT NRM PARAM NOF/U: HCPCS | Performed by: NURSE PRACTITIONER

## 2020-11-19 PROCEDURE — G8536 NO DOC ELDER MAL SCRN: HCPCS | Performed by: NURSE PRACTITIONER

## 2020-11-19 PROCEDURE — G8754 DIAS BP LESS 90: HCPCS | Performed by: NURSE PRACTITIONER

## 2020-11-19 PROCEDURE — 99214 OFFICE O/P EST MOD 30 MIN: CPT | Performed by: NURSE PRACTITIONER

## 2020-11-19 PROCEDURE — G8432 DEP SCR NOT DOC, RNG: HCPCS | Performed by: NURSE PRACTITIONER

## 2020-11-19 PROCEDURE — 93000 ELECTROCARDIOGRAM COMPLETE: CPT | Performed by: NURSE PRACTITIONER

## 2020-11-19 PROCEDURE — 1101F PT FALLS ASSESS-DOCD LE1/YR: CPT | Performed by: NURSE PRACTITIONER

## 2020-11-19 PROCEDURE — G8427 DOCREV CUR MEDS BY ELIG CLIN: HCPCS | Performed by: NURSE PRACTITIONER

## 2020-11-19 PROCEDURE — 1090F PRES/ABSN URINE INCON ASSESS: CPT | Performed by: NURSE PRACTITIONER

## 2020-11-19 PROCEDURE — G8752 SYS BP LESS 140: HCPCS | Performed by: NURSE PRACTITIONER

## 2020-11-19 NOTE — PROGRESS NOTES
ELECTROPHYSIOLOGY        Subjective: Tania Stehpens is a 79 y.o. female is here for EP follow up. The patient denies chest pain/ shortness of breath, orthopnea, PND, LE edema, palpitations, syncope, presyncope or fatigue. 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)     Bilateral carotid artery stenosis     CVA (cerebral vascular accident) [facility] 11/05/2019    Ulnar neuropathy at elbow of right upper extremity 07/25/2018    Cervical post-laminectomy syndrome 06/07/2018    Idiopathic small and large fiber sensory neuropathy 06/07/2018    Lumbar back pain with radiculopathy affecting left lower extremity 06/07/2018    Lumbar back pain with radiculopathy affecting right lower extremity 06/07/2018    Cervical radiculopathy due to degenerative joint disease of spine 06/07/2018    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    Stenosis of both internal carotid arteries 03/13/2017    Benign essential tremor syndrome 02/21/2017    Tension vascular headache 02/21/2017    Hyperthyroidism 02/21/2017    Osteoporosis 08/31/2016    MGUS (monoclonal gammopathy of unknown significance) 10/04/2013    Chronic tension headaches 06/25/2013    Falls 06/25/2013    Lumbar stenosis 08/03/2011      Boris Delgado MD  Past Medical History:   Diagnosis Date    Adverse effect of anesthesia     sleep apnea no cpap machine useage       Anemia     has had iron infusions;  Hem/onc Dr Kim Ryan [phone]    Anxiety and depression     Bilateral carotid artery stenosis     Diastolic CHF, chronic ([address] 5/07/3320    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)     GERD (gastroesophageal reflux disease)     Hypercholesteremia     Hypertension     Idiopathic small and large fiber sensory neuropathy     Limited peripheral vision of right eye     complication of cataract surgery    Long term current use of anticoagulant therapy     Meniere disease     MGUS (monoclonal gammopathy of unknown significance)     Monoclonal gammopathy of unknown significance     hem/onc:  Dr Pillo Garcia [phone]    Osteoporosis     Psoriasis     bilateral Legs    Restless leg     Skin melanoma ([facility] 2012    (Rt ankle) and basal cell (Lt eye) removed    Transient ischemic attack approx 2005    HAS HAD 2 MINISTROKES-NO DEFICITS; neuro Dr Drew Rodriguez (cc)    Unspecified adverse effect of anesthesia     HAS NEEDED PORT OR CUTDOWN FOR RECVNG BLOOD OR LONG-TERM IVs; surgery 7/28/15 w/o any difficulty    Unspecified sleep apnea     does not use CPAP      Past Surgical History:   Procedure Laterality Date    HX APPENDECTOMY      HX CATARACT REMOVAL  2011    Right    HX CERVICAL FUSION  1/28/15    C5-7    HX CHOLECYSTECTOMY  2000    HX GI  2010    liver bx due to liver Bx due to elevated enzymes, liver laceration during Bx, hospitalized 10-12d    HX HEENT  7/28/15    Lt total parotidectomy with facial nerve dissection: Dr Blane Sandhu HX HEENT  11/2015    Right parotidectomy    HX HYSTERECTOMY      HX ORTHOPAEDIC  2009 & 2011    LUMBAR FUSION and revision    HX OTHER SURGICAL  2010    MELANOMA (Rt ankle) and basal cell (Lt eye) removed; q6mo ck by DERMATOLOGY    HX SHOULDER REPLACEMENT Left 08/01/2016    HX THYROIDECTOMY      Right    MA COLON CA SCRN NOT HI RSK IND  1/27/2016         MA INSERTION SUBQ CARDIAC RHYTHM MONITOR W/PRGRMG N/A 11/6/2019    LOOP RECORDER INSERT performed by Ely Obregon MD at [facility] CARDIAC CATH LAB     Allergies   Allergen Reactions    Aspirin Nausea and Vomiting    Bee Sting [Sting, Bee] Hives and Shortness of Breath     WASP STING    Codeine Nausea and Vomiting    Hydrocodone Rash     And sedation    Lisinopril Cough    Morphine Rash    Nsaids (Non-Steroidal Anti-Inflammatory Drug) Nausea and Vomiting    Oxycodone Rash     On her back    Penicillins Hives, Shortness of Breath and Swelling      Family History   Problem Relation Age of Onset    Alcohol abuse Father     Emphysema Father     Cancer Sister         PANCREATIC    Alcohol abuse Brother     COPD Brother     Other Mother         UNKNOWN CAUSE OF DEATH    Heart Disease Mother     Breast Cancer Maternal Aunt     Anesth Problems Neg Hx     negative for cardiac disease  Social History     Socioeconomic History    Marital status:      Spouse name: Not on file    Number of children: Not on file    Years of education: Not on file    Highest education level: Not on file   Tobacco Use    Smoking status: Former Smoker     Years: 15.00     Last attempt to quit: 7/15/1975     Years since quittin.3    Smokeless tobacco: Never Used   Substance and Sexual Activity    Alcohol use: Yes     Comment: may have a glass of wine on rare occasion    Drug use: Yes     Types: Prescription, OTC    Sexual activity: Not Currently   Social History Narrative    Lives in [place] with  of 62 years. Has 2 sons and 1 daughter. Used to work in a bank. Likes to garden and Smackages. Current Outpatient Medications   Medication Sig    cholecalciferol, vitamin D3, (VITAMIN D3 PO) Take  by mouth.  clonazePAM (KlonoPIN) 0.5 mg tablet TAKE 1 TABLET BY MOUTH TWICE DAILY .  DO NOT EXCEED 2 PER 24 HOURS    nortriptyline (PAMELOR) 10 mg capsule TAKE 1 TO 2 CAPSULES BY MOUTH NIGHTLY    atorvastatin (LIPITOR) 20 mg tablet Take 1 tablet by mouth once daily    pantoprazole (PROTONIX) 40 mg tablet TAKE 1 TABLET BY MOUTH IN THE MORNING    escitalopram oxalate (LEXAPRO) 20 mg tablet TAKE 1 TABLET BY MOUTH NIGHTLY FOR ANXIETY AND FOR DEPRESSION    verapamil ER (CALAN-SR) 120 mg tablet TAKE 1 TABLET BY MOUTH NIGHTLY FOR HIGH BLOOD PRESSURE    rOPINIRole (REQUIP) 1 mg tablet Take 1 tablet by mouth nightly    olmesartan (BENICAR) 5 mg tablet Take 0.5 Tabs by mouth daily.  ipratropium-albuteroL (Combivent Respimat)  mcg/actuation inhaler Take 1 Puff by inhalation every four (4) hours as needed for Shortness of Breath.  levocetirizine (Xyzal) 5 mg tablet Take 1 Tab by mouth daily as needed for Allergies.  clopidogreL (PLAVIX) 75 mg tab Take 1 Tab by mouth nightly. (Patient taking differently: Take 1 Tab by mouth nightly. Plavix on hold for a week pending dental surgery. 6/16/20))    primidone (MYSOLINE) 50 mg tablet Take 1 Tab by mouth nightly.  fluticasone propionate (FLONASE) 50 mcg/actuation nasal spray 2 Sprays by Both Nostrils route daily.  albuterol (PROVENTIL HFA, VENTOLIN HFA, PROAIR HFA) 90 mcg/actuation inhaler Take 1 Puff by inhalation every four (4) hours as needed for Wheezing.  polyethylene glycol (MIRALAX) 17 gram packet Take 17 g by mouth daily as needed (constipation).  diphenhydrAMINE-zinc acetate 2%-0.1% (BENADRYL) 2-0.1 % topical cream Apply  to affected area three (3) times daily as needed for Itching. apply a thin layer   Indications: skin irritation    multivit/folic acid/vit K1 (ONE-A-DAY WOMEN'S 50 PLUS PO) Take 1 Tab by mouth daily. No current facility-administered medications for this visit. Vitals:    11/19/20 1035   BP: 110/60   Pulse: 64   Resp: 16   SpO2: 99%   Weight: 143 lb 11.2 oz (65.2 kg)   Height: 5' 2\" (1.575 m)       I have reviewed the nurses notes, vitals, problem list, allergy list, medical history, family, social history and medications. Review of Symptoms:    General: Pt denies excessive weight gain or loss. Pt is able to conduct ADL's  HEENT: Denies blurred vision, headaches, epistaxis and difficulty swallowing. Respiratory: Denies shortness of breath, HERNANDES, wheezing or stridor.   Cardiovascular: Denies precordial pain, palpitations, edema or PND  Gastrointestinal: Denies poor appetite, indigestion, abdominal pain or blood in stool  Urinary: Denies dysuria, pyuria  Musculoskeletal: Denies pain or swelling from muscles or joints  Neurologic: Denies tremor, paresthesias, or sensory motor disturbance  Skin: Denies rash, itching or texture change. Psych: Denies depression      Physical Exam:      General: Well developed, in no acute distress. HEENT: Eyes - PERRL, no jvd  Heart:  Normal S1/S2 negative S3 or S4. Regular, no murmur, gallop or rub. Respiratory: Clear bilaterally x 4, no wheezing or rales  Extremities:  No edema, normal cap refill, no cyanosis. Musculoskeletal: No clubbing  Neuro: A&Ox3, speech clear, gait stable. Skin: Skin color is normal. No rashes or lesions.  Non diaphoretic. no ulcers  Vascular: 2+ pulses symmetric in all extremities  Psych - judgement intact and orientation is wnl       Cardiographics    Ekg: Sinus  Rhythm   WITHIN NORMAL LIMITS    Results for orders placed or performed during the hospital encounter of 05/11/20   EKG, 12 LEAD, INITIAL   Result Value Ref Range    Ventricular Rate 81 BPM    Atrial Rate 81 BPM    P-R Interval 138 ms    QRS Duration 74 ms    Q-T Interval 358 ms    QTC Calculation (Bezet) 415 ms    Calculated P Axis 18 degrees    Calculated R Axis 59 degrees    Calculated T Axis 32 degrees    Diagnosis       Normal sinus rhythm with sinus arrhythmia  Normal ECG  When compared with ECG of 22-APR-2020 10:32,  premature atrial complexes are no longer present  Confirmed by Chris Momin ([id number]) on 5/12/2020 9:15:52 AM           Lab Results   Component Value Date/Time    WBC 4.5 07/11/2020 11:36 AM    HGB 11.7 07/11/2020 11:36 AM    HCT 37.2 07/11/2020 11:36 AM    PLATELET 237 62/59/1374 11:36 AM    MCV 96.4 07/11/2020 11:36 AM      Lab Results   Component Value Date/Time    Sodium 140 07/11/2020 11:36 AM    Potassium 4.0 07/11/2020 11:36 AM    Chloride 108 07/11/2020 11:36 AM    CO2 26 07/11/2020 11:36 AM Anion gap 6 07/11/2020 11:36 AM    Glucose 117 (H) 07/11/2020 11:36 AM    BUN 19 07/11/2020 11:36 AM    Creatinine 0.78 07/11/2020 11:36 AM    BUN/Creatinine ratio 24 (H) 07/11/2020 11:36 AM    GFR est AA >60 07/11/2020 11:36 AM    GFR est non-AA >60 07/11/2020 11:36 AM    Calcium 8.9 07/11/2020 11:36 AM    Bilirubin, total 0.6 05/11/2020 05:06 PM    Alk. phosphatase 96 05/11/2020 05:06 PM    Protein, total 6.8 05/11/2020 05:06 PM    Albumin 2.8 (L) 05/11/2020 05:06 PM    Globulin 4.0 05/11/2020 05:06 PM    A-G Ratio 0.7 (L) 05/11/2020 05:06 PM    ALT (SGPT) 24 05/11/2020 05:06 PM      Lab Results   Component Value Date/Time    TSH 2.43 11/06/2019 02:16 AM           Assessment:           ICD-10-CM ICD-9-CM    1. Cerebrovascular accident (CVA) due to thrombosis of cerebral artery (HCC)  I63.30 434.01 AMB POC EKG ROUTINE W/ 12 LEADS, INTER & REP   2. Diastolic CHF, chronic (HCC)  I50.32 428.32      428.0    3. Encounter for loop recorder check  Z45.09 V53.39      Orders Placed This Encounter    AMB POC EKG ROUTINE W/ 12 LEADS, INTER & REP     Order Specific Question:   Reason for Exam:     Answer:   routine    cholecalciferol, vitamin D3, (VITAMIN D3 PO)     Sig: Take  by mouth. Plan:     Ms Camilla Agarwal is s/p ILR for cryptogenic stroke in 2019. ILR with SR and PACs; no AF. She is in sinus today and normotensive. Will continue to follow her ILR remotely and I will see her back in 1 year. Continue medical management for CVA, HTN and hyperlipidemia. Thank you for allowing me to participate in [address] [place], [place] [address]s care.       Benita Rdz NP

## 2020-11-19 NOTE — PROGRESS NOTES
1. Have you been to the ER, urgent care clinic since your last visit? Hospitalized since your last visit? No    2. Have you seen or consulted any other health care providers outside of the [address] [place] [address] since your last visit? Include any pap smears or colon screening. No    Chief Complaint   Patient presents with    Establish Care     hx of cva d/t thrombosis of cerebral artery       Pt reports chest heaviness when she gets up in the morning with difficulty breathing at times. Does not occur every night. Can last up to a couple of days. Denies chest pain.

## 2020-12-03 DIAGNOSIS — F41.9 ANXIETY: ICD-10-CM

## 2020-12-04 RX ORDER — CLONAZEPAM 0.5 MG/1
TABLET ORAL
Qty: 60 TAB | Refills: 0 | Status: SHIPPED | OUTPATIENT
Start: 2020-12-04 | End: 2020-12-30

## 2020-12-05 RX ORDER — ESCITALOPRAM OXALATE 20 MG/1
TABLET ORAL
Qty: 90 TAB | Refills: 0 | Status: SHIPPED | OUTPATIENT
Start: 2020-12-05 | End: 2021-03-04 | Stop reason: SDUPTHER

## 2020-12-05 RX ORDER — ROPINIROLE 1 MG/1
TABLET, FILM COATED ORAL
Qty: 90 TAB | Refills: 0 | Status: SHIPPED | OUTPATIENT
Start: 2020-12-05 | End: 2021-03-04 | Stop reason: SDUPTHER

## 2020-12-05 RX ORDER — CLOPIDOGREL BISULFATE 75 MG/1
TABLET ORAL
Qty: 90 TAB | Refills: 0 | Status: SHIPPED | OUTPATIENT
Start: 2020-12-05 | End: 2021-03-17

## 2020-12-16 RX ORDER — NORTRIPTYLINE HYDROCHLORIDE 10 MG/1
CAPSULE ORAL
Qty: 90 CAP | Refills: 0 | Status: SHIPPED | OUTPATIENT
Start: 2020-12-16 | End: 2021-02-01

## 2020-12-16 RX ORDER — VERAPAMIL HYDROCHLORIDE 120 MG/1
TABLET, FILM COATED, EXTENDED RELEASE ORAL
Qty: 90 TAB | Refills: 0 | Status: SHIPPED | OUTPATIENT
Start: 2020-12-16 | End: 2021-03-04 | Stop reason: SDUPTHER

## 2020-12-30 DIAGNOSIS — F41.9 ANXIETY: ICD-10-CM

## 2020-12-30 RX ORDER — CLONAZEPAM 0.5 MG/1
TABLET ORAL
Qty: 60 TAB | Refills: 0 | Status: SHIPPED | OUTPATIENT
Start: 2020-12-30 | End: 2021-01-31

## 2021-01-23 RX ORDER — PANTOPRAZOLE SODIUM 40 MG/1
TABLET, DELAYED RELEASE ORAL
Qty: 90 TAB | Refills: 0 | Status: SHIPPED | OUTPATIENT
Start: 2021-01-23 | End: 2021-04-13 | Stop reason: ALTCHOICE

## 2021-01-23 RX ORDER — ATORVASTATIN CALCIUM 20 MG/1
TABLET, FILM COATED ORAL
Qty: 90 TAB | Refills: 0 | Status: SHIPPED | OUTPATIENT
Start: 2021-01-23 | End: 2021-04-28

## 2021-01-25 ENCOUNTER — VIRTUAL VISIT (OUTPATIENT)
Dept: INTERNAL MEDICINE CLINIC | Age: 81
End: 2021-01-25
Payer: MEDICARE

## 2021-01-25 DIAGNOSIS — I10 ESSENTIAL HYPERTENSION: ICD-10-CM

## 2021-01-25 DIAGNOSIS — F41.9 ANXIETY: Primary | ICD-10-CM

## 2021-01-25 DIAGNOSIS — G60.8 IDIOPATHIC SMALL AND LARGE FIBER SENSORY NEUROPATHY: ICD-10-CM

## 2021-01-25 DIAGNOSIS — Z86.73 HX OF COMPLETED STROKE: ICD-10-CM

## 2021-01-25 PROCEDURE — 99214 OFFICE O/P EST MOD 30 MIN: CPT | Performed by: INTERNAL MEDICINE

## 2021-01-25 NOTE — PROGRESS NOTES
Reviewed record in preparation for visit and have obtained necessary documentation. Identified pt with two pt identifiers(name and ). Chief Complaint   Patient presents with   Magdy Gutierrez Hypertension       Health Maintenance Due   Topic Date Due    Eye Exam  1950    COVID-19 Vaccine (1 of 2) 1957    DTaP/Tdap/Td  (1 - Tdap) 1962    Shingles Vaccine (2 of 2) 2019    Yearly Flu Vaccine (1) 2020    Albumin Urine Test  2020    Cholesterol Test   2020    Diabetic Foot Care  2020       Ms. Farideh Gallegos has a reminder for a \"due or due soon\" health maintenance. I have asked that she discuss this further with her primary care provider for follow-up on this health maintenance. Coordination of Care Questionnaire:  :     1) Have you been to an emergency room, urgent care clinic since your last visit? no   Hospitalized since your last visit? no             2) Have you seen or consulted any other health care providers outside of [address] [place] [address] since your last visit? no  (Include any pap smears or colon screenings in this section.)    3) In the event something were to happen to you and you were unable to speak on your behalf, do you have an Advance Directive/ Living Will in place stating your wishes? NO    Do you have an Advance Directive on file? no    4) Are you interested in receiving information on Advance Directives? NO    Patient is accompanied by self I have received verbal consent from Harshad Cameron to discuss any/all medical information while they are present in the room.

## 2021-01-25 NOTE — PROGRESS NOTES
CC: Hypertension      HPI:    She is a 79 y.o. female who presents for evaluation of chronic medical issues      Essential hypertension  -    on benicar and verapamil not checking BP at home. Anxiety: controlled on clonazepam and SSRI. On clonazepam 0.5mg BID and lexapro and pamelor. Denies dizziness      Hx of completed stroke on plavix and statin     MGUS (monoclonal gammopathy of unknown significance) followed by Dr Wilfredo Bae      Patient reports fall  Lost balance / leading to a subdural hematoma and followed up with neuro and stable. Patient feels chills in her head since then. No focal deficits  Isolated fall in the stairs    This is an established visit conducted via telemedicine with video. The patient has been instructed that this meets HIPAA criteria and acknowledges and agrees to this method of visitation. Pursuant to the emergency declaration under the [facility]5 waiver authority and the [facility] and [employer] Act, this Virtual Visit was conducted, with patient's consent, to reduce the patient's risk of exposure to COVID-19 and provide continuity of care for an established patient. Services were provided through a video synchronous discussion virtually to substitute for in-person clinic visit. ROS:  Constitutional: negative for fevers, chills, anorexia and weight loss  10 systems reviewed and negative other than HPI     Past Medical History:   Diagnosis Date    Adverse effect of anesthesia     sleep apnea no cpap machine useage       Anemia     has had iron infusions;  Hem/onc Dr Wilfredo Bae [phone]    Anxiety and depression     Bilateral carotid artery stenosis     Diastolic CHF, chronic ([facility] [address] 9/89/3861    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)     GERD (gastroesophageal reflux disease)     Hypercholesteremia     Hypertension     Idiopathic small and large fiber sensory neuropathy     Limited peripheral vision of right eye     complication of cataract surgery    Long term current use of anticoagulant therapy     Meniere disease     MGUS (monoclonal gammopathy of unknown significance)     Monoclonal gammopathy of unknown significance     hem/onc:  Dr Austen Weldon [phone]    Osteoporosis     Psoriasis     bilateral Legs    Restless leg     Skin melanoma ([address] 2012    (Rt ankle) and basal cell (Lt eye) removed    Transient ischemic attack approx 2005    HAS HAD 2 MINISTROKES-NO DEFICITS; neuro Dr Brianna Anthony (cc)    Unspecified adverse effect of anesthesia     HAS NEEDED PORT OR CUTDOWN FOR RECVNG BLOOD OR LONG-TERM IVs; surgery 7/28/15 w/o any difficulty    Unspecified sleep apnea     does not use CPAP       Current Outpatient Medications on File Prior to Visit   Medication Sig Dispense Refill    atorvastatin (LIPITOR) 20 mg tablet Take 1 tablet by mouth once daily 90 Tab 0    pantoprazole (PROTONIX) 40 mg tablet TAKE 1 TABLET BY MOUTH IN THE MORNING 90 Tab 0    clonazePAM (KlonoPIN) 0.5 mg tablet TAKE 1 TABLET BY MOUTH TWICE DAILY . DO NOT EXCEED 2 PER 24 HOURS 60 Tab 0    nortriptyline (PAMELOR) 10 mg capsule TAKE 1 TO 2 CAPSULES BY MOUTH NIGHTLY 90 Cap 0    verapamil ER (CALAN-SR) 120 mg tablet TAKE 1 TABLET BY MOUTH NIGHTLY FOR HIGH BLOOD PRESSURE 90 Tab 0    rOPINIRole (REQUIP) 1 mg tablet Take 1 tablet by mouth nightly 90 Tab 0    escitalopram oxalate (LEXAPRO) 20 mg tablet TAKE 1 TABLET BY MOUTH NIGHTLY FOR ANXIETY AND FOR DEPRESSION 90 Tab 0    clopidogreL (PLAVIX) 75 mg tab Take 1 tablet by mouth nightly 90 Tab 0    cholecalciferol, vitamin D3, (VITAMIN D3 PO) Take  by mouth.  olmesartan (BENICAR) 5 mg tablet Take 0.5 Tabs by mouth daily. 60 Tab 0    ipratropium-albuteroL (Combivent Respimat)  mcg/actuation inhaler Take 1 Puff by inhalation every four (4) hours as needed for Shortness of Breath.       diphenhydrAMINE-zinc acetate 2%-0.1% (BENADRYL) 2-0.1 % topical cream Apply  to affected area three (3) times daily as needed for Itching. apply a thin layer   Indications: skin irritation 30 g 0    levocetirizine (Xyzal) 5 mg tablet Take 1 Tab by mouth daily as needed for Allergies. 30 Tab 0    primidone (MYSOLINE) 50 mg tablet Take 1 Tab by mouth nightly. 90 Tab 1    multivit/folic acid/vit K1 (ONE-A-DAY WOMEN'S 50 PLUS PO) Take 1 Tab by mouth daily.  fluticasone propionate (FLONASE) 50 mcg/actuation nasal spray 2 Sprays by Both Nostrils route daily. 1 Bottle 0    albuterol (PROVENTIL HFA, VENTOLIN HFA, PROAIR HFA) 90 mcg/actuation inhaler Take 1 Puff by inhalation every four (4) hours as needed for Wheezing. 1 Inhaler 0    polyethylene glycol (MIRALAX) 17 gram packet Take 17 g by mouth daily as needed (constipation). No current facility-administered medications on file prior to visit.         Past Surgical History:   Procedure Laterality Date    HX APPENDECTOMY      HX CATARACT REMOVAL  2011    Right    HX CERVICAL FUSION  1/28/15    C5-7    HX CHOLECYSTECTOMY  2000    HX GI  2010    liver bx due to liver Bx due to elevated enzymes, liver laceration during Bx, hospitalized 10-12d    HX HEENT  7/28/15    Lt total parotidectomy with facial nerve dissection: Dr Anabel Andres HX HEENT  11/2015    Right parotidectomy    HX HYSTERECTOMY      HX ORTHOPAEDIC  2009 & 2011    LUMBAR FUSION and revision    HX OTHER SURGICAL  2010    MELANOMA (Rt ankle) and basal cell (Lt eye) removed; q6mo ck by DERMATOLOGY    HX SHOULDER REPLACEMENT Left 08/01/2016    HX THYROIDECTOMY      Right    CT COLON CA SCRN NOT HI RSK IND  1/27/2016         CT INSERTION SUBQ CARDIAC RHYTHM MONITOR W/PRGRMG N/A 11/6/2019    LOOP RECORDER INSERT performed by Kinza Edwards MD at [facility] CARDIAC [facility]       Family History   Problem Relation Age of Onset    Alcohol abuse Father     Emphysema Father     Cancer Sister         PANCREATIC    Alcohol abuse Brother  COPD Brother     Other Mother         UNKNOWN CAUSE OF DEATH    Heart Disease Mother     Breast Cancer Maternal Aunt     Anesth Problems Neg Hx      Reviewed and no changes     Social History     Socioeconomic History    Marital status:      Spouse name: Not on file    Number of children: Not on file    Years of education: Not on file    Highest education level: Not on file   Occupational History    Not on file   Social Needs    Financial resource strain: Not on file    Food insecurity     Worry: Not on file     Inability: Not on file    Transportation needs     Medical: Not on file     Non-medical: Not on file   Tobacco Use    Smoking status: Former Smoker     Years: 15.00     Quit date: 7/15/1975     Years since quittin.7    Smokeless tobacco: Never Used   Substance and Sexual Activity    Alcohol use: Yes     Comment: may have a glass of wine on rare occasion    Drug use: Yes     Types: Prescription, OTC    Sexual activity: Not Currently   Lifestyle    Physical activity     Days per week: Not on file     Minutes per session: Not on file    Stress: Not on file   Relationships    Social connections     Talks on phone: Not on file     Gets together: Not on file     Attends Evangelical service: Not on file     Active member of club or organization: Not on file     Attends meetings of clubs or organizations: Not on file     Relationship status: Not on file    Intimate partner violence     Fear of current or ex partner: Not on file     Emotionally abused: Not on file     Physically abused: Not on file     Forced sexual activity: Not on file   Other Topics Concern    Not on file   Social History Narrative    Lives in [place] with  of 62 years. Has 2 sons and 1 daughter. Used to work in a bank. Likes to garden and sew.              Visit Vitals  LMP  (LMP Unknown)       Physical Examination:   Gen: well appearing female  HEENT: normal conjunctiva, no audible congestion, patient does not see oral erythema, has MMM  Neck: patient does not feel enlarged or tender LAD or masses  Resp: normal respiratory effort, no audible wheezing. CV: patient does not feel palpitations or heart irregularity  Abd: patient does not feel abdominal tenderness or mass, patient does not notice distension  Extrem: patient does not see swelling in ankles or joints. Neuro: Alert and oriented, able to answer questions without difficulty, able to move all extremities and walk normally          Lab Results   Component Value Date/Time    WBC 4.5 07/11/2020 11:36 AM    HGB 11.7 07/11/2020 11:36 AM    HCT 37.2 07/11/2020 11:36 AM    PLATELET 862 07/42/1345 11:36 AM    MCV 96.4 07/11/2020 11:36 AM     Lab Results   Component Value Date/Time    Sodium 140 07/11/2020 11:36 AM    Potassium 4.0 07/11/2020 11:36 AM    Chloride 108 07/11/2020 11:36 AM    CO2 26 07/11/2020 11:36 AM    Anion gap 6 07/11/2020 11:36 AM    Glucose 117 (H) 07/11/2020 11:36 AM    BUN 19 07/11/2020 11:36 AM    Creatinine 0.78 07/11/2020 11:36 AM    BUN/Creatinine ratio 24 (H) 07/11/2020 11:36 AM    GFR est AA >60 07/11/2020 11:36 AM    GFR est non-AA >60 07/11/2020 11:36 AM    Calcium 8.9 07/11/2020 11:36 AM     Lab Results   Component Value Date/Time    Cholesterol, total 186 11/06/2019 02:16 AM    HDL Cholesterol 70 11/06/2019 02:16 AM    LDL, calculated 79.2 11/06/2019 02:16 AM    VLDL, calculated 36.8 11/06/2019 02:16 AM    Triglyceride 184 (H) 11/06/2019 02:16 AM    CHOL/HDL Ratio 2.7 11/06/2019 02:16 AM     Lab Results   Component Value Date/Time    TSH 2.43 11/06/2019 02:16 AM     No results found for: PSA, Rosaland Dung, [id number], [id number]  Lab Results   Component Value Date/Time    Hemoglobin A1c 5.3 11/06/2019 02:16 AM     No results found for: Juanis Sill, [id number]    Lab Results   Component Value Date/Time    ALT (SGPT) 24 05/11/2020 05:06 PM    Alk.  phosphatase 96 05/11/2020 05:06 PM Bilirubin, total 0.6 05/11/2020 05:06 PM           Assessment/Plan:     Essential hypertension  -    on benicar and verapamil not checking BP at home. -  Previously controlled     Anxiety: controlled on clonazepam and SSRI. On clonazepam 0.5mg BID and lexapro and pamelor. Denies dizziness      Hx of completed stroke on plavix and statin     MGUS (monoclonal gammopathy of unknown significance) followed by Dr Caden Ma      Subdural hematoma July 2020 in the setting of tripping on the stairs. No fall since then.  neurosurgeon. Doing well       Follow up in 4 months in the office    Mar Montoya MD    This is an established visit conducted via real time video and audio telemedicine. The patient has been instructed that this meets HIPAA criteria and acknowledges and agrees to this method of visitation.

## 2021-01-30 DIAGNOSIS — F41.9 ANXIETY: ICD-10-CM

## 2021-01-31 RX ORDER — CLONAZEPAM 0.5 MG/1
TABLET ORAL
Qty: 60 TAB | Refills: 0 | Status: SHIPPED | OUTPATIENT
Start: 2021-01-31 | End: 2021-02-26

## 2021-02-01 RX ORDER — NORTRIPTYLINE HYDROCHLORIDE 10 MG/1
CAPSULE ORAL
Qty: 90 CAP | Refills: 0 | Status: SHIPPED | OUTPATIENT
Start: 2021-02-01 | End: 2021-03-16 | Stop reason: SDUPTHER

## 2021-02-08 ENCOUNTER — OFFICE VISIT (OUTPATIENT)
Dept: CARDIOLOGY CLINIC | Age: 81
End: 2021-02-08
Payer: MEDICARE

## 2021-02-08 DIAGNOSIS — I63.30 CEREBROVASCULAR ACCIDENT (CVA) DUE TO THROMBOSIS OF CEREBRAL ARTERY (HCC): Primary | ICD-10-CM

## 2021-02-08 DIAGNOSIS — Z45.09 ENCOUNTER FOR LOOP RECORDER CHECK: ICD-10-CM

## 2021-02-08 PROCEDURE — 93298 REM INTERROG DEV EVAL SCRMS: CPT | Performed by: INTERNAL MEDICINE

## 2021-02-25 DIAGNOSIS — F41.9 ANXIETY: ICD-10-CM

## 2021-02-26 RX ORDER — CLONAZEPAM 0.5 MG/1
TABLET ORAL
Qty: 60 TAB | Refills: 0 | Status: SHIPPED | OUTPATIENT
Start: 2021-02-26 | End: 2021-04-03

## 2021-03-04 RX ORDER — ROPINIROLE 1 MG/1
TABLET, FILM COATED ORAL
Qty: 90 TAB | Refills: 0 | Status: SHIPPED | OUTPATIENT
Start: 2021-03-04 | End: 2021-06-05

## 2021-03-04 RX ORDER — VERAPAMIL HYDROCHLORIDE 120 MG/1
TABLET, FILM COATED, EXTENDED RELEASE ORAL
Qty: 90 TAB | Refills: 0 | Status: SHIPPED | OUTPATIENT
Start: 2021-03-04 | End: 2021-06-13

## 2021-03-04 RX ORDER — ESCITALOPRAM OXALATE 20 MG/1
TABLET ORAL
Qty: 90 TAB | Refills: 0 | Status: SHIPPED | OUTPATIENT
Start: 2021-03-04 | End: 2021-06-05

## 2021-03-16 RX ORDER — NORTRIPTYLINE HYDROCHLORIDE 10 MG/1
CAPSULE ORAL
Qty: 90 CAP | Refills: 0 | Status: SHIPPED | OUTPATIENT
Start: 2021-03-16 | End: 2021-04-28

## 2021-03-17 RX ORDER — CLOPIDOGREL BISULFATE 75 MG/1
TABLET ORAL
Qty: 90 TAB | Refills: 0 | Status: SHIPPED | OUTPATIENT
Start: 2021-03-17 | End: 2021-06-13

## 2021-04-01 DIAGNOSIS — F41.9 ANXIETY: ICD-10-CM

## 2021-04-03 RX ORDER — CLONAZEPAM 0.5 MG/1
TABLET ORAL
Qty: 60 TAB | Refills: 0 | Status: SHIPPED | OUTPATIENT
Start: 2021-04-03 | End: 2021-04-28

## 2021-04-13 ENCOUNTER — OFFICE VISIT (OUTPATIENT)
Dept: INTERNAL MEDICINE CLINIC | Age: 81
End: 2021-04-13
Payer: MEDICARE

## 2021-04-13 VITALS
RESPIRATION RATE: 14 BRPM | BODY MASS INDEX: 26.31 KG/M2 | WEIGHT: 143 LBS | DIASTOLIC BLOOD PRESSURE: 57 MMHG | SYSTOLIC BLOOD PRESSURE: 106 MMHG | TEMPERATURE: 97.2 F | HEART RATE: 72 BPM | OXYGEN SATURATION: 98 % | HEIGHT: 62 IN

## 2021-04-13 DIAGNOSIS — I10 ESSENTIAL HYPERTENSION: ICD-10-CM

## 2021-04-13 DIAGNOSIS — G25.0 BENIGN ESSENTIAL TREMOR SYNDROME: ICD-10-CM

## 2021-04-13 DIAGNOSIS — F41.9 ANXIETY: ICD-10-CM

## 2021-04-13 DIAGNOSIS — D47.2 MGUS (MONOCLONAL GAMMOPATHY OF UNKNOWN SIGNIFICANCE): ICD-10-CM

## 2021-04-13 DIAGNOSIS — K21.9 GASTROESOPHAGEAL REFLUX DISEASE WITHOUT ESOPHAGITIS: ICD-10-CM

## 2021-04-13 DIAGNOSIS — E11.42 DIABETIC PERIPHERAL NEUROPATHY ASSOCIATED WITH TYPE 2 DIABETES MELLITUS (HCC): ICD-10-CM

## 2021-04-13 DIAGNOSIS — Z86.73 HX OF COMPLETED STROKE: Primary | ICD-10-CM

## 2021-04-13 DIAGNOSIS — R25.2 LEG CRAMPS: ICD-10-CM

## 2021-04-13 PROBLEM — I50.32 DIASTOLIC CHF, CHRONIC (HCC): Status: RESOLVED | Noted: 2020-04-23 | Resolved: 2021-04-13

## 2021-04-13 PROCEDURE — G8752 SYS BP LESS 140: HCPCS | Performed by: INTERNAL MEDICINE

## 2021-04-13 PROCEDURE — G8427 DOCREV CUR MEDS BY ELIG CLIN: HCPCS | Performed by: INTERNAL MEDICINE

## 2021-04-13 PROCEDURE — G8510 SCR DEP NEG, NO PLAN REQD: HCPCS | Performed by: INTERNAL MEDICINE

## 2021-04-13 PROCEDURE — 1101F PT FALLS ASSESS-DOCD LE1/YR: CPT | Performed by: INTERNAL MEDICINE

## 2021-04-13 PROCEDURE — G8536 NO DOC ELDER MAL SCRN: HCPCS | Performed by: INTERNAL MEDICINE

## 2021-04-13 PROCEDURE — G0439 PPPS, SUBSEQ VISIT: HCPCS | Performed by: INTERNAL MEDICINE

## 2021-04-13 PROCEDURE — G8419 CALC BMI OUT NRM PARAM NOF/U: HCPCS | Performed by: INTERNAL MEDICINE

## 2021-04-13 PROCEDURE — G8754 DIAS BP LESS 90: HCPCS | Performed by: INTERNAL MEDICINE

## 2021-04-13 RX ORDER — ZOSTER VACCINE RECOMBINANT, ADJUVANTED 50 MCG/0.5
KIT INTRAMUSCULAR
Qty: 0.5 ML | Refills: 1 | Status: SHIPPED | OUTPATIENT
Start: 2021-04-13 | End: 2022-01-11 | Stop reason: ALTCHOICE

## 2021-04-13 RX ORDER — FAMOTIDINE 20 MG/1
20 TABLET, FILM COATED ORAL
Qty: 30 TAB | Refills: 2 | Status: SHIPPED | OUTPATIENT
Start: 2021-04-13 | End: 2021-07-19

## 2021-04-13 NOTE — PATIENT INSTRUCTIONS
Medicare Wellness Visit, Female The best way to live healthy is to have a lifestyle where you eat a well-balanced diet, exercise regularly, limit alcohol use, and quit all forms of tobacco/nicotine, if applicable. Regular preventive services are another way to keep healthy. Preventive services (vaccines, screening tests, monitoring & exams) can help personalize your care plan, which helps you manage your own care. Screening tests can find health problems at the earliest stages, when they are easiest to treat. [facility] follows the current, evidence-based guidelines published by the [facility] ([facility]) when recommending preventive services for our patients. Because we follow these guidelines, sometimes recommendations change over time as research supports it. (For example, mammograms used to be recommended annually. Even though Medicare will still pay for an annual mammogram, the newer guidelines recommend a mammogram every two years for women of average risk). Of course, you and your doctor may decide to screen more often for some diseases, based on your risk and your co-morbidities (chronic disease you are already diagnosed with). Preventive services for you include: - Medicare offers their members a free annual wellness visit, which is time for you and your primary care provider to discuss and plan for your preventive service needs. Take advantage of this benefit every year! 
-All adults over the age of 72 should receive the recommended pneumonia vaccines. Current USPSTF guidelines recommend a series of two vaccines for the best pneumonia protection.  
-All adults should have a flu vaccine yearly and a tetanus vaccine every 10 years.  
-All adults age 48 and older should receive the shingles vaccines (series of two vaccines).      
-All adults age 38-68 who are overweight should have a diabetes screening test once every three years.  
-All adults born between [birth year] and 1964 should be screened once for Hepatitis C. 
-Other screening tests and preventive services for persons with diabetes include: an eye exam to screen for diabetic retinopathy, a kidney function test, a foot exam, and stricter control over your cholesterol.  
-Cardiovascular screening for adults with routine risk involves an electrocardiogram (ECG) at intervals determined by your doctor.  
-Colorectal cancer screenings should be done for adults age 54-65 with no increased risk factors for colorectal cancer. There are a number of acceptable methods of screening for this type of cancer. Each test has its own benefits and drawbacks. Discuss with your doctor what is most appropriate for you during your annual wellness visit. The different tests include: colonoscopy (considered the best screening method), a fecal occult blood test, a fecal DNA test, and sigmoidoscopy. 
 
-A bone mass density test is recommended when a woman turns 65 to screen for osteoporosis. This test is only recommended one time, as a screening. Some providers will use this same test as a disease monitoring tool if you already have osteoporosis. -Breast cancer screenings are recommended every other year for women of normal risk, age 54-69. 
-Cervical cancer screenings for women over age 72 are only recommended with certain risk factors. Here is a list of your current Health Maintenance items (your personalized list of preventive services) with a due date: 
Health Maintenance Due Topic Date Due  
 Hepatitis C Test  Never done  Eye Exam  Never done  DTaP/Tdap/Td  (1 - Tdap) Never done  Shingles Vaccine (2 of 2) 03/12/2019  Albumin Urine Test  09/26/2020  Cholesterol Test   11/06/2020 [facility] Diabetic Foot Care  12/09/2020

## 2021-04-13 NOTE — PROGRESS NOTES
This is the Subsequent Medicare Annual Wellness Exam, performed 12 months or more after the Initial AWV or the last Subsequent AWV    I have reviewed the patient's medical history in detail and updated the computerized patient record. Leg cramps : Intermittent cramps mostly at nighttime. Patient is on Requip for restless leg syndrome 1 mg at nighttime. For high cholesterol she is taking Lipitor 20 mg daily      HTN low BP today -patient is currently on verapamil 120 mg daily and Benicar 5 mg daily. Blood pressure is 106/57. Patient denies dizziness chest pain shortness of breath and claudication symptoms        Anxiety: controlled on clonazepam and SSRI. On clonazepam 0.5mg BID and lexapro and pamelor. Denies dizziness      Hx of completed stroke on plavix and statin     MGUS (monoclonal gammopathy of unknown significance) followed by Dr Antonia Collazo      Patient reports fall  Lost balance / leading to a subdural hematoma and followed up with neuro and stable. Assessment/Plan   Education and counseling provided:  Are appropriate based on today's review and evaluation      Depression Risk Factor Screening     3 most recent PHQ Screens 4/13/2021   PHQ Not Done -   Little interest or pleasure in doing things Not at all   Feeling down, depressed, irritable, or hopeless Not at all   Total Score PHQ 2 0       Alcohol Risk Screen    Do you average more than 1 drink per night or more than 7 drinks a week:  No    On any one occasion in the past three months have you have had more than 3 drinks containing alcohol:  No        Functional Ability and Level of Safety    Hearing: Hearing is good. The patient wears hearing aids. Activities of Daily Living: The home contains: grab bars  Patient needs help with:  phone, transportation and shopping      Ambulation: with no difficulty     Fall Risk:  Fall Risk Assessment, last 12 mths 4/13/2021   Able to walk?  Yes   Fall in past 12 months? 0   Do you feel unsteady? 0   Are you worried about falling 0   Number of falls in past 12 months -   Fall with injury? -      Abuse Screen:  Patient is not abused       Cognitive Screening    Has your family/caregiver stated any concerns about your memory: no     Cognitive Screening: Normal - Verbal Fluency Test    Health Maintenance Due     Health Maintenance Due   Topic Date Due    Hepatitis C Screening  Never done    Eye Exam Retinal or Dilated  Never done    DTaP/Tdap/Td series (1 - Tdap) Never done    Shingrix Vaccine Age 50> (2 of 2) 03/12/2019    MICROALBUMIN Q1  09/26/2020    Lipid Screen  11/06/2020    Foot Exam Q1  12/09/2020       Patient Care Team   Patient Care Team:  Cruz Weston MD as PCP - General (Internal Medicine)  Cruz Weston MD as PCP - [facility][1] Provider  Carly Morales MD as Surgeon (General Surgery)  Gabriel Marquez MD (Neurology)  Mele Mares MD (Hematology and Oncology)  Nora Osorio MD (Dermatology)    History     Patient Active Problem List   Diagnosis Code    Lumbar stenosis M48.061    Chronic tension headaches G44.229   [facility]. 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    Lumbar back pain with radiculopathy affecting left lower extremity M54.16    Lumbar back pain with radiculopathy affecting right lower extremity M54.16    Cervical radiculopathy due to degenerative joint disease of spine M47.22    Ulnar neuropathy at elbow of right upper extremity G56.21    CVA (cerebral vascular accident) ([facility] [address] H76.6    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) J26.685    Bilateral carotid artery stenosis I65.23    Lumbar post-laminectomy syndrome M96.1    Acute respiratory distress K74.44    Diastolic CHF, chronic (HCC) I50.32    COVID-19 U07.1    Contact dermatitis L25.9    Impingement syndrome of shoulder, right M75.41     Past Medical History:   Diagnosis Date    Adverse effect of anesthesia     sleep apnea no cpap machine useage       Anemia     has had iron infusions;  Hem/onc Dr Russell Rosenbaum [phone]    Anxiety and depression     Bilateral carotid artery stenosis     Diastolic CHF, chronic ([facility] [address] 8/47/7602    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)     GERD (gastroesophageal reflux disease)     Hypercholesteremia     Hypertension     Idiopathic small and large fiber sensory neuropathy     Limited peripheral vision of right eye     complication of cataract surgery    Long term current use of anticoagulant therapy     Meniere disease     MGUS (monoclonal gammopathy of unknown significance)     Monoclonal gammopathy of unknown significance     hem/onc:  Dr Russell Rosenbaum [phone]    Osteoporosis     Psoriasis     bilateral Legs    Restless leg     Skin melanoma ([facility] [address] 2012    (Rt ankle) and basal cell (Lt eye) removed    Transient ischemic attack approx 2005    HAS HAD 2 MINISTROKES-NO DEFICITS; neuro Dr Jarvis Rutherford (cc)    Unspecified adverse effect of anesthesia     HAS NEEDED PORT OR CUTDOWN FOR RECVNG BLOOD OR LONG-TERM IVs; surgery 7/28/15 w/o any difficulty    Unspecified sleep apnea     does not use CPAP      Past Surgical History:   Procedure Laterality Date    HX APPENDECTOMY      HX CATARACT REMOVAL  2011    Right    HX CERVICAL FUSION  1/28/15    C5-7    HX CHOLECYSTECTOMY  2000    HX GI  2010    liver bx due to liver Bx due to elevated enzymes, liver laceration during Bx, hospitalized 10-12d    HX HEENT  7/28/15    Lt total parotidectomy with facial nerve dissection: Dr Juan Carlos Marshall HX HEENT  11/2015    Right parotidectomy    HX HYSTERECTOMY  HX ORTHOPAEDIC  2009 & 2011    LUMBAR FUSION and revision    HX OTHER SURGICAL  2010    MELANOMA (Rt ankle) and basal cell (Lt eye) removed; q6mo ck by DERMATOLOGY    HX SHOULDER REPLACEMENT Left 08/01/2016    HX THYROIDECTOMY      Right    [place] COLON CA SCRN NOT HI RSK IND  1/27/2016         [place] INSERTION SUBQ CARDIAC RHYTHM MONITOR W/PRGRMG N/A 11/6/2019    LOOP RECORDER INSERT performed by Carlin Blum MD at [facility] CARDIAC CATH LAB     Current Outpatient Medications   Medication Sig Dispense Refill    varicella-zoster recombinant, PF, (Shingrix, PF,) 50 mcg/0.5 mL susr injection 0.5mL by IntraMUSCular route once now and then repeat in 2-6 months 0.5 mL 1    diph,pertuss,acel,,tetanus vac,PF, (ADACEL) 2 Lf-(2.5-5-3-5 mcg)-5Lf/0.5 mL syrg vaccine 0.5 mL by IntraMUSCular route once for 1 dose. 0.5 mL 0    clonazePAM (KlonoPIN) 0.5 mg tablet TAKE 1 TABLET BY MOUTH TWICE DAILY . DO NOT EXCEED 2 PER 24 HOURS 60 Tab 0    clopidogreL (PLAVIX) 75 mg tab Take 1 tablet by mouth nightly 90 Tab 0    nortriptyline (PAMELOR) 10 mg capsule TAKE 1 TO 2 CAPSULES BY MOUTH NIGHTLY 90 Cap 0    escitalopram oxalate (LEXAPRO) 20 mg tablet TAKE 1 TABLET BY MOUTH NIGHTLY FOR ANXIETY AND FOR DEPRESSION 90 Tab 0    rOPINIRole (REQUIP) 1 mg tablet Take 1 tablet by mouth nightly 90 Tab 0    verapamil ER (CALAN-SR) 120 mg tablet TAKE 1 TABLET BY MOUTH NIGHTLY FOR HIGH BLOOD PRESSURE 90 Tab 0    atorvastatin (LIPITOR) 20 mg tablet Take 1 tablet by mouth once daily 90 Tab 0    pantoprazole (PROTONIX) 40 mg tablet TAKE 1 TABLET BY MOUTH IN THE MORNING 90 Tab 0    cholecalciferol, vitamin D3, (VITAMIN D3 PO) Take  by mouth.  olmesartan (BENICAR) 5 mg tablet Take 0.5 Tabs by mouth daily. 60 Tab 0    ipratropium-albuteroL (Combivent Respimat)  mcg/actuation inhaler Take 1 Puff by inhalation every four (4) hours as needed for Shortness of Breath.       diphenhydrAMINE-zinc acetate 2%-0.1% (BENADRYL) 2-0.1 % topical cream Apply  to affected area three (3) times daily as needed for Itching. apply a thin layer   Indications: skin irritation 30 g 0    levocetirizine (Xyzal) 5 mg tablet Take 1 Tab by mouth daily as needed for Allergies. 30 Tab 0    primidone (MYSOLINE) 50 mg tablet Take 1 Tab by mouth nightly. 90 Tab 1    multivit/folic acid/vit K1 (ONE-A-DAY WOMEN'S 50 PLUS PO) Take 1 Tab by mouth daily.  fluticasone propionate (FLONASE) 50 mcg/actuation nasal spray 2 Sprays by Both Nostrils route daily. 1 Bottle 0    albuterol (PROVENTIL HFA, VENTOLIN HFA, PROAIR HFA) 90 mcg/actuation inhaler Take 1 Puff by inhalation every four (4) hours as needed for Wheezing. 1 Inhaler 0    polyethylene glycol (MIRALAX) 17 gram packet Take 17 g by mouth daily as needed (constipation).        Allergies   Allergen Reactions    Aspirin Nausea and Vomiting    Bee Sting [Sting, Bee] Hives and Shortness of Breath     WASP STING    Codeine Nausea and Vomiting    Hydrocodone Rash     And sedation    Lisinopril Cough    Morphine Rash    Nsaids (Non-Steroidal Anti-Inflammatory Drug) Nausea and Vomiting    Oxycodone Rash     On her back    Penicillins Hives, Shortness of Breath and Swelling       Family History   Problem Relation Age of Onset    Alcohol abuse Father     Emphysema Father     Cancer Sister         PANCREATIC    Alcohol abuse Brother     COPD Brother     Other Mother         UNKNOWN CAUSE OF DEATH    Heart Disease Mother     Breast Cancer Maternal Aunt     Anesth Problems Neg Hx      Social History     Tobacco Use    Smoking status: Former Smoker     Years: 15.00     Quit date: 7/15/1975     Years since quittin.7    Smokeless tobacco: Never Used   Substance Use Topics    Alcohol use: Yes     Comment: may have a glass of wine on rare occasion       Visit Vitals  BP (!) 106/57 (BP 1 Location: Left arm, BP Patient Position: Sitting, BP Cuff Size: Adult long)   Pulse 72   Temp 97.2 °F (36.2 °C) (Temporal) Resp 14   Ht 5' 2\" (1.575 m)   Wt 143 lb (64.9 kg)   SpO2 98%   BMI 26.16 kg/m²   Gen: No acute distress  CV: Normal S1-S2 no murmurs gallops rubs  Lungs: Clear to auscultation bilaterally  Abdomen: Positive bowel sounds, soft nontender nondistended  Extremities: no edema/normal pulses    Assessment and Plan    1. Hx of completed stroke  Patient is on Plavix and Lipitor 20 mg    2. Essential hypertension  Blood pressure is on lower side today therefore we will stop the Benicar and continue the verapamil  Follow-up in 2 weeks for blood pressure check with nurse    3. Benign essential tremor syndrome  Continue primidone    4. MGUS (monoclonal gammopathy of unknown significance)  Followed by Dr. Lincoln Liu  - Zeus Williamson DIFF; Future  - CBC WITH AUTOMATED DIFF    5. Anxiety  Controlled on Lexapro and clonazepam    6. Diabetic peripheral neuropathy associated with type 2 diabetes mellitus ([facility] [address]  Patient is due for labs. Not on medication for diabetes this is diet controlled  - LIPID PANEL; Future  - MICROALBUMIN, UR, RAND W/ MICROALB/CREAT RATIO; Future  - HEMOGLOBIN A1C WITH EAG; Future  - HEMOGLOBIN A1C WITH EAG  - MICROALBUMIN, UR, RAND W/ MICROALB/CREAT RATIO  - LIPID PANEL    7. Leg cramps  Has a history of restless leg syndrome and is currently on Requip 1 mg at nighttime. Will check magnesium level  - METABOLIC PANEL, COMPREHENSIVE; Future  - MAGNESIUM; Future  - MAGNESIUM  - METABOLIC PANEL, COMPREHENSIVE    8. Gastroesophageal reflux disease without esophagitis  Advised to stop PPI due to long-term side effects and take Pepcid instead denies active GERD symptoms  - famotidine (PEPCID) 20 mg tablet; Take 1 Tab by mouth nightly. Dispense: 30 Tab;  Refill: 2    In the process of Covid vaccination therefore shingles vaccine in tetanus vaccine will be deferred    Josh Medeiros MD

## 2021-04-20 NOTE — PROGRESS NOTES
CHRISTIANNE:    Inpatient Rehab  FOC/2nd  Medicare Letter      CM: Mary Jo Caldera is currently working with pt in the Neuro Unit. CM completed room visit with family (pt is currently off the floor). CM reviewed pt's therapy needs and recommendations with pt's family. Pt family agreeable for referral to be sent to [facility], and if unable to accept pt's family will like referral sent to SNF. CM informed pt of FOC document. CM will continue to follow.     Mary Jo Caldera, FREDDY, [facility] show

## 2021-04-26 DIAGNOSIS — F41.9 ANXIETY: ICD-10-CM

## 2021-04-28 RX ORDER — NORTRIPTYLINE HYDROCHLORIDE 10 MG/1
CAPSULE ORAL
Qty: 90 CAP | Refills: 0 | Status: SHIPPED | OUTPATIENT
Start: 2021-04-28 | End: 2021-06-13

## 2021-04-28 RX ORDER — ATORVASTATIN CALCIUM 20 MG/1
TABLET, FILM COATED ORAL
Qty: 90 TAB | Refills: 0 | Status: SHIPPED | OUTPATIENT
Start: 2021-04-28 | End: 2021-07-29

## 2021-04-28 RX ORDER — CLONAZEPAM 0.5 MG/1
TABLET ORAL
Qty: 60 TAB | Refills: 0 | Status: SHIPPED | OUTPATIENT
Start: 2021-04-28 | End: 2021-06-02

## 2021-05-10 ENCOUNTER — OFFICE VISIT (OUTPATIENT)
Dept: CARDIOLOGY CLINIC | Age: 81
End: 2021-05-10
Payer: MEDICARE

## 2021-05-10 DIAGNOSIS — Z45.09 ENCOUNTER FOR LOOP RECORDER CHECK: ICD-10-CM

## 2021-05-10 DIAGNOSIS — I63.30 CEREBROVASCULAR ACCIDENT (CVA) DUE TO THROMBOSIS OF CEREBRAL ARTERY (HCC): Primary | ICD-10-CM

## 2021-05-10 PROCEDURE — 93298 REM INTERROG DEV EVAL SCRMS: CPT | Performed by: INTERNAL MEDICINE

## 2021-06-01 DIAGNOSIS — F41.9 ANXIETY: ICD-10-CM

## 2021-06-02 RX ORDER — CLONAZEPAM 0.5 MG/1
TABLET ORAL
Qty: 60 TABLET | Refills: 0 | Status: SHIPPED | OUTPATIENT
Start: 2021-06-02 | End: 2021-06-05

## 2021-06-03 DIAGNOSIS — F41.9 ANXIETY: ICD-10-CM

## 2021-06-05 RX ORDER — ROPINIROLE 1 MG/1
TABLET, FILM COATED ORAL
Qty: 90 TABLET | Refills: 0 | Status: SHIPPED | OUTPATIENT
Start: 2021-06-05 | End: 2021-09-10

## 2021-06-05 RX ORDER — CLONAZEPAM 0.5 MG/1
TABLET ORAL
Qty: 60 TABLET | Refills: 0 | Status: SHIPPED | OUTPATIENT
Start: 2021-06-05 | End: 2021-08-02 | Stop reason: SDUPTHER

## 2021-06-05 RX ORDER — ESCITALOPRAM OXALATE 20 MG/1
TABLET ORAL
Qty: 90 TABLET | Refills: 0 | Status: SHIPPED | OUTPATIENT
Start: 2021-06-05 | End: 2021-09-10

## 2021-06-13 RX ORDER — CLOPIDOGREL BISULFATE 75 MG/1
TABLET ORAL
Qty: 90 TABLET | Refills: 0 | Status: SHIPPED | OUTPATIENT
Start: 2021-06-13 | End: 2021-09-17

## 2021-06-13 RX ORDER — NORTRIPTYLINE HYDROCHLORIDE 10 MG/1
CAPSULE ORAL
Qty: 90 CAPSULE | Refills: 0 | Status: SHIPPED | OUTPATIENT
Start: 2021-06-13 | End: 2021-07-29

## 2021-06-13 RX ORDER — VERAPAMIL HYDROCHLORIDE 120 MG/1
TABLET, FILM COATED, EXTENDED RELEASE ORAL
Qty: 90 TABLET | Refills: 0 | Status: SHIPPED | OUTPATIENT
Start: 2021-06-13 | End: 2021-09-17

## 2021-06-18 ENCOUNTER — TELEPHONE (OUTPATIENT)
Dept: INTERNAL MEDICINE CLINIC | Age: 81
End: 2021-06-18

## 2021-06-18 NOTE — TELEPHONE ENCOUNTER
#[phone] OR #[phone]    Pt states she has had indigestion for a month now. Pt states she ha to sleep sitting up and still gets stuff in her throat. Pt wants to see Dr. Teagan Tobin only. Offered VV with other physicians, but pt refused. Pt is asking to be seen soon and did not take an appt that is out. Advised  Pt would not get a call back until Monday most likely.

## 2021-06-21 ENCOUNTER — VIRTUAL VISIT (OUTPATIENT)
Dept: INTERNAL MEDICINE CLINIC | Age: 81
End: 2021-06-21
Payer: MEDICARE

## 2021-06-21 DIAGNOSIS — K30 ACID INDIGESTION: ICD-10-CM

## 2021-06-21 DIAGNOSIS — K21.9 GASTROESOPHAGEAL REFLUX DISEASE WITHOUT ESOPHAGITIS: Primary | ICD-10-CM

## 2021-06-21 PROCEDURE — 99442 PR PHYS/QHP TELEPHONE EVALUATION 11-20 MIN: CPT | Performed by: INTERNAL MEDICINE

## 2021-06-21 RX ORDER — PANTOPRAZOLE SODIUM 40 MG/1
40 TABLET, DELAYED RELEASE ORAL
Qty: 60 TABLET | Refills: 1 | Status: SHIPPED | OUTPATIENT
Start: 2021-06-21 | End: 2021-10-30

## 2021-06-21 NOTE — PROGRESS NOTES
CC: GERD    Acute visit   HPI:    She is a 79 y.o. female who presents for evaluation of acid reflux     It has been a long time since had endoscopy with Dr Julianne Najjar I could not locate    Last colonoscopy done 2016      Patient was having indigestion and I prescribed famotidine and symptoms have worsened  Sleeping sitting up due to acid reflux  No weight loss   No change in color of stool  Not taking NSAIDs  Not taking aspirin  Does drink decaf coffee  No spicy foods       This is an established visit conducted via telemedicine with phone only  The patient has been instructed that this meets HIPAA criteria and acknowledges and agrees to this method of visitation. Pursuant to the emergency declaration under the [facility], [facility] waiver authority and the [employer] Act, this Virtual Visit was conducted, with patient's consent, to reduce the patient's risk of exposure to COVID-19 and provide continuity of care for an established patient. Services were provided through a phone visit      ROS:  Constitutional: negative for fevers, chills, anorexia and weight loss  10 systems reviewed and negative other than HPI     Past Medical History:   Diagnosis Date    Adverse effect of anesthesia     sleep apnea no cpap machine useage       Anemia     has had iron infusions;  Hem/onc Dr Kraft Rom [phone]    Anxiety and depression     Bilateral carotid artery stenosis     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)     GERD (gastroesophageal reflux disease)     Hypercholesteremia     Hypertension     Idiopathic small and large fiber sensory neuropathy     Limited peripheral vision of right eye     complication of cataract surgery    Long term current use of anticoagulant therapy     Meniere disease     MGUS (monoclonal gammopathy of unknown significance)     Monoclonal gammopathy of unknown significance hem/onc:  Dr Bernadine Mckeon [phone]    Osteoporosis     Psoriasis     bilateral Legs    Restless leg     Skin melanoma ([facility] [address] 2012    (Rt ankle) and basal cell (Lt eye) removed    Transient ischemic attack approx 2005    HAS HAD 2 MINISTROKES-NO DEFICITS; neuro Dr Hilda Muñiz (cc)    Unspecified adverse effect of anesthesia     HAS NEEDED PORT OR CUTDOWN FOR RECVNG BLOOD OR LONG-TERM IVs; surgery 7/28/15 w/o any difficulty    Unspecified sleep apnea     does not use CPAP       Current Outpatient Medications on File Prior to Visit   Medication Sig Dispense Refill    nortriptyline (PAMELOR) 10 mg capsule TAKE 1 TO 2 CAPSULES BY MOUTH NIGHTLY 90 Capsule 0    verapamil ER (CALAN-SR) 120 mg tablet TAKE 1 TABLET BY MOUTH NIGHTLY FOR HIGH BLOOD PRESSURE 90 Tablet 0    clopidogreL (PLAVIX) 75 mg tab Take 1 tablet by mouth nightly 90 Tablet 0    rOPINIRole (REQUIP) 1 mg tablet Take 1 tablet by mouth nightly 90 Tablet 0    escitalopram oxalate (LEXAPRO) 20 mg tablet TAKE 1 TABLET BY MOUTH NIGHTLY FOR ANXIETY AND FOR DEPRESSION 90 Tablet 0    clonazePAM (KlonoPIN) 0.5 mg tablet TAKE 1 TABLET BY MOUTH TWICE DAILY . DO NOT EXCEED 2 PER 24 HOURS 60 Tablet 0    atorvastatin (LIPITOR) 20 mg tablet Take 1 tablet by mouth once daily 90 Tab 0    varicella-zoster recombinant, PF, (Shingrix, PF,) 50 mcg/0.5 mL susr injection 0.5mL by IntraMUSCular route once now and then repeat in 2-6 months 0.5 mL 1    famotidine (PEPCID) 20 mg tablet Take 1 Tab by mouth nightly. 30 Tab 2    cholecalciferol, vitamin D3, (VITAMIN D3 PO) Take  by mouth.  ipratropium-albuteroL (Combivent Respimat)  mcg/actuation inhaler Take 1 Puff by inhalation every four (4) hours as needed for Shortness of Breath.  diphenhydrAMINE-zinc acetate 2%-0.1% (BENADRYL) 2-0.1 % topical cream Apply  to affected area three (3) times daily as needed for Itching.  apply a thin layer   Indications: skin irritation 30 g 0    levocetirizine (Xyzal) 5 mg tablet Take 1 Tab by mouth daily as needed for Allergies. 30 Tab 0    primidone (MYSOLINE) 50 mg tablet Take 1 Tab by mouth nightly. 90 Tab 1    multivit/folic acid/vit K1 (ONE-A-DAY WOMEN'S 50 PLUS PO) Take 1 Tab by mouth daily.  fluticasone propionate (FLONASE) 50 mcg/actuation nasal spray 2 Sprays by Both Nostrils route daily. 1 Bottle 0    albuterol (PROVENTIL HFA, VENTOLIN HFA, PROAIR HFA) 90 mcg/actuation inhaler Take 1 Puff by inhalation every four (4) hours as needed for Wheezing. 1 Inhaler 0    polyethylene glycol (MIRALAX) 17 gram packet Take 17 g by mouth daily as needed (constipation).  [DISCONTINUED] nortriptyline (PAMELOR) 10 mg capsule TAKE 1 TO 2 CAPSULES BY MOUTH NIGHTLY 90 Cap 0    [DISCONTINUED] verapamil ER (CALAN-SR) 120 mg tablet TAKE 1 TABLET BY MOUTH NIGHTLY FOR HIGH BLOOD PRESSURE 90 Tab 0    [DISCONTINUED] rOPINIRole (REQUIP) 1 mg tablet Take 1 tablet by mouth nightly 90 Tab 0    [DISCONTINUED] escitalopram oxalate (LEXAPRO) 20 mg tablet TAKE 1 TABLET BY MOUTH NIGHTLY FOR ANXIETY AND FOR DEPRESSION 90 Tab 0     No current facility-administered medications on file prior to visit.        Past Surgical History:   Procedure Laterality Date    HX APPENDECTOMY      HX CATARACT REMOVAL  2011    Right    HX CERVICAL FUSION  1/28/15    C5-7    HX CHOLECYSTECTOMY  2000    HX GI  2010    liver bx due to liver Bx due to elevated enzymes, liver laceration during Bx, hospitalized 10-12d    HX HEENT  7/28/15    Lt total parotidectomy with facial nerve dissection: Dr Abby Roth HX HEENT  11/2015    Right parotidectomy    HX HYSTERECTOMY      HX ORTHOPAEDIC  2009 & 2011    LUMBAR FUSION and revision    HX OTHER SURGICAL  2010    MELANOMA (Rt ankle) and basal cell (Lt eye) removed; q6mo ck by DERMATOLOGY    HX SHOULDER REPLACEMENT Left 08/01/2016    HX THYROIDECTOMY      Right    [place] COLON CA SCRN NOT HI RSK IND  1/27/2016         [place] INSERTION SUBQ CARDIAC RHYTHM MONITOR W/PRGRMG N/A 2019    LOOP RECORDER INSERT performed by Phares Nageotte, MD at [facility] CARDIAC CATH LAB       Family History   Problem Relation Age of Onset    Alcohol abuse Father     Emphysema Father     Cancer Sister         PANCREATIC    Alcohol abuse Brother     COPD Brother     Other Mother         UNKNOWN CAUSE OF DEATH    Heart Disease Mother     Breast Cancer Maternal Aunt     Anesth Problems Neg Hx      Reviewed and no changes     Social History     Socioeconomic History    Marital status:      Spouse name: Not on file    Number of children: Not on file    Years of education: Not on file    Highest education level: Not on file   Occupational History    Not on file   Tobacco Use    Smoking status: Former Smoker     Years: 15.00     Quit date: 7/15/1975     Years since quittin.9    Smokeless tobacco: Never Used   Vaping Use    Vaping Use: Never used   Substance and Sexual Activity    Alcohol use: Yes     Comment: may have a glass of wine on rare occasion    Drug use: Yes     Types: Prescription, OTC    Sexual activity: Not Currently   Other Topics Concern    Not on file   Social History Narrative    Lives in [place] with  of 62 years. Has 2 sons and 1 daughter. Used to work in a bank. Likes to garden and sew. Social Determinants of Health     Financial Resource Strain:     Difficulty of Paying Living Expenses:    Food Insecurity:     Worried About Running Out of Food in the Last Year:     [address] in the Last Year:    Transportation Needs:     Lack of Transportation (Medical):      Lack of Transportation (Non-Medical):    Physical Activity:     Days of Exercise per Week:     Minutes of Exercise per Session:    Stress:     Feeling of Stress :    Social Connections:     Frequency of Communication with Friends and Family:     Frequency of Social Gatherings with Friends and Family:     Attends Jainism Services:     Active Member of Clubs or Organizations:     Attends Club or Organization Meetings:     Marital Status:    Intimate Partner Violence:     Fear of Current or Ex-Partner:     Emotionally Abused:     Physically Abused:     Sexually Abused:             Visit Vitals  LMP  (LMP Unknown)       Physical Examination:   Gen: well appearing female  HEENT: normal conjunctiva, no audible congestion, patient does not see oral erythema, has MMM  Neck: patient does not feel enlarged or tender LAD or masses  Resp: normal respiratory effort, no audible wheezing. CV: patient does not feel palpitations or heart irregularity  Abd: patient does not feel abdominal tenderness or mass, patient does not notice distension  Extrem: patient does not see swelling in ankles or joints.    Neuro: Alert and oriented, able to answer questions without difficulty, able to move all extremities and walk normally          Lab Results   Component Value Date/Time    WBC 4.5 07/11/2020 11:36 AM    HGB 11.7 07/11/2020 11:36 AM    HCT 37.2 07/11/2020 11:36 AM    PLATELET 215 89/44/3125 11:36 AM    MCV 96.4 07/11/2020 11:36 AM     Lab Results   Component Value Date/Time    Sodium 140 07/11/2020 11:36 AM    Potassium 4.0 07/11/2020 11:36 AM    Chloride 108 07/11/2020 11:36 AM    CO2 26 07/11/2020 11:36 AM    Anion gap 6 07/11/2020 11:36 AM    Glucose 117 (H) 07/11/2020 11:36 AM    BUN 19 07/11/2020 11:36 AM    Creatinine 0.78 07/11/2020 11:36 AM    BUN/Creatinine ratio 24 (H) 07/11/2020 11:36 AM    GFR est AA >60 07/11/2020 11:36 AM    GFR est non-AA >60 07/11/2020 11:36 AM    Calcium 8.9 07/11/2020 11:36 AM     Lab Results   Component Value Date/Time    Cholesterol, total 186 11/06/2019 02:16 AM    HDL Cholesterol 70 11/06/2019 02:16 AM    LDL, calculated 79.2 11/06/2019 02:16 AM    VLDL, calculated 36.8 11/06/2019 02:16 AM    Triglyceride 184 (H) 11/06/2019 02:16 AM    CHOL/HDL Ratio 2.7 11/06/2019 02:16 AM     Lab Results   Component Value Date/Time    TSH 2.43 11/06/2019 02:16 AM     No results found for: PSA, PSA2, Justus Daniels, [id number], [id number]  Lab Results   Component Value Date/Time    Hemoglobin A1c 5.3 11/06/2019 02:16 AM     No results found for: Marce RiedelCONNOR3RIANDRÉS    Lab Results   Component Value Date/Time    ALT (SGPT) 24 05/11/2020 05:06 PM    Alk. phosphatase 96 05/11/2020 05:06 PM    Bilirubin, total 0.6 05/11/2020 05:06 PM           Assessment/Plan:    1. Gastroesophageal reflux disease without esophagitis  2. Acid indigestion  - pantoprazole (PROTONIX) 40 mg tablet; Take 1 Tablet by mouth Before breakfast and dinner. Indications: indigestion, gastroesophageal reflux disease  Dispense: 60 Tablet; Refill: 1  - continue with famotidine at bedtime  Discussed avoiding coffee  Make appointment with Dr Hair Braun ( her GI)           Anuel Crenshaw MD    This is an established visit conducted via phone, spent 15 minutes . The patient has been instructed that this meets HIPAA criteria and acknowledges and agrees to this method of visitation.

## 2021-06-21 NOTE — PROGRESS NOTES
Reviewed record in preparation for visit and have obtained necessary documentation. Identified pt with two pt identifiers(name and ). Chief Complaint   Patient presents with    GERD       Health Maintenance Due   Topic Date Due    Hepatitis C Test  Never done    Eye Exam  Never done    DTaP/Tdap/Td  (1 - Tdap) Never done    Shingles Vaccine (2 of 2) 2019    Albumin Urine Test  2020    Cholesterol Test   2020    Diabetic Foot Care  2020       Ms. Yannick Quarles has a reminder for a \"due or due soon\" health maintenance. I have asked that she discuss this further with her primary care provider for follow-up on this health maintenance. Coordination of Care Questionnaire:  :     1) Have you been to an emergency room, urgent care clinic since your last visit? no   Hospitalized since your last visit? no             2) Have you seen or consulted any other health care providers outside of [address] [place] [address] since your last visit? no  (Include any pap smears or colon screenings in this section.)    3) In the event something were to happen to you and you were unable to speak on your behalf, do you have an Advance Directive/ Living Will in place stating your wishes? NO    Do you have an Advance Directive on file? no    4) Are you interested in receiving information on Advance Directives? NO    Patient is accompanied by self I have received verbal consent from Bernabe Day to discuss any/all medical information while they are present in the room.

## 2021-07-17 DIAGNOSIS — K21.9 GASTROESOPHAGEAL REFLUX DISEASE WITHOUT ESOPHAGITIS: ICD-10-CM

## 2021-07-19 RX ORDER — FAMOTIDINE 20 MG/1
TABLET, FILM COATED ORAL
Qty: 30 TABLET | Refills: 0 | Status: SHIPPED | OUTPATIENT
Start: 2021-07-19

## 2021-07-29 DIAGNOSIS — F41.9 ANXIETY: ICD-10-CM

## 2021-07-29 RX ORDER — ATORVASTATIN CALCIUM 20 MG/1
TABLET, FILM COATED ORAL
Qty: 90 TABLET | Refills: 0 | Status: SHIPPED | OUTPATIENT
Start: 2021-07-29 | End: 2021-10-22

## 2021-07-29 RX ORDER — NORTRIPTYLINE HYDROCHLORIDE 10 MG/1
CAPSULE ORAL
Qty: 90 CAPSULE | Refills: 0 | Status: SHIPPED | OUTPATIENT
Start: 2021-07-29 | End: 2021-09-09

## 2021-07-30 RX ORDER — CLONAZEPAM 0.5 MG/1
TABLET ORAL
Qty: 60 TABLET | Refills: 0 | OUTPATIENT
Start: 2021-07-30

## 2021-08-02 RX ORDER — CLONAZEPAM 0.5 MG/1
TABLET ORAL
Qty: 60 TABLET | Refills: 0 | Status: SHIPPED | OUTPATIENT
Start: 2021-08-02 | End: 2021-08-31

## 2021-08-09 ENCOUNTER — OFFICE VISIT (OUTPATIENT)
Dept: CARDIOLOGY CLINIC | Age: 81
End: 2021-08-09
Payer: MEDICARE

## 2021-08-09 DIAGNOSIS — I63.30 CEREBROVASCULAR ACCIDENT (CVA) DUE TO THROMBOSIS OF CEREBRAL ARTERY (HCC): Primary | ICD-10-CM

## 2021-08-09 DIAGNOSIS — Z45.09 ENCOUNTER FOR LOOP RECORDER CHECK: ICD-10-CM

## 2021-08-09 PROCEDURE — 93298 REM INTERROG DEV EVAL SCRMS: CPT | Performed by: INTERNAL MEDICINE

## 2021-08-13 ENCOUNTER — OFFICE VISIT (OUTPATIENT)
Dept: INTERNAL MEDICINE CLINIC | Age: 81
End: 2021-08-13
Payer: MEDICARE

## 2021-08-13 VITALS
WEIGHT: 150 LBS | HEIGHT: 62 IN | TEMPERATURE: 98.2 F | BODY MASS INDEX: 27.6 KG/M2 | DIASTOLIC BLOOD PRESSURE: 77 MMHG | RESPIRATION RATE: 16 BRPM | OXYGEN SATURATION: 98 % | HEART RATE: 72 BPM | SYSTOLIC BLOOD PRESSURE: 126 MMHG

## 2021-08-13 DIAGNOSIS — I10 ESSENTIAL HYPERTENSION: ICD-10-CM

## 2021-08-13 DIAGNOSIS — I70.223 REST PAIN OF BOTH LOWER EXTREMITIES DUE TO ATHEROSCLEROSIS (HCC): Primary | ICD-10-CM

## 2021-08-13 DIAGNOSIS — K21.9 GASTROESOPHAGEAL REFLUX DISEASE WITHOUT ESOPHAGITIS: ICD-10-CM

## 2021-08-13 DIAGNOSIS — D47.2 MGUS (MONOCLONAL GAMMOPATHY OF UNKNOWN SIGNIFICANCE): ICD-10-CM

## 2021-08-13 DIAGNOSIS — Z86.73 HX OF COMPLETED STROKE: ICD-10-CM

## 2021-08-13 DIAGNOSIS — F41.9 ANXIETY: ICD-10-CM

## 2021-08-13 PROCEDURE — 1101F PT FALLS ASSESS-DOCD LE1/YR: CPT | Performed by: INTERNAL MEDICINE

## 2021-08-13 PROCEDURE — G8536 NO DOC ELDER MAL SCRN: HCPCS | Performed by: INTERNAL MEDICINE

## 2021-08-13 PROCEDURE — G8419 CALC BMI OUT NRM PARAM NOF/U: HCPCS | Performed by: INTERNAL MEDICINE

## 2021-08-13 PROCEDURE — G8427 DOCREV CUR MEDS BY ELIG CLIN: HCPCS | Performed by: INTERNAL MEDICINE

## 2021-08-13 PROCEDURE — G8754 DIAS BP LESS 90: HCPCS | Performed by: INTERNAL MEDICINE

## 2021-08-13 PROCEDURE — G8510 SCR DEP NEG, NO PLAN REQD: HCPCS | Performed by: INTERNAL MEDICINE

## 2021-08-13 PROCEDURE — G8752 SYS BP LESS 140: HCPCS | Performed by: INTERNAL MEDICINE

## 2021-08-13 PROCEDURE — 1090F PRES/ABSN URINE INCON ASSESS: CPT | Performed by: INTERNAL MEDICINE

## 2021-08-13 PROCEDURE — 99214 OFFICE O/P EST MOD 30 MIN: CPT | Performed by: INTERNAL MEDICINE

## 2021-08-13 NOTE — PROGRESS NOTES
Room: 6D    Identified pt with two pt identifiers(name and ). Reviewed record in preparation for visit and have obtained necessary documentation. All patient medications has been reviewed. Chief Complaint   Patient presents with    Follow-up     3 month       Health Maintenance Due   Topic    Eye Exam Retinal or Dilated     DTaP/Tdap/Td series (1 - Tdap)    Shingrix Vaccine Age 50> (2 of 2)    MICROALBUMIN Q1     Lipid Screen     Foot Exam Q1        Vitals:    21 1117   BP: 126/77   Pulse: 72   Resp: 16   Temp: 98.2 °F (36.8 °C)   TempSrc: Temporal   SpO2: 98%   Weight: 150 lb (68 kg)   Height: 5' 2\" (1.575 m)   PainSc:   0 - No pain       4. Have you been to the ER, urgent care clinic since your last visit? Hospitalized since your last visit? No    5. Have you seen or consulted any other health care providers outside of the [address] [place] [address] since your last visit? Include any pap smears or colon screening. No    Patient is accompanied by self and  I have received verbal consent from Susan Alvares to discuss any/all medical information while they are present in the room.

## 2021-08-13 NOTE — PROGRESS NOTES
Ms. Analia Slater is presenting to follow up     CC:  Follow-up (3 month)       HPI:  cramps : Intermittent cramps mostly at nighttime. Patient is on Requip for restless leg syndrome 1 mg at nighttime. Pain in legs is ongoing    For high cholesterol she is taking Lipitor 20 mg daily  Tolerating medication well     HTN normal BP Today 126/74   -patient is currently on verapamil 120 mg daily and Benicar 5 mg daily. Blood pressure is 126/77. Patient denies dizziness chest pain shortness of breath and claudication symptoms        Anxiety: controlled on clonazepam and SSRI. On clonazepam 0.5mg BID and lexapro and pamelor. Denies dizziness      Hx of completed stroke on plavix and statin     MGUS (monoclonal gammopathy of unknown significance) followed by Dr Dion Cotter   has appointment coming up       Patient reports fall  Lost balance / leading to a subdural hematoma and followed up with neuro and stable. 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)     GERD (gastroesophageal reflux disease)     Hypercholesteremia     Hypertension     Idiopathic small and large fiber sensory neuropathy     Limited peripheral vision of right eye     complication of cataract surgery    Long term current use of anticoagulant therapy     Meniere disease     MGUS (monoclonal gammopathy of unknown significance)     Monoclonal gammopathy of unknown significance     hem/onc:  Dr Dion Cotter [phone]    Osteoporosis     Psoriasis     bilateral Legs    Restless leg     [facility] 2012 (Rt ankle) and basal cell (Lt eye) removed    Transient ischemic attack approx 2005    HAS HAD 2 MINISTROKES-NO DEFICITS; neuro Dr Carlos Eduardo Damian (cc)    Unspecified adverse effect of anesthesia     HAS NEEDED PORT OR CUTDOWN FOR RECVNG BLOOD OR LONG-TERM IVs; surgery 7/28/15 w/o any difficulty    Unspecified sleep apnea     does not use CPAP        Past Surgical History:   Procedure Laterality Date    HX APPENDECTOMY      HX CATARACT REMOVAL  2011    Right    HX CERVICAL FUSION  1/28/15    C5-7    HX CHOLECYSTECTOMY  2000    HX GI  2010    liver bx due to liver Bx due to elevated enzymes, liver laceration during Bx, hospitalized 10-12d    HX HEENT  7/28/15    Lt total parotidectomy with facial nerve dissection: Dr Geronimo Lewis HX HEENT  11/2015    Right parotidectomy    HX HYSTERECTOMY      HX ORTHOPAEDIC  2009 & 2011    LUMBAR FUSION and revision    HX OTHER SURGICAL  2010    MELANOMA (Rt ankle) and basal cell (Lt eye) removed; q6mo ck by DERMATOLOGY    HX SHOULDER REPLACEMENT Left 08/01/2016    HX THYROIDECTOMY      Right    NC COLON CA SCRN NOT HI RSK IND  1/27/2016         NC INSERTION SUBQ CARDIAC RHYTHM MONITOR W/PRGRMG N/A 11/6/2019    LOOP RECORDER INSERT performed by Claritza Leone MD at hospitals CARDIAC CATH LAB       Allergies   Allergen Reactions    Aspirin Nausea and Vomiting    Bee Sting [Sting, Bee] Hives and Shortness of Breath     WASP STING    Codeine Nausea and Vomiting    Hydrocodone Rash     And sedation    Lisinopril Cough    Morphine Rash    Nsaids (Non-Steroidal Anti-Inflammatory Drug) Nausea and Vomiting    Oxycodone Rash     On her back    Penicillins Hives, Shortness of Breath and Swelling       Current Outpatient Medications on File Prior to Visit   Medication Sig Dispense Refill    clonazePAM (KlonoPIN) 0.5 mg tablet TAKE 1 TABLET BY MOUTH TWICE DAILY .  DO NOT EXCEED 2 PER 24 HOURS 60 Tablet 0    atorvastatin (LIPITOR) 20 mg tablet Take 1 tablet by mouth once daily 90 Tablet 0    nortriptyline (PAMELOR) 10 mg capsule TAKE 1 TO 2 CAPSULES BY MOUTH NIGHTLY 90 Capsule 0    famotidine (PEPCID) 20 mg tablet Take 1 tablet by mouth nightly 30 Tablet 0    pantoprazole (PROTONIX) 40 mg tablet Take 1 Tablet by mouth Before breakfast and dinner. Indications: indigestion, gastroesophageal reflux disease 60 Tablet 1    verapamil ER (CALAN-SR) 120 mg tablet TAKE 1 TABLET BY MOUTH NIGHTLY FOR HIGH BLOOD PRESSURE 90 Tablet 0    clopidogreL (PLAVIX) 75 mg tab Take 1 tablet by mouth nightly 90 Tablet 0    rOPINIRole (REQUIP) 1 mg tablet Take 1 tablet by mouth nightly 90 Tablet 0    escitalopram oxalate (LEXAPRO) 20 mg tablet TAKE 1 TABLET BY MOUTH NIGHTLY FOR ANXIETY AND FOR DEPRESSION 90 Tablet 0    cholecalciferol, vitamin D3, (VITAMIN D3 PO) Take  by mouth.  ipratropium-albuteroL (Combivent Respimat)  mcg/actuation inhaler Take 1 Puff by inhalation every four (4) hours as needed for Shortness of Breath.  diphenhydrAMINE-zinc acetate 2%-0.1% (BENADRYL) 2-0.1 % topical cream Apply  to affected area three (3) times daily as needed for Itching. apply a thin layer   Indications: skin irritation 30 g 0    levocetirizine (Xyzal) 5 mg tablet Take 1 Tab by mouth daily as needed for Allergies. 30 Tab 0    primidone (MYSOLINE) 50 mg tablet Take 1 Tab by mouth nightly. 90 Tab 1    multivit/folic acid/vit K1 (ONE-A-DAY WOMEN'S 50 PLUS PO) Take 1 Tab by mouth daily.  albuterol (PROVENTIL HFA, VENTOLIN HFA, PROAIR HFA) 90 mcg/actuation inhaler Take 1 Puff by inhalation every four (4) hours as needed for Wheezing. 1 Inhaler 0    polyethylene glycol (MIRALAX) 17 gram packet Take 17 g by mouth daily as needed (constipation).       varicella-zoster recombinant, PF, (Shingrix, PF,) 50 mcg/0.5 mL susr injection 0.5mL by IntraMUSCular route once now and then repeat in 2-6 months (Patient not taking: Reported on 8/13/2021) 0.5 mL 1    [DISCONTINUED] nortriptyline (PAMELOR) 10 mg capsule TAKE 1 TO 2 CAPSULES BY MOUTH NIGHTLY 90 Cap 0    [DISCONTINUED] verapamil ER (CALAN-SR) 120 mg tablet TAKE 1 TABLET BY MOUTH NIGHTLY FOR HIGH BLOOD PRESSURE 90 Tab 0    [DISCONTINUED] rOPINIRole (REQUIP) 1 mg tablet Take 1 tablet by mouth nightly 90 Tab 0    [DISCONTINUED] escitalopram oxalate (LEXAPRO) 20 mg tablet TAKE 1 TABLET BY MOUTH NIGHTLY FOR ANXIETY AND FOR DEPRESSION 90 Tab 0    fluticasone propionate (FLONASE) 50 mcg/actuation nasal spray 2 Sprays by Both Nostrils route daily. (Patient not taking: Reported on 2021) 1 Bottle 0     No current facility-administered medications on file prior to visit. family history includes Alcohol abuse in her brother and father; Breast Cancer in her maternal aunt; COPD in her brother; Cancer in her sister; Emphysema in her father; Heart Disease in her mother; Other in her mother. Social History     Socioeconomic History    Marital status:      Spouse name: Not on file    Number of children: Not on file    Years of education: Not on file    Highest education level: Not on file   Occupational History    Not on file   Tobacco Use    Smoking status: Former Smoker     Years: 15.00     Quit date: 7/15/1975     Years since quittin.1    Smokeless tobacco: Never Used   Vaping Use    Vaping Use: Never used   Substance and Sexual Activity    Alcohol use: Yes     Comment: may have a glass of wine on rare occasion    Drug use: Yes     Types: Prescription, OTC    Sexual activity: Not Currently   Other Topics Concern    Not on file   Social History Narrative    Lives in [place] with  of 62 years. Has 2 sons and 1 daughter. Used to work in a bank. Likes to garden and sew.       Social Determinants of Health     Financial Resource Strain:     Difficulty of Paying Living Expenses:    Food Insecurity:     Worried About Running Out of Food in the Last Year:     [address] in the Last Year:    Causata Needs:     Lack of Transportation (Medical):  Lack of Transportation (Non-Medical):    Physical Activity:     Days of Exercise per Week:     Minutes of Exercise per Session:    Stress:     Feeling of Stress :    Social Connections:     Frequency of Communication with Friends and Family:     Frequency of Social Gatherings with Friends and Family:     Attends Taoism Services:     Active Member of Clubs or Organizations:     Attends Club or Organization Meetings:     Marital Status:    Intimate Partner Violence:     Fear of Current or Ex-Partner:     Emotionally Abused:     Physically Abused:     Sexually Abused:        Visit Vitals  /77 (BP 1 Location: Left arm, BP Patient Position: Sitting)   Pulse 72   Temp 98.2 °F (36.8 °C) (Temporal)   Resp 16   Ht 5' 2\" (1.575 m)   Wt 150 lb (68 kg)   LMP  (LMP Unknown)   SpO2 98%   BMI 27.44 kg/m²     General:  Well appearing female no acute distress  HEENT:   PERRL,normal conjunctiva. External ear and canals normal, TMs normal.  Hearing normal to voice. Nose without edema or discharge, normal septum. Lips, teeth, gums normal.  Oropharynx: no erythema, no exudates, no lesions, normal tongue. Neck:  Supple. Thyroid normal size, nontender, without nodules. No carotid bruit. No masses or lymphadenopathy  Respiratory: no respiratory distress,  no wheezing, no rhonchi, no rales. No chest wall tenderness. Cardiovascular:  RRR, normal S1S2, no murmur. Gastrointestinal: normal bowel sounds, soft, nontender, without masses. No hepatosplenomegaly. Extremities decreased pedal pules, warm extremities, varicose veins, no edema, normal sensation   Musculoskeletal:  Normal gait. Normal digits and nails. Normal strength and tone, no atrophy, and no abnormal movement. Skin:  No rash, no lesions, no ulcers. Skin warm, normal turgor, without induration or nodules. Neuro:  A and OX4, fluent speech, cranial nerves normal 2-12.   Psych:  Normal affect      Lab Results   Component Value Date/Time    WBC 4.5 07/11/2020 11:36 AM    HGB 11.7 07/11/2020 11:36 AM    HCT 37.2 07/11/2020 11:36 AM    PLATELET 344 37/56/5680 11:36 AM    MCV 96.4 07/11/2020 11:36 AM     Lab Results   Component Value Date/Time    Sodium 140 07/11/2020 11:36 AM    Potassium 4.0 07/11/2020 11:36 AM    Chloride 108 07/11/2020 11:36 AM    CO2 26 07/11/2020 11:36 AM    Anion gap 6 07/11/2020 11:36 AM    Glucose 117 (H) 07/11/2020 11:36 AM    BUN 19 07/11/2020 11:36 AM    Creatinine 0.78 07/11/2020 11:36 AM    BUN/Creatinine ratio 24 (H) 07/11/2020 11:36 AM    GFR est AA >60 07/11/2020 11:36 AM    GFR est non-AA >60 07/11/2020 11:36 AM    Calcium 8.9 07/11/2020 11:36 AM     Lab Results   Component Value Date/Time    Cholesterol, total 186 11/06/2019 02:16 AM    HDL Cholesterol 70 11/06/2019 02:16 AM    LDL, calculated 79.2 11/06/2019 02:16 AM    VLDL, calculated 36.8 11/06/2019 02:16 AM    Triglyceride 184 (H) 11/06/2019 02:16 AM    CHOL/HDL Ratio 2.7 11/06/2019 02:16 AM     Lab Results   Component Value Date/Time    TSH 2.43 11/06/2019 02:16 AM     Lab Results   Component Value Date/Time    Hemoglobin A1c 5.3 11/06/2019 02:16 AM     No results found for: Erinn Pritchard, XQVID3, XQVID, VD3RIA                Assessment and Plan:     1. Leg pain  Long hx of leg pain and treated for restless leg will obtain   - ANKLE BRACHIAL INDEX; Future  Continue requip for restless leg   2. Anxiety  Controlled on pamelor and klonopin    3. Gastroesophageal reflux disease without esophagitis  Controlled     4. Essential hypertension  Controlled on verapamil     5. Hx of completed stroke  BP at goal of 130/80 or less, on statin and plavix     6.  MGUS (monoclonal gammopathy of unknown significance)  Followed by Dr Tom Guy     Do labs that were previously ordered today   Schedule ankle brachial index to look into the circulation of your leg  Obtain Tdap and shingles vaccine at pharmacy       Brett Collins MD

## 2021-08-13 NOTE — PATIENT INSTRUCTIONS
Do labs that were previously ordered today   Schedule ankle brachial index to look into the circulation of your leg  Obtain Tdap and shingles vaccine at pharmacy

## 2021-08-30 DIAGNOSIS — F41.9 ANXIETY: ICD-10-CM

## 2021-08-31 RX ORDER — CLONAZEPAM 0.5 MG/1
TABLET ORAL
Qty: 60 TABLET | Refills: 0 | Status: SHIPPED | OUTPATIENT
Start: 2021-08-31 | End: 2021-10-01

## 2021-09-09 RX ORDER — NORTRIPTYLINE HYDROCHLORIDE 10 MG/1
CAPSULE ORAL
Qty: 90 CAPSULE | Refills: 0 | Status: SHIPPED | OUTPATIENT
Start: 2021-09-09 | End: 2021-09-17

## 2021-09-10 RX ORDER — ROPINIROLE 1 MG/1
TABLET, FILM COATED ORAL
Qty: 90 TABLET | Refills: 0 | Status: SHIPPED | OUTPATIENT
Start: 2021-09-10 | End: 2021-12-17

## 2021-09-10 RX ORDER — ESCITALOPRAM OXALATE 20 MG/1
TABLET ORAL
Qty: 90 TABLET | Refills: 0 | Status: SHIPPED | OUTPATIENT
Start: 2021-09-10 | End: 2021-12-06

## 2021-09-17 RX ORDER — VERAPAMIL HYDROCHLORIDE 120 MG/1
TABLET, FILM COATED, EXTENDED RELEASE ORAL
Qty: 90 TABLET | Refills: 0 | Status: SHIPPED | OUTPATIENT
Start: 2021-09-17 | End: 2021-12-16

## 2021-09-17 RX ORDER — NORTRIPTYLINE HYDROCHLORIDE 10 MG/1
CAPSULE ORAL
Qty: 90 CAPSULE | Refills: 0 | Status: SHIPPED | OUTPATIENT
Start: 2021-09-17 | End: 2021-12-16

## 2021-09-17 RX ORDER — CLOPIDOGREL BISULFATE 75 MG/1
TABLET ORAL
Qty: 90 TABLET | Refills: 0 | Status: SHIPPED | OUTPATIENT
Start: 2021-09-17 | End: 2021-12-16

## 2021-09-30 DIAGNOSIS — F41.9 ANXIETY: ICD-10-CM

## 2021-10-01 RX ORDER — CLONAZEPAM 0.5 MG/1
TABLET ORAL
Qty: 60 TABLET | Refills: 0 | Status: SHIPPED | OUTPATIENT
Start: 2021-10-01 | End: 2021-10-30

## 2021-10-06 ENCOUNTER — TELEPHONE (OUTPATIENT)
Dept: INTERNAL MEDICINE CLINIC | Age: 81
End: 2021-10-06
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
## 2021-10-06 NOTE — TELEPHONE ENCOUNTER
#[phone]  Pt states she needs a call back to schedule an appt to get DMV forms completed so that she can continue to drive. These were done last year as well. Pt is asking for a call back.

## 2021-10-11 NOTE — TELEPHONE ENCOUNTER
----- Message from Pepe Nguyen sent at 10/11/2021  2:27 PM EDT -----  Regarding: Dr Shana Espino first and last name:pt      Reason for [place] [id number] health report      Callback required yes/no and why: ues to verify ready for       Best contact number(s):[phone]      Details to clarify the request: Pt is requesting a call back for an update on the status on DM health report that is due to [facility] by 10/31/21.       Message from [facility]

## 2021-10-14 NOTE — TELEPHONE ENCOUNTER
Patient called to check status. Pt advised per not by Tammy Nunn that form is in doctors office awaiting completion    Pt states that form needs to be turned in by October 31 and asks for it as soon as possible. Please call to update when sent.   Pt phone: [phone] home   Cell : [phone]  (voicemail ok per patient

## 2021-10-15 ENCOUNTER — TELEPHONE (OUTPATIENT)
Dept: INTERNAL MEDICINE CLINIC | Age: 81
End: 2021-10-15

## 2021-10-15 NOTE — TELEPHONE ENCOUNTER
Called patient. Patients form has been completed. Patient can come by the office and pick the form up.    Patient will come by today 10/15/2021 to  form

## 2021-10-18 ENCOUNTER — TRANSCRIBE ORDER (OUTPATIENT)
Dept: SCHEDULING | Age: 81
End: 2021-10-18

## 2021-10-18 DIAGNOSIS — Z12.31 VISIT FOR SCREENING MAMMOGRAM: Primary | ICD-10-CM

## 2021-10-22 RX ORDER — ATORVASTATIN CALCIUM 20 MG/1
TABLET, FILM COATED ORAL
Qty: 90 TABLET | Refills: 0 | Status: SHIPPED | OUTPATIENT
Start: 2021-10-22 | End: 2022-02-02

## 2021-10-30 DIAGNOSIS — K30 ACID INDIGESTION: ICD-10-CM

## 2021-10-30 DIAGNOSIS — F41.9 ANXIETY: ICD-10-CM

## 2021-10-30 DIAGNOSIS — K21.9 GASTROESOPHAGEAL REFLUX DISEASE WITHOUT ESOPHAGITIS: ICD-10-CM

## 2021-10-30 RX ORDER — PANTOPRAZOLE SODIUM 40 MG/1
TABLET, DELAYED RELEASE ORAL
Qty: 60 TABLET | Refills: 0 | Status: SHIPPED | OUTPATIENT
Start: 2021-10-30 | End: 2022-01-03

## 2021-10-30 RX ORDER — CLONAZEPAM 0.5 MG/1
TABLET ORAL
Qty: 60 TABLET | Refills: 0 | Status: SHIPPED | OUTPATIENT
Start: 2021-10-30 | End: 2021-12-05

## 2021-11-08 ENCOUNTER — OFFICE VISIT (OUTPATIENT)
Dept: CARDIOLOGY CLINIC | Age: 81
End: 2021-11-08
Payer: MEDICARE

## 2021-11-08 DIAGNOSIS — I63.30 CEREBROVASCULAR ACCIDENT (CVA) DUE TO THROMBOSIS OF CEREBRAL ARTERY (HCC): ICD-10-CM

## 2021-11-08 DIAGNOSIS — Z45.09 ENCOUNTER FOR LOOP RECORDER CHECK: Primary | ICD-10-CM

## 2021-11-08 PROCEDURE — 93298 REM INTERROG DEV EVAL SCRMS: CPT | Performed by: INTERNAL MEDICINE

## 2021-11-18 ENCOUNTER — HOSPITAL ENCOUNTER (OUTPATIENT)
Dept: MAMMOGRAPHY | Age: 81
Discharge: HOME | End: 2021-11-18
Attending: INTERNAL MEDICINE
Payer: MEDICARE

## 2021-11-18 DIAGNOSIS — Z12.31 VISIT FOR SCREENING MAMMOGRAM: ICD-10-CM

## 2021-11-18 PROCEDURE — 77067 SCR MAMMO BI INCL CAD: CPT

## 2021-12-05 DIAGNOSIS — F41.9 ANXIETY: ICD-10-CM

## 2021-12-05 RX ORDER — CLONAZEPAM 0.5 MG/1
TABLET ORAL
Qty: 60 TABLET | Refills: 0 | Status: SHIPPED | OUTPATIENT
Start: 2021-12-05 | End: 2022-01-03

## 2021-12-06 RX ORDER — ESCITALOPRAM OXALATE 20 MG/1
TABLET ORAL
Qty: 90 TABLET | Refills: 0 | Status: SHIPPED | OUTPATIENT
Start: 2021-12-06 | End: 2022-03-03

## 2021-12-10 ENCOUNTER — TELEPHONE (OUTPATIENT)
Dept: INTERNAL MEDICINE CLINIC | Age: 81
End: 2021-12-10

## 2021-12-10 NOTE — TELEPHONE ENCOUNTER
I attempted to leave the patient a message; however, her mailbox is full.  would like to find out if the pt is still taking Primidone. 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   Signed By: Yasemin Hernandez LPN     December 10, 2021

## 2021-12-10 NOTE — TELEPHONE ENCOUNTER
I left the pt a message to call me back at the office re: to find out if the patient is still taking Primidone.    Signed By: Ismael Brown LPN     December 10, 2021

## 2021-12-13 ENCOUNTER — TELEPHONE (OUTPATIENT)
Dept: INTERNAL MEDICINE CLINIC | Age: 81
End: 2021-12-13

## 2021-12-13 NOTE — TELEPHONE ENCOUNTER
I spoke to the patient and let her know Timothy Cao wrote a letter and I faxed it to [facility].   Signed By: Abdi Oscar, MYA     December 13, 2021

## 2021-12-13 NOTE — LETTER
NOTIFICATION RETURN TO WORK / SCHOOL    12/13/2021 4:15 PM    Ms. Nathaly Jimenez  [facility] [address]  [address] [address]      To Whom It May Concern:    Nathaly Jimenez is currently under the care of [facility]. Patient is not on primidone and has not been taking medication for several months. If there are questions or concerns please have the patient contact our office.         Sincerely,      Todd Salgado MD

## 2021-12-13 NOTE — TELEPHONE ENCOUNTER
I spoke to the pt and asked her if she was taking Primidone, she stated she does not remember ever taking it. I told her I would let  know. She needs a letter from  stating she does not take Primidone to reverse the suspension of her drivers license.    Signed By: Codie Urban LPN     December 13, 2021

## 2021-12-16 RX ORDER — NORTRIPTYLINE HYDROCHLORIDE 10 MG/1
CAPSULE ORAL
Qty: 90 CAPSULE | Refills: 0 | Status: SHIPPED | OUTPATIENT
Start: 2021-12-16 | End: 2022-02-02

## 2021-12-16 RX ORDER — VERAPAMIL HYDROCHLORIDE 120 MG/1
TABLET, FILM COATED, EXTENDED RELEASE ORAL
Qty: 90 TABLET | Refills: 0 | Status: SHIPPED | OUTPATIENT
Start: 2021-12-16 | End: 2022-03-18

## 2021-12-16 RX ORDER — CLOPIDOGREL BISULFATE 75 MG/1
TABLET ORAL
Qty: 90 TABLET | Refills: 0 | Status: SHIPPED | OUTPATIENT
Start: 2021-12-16 | End: 2022-03-18

## 2021-12-17 RX ORDER — ROPINIROLE 1 MG/1
TABLET, FILM COATED ORAL
Qty: 90 TABLET | Refills: 0 | Status: SHIPPED | OUTPATIENT
Start: 2021-12-17

## 2022-01-03 DIAGNOSIS — K21.9 GASTROESOPHAGEAL REFLUX DISEASE WITHOUT ESOPHAGITIS: ICD-10-CM

## 2022-01-03 DIAGNOSIS — F41.9 ANXIETY: ICD-10-CM

## 2022-01-03 DIAGNOSIS — K30 ACID INDIGESTION: ICD-10-CM

## 2022-01-03 RX ORDER — CLONAZEPAM 0.5 MG/1
TABLET ORAL
Qty: 60 TABLET | Refills: 0 | Status: SHIPPED | OUTPATIENT
Start: 2022-01-03 | End: 2022-02-02

## 2022-01-03 RX ORDER — PANTOPRAZOLE SODIUM 40 MG/1
TABLET, DELAYED RELEASE ORAL
Qty: 60 TABLET | Refills: 0 | Status: SHIPPED | OUTPATIENT
Start: 2022-01-03 | End: 2022-03-03

## 2022-01-11 ENCOUNTER — OFFICE VISIT (OUTPATIENT)
Dept: INTERNAL MEDICINE CLINIC | Age: 82
End: 2022-01-11
Payer: MEDICARE

## 2022-01-11 VITALS
SYSTOLIC BLOOD PRESSURE: 101 MMHG | RESPIRATION RATE: 17 BRPM | HEIGHT: 62 IN | TEMPERATURE: 97.9 F | HEART RATE: 71 BPM | BODY MASS INDEX: 27.79 KG/M2 | DIASTOLIC BLOOD PRESSURE: 66 MMHG | OXYGEN SATURATION: 94 % | WEIGHT: 151 LBS

## 2022-01-11 DIAGNOSIS — E11.42 DIABETIC PERIPHERAL NEUROPATHY ASSOCIATED WITH TYPE 2 DIABETES MELLITUS (HCC): ICD-10-CM

## 2022-01-11 DIAGNOSIS — E78.00 HIGH CHOLESTEROL: ICD-10-CM

## 2022-01-11 DIAGNOSIS — F41.9 ANXIETY: Primary | ICD-10-CM

## 2022-01-11 DIAGNOSIS — K21.9 GASTROESOPHAGEAL REFLUX DISEASE WITHOUT ESOPHAGITIS: ICD-10-CM

## 2022-01-11 DIAGNOSIS — I70.223 REST PAIN OF BOTH LOWER EXTREMITIES DUE TO ATHEROSCLEROSIS (HCC): ICD-10-CM

## 2022-01-11 DIAGNOSIS — D47.2 MGUS (MONOCLONAL GAMMOPATHY OF UNKNOWN SIGNIFICANCE): ICD-10-CM

## 2022-01-11 DIAGNOSIS — I10 ESSENTIAL HYPERTENSION: ICD-10-CM

## 2022-01-11 PROCEDURE — 99214 OFFICE O/P EST MOD 30 MIN: CPT | Performed by: INTERNAL MEDICINE

## 2022-01-11 PROCEDURE — G8752 SYS BP LESS 140: HCPCS | Performed by: INTERNAL MEDICINE

## 2022-01-11 PROCEDURE — 1101F PT FALLS ASSESS-DOCD LE1/YR: CPT | Performed by: INTERNAL MEDICINE

## 2022-01-11 PROCEDURE — G8427 DOCREV CUR MEDS BY ELIG CLIN: HCPCS | Performed by: INTERNAL MEDICINE

## 2022-01-11 PROCEDURE — G8754 DIAS BP LESS 90: HCPCS | Performed by: INTERNAL MEDICINE

## 2022-01-11 PROCEDURE — G8536 NO DOC ELDER MAL SCRN: HCPCS | Performed by: INTERNAL MEDICINE

## 2022-01-11 PROCEDURE — 1090F PRES/ABSN URINE INCON ASSESS: CPT | Performed by: INTERNAL MEDICINE

## 2022-01-11 PROCEDURE — G8419 CALC BMI OUT NRM PARAM NOF/U: HCPCS | Performed by: INTERNAL MEDICINE

## 2022-01-11 PROCEDURE — G8510 SCR DEP NEG, NO PLAN REQD: HCPCS | Performed by: INTERNAL MEDICINE

## 2022-01-11 NOTE — PROGRESS NOTES
Identified pt with two pt identifiers(name and ). Reviewed record in preparation for visit and have obtained necessary documentation. Chief Complaint   Patient presents with    Follow-up        Vitals:    22 1022   BP: 101/66   Pulse: 71   Resp: 17   Temp: 97.9 °F (36.6 °C)   TempSrc: Temporal   SpO2: 94%   Weight: 151 lb (68.5 kg)   Height: 5' 2\" (1.575 m)   PainSc:   0 - No pain       Health Maintenance Due   Topic    Eye Exam Retinal or Dilated     DTaP/Tdap/Td series (1 - Tdap)    Shingrix Vaccine Age 50> (2 of 2)    MICROALBUMIN Q1     Lipid Screen     COVID-19 Vaccine (2 - Booster for [employer] series)       Coordination of Care Questionnaire:  :   1) Have you been to an emergency room, urgent care, or hospitalized since your last visit? If yes, where when, and reason for visit? no       2. Have seen or consulted any other health care provider since your last visit? If yes, where when, and reason for visit? NO      Patient is accompanied by  I have received verbal consent from Marcel Hatch to discuss any/all medical information while they are present in the room.

## 2022-01-11 NOTE — PROGRESS NOTES
Ms. Komal Rosen is presenting to follow up     CC:  Follow-up       HPI:    [de-identified] yo woman  With a hx of high cholesterol. HTN, anxiety and hx of stroke and MGUS, subdural hematoma presenting to follow up         cramps : Intermittent cramps mostly at nighttime. Patient is on Requip for restless leg syndrome 1 mg at nighttime. Pain in legs is ongoing    For high cholesterol she is taking Lipitor 20 mg daily  Tolerating medication well     HTN normal BP Today 101/66  -patient is currently on verapamil 120 mg daily and Benicar 5 mg daily. Patient denies dizziness chest pain shortness of breath and claudication symptoms        Anxiety: controlled on clonazepam and SSRI. On clonazepam 0.5mg BID and lexapro and pamelor. Denies dizziness      Hx of completed stroke on plavix and statin     MGUS (monoclonal gammopathy of unknown significance) followed by Dr Rusty Hansen  Last seen September 27th reviewed noted  \" M spike was small \"       Recall in the past had  fall  Lost balance / leading to a subdural hematoma and followed up with neuro and stable. 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)     GERD (gastroesophageal reflux disease)     Hypercholesteremia     Hypertension     Idiopathic small and large fiber sensory neuropathy     Limited peripheral vision of right eye     complication of cataract surgery    Long term current use of anticoagulant therapy     Meniere disease     MGUS (monoclonal gammopathy of unknown significance)     Monoclonal gammopathy of unknown significance     hem/onc:  Dr Katrin Pool [phone]    Osteoporosis     Psoriasis     bilateral Legs    Restless leg     Skin melanoma ([address] 2012    (Rt ankle) and basal cell (Lt eye) removed    Transient ischemic attack approx 2005    HAS HAD 2 MINISTROKES-NO DEFICITS; neuro Dr Bonita Lundborg (cc)    Unspecified adverse effect of anesthesia     HAS NEEDED PORT OR CUTDOWN FOR RECVNG BLOOD OR LONG-TERM IVs; surgery 7/28/15 w/o any difficulty    Unspecified sleep apnea     does not use CPAP        Past Surgical History:   Procedure Laterality Date    HX APPENDECTOMY      HX CATARACT REMOVAL  2011    Right    HX CERVICAL FUSION  1/28/15    C5-7    HX CHOLECYSTECTOMY  2000    HX GI  2010    liver bx due to liver Bx due to elevated enzymes, liver laceration during Bx, hospitalized 10-12d    HX HEENT  7/28/15    Lt total parotidectomy with facial nerve dissection: Dr Lim Barrier HX HEENT  11/2015    Right parotidectomy    HX HYSTERECTOMY      HX ORTHOPAEDIC  2009 & 2011    LUMBAR FUSION and revision    HX OTHER SURGICAL  2010    MELANOMA (Rt ankle) and basal cell (Lt eye) removed; q6mo ck by DERMATOLOGY    HX SHOULDER REPLACEMENT Left 08/01/2016    HX THYROIDECTOMY      Right    AL COLON CA SCRN NOT HI RSK IND  1/27/2016         AL INSERTION SUBQ CARDIAC RHYTHM MONITOR W/PRGRMG N/A 11/6/2019    LOOP RECORDER INSERT performed by Yohannes Nassar MD at [facility] CARDIAC CATH LAB       Allergies   Allergen Reactions    Aspirin Nausea and Vomiting    Bee Sting [Sting, Bee] Hives and Shortness of Breath     WASP STING    Codeine Nausea and Vomiting    Hydrocodone Rash     And sedation    Lisinopril Cough    Morphine Rash    Nsaids (Non-Steroidal Anti-Inflammatory Drug) Nausea and Vomiting    Oxycodone Rash     On her back    Penicillins Hives, Shortness of Breath and Swelling       Current Outpatient Medications on File Prior to Visit   Medication Sig Dispense Refill    clonazePAM (KlonoPIN) 0.5 mg tablet TAKE 1 TABLET BY MOUTH TWICE DAILY . DO NOT EXCEED 2 PER 24 HOURS 60 Tablet 0    pantoprazole (PROTONIX) 40 mg tablet TAKE 1 TABLET BY MOUTH BEFORE BREAKFAST AND BEFORE SUPPER 60 Tablet 0    rOPINIRole (REQUIP) 1 mg tablet Take 1 tablet by mouth nightly 90 Tablet 0    verapamil ER (CALAN-SR) 120 mg tablet TAKE 1 TABLET BY MOUTH NIGHTLY FOR HIGH BLOOD PRESSURE 90 Tablet 0    clopidogreL (PLAVIX) 75 mg tab Take 1 tablet by mouth nightly 90 Tablet 0    nortriptyline (PAMELOR) 10 mg capsule TAKE 1 TO 2 CAPSULES BY MOUTH NIGHTLY 90 Capsule 0    escitalopram oxalate (LEXAPRO) 20 mg tablet TAKE 1 TABLET BY MOUTH NIGHTLY FOR ANXIETY AND FOR DEPRESSION 90 Tablet 0    atorvastatin (LIPITOR) 20 mg tablet Take 1 tablet by mouth once daily 90 Tablet 0    famotidine (PEPCID) 20 mg tablet Take 1 tablet by mouth nightly 30 Tablet 0    cholecalciferol, vitamin D3, (VITAMIN D3 PO) Take  by mouth.  ipratropium-albuteroL (Combivent Respimat)  mcg/actuation inhaler Take 1 Puff by inhalation every four (4) hours as needed for Shortness of Breath.  diphenhydrAMINE-zinc acetate 2%-0.1% (BENADRYL) 2-0.1 % topical cream Apply  to affected area three (3) times daily as needed for Itching. apply a thin layer   Indications: skin irritation 30 g 0    levocetirizine (Xyzal) 5 mg tablet Take 1 Tab by mouth daily as needed for Allergies. 30 Tab 0    albuterol (PROVENTIL HFA, VENTOLIN HFA, PROAIR HFA) 90 mcg/actuation inhaler Take 1 Puff by inhalation every four (4) hours as needed for Wheezing. 1 Inhaler 0    polyethylene glycol (MIRALAX) 17 gram packet Take 17 g by mouth daily as needed (constipation).  multivit/folic acid/vit K1 (ONE-A-DAY WOMEN'S 50 PLUS PO) Take 1 Tab by mouth daily. (Patient not taking: Reported on 1/11/2022)      fluticasone propionate (FLONASE) 50 mcg/actuation nasal spray 2 Sprays by Both Nostrils route daily.  (Patient not taking: Reported on 2021) 1 Bottle 0     No current facility-administered medications on file prior to visit. family history includes Alcohol abuse in her brother and father; Breast Cancer in her maternal aunt; COPD in her brother; Cancer in her sister; Emphysema in her father; Heart Disease in her mother; Other in her mother. Social History     Socioeconomic History    Marital status:      Spouse name: Not on file    Number of children: Not on file    Years of education: Not on file    Highest education level: Not on file   Occupational History    Not on file   Tobacco Use    Smoking status: Former Smoker     Years: 15.00     Quit date: 7/15/1975     Years since quittin.11    Smokeless tobacco: Never Used   Vaping Use    Vaping Use: Never used   Substance and Sexual Activity    Alcohol use: Yes     Comment: may have a glass of wine on rare occasion    Drug use: Yes     Types: Prescription, OTC    Sexual activity: Not Currently   Other Topics Concern    Not on file   Social History Narrative    Lives in [place] with  of 62 years. Has 2 sons and 1 daughter. Used to work in a bank. Likes to garden and sew. Social Determinants of Health     Financial Resource Strain:     Difficulty of Paying Living Expenses: Not on file   Food Insecurity:     Worried About Running Out of Food in the Last Year: Not on file    Stefania of Food in the Last Year: Not on file   Transportation Needs:     Lack of Transportation (Medical): Not on file    Lack of Transportation (Non-Medical):  Not on file   Physical Activity:     Days of Exercise per Week: Not on file    Minutes of Exercise per Session: Not on file   Stress:     Feeling of Stress : Not on file   Social Connections:     Frequency of Communication with Friends and Family: Not on file    Frequency of Social Gatherings with Friends and Family: Not on file    Attends Baptism Services: Not on file   [employer] of [employer]s or Organizations: Not on file    Attends Club or Organization Meetings: Not on file    Marital Status: Not on file   Intimate Partner Violence:     Fear of Current or Ex-Partner: Not on file    Emotionally Abused: Not on file    Physically Abused: Not on file    Sexually Abused: Not on file   Housing Stability:     Unable to Pay for Housing in the Last Year: Not on file    Number of [place] in the Last Year: Not on file    Unstable Housing in the Last Year: Not on file       Visit Vitals  /66   Pulse 71   Temp 97.9 °F (36.6 °C) (Temporal)   Resp 17   Ht 5' 2\" (1.575 m)   Wt 151 lb (68.5 kg)   LMP  (LMP Unknown)   SpO2 94%   BMI 27.62 kg/m²     General:  Well appearing female no acute distress  HEENT:   PERRL,normal conjunctiva. External ear and canals normal, TMs normal.  Hearing decreased. Nose without edema or discharge, normal septum. Lips, teeth, gums normal.  Oropharynx: no erythema, no exudates, no lesions, normal tongue. Neck:  Supple. Thyroid normal size, nontender, without nodules. No carotid bruit. No masses or lymphadenopathy  Respiratory: no respiratory distress,  no wheezing, no rhonchi, no rales. No chest wall tenderness. Cardiovascular:  RRR, normal S1S2, no murmur. Gastrointestinal: normal bowel sounds, soft, nontender, without masses. No hepatosplenomegaly. Extremities decreased pedal pules, warm extremities, varicose veins, no edema, normal sensation   Musculoskeletal:  Normal gait. Normal digits and nails. Normal strength and tone, no atrophy, and no abnormal movement. Skin:  No rash, no lesions, no ulcers. Skin warm, normal turgor, without induration or nodules. Neuro:  A and OX4, fluent speech, cranial nerves normal 2-12.   Psych:  Normal affect      Lab Results   Component Value Date/Time    WBC 4.5 07/11/2020 11:36 AM    HGB 11.7 07/11/2020 11:36 AM    HCT 37.2 07/11/2020 11:36 AM    PLATELET 279 62/19/1866 11:36 AM    MCV 96.4 07/11/2020 11:36 AM     Lab Results   Component Value Date/Time    Sodium 140 07/11/2020 11:36 AM    Potassium 4.0 07/11/2020 11:36 AM    Chloride 108 07/11/2020 11:36 AM    CO2 26 07/11/2020 11:36 AM    Anion gap 6 07/11/2020 11:36 AM    Glucose 117 (H) 07/11/2020 11:36 AM    BUN 19 07/11/2020 11:36 AM    Creatinine 0.78 07/11/2020 11:36 AM    BUN/Creatinine ratio 24 (H) 07/11/2020 11:36 AM    GFR est AA >60 07/11/2020 11:36 AM    GFR est non-AA >60 07/11/2020 11:36 AM    Calcium 8.9 07/11/2020 11:36 AM     Lab Results   Component Value Date/Time    Cholesterol, total 186 11/06/2019 02:16 AM    HDL Cholesterol 70 11/06/2019 02:16 AM    LDL, calculated 79.2 11/06/2019 02:16 AM    VLDL, calculated 36.8 11/06/2019 02:16 AM    Triglyceride 184 (H) 11/06/2019 02:16 AM    CHOL/HDL Ratio 2.7 11/06/2019 02:16 AM     Lab Results   Component Value Date/Time    TSH 2.43 11/06/2019 02:16 AM     Lab Results   Component Value Date/Time    Hemoglobin A1c 5.3 11/06/2019 02:16 AM     No results found for: Magi Rodney, XQVID3, XQVID, VD3RIA                Assessment and Plan:     1Anxiety: increased due to husbands dementia and medical issues   Controlled on pamelor and klonopin    2 Gastroesophageal reflux disease without esophagitis  Controlled on pepcid    3. Essential hypertension  Controlled on verapamil     4 Hx of completed stroke  BP at goal of 130/80 or less, on statin and plavix   Due for cholesterol check ordered  6. MGUS (monoclonal gammopathy of unknown significance)  Followed by Dr Adebayo Cloud     7. Restless leg syndrome: on requip     8.  Neuropathy: on pamelor       Do labs that were previously ordered today     Recommend covid 19 booster today     MD Jeremie

## 2022-01-12 LAB
ALBUMIN SERPL-MCNC: 4 G/DL (ref 3.5–5)
ALBUMIN/GLOB SERPL: 1.3 {RATIO} (ref 1.1–2.2)
ALP SERPL-CCNC: 107 U/L (ref 45–117)
ALT SERPL-CCNC: 26 U/L (ref 12–78)
ANION GAP SERPL CALC-SCNC: 6 MMOL/L (ref 5–15)
AST SERPL-CCNC: 18 U/L (ref 15–37)
BILIRUB SERPL-MCNC: 0.5 MG/DL (ref 0.2–1)
BUN SERPL-MCNC: 16 MG/DL (ref 6–20)
BUN/CREAT SERPL: 19 (ref 12–20)
CALCIUM SERPL-MCNC: 9.6 MG/DL (ref 8.5–10.1)
CHLORIDE SERPL-SCNC: 106 MMOL/L (ref 97–108)
CHOLEST SERPL-MCNC: 166 MG/DL
CO2 SERPL-SCNC: 28 MMOL/L (ref 21–32)
CREAT SERPL-MCNC: 0.86 MG/DL (ref 0.55–1.02)
CREAT UR-MCNC: 237 MG/DL
EST. AVERAGE GLUCOSE BLD GHB EST-MCNC: 103 MG/DL
GLOBULIN SER CALC-MCNC: 3.1 G/DL (ref 2–4)
GLUCOSE SERPL-MCNC: 85 MG/DL (ref 65–100)
HBA1C MFR BLD: 5.2 % (ref 4–5.6)
HDLC SERPL-MCNC: 77 MG/DL
HDLC SERPL: 2.2 {RATIO} (ref 0–5)
LDLC SERPL CALC-MCNC: 38.4 MG/DL (ref 0–100)
MICROALBUMIN UR-MCNC: 1.4 MG/DL
MICROALBUMIN/CREAT UR-RTO: 6 MG/G (ref 0–30)
POTASSIUM SERPL-SCNC: 4.7 MMOL/L (ref 3.5–5.1)
PROT SERPL-MCNC: 7.1 G/DL (ref 6.4–8.2)
SODIUM SERPL-SCNC: 140 MMOL/L (ref 136–145)
TRIGL SERPL-MCNC: 253 MG/DL (ref ?–150)
VLDLC SERPL CALC-MCNC: 50.6 MG/DL

## 2022-01-14 NOTE — PROGRESS NOTES
No protein in urine ( normal)   Normal kidney and liver function   No diabetes   Triglycerides are elevated - short term fat - work on low fat diet  HDL good cholesterol is excellent  LDL bad cholesterol is excellent

## 2022-01-20 NOTE — DISCHARGE INSTRUCTIONS
Please follow up with your primary care doctor. Continue to drink plenty of fluids and take tylenol as needed for pain. Do not exceed the daily recommended dose of Tylenol. If you have any new or worsening symptoms, including chest pain, trouble breathing, or any other concerning symptoms, please see your doctor or return to the ER. Bronchitis: Care Instructions  Your Care Instructions    Bronchitis is inflammation of the bronchial tubes, which carry air to the lungs. The tubes swell and produce mucus, or phlegm. The mucus and inflamed bronchial tubes make you cough. You may have trouble breathing. Most cases of bronchitis are caused by viruses like those that cause colds. Antibiotics usually do not help and they may be harmful. Bronchitis usually develops rapidly and lasts about 2 to 3 weeks in otherwise healthy people. Follow-up care is a key part of your treatment and safety. Be sure to make and go to all appointments, and call your doctor if you are having problems. It's also a good idea to know your test results and keep a list of the medicines you take. How can you care for yourself at home? · Take all medicines exactly as prescribed. Call your doctor if you think you are having a problem with your medicine. · Get some extra rest.  · Take an over-the-counter pain medicine, such as acetaminophen (Tylenol), ibuprofen (Advil, Motrin), or naproxen (Aleve) to reduce fever and relieve body aches. Read and follow all instructions on the label. · Do not take two or more pain medicines at the same time unless the doctor told you to. Many pain medicines have acetaminophen, which is Tylenol. Too much acetaminophen (Tylenol) can be harmful. · Take an over-the-counter cough medicine that contains dextromethorphan to help quiet a dry, hacking cough so that you can sleep. Avoid cough medicines that have more than one active ingredient. Read and follow all instructions on the label.   · Breathe moist air from a humidifier, hot shower, or sink filled with hot water. The heat and moisture will thin mucus so you can cough it out. · Do not smoke. Smoking can make bronchitis worse. If you need help quitting, talk to your doctor about stop-smoking programs and medicines. These can increase your chances of quitting for good. When should you call for help? Call 911 anytime you think you may need emergency care. For example, call if:  · You have severe trouble breathing. Call your doctor now or seek immediate medical care if:  · You have new or worse trouble breathing. · You cough up dark brown or bloody mucus (sputum). · You have a new or higher fever. · You have a new rash. Watch closely for changes in your health, and be sure to contact your doctor if:  · You cough more deeply or more often, especially if you notice more mucus or a change in the color of your mucus. · You are not getting better as expected. Where can you learn more? Go to http://trae-joseline.info/. Enter H333 in the search box to learn more about \"Bronchitis: Care Instructions. \"  Current as of: March 25, 2017  Content Version: 11.3  © 8367-6131 [employer]. Care instructions adapted under license by [employer] (which disclaims liability or warranty for this information). If you have questions about a medical condition or this instruction, always ask your healthcare professional. [place] [address] any warranty or liability for your use of this information. Additional Notes: Discussed future bx of area if spreads or worsens, Triamcinolone helps when uses Render Risk Assessment In Note?: no Detail Level: Simple Additional Notes: Medication helps, but rash slightly started again and feels sensitive with the creams. Will try switching out hydrocortisone with tacrolimus. Pt concerned about side effects with topicals.

## 2022-02-04 ENCOUNTER — OFFICE VISIT (OUTPATIENT)
Dept: CARDIOLOGY CLINIC | Age: 82
End: 2022-02-04
Payer: MEDICARE

## 2022-02-04 DIAGNOSIS — Z45.09 ENCOUNTER FOR LOOP RECORDER CHECK: ICD-10-CM

## 2022-02-04 DIAGNOSIS — I63.30 CEREBROVASCULAR ACCIDENT (CVA) DUE TO THROMBOSIS OF CEREBRAL ARTERY (HCC): Primary | ICD-10-CM

## 2022-02-04 PROCEDURE — 93298 REM INTERROG DEV EVAL SCRMS: CPT | Performed by: INTERNAL MEDICINE

## 2022-02-21 NOTE — PROGRESS NOTES
ELECTROPHYSIOLOGY        Subjective: Denisha Willard is a [de-identified] y.o. female is here for an annual EP follow up. The patient denies chest pain/ shortness of breath, orthopnea, PND, LE edema, palpitations, syncope, presyncope or fatigue. 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)     Bilateral carotid artery stenosis     CVA (cerebral vascular accident) ([facility] [address] 11/05/2019    Ulnar neuropathy at elbow of right upper extremity 07/25/2018    Cervical post-laminectomy syndrome 06/07/2018    Idiopathic small and large fiber sensory neuropathy 06/07/2018    Lumbar back pain with radiculopathy affecting left lower extremity 06/07/2018    Lumbar back pain with radiculopathy affecting right lower extremity 06/07/2018    Cervical radiculopathy due to degenerative joint disease of spine 06/07/2018    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    Stenosis of both internal carotid arteries 03/13/2017    Benign essential tremor syndrome 02/21/2017    Tension vascular headache 02/21/2017    Hyperthyroidism 02/21/2017    Osteoporosis 08/31/2016    MGUS (monoclonal gammopathy of unknown significance) 10/04/2013    Chronic tension headaches 06/25/2013    Falls 06/25/2013    Lumbar stenosis 08/03/2011      Haley Moore MD  Past Medical History:   Diagnosis Date    Adverse effect of anesthesia     sleep apnea no cpap machine useage       Anemia     has had iron infusions;  Hem/onc Dr Gurpreet Horan [phone]    Anxiety and depression     Bilateral carotid artery stenosis     Congestive heart failure ([facility] [address]     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)     GERD (gastroesophageal reflux disease)     Hypercholesteremia  Hypertension     Idiopathic small and large fiber sensory neuropathy     Limited peripheral vision of right eye     complication of cataract surgery    Long term current use of anticoagulant therapy     Meniere disease     MGUS (monoclonal gammopathy of unknown significance)     Monoclonal gammopathy of unknown significance     hem/onc:  Dr Kinza Brunson [phone]    Osteoporosis     Psoriasis     bilateral Legs    Restless leg     Skin melanoma ([facility] [address] 2012    (Rt ankle) and basal cell (Lt eye) removed    Thyroid disease     Transient ischemic attack approx 2005    HAS HAD 2 MINISTROKES-NO DEFICITS; neuro Dr White Phlegm (cc)    Unspecified adverse effect of anesthesia     HAS NEEDED PORT OR CUTDOWN FOR RECVNG BLOOD OR LONG-TERM IVs; surgery 7/28/15 w/o any difficulty    Unspecified sleep apnea     does not use CPAP      Past Surgical History:   Procedure Laterality Date    HX APPENDECTOMY      HX CATARACT REMOVAL  2011    Right    HX CERVICAL FUSION  1/28/15    C5-7    HX CHOLECYSTECTOMY  2000    HX GI  2010    liver bx due to liver Bx due to elevated enzymes, liver laceration during Bx, hospitalized 10-12d    HX HEENT  7/28/15    Lt total parotidectomy with facial nerve dissection: Dr Tong Hernandez HX HEENT  11/2015    Right parotidectomy    HX HYSTERECTOMY      HX ORTHOPAEDIC  2009 & 2011    LUMBAR FUSION and revision    HX OTHER SURGICAL  2010    MELANOMA (Rt ankle) and basal cell (Lt eye) removed; q6mo ck by DERMATOLOGY    HX SHOULDER REPLACEMENT Left 08/01/2016    HX THYROIDECTOMY      Right    VA COLON CA SCRN NOT HI RSK IND  1/27/2016         VA INSERTION SUBQ CARDIAC RHYTHM MONITOR W/PRGRMG N/A 11/6/2019    LOOP RECORDER INSERT performed by Ish Wood MD at [facility] CARDIAC CATH LAB     Allergies   Allergen Reactions    Aspirin Nausea and Vomiting    Bee Sting [Sting, Bee] Hives and Shortness of Breath     WASP STING    Codeine Nausea and Vomiting    Hydrocodone Rash     And sedation    Lisinopril Cough    Morphine Rash    Nsaids (Non-Steroidal Anti-Inflammatory Drug) Nausea and Vomiting    Oxycodone Rash     On her back    Penicillins Hives, Shortness of Breath and Swelling      Family History   Problem Relation Age of Onset    Alcohol abuse Father     Emphysema Father     Cancer Sister         PANCREATIC    Alcohol abuse Brother     COPD Brother     Other Mother         UNKNOWN CAUSE OF DEATH    Heart Disease Mother     Breast Cancer Maternal Aunt     Anesth Problems Neg Hx     negative for cardiac disease  Social History     Socioeconomic History    Marital status:    Tobacco Use    Smoking status: Former Smoker     Years: 15.00     Quit date: 7/15/1975     Years since quittin.6    Smokeless tobacco: Never Used   Vaping Use    Vaping Use: Never used   Substance and Sexual Activity    Alcohol use: Yes     Comment: may have a glass of wine on rare occasion    Drug use: Yes     Types: Prescription, OTC    Sexual activity: Not Currently   Social History Narrative    Lives in [place] with  of 62 years. Has 2 sons and 1 daughter. Used to work in a bank. Likes to garden and sew. Current Outpatient Medications   Medication Sig    nortriptyline (PAMELOR) 10 mg capsule TAKE 1 TO 2 CAPSULES BY MOUTH NIGHTLY    clonazePAM (KlonoPIN) 0.5 mg tablet TAKE 1 TABLET BY MOUTH TWICE DAILY .  DO NOT EXCEED 2 PER 24 HOURS    atorvastatin (LIPITOR) 20 mg tablet Take 1 tablet by mouth once daily    pantoprazole (PROTONIX) 40 mg tablet TAKE 1 TABLET BY MOUTH BEFORE BREAKFAST AND BEFORE SUPPER    verapamil ER (CALAN-SR) 120 mg tablet TAKE 1 TABLET BY MOUTH NIGHTLY FOR HIGH BLOOD PRESSURE    clopidogreL (PLAVIX) 75 mg tab Take 1 tablet by mouth nightly    escitalopram oxalate (LEXAPRO) 20 mg tablet TAKE 1 TABLET BY MOUTH NIGHTLY FOR ANXIETY AND FOR DEPRESSION    famotidine (PEPCID) 20 mg tablet Take 1 tablet by mouth nightly    cholecalciferol, vitamin D3, (VITAMIN D3 PO) Take  by mouth.  ipratropium-albuteroL (Combivent Respimat)  mcg/actuation inhaler Take 1 Puff by inhalation every four (4) hours as needed for Shortness of Breath.  multivit/folic acid/vit K1 (ONE-A-DAY WOMEN'S 50 PLUS PO) Take 1 Tablet by mouth daily.  albuterol (PROVENTIL HFA, VENTOLIN HFA, PROAIR HFA) 90 mcg/actuation inhaler Take 1 Puff by inhalation every four (4) hours as needed for Wheezing.  polyethylene glycol (MIRALAX) 17 gram packet Take 17 g by mouth daily as needed (constipation).  rOPINIRole (REQUIP) 1 mg tablet Take 1 tablet by mouth nightly (Patient not taking: Reported on 2/23/2022)    diphenhydrAMINE-zinc acetate 2%-0.1% (BENADRYL) 2-0.1 % topical cream Apply  to affected area three (3) times daily as needed for Itching. apply a thin layer   Indications: skin irritation (Patient not taking: Reported on 2/23/2022)    levocetirizine (Xyzal) 5 mg tablet Take 1 Tab by mouth daily as needed for Allergies. (Patient not taking: Reported on 2/23/2022)    fluticasone propionate (FLONASE) 50 mcg/actuation nasal spray 2 Sprays by Both Nostrils route daily. (Patient not taking: Reported on 8/13/2021)     No current facility-administered medications for this visit. Vitals:    02/23/22 1139   BP: 126/78   Pulse: 79   Resp: 18   SpO2: 98%   Weight: 149 lb 12.8 oz (67.9 kg)   Height: 5' 2\" (1.575 m)       I have reviewed the nurses notes, vitals, problem list, allergy list, medical history, family, social history and medications. Review of Symptoms:    General: Pt denies excessive weight gain or loss. Pt is able to conduct ADL's  HEENT: Denies blurred vision, headaches, epistaxis and difficulty swallowing. Respiratory: Denies shortness of breath, HERNANDES, wheezing or stridor.   Cardiovascular: Denies precordial pain, palpitations, edema or PND  Gastrointestinal: Denies poor appetite, indigestion, abdominal pain or blood in stool  Urinary: Denies dysuria, pyuria  Musculoskeletal: Denies pain or swelling from muscles or joints  Neurologic: Denies tremor, paresthesias, or sensory motor disturbance  Skin: Denies rash, itching or texture change. Psych: Denies depression      Physical Exam:      General: Well developed, in no acute distress. HEENT: Eyes - PERRL, no jvd  Heart:  Normal S1/S2 negative S3 or S4. Regular, no murmur, gallop or rub. Respiratory: Clear bilaterally x 4, no wheezing or rales  Extremities:  No edema, normal cap refill, no cyanosis. Musculoskeletal: No clubbing  Neuro: A&Ox3, speech clear, gait stable.    Skin: Skin color is normal. Non diaphoretic. no ulcers  Vascular: 2+ pulses symmetric in all extremities  Psych - judgement intact and orientation is wnl       Cardiographics    ECG: SR HR 75    Results for orders placed or performed during the hospital encounter of 05/11/20   EKG, 12 LEAD, INITIAL   Result Value Ref Range    Ventricular Rate 81 BPM    Atrial Rate 81 BPM    P-R Interval 138 ms    QRS Duration 74 ms    Q-T Interval 358 ms    QTC Calculation (Bezet) 415 ms    Calculated P Axis 18 degrees    Calculated R Axis 59 degrees    Calculated T Axis 32 degrees    Diagnosis       Normal sinus rhythm with sinus arrhythmia  Normal ECG  When compared with ECG of 22-APR-2020 10:32,  premature atrial complexes are no longer present  Confirmed by Blanca Gasca ([id number]) on 5/12/2020 9:15:52 AM           Lab Results   Component Value Date/Time    WBC 4.5 07/11/2020 11:36 AM    HGB 11.7 07/11/2020 11:36 AM    HCT 37.2 07/11/2020 11:36 AM    PLATELET 812 74/04/9375 11:36 AM    MCV 96.4 07/11/2020 11:36 AM      Lab Results   Component Value Date/Time    Sodium 140 01/11/2022 11:04 AM    Potassium 4.7 01/11/2022 11:04 AM    Chloride 106 01/11/2022 11:04 AM    CO2 28 01/11/2022 11:04 AM    Anion gap 6 01/11/2022 11:04 AM    Glucose 85 01/11/2022 11:04 AM    BUN 16 01/11/2022 11:04 AM    Creatinine 0.86 01/11/2022 11:04 AM    BUN/Creatinine ratio 19 01/11/2022 11:04 AM    GFR est AA >60 01/11/2022 11:04 AM    GFR est non-AA >60 01/11/2022 11:04 AM    Calcium 9.6 01/11/2022 11:04 AM    Bilirubin, total 0.5 01/11/2022 11:04 AM    Alk. phosphatase 107 01/11/2022 11:04 AM    Protein, total 7.1 01/11/2022 11:04 AM    Albumin 4.0 01/11/2022 11:04 AM    Globulin 3.1 01/11/2022 11:04 AM    A-G Ratio 1.3 01/11/2022 11:04 AM    ALT (SGPT) 26 01/11/2022 11:04 AM      Lab Results   Component Value Date/Time    TSH 2.43 11/06/2019 02:16 AM           Assessment:           ICD-10-CM VRE-2-MY    1. Diastolic CHF, chronic (HCC)  I50.32 428.32 AMB POC EKG ROUTINE W/ 12 LEADS, INTER & REP     428.0    2. Cerebrovascular accident (CVA) due to thrombosis of cerebral artery ([facility] [address]  I63.30 434.01    3. Primary hypertension  I10 401.9      Orders Placed This Encounter    AMB POC EKG ROUTINE W/ 12 LEADS, INTER & REP     Order Specific Question:   Reason for Exam:     Answer:   routine        Plan:     Ms Elsie Paz is s/p ILR for cryptogenic stroke in 2019. She denies palpitations, irregular HR or other adverse cardiac complaints. Per ECG she is maintaining SR. Her BP is normotensive. Her ILR shows no events. We will see her back in the office in 1 year. Continue medical management for CVA, HTN,  and hyperlipidemia. Thank you for allowing me to participate in [address] [place], [place] [address]s care.       Maryjane Bell NP

## 2022-02-23 ENCOUNTER — OFFICE VISIT (OUTPATIENT)
Dept: CARDIOLOGY CLINIC | Age: 82
End: 2022-02-23
Payer: MEDICARE

## 2022-02-23 VITALS
HEIGHT: 62 IN | SYSTOLIC BLOOD PRESSURE: 126 MMHG | BODY MASS INDEX: 27.57 KG/M2 | DIASTOLIC BLOOD PRESSURE: 78 MMHG | WEIGHT: 149.8 LBS | OXYGEN SATURATION: 98 % | HEART RATE: 79 BPM | RESPIRATION RATE: 18 BRPM

## 2022-02-23 DIAGNOSIS — I50.32 DIASTOLIC CHF, CHRONIC (HCC): Primary | ICD-10-CM

## 2022-02-23 DIAGNOSIS — I10 PRIMARY HYPERTENSION: ICD-10-CM

## 2022-02-23 DIAGNOSIS — I63.30 CEREBROVASCULAR ACCIDENT (CVA) DUE TO THROMBOSIS OF CEREBRAL ARTERY (HCC): ICD-10-CM

## 2022-02-23 PROCEDURE — 99214 OFFICE O/P EST MOD 30 MIN: CPT | Performed by: NURSE PRACTITIONER

## 2022-02-23 PROCEDURE — G8427 DOCREV CUR MEDS BY ELIG CLIN: HCPCS | Performed by: NURSE PRACTITIONER

## 2022-02-23 PROCEDURE — 1090F PRES/ABSN URINE INCON ASSESS: CPT | Performed by: NURSE PRACTITIONER

## 2022-02-23 PROCEDURE — G8536 NO DOC ELDER MAL SCRN: HCPCS | Performed by: NURSE PRACTITIONER

## 2022-02-23 PROCEDURE — G8419 CALC BMI OUT NRM PARAM NOF/U: HCPCS | Performed by: NURSE PRACTITIONER

## 2022-02-23 PROCEDURE — 93000 ELECTROCARDIOGRAM COMPLETE: CPT | Performed by: NURSE PRACTITIONER

## 2022-02-23 PROCEDURE — 1101F PT FALLS ASSESS-DOCD LE1/YR: CPT | Performed by: NURSE PRACTITIONER

## 2022-02-23 PROCEDURE — G8754 DIAS BP LESS 90: HCPCS | Performed by: NURSE PRACTITIONER

## 2022-02-23 PROCEDURE — G8752 SYS BP LESS 140: HCPCS | Performed by: NURSE PRACTITIONER

## 2022-02-23 PROCEDURE — G8432 DEP SCR NOT DOC, RNG: HCPCS | Performed by: NURSE PRACTITIONER

## 2022-02-23 NOTE — PROGRESS NOTES
Chief Complaint   Patient presents with    CHF     Overdue for annual - denies cardiac sx      1. Have you been to the ER, urgent care clinic since your last visit? Hospitalized since your last visit? No     2. Have you seen or consulted any other health care providers outside of the [address] [place] [address] since your last visit? Include any pap smears or colon screening.   No

## 2022-02-23 NOTE — LETTER
2/23/2022    Patient: Karl Nazario   YOB: 1940   Date of Visit: 2/23/2022     Bryan Cornell MD  [address]  [facility]    Dear Bryan Cornell MD,      Thank you for referring Ms. Manuel Houston to [address] [place] [address] for evaluation. My notes for this consultation are attached. If you have questions, please do not hesitate to call me. I look forward to following your patient along with you.       Sincerely,    Johanny Canela NP

## 2022-03-03 DIAGNOSIS — K21.9 GASTROESOPHAGEAL REFLUX DISEASE WITHOUT ESOPHAGITIS: ICD-10-CM

## 2022-03-03 DIAGNOSIS — K30 ACID INDIGESTION: ICD-10-CM

## 2022-03-03 RX ORDER — PANTOPRAZOLE SODIUM 40 MG/1
TABLET, DELAYED RELEASE ORAL
Qty: 60 TABLET | Refills: 0 | Status: SHIPPED | OUTPATIENT
Start: 2022-03-03 | End: 2022-05-02

## 2022-03-03 RX ORDER — ESCITALOPRAM OXALATE 20 MG/1
TABLET ORAL
Qty: 90 TABLET | Refills: 0 | Status: SHIPPED | OUTPATIENT
Start: 2022-03-03 | End: 2022-06-07 | Stop reason: SDUPTHER

## 2022-03-18 PROBLEM — I67.89 CEREBRAL MICROVASCULAR DISEASE: Status: ACTIVE | Noted: 2017-03-13

## 2022-03-18 PROBLEM — M54.16 LUMBAR BACK PAIN WITH RADICULOPATHY AFFECTING RIGHT LOWER EXTREMITY: Status: ACTIVE | Noted: 2018-06-07

## 2022-03-18 PROBLEM — G44.209 TENSION VASCULAR HEADACHE: Status: ACTIVE | Noted: 2017-02-21

## 2022-03-18 PROBLEM — M54.16 LUMBAR BACK PAIN WITH RADICULOPATHY AFFECTING LEFT LOWER EXTREMITY: Status: ACTIVE | Noted: 2018-06-07

## 2022-03-19 PROBLEM — U07.1 COVID-19: Status: ACTIVE | Noted: 2020-05-18

## 2022-03-19 PROBLEM — I63.9 CVA (CEREBRAL VASCULAR ACCIDENT) (HCC): Status: ACTIVE | Noted: 2019-11-05

## 2022-03-19 PROBLEM — G56.21 ULNAR NEUROPATHY AT ELBOW OF RIGHT UPPER EXTREMITY: Status: ACTIVE | Noted: 2018-07-25

## 2022-03-19 PROBLEM — G25.0 BENIGN ESSENTIAL TREMOR SYNDROME: Status: ACTIVE | Noted: 2017-02-21

## 2022-03-19 PROBLEM — E05.90 HYPERTHYROIDISM: Status: ACTIVE | Noted: 2017-02-21

## 2022-03-19 PROBLEM — M47.22 CERVICAL RADICULOPATHY DUE TO DEGENERATIVE JOINT DISEASE OF SPINE: Status: ACTIVE | Noted: 2018-06-07

## 2022-03-19 PROBLEM — G60.8 IDIOPATHIC SMALL AND LARGE FIBER SENSORY NEUROPATHY: Status: ACTIVE | Noted: 2018-06-07

## 2022-03-19 PROBLEM — L25.9 CONTACT DERMATITIS: Status: ACTIVE | Noted: 2020-05-18

## 2022-03-19 PROBLEM — I65.23 STENOSIS OF BOTH INTERNAL CAROTID ARTERIES: Status: ACTIVE | Noted: 2017-03-13

## 2022-03-19 PROBLEM — R06.03 ACUTE RESPIRATORY DISTRESS: Status: ACTIVE | Noted: 2020-04-22

## 2022-03-19 PROBLEM — M75.41 IMPINGEMENT SYNDROME OF SHOULDER, RIGHT: Status: ACTIVE | Noted: 2020-07-10

## 2022-03-20 PROBLEM — M96.1 LUMBAR POST-LAMINECTOMY SYNDROME: Status: ACTIVE | Noted: 2019-11-06

## 2022-03-20 PROBLEM — M96.1 CERVICAL POST-LAMINECTOMY SYNDROME: Status: ACTIVE | Noted: 2018-06-07

## 2022-05-01 DIAGNOSIS — K30 ACID INDIGESTION: ICD-10-CM

## 2022-05-01 DIAGNOSIS — K21.9 GASTROESOPHAGEAL REFLUX DISEASE WITHOUT ESOPHAGITIS: ICD-10-CM

## 2022-05-01 DIAGNOSIS — F41.9 ANXIETY: ICD-10-CM

## 2022-05-02 RX ORDER — PANTOPRAZOLE SODIUM 40 MG/1
TABLET, DELAYED RELEASE ORAL
Qty: 60 TABLET | Refills: 0 | Status: SHIPPED | OUTPATIENT
Start: 2022-05-02 | End: 2022-07-04

## 2022-05-02 RX ORDER — CLONAZEPAM 0.5 MG/1
TABLET ORAL
Qty: 60 TABLET | Refills: 0 | Status: SHIPPED | OUTPATIENT
Start: 2022-05-02 | End: 2022-06-02

## 2022-05-02 RX ORDER — ATORVASTATIN CALCIUM 20 MG/1
TABLET, FILM COATED ORAL
Qty: 90 TABLET | Refills: 0 | Status: SHIPPED | OUTPATIENT
Start: 2022-05-02 | End: 2022-05-27 | Stop reason: DRUGHIGH

## 2022-05-27 ENCOUNTER — OFFICE VISIT (OUTPATIENT)
Dept: INTERNAL MEDICINE CLINIC | Age: 82
End: 2022-05-27
Payer: MEDICARE

## 2022-05-27 VITALS
BODY MASS INDEX: 27.97 KG/M2 | OXYGEN SATURATION: 96 % | SYSTOLIC BLOOD PRESSURE: 125 MMHG | HEART RATE: 65 BPM | HEIGHT: 62 IN | WEIGHT: 152 LBS | TEMPERATURE: 98.5 F | DIASTOLIC BLOOD PRESSURE: 72 MMHG | RESPIRATION RATE: 16 BRPM

## 2022-05-27 DIAGNOSIS — Z78.0 POSTMENOPAUSAL: ICD-10-CM

## 2022-05-27 DIAGNOSIS — Z13.820 SCREENING FOR OSTEOPOROSIS: ICD-10-CM

## 2022-05-27 DIAGNOSIS — Z00.00 MEDICARE ANNUAL WELLNESS VISIT, SUBSEQUENT: ICD-10-CM

## 2022-05-27 DIAGNOSIS — D47.2 MGUS (MONOCLONAL GAMMOPATHY OF UNKNOWN SIGNIFICANCE): Primary | ICD-10-CM

## 2022-05-27 PROBLEM — E11.9 TYPE 2 DIABETES MELLITUS (HCC): Status: ACTIVE | Noted: 2022-05-27

## 2022-05-27 PROCEDURE — G8417 CALC BMI ABV UP PARAM F/U: HCPCS | Performed by: INTERNAL MEDICINE

## 2022-05-27 PROCEDURE — G8510 SCR DEP NEG, NO PLAN REQD: HCPCS | Performed by: INTERNAL MEDICINE

## 2022-05-27 PROCEDURE — 1101F PT FALLS ASSESS-DOCD LE1/YR: CPT | Performed by: INTERNAL MEDICINE

## 2022-05-27 PROCEDURE — G0439 PPPS, SUBSEQ VISIT: HCPCS | Performed by: INTERNAL MEDICINE

## 2022-05-27 PROCEDURE — G8752 SYS BP LESS 140: HCPCS | Performed by: INTERNAL MEDICINE

## 2022-05-27 PROCEDURE — G8427 DOCREV CUR MEDS BY ELIG CLIN: HCPCS | Performed by: INTERNAL MEDICINE

## 2022-05-27 PROCEDURE — G8754 DIAS BP LESS 90: HCPCS | Performed by: INTERNAL MEDICINE

## 2022-05-27 PROCEDURE — G8536 NO DOC ELDER MAL SCRN: HCPCS | Performed by: INTERNAL MEDICINE

## 2022-05-27 RX ORDER — ATORVASTATIN CALCIUM 10 MG/1
10 TABLET, FILM COATED ORAL DAILY
Qty: 90 TABLET | Refills: 1 | Status: SHIPPED | OUTPATIENT
Start: 2022-05-27

## 2022-05-27 NOTE — PROGRESS NOTES
Ms. Moi Benson is presenting to follow up     CC:  Follow-up, Hypertension, Depression, and GERD       HPI:    [de-identified] yo woman  With a hx of high cholesterol. HTN, anxiety and hx of stroke and MGUS, chronic back pain s/p Lumbar fusion,  subdural hematoma presenting to follow up         cramps : Intermittent cramps mostly at nighttime. Patient is on Requip for restless leg syndrome 1 mg at nighttime. Recall patient has extensive degenerative disease in lack  Hx of L3-L5 fusion   Has numbness in left foot and ankles  Denies weakness  No hx of diabetes       For high cholesterol she is taking Lipitor 20 mg daily  Tolerating medication well   LDL was in the 20s can decrease lipitor      HTN normal BP Today  125/72  -patient is currently on verapamil 120 mg daily and Benicar 5 mg daily. Patient denies dizziness chest pain shortness of breath and claudication symptoms        Anxiety: controlled on clonazepam and SSRI. On clonazepam 0.5mg BID and lexapro and pamelor. Has increased stress with taking of  who has dementia  Denies dizziness      Hx of completed stroke on plavix and statin LDL at goal  Blood pressure at goal     MGUS (monoclonal gammopathy of unknown significance) followed by Dr Stephanie Leach  Who will leave [facility] and wants to establish with doctor in our system referral given today       Recall in the past had  fall  Lost balance / leading to a subdural hematoma and followed up with neuro and stable. Recent eye exam has new glasses   Denies recent falls     Wearing hearing aid on right   Review of systems:  Constitutional: negative for fever, chills, weight loss, night sweats   10 systems reviewed and negative other than HPI       Past Medical History:   Diagnosis Date    Adverse effect of anesthesia     sleep apnea no cpap machine useage       Anemia     has had iron infusions;  Hem/onc Dr Stephanie Leach [phone]    Anxiety and depression     Bilateral carotid artery stenosis     Congestive heart failure ([facility] [address]     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)     GERD (gastroesophageal reflux disease)     Hypercholesteremia     Hypertension     Idiopathic small and large fiber sensory neuropathy     Limited peripheral vision of right eye     complication of cataract surgery    Long term current use of anticoagulant therapy     Meniere disease     MGUS (monoclonal gammopathy of unknown significance)     Monoclonal gammopathy of unknown significance     hem/onc:  Dr Kati Hamilton [phone]    Osteoporosis     Psoriasis     bilateral Legs    Restless leg     Skin melanoma ([facility] [address] 2012    (Rt ankle) and basal cell (Lt eye) removed    Thyroid disease     Transient ischemic attack approx 2005    HAS HAD 2 MINISTROKES-NO DEFICITS; neuro Dr Abdi Bolus (cc)    Unspecified adverse effect of anesthesia     HAS NEEDED PORT OR CUTDOWN FOR RECVNG BLOOD OR LONG-TERM IVs; surgery 7/28/15 w/o any difficulty    Unspecified sleep apnea     does not use CPAP        Past Surgical History:   Procedure Laterality Date    HX APPENDECTOMY      HX CATARACT REMOVAL  2011    Right    HX CERVICAL FUSION  1/28/15    C5-7    HX CHOLECYSTECTOMY  2000    HX GI  2010    liver bx due to liver Bx due to elevated enzymes, liver laceration during Bx, hospitalized 10-12d    HX HEENT  7/28/15    Lt total parotidectomy with facial nerve dissection: Dr Jovita Mcdowell HX HEENT  11/2015    Right parotidectomy    HX HYSTERECTOMY      HX ORTHOPAEDIC  2009 & 2011    LUMBAR FUSION and revision    HX OTHER SURGICAL  2010    MELANOMA (Rt ankle) and basal cell (Lt eye) removed; q6mo ck by DERMATOLOGY    HX SHOULDER REPLACEMENT Left 08/01/2016    HX THYROIDECTOMY      Right    ND COLON CA SCRN NOT HI RSK IND  1/27/2016         ND INSERTION SUBQ CARDIAC RHYTHM MONITOR W/PRGRMG N/A 11/6/2019    LOOP RECORDER INSERT performed by Christy Tijerina MD at [facility] CARDIAC CATH LAB       Allergies   Allergen Reactions    Aspirin Nausea and Vomiting    Bee Sting [Sting, Bee] Hives and Shortness of Breath     WASP STING    Codeine Nausea and Vomiting    Hydrocodone Rash     And sedation    Lisinopril Cough    Morphine Rash    Nsaids (Non-Steroidal Anti-Inflammatory Drug) Nausea and Vomiting    Oxycodone Rash     On her back    Penicillins Hives, Shortness of Breath and Swelling       Current Outpatient Medications on File Prior to Visit   Medication Sig Dispense Refill    clonazePAM (KlonoPIN) 0.5 mg tablet TAKE 1 TABLET BY MOUTH TWICE DAILY . DO NOT EXCEED 2 PER 24 HOURS 60 Tablet 0    atorvastatin (LIPITOR) 20 mg tablet Take 1 tablet by mouth once daily 90 Tablet 0    pantoprazole (PROTONIX) 40 mg tablet TAKE 1 TABLET BY MOUTH BEFORE BREAKFAST AND BEFORE SUPPER 60 Tablet 0    nortriptyline (PAMELOR) 10 mg capsule TAKE 1 TO 2 CAPSULES BY MOUTH NIGHTLY 90 Capsule 1    clopidogreL (PLAVIX) 75 mg tab Take 1 tablet by mouth nightly 90 Tablet 1    verapamil ER (CALAN-SR) 120 mg tablet TAKE 1 TABLET BY MOUTH NIGHTLY FOR HIGH BLOOD PRESSURE 90 Tablet 1    escitalopram oxalate (LEXAPRO) 20 mg tablet TAKE 1 TABLET BY MOUTH NIGHTLY FOR ANXIETY AND FOR DEPRESSION 90 Tablet 0    rOPINIRole (REQUIP) 1 mg tablet Take 1 tablet by mouth nightly 90 Tablet 0    famotidine (PEPCID) 20 mg tablet Take 1 tablet by mouth nightly 30 Tablet 0    cholecalciferol, vitamin D3, (VITAMIN D3 PO) Take  by mouth.  ipratropium-albuteroL (Combivent Respimat)  mcg/actuation inhaler Take 1 Puff by inhalation every four (4) hours as needed for Shortness of Breath.  diphenhydrAMINE-zinc acetate 2%-0.1% (BENADRYL) 2-0.1 % topical cream Apply  to affected area three (3) times daily as needed for Itching. apply a thin layer   Indications: skin irritation 30 g 0    levocetirizine (Xyzal) 5 mg tablet Take 1 Tab by mouth daily as needed for Allergies. 30 Tab 0    multivit/folic acid/vit K1 (ONE-A-DAY WOMEN'S 50 PLUS PO) Take 1 Tablet by mouth daily.       fluticasone propionate (FLONASE) 50 mcg/actuation nasal spray 2 Sprays by Both Nostrils route daily. 1 Bottle 0    albuterol (PROVENTIL HFA, VENTOLIN HFA, PROAIR HFA) 90 mcg/actuation inhaler Take 1 Puff by inhalation every four (4) hours as needed for Wheezing. 1 Inhaler 0    polyethylene glycol (MIRALAX) 17 gram packet Take 17 g by mouth daily as needed (constipation). No current facility-administered medications on file prior to visit. family history includes Alcohol abuse in her brother and father; Breast Cancer in her maternal aunt; COPD in her brother; Cancer in her sister; Emphysema in her father; Heart Disease in her mother; Other in her mother. Social History     Socioeconomic History    Marital status:      Spouse name: Not on file    Number of children: Not on file    Years of education: Not on file    Highest education level: Not on file   Occupational History    Not on file   Tobacco Use    Smoking status: Former Smoker     Years: 15.00     Quit date: 7/15/1975     Years since quittin.8    Smokeless tobacco: Never Used   Vaping Use    Vaping Use: Never used   Substance and Sexual Activity    Alcohol use: Yes     Comment: may have a glass of wine on rare occasion    Drug use: Yes     Types: Prescription, OTC    Sexual activity: Not Currently   Other Topics Concern    Not on file   Social History Narrative    Lives in [place] with  of 62 years. Has 2 sons and 1 daughter. Used to work in a bank. Likes to garden and [employer]. Social Determinants of Health     Financial Resource Strain:     Difficulty of Paying Living Expenses: Not on file   Food Insecurity:     Worried About Running Out of Food in the Last Year: Not on file    Stefania of Food in the Last Year: Not on file   Transportation Needs:     Lack of Transportation (Medical): Not on file    Lack of Transportation (Non-Medical):  Not on file   Physical Activity:     Days of Exercise per Week: Not on file    Minutes of Exercise per Session: Not on file   Stress:     Feeling of Stress : Not on file   Social Connections:     Frequency of Communication with Friends and Family: Not on file    Frequency of Social Gatherings with Friends and Family: Not on file    Attends Restorationism Services: Not on file    Active Member of [address] [place] [address] or Organizations: Not on file    Attends Club or Organization Meetings: Not on file    Marital Status: Not on file   Intimate Partner Violence:     Fear of Current or Ex-Partner: Not on file    Emotionally Abused: Not on file    Physically Abused: Not on file    Sexually Abused: Not on file   Housing Stability:     Unable to Pay for Housing in the Last Year: Not on file    Number of [place] in the Last Year: Not on file    Unstable Housing in the Last Year: Not on file       Visit Vitals  /72   Pulse 65   Temp 98.5 °F (36.9 °C) (Temporal)   Resp 16   Ht 5' 2\" (1.575 m)   Wt 152 lb (68.9 kg)   LMP  (LMP Unknown)   SpO2 96%   BMI 27.80 kg/m²     General:  Well appearing female no acute distress  HEENT:   PERRL,normal conjunctiva. External ear and canals normal, TMs normal.  Hearing decreased. Hearing aid on right side   Neck:  Supple. Thyroid normal size, nontender, without nodules. No carotid bruit. No masses or lymphadenopathy  Respiratory: no respiratory distress,  no wheezing, no rhonchi, no rales. No chest wall tenderness. Cardiovascular:  RRR, normal S1S2, no murmur. Gastrointestinal: normal bowel sounds, soft, nontender, without masses. Extremities decreased pedal pules, warm extremities, varicose veins, no edema, normal sensation   Musculoskeletal:  Normal gait. Normal digits and nails. Normal strength and tone, no atrophy, and no abnormal movement. Skin:  No rash, no lesions, no ulcers. Skin warm, normal turgor, without induration or nodules. Neuro:  A and OX4, fluent speech, cranial nerves normal 2-12.   Psych:  Normal affect      Lab Results   Component Value Date/Time    WBC 4.5 07/11/2020 11:36 AM    HGB 11.7 07/11/2020 11:36 AM    HCT 37.2 07/11/2020 11:36 AM    PLATELET 596 34/02/1117 11:36 AM    MCV 96.4 07/11/2020 11:36 AM     Lab Results   Component Value Date/Time    Sodium 140 01/11/2022 11:04 AM    Potassium 4.7 01/11/2022 11:04 AM    Chloride 106 01/11/2022 11:04 AM    CO2 28 01/11/2022 11:04 AM    Anion gap 6 01/11/2022 11:04 AM    Glucose 85 01/11/2022 11:04 AM    BUN 16 01/11/2022 11:04 AM    Creatinine 0.86 01/11/2022 11:04 AM    BUN/Creatinine ratio 19 01/11/2022 11:04 AM    GFR est AA >60 01/11/2022 11:04 AM    GFR est non-AA >60 01/11/2022 11:04 AM    Calcium 9.6 01/11/2022 11:04 AM     Lab Results   Component Value Date/Time    Cholesterol, total 166 01/11/2022 11:04 AM    HDL Cholesterol 77 01/11/2022 11:04 AM    LDL, calculated 38.4 01/11/2022 11:04 AM    VLDL, calculated 50.6 01/11/2022 11:04 AM    Triglyceride 253 (H) 01/11/2022 11:04 AM    CHOL/HDL Ratio 2.2 01/11/2022 11:04 AM     Lab Results   Component Value Date/Time    TSH 2.43 11/06/2019 02:16 AM     Lab Results   Component Value Date/Time    Hemoglobin A1c 5.2 01/11/2022 11:04 AM     No results found for: Macrina Orozco, XQVID3, XQVID, VD3RIA                Assessment and Plan:     1Anxiety: increased due to husbands dementia and medical issues   Controlled on pamelor and klonopin    2 Gastroesophageal reflux disease without esophagitis  Controlled on pepcid    3. Essential hypertension  Controlled on verapamil     4 Hx of completed stroke  BP at goal of 130/80 or less, on statin and plavix   LDL 38 at goal   Decrease statin dose to 10mg     6. MGUS (monoclonal gammopathy of unknown significance)  Followed by Dr Anil Murray who is leaving [facility] so referred to Dr Nestor Kaplan here     7. Restless leg syndrome: on requip     8.  Neuropath lumbar radiculopathy hx of lumbar fusion : on pamelor   This is related to her back    Aicha Cronin MD  This is the Subsequent Medicare Annual Wellness Exam, performed 12 months or more after the Initial AWV or the last Subsequent AWV    I have reviewed the patient's medical history in detail and updated the computerized patient record. Assessment/Plan   Education and counseling provided:  Are appropriate based on today's review and evaluation    1. MGUS (monoclonal gammopathy of unknown significance)  -     REFERRAL TO HEMATOLOGY ONCOLOGY  2. Type 2 diabetes mellitus without complication, without long-term current use of insulin ([facility] [address]  3. Screening for osteoporosis  -     DEXA BONE DENSITY STUDY AXIAL; Future  4. Postmenopausal  -     DEXA BONE DENSITY STUDY AXIAL; Future  5. Medicare annual wellness visit, subsequent  -     diph,pertuss,acel,,tetanus vac,PF, (ADACEL) 2 Lf-(2.5-5-3-5 mcg)-5Lf/0.5 mL syrg vaccine; 0.5 mL by IntraMUSCular route once for 1 dose., Print, Disp-0.5 mL, R-0       Depression Risk Factor Screening     3 most recent PHQ Screens 5/27/2022   PHQ Not Done -   Little interest or pleasure in doing things Not at all   Feeling down, depressed, irritable, or hopeless Not at all   Total Score PHQ 2 0       Alcohol & Drug Abuse Risk Screen    Do you average more than 1 drink per night or more than 7 drinks a week:  No    On any one occasion in the past three months have you have had more than 3 drinks containing alcohol:  No          Functional Ability and Level of Safety    Hearing: Hearing is good. The patient wears hearing aids. Activities of Daily Living: The home contains: no safety equipment. Patient does total self care      Ambulation: with no difficulty     Fall Risk:  Fall Risk Assessment, last 12 mths 5/27/2022   Able to walk? Yes   Fall in past 12 months? 0   Do you feel unsteady? 0   Are you worried about falling 0   Is the gait abnormal? -   Number of falls in past 12 months -   Fall with injury?  -      Abuse Screen:  Patient is not abused       Cognitive Screening    Has your family/caregiver stated any concerns about your memory: no     Cognitive Screening: Normal - Verbal Fluency Test    Health Maintenance Due     Health Maintenance Due   Topic Date Due    Eye Exam Retinal or Dilated  Never done    DTaP/Tdap/Td series (1 - Tdap) Never done    Shingrix Vaccine Age 50> (2 of 2) 03/12/2019    Medicare Yearly Exam  04/14/2022       Patient Care Team   Patient Care Team:  Dorcas Keith MD as PCP - General (Internal Medicine Physician)  Dorcas Keith MD as PCP - [address] [place] [address] Provider  Sudha Parker MD as Surgeon (General Surgery)  Johanna Borwn MD (Neurology)  Tanja Cerrato MD (Hematology and Oncology)  Devin Otero MD (Dermatology Physician)    Hoda Hernández MD

## 2022-05-27 NOTE — PATIENT INSTRUCTIONS
Referral to hematologist in [facility]  Schedule bone mass density to check strenght of bones  Decrease dose of the cholesterol medicine   Medicare Wellness Visit, Female     The best way to live healthy is to have a lifestyle where you eat a well-balanced diet, exercise regularly, limit alcohol use, and quit all forms of tobacco/nicotine, if applicable. Regular preventive services are another way to keep healthy. Preventive services (vaccines, screening tests, monitoring & exams) can help personalize your care plan, which helps you manage your own care. Screening tests can find health problems at the earliest stages, when they are easiest to treat. [facility] follows the current, evidence-based guidelines published by the [facility] [place] ([facility]) when recommending preventive services for our patients. Because we follow these guidelines, sometimes recommendations change over time as research supports it. (For example, mammograms used to be recommended annually. Even though Medicare will still pay for an annual mammogram, the newer guidelines recommend a mammogram every two years for women of average risk). Of course, you and your doctor may decide to screen more often for some diseases, based on your risk and your co-morbidities (chronic disease you are already diagnosed with). Preventive services for you include:  - Medicare offers their members a free annual wellness visit, which is time for you and your primary care provider to discuss and plan for your preventive service needs. Take advantage of this benefit every year!  -All adults over the age of 72 should receive the recommended pneumonia vaccines.  Current USPSTF guidelines recommend a series of two vaccines for the best pneumonia protection.   -All adults should have a flu vaccine yearly and a tetanus vaccine every 10 years.   -All adults age 48 and older should receive the shingles vaccines (series of two vaccines). -All adults age 38-68 who are overweight should have a diabetes screening test once every three years.   -All adults born between [birth year] and 1964 should be screened once for Hepatitis C.  -Other screening tests and preventive services for persons with diabetes include: an eye exam to screen for diabetic retinopathy, a kidney function test, a foot exam, and stricter control over your cholesterol.   -Cardiovascular screening for adults with routine risk involves an electrocardiogram (ECG) at intervals determined by your doctor.   -Colorectal cancer screenings should be done for adults age 54-65 with no increased risk factors for colorectal cancer. There are a number of acceptable methods of screening for this type of cancer. Each test has its own benefits and drawbacks. Discuss with your doctor what is most appropriate for you during your annual wellness visit. The different tests include: colonoscopy (considered the best screening method), a fecal occult blood test, a fecal DNA test, and sigmoidoscopy.    -A bone mass density test is recommended when a woman turns 65 to screen for osteoporosis. This test is only recommended one time, as a screening. Some providers will use this same test as a disease monitoring tool if you already have osteoporosis. -Breast cancer screenings are recommended every other year for women of normal risk, age 54-69.  -Cervical cancer screenings for women over age 72 are only recommended with certain risk factors.      Here is a list of your current Health Maintenance items (your personalized list of preventive services) with a due date:  Health Maintenance Due   Topic Date Due    Eye Exam  Never done   [facility]

## 2022-05-27 NOTE — PROGRESS NOTES
1. \"Have you been to the ER, urgent care clinic since your last visit? Hospitalized since your last visit? \" No    2. \"Have you seen or consulted any other health care providers outside of the [address] [place] [address] since your last visit? \" No     3. For patients aged 39-70: Has the patient had a colonoscopy / FIT/ Cologuard? NA - based on age      If the patient is female:    4. For patients aged 41-77: Has the patient had a mammogram within the past 2 years? NA - based on age or sex      11. For patients aged 21-65: Has the patient had a pap smear?  NA - based on age or sex

## 2022-06-07 RX ORDER — ESCITALOPRAM OXALATE 20 MG/1
20 TABLET ORAL DAILY
Qty: 90 TABLET | Refills: 3 | Status: SHIPPED | OUTPATIENT
Start: 2022-06-07

## 2022-06-16 RX ORDER — NORTRIPTYLINE HYDROCHLORIDE 10 MG/1
CAPSULE ORAL
Qty: 90 CAPSULE | Refills: 0 | Status: SHIPPED | OUTPATIENT
Start: 2022-06-16 | End: 2022-06-21

## 2022-06-21 RX ORDER — VERAPAMIL HYDROCHLORIDE 120 MG/1
TABLET, FILM COATED, EXTENDED RELEASE ORAL
Qty: 90 TABLET | Refills: 0 | Status: SHIPPED | OUTPATIENT
Start: 2022-06-21

## 2022-06-21 RX ORDER — NORTRIPTYLINE HYDROCHLORIDE 10 MG/1
CAPSULE ORAL
Qty: 90 CAPSULE | Refills: 0 | Status: SHIPPED | OUTPATIENT
Start: 2022-06-21 | End: 2022-09-12

## 2022-07-01 NOTE — PROGRESS NOTES
Suly Aguero is a [de-identified] y.o. female here for new patient appt for MGUS. (previously followed by Dr Noble Carballo)  Dr Shilpa Joyce  Pt states she feels fine. No concerns brought up. Here with her  who has Dementia. 1. Have you been to the ER, urgent care clinic since your last visit? Hospitalized since your last visit? New Pt    2. Have you seen or consulted any other health care providers outside of the [address] [place] [address] since your last visit? Include any pap smears or colon screening.  New Pt

## 2022-07-03 DIAGNOSIS — K21.9 GASTROESOPHAGEAL REFLUX DISEASE WITHOUT ESOPHAGITIS: ICD-10-CM

## 2022-07-03 DIAGNOSIS — K30 ACID INDIGESTION: ICD-10-CM

## 2022-07-04 RX ORDER — PANTOPRAZOLE SODIUM 40 MG/1
TABLET, DELAYED RELEASE ORAL
Qty: 60 TABLET | Refills: 0 | Status: SHIPPED | OUTPATIENT
Start: 2022-07-04 | End: 2022-08-27

## 2022-07-06 ENCOUNTER — OFFICE VISIT (OUTPATIENT)
Dept: ONCOLOGY | Age: 82
End: 2022-07-06
Payer: MEDICARE

## 2022-07-06 VITALS
BODY MASS INDEX: 27.64 KG/M2 | OXYGEN SATURATION: 96 % | SYSTOLIC BLOOD PRESSURE: 135 MMHG | WEIGHT: 150.2 LBS | HEIGHT: 62 IN | HEART RATE: 85 BPM | TEMPERATURE: 97.7 F | DIASTOLIC BLOOD PRESSURE: 74 MMHG

## 2022-07-06 DIAGNOSIS — D50.0 IRON DEFICIENCY ANEMIA DUE TO CHRONIC BLOOD LOSS: ICD-10-CM

## 2022-07-06 DIAGNOSIS — D47.2 MGUS (MONOCLONAL GAMMOPATHY OF UNKNOWN SIGNIFICANCE): Primary | ICD-10-CM

## 2022-07-06 PROCEDURE — G8752 SYS BP LESS 140: HCPCS | Performed by: INTERNAL MEDICINE

## 2022-07-06 PROCEDURE — G8427 DOCREV CUR MEDS BY ELIG CLIN: HCPCS | Performed by: INTERNAL MEDICINE

## 2022-07-06 PROCEDURE — G8432 DEP SCR NOT DOC, RNG: HCPCS | Performed by: INTERNAL MEDICINE

## 2022-07-06 PROCEDURE — 99204 OFFICE O/P NEW MOD 45 MIN: CPT | Performed by: INTERNAL MEDICINE

## 2022-07-06 PROCEDURE — 1101F PT FALLS ASSESS-DOCD LE1/YR: CPT | Performed by: INTERNAL MEDICINE

## 2022-07-06 PROCEDURE — 1123F ACP DISCUSS/DSCN MKR DOCD: CPT | Performed by: INTERNAL MEDICINE

## 2022-07-06 PROCEDURE — 1090F PRES/ABSN URINE INCON ASSESS: CPT | Performed by: INTERNAL MEDICINE

## 2022-07-06 PROCEDURE — G8754 DIAS BP LESS 90: HCPCS | Performed by: INTERNAL MEDICINE

## 2022-07-06 PROCEDURE — G8417 CALC BMI ABV UP PARAM F/U: HCPCS | Performed by: INTERNAL MEDICINE

## 2022-07-06 PROCEDURE — G8536 NO DOC ELDER MAL SCRN: HCPCS | Performed by: INTERNAL MEDICINE

## 2022-07-06 RX ORDER — OLMESARTAN MEDOXOMIL 5 MG/1
TABLET ORAL DAILY
COMMUNITY

## 2022-07-06 NOTE — PROGRESS NOTES
[address] [facility] [address], [address] [facility], [address] [place] [address]  [phone]          Hematology Note        Patient: Sherrill Goldsmith MRN: [id number]  SSN: [id number]    YOB: 1940  Age: [de-identified] y.o. Sex: female        Subjective: Sherrill Goldsmith is a [de-identified] y.o. female who I am seeing for a diagnosis of MGUS, IgG type. She also suffers with iron deficiency secondary to small bowel AVMs. She has been seen by Dr. Joe Bush for over 5 yrs. Due to the prison of Dr. Joe Bush she is switching care to me. She us asymptomatic. The patient denies recent infections, neuropathy, weight loss, loss of appetite or bone pain. Review of Systems:    Constitutional: negative  Eyes: negative  Ears, Nose, Mouth, Throat, and Face: negative  Respiratory: negative  Cardiovascular: negative  Gastrointestinal: negative  Genitourinary:negative  Integument/Breast: negative  Hematologic/Lymphatic: negative  Musculoskeletal:negative  Neurological: negative      Past Medical History:   Diagnosis Date    Adverse effect of anesthesia     sleep apnea no cpap machine useage       Anemia     has had iron infusions;  Hem/onc Dr Joe Bush [phone]    Anxiety and depression     Bilateral carotid artery stenosis     Congestive heart failure ([address]     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)     GERD (gastroesophageal reflux disease)     Hypercholesteremia     Hypertension     Idiopathic small and large fiber sensory neuropathy     Limited peripheral vision of right eye     complication of cataract surgery    Long term current use of anticoagulant therapy     Meniere disease     MGUS (monoclonal gammopathy of unknown significance)     Monoclonal gammopathy of unknown significance     hem/onc:  Dr Joe Bush [phone]    Osteoporosis     Psoriasis     bilateral Legs    Restless leg     Skin melanoma ([address] 2012    (Rt ankle) and basal cell (Lt eye) removed    Thyroid disease     Transient ischemic attack approx     HAS HAD 2 MINISTROKES-NO DEFICITS; neuro Dr Carlos Barrett (cc)    Type 2 diabetes mellitus 2022    Unspecified adverse effect of anesthesia     HAS NEEDED PORT OR CUTDOWN FOR RECVNG BLOOD OR LONG-TERM IVs; surgery 7/28/15 w/o any difficulty    Unspecified sleep apnea     does not use CPAP     Past Surgical History:   Procedure Laterality Date    HX APPENDECTOMY      HX CATARACT REMOVAL      Right    HX CERVICAL FUSION  1/28/15    C5-7    HX CHOLECYSTECTOMY      HX GI      liver bx due to liver Bx due to elevated enzymes, liver laceration during Bx, hospitalized 10-12d    HX HEENT  7/28/15    Lt total parotidectomy with facial nerve dissection: Dr Elis Pittman HX HEENT  2015    Right parotidectomy    HX HYSTERECTOMY      HX ORTHOPAEDIC   &     LUMBAR FUSION and revision    HX OTHER SURGICAL  2010    MELANOMA (Rt ankle) and basal cell (Lt eye) removed; q6mo ck by DERMATOLOGY    HX SHOULDER REPLACEMENT Left 2016    HX THYROIDECTOMY      Right    TN COLON CA SCRN NOT HI RSK IND  2016         TN INSERTION SUBQ CARDIAC RHYTHM MONITOR W/PRGRMG N/A 2019    LOOP RECORDER INSERT performed by Tania Navarro MD at \[facility]\"" CARDIAC CATH LAB      Family History   Problem Relation Age of Onset    Alcohol abuse Father     Emphysema Father     Cancer Sister         PANCREATIC    Alcohol abuse Brother     COPD Brother     Other Mother         UNKNOWN CAUSE OF DEATH    Heart Disease Mother     Breast Cancer Maternal Aunt     Anesth Problems Neg Hx      Social History     Tobacco Use    Smoking status: Former Smoker     Years: 15.00     Quit date: 7/15/1975     Years since quittin.0    Smokeless tobacco: Never Used   Substance Use Topics    Alcohol use: Yes     Comment: may have a glass of wine on rare occasion      Prior to Admission medications Medication Sig Start Date End Date Taking? Authorizing Provider   olmesartan (BENICAR) 5 mg tablet Take  by mouth daily. Yes Provider, Historical   pantoprazole (PROTONIX) 40 mg tablet TAKE 1 TABLET BY MOUTH BEFORE BREAKFAST AND BEFORE SUPPER 7/4/22  Yes Vandana Ireland MD   verapamil ER (CALAN-SR) 120 mg tablet TAKE 1 TABLET BY MOUTH NIGHTLY FOR HIGH BLOOD PRESSURE 6/21/22  Yes Vandana Bravo MD   nortriptyline (PAMELOR) 10 mg capsule TAKE 1 TO 2 CAPSULES BY MOUTH NIGHTLY 6/21/22  Yes Vandana Ireland MD   escitalopram oxalate (LEXAPRO) 20 mg tablet Take 1 Tablet by mouth daily. 6/7/22  Yes Vandana Ireland MD   clonazePAM (KlonoPIN) 0.5 mg tablet TAKE 1 TABLET BY MOUTH TWICE DAILY . DO NOT EXCEED 2 PER 24 HOURS 6/2/22  Yes Vandana Ireland MD   atorvastatin (LIPITOR) 10 mg tablet Take 1 Tablet by mouth daily. 5/27/22  Yes Liseth Grier MD   clopidogreL (PLAVIX) 75 mg tab Take 1 tablet by mouth nightly 3/18/22  Yes Liseth Grier MD   rOPINIRole (REQUIP) 1 mg tablet Take 1 tablet by mouth nightly 12/17/21  Yes Charo Nunez MD   famotidine (PEPCID) 20 mg tablet Take 1 tablet by mouth nightly 7/19/21  Yes Amy MedicMD consuelo   ipratropium-albuteroL (Combivent Respimat)  mcg/actuation inhaler Take 1 Puff by inhalation every four (4) hours as needed for Shortness of Breath. Yes Provider, Historical   diphenhydrAMINE-zinc acetate 2%-0.1% (BENADRYL) 2-0.1 % topical cream Apply  to affected area three (3) times daily as needed for Itching. apply a thin layer   Indications: skin irritation 5/28/20  Yes Vandana Ireland MD   levocetirizine (Xyzal) 5 mg tablet Take 1 Tab by mouth daily as needed for Allergies. 4/26/20  Yes Matheus Anderson, DO   multivit/folic acid/vit K1 (ONE-A-DAY WOMEN'S 50 PLUS PO) Take 1 Tablet by mouth daily. Yes Provider, Historical   fluticasone propionate (FLONASE) 50 mcg/actuation nasal spray 2 Sprays by Both Nostrils route daily. 10/31/19  Yes Stephania Weaver NP   albuterol (PROVENTIL HFA, VENTOLIN HFA, PROAIR HFA) 90 mcg/actuation inhaler Take 1 Puff by inhalation every four (4) hours as needed for Wheezing. 7/13/18  Yes Eriberto Soto MD   polyethylene glycol (MIRALAX) 17 gram packet Take 17 g by mouth daily as needed (constipation). Yes Provider, Historical   cholecalciferol, vitamin D3, (VITAMIN D3 PO) Take  by mouth. Patient not taking: Reported on 7/6/2022    Provider, Historical              Allergies   Allergen Reactions    Aspirin Nausea and Vomiting    Bee Sting [Sting, Bee] Hives and Shortness of Breath     WASP STING    Codeine Nausea and Vomiting    Hydrocodone Rash     And sedation    Lisinopril Cough    Morphine Rash    Nsaids (Non-Steroidal Anti-Inflammatory Drug) Nausea and Vomiting    Oxycodone Rash     On her back    Penicillins Hives, Shortness of Breath and Swelling           Objective:     Vitals:    07/06/22 1547   BP: 135/74   Pulse: 85   Temp: 97.7 °F (36.5 °C)   TempSrc: Temporal   SpO2: 96%   Weight: 150 lb 3.2 oz (68.1 kg)   Height: 5' 2\" (1.575 m)            Physical Exam:  GENERAL: alert, cooperative, no distress, appears stated age  EYE: conjunctivae/corneas clear. PERRL, EOM's intact  LYMPHATIC: Cervical, supraclavicular, and axillary nodes normal.   THROAT & NECK: normal and no erythema or exudates noted. LUNG: clear to auscultation bilaterally  HEART: regular rate and rhythm, S1, S2 normal, no murmur, click, rub or gallop  ABDOMEN: soft, non-tender. Bowel sounds normal. No masses,  no organomegaly  EXTREMITIES:  extremities normal, atraumatic, no cyanosis or edema  SKIN: Normal.  NEUROLOGIC: AOx3. Gait normal. Reflexes and motor strength normal and symmetric. Cranial nerves 2-12 and sensation grossly intact.         Lab Results   Component Value Date/Time    WBC 4.5 07/11/2020 11:36 AM    HGB 11.7 07/11/2020 11:36 AM    HCT 37.2 07/11/2020 11:36 AM    PLATELET 936 81/26/7131 11:36 AM    MCV 96.4 07/11/2020 11:36 AM     Lab Results   Component Value Date/Time    Ferritin 39 04/22/2020 04:31 PM           Assessment:     1. MGUS   IgG   No end organ damage    > Observation  > Risk of progression to Myeloma is low       2. Iron def anemia     Last Hgb and serum ferritin were normal.       Plan:       > CBC, BMP, SPEP/IVETTE, serum FLC every 3-6 months  > F/U in 6 months      Signed by: Marika Bedolla MD                     July 6, 2022         CC.  Tomi Villalobos MD

## 2022-07-06 NOTE — LETTER
7/9/2022    Patient: Tali Smoker   YOB: 1940   Date of Visit: 7/6/2022     Jose Ramsey MD  [address]  [facility]    Dear Jose Ramsey MD,      Thank you for referring Ms. Babita Flores to [address] [place] [address] for evaluation. My notes for this consultation are attached. If you have questions, please do not hesitate to call me. I look forward to following your patient along with you.       Sincerely,    Juan Ramon Roach MD

## 2022-07-26 ENCOUNTER — HOSPITAL ENCOUNTER (OUTPATIENT)
Dept: MAMMOGRAPHY | Age: 82
Discharge: HOME | End: 2022-07-26
Attending: INTERNAL MEDICINE
Payer: MEDICARE

## 2022-07-26 DIAGNOSIS — Z78.0 POSTMENOPAUSAL: ICD-10-CM

## 2022-07-26 DIAGNOSIS — Z13.820 SCREENING FOR OSTEOPOROSIS: ICD-10-CM

## 2022-07-26 PROCEDURE — 77080 DXA BONE DENSITY AXIAL: CPT

## 2022-08-01 NOTE — PROGRESS NOTES
Called, spoke with Pt. Two pt identifiers confirmed. Pt given dexa results and recommendations per Dr. Fermin Robles. Pt verbalized understanding of information discussed w/ no further questions at this time.

## 2022-08-26 DIAGNOSIS — F41.9 ANXIETY: ICD-10-CM

## 2022-08-26 DIAGNOSIS — K21.9 GASTROESOPHAGEAL REFLUX DISEASE WITHOUT ESOPHAGITIS: ICD-10-CM

## 2022-08-26 DIAGNOSIS — K30 ACID INDIGESTION: ICD-10-CM

## 2022-08-27 RX ORDER — CLONAZEPAM 0.5 MG/1
TABLET ORAL
Qty: 60 TABLET | Refills: 0 | Status: SHIPPED | OUTPATIENT
Start: 2022-08-27 | End: 2022-09-26

## 2022-08-27 RX ORDER — PANTOPRAZOLE SODIUM 40 MG/1
TABLET, DELAYED RELEASE ORAL
Qty: 60 TABLET | Refills: 0 | Status: SHIPPED | OUTPATIENT
Start: 2022-08-27 | End: 2022-10-24

## 2022-09-12 RX ORDER — NORTRIPTYLINE HYDROCHLORIDE 10 MG/1
CAPSULE ORAL
Qty: 90 CAPSULE | Refills: 0 | Status: SHIPPED | OUTPATIENT
Start: 2022-09-12 | End: 2022-10-24

## 2022-09-24 DIAGNOSIS — F41.9 ANXIETY: ICD-10-CM

## 2022-09-26 RX ORDER — CLOPIDOGREL BISULFATE 75 MG/1
TABLET ORAL
Qty: 90 TABLET | Refills: 0 | Status: SHIPPED | OUTPATIENT
Start: 2022-09-26

## 2022-09-26 RX ORDER — CLONAZEPAM 0.5 MG/1
TABLET ORAL
Qty: 60 TABLET | Refills: 0 | Status: SHIPPED | OUTPATIENT
Start: 2022-09-26 | End: 2022-10-24

## 2022-10-24 DIAGNOSIS — K21.9 GASTROESOPHAGEAL REFLUX DISEASE WITHOUT ESOPHAGITIS: ICD-10-CM

## 2022-10-24 DIAGNOSIS — F41.9 ANXIETY: ICD-10-CM

## 2022-10-24 DIAGNOSIS — K30 ACID INDIGESTION: ICD-10-CM

## 2022-10-24 RX ORDER — CLONAZEPAM 0.5 MG/1
TABLET ORAL
Qty: 60 TABLET | Refills: 0 | Status: SHIPPED | OUTPATIENT
Start: 2022-10-24 | End: 2022-11-28

## 2022-10-24 RX ORDER — NORTRIPTYLINE HYDROCHLORIDE 10 MG/1
CAPSULE ORAL
Qty: 90 CAPSULE | Refills: 0 | Status: SHIPPED | OUTPATIENT
Start: 2022-10-24

## 2022-10-24 RX ORDER — PANTOPRAZOLE SODIUM 40 MG/1
TABLET, DELAYED RELEASE ORAL
Qty: 60 TABLET | Refills: 0 | Status: SHIPPED | OUTPATIENT
Start: 2022-10-24 | End: 2022-10-31

## 2022-11-27 DIAGNOSIS — F41.9 ANXIETY: ICD-10-CM

## 2022-11-28 RX ORDER — CLONAZEPAM 0.5 MG/1
TABLET ORAL
Qty: 60 TABLET | Refills: 0 | Status: SHIPPED | OUTPATIENT
Start: 2022-11-28

## 2022-12-06 ENCOUNTER — OFFICE VISIT (OUTPATIENT)
Dept: INTERNAL MEDICINE CLINIC | Age: 82
End: 2022-12-06
Payer: MEDICARE

## 2022-12-06 VITALS
OXYGEN SATURATION: 96 % | HEIGHT: 62 IN | SYSTOLIC BLOOD PRESSURE: 130 MMHG | DIASTOLIC BLOOD PRESSURE: 70 MMHG | BODY MASS INDEX: 27.6 KG/M2 | WEIGHT: 150 LBS | TEMPERATURE: 96.8 F | HEART RATE: 83 BPM | RESPIRATION RATE: 16 BRPM

## 2022-12-06 DIAGNOSIS — Z78.0 POSTMENOPAUSAL: ICD-10-CM

## 2022-12-06 DIAGNOSIS — Z78.0 POST-MENOPAUSAL: ICD-10-CM

## 2022-12-06 DIAGNOSIS — E78.00 HIGH CHOLESTEROL: ICD-10-CM

## 2022-12-06 DIAGNOSIS — Z23 NEEDS FLU SHOT: Primary | ICD-10-CM

## 2022-12-06 DIAGNOSIS — R53.83 FATIGUE, UNSPECIFIED TYPE: ICD-10-CM

## 2022-12-06 DIAGNOSIS — D47.2 MGUS (MONOCLONAL GAMMOPATHY OF UNKNOWN SIGNIFICANCE): ICD-10-CM

## 2022-12-06 DIAGNOSIS — I10 ESSENTIAL HYPERTENSION: ICD-10-CM

## 2022-12-06 DIAGNOSIS — F41.9 ANXIETY: ICD-10-CM

## 2022-12-06 PROCEDURE — G8432 DEP SCR NOT DOC, RNG: HCPCS | Performed by: INTERNAL MEDICINE

## 2022-12-06 PROCEDURE — 90694 VACC AIIV4 NO PRSRV 0.5ML IM: CPT | Performed by: INTERNAL MEDICINE

## 2022-12-06 PROCEDURE — 1090F PRES/ABSN URINE INCON ASSESS: CPT | Performed by: INTERNAL MEDICINE

## 2022-12-06 PROCEDURE — G8427 DOCREV CUR MEDS BY ELIG CLIN: HCPCS | Performed by: INTERNAL MEDICINE

## 2022-12-06 PROCEDURE — 1101F PT FALLS ASSESS-DOCD LE1/YR: CPT | Performed by: INTERNAL MEDICINE

## 2022-12-06 PROCEDURE — 99214 OFFICE O/P EST MOD 30 MIN: CPT | Performed by: INTERNAL MEDICINE

## 2022-12-06 PROCEDURE — G8754 DIAS BP LESS 90: HCPCS | Performed by: INTERNAL MEDICINE

## 2022-12-06 PROCEDURE — G8752 SYS BP LESS 140: HCPCS | Performed by: INTERNAL MEDICINE

## 2022-12-06 PROCEDURE — G0008 ADMIN INFLUENZA VIRUS VAC: HCPCS | Performed by: INTERNAL MEDICINE

## 2022-12-06 PROCEDURE — G8417 CALC BMI ABV UP PARAM F/U: HCPCS | Performed by: INTERNAL MEDICINE

## 2022-12-06 PROCEDURE — G8536 NO DOC ELDER MAL SCRN: HCPCS | Performed by: INTERNAL MEDICINE

## 2022-12-06 NOTE — PROGRESS NOTES
Ms. Yvette Warren is presenting to follow up     CC:  Follow-up, Hypertension, and Cholesterol Problem       HPI:    [de-identified] yo woman  With a hx of high cholesterol. HTN, anxiety and hx of stroke and MGUS, chronic back pain s/p Lumbar fusion,  subdural hematoma presenting to follow up   Todays visit was challenging she forgot her hearing aids and it was very difficult to communicate with her she cannot hear provider  She was unable to really verify her medications as well    Hx of stroke   Acute left posterior frontal/parietal operculum and posterior insula cortical  infarct. Reports last week she had an episode of entire face felt weird and getting wors was difficult  laid down and after 15 minutes resolved. She refused to seek medical care. No  recurrence  Discussed at length with patient and she should have gone to the emergency room during that episode. Unclear if she is compliant with Plavix Lipitor  Blood pressure initially was elevated 150/70 but repeat was 130/70. cramps : Intermittent cramps mostly at nighttime reported previously and today she reports  no symptoms and not taking requip anymore    Recall patient has extensive degenerative disease in lack  Hx of L3-L5 fusion   Has numbness in left foot and ankles  Denies weakness  No hx of diabetes           -patient is supposed to be on  verapamil 120 mg daily and Benicar 5 mg daily. Brought in meds and  not taking. Anxiety: controlled on clonazepam and SSRI. On clonazepam 0.5mg BID and lexapro and pamelor. Has increased stress with taking of  who has dementia  Denies dizziness       MGUS (monoclonal gammopathy of unknown significance) she followed by Dr Mary Palacios  And established with Dr Jaquan Santos   She cannot recall her visit with Dr. Naila Warren I reminded her she was able to recall.   Reminded she is due for labs  Forgot hearing aids  Denies recent falls     Wearing hearing aid on right   Review of systems:  Limited review of systems due to her inability to hear as well  Constitutional: negative for fever, chills, weight loss, night sweats   10 systems reviewed and negative other than HPI       Past Medical History:   Diagnosis Date    Adverse effect of anesthesia     sleep apnea no cpap machine useage       Anemia     has had iron infusions;  Hem/onc Dr Mary Palacios [phone]    Anxiety and depression     Bilateral carotid artery stenosis     Congestive heart failure ([facility]     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)     GERD (gastroesophageal reflux disease)     Hypercholesteremia     Hypertension     Idiopathic small and large fiber sensory neuropathy     Limited peripheral vision of right eye     complication of cataract surgery    Long term current use of anticoagulant therapy     Meniere disease     MGUS (monoclonal gammopathy of unknown significance)     Monoclonal gammopathy of unknown significance     hem/onc:  Dr Mary Palacios [phone]    Osteoporosis     Psoriasis     bilateral Legs    Restless leg     Skin melanoma ([facility] 2012    (Rt ankle) and basal cell (Lt eye) removed    Thyroid disease     Transient ischemic attack approx 2005    HAS HAD 2 MINISTROKES-NO DEFICITS; neuro Dr Vandana Costa. Salvador Parrish (cc)    Type 2 diabetes mellitus 5/27/2022    Unspecified adverse effect of anesthesia     HAS NEEDED PORT OR CUTDOWN FOR RECVNG BLOOD OR LONG-TERM IVs; surgery 7/28/15 w/o any difficulty    Unspecified sleep apnea     does not use CPAP        Past Surgical History:   Procedure Laterality Date    HX APPENDECTOMY      HX CATARACT REMOVAL  2011    Right    HX CERVICAL FUSION  1/28/15    C5-7    HX CHOLECYSTECTOMY  2000    HX GI  2010    liver bx due to liver Bx due to elevated enzymes, liver laceration during Bx, hospitalized 10-12d    HX HEENT  7/28/15    Lt total parotidectomy with facial nerve dissection: Dr Caryn Dakins    HX HEENT  11/2015    Right parotidectomy    HX HYSTERECTOMY      HX ORTHOPAEDIC  2009 & 2011    LUMBAR FUSION and revision    HX OTHER SURGICAL  2010    MELANOMA (Rt ankle) and basal cell (Lt eye) removed; q6mo ck by DERMATOLOGY    HX SHOULDER REPLACEMENT Left 08/01/2016    HX THYROIDECTOMY      Right    WA COLON CA SCRN NOT HI RSK IND  1/27/2016         [place] INSERTION SUBQ CARDIAC RHYTHM MONITOR W/PRGRMG N/A 11/6/2019    LOOP RECORDER INSERT performed by Ildefonso Rowe MD at [facility] CARDIAC CATH LAB       Allergies   Allergen Reactions    Aspirin Nausea and Vomiting    Bee Sting [Sting, Bee] Hives and Shortness of Breath     WASP STING    Codeine Nausea and Vomiting    Hydrocodone Rash     And sedation    Lisinopril Cough    Morphine Rash    Nsaids (Non-Steroidal Anti-Inflammatory Drug) Nausea and Vomiting    Oxycodone Rash     On her back    Penicillins Hives, Shortness of Breath and Swelling     We were unable to verify her medication list due to decreased hearing she is going to bring her meds and next week when her  comes in  Current Outpatient Medications on File Prior to Visit   Medication Sig Dispense Refill    clonazePAM (KlonoPIN) 0.5 mg tablet TAKE 1 TABLET BY MOUTH TWICE DAILY . DO NOT EXCEED 2 PER 24 HOURS 60 Tablet 0    pantoprazole (PROTONIX) 40 mg tablet TAKE 1 TABLET BY MOUTH BEFORE BREAKFAST AND BEFORE SUPPER 60 Tablet 3    nortriptyline (PAMELOR) 10 mg capsule TAKE 1 TO 2 CAPSULES BY MOUTH NIGHTLY 90 Capsule 0    clopidogreL (PLAVIX) 75 mg tab Take 1 tablet by mouth nightly 90 Tablet 0    olmesartan (BENICAR) 5 mg tablet Take  by mouth daily. verapamil ER (CALAN-SR) 120 mg tablet TAKE 1 TABLET BY MOUTH NIGHTLY FOR HIGH BLOOD PRESSURE 90 Tablet 0    escitalopram oxalate (LEXAPRO) 20 mg tablet Take 1 Tablet by mouth daily. 90 Tablet 3    atorvastatin (LIPITOR) 10 mg tablet Take 1 Tablet by mouth daily. 90 Tablet 1    cholecalciferol, vitamin D3, (VITAMIN D3 PO) Take  by mouth.       diphenhydrAMINE-zinc acetate 2%-0.1% (BENADRYL) 2-0.1 % topical cream Apply  to affected area three (3) times daily as needed for Itching. apply a thin layer   Indications: skin irritation 30 g 0    multivit/folic acid/vit K1 (ONE-A-DAY WOMEN'S 50 PLUS PO) Take 1 Tablet by mouth daily. fluticasone propionate (FLONASE) 50 mcg/actuation nasal spray 2 Sprays by Both Nostrils route daily. 1 Bottle 0    albuterol (PROVENTIL HFA, VENTOLIN HFA, PROAIR HFA) 90 mcg/actuation inhaler Take 1 Puff by inhalation every four (4) hours as needed for Wheezing. 1 Inhaler 0    polyethylene glycol (MIRALAX) 17 gram packet Take 17 g by mouth daily as needed (constipation). No current facility-administered medications on file prior to visit. family history includes Alcohol abuse in her brother and father; Breast Cancer in her maternal aunt; COPD in her brother; Cancer in her sister; Emphysema in her father; Heart Disease in her mother; Other in her mother. Social History     Socioeconomic History    Marital status:      Spouse name: Not on file    Number of children: Not on file    Years of education: Not on file    Highest education level: Not on file   Occupational History    Not on file   Tobacco Use    Smoking status: Former     Years: 15.00     Types: Cigarettes     Quit date: 7/15/1975     Years since quittin.4    Smokeless tobacco: Never   Vaping Use    Vaping Use: Never used   Substance and Sexual Activity    Alcohol use: Yes     Comment: may have a glass of wine on rare occasion    Drug use: Yes     Types: Prescription, OTC    Sexual activity: Not Currently   Other Topics Concern    Not on file   Social History Narrative    Lives in [place] with  of 62 years. Has 2 sons and 1 daughter. Used to work in a bank. Likes to garden and sew.       Social Determinants of Health     Financial Resource Strain: Not on file   Food Insecurity: Not on file   Transportation Needs: Not on file   Physical Activity: Not on file   Stress: Not on file   Social Connections: Not on file   Intimate Partner Violence: Not on file Housing Stability: Not on file       Visit Vitals  /70   Pulse 83   Temp 96.8 °F (36 °C) (Temporal)   Resp 16   Ht 5' 2\" (1.575 m)   Wt 150 lb (68 kg)   LMP  (LMP Unknown)   SpO2 96%   BMI 27.44 kg/m²     General:  Well appearing female no acute distress  HEENT:   PERRL,normal conjunctiva. Hard of hearing /hearing decreased. Neck:  Supple. Thyroid normal size, nontender, without nodules. No carotid bruit. No masses or lymphadenopathy  Respiratory: no respiratory distress,  no wheezing, no rhonchi, no rales. No chest wall tenderness. Cardiovascular:  RRR, normal S1S2, no murmur. Gastrointestinal: normal bowel sounds, soft, nontender, without masses. Extremities decreased pedal pules, warm extremities, varicose veins, no edema, normal sensation   Musculoskeletal:  Normal gait. Normal digits and nails. Normal strength and tone, no atrophy, and no abnormal movement. Skin:  No rash, no lesions, no ulcers. Skin warm, normal turgor, without induration or nodules. Neuro:  A and OX4, fluent speech, cranial nerves normal 2-12.   Psych:  Normal affect        Monofilament R - normal sensation with micro filament  L -decreased sensation on the left foot  Pulse DP R - 2+ (normal)  L - 2+ (normal)   Pulse TP R - 2+ (normal)  L - 2+ (normal)   Structural deformity R - None  L - None   Skin Integrity / Deformity R - None  L - None    Lab Results   Component Value Date/Time    WBC 4.5 07/11/2020 11:36 AM    HGB 11.7 07/11/2020 11:36 AM    HCT 37.2 07/11/2020 11:36 AM    PLATELET 417 67/12/1352 11:36 AM    MCV 96.4 07/11/2020 11:36 AM     Lab Results   Component Value Date/Time    Sodium 140 01/11/2022 11:04 AM    Potassium 4.7 01/11/2022 11:04 AM    Chloride 106 01/11/2022 11:04 AM    CO2 28 01/11/2022 11:04 AM    Anion gap 6 01/11/2022 11:04 AM    Glucose 85 01/11/2022 11:04 AM    BUN 16 01/11/2022 11:04 AM    Creatinine 0.86 01/11/2022 11:04 AM    BUN/Creatinine ratio 19 01/11/2022 11:04 AM    GFR est AA >60 01/11/2022 11:04 AM    GFR est non-AA >60 01/11/2022 11:04 AM    Calcium 9.6 01/11/2022 11:04 AM     Lab Results   Component Value Date/Time    Cholesterol, total 166 01/11/2022 11:04 AM    HDL Cholesterol 77 01/11/2022 11:04 AM    LDL, calculated 38.4 01/11/2022 11:04 AM    VLDL, calculated 50.6 01/11/2022 11:04 AM    Triglyceride 253 (H) 01/11/2022 11:04 AM    CHOL/HDL Ratio 2.2 01/11/2022 11:04 AM     Lab Results   Component Value Date/Time    TSH 2.43 11/06/2019 02:16 AM     Lab Results   Component Value Date/Time    Hemoglobin A1c 5.2 01/11/2022 11:04 AM     No results found for: Wanda Donald, XQVID3, XQVID, VD3RIA                Assessment and Plan:     1Anxiety: increased due to husbands dementia and medical issues   Controlled on klonopin and Lexapro  Has increased stress with   2 Gastroesophageal reflux disease without esophagitis  Controlled on pepcid    3. Essential hypertension  Stopped meds and BP is higher,     4 Hx of completed stroke  BP at goal of 130/80 or less, on statin and plavix   On Lipitor 10 mg  Check lipid panel today    6. MGUS (monoclonal gammopathy of unknown significance)  Newly established with Dr Virginie Saucedo -reminded to do labs he has ordered    7. Restless leg syndrome: Stopped Requip seems that these symptoms resolved    8. Neuropath lumbar radiculopathy hx of lumbar fusion : on pamelor   This is related to her back  -We could consider stopping nortriptyline given her age.     GERD: It is unclear if she is taking both Protonix and Pepcid she is going to bring her medications in next week so we can verify    Decreased hearing: Advised patient to wear hearing aids  In addition flu shot was given    Follow-up in 6 months

## 2022-12-06 NOTE — PATIENT INSTRUCTIONS
Reviewed medicines and patient stopped taking all  blood pressure medicines  Resume the benicar 5mg daily for blood pressure

## 2022-12-06 NOTE — PROGRESS NOTES
1. \"Have you been to the ER, urgent care clinic since your last visit? Hospitalized since your last visit? \" No    2. \"Have you seen or consulted any other health care providers outside of the [address] [place] [address] since your last visit? \" No     3. For patients aged 39-70: Has the patient had a colonoscopy / FIT/ Cologuard? NA - based on age      If the patient is female:    4. For patients aged 41-77: Has the patient had a mammogram within the past 2 years? NA - based on age or sex      11. For patients aged 21-65: Has the patient had a pap smear?  NA - based on age or sex

## 2022-12-07 LAB
ALBUMIN SERPL-MCNC: 4 G/DL (ref 3.5–5)
ALBUMIN/GLOB SERPL: 1.4 {RATIO} (ref 1.1–2.2)
ALP SERPL-CCNC: 107 U/L (ref 45–117)
ALT SERPL-CCNC: 40 U/L (ref 12–78)
ANION GAP SERPL CALC-SCNC: 5 MMOL/L (ref 5–15)
AST SERPL-CCNC: 20 U/L (ref 15–37)
BASOPHILS # BLD: 0 K/UL (ref 0–0.1)
BASOPHILS NFR BLD: 1 % (ref 0–1)
BILIRUB SERPL-MCNC: 0.5 MG/DL (ref 0.2–1)
BUN SERPL-MCNC: 13 MG/DL (ref 6–20)
BUN/CREAT SERPL: 16 (ref 12–20)
CALCIUM SERPL-MCNC: 9 MG/DL (ref 8.5–10.1)
CHLORIDE SERPL-SCNC: 108 MMOL/L (ref 97–108)
CHOLEST SERPL-MCNC: 228 MG/DL
CO2 SERPL-SCNC: 28 MMOL/L (ref 21–32)
CREAT SERPL-MCNC: 0.8 MG/DL (ref 0.55–1.02)
DIFFERENTIAL METHOD BLD: NORMAL
EOSINOPHIL # BLD: 0.1 K/UL (ref 0–0.4)
EOSINOPHIL NFR BLD: 2 % (ref 0–7)
ERYTHROCYTE [DISTWIDTH] IN BLOOD BY AUTOMATED COUNT: 13 % (ref 11.5–14.5)
GLOBULIN SER CALC-MCNC: 2.9 G/DL (ref 2–4)
GLUCOSE SERPL-MCNC: 90 MG/DL (ref 65–100)
HCT VFR BLD AUTO: 41.7 % (ref 35–47)
HDLC SERPL-MCNC: 92 MG/DL
HDLC SERPL: 2.5 {RATIO} (ref 0–5)
HGB BLD-MCNC: 12.9 G/DL (ref 11.5–16)
IMM GRANULOCYTES # BLD AUTO: 0 K/UL (ref 0–0.04)
IMM GRANULOCYTES NFR BLD AUTO: 0 % (ref 0–0.5)
LDLC SERPL CALC-MCNC: 111 MG/DL (ref 0–100)
LYMPHOCYTES # BLD: 1.5 K/UL (ref 0.8–3.5)
LYMPHOCYTES NFR BLD: 34 % (ref 12–49)
MCH RBC QN AUTO: 30.6 PG (ref 26–34)
MCHC RBC AUTO-ENTMCNC: 30.9 G/DL (ref 30–36.5)
MCV RBC AUTO: 98.8 FL (ref 80–99)
MONOCYTES # BLD: 0.4 K/UL (ref 0–1)
MONOCYTES NFR BLD: 9 % (ref 5–13)
NEUTS SEG # BLD: 2.3 K/UL (ref 1.8–8)
NEUTS SEG NFR BLD: 54 % (ref 32–75)
NRBC # BLD: 0 K/UL (ref 0–0.01)
NRBC BLD-RTO: 0 PER 100 WBC
PLATELET # BLD AUTO: 290 K/UL (ref 150–400)
PMV BLD AUTO: 10.5 FL (ref 8.9–12.9)
POTASSIUM SERPL-SCNC: 4.4 MMOL/L (ref 3.5–5.1)
PROT SERPL-MCNC: 6.9 G/DL (ref 6.4–8.2)
RBC # BLD AUTO: 4.22 M/UL (ref 3.8–5.2)
SODIUM SERPL-SCNC: 141 MMOL/L (ref 136–145)
TRIGL SERPL-MCNC: 125 MG/DL (ref ?–150)
TSH SERPL DL<=0.05 MIU/L-ACNC: 1.53 UIU/ML (ref 0.36–3.74)
VLDLC SERPL CALC-MCNC: 25 MG/DL
WBC # BLD AUTO: 4.2 K/UL (ref 3.6–11)

## 2022-12-07 RX ORDER — ATORVASTATIN CALCIUM 20 MG/1
20 TABLET, FILM COATED ORAL DAILY
Qty: 90 TABLET | Refills: 2 | Status: SHIPPED | OUTPATIENT
Start: 2022-12-07

## 2022-12-08 RX ORDER — OLMESARTAN MEDOXOMIL 5 MG/1
5 TABLET ORAL DAILY
Qty: 90 TABLET | Refills: 1 | Status: SHIPPED | OUTPATIENT
Start: 2022-12-08

## 2022-12-14 RX ORDER — NORTRIPTYLINE HYDROCHLORIDE 10 MG/1
CAPSULE ORAL
Qty: 90 CAPSULE | Refills: 0 | Status: SHIPPED | OUTPATIENT
Start: 2022-12-14

## 2022-12-27 DIAGNOSIS — F41.9 ANXIETY: ICD-10-CM

## 2022-12-28 RX ORDER — CLONAZEPAM 0.5 MG/1
TABLET ORAL
Qty: 60 TABLET | Refills: 0 | Status: SHIPPED | OUTPATIENT
Start: 2022-12-28

## 2022-12-28 RX ORDER — CLOPIDOGREL BISULFATE 75 MG/1
TABLET ORAL
Qty: 90 TABLET | Refills: 0 | Status: SHIPPED | OUTPATIENT
Start: 2022-12-28

## 2023-01-04 NOTE — PROGRESS NOTES
Tanner Aragon is a 81 y.o. female here for 6 month follow up for MGUS. Labs done 12/6/22. Pt states she has been well. No concerns brought up. 1. Have you been to the ER, urgent care clinic since your last visit? Hospitalized since your last visit? no    2. Have you seen or consulted any other health care providers outside of the [address] [place] [address] since your last visit? Include any pap smears or colon screening.   no

## 2023-01-09 ENCOUNTER — OFFICE VISIT (OUTPATIENT)
Dept: ONCOLOGY | Age: 83
End: 2023-01-09
Payer: COMMERCIAL

## 2023-01-09 VITALS
HEART RATE: 87 BPM | HEIGHT: 62 IN | DIASTOLIC BLOOD PRESSURE: 75 MMHG | SYSTOLIC BLOOD PRESSURE: 129 MMHG | OXYGEN SATURATION: 98 % | TEMPERATURE: 97.7 F | BODY MASS INDEX: 27.79 KG/M2 | WEIGHT: 151 LBS

## 2023-01-09 DIAGNOSIS — D47.2 MGUS (MONOCLONAL GAMMOPATHY OF UNKNOWN SIGNIFICANCE): Primary | ICD-10-CM

## 2023-01-09 DIAGNOSIS — D50.0 IRON DEFICIENCY ANEMIA DUE TO CHRONIC BLOOD LOSS: ICD-10-CM

## 2023-01-09 PROCEDURE — 1123F ACP DISCUSS/DSCN MKR DOCD: CPT | Performed by: INTERNAL MEDICINE

## 2023-01-09 PROCEDURE — 99213 OFFICE O/P EST LOW 20 MIN: CPT | Performed by: INTERNAL MEDICINE

## 2023-01-09 NOTE — LETTER
1/9/2023    Patient: Tali Smoker   YOB: 1940   Date of Visit: 1/9/2023     Jose Ramsey MD  [address]  [facility] [address]  [facility]    Dear Jose Ramsey MD,      Thank you for referring Ms. Babita Flores to [address] [place] [address] for evaluation. My notes for this consultation are attached. If you have questions, please do not hesitate to call me. I look forward to following your patient along with you.       Sincerely,    Juan Ramon Roach MD

## 2023-01-09 NOTE — PROGRESS NOTES
[address] [facility] [address], [address], [address] [place] [address], [address] [facility]  [phone]          Hematology Note        Patient: Nathaly Jimenez MRN: [id number]  SSN: [id number]    YOB: 1940  Age: 81 y.o. Sex: female        Subjective: Nathaly Jimenez is a 81 y.o. female who I am seeing for a diagnosis of MGUS, IgG type. She also suffers with iron deficiency secondary to small bowel AVMs. She has been seen by Dr. Flores Raman for over 5 yrs. . She is asymptomatic. The patient denies recent infections, neuropathy, weight loss, loss of appetite or bone pain. Review of Systems:    Constitutional: negative  Eyes: negative  Ears, Nose, Mouth, Throat, and Face: negative  Respiratory: negative  Cardiovascular: negative  Gastrointestinal: negative  Genitourinary:negative  Integument/Breast: negative  Hematologic/Lymphatic: negative  Musculoskeletal:negative  Neurological: negative      Past Medical History:   Diagnosis Date    Adverse effect of anesthesia     sleep apnea no cpap machine useage       Anemia     has had iron infusions;  Hem/onc Dr Flores Raman [phone]    Anxiety and depression     Bilateral carotid artery stenosis     Congestive heart failure ([address]     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)     GERD (gastroesophageal reflux disease)     Hypercholesteremia     Hypertension     Idiopathic small and large fiber sensory neuropathy     Limited peripheral vision of right eye     complication of cataract surgery    Long term current use of anticoagulant therapy     Meniere disease     MGUS (monoclonal gammopathy of unknown significance)     Monoclonal gammopathy of unknown significance     hem/onc:  Dr Flores Raman [phone]    Osteoporosis     Psoriasis     bilateral Legs    Restless leg     Skin melanoma ([address] 2012    (Rt ankle) and basal cell (Lt eye) removed    Thyroid disease     Transient ischemic attack approx     HAS HAD 2 MINISTROKES-NO DEFICITS; neuro Dr Mumtaz Coates (cc)    Type 2 diabetes mellitus 2022    Unspecified adverse effect of anesthesia     HAS NEEDED PORT OR CUTDOWN FOR RECVNG BLOOD OR LONG-TERM IVs; surgery 7/28/15 w/o any difficulty    Unspecified sleep apnea     does not use CPAP     Past Surgical History:   Procedure Laterality Date    HX APPENDECTOMY      HX CATARACT REMOVAL      Right    HX CERVICAL FUSION  1/28/15    C5-7    HX CHOLECYSTECTOMY      HX GI      liver bx due to liver Bx due to elevated enzymes, liver laceration during Bx, hospitalized 10-12d    HX HEENT  7/28/15    Lt total parotidectomy with facial nerve dissection: Dr Maria Dolores Flores    HX HEENT  2015    Right parotidectomy    HX HYSTERECTOMY      HX ORTHOPAEDIC   &     LUMBAR FUSION and revision    HX OTHER SURGICAL  2010    MELANOMA (Rt ankle) and basal cell (Lt eye) removed; q6mo ck by DERMATOLOGY    HX SHOULDER REPLACEMENT Left 2016    HX THYROIDECTOMY      Right    WI COLON CA SCRN NOT HI RSK IND  2016         WI INSERTION SUBQ CARDIAC RHYTHM MONITOR W/PRGRMG N/A 2019    LOOP RECORDER INSERT performed by Gianni Caro MD at [facility] CARDIAC CATH LAB      Family History   Problem Relation Age of Onset    Alcohol abuse Father     Emphysema Father     Cancer Sister         PANCREATIC    Alcohol abuse Brother     COPD Brother     Other Mother         UNKNOWN CAUSE OF DEATH    Heart Disease Mother     Breast Cancer Maternal Aunt     Anesth Problems Neg Hx      Social History     Tobacco Use    Smoking status: Former     Years: 15.00     Types: Cigarettes     Quit date: 7/15/1975     Years since quittin.5    Smokeless tobacco: Never   Substance Use Topics    Alcohol use: Yes     Comment: may have a glass of wine on rare occasion      Prior to Admission medications    Medication Sig Start Date End Date Taking?  Authorizing Provider   clonazePAM (KlonoPIN) 0.5 mg tablet TAKE 1 TABLET BY MOUTH TWICE DAILY . DO NOT EXCEED 2 PER 24 HOURS 12/28/22  Yes Liseth Grier MD   clopidogreL (PLAVIX) 75 mg tab Take 1 tablet by mouth nightly 12/28/22  Yes Vandana Ireland MD   nortriptyline (PAMELOR) 10 mg capsule TAKE 1 TO 2 CAPSULES BY MOUTH NIGHTLY 12/14/22  Yes Vandana Ireland MD   olmesartan (BENICAR) 5 mg tablet Take 1 Tablet by mouth daily. 12/8/22  Yes Liseth Grier MD   atorvastatin (LIPITOR) 20 mg tablet Take 1 Tablet by mouth daily. 12/7/22  Yes Liseth Grier MD   pantoprazole (PROTONIX) 40 mg tablet TAKE 1 TABLET BY MOUTH BEFORE BREAKFAST AND BEFORE SUPPER 10/31/22  Yes Vandana Ireland MD   escitalopram oxalate (LEXAPRO) 20 mg tablet Take 1 Tablet by mouth daily. 6/7/22  Yes Liseth Grier MD   cholecalciferol, vitamin D3, (VITAMIN D3 PO) Take  by mouth. Yes Provider, Historical   diphenhydrAMINE-zinc acetate 2%-0.1% (BENADRYL) 2-0.1 % topical cream Apply  to affected area three (3) times daily as needed for Itching. apply a thin layer   Indications: skin irritation 5/28/20  Yes Vandana Ireland MD   multivit/folic acid/vit K1 (ONE-A-DAY WOMEN'S 50 PLUS PO) Take 1 Tablet by mouth daily. Yes Provider, Historical   fluticasone propionate (FLONASE) 50 mcg/actuation nasal spray 2 Sprays by Both Nostrils route daily. 10/31/19  Yes Stephania Weaver NP   albuterol (PROVENTIL HFA, VENTOLIN HFA, PROAIR HFA) 90 mcg/actuation inhaler Take 1 Puff by inhalation every four (4) hours as needed for Wheezing. 7/13/18  Yes Noman Soto MD   polyethylene glycol (MIRALAX) 17 gram packet Take 17 g by mouth daily as needed (constipation).    Yes Provider, Historical              Allergies   Allergen Reactions    Aspirin Nausea and Vomiting    Bee Sting [Sting, Bee] Hives and Shortness of Breath     WASP STING    Codeine Nausea and Vomiting    Hydrocodone Rash     And sedation    Lisinopril Cough    Morphine Rash    Nsaids (Non-Steroidal Anti-Inflammatory Drug) Nausea and Vomiting    Oxycodone Rash     On her back    Penicillins Hives, Shortness of Breath and Swelling           Objective:     Vitals:    01/09/23 1125   BP: 129/75   Pulse: 87   Temp: 97.7 °F (36.5 °C)   TempSrc: Temporal   SpO2: 98%   Weight: 151 lb (68.5 kg)   Height: 5' 2\" (1.575 m)            Physical Exam:  GENERAL: alert, cooperative, no distress, appears stated age  EYE: conjunctivae/corneas clear. PERRL, EOM's intact  LYMPHATIC: Cervical, supraclavicular, and axillary nodes normal.   THROAT & NECK: normal and no erythema or exudates noted. LUNG: clear to auscultation bilaterally  HEART: regular rate and rhythm, S1, S2 normal, no murmur, click, rub or gallop  ABDOMEN: soft, non-tender. Bowel sounds normal. No masses,  no organomegaly  EXTREMITIES:  extremities normal, atraumatic, no cyanosis or edema  SKIN: Normal.  NEUROLOGIC: AOx3. Gait normal. Reflexes and motor strength normal and symmetric. Cranial nerves 2-12 and sensation grossly intact. Lab Results   Component Value Date/Time    WBC 4.2 12/06/2022 12:55 PM    HGB 12.9 12/06/2022 12:55 PM    HCT 41.7 12/06/2022 12:55 PM    PLATELET 633 36/36/7268 12:55 PM    MCV 98.8 12/06/2022 12:55 PM     Lab Results   Component Value Date/Time    Ferritin 39 04/22/2020 04:31 PM           Assessment:     1. MGUS   IgG   No end organ damage    > Observation  > Risk of progression to Myeloma is low       2. Iron def anemia     Last Hgb and serum ferritin were normal.       Plan:       > CBC, BMP, SPEP/IVETTE, serum FLC every 3-6 months  > F/U in 6 months      Signed by: Susan Lowry MD                     January 9, 2023         CC.  Kadi Franklin MD

## 2023-01-22 DIAGNOSIS — F41.9 ANXIETY: ICD-10-CM

## 2023-01-23 RX ORDER — CLONAZEPAM 0.5 MG/1
TABLET ORAL
Qty: 60 TABLET | Refills: 0 | Status: SHIPPED | OUTPATIENT
Start: 2023-01-23

## 2023-01-28 RX ORDER — NORTRIPTYLINE HYDROCHLORIDE 10 MG/1
CAPSULE ORAL
Qty: 90 CAPSULE | Refills: 0 | Status: SHIPPED | OUTPATIENT
Start: 2023-01-28

## 2023-02-21 DIAGNOSIS — F41.9 ANXIETY: ICD-10-CM

## 2023-02-22 RX ORDER — CLONAZEPAM 0.5 MG/1
TABLET ORAL
Qty: 60 TABLET | Refills: 0 | Status: SHIPPED | OUTPATIENT
Start: 2023-02-22

## 2023-03-15 RX ORDER — NORTRIPTYLINE HYDROCHLORIDE 10 MG/1
CAPSULE ORAL
Qty: 90 CAPSULE | Refills: 0 | Status: SHIPPED | OUTPATIENT
Start: 2023-03-15

## 2023-03-22 DIAGNOSIS — F41.9 ANXIETY: ICD-10-CM

## 2023-03-23 RX ORDER — CLONAZEPAM 0.5 MG/1
TABLET ORAL
Qty: 60 TABLET | Refills: 0 | Status: SHIPPED | OUTPATIENT
Start: 2023-03-23

## 2023-03-23 RX ORDER — CLOPIDOGREL BISULFATE 75 MG/1
TABLET ORAL
Qty: 90 TABLET | Refills: 0 | Status: SHIPPED | OUTPATIENT
Start: 2023-03-23

## 2023-04-05 ENCOUNTER — OFFICE VISIT (OUTPATIENT)
Dept: INTERNAL MEDICINE CLINIC | Age: 83
End: 2023-04-05

## 2023-04-05 PROCEDURE — 99214 OFFICE O/P EST MOD 30 MIN: CPT | Performed by: INTERNAL MEDICINE

## 2023-04-05 PROCEDURE — G8432 DEP SCR NOT DOC, RNG: HCPCS | Performed by: INTERNAL MEDICINE

## 2023-04-05 PROCEDURE — G8417 CALC BMI ABV UP PARAM F/U: HCPCS | Performed by: INTERNAL MEDICINE

## 2023-04-05 PROCEDURE — 1090F PRES/ABSN URINE INCON ASSESS: CPT | Performed by: INTERNAL MEDICINE

## 2023-04-05 PROCEDURE — 1101F PT FALLS ASSESS-DOCD LE1/YR: CPT | Performed by: INTERNAL MEDICINE

## 2023-04-05 PROCEDURE — G8536 NO DOC ELDER MAL SCRN: HCPCS | Performed by: INTERNAL MEDICINE

## 2023-04-05 PROCEDURE — G8427 DOCREV CUR MEDS BY ELIG CLIN: HCPCS | Performed by: INTERNAL MEDICINE

## 2023-04-05 NOTE — PROGRESS NOTES
Ms. Anna Marie Reyes is presenting to follow up     CC:  Hypertension and Cholesterol Problem         HPI:    82 yo woman with a hx of high cholesterol, HTN, anxiety and hx of stroke and MGUS, chronic back pain s/p Lumbar fusion,  subdural hematoma presenting to follow up     Hx of stroke   Acute left posterior frontal/parietal operculum and posterior insula cortical  infarct. BP at goal   Last lipid panel LDL was high so I increased the lipitor 20mg     Recall patient has extensive degenerative disease in lack  Hx of L3-L5 fusion   Has numbness in left foot and ankles  Denies weakness  No hx of diabetes       Blood pressure is now controlled on benicar 5mg daily        Anxiety: controlled on clonazepam and SSRI. On clonazepam 0.5mg BID and lexapro and pamelor. Has increased stress with taking of  who has dementia  Denies dizziness       MGUS (monoclonal gammopathy of unknown significance) she followed by Dr Jennifer Dubose  And established with Dr Frias Flair   She cannot recall her visit with Dr. Jeniffer Garcia I reminded her she was able to recall. 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)     GERD (gastroesophageal reflux disease)     Hypercholesteremia     Hypertension     Idiopathic small and large fiber sensory neuropathy     Limited peripheral vision of right eye     complication of cataract surgery    Long term current use of anticoagulant therapy     Meniere disease     MGUS (monoclonal gammopathy of unknown significance)     Monoclonal gammopathy of unknown significance     hem/onc:  Dr Hoffman Speak [phone]    Osteoporosis     Psoriasis     bilateral Legs    Restless leg     Skin melanoma ([address] 2012    (Rt ankle) and basal cell (Lt eye) removed    Thyroid disease     Transient ischemic attack approx 2005    HAS HAD 2 MINISTROKES-NO DEFICITS; neuro Dr Chelsea Ruiz ()    Type 2 diabetes mellitus 5/27/2022    Unspecified adverse effect of anesthesia     HAS NEEDED PORT OR CUTDOWN FOR RECVNG BLOOD OR LONG-TERM IVs; surgery 7/28/15 w/o any difficulty    Unspecified sleep apnea     does not use CPAP        Past Surgical History:   Procedure Laterality Date    HX APPENDECTOMY      HX CATARACT REMOVAL  2011    Right    HX CERVICAL FUSION  1/28/15    C5-7    HX CHOLECYSTECTOMY  2000    HX GI  2010    liver bx due to liver Bx due to elevated enzymes, liver laceration during Bx, hospitalized 10-12d    HX HEENT  7/28/15    Lt total parotidectomy with facial nerve dissection: Dr Trung Rocha  11/2015    Right parotidectomy    HX HYSTERECTOMY      HX ORTHOPAEDIC  2009 & 2011    LUMBAR FUSION and revision    HX OTHER SURGICAL  2010    MELANOMA (Rt ankle) and basal cell (Lt eye) removed; q6mo ck by DERMATOLOGY    HX SHOULDER REPLACEMENT Left 08/01/2016    HX THYROIDECTOMY      Right    FL COLON CA SCRN NOT HI RSK IND  1/27/2016         FL INSERTION SUBQ CARDIAC RHYTHM MONITOR W/PRGRMG N/A 11/6/2019    LOOP RECORDER INSERT performed by Bebe Fair MD at [facility] CARDIAC CATH LAB       Allergies   Allergen Reactions    Aspirin Nausea and Vomiting    Bee Sting [Sting, Bee] Hives and Shortness of Breath     WASP STING    Codeine Nausea and Vomiting    Hydrocodone Rash     And sedation    Lisinopril Cough    Morphine Rash    Nsaids (Non-Steroidal Anti-Inflammatory Drug) Nausea and Vomiting    Oxycodone Rash     On her back    Penicillins Hives, Shortness of Breath and Swelling     We were unable to verify her medication list due to decreased hearing she is going to bring her meds and next week when her  comes in  Current Outpatient Medications on File Prior to Visit   Medication Sig Dispense Refill    clonazePAM (KlonoPIN) 0.5 mg tablet TAKE 1 TABLET BY MOUTH TWICE DAILY . DO NOT EXCEED 2 PER 24 HOURS 60 Tablet 0    clopidogreL (PLAVIX) 75 mg tab Take 1 tablet by mouth nightly 90 Tablet 0    nortriptyline (PAMELOR) 10 mg capsule TAKE 1 TO 2 CAPSULES BY MOUTH NIGHTLY 90 Capsule 0    olmesartan (BENICAR) 5 mg tablet Take 1 Tablet by mouth daily. 90 Tablet 1    atorvastatin (LIPITOR) 20 mg tablet Take 1 Tablet by mouth daily. 90 Tablet 2    pantoprazole (PROTONIX) 40 mg tablet TAKE 1 TABLET BY MOUTH BEFORE BREAKFAST AND BEFORE SUPPER 60 Tablet 3    escitalopram oxalate (LEXAPRO) 20 mg tablet Take 1 Tablet by mouth daily. 90 Tablet 3    cholecalciferol, vitamin D3, (VITAMIN D3 PO) Take  by mouth. diphenhydrAMINE-zinc acetate 2%-0.1% (BENADRYL) 2-0.1 % topical cream Apply  to affected area three (3) times daily as needed for Itching. apply a thin layer   Indications: skin irritation 30 g 0    multivit/folic acid/vit K1 (ONE-A-DAY WOMEN'S 50 PLUS PO) Take 1 Tablet by mouth daily. fluticasone propionate (FLONASE) 50 mcg/actuation nasal spray 2 Sprays by Both Nostrils route daily. 1 Bottle 0    albuterol (PROVENTIL HFA, VENTOLIN HFA, PROAIR HFA) 90 mcg/actuation inhaler Take 1 Puff by inhalation every four (4) hours as needed for Wheezing. 1 Inhaler 0    polyethylene glycol (MIRALAX) 17 gram packet Take 1 Packet by mouth daily as needed (constipation). No current facility-administered medications on file prior to visit. family history includes Alcohol abuse in her brother and father; Breast Cancer in her maternal aunt; COPD in her brother; Cancer in her sister; Emphysema in her father; Heart Disease in her mother;  Other in her mother. Social History     Socioeconomic History    Marital status:      Spouse name: Not on file    Number of children: Not on file    Years of education: Not on file    Highest education level: Not on file   Occupational History    Not on file   Tobacco Use    Smoking status: Former     Years: 15.00     Types: Cigarettes     Quit date: 7/15/1975     Years since quittin.7    Smokeless tobacco: Never   Vaping Use    Vaping Use: Never used   Substance and Sexual Activity    Alcohol use: Yes     Comment: may have a glass of wine on rare occasion    Drug use: Yes     Types: Prescription, OTC    Sexual activity: Not Currently   Other Topics Concern    Not on file   Social History Narrative    Lives in [place] with  of 62 years. Has 2 sons and 1 daughter. Used to work in a bank. Likes to garden and sew. Social Determinants of Health     Financial Resource Strain: Low Risk     Difficulty of Paying Living Expenses: Not hard at all   Food Insecurity: No Food Insecurity    Worried About Running Out of Food in the Last Year: Never true    Ran Out of Food in the Last Year: Never true   Transportation Needs: Not on file   Physical Activity: Not on file   Stress: Not on file   Social Connections: Not on file   Intimate Partner Violence: Not on file   Housing Stability: Not on file       Visit Vitals  /73 (BP 1 Location: Right upper arm, BP Patient Position: Sitting, BP Cuff Size: Adult)   Pulse 82   Temp 97.1 °F (36.2 °C) (Temporal)   Resp 16   Ht 5' 2\" (1.575 m)   LMP  (LMP Unknown)   SpO2 95%   BMI 27.62 kg/m²     General:  Well appearing female no acute distress  HEENT:   PERRL,normal conjunctiva. Hard of hearing /hearing decreased. Neck:  Supple. Thyroid normal size, nontender, without nodules. No carotid bruit. No masses or lymphadenopathy  Respiratory: no respiratory distress,  no wheezing, no rhonchi, no rales. No chest wall tenderness.   Cardiovascular:  RRR, normal S1S2, no murmur. Gastrointestinal: normal bowel sounds, soft, nontender, without masses. Extremities decreased pedal pules, warm extremities, varicose veins, no edema, normal sensation   Musculoskeletal:  Normal gait. Normal digits and nails. Normal strength and tone, no atrophy, and no abnormal movement. Skin:  No rash, no lesions, no ulcers. Neuro:  A and OX4, fluent speech, cranial nerves normal 2-12. Psych:  Normal affect        Lab Results   Component Value Date/Time    WBC 4.2 12/06/2022 12:55 PM    HGB 12.9 12/06/2022 12:55 PM    HCT 41.7 12/06/2022 12:55 PM    PLATELET 329 97/32/7444 12:55 PM    MCV 98.8 12/06/2022 12:55 PM     Lab Results   Component Value Date/Time    Sodium 141 12/06/2022 12:55 PM    Potassium 4.4 12/06/2022 12:55 PM    Chloride 108 12/06/2022 12:55 PM    CO2 28 12/06/2022 12:55 PM    Anion gap 5 12/06/2022 12:55 PM    Glucose 90 12/06/2022 12:55 PM    BUN 13 12/06/2022 12:55 PM    Creatinine 0.80 12/06/2022 12:55 PM    BUN/Creatinine ratio 16 12/06/2022 12:55 PM    GFR est AA >60 01/11/2022 11:04 AM    GFR est non-AA >60 01/11/2022 11:04 AM    Calcium 9.0 12/06/2022 12:55 PM     Lab Results   Component Value Date/Time    Cholesterol, total 228 (H) 12/06/2022 12:55 PM    HDL Cholesterol 92 12/06/2022 12:55 PM    LDL, calculated 111 (H) 12/06/2022 12:55 PM    VLDL, calculated 25 12/06/2022 12:55 PM    Triglyceride 125 12/06/2022 12:55 PM    CHOL/HDL Ratio 2.5 12/06/2022 12:55 PM     Lab Results   Component Value Date/Time    TSH 1.53 12/06/2022 12:55 PM     Lab Results   Component Value Date/Time    Hemoglobin A1c 5.2 01/11/2022 11:04 AM     No results found for: [facility], XQVID3, XQVID, VD3RIA                Assessment and Plan:     1Anxiety: increased due to husbands dementia and medical issues   Controlled on klonopin and Lexapro  Has increased stress with   2 Gastroesophageal reflux disease without esophagitis  Controlled on protonix    3.  Essential hypertension: blood pressure is excellent today on olmesartan      4 Hx of completed stroke  BP at goal of 130/80 or less, on statin and plavix   Last lipid panel LDL not at goal and increased lipitor to 20mg   Check level today    5. MGUS (monoclonal gammopathy of unknown significance)  Newly established with Dr Rahul Jeffries -reminded to do labs he has ordered    6. Restless leg syndrome: Stopped Requip seems that these symptoms resolved    7. Neuropath lumbar radiculopathy hx of lumbar fusion : on pamelor   This is related to her back  -We could consider stopping nortriptyline given her age. 8.GERD:taking PPI     9. Hx of melanoma: given referral to dermatology    10 Decreased hearing: wearing on right ear       Follow-up in 6 months

## 2023-04-05 NOTE — PROGRESS NOTES
Chief Complaint   Patient presents with    Hypertension    Cholesterol Problem          1. \"Have you been to the ER, urgent care clinic since your last visit? Hospitalized since your last visit? \" No    2. \"Have you seen or consulted any other health care providers outside of the [address] [place] [address] since your last visit? \" No     3. For patients aged 39-70: Has the patient had a colonoscopy / FIT/ Cologuard? NA - based on age      If the patient is female:    4. For patients aged 41-77: Has the patient had a mammogram within the past 2 years? NA - based on age or sex      11. For patients aged 21-65: Has the patient had a pap smear?  NA - based on age or sex

## 2023-04-22 DIAGNOSIS — F41.9 ANXIETY: ICD-10-CM

## 2023-04-25 RX ORDER — NORTRIPTYLINE HYDROCHLORIDE 10 MG/1
CAPSULE ORAL
Qty: 90 CAPSULE | Refills: 0 | Status: SHIPPED | OUTPATIENT
Start: 2023-04-25

## 2023-04-25 RX ORDER — CLONAZEPAM 0.5 MG/1
TABLET ORAL
Qty: 60 TABLET | Refills: 0 | Status: SHIPPED | OUTPATIENT
Start: 2023-04-25

## 2023-05-24 DIAGNOSIS — F41.1 GENERALIZED ANXIETY DISORDER: Primary | ICD-10-CM

## 2023-05-25 RX ORDER — CLONAZEPAM 0.5 MG/1
TABLET ORAL
Qty: 60 TABLET | Refills: 1 | Status: SHIPPED | OUTPATIENT
Start: 2023-05-25 | End: 2023-06-24

## 2023-06-01 RX ORDER — OLMESARTAN MEDOXOMIL 5 MG/1
TABLET ORAL
Qty: 90 TABLET | Refills: 0 | Status: SHIPPED | OUTPATIENT
Start: 2023-06-01

## 2023-06-10 ENCOUNTER — HOSPITAL ENCOUNTER (EMERGENCY)
Facility: HOSPITAL | Age: 83
Discharge: HOME | End: 2023-06-10
Attending: EMERGENCY MEDICINE
Payer: COMMERCIAL

## 2023-06-10 ENCOUNTER — APPOINTMENT (OUTPATIENT)
Facility: HOSPITAL | Age: 83
End: 2023-06-10
Payer: COMMERCIAL

## 2023-06-10 VITALS
BODY MASS INDEX: 27.51 KG/M2 | HEART RATE: 88 BPM | DIASTOLIC BLOOD PRESSURE: 83 MMHG | HEIGHT: 62 IN | TEMPERATURE: 98.3 F | WEIGHT: 149.47 LBS | RESPIRATION RATE: 16 BRPM | OXYGEN SATURATION: 99 % | SYSTOLIC BLOOD PRESSURE: 169 MMHG

## 2023-06-10 DIAGNOSIS — M25.512 ACUTE PAIN OF LEFT SHOULDER: Primary | ICD-10-CM

## 2023-06-10 LAB
EKG ATRIAL RATE: 84 BPM
EKG DIAGNOSIS: NORMAL
EKG P AXIS: 55 DEGREES
EKG P-R INTERVAL: 154 MS
EKG Q-T INTERVAL: 370 MS
EKG QRS DURATION: 74 MS
EKG QTC CALCULATION (BAZETT): 437 MS
EKG R AXIS: 63 DEGREES
EKG T AXIS: 63 DEGREES
EKG VENTRICULAR RATE: 84 BPM
TROPONIN I SERPL HS-MCNC: 7 NG/L (ref 0–51)

## 2023-06-10 PROCEDURE — 84484 ASSAY OF TROPONIN QUANT: CPT

## 2023-06-10 PROCEDURE — 73030 X-RAY EXAM OF SHOULDER: CPT

## 2023-06-10 PROCEDURE — 6370000000 HC RX 637 (ALT 250 FOR IP): Performed by: PHYSICIAN ASSISTANT

## 2023-06-10 PROCEDURE — 36415 COLL VENOUS BLD VENIPUNCTURE: CPT

## 2023-06-10 RX ORDER — LIDOCAINE 4 G/G
1 PATCH TOPICAL DAILY PRN
Qty: 30 PATCH | Refills: 0 | Status: SHIPPED | OUTPATIENT
Start: 2023-06-10 | End: 2023-07-10

## 2023-06-10 RX ORDER — LIDOCAINE 4 G/G
1 PATCH TOPICAL
Status: DISCONTINUED | OUTPATIENT
Start: 2023-06-10 | End: 2023-06-10 | Stop reason: HOSPADM

## 2023-06-10 ASSESSMENT — PAIN - FUNCTIONAL ASSESSMENT: PAIN_FUNCTIONAL_ASSESSMENT: 0-10

## 2023-06-10 ASSESSMENT — PAIN SCALES - GENERAL: PAINLEVEL_OUTOF10: 10

## 2023-06-10 NOTE — ED PROVIDER NOTES
[facility] EMERGENCY DEPT  EMERGENCY DEPARTMENT ENCOUNTER       Pt Name: Lawrence Oviedo  MRN: [id number]  [place] 1940  Date of evaluation: 6/10/2023  Provider: JED Marinelli   PCP: Teressa Jara MD  Note Started: 5:45 PM EDT 6/10/23     CHIEF COMPLAINT       Chief Complaint   Patient presents with    Shoulder Pain     X 4 days. Hx of left shoulder replacement. Pt denies any chest pain or sob         HISTORY OF PRESENT ILLNESS: 1 or more elements      History From: Patient  HPI Limitations: None     Lawrence Oviedo is a 81 y.o. female who presents by POV with complaints of left shoulder pain. The pain has been present for about a week. She denies injury but notes that about 10 to 15 years ago her left shoulder was replaced by Dr. Yoanna Grayson at [facility]. The pain is a constant pain that occasionally will radiate down her left arm. It is exacerbated with movement. She has been taking Tylenol without relief. She denies chest pain, palpitations, shortness of breath, jaw pain, abdominal pain, nausea, and vomiting. Right hand dominate. Taking Tylenol without relief. Nursing Notes were all reviewed and agreed with or any disagreements were addressed in the HPI. REVIEW OF SYSTEMS      Review of Systems     Positives and Pertinent negatives as per HPI. 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

## 2023-06-15 ENCOUNTER — HOSPITAL ENCOUNTER (OUTPATIENT)
Facility: HOSPITAL | Age: 83
Discharge: HOME | End: 2023-06-18
Payer: MEDICARE

## 2023-06-15 DIAGNOSIS — M54.12 CERVICAL RADICULAR PAIN: ICD-10-CM

## 2023-06-15 PROCEDURE — 72050 X-RAY EXAM NECK SPINE 4/5VWS: CPT

## 2023-06-26 RX ORDER — CLOPIDOGREL BISULFATE 75 MG/1
TABLET ORAL
Qty: 90 TABLET | Refills: 0 | Status: SHIPPED | OUTPATIENT
Start: 2023-06-26

## 2023-07-03 RX ORDER — ESCITALOPRAM OXALATE 20 MG/1
TABLET ORAL
Qty: 90 TABLET | Refills: 0 | Status: SHIPPED | OUTPATIENT
Start: 2023-07-03

## 2023-07-10 ENCOUNTER — OFFICE VISIT (OUTPATIENT)
Age: 83
End: 2023-07-10
Payer: COMMERCIAL

## 2023-07-10 VITALS
SYSTOLIC BLOOD PRESSURE: 143 MMHG | BODY MASS INDEX: 27.68 KG/M2 | HEIGHT: 62 IN | OXYGEN SATURATION: 95 % | DIASTOLIC BLOOD PRESSURE: 80 MMHG | WEIGHT: 150.4 LBS | TEMPERATURE: 97.9 F | HEART RATE: 83 BPM

## 2023-07-10 DIAGNOSIS — D47.2 MONOCLONAL GAMMOPATHY: ICD-10-CM

## 2023-07-10 DIAGNOSIS — D47.2 MONOCLONAL GAMMOPATHY: Primary | ICD-10-CM

## 2023-07-10 PROCEDURE — 99213 OFFICE O/P EST LOW 20 MIN: CPT | Performed by: INTERNAL MEDICINE

## 2023-07-10 PROCEDURE — 1123F ACP DISCUSS/DSCN MKR DOCD: CPT | Performed by: INTERNAL MEDICINE

## 2023-07-10 NOTE — PROGRESS NOTES
Breath and Swelling    Lisinopril Cough    Aspirin Nausea And Vomiting    Codeine Nausea And Vomiting    Hydrocodone Rash     And sedation    Morphine Rash    Nsaids Nausea And Vomiting    Oxycodone Rash     On her back             Objective:                  Physical Exam:   GENERAL: alert, cooperative, no distress, appears stated age   EYE: conjunctivae/corneas clear. PERRL, EOM's intact   LYMPHATIC: Cervical, supraclavicular, and axillary nodes normal.    THROAT & NECK: normal and no erythema or exudates noted. LUNG: clear to auscultation bilaterally   HEART: regular rate and rhythm, S1, S2 normal, no murmur, click, rub or gallop   ABDOMEN: soft, non-tender. Bowel sounds normal. No masses,  no organomegaly   EXTREMITIES:  extremities normal, atraumatic, no cyanosis or edema   SKIN: Normal.   NEUROLOGIC: AOx3. Gait normal. Reflexes and motor strength normal and symmetric. Cranial nerves 2-12 and sensation grossly intact. Physical exam and ROS has been modified from a prior visit to make it relevant and current      Lab Results   Component Value Date     04/05/2023    K 4.2 04/05/2023     04/05/2023    CO2 30 04/05/2023    BUN 15 04/05/2023    CREATININE 0.77 04/05/2023    GLUCOSE 86 04/05/2023    CALCIUM 9.6 04/05/2023    PROT 6.9 04/05/2023    LABALBU 3.8 04/05/2023    BILITOT 0.5 04/05/2023    ALKPHOS 95 04/05/2023    AST 23 04/05/2023    ALT 24 04/05/2023    GFRAA >60 01/11/2022    AGRATIO 1.2 04/05/2023    GLOB 3.1 04/05/2023                      Assessment:        1. MGUS    IgG    No end organ damage      > Observation   > Risk of progression to Myeloma is low          2. Iron def anemia       Last Hgb and serum ferritin were normal.              Plan:           > CBC, BMP, SPEP/RICARDO, serum FLC now   > F/U in  1 yr       Signed by: Efrain Griffin MD                     July 10, 2023          CC.  Shanelle Hoyt MD

## 2023-07-11 LAB
ALBUMIN SERPL-MCNC: 3.8 G/DL (ref 3.5–5)
ALBUMIN/GLOB SERPL: 1.3 (ref 1.1–2.2)
ALP SERPL-CCNC: 97 U/L (ref 45–117)
ALT SERPL-CCNC: 25 U/L (ref 12–78)
ANION GAP SERPL CALC-SCNC: 5 MMOL/L (ref 5–15)
AST SERPL-CCNC: 18 U/L (ref 15–37)
BASOPHILS # BLD: 0 K/UL (ref 0–0.1)
BASOPHILS NFR BLD: 1 % (ref 0–1)
BILIRUB SERPL-MCNC: 0.3 MG/DL (ref 0.2–1)
BUN SERPL-MCNC: 13 MG/DL (ref 6–20)
BUN/CREAT SERPL: 19 (ref 12–20)
CALCIUM SERPL-MCNC: 9.7 MG/DL (ref 8.5–10.1)
CHLORIDE SERPL-SCNC: 108 MMOL/L (ref 97–108)
CO2 SERPL-SCNC: 27 MMOL/L (ref 21–32)
CREAT SERPL-MCNC: 0.7 MG/DL (ref 0.55–1.02)
DIFFERENTIAL METHOD BLD: ABNORMAL
EOSINOPHIL # BLD: 0.1 K/UL (ref 0–0.4)
EOSINOPHIL NFR BLD: 2 % (ref 0–7)
ERYTHROCYTE [DISTWIDTH] IN BLOOD BY AUTOMATED COUNT: 12.7 % (ref 11.5–14.5)
GLOBULIN SER CALC-MCNC: 2.9 G/DL (ref 2–4)
GLUCOSE SERPL-MCNC: 83 MG/DL (ref 65–100)
HCT VFR BLD AUTO: 41.2 % (ref 35–47)
HGB BLD-MCNC: 12.5 G/DL (ref 11.5–16)
IMM GRANULOCYTES # BLD AUTO: 0 K/UL (ref 0–0.04)
IMM GRANULOCYTES NFR BLD AUTO: 0 % (ref 0–0.5)
LYMPHOCYTES # BLD: 1.4 K/UL (ref 0.8–3.5)
LYMPHOCYTES NFR BLD: 33 % (ref 12–49)
MCH RBC QN AUTO: 30.3 PG (ref 26–34)
MCHC RBC AUTO-ENTMCNC: 30.3 G/DL (ref 30–36.5)
MCV RBC AUTO: 100 FL (ref 80–99)
MONOCYTES # BLD: 0.5 K/UL (ref 0–1)
MONOCYTES NFR BLD: 12 % (ref 5–13)
NEUTS SEG # BLD: 2.3 K/UL (ref 1.8–8)
NEUTS SEG NFR BLD: 52 % (ref 32–75)
NRBC # BLD: 0.02 K/UL (ref 0–0.01)
NRBC BLD-RTO: 0.5 PER 100 WBC
PLATELET # BLD AUTO: 219 K/UL (ref 150–400)
PMV BLD AUTO: 10.8 FL (ref 8.9–12.9)
POTASSIUM SERPL-SCNC: 5 MMOL/L (ref 3.5–5.1)
PROT SERPL-MCNC: 6.7 G/DL (ref 6.4–8.2)
RBC # BLD AUTO: 4.12 M/UL (ref 3.8–5.2)
SODIUM SERPL-SCNC: 140 MMOL/L (ref 136–145)
WBC # BLD AUTO: 4.3 K/UL (ref 3.6–11)

## 2023-07-17 LAB
ALBUMIN SERPL ELPH-MCNC: 3.6 G/DL (ref 2.9–4.4)
ALBUMIN/GLOB SERPL: 1.4 (ref 0.7–1.7)
ALPHA1 GLOB SERPL ELPH-MCNC: 0.2 G/DL (ref 0–0.4)
ALPHA2 GLOB SERPL ELPH-MCNC: 0.8 G/DL (ref 0.4–1)
B-GLOBULIN SERPL ELPH-MCNC: 0.9 G/DL (ref 0.7–1.3)
GAMMA GLOB SERPL ELPH-MCNC: 0.7 G/DL (ref 0.4–1.8)
GLOBULIN SER-MCNC: 2.7 G/DL (ref 2.2–3.9)
IGA SERPL-MCNC: 278 MG/DL (ref 64–422)
IGG SERPL-MCNC: 696 MG/DL (ref 586–1602)
IGM SERPL-MCNC: 32 MG/DL (ref 26–217)
INTERPRETATION SERPL IEP-IMP: ABNORMAL
KAPPA LC FREE SER-MCNC: 31.8 MG/L (ref 3.3–19.4)
KAPPA LC FREE/LAMBDA FREE SER: 1.41 (ref 0.26–1.65)
LAMBDA LC FREE SERPL-MCNC: 22.5 MG/L (ref 5.7–26.3)
M PROTEIN SERPL ELPH-MCNC: ABNORMAL G/DL
PROT SERPL-MCNC: 6.3 G/DL (ref 6–8.5)

## 2023-07-22 DIAGNOSIS — F41.1 GENERALIZED ANXIETY DISORDER: ICD-10-CM

## 2023-07-24 RX ORDER — NORTRIPTYLINE HYDROCHLORIDE 10 MG/1
CAPSULE ORAL
Qty: 90 CAPSULE | Refills: 0 | Status: SHIPPED | OUTPATIENT
Start: 2023-07-24

## 2023-07-24 RX ORDER — CLONAZEPAM 0.5 MG/1
TABLET ORAL
Qty: 60 TABLET | Refills: 0 | Status: SHIPPED | OUTPATIENT
Start: 2023-07-24 | End: 2023-08-23

## 2023-09-28 DIAGNOSIS — F41.1 GENERALIZED ANXIETY DISORDER: ICD-10-CM

## 2023-09-29 RX ORDER — CLONAZEPAM 0.5 MG/1
TABLET ORAL
Qty: 60 TABLET | Refills: 0 | Status: SHIPPED | OUTPATIENT
Start: 2023-09-29 | End: 2023-10-29

## 2023-09-29 RX ORDER — NORTRIPTYLINE HYDROCHLORIDE 10 MG/1
CAPSULE ORAL
Qty: 90 CAPSULE | Refills: 0 | Status: SHIPPED | OUTPATIENT
Start: 2023-09-29

## 2023-10-04 ENCOUNTER — TELEPHONE (OUTPATIENT)
Age: 83
End: 2023-10-04

## 2023-10-04 DIAGNOSIS — F41.1 GENERALIZED ANXIETY DISORDER: ICD-10-CM

## 2023-10-04 RX ORDER — CLOPIDOGREL BISULFATE 75 MG/1
75 TABLET ORAL NIGHTLY
Qty: 90 TABLET | Refills: 1 | Status: SHIPPED | OUTPATIENT
Start: 2023-10-04

## 2023-10-04 RX ORDER — ESCITALOPRAM OXALATE 20 MG/1
20 TABLET ORAL DAILY
Qty: 90 TABLET | Refills: 0 | Status: SHIPPED | OUTPATIENT
Start: 2023-10-04

## 2023-10-04 RX ORDER — NORTRIPTYLINE HYDROCHLORIDE 10 MG/1
CAPSULE ORAL
Qty: 90 CAPSULE | Refills: 0 | Status: SHIPPED | OUTPATIENT
Start: 2023-10-04

## 2023-10-04 RX ORDER — CLONAZEPAM 0.5 MG/1
TABLET ORAL
Qty: 60 TABLET | Refills: 2 | Status: SHIPPED | OUTPATIENT
Start: 2023-10-04 | End: 2023-11-04

## 2023-10-04 NOTE — TELEPHONE ENCOUNTER
Patient is completely out of Medication & needs refill done for thru [facility] for her Clopidogrel (PLAVIX) 75 MG tablet. Please call if any questions.  Thank you

## 2023-10-04 NOTE — TELEPHONE ENCOUNTER
Patient states she needs a call back to get an Appt but patient is requesting Phone Call appt for Routine appt due to patient states she does drive well anymore. Patient last appt 6/15/23. Please call to advise.  Thank you

## 2023-10-04 NOTE — TELEPHONE ENCOUNTER
PCP: Ceferino Paul MD    Last appt:   6/15/2023    Future Appointments   Date Time Provider 4600  46[facility]   7/10/2024 11:00 AM Brielle Denson MD [facility]/GIANNA [facility] AMB       Requested Prescriptions     Pending Prescriptions Disp Refills    escitalopram (LEXAPRO) 20 MG tablet 90 tablet 0     Sig: Take 1 tablet by mouth daily    clopidogrel (PLAVIX) 75 MG tablet 90 tablet 1     Sig: Take 1 tablet by mouth nightly    clonazePAM (KLONOPIN) 0.5 MG tablet 60 tablet 2     Sig: TAKE 1 TABLET BY MOUTH TWICE DAILY .  DO NOT EXCEED 2 PER 24 HOURS    nortriptyline (PAMELOR) 10 MG capsule 90 capsule 0     Sig: TAKE 1 TO 2 CAPSULES BY MOUTH NIGHTLY

## 2023-10-04 NOTE — TELEPHONE ENCOUNTER
----- Message from Warren Khan sent at 01/8/1875  8:43 AM EDT -----  Subject: Medication Problem    Medication: escitalopram (LEXAPRO) 20 MG tablet  Dosage:  Take 1 tablet by mouth once daily  Ordering Provider: Luli Herman    Question/Problem: Completely out      Pharmacy: [address], [id number] [place] [address] [phone] Kenna Spurling [phone]    ---------------------------------------------------------------------------  --------------  Joe BABB  [phone]; OK to leave message on voicemail  ---------------------------------------------------------------------------  --------------    SCRIPT ANSWERS  Relationship to Patient: Self

## 2023-10-06 ENCOUNTER — TELEPHONE (OUTPATIENT)
Age: 83
End: 2023-10-06

## 2023-10-06 NOTE — TELEPHONE ENCOUNTER
----- Message from Edita Avalos sent at 10/6/2023 10:17 AM EDT -----  Subject: Appointment Request    Reason for Call: Established Patient Appointment needed: Routine Existing   Condition Follow Up    QUESTIONS    Reason for appointment request? Available appointments did not meet   patient need     Additional Information for Provider?  Patient would like to schedule an   appt before her scheduled appt in Feb. unable to do a virtual. Please call   and schedule  ---------------------------------------------------------------------------  --------------  Bernadine Rachel INFO  [phone]; OK to leave message on voicemail  ---------------------------------------------------------------------------  --------------  SCRIPT ANSWERS

## 2023-11-16 ENCOUNTER — OFFICE VISIT (OUTPATIENT)
Age: 83
End: 2023-11-16
Payer: MEDICARE

## 2023-11-16 VITALS
RESPIRATION RATE: 18 BRPM | HEIGHT: 62 IN | SYSTOLIC BLOOD PRESSURE: 115 MMHG | OXYGEN SATURATION: 94 % | TEMPERATURE: 98.3 F | HEART RATE: 79 BPM | BODY MASS INDEX: 26.43 KG/M2 | WEIGHT: 143.6 LBS | DIASTOLIC BLOOD PRESSURE: 72 MMHG

## 2023-11-16 DIAGNOSIS — F41.1 GENERALIZED ANXIETY DISORDER: ICD-10-CM

## 2023-11-16 DIAGNOSIS — D47.2 MONOCLONAL GAMMOPATHY: ICD-10-CM

## 2023-11-16 DIAGNOSIS — I10 ESSENTIAL (PRIMARY) HYPERTENSION: ICD-10-CM

## 2023-11-16 DIAGNOSIS — K21.9 GASTRO-ESOPHAGEAL REFLUX DISEASE WITHOUT ESOPHAGITIS: Primary | ICD-10-CM

## 2023-11-16 DIAGNOSIS — I63.412 CEREBRAL INFARCTION DUE TO EMBOLISM OF LEFT MIDDLE CEREBRAL ARTERY (HCC): ICD-10-CM

## 2023-11-16 PROBLEM — G44.209 TENSION VASCULAR HEADACHE: Status: RESOLVED | Noted: 2017-02-21 | Resolved: 2023-11-16

## 2023-11-16 PROBLEM — L25.9 CONTACT DERMATITIS: Status: RESOLVED | Noted: 2020-05-18 | Resolved: 2023-11-16

## 2023-11-16 PROBLEM — I67.89 CEREBRAL MICROVASCULAR DISEASE: Status: RESOLVED | Noted: 2017-03-13 | Resolved: 2023-11-16

## 2023-11-16 PROBLEM — I63.9 CVA (CEREBRAL VASCULAR ACCIDENT) (HCC): Status: RESOLVED | Noted: 2019-11-05 | Resolved: 2023-11-16

## 2023-11-16 PROBLEM — R06.03 ACUTE RESPIRATORY DISTRESS: Status: RESOLVED | Noted: 2020-04-22 | Resolved: 2023-11-16

## 2023-11-16 PROBLEM — M54.16 LUMBAR BACK PAIN WITH RADICULOPATHY AFFECTING LEFT LOWER EXTREMITY: Status: RESOLVED | Noted: 2018-06-07 | Resolved: 2023-11-16

## 2023-11-16 PROBLEM — M54.16 LUMBAR BACK PAIN WITH RADICULOPATHY AFFECTING RIGHT LOWER EXTREMITY: Status: RESOLVED | Noted: 2018-06-07 | Resolved: 2023-11-16

## 2023-11-16 PROCEDURE — 99214 OFFICE O/P EST MOD 30 MIN: CPT | Performed by: INTERNAL MEDICINE

## 2023-11-16 PROCEDURE — 3078F DIAST BP <80 MM HG: CPT | Performed by: INTERNAL MEDICINE

## 2023-11-16 PROCEDURE — 1123F ACP DISCUSS/DSCN MKR DOCD: CPT | Performed by: INTERNAL MEDICINE

## 2023-11-16 PROCEDURE — 3074F SYST BP LT 130 MM HG: CPT | Performed by: INTERNAL MEDICINE

## 2023-11-16 RX ORDER — NORTRIPTYLINE HYDROCHLORIDE 10 MG/1
CAPSULE ORAL
Qty: 90 CAPSULE | Refills: 1 | Status: SHIPPED | OUTPATIENT
Start: 2023-11-16

## 2023-11-16 RX ORDER — CLONAZEPAM 0.5 MG/1
0.5 TABLET ORAL 2 TIMES DAILY PRN
Qty: 60 TABLET | Refills: 2 | Status: SHIPPED | OUTPATIENT
Start: 2023-11-16 | End: 2024-02-14

## 2023-11-16 RX ORDER — CLONAZEPAM 0.5 MG/1
TABLET ORAL
Qty: 60 TABLET | Refills: 2 | Status: CANCELLED | OUTPATIENT
Start: 2023-11-16 | End: 2023-12-17

## 2023-11-16 NOTE — PROGRESS NOTES
1. \"Have you been to the ER, urgent care clinic since your last visit? Hospitalized since your last visit? \" No    2. \"Have you seen or consulted any other health care providers outside of the [address] [place] [address] since your last visit? \" No     3. For patients aged 43-73: Has the patient had a colonoscopy / FIT/ Cologuard? NA - based on age      If the patient is female:    4. For patients aged 43-66: Has the patient had a mammogram within the past 2 years? NA - based on age or sex      11. For patients aged 21-65: Has the patient had a pap smear?  NA - based on age or sex
digits and nails. Normal strength and tone, no atrophy, and no abnormal movement. Skin:  No rash, no lesions, no ulcers. Skin warm, normal turgor, without induration or nodules. Neuro:  A and OX4, fluent speech, cranial nerves normal 2-12. Psych:  Normal affect      Lab Results   Component Value Date    WBC 4.3 07/10/2023    HGB 12.5 07/10/2023    HCT 41.2 07/10/2023     07/10/2023    CHOL 160 04/05/2023    TRIG 176 (H) 04/05/2023    HDL 80 04/05/2023    ALT 25 07/10/2023    AST 18 07/10/2023     07/10/2023    K 5.0 07/10/2023     07/10/2023    CREATININE 0.70 07/10/2023    BUN 13 07/10/2023    CO2 27 07/10/2023    TSH 1.53 12/06/2022    INR 1.0 07/11/2020    LABA1C 5.2 01/11/2022     Lab Results   Component Value Date    LDLCALC 44.8 04/05/2023     No results found for: \"PSA\", \"PSADIA\"     Assessment and Plan:     1. Generalized anxiety disorder  Doing well on   Lexapro and   - nortriptyline (PAMELOR) 10 MG capsule; TAKE 1 TO 2 CAPSULES BY MOUTH NIGHTLY  Dispense: 90 capsule; Refill: 1  - clonazePAM (KLONOPIN) 0.5 MG tablet; Take 1 tablet by mouth 2 times daily as needed for Anxiety for up to 90 days. Max Daily Amount: 1 mg  Dispense: 60 tablet; Refill: 2    2. Gastro-esophageal reflux disease without esophagitis  Controlled on PPI     3. 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 Monoclonal gammopathy  Stable  follows with Dr Raphael Rhoades    5. Essential (primary) hypertension  At goal on benicar       Return in about 7 months (around 6/16/2024) for medicare wellness visit 30 minute .      Estrada Harris MD

## 2024-03-28 NOTE — TELEPHONE ENCOUNTER
Spoke with Christy Huber with [facility].   Clarification provided for patient temazepam Patient's belongings returned

## 2024-05-17 DIAGNOSIS — F41.1 GENERALIZED ANXIETY DISORDER: ICD-10-CM

## 2024-05-18 RX ORDER — CLONAZEPAM 0.5 MG/1
TABLET ORAL
Qty: 60 TABLET | Refills: 2 | Status: SHIPPED | OUTPATIENT
Start: 2024-05-18 | End: 2024-08-16

## 2024-06-17 ENCOUNTER — OFFICE VISIT (OUTPATIENT)
Age: 84
End: 2024-06-17
Payer: MEDICARE

## 2024-06-17 VITALS
HEART RATE: 72 BPM | BODY MASS INDEX: 22.88 KG/M2 | WEIGHT: 134 LBS | SYSTOLIC BLOOD PRESSURE: 130 MMHG | RESPIRATION RATE: 18 BRPM | HEIGHT: 64 IN | DIASTOLIC BLOOD PRESSURE: 80 MMHG | OXYGEN SATURATION: 99 %

## 2024-06-17 DIAGNOSIS — D47.2 MONOCLONAL GAMMOPATHY: ICD-10-CM

## 2024-06-17 DIAGNOSIS — I10 ESSENTIAL (PRIMARY) HYPERTENSION: ICD-10-CM

## 2024-06-17 DIAGNOSIS — F41.1 GENERALIZED ANXIETY DISORDER: ICD-10-CM

## 2024-06-17 DIAGNOSIS — Z00.00 MEDICARE ANNUAL WELLNESS VISIT, SUBSEQUENT: Primary | ICD-10-CM

## 2024-06-17 DIAGNOSIS — E78.00 HIGH CHOLESTEROL: ICD-10-CM

## 2024-06-17 PROCEDURE — 3079F DIAST BP 80-89 MM HG: CPT | Performed by: INTERNAL MEDICINE

## 2024-06-17 PROCEDURE — 3075F SYST BP GE 130 - 139MM HG: CPT | Performed by: INTERNAL MEDICINE

## 2024-06-17 PROCEDURE — 1123F ACP DISCUSS/DSCN MKR DOCD: CPT | Performed by: INTERNAL MEDICINE

## 2024-06-17 PROCEDURE — G0439 PPPS, SUBSEQ VISIT: HCPCS | Performed by: INTERNAL MEDICINE

## 2024-06-17 SDOH — ECONOMIC STABILITY: FOOD INSECURITY: WITHIN THE PAST 12 MONTHS, THE FOOD YOU BOUGHT JUST DIDN'T LAST AND YOU DIDN'T HAVE MONEY TO GET MORE.: NEVER TRUE

## 2024-06-17 SDOH — ECONOMIC STABILITY: INCOME INSECURITY: HOW HARD IS IT FOR YOU TO PAY FOR THE VERY BASICS LIKE FOOD, HOUSING, MEDICAL CARE, AND HEATING?: NOT HARD AT ALL

## 2024-06-17 SDOH — ECONOMIC STABILITY: FOOD INSECURITY: WITHIN THE PAST 12 MONTHS, YOU WORRIED THAT YOUR FOOD WOULD RUN OUT BEFORE YOU GOT MONEY TO BUY MORE.: NEVER TRUE

## 2024-06-17 SDOH — ECONOMIC STABILITY: HOUSING INSECURITY
IN THE LAST 12 MONTHS, WAS THERE A TIME WHEN YOU DID NOT HAVE A STEADY PLACE TO SLEEP OR SLEPT IN A SHELTER (INCLUDING NOW)?: NO

## 2024-06-17 ASSESSMENT — PATIENT HEALTH QUESTIONNAIRE - PHQ9
SUM OF ALL RESPONSES TO PHQ QUESTIONS 1-9: 0
SUM OF ALL RESPONSES TO PHQ9 QUESTIONS 1 & 2: 0
SUM OF ALL RESPONSES TO PHQ QUESTIONS 1-9: 0
SUM OF ALL RESPONSES TO PHQ QUESTIONS 1-9: 0
2. FEELING DOWN, DEPRESSED OR HOPELESS: NOT AT ALL
SUM OF ALL RESPONSES TO PHQ QUESTIONS 1-9: 0
1. LITTLE INTEREST OR PLEASURE IN DOING THINGS: NOT AT ALL

## 2024-06-17 ASSESSMENT — LIFESTYLE VARIABLES
HOW OFTEN DO YOU HAVE A DRINK CONTAINING ALCOHOL: NEVER
HOW MANY STANDARD DRINKS CONTAINING ALCOHOL DO YOU HAVE ON A TYPICAL DAY: PATIENT DOES NOT DRINK

## 2024-06-17 NOTE — PROGRESS NOTES
Medicare Annual Wellness Visit    Mckenzie Rojas is here for Medicare AWV    Assessment & Plan   Medicare annual wellness visit, subsequent  Generalized anxiety disorder  Essential (primary) hypertension controlled   -     Comprehensive Metabolic Panel; Future  -     CBC with Auto Differential; Future  Monoclonal gammopathy stable follows with Dr Rao no end organ involvement  High cholesterol  -     Lipid Panel; Future    Hx of stroke   Acute left posterior frontal/parietal operculum and posterior insula cortical  infarct.  BP at goal   Last lipid panel LDL was high so I increased the lipitor 20mg and repeat April 2023 improved 44     Check lipid panel today  Recommendations for Preventive Services Due: see orders and patient instructions/AVS.  Recommended screening schedule for the next 5-10 years is provided to the patient in written form: see Patient Instructions/AVS.     Return in 6 months (on 12/17/2024).     Subjective   84 yo woman with a hx of high cholesterol, HTN, anxiety and hx of stroke and MGUS, chronic back pain s/p Lumbar fusion,  subdural hematoma presenting to follow up      Hx of stroke   Acute left posterior frontal/parietal operculum and posterior insula cortical  infarct.  BP at goal   Last lipid panel LDL was high so I increased the lipitor 20mg and repeat April 2023 improved 44      Recall patient has extensive degenerative disease in lack  Hx of L3-L5 fusion   Has numbness in left foot and ankles  Denies weakness  No hx of diabetes         Blood pressure is now controlled on benicar 5mg daily         Hx of left shoulder replacement per Dr Hicks and last imaged in 2019 and looked fine. She is having some discomfort  not severe, declines further intervention         Anxiety: controlled on clonazepam and SSRI and lexapro and pamelor.   Has increased stress with taking of  who has dementia but managing   takes clonazepam after dinner to help with sleep        MGUS (monoclonal

## 2024-06-17 NOTE — PATIENT INSTRUCTIONS
Manage other health problems such as diabetes, high blood pressure, and high cholesterol. If you think you may have a problem with alcohol or drug use, talk to your doctor.   Medicines    Take your medicines exactly as prescribed. Call your doctor if you think you are having a problem with your medicine.     If your doctor recommends aspirin, take the amount directed each day. Make sure you take aspirin and not another kind of pain reliever, such as acetaminophen (Tylenol).   When should you call for help?   Call 911 if you have symptoms of a heart attack. These may include:    Chest pain or pressure, or a strange feeling in the chest.     Sweating.     Shortness of breath.     Pain, pressure, or a strange feeling in the back, neck, jaw, or upper belly or in one or both shoulders or arms.     Lightheadedness or sudden weakness.     A fast or irregular heartbeat.   After you call 911, the  may tell you to chew 1 adult-strength or 2 to 4 low-dose aspirin. Wait for an ambulance. Do not try to drive yourself.  Watch closely for changes in your health, and be sure to contact your doctor if you have any problems.  Where can you learn more?  Go to https://www.[employer].net/patientEd and enter F075 to learn more about \"A Healthy Heart: Care Instructions.\"  Current as of: June 24, 2023  Content Version: 14.1  © 0078-0691 [employer].   Care instructions adapted under license by [employer]. If you have questions about a medical condition or this instruction, always ask your healthcare professional. [employer] disclaims any warranty or liability for your use of this information.      Personalized Preventive Plan for Mckenzie Rojas - 6/17/2024  Medicare offers a range of preventive health benefits. Some of the tests and screenings are paid in full while other may be subject to a deductible, co-insurance, and/or copay.    Some of these benefits include a comprehensive review of your medical

## 2024-08-19 ENCOUNTER — HOSPITAL ENCOUNTER (EMERGENCY)
Facility: HOSPITAL | Age: 84
Discharge: HOME | End: 2024-08-19
Attending: EMERGENCY MEDICINE
Payer: MEDICARE

## 2024-08-19 ENCOUNTER — APPOINTMENT (OUTPATIENT)
Facility: HOSPITAL | Age: 84
End: 2024-08-19
Payer: MEDICARE

## 2024-08-19 VITALS
SYSTOLIC BLOOD PRESSURE: 131 MMHG | BODY MASS INDEX: 23 KG/M2 | WEIGHT: 134.7 LBS | DIASTOLIC BLOOD PRESSURE: 66 MMHG | HEIGHT: 64 IN | TEMPERATURE: 97.5 F | RESPIRATION RATE: 15 BRPM | OXYGEN SATURATION: 98 % | HEART RATE: 72 BPM

## 2024-08-19 DIAGNOSIS — S09.90XA INJURY OF HEAD, INITIAL ENCOUNTER: Primary | ICD-10-CM

## 2024-08-19 PROCEDURE — 6370000000 HC RX 637 (ALT 250 FOR IP): Performed by: EMERGENCY MEDICINE

## 2024-08-19 PROCEDURE — 70450 CT HEAD/BRAIN W/O DYE: CPT

## 2024-08-19 PROCEDURE — 99284 EMERGENCY DEPT VISIT MOD MDM: CPT

## 2024-08-19 RX ORDER — ACETAMINOPHEN 325 MG/1
650 TABLET ORAL ONCE
Status: COMPLETED | OUTPATIENT
Start: 2024-08-19 | End: 2024-08-19

## 2024-08-19 RX ADMIN — ACETAMINOPHEN 650 MG: 325 TABLET ORAL at 05:57

## 2024-08-19 ASSESSMENT — PAIN DESCRIPTION - DESCRIPTORS: DESCRIPTORS: ACHING;NAGGING

## 2024-08-19 ASSESSMENT — PAIN DESCRIPTION - LOCATION
LOCATION: HEAD
LOCATION: HEAD

## 2024-08-19 ASSESSMENT — LIFESTYLE VARIABLES
HOW MANY STANDARD DRINKS CONTAINING ALCOHOL DO YOU HAVE ON A TYPICAL DAY: PATIENT DOES NOT DRINK
HOW OFTEN DO YOU HAVE A DRINK CONTAINING ALCOHOL: NEVER

## 2024-08-19 ASSESSMENT — PAIN DESCRIPTION - ORIENTATION: ORIENTATION: RIGHT

## 2024-08-19 ASSESSMENT — PAIN DESCRIPTION - FREQUENCY: FREQUENCY: CONTINUOUS

## 2024-08-19 ASSESSMENT — PAIN SCALES - GENERAL
PAINLEVEL_OUTOF10: 5
PAINLEVEL_OUTOF10: 5

## 2024-08-19 ASSESSMENT — PAIN - FUNCTIONAL ASSESSMENT: PAIN_FUNCTIONAL_ASSESSMENT: 0-10

## 2024-08-19 ASSESSMENT — PAIN DESCRIPTION - ONSET: ONSET: SUDDEN

## 2024-08-19 NOTE — ED PROVIDER NOTES
[facility] EMERGENCY DEPT  EMERGENCY DEPARTMENT ENCOUNTER       Pt Name: Mckenzie Rojas  MRN: [id number]  Birthdate 1940  Date of evaluation: 8/19/2024  Provider: Dougie Martini DO   PCP: Emma You MD  Note Started: 4:49 AM EDT 8/19/24     CHIEF COMPLAINT       Chief Complaint   Patient presents with    Fall     Pt arrives for a fall while visiting her . Pt tripped and hit her head. Pt did not loose consciousness. Pt not on any blood thinners.          HISTORY OF PRESENT ILLNESS: 1 or more elements      History From: Patient, History limited by: none     Mckenzie Rojas is a 83 y.o. female presenting the emergency department after ground-level fall.  Complains of a head injury       Please See MDM for Additional Details of the HPI/PMH  Nursing Notes were all reviewed and agreed with or any disagreements were addressed in the HPI.     REVIEW OF SYSTEMS        Positives and Pertinent negatives as per HPI.    PAST HISTORY     Past Medical History:  Past Medical History:   Diagnosis Date    Adverse effect of anesthesia     sleep apnea no cpap machine useage       Anemia     has had iron infusions; Hem/onc Dr Maki [phone]    Anxiety and depression     Bilateral carotid artery stenosis     Congestive heart failure (HCC)     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)     GERD (gastroesophageal reflux disease)     Hypercholesteremia     Hypertension     Idiopathic small and large fiber sensory neuropathy     Limited peripheral vision of right eye     complication of cataract surgery    Long term current use of anticoagulant therapy     Meniere disease     MGUS (monoclonal gammopathy of unknown significance)     Monoclonal gammopathy of unknown significance     hem/onc:  Dr Maki [phone]    Osteoporosis     Psoriasis     bilateral Legs    Restless leg     Skin melanoma (HCC) 2012    (Rt ankle) and basal cell (Lt eye) removed    Thyroid disease     Transient ischemic attack approx 2005    HAS HAD

## 2024-08-24 DIAGNOSIS — F41.1 GENERALIZED ANXIETY DISORDER: ICD-10-CM

## 2024-08-26 RX ORDER — NORTRIPTYLINE HCL 10 MG
CAPSULE ORAL
Qty: 90 CAPSULE | Refills: 0 | Status: SHIPPED | OUTPATIENT
Start: 2024-08-26

## 2024-09-03 ENCOUNTER — TELEPHONE (OUTPATIENT)
Age: 84
End: 2024-09-03

## 2024-09-03 NOTE — TELEPHONE ENCOUNTER
Medina states that pt is having brain fog/mental issues.     First appt is VV next Tuesday.  VV will not work and pt needs to be seen.    Pt had hit her head while at 's /hospice     But she (Medina)  believes this has been happening for some time.     Please call to schedule appt.  Thank you.

## 2024-09-05 ENCOUNTER — OFFICE VISIT (OUTPATIENT)
Age: 84
End: 2024-09-05
Payer: MEDICARE

## 2024-09-05 VITALS
OXYGEN SATURATION: 96 % | HEIGHT: 64 IN | SYSTOLIC BLOOD PRESSURE: 137 MMHG | WEIGHT: 131 LBS | DIASTOLIC BLOOD PRESSURE: 72 MMHG | BODY MASS INDEX: 22.36 KG/M2 | HEART RATE: 98 BPM | TEMPERATURE: 98 F | RESPIRATION RATE: 18 BRPM

## 2024-09-05 DIAGNOSIS — R41.3 MEMORY LOSS: ICD-10-CM

## 2024-09-05 DIAGNOSIS — E78.00 HIGH CHOLESTEROL: ICD-10-CM

## 2024-09-05 DIAGNOSIS — I10 ESSENTIAL (PRIMARY) HYPERTENSION: ICD-10-CM

## 2024-09-05 DIAGNOSIS — F41.1 GENERALIZED ANXIETY DISORDER: Primary | ICD-10-CM

## 2024-09-05 DIAGNOSIS — Z86.73 HX OF COMPLETED STROKE: ICD-10-CM

## 2024-09-05 PROCEDURE — 99214 OFFICE O/P EST MOD 30 MIN: CPT | Performed by: INTERNAL MEDICINE

## 2024-09-05 RX ORDER — CLOPIDOGREL BISULFATE 75 MG/1
75 TABLET ORAL NIGHTLY
Qty: 90 TABLET | Refills: 0 | OUTPATIENT
Start: 2024-09-05

## 2024-09-05 RX ORDER — OLMESARTAN MEDOXOMIL 5 MG/1
5 TABLET ORAL DAILY
Qty: 90 TABLET | Refills: 0 | Status: SHIPPED | OUTPATIENT
Start: 2024-09-05

## 2024-09-05 RX ORDER — CLOPIDOGREL BISULFATE 75 MG/1
75 TABLET ORAL NIGHTLY
Qty: 90 TABLET | Refills: 1 | Status: SHIPPED | OUTPATIENT
Start: 2024-09-05

## 2024-09-05 SDOH — ECONOMIC STABILITY: FOOD INSECURITY: WITHIN THE PAST 12 MONTHS, THE FOOD YOU BOUGHT JUST DIDN'T LAST AND YOU DIDN'T HAVE MONEY TO GET MORE.: NEVER TRUE

## 2024-09-05 SDOH — ECONOMIC STABILITY: INCOME INSECURITY: HOW HARD IS IT FOR YOU TO PAY FOR THE VERY BASICS LIKE FOOD, HOUSING, MEDICAL CARE, AND HEATING?: NOT HARD AT ALL

## 2024-09-05 SDOH — ECONOMIC STABILITY: FOOD INSECURITY: WITHIN THE PAST 12 MONTHS, YOU WORRIED THAT YOUR FOOD WOULD RUN OUT BEFORE YOU GOT MONEY TO BUY MORE.: NEVER TRUE

## 2024-09-05 ASSESSMENT — PATIENT HEALTH QUESTIONNAIRE - PHQ9
SUM OF ALL RESPONSES TO PHQ9 QUESTIONS 1 & 2: 2
SUM OF ALL RESPONSES TO PHQ QUESTIONS 1-9: 2
1. LITTLE INTEREST OR PLEASURE IN DOING THINGS: SEVERAL DAYS
2. FEELING DOWN, DEPRESSED OR HOPELESS: SEVERAL DAYS
SUM OF ALL RESPONSES TO PHQ QUESTIONS 1-9: 2

## 2024-09-05 NOTE — PROGRESS NOTES
\"Have you been to the ER, urgent care clinic since your last visit?  Hospitalized since your last visit?\"    YES - When: approximately 2  weeks ago.  Where and Why: 08/19/2024 [facility] injury of head.    “Have you seen or consulted any other health care providers outside of [facility] since your last visit?”    NO            Click Here for Release of Records Request  
1 TABLET BY MOUTH BEFORE BREAKFAST AND BEFORE SUPPER      polyethylene glycol (GLYCOLAX) 17 GM/SCOOP powder Take 17 g by mouth daily as needed      clonazePAM (KLONOPIN) 0.5 MG tablet TAKE 1 TABLET BY MOUTH TWICE DAILY AS NEEDED FOR ANXIETY . DO NOT EXCEED 2 PER 24 HOURS 60 tablet 2     No current facility-administered medications for this visit.      Physical exam  General:  Well appearing adult no acute distress  HEENT:   PERRL,normal conjunctiva. External ear and canals normal, TMs normal.  Hearing DECREASED voice.  Nose without edema or discharge, normal septum.  Lips, teeth, gums normal.  Oropharynx: no erythema, no exudates, no lesions, normal tongue.  Neck:  Supple. Thyroid normal size, nontender, without nodules.  No carotid bruit. No masses or lymphadenopathy  Respiratory: no respiratory distress,  no wheezing, no rhonchi, no rales. No chest wall tenderness.  Cardiovascular:  RRR, normal S1S2, no murmur.    Gastrointestinal: normal bowel sounds, soft, nontender, without masses.  No hepatosplenomegaly.  Extremities +2 pulses, no edema, normal sensation   Musculoskeletal:  Normal gait. Normal digits and nails.  Normal strength and tone, no atrophy, and no abnormal movement.  Skin:  No rash, no lesions, no ulcers.  Skin warm, normal turgor, without induration or nodules.  Neuro:  A and OX4, fluent speech, cranial nerves normal 2-12.    Psych:  Normal affect   Objective   Blood pressure 137/72, pulse 98, temperature 98 °F (36.7 °C), temperature source Temporal, resp. rate 18, height 1.626 m (5' 4\"), weight 59.4 kg (131 lb), SpO2 96%.  Physical Exam             The patient (or guardian, if applicable) and other individuals in attendance with the patient were advised that Artificial Intelligence will be utilized during this visit to record, process the conversation to generate a clinical note and to support improvement of the AI technology. The patient (or guardian, if applicable) and other individuals in

## 2024-09-06 LAB
ALBUMIN SERPL-MCNC: 3.7 G/DL (ref 3.5–5)
ALBUMIN/GLOB SERPL: 1.2 (ref 1.1–2.2)
ALP SERPL-CCNC: 89 U/L (ref 45–117)
ALT SERPL-CCNC: 21 U/L (ref 12–78)
ANION GAP SERPL CALC-SCNC: 5 MMOL/L (ref 2–12)
AST SERPL-CCNC: 24 U/L (ref 15–37)
BASOPHILS # BLD: 0 K/UL (ref 0–0.1)
BASOPHILS NFR BLD: 1 % (ref 0–1)
BILIRUB SERPL-MCNC: 0.6 MG/DL (ref 0.2–1)
BUN SERPL-MCNC: 17 MG/DL (ref 6–20)
BUN/CREAT SERPL: 21 (ref 12–20)
CALCIUM SERPL-MCNC: 9 MG/DL (ref 8.5–10.1)
CHLORIDE SERPL-SCNC: 110 MMOL/L (ref 97–108)
CHOLEST SERPL-MCNC: 149 MG/DL
CO2 SERPL-SCNC: 25 MMOL/L (ref 21–32)
CREAT SERPL-MCNC: 0.81 MG/DL (ref 0.55–1.02)
DIFFERENTIAL METHOD BLD: ABNORMAL
EOSINOPHIL # BLD: 0.1 K/UL (ref 0–0.4)
EOSINOPHIL NFR BLD: 2 % (ref 0–7)
ERYTHROCYTE [DISTWIDTH] IN BLOOD BY AUTOMATED COUNT: 12.8 % (ref 11.5–14.5)
GLOBULIN SER CALC-MCNC: 3.1 G/DL (ref 2–4)
GLUCOSE SERPL-MCNC: 123 MG/DL (ref 65–100)
HCT VFR BLD AUTO: 41 % (ref 35–47)
HDLC SERPL-MCNC: 86 MG/DL
HDLC SERPL: 1.7 (ref 0–5)
HGB BLD-MCNC: 12.7 G/DL (ref 11.5–16)
IMM GRANULOCYTES # BLD AUTO: 0 K/UL (ref 0–0.04)
IMM GRANULOCYTES NFR BLD AUTO: 0 % (ref 0–0.5)
LDLC SERPL CALC-MCNC: 44.6 MG/DL (ref 0–100)
LYMPHOCYTES # BLD: 1.3 K/UL (ref 0.8–3.5)
LYMPHOCYTES NFR BLD: 29 % (ref 12–49)
MCH RBC QN AUTO: 30.8 PG (ref 26–34)
MCHC RBC AUTO-ENTMCNC: 31 G/DL (ref 30–36.5)
MCV RBC AUTO: 99.5 FL (ref 80–99)
MONOCYTES # BLD: 0.4 K/UL (ref 0–1)
MONOCYTES NFR BLD: 10 % (ref 5–13)
NEUTS SEG # BLD: 2.5 K/UL (ref 1.8–8)
NEUTS SEG NFR BLD: 58 % (ref 32–75)
NRBC # BLD: 0 K/UL (ref 0–0.01)
NRBC BLD-RTO: 0 PER 100 WBC
PLATELET # BLD AUTO: 286 K/UL (ref 150–400)
PMV BLD AUTO: 10.7 FL (ref 8.9–12.9)
POTASSIUM SERPL-SCNC: 4.1 MMOL/L (ref 3.5–5.1)
PROT SERPL-MCNC: 6.8 G/DL (ref 6.4–8.2)
RBC # BLD AUTO: 4.12 M/UL (ref 3.8–5.2)
RPR SER QL: NONREACTIVE
SODIUM SERPL-SCNC: 140 MMOL/L (ref 136–145)
TRIGL SERPL-MCNC: 92 MG/DL
TSH SERPL DL<=0.05 MIU/L-ACNC: 0.78 UIU/ML (ref 0.36–3.74)
VIT B12 SERPL-MCNC: 239 PG/ML (ref 193–986)
VLDLC SERPL CALC-MCNC: 18.4 MG/DL
WBC # BLD AUTO: 4.3 K/UL (ref 3.6–11)

## 2024-09-13 ENCOUNTER — TELEPHONE (OUTPATIENT)
Age: 84
End: 2024-09-13

## 2024-09-13 DIAGNOSIS — R41.3 MEMORY LOSS: Primary | ICD-10-CM

## 2024-10-02 ENCOUNTER — TELEPHONE (OUTPATIENT)
Age: 84
End: 2024-10-02

## 2024-10-02 NOTE — TELEPHONE ENCOUNTER
Medina would like a call as pt is showing memory issues.   Short term memory is a problem.  Constantly she states.  Pt's  just passed last week and she doesn't understand all of this.  Five minutes and she doesn't remember something that was told to her.     Pt can't get in until May to neurologist.      Is there anything that can help with this? Medication?     Please call to discuss.  Does pt need an appt?    Please call Medina.  Thanks.     Lisa Holt #[phone]

## 2024-10-02 NOTE — TELEPHONE ENCOUNTER
Caller is returning a call from psr team regarding appt---suggested day and time is ok with caller if still available at clinic    psr team not available to take the call    Please call pt back.

## 2024-10-11 ENCOUNTER — OFFICE VISIT (OUTPATIENT)
Age: 84
End: 2024-10-11
Payer: MEDICARE

## 2024-10-11 VITALS
TEMPERATURE: 97.8 F | OXYGEN SATURATION: 98 % | HEIGHT: 64 IN | HEART RATE: 84 BPM | SYSTOLIC BLOOD PRESSURE: 141 MMHG | BODY MASS INDEX: 22.33 KG/M2 | WEIGHT: 130.8 LBS | RESPIRATION RATE: 18 BRPM | DIASTOLIC BLOOD PRESSURE: 73 MMHG

## 2024-10-11 DIAGNOSIS — F41.1 GENERALIZED ANXIETY DISORDER: Primary | ICD-10-CM

## 2024-10-11 DIAGNOSIS — E53.8 B12 DEFICIENCY: ICD-10-CM

## 2024-10-11 DIAGNOSIS — R41.3 MEMORY LOSS: ICD-10-CM

## 2024-10-11 DIAGNOSIS — F02.B11 MODERATE LATE ONSET ALZHEIMER'S DEMENTIA WITH AGITATION (HCC): ICD-10-CM

## 2024-10-11 DIAGNOSIS — G30.1 MODERATE LATE ONSET ALZHEIMER'S DEMENTIA WITH AGITATION (HCC): ICD-10-CM

## 2024-10-11 DIAGNOSIS — I10 ESSENTIAL (PRIMARY) HYPERTENSION: ICD-10-CM

## 2024-10-11 DIAGNOSIS — E78.00 HIGH CHOLESTEROL: ICD-10-CM

## 2024-10-11 DIAGNOSIS — Z86.73 HX OF COMPLETED STROKE: ICD-10-CM

## 2024-10-11 PROCEDURE — 3077F SYST BP >= 140 MM HG: CPT | Performed by: INTERNAL MEDICINE

## 2024-10-11 PROCEDURE — 3078F DIAST BP <80 MM HG: CPT | Performed by: INTERNAL MEDICINE

## 2024-10-11 PROCEDURE — 1123F ACP DISCUSS/DSCN MKR DOCD: CPT | Performed by: INTERNAL MEDICINE

## 2024-10-11 PROCEDURE — 99214 OFFICE O/P EST MOD 30 MIN: CPT | Performed by: INTERNAL MEDICINE

## 2024-10-11 PROCEDURE — 96372 THER/PROPH/DIAG INJ SC/IM: CPT | Performed by: INTERNAL MEDICINE

## 2024-10-11 PROCEDURE — PBSHW PBB SHADOW CHARGE: Performed by: INTERNAL MEDICINE

## 2024-10-11 RX ORDER — ATORVASTATIN CALCIUM 20 MG/1
20 TABLET, FILM COATED ORAL DAILY
Qty: 90 TABLET | Refills: 2 | Status: SHIPPED | OUTPATIENT
Start: 2024-10-11

## 2024-10-11 RX ORDER — ESCITALOPRAM OXALATE 20 MG/1
20 TABLET ORAL DAILY
Qty: 90 TABLET | Refills: 0 | Status: SHIPPED | OUTPATIENT
Start: 2024-10-11

## 2024-10-11 RX ORDER — DONEPEZIL HYDROCHLORIDE 10 MG/1
10 TABLET, FILM COATED ORAL NIGHTLY
Qty: 90 TABLET | Refills: 1 | Status: SHIPPED | OUTPATIENT
Start: 2024-10-11

## 2024-10-11 RX ORDER — PANTOPRAZOLE SODIUM 40 MG/1
40 TABLET, DELAYED RELEASE ORAL DAILY
Qty: 90 TABLET | Refills: 1 | Status: SHIPPED | OUTPATIENT
Start: 2024-10-11

## 2024-10-11 RX ORDER — CYANOCOBALAMIN 1000 UG/ML
1000 INJECTION, SOLUTION INTRAMUSCULAR; SUBCUTANEOUS ONCE
Status: COMPLETED | OUTPATIENT
Start: 2024-10-11 | End: 2024-10-11

## 2024-10-11 RX ORDER — CYANOCOBALAMIN, ISOPROPYL ALCOHOL 1000MCG/ML
1 KIT INJECTION
Qty: 1 KIT | Refills: 4 | Status: SHIPPED | OUTPATIENT
Start: 2024-10-11

## 2024-10-11 RX ADMIN — CYANOCOBALAMIN 1000 MCG: 1000 INJECTION, SOLUTION INTRAMUSCULAR; SUBCUTANEOUS at 11:53

## 2024-10-11 NOTE — PROGRESS NOTES
After obtaining consent, and per  verbal orders of Emma You MD, injection of b12 given in Left arm by Zully Allred MA. Patient instructed to remain in clinic for 20 minutes afterwards, and to report any adverse reaction to me immediately.   
\"Have you been to the ER, urgent care clinic since your last visit?  Hospitalized since your last visit?\"    NO    “Have you seen or consulted any other health care providers outside our system since your last visit?”    NO           
diligent about taking her medications, refilling them every Sunday. She becomes anxious when her medication supply is low. She engages in activities such as crosswords and jumbles but struggles with them.She stopped driving    She is also on elavil 10mg at bedtime       Review of Systems   10 systems reviewed and negative other then HPI  Reviewed social history, medical history, family history and allergies no changes     Current Outpatient Medications   Medication Sig Dispense Refill    escitalopram (LEXAPRO) 20 MG tablet Take 1 tablet by mouth daily 90 tablet 0    atorvastatin (LIPITOR) 20 MG tablet Take 1 tablet by mouth daily 90 tablet 2    pantoprazole (PROTONIX) 40 MG tablet Take 1 tablet by mouth daily 90 tablet 1    Cyanocobalamin (B-12 COMPLIANCE INJECTION) 1000 MCG/ML KIT Inject 1 each as directed every 30 days 1 kit 4    donepezil (ARICEPT) 10 MG tablet Take 1 tablet by mouth nightly 90 tablet 1    clopidogrel (PLAVIX) 75 MG tablet Take 1 tablet by mouth nightly 90 tablet 1    olmesartan (BENICAR) 5 MG tablet Take 1 tablet by mouth daily 90 tablet 0    clonazePAM (KLONOPIN) 0.5 MG tablet TAKE 1 TABLET BY MOUTH TWICE DAILY AS NEEDED FOR ANXIETY . DO NOT EXCEED 2 PER 24 HOURS 60 tablet 2    diphenhydrAMINE-zinc acetate 2-0.1 % cream Apply topically 3 times daily as needed      fluticasone (FLONASE) 50 MCG/ACT nasal spray 2 sprays by Nasal route daily      polyethylene glycol (GLYCOLAX) 17 GM/SCOOP powder Take 17 g by mouth daily as needed      albuterol sulfate HFA (PROVENTIL;VENTOLIN;PROAIR) 108 (90 Base) MCG/ACT inhaler Inhale 1 puff into the lungs every 4 hours as needed (Patient not taking: Reported on 10/11/2024)       No current facility-administered medications for this visit.      Physical exam  General:  Well appearing adult no acute distress  HEENT:   PERRL,normal conjunctiva. External ear and canals normal, TMs normal.  Hearing normal to voice.  Nose without edema or discharge, normal septum.

## 2024-10-20 ENCOUNTER — HOSPITAL ENCOUNTER (OUTPATIENT)
Facility: HOSPITAL | Age: 84
Discharge: HOME | End: 2024-10-23
Attending: INTERNAL MEDICINE
Payer: MEDICARE

## 2024-10-20 DIAGNOSIS — R41.3 MEMORY LOSS: ICD-10-CM

## 2024-10-20 PROCEDURE — 6360000004 HC RX CONTRAST MEDICATION: Performed by: INTERNAL MEDICINE

## 2024-10-20 PROCEDURE — A9579 GAD-BASE MR CONTRAST NOS,1ML: HCPCS | Performed by: INTERNAL MEDICINE

## 2024-10-20 PROCEDURE — 70553 MRI BRAIN STEM W/O & W/DYE: CPT

## 2024-10-20 RX ADMIN — GADOTERIDOL 12 ML: 279.3 INJECTION, SOLUTION INTRAVENOUS at 09:45

## 2024-10-29 DIAGNOSIS — F41.1 GENERALIZED ANXIETY DISORDER: ICD-10-CM

## 2024-10-29 RX ORDER — CLONAZEPAM 0.5 MG/1
TABLET ORAL
Qty: 60 TABLET | Refills: 0 | Status: SHIPPED | OUTPATIENT
Start: 2024-10-29 | End: 2025-01-27

## 2024-12-05 ENCOUNTER — OFFICE VISIT (OUTPATIENT)
Age: 84
End: 2024-12-05
Payer: MEDICARE

## 2024-12-05 VITALS
BODY MASS INDEX: 21.75 KG/M2 | OXYGEN SATURATION: 98 % | HEIGHT: 64 IN | DIASTOLIC BLOOD PRESSURE: 73 MMHG | WEIGHT: 127.4 LBS | TEMPERATURE: 97.8 F | RESPIRATION RATE: 18 BRPM | HEART RATE: 66 BPM | SYSTOLIC BLOOD PRESSURE: 115 MMHG

## 2024-12-05 DIAGNOSIS — F41.1 GENERALIZED ANXIETY DISORDER: ICD-10-CM

## 2024-12-05 DIAGNOSIS — E11.9 TYPE 2 DIABETES MELLITUS WITHOUT COMPLICATION, WITHOUT LONG-TERM CURRENT USE OF INSULIN (HCC): ICD-10-CM

## 2024-12-05 DIAGNOSIS — E78.00 HIGH CHOLESTEROL: ICD-10-CM

## 2024-12-05 DIAGNOSIS — E53.8 B12 DEFICIENCY: Primary | ICD-10-CM

## 2024-12-05 DIAGNOSIS — Z86.73 HX OF COMPLETED STROKE: ICD-10-CM

## 2024-12-05 DIAGNOSIS — I10 ESSENTIAL (PRIMARY) HYPERTENSION: ICD-10-CM

## 2024-12-05 DIAGNOSIS — J45.20 MILD INTERMITTENT ASTHMA, UNSPECIFIED WHETHER COMPLICATED: ICD-10-CM

## 2024-12-05 PROCEDURE — 1159F MED LIST DOCD IN RCRD: CPT | Performed by: INTERNAL MEDICINE

## 2024-12-05 PROCEDURE — 99214 OFFICE O/P EST MOD 30 MIN: CPT | Performed by: INTERNAL MEDICINE

## 2024-12-05 PROCEDURE — PBSHW PBB SHADOW CHARGE: Performed by: INTERNAL MEDICINE

## 2024-12-05 PROCEDURE — 1123F ACP DISCUSS/DSCN MKR DOCD: CPT | Performed by: INTERNAL MEDICINE

## 2024-12-05 PROCEDURE — 3078F DIAST BP <80 MM HG: CPT | Performed by: INTERNAL MEDICINE

## 2024-12-05 PROCEDURE — 96372 THER/PROPH/DIAG INJ SC/IM: CPT | Performed by: INTERNAL MEDICINE

## 2024-12-05 PROCEDURE — 3074F SYST BP LT 130 MM HG: CPT | Performed by: INTERNAL MEDICINE

## 2024-12-05 RX ORDER — UBIDECARENONE 75 MG
100 CAPSULE ORAL DAILY
Qty: 30 TABLET | Refills: 3 | Status: SHIPPED | OUTPATIENT
Start: 2024-12-05 | End: 2025-12-05

## 2024-12-05 RX ORDER — ATORVASTATIN CALCIUM 10 MG/1
10 TABLET, FILM COATED ORAL DAILY
Qty: 30 TABLET | Refills: 3 | Status: SHIPPED | OUTPATIENT
Start: 2024-12-05

## 2024-12-05 RX ORDER — CYANOCOBALAMIN 1000 UG/ML
1000 INJECTION, SOLUTION INTRAMUSCULAR; SUBCUTANEOUS ONCE
Status: COMPLETED | OUTPATIENT
Start: 2024-12-05 | End: 2024-12-05

## 2024-12-05 RX ORDER — DIPHENHYDRAMINE HYDROCHLORIDE, ZINC ACETATE 2; .1 G/100G; G/100G
CREAM TOPICAL 3 TIMES DAILY PRN
Qty: 1 EACH | Refills: 2 | Status: SHIPPED | OUTPATIENT
Start: 2024-12-05

## 2024-12-05 RX ORDER — ALBUTEROL SULFATE 90 UG/1
1 INHALANT RESPIRATORY (INHALATION) EVERY 4 HOURS PRN
Qty: 18 G | Refills: 2 | Status: SHIPPED | OUTPATIENT
Start: 2024-12-05

## 2024-12-05 RX ADMIN — CYANOCOBALAMIN 1000 MCG: 1000 INJECTION INTRAMUSCULAR; SUBCUTANEOUS at 16:14

## 2024-12-05 NOTE — PATIENT INSTRUCTIONS
Lowering cholesterol pill to 10mg atorvastatin  Decrease the clonazepam to half a pill   Stays on lexapro 20mg daily    Aricept at night       B12 changed to pill

## 2024-12-05 NOTE — PROGRESS NOTES
Mckenzie Rojas (:  1940) is a 84 y.o. female, Established patient, here for evaluation of the following chief complaint(s):  Diabetes and Numbness (Back of left calf to foot)         Assessment & Plan  1. Dementia.  The overall clinical picture, including the brain scan and labs, is consistent with dementia. She is currently on Aricept 10 mg, which has caused some initial diarrhea that has since subsided. She reports experiencing brain fog, which may be exacerbated by her daily use of clonazepam. Clonazepam will be reduced to half a pill daily to see if the brain fog improves. She will continue on Lexapro 20 mg daily. Her blood pressure is well-controlled with Benicar 5 mg, which she will continue to take.     2. Hyperlipidemia.  Her LDL level is 40, indicating that a lower LDL is not necessary. Atorvastatin will be decreased to 10 mg. A new prescription for atorvastatin 10 mg has been provided.    3. Anxiety.  She is currently on Lexapro 20 mg daily for anxiety. Clonazepam, which she has been taking daily, will be reduced to half a pill to see if it alleviates her brain fog.    4. Vitamin B12 Deficiency.  She is on B12 repletion. She can either take a daily pill or receive a monthly injection. A prescription for B12 has been sent.    5. History of Stroke.  She is on atorvastatin and Plavix for her history of stroke. Atorvastatin will be reduced to 10 mg.    Follow-up  Patient will return in 2025 for follow up.    Results  Laboratory Studies  TSH was 0.78. B12 was 239. Blood count was essentially normal. RPR was nonreactive.    Imaging  MRI of the brain showed minimal atrophy, nonspecific white matter changes.    Testing  [place] test score was 20 out of 30.  1. B12 deficiency  -     cyanocobalamin injection 1,000 mcg; 1,000 mcg, IntraMUSCular, ONCE, 1 dose, On Thu 24 at 1630  -     vitamin B-12 (CYANOCOBALAMIN) 100 MCG tablet; Take 1 tablet by mouth daily, Disp-30 tablet, R-3Normal  2. Generalized

## 2024-12-05 NOTE — PROGRESS NOTES
After obtaining consent, and per  verbal orders of Emma You MD, injection of b12 given in Left arm by Zully Allred MA. Patient instructed to remain in clinic for 20 minutes afterwards, and to report any adverse reaction to me immediately. \"Have you been to the ER, urgent care clinic since your last visit?  Hospitalized since your last visit?\"    NO    “Have you seen or consulted any other health care providers outside our system since your last visit?”    NO

## 2024-12-31 DIAGNOSIS — I10 ESSENTIAL (PRIMARY) HYPERTENSION: ICD-10-CM

## 2024-12-31 RX ORDER — OLMESARTAN MEDOXOMIL 5 MG/1
5 TABLET ORAL DAILY
Qty: 90 TABLET | Refills: 0 | Status: SHIPPED | OUTPATIENT
Start: 2024-12-31

## 2025-01-04 DIAGNOSIS — F41.1 GENERALIZED ANXIETY DISORDER: ICD-10-CM

## 2025-01-06 RX ORDER — ESCITALOPRAM OXALATE 20 MG/1
20 TABLET ORAL DAILY
Qty: 90 TABLET | Refills: 0 | Status: SHIPPED | OUTPATIENT
Start: 2025-01-06

## 2025-03-02 DIAGNOSIS — Z86.73 HX OF COMPLETED STROKE: ICD-10-CM

## 2025-03-03 RX ORDER — CLOPIDOGREL BISULFATE 75 MG/1
75 TABLET ORAL NIGHTLY
Qty: 90 TABLET | Refills: 0 | Status: SHIPPED | OUTPATIENT
Start: 2025-03-03

## 2025-03-04 ENCOUNTER — OFFICE VISIT (OUTPATIENT)
Age: 85
End: 2025-03-04
Payer: MEDICARE

## 2025-03-04 VITALS
RESPIRATION RATE: 18 BRPM | OXYGEN SATURATION: 98 % | DIASTOLIC BLOOD PRESSURE: 82 MMHG | BODY MASS INDEX: 21.68 KG/M2 | TEMPERATURE: 98.1 F | HEIGHT: 64 IN | SYSTOLIC BLOOD PRESSURE: 143 MMHG | HEART RATE: 73 BPM | WEIGHT: 127 LBS

## 2025-03-04 DIAGNOSIS — F02.B11 MODERATE LATE ONSET ALZHEIMER'S DEMENTIA WITH AGITATION (HCC): ICD-10-CM

## 2025-03-04 DIAGNOSIS — I10 ESSENTIAL (PRIMARY) HYPERTENSION: ICD-10-CM

## 2025-03-04 DIAGNOSIS — Z86.73 HX OF COMPLETED STROKE: Primary | ICD-10-CM

## 2025-03-04 DIAGNOSIS — E11.9 TYPE 2 DIABETES MELLITUS WITHOUT COMPLICATION, WITHOUT LONG-TERM CURRENT USE OF INSULIN (HCC): ICD-10-CM

## 2025-03-04 DIAGNOSIS — G30.1 MODERATE LATE ONSET ALZHEIMER'S DEMENTIA WITH AGITATION (HCC): ICD-10-CM

## 2025-03-04 PROCEDURE — 99214 OFFICE O/P EST MOD 30 MIN: CPT | Performed by: INTERNAL MEDICINE

## 2025-03-04 RX ORDER — OLMESARTAN MEDOXOMIL 5 MG/1
5 TABLET ORAL DAILY
Qty: 90 TABLET | Refills: 1 | Status: SHIPPED | OUTPATIENT
Start: 2025-03-04

## 2025-03-04 SDOH — ECONOMIC STABILITY: FOOD INSECURITY: WITHIN THE PAST 12 MONTHS, THE FOOD YOU BOUGHT JUST DIDN'T LAST AND YOU DIDN'T HAVE MONEY TO GET MORE.: NEVER TRUE

## 2025-03-04 SDOH — ECONOMIC STABILITY: FOOD INSECURITY: WITHIN THE PAST 12 MONTHS, YOU WORRIED THAT YOUR FOOD WOULD RUN OUT BEFORE YOU GOT MONEY TO BUY MORE.: NEVER TRUE

## 2025-03-04 ASSESSMENT — PATIENT HEALTH QUESTIONNAIRE - PHQ9: DEPRESSION UNABLE TO ASSESS: FUNCTIONAL CAPACITY MOTIVATION LIMITS ACCURACY

## 2025-03-04 NOTE — PROGRESS NOTES
\"Have you been to the ER, urgent care clinic since your last visit?  Hospitalized since your last visit?\"    NO    “Have you seen or consulted any other health care providers outside our system since your last visit?”    NO           
149 09/05/2024    TRIG 92 09/05/2024    HDL 86 09/05/2024    ALT 21 09/05/2024    AST 24 09/05/2024     09/05/2024    K 4.1 09/05/2024     (H) 09/05/2024    CREATININE 0.81 09/05/2024    BUN 17 09/05/2024    CO2 25 09/05/2024    TSH 0.78 09/05/2024    INR 1.0 07/11/2020    LABA1C 5.2 01/11/2022     No results found for: \"LDLDIRECT\"  No results found for: \"PSA\"     Assessment and Plan:     1. Hx of completed stroke  On aspirin and statin     2. 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 She is independent in bathing, dressing, toileting. She does not drive.   She is on aricept 10mg daily   Family takes turns in staying with her and helping her with meals and medications     3. Type 2 diabetes mellitus without complication, without long-term current use of insulin (HCC)      4. Essential (primary) hypertension  BP higher today out of benicar resume    5. Anxiety: she is on lexapro   And doing better since stopping clonazepam     6. Borderline B12 - and now on supplement    7. Hearing loss: she has one hearing aid lost the other advised to obtain one to have good hearing for both ears.   Follow up in 6 months     Emma Hanson MD

## 2025-03-27 DIAGNOSIS — R41.3 MEMORY LOSS: ICD-10-CM

## 2025-03-27 RX ORDER — PANTOPRAZOLE SODIUM 40 MG/1
40 TABLET, DELAYED RELEASE ORAL DAILY
Qty: 90 TABLET | Refills: 0 | Status: SHIPPED | OUTPATIENT
Start: 2025-03-27

## 2025-04-01 DIAGNOSIS — R41.3 MEMORY LOSS: ICD-10-CM

## 2025-04-02 RX ORDER — DONEPEZIL HYDROCHLORIDE 10 MG/1
10 TABLET, FILM COATED ORAL NIGHTLY
Qty: 90 TABLET | Refills: 0 | Status: SHIPPED | OUTPATIENT
Start: 2025-04-02

## 2025-04-03 ENCOUNTER — TELEPHONE (OUTPATIENT)
Dept: PHARMACY | Facility: CLINIC | Age: 85
End: 2025-04-03

## 2025-04-03 NOTE — TELEPHONE ENCOUNTER
[facility] CLINICAL PHARMACY: ADHERENCE REVIEW  Identified care gap per [place]: fills at [facility]: St[place] adherence    [place] [facility]      Patient also appears to be prescribed: ACE/ARB    ASSESSMENT  STATIN ADHERENCE    Insurance Records claims through 3/31/25 (Prior Year PDC = not reported; YTD PDC = 86%; Potential Fail Date: 25):   ATORVASTATIN TAB 10MG last filled on 25 for 30 day supply. Next refill due: 25    Prescribed si tablet/capsule daily    Per Insurer Portal: last filled on 25 for 30 day supply.     Per [facility]: not contacted    Lab Results   Component Value Date    CHOL 149 2024    TRIG 92 2024    HDL 86 2024     Lab Results   Component Value Date    LDL 44.6 2024      ALT   Date Value Ref Range Status   2024 21 12 - 78 U/L Final     AST   Date Value Ref Range Status   2024 24 15 - 37 U/L Final     Comment:     SPECIMEN HEMOLYZED, RESULTS MAY BE AFFECTED     The ASCVD Risk score (Osman PHAN, et al., 2019) failed to calculate for the following reasons:    The 2019 ASCVD risk score is only valid for ages 40 to 79    Risk score cannot be calculated because patient has a medical history suggesting prior/existing ASCVD     PLAN    The following are interventions that have been identified:   Patient OVERDUE refilling ATORVASTATIN TAB 10MG and active on home medication list.   Patient will need FUTURE REFILLS for ATORVASTATIN TAB 10MG   Patient eligible for 100 day supply of ATORVASTATIN TAB 10MG    Attempting to reach patient to review.  Left message asking for return call. [employer] message sent to patient.    Last Visit: 25  Next Visit: none    Sammy Bundy [facility]   [facility]  [phone] Option 1     For Pharmacy Admin Tracking Only    Program: [employer]  CPA in place:  No  Gap Closed?: No   Time Spent (min): 5

## 2025-04-11 DIAGNOSIS — F41.1 GENERALIZED ANXIETY DISORDER: ICD-10-CM

## 2025-04-11 RX ORDER — ESCITALOPRAM OXALATE 20 MG/1
20 TABLET ORAL DAILY
Qty: 90 TABLET | Refills: 0 | Status: SHIPPED | OUTPATIENT
Start: 2025-04-11

## 2025-05-05 DIAGNOSIS — Z86.73 HX OF COMPLETED STROKE: ICD-10-CM

## 2025-05-05 RX ORDER — ATORVASTATIN CALCIUM 10 MG/1
10 TABLET, FILM COATED ORAL DAILY
Qty: 90 TABLET | Refills: 1 | Status: SHIPPED | OUTPATIENT
Start: 2025-05-05

## 2025-05-05 NOTE — TELEPHONE ENCOUNTER
Dr. Emma Hanson MD, patient eligible for up to 90-day supply, if appropriate. Rx(s) pended for your signature/modification as appropriate    Last Visit: 3/4/25    Thank you,  Krista Hicks, PharmD, [facility], [facility]  [facility]  Department, toll free: [phone], option 1  ================================================================        [facility] CLINICAL PHARMACY: ADHERENCE REVIEW  Identified care gap per [facility]: fills at [facility]: Statin adherence    Patient also appears to be prescribed: ACE/ARB    ASSESSMENT    ACE/ARB ADHERENCE    Insurance Records claims through 4/28/25 (Prior Year PDC = not reported; YTD PDC = n/a one fill; Potential Fail Date: 8/1/25):   Olmesartan 5 mg tablets last filled on 3/4/25 for 90 day supply. Next refill due: 6/2/25  One refill remaining    BP Readings from Last 3 Encounters:   03/04/25 (!) 143/82   12/05/24 115/73   10/11/24 (!) 141/73     CrCl cannot be calculated (Patient's most recent lab result is older than the maximum 180 days allowed.).  Lab Results   Component Value Date    CREATININE 0.81 09/05/2024     Lab Results   Component Value Date    K 4.1 09/05/2024     STATIN ADHERENCE    Insurance Records claims through 4/28/25 (Prior Year PDC = not reported; YTD PDC = 57%; Potential Fail Date: 5/25/25):   Atorvastatin 10 mg tablets last filled on 2/18/25 for 30 day supply. Next refill due: 3/20/25  Per [facility] portal, one refill remaining    Lab Results   Component Value Date    CHOL 149 09/05/2024    TRIG 92 09/05/2024    HDL 86 09/05/2024     Lab Results   Component Value Date    LDL 44.6 09/05/2024      ALT   Date Value Ref Range Status   09/05/2024 21 12 - 78 U/L Final     AST   Date Value Ref Range Status   09/05/2024 24 15 - 37 U/L Final     Comment:     SPECIMEN HEMOLYZED, RESULTS MAY BE AFFECTED     The ASCVD Risk score (Osman PHAN, et al., 2019) failed to calculate for the following reasons:    The 2019

## 2025-05-19 ENCOUNTER — OFFICE VISIT (OUTPATIENT)
Age: 85
End: 2025-05-19
Payer: MEDICARE

## 2025-05-19 DIAGNOSIS — F02.B11 MODERATE LATE ONSET ALZHEIMER'S DEMENTIA WITH AGITATION (HCC): Primary | ICD-10-CM

## 2025-05-19 DIAGNOSIS — G30.1 MODERATE LATE ONSET ALZHEIMER'S DEMENTIA WITH AGITATION (HCC): Primary | ICD-10-CM

## 2025-05-19 DIAGNOSIS — F43.23 ADJUSTMENT DISORDER WITH MIXED ANXIETY AND DEPRESSED MOOD: ICD-10-CM

## 2025-05-19 DIAGNOSIS — Z86.73 HX OF COMPLETED STROKE: ICD-10-CM

## 2025-05-19 PROCEDURE — 1123F ACP DISCUSS/DSCN MKR DOCD: CPT | Performed by: CLINICAL NEUROPSYCHOLOGIST

## 2025-05-19 PROCEDURE — 90791 PSYCH DIAGNOSTIC EVALUATION: CPT | Performed by: CLINICAL NEUROPSYCHOLOGIST

## 2025-05-19 NOTE — PROGRESS NOTES
Within reference range   RPR 9/5/2024 Nonreactive     PSYCHOSOCIAL HISTORY:  Birth/Development: Born and raised in [place]  Language: English  Education: Started the 11th grade but withdrew from school when she got .  Later earned her GED.  Academic Problems: None  Occupation:  or .  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   passed away in September 2024 after over 60 years of marriage  Children: Three children  Housing: Alone in private residence  Legal/financial issues: None.  Patient has completed a will    Substance Use:  Alcohol: None  Nicotine: Quit smoking before 1972  Recreational/Illicit Substances: None  Treatment: None    BEHAVIORAL OBSERVATIONS:  Appearance: Casually dressed, Well-groomed, and Appeared stated age  Orientation: Reported the date to be Wednesday, January 2024 (correct: Monday, 5/19/2025)  Motor: Ambulated independently, Adequate gait, Adequate posture, and No involuntary movements  Thought Processes: Clear and Coherent  Hearing and Vision: Adequate  Speech: Appropriate for Rate, Tone, Prosody, and Volume  Comprehension: Adequate  Interpersonal Skills: Adequate  Affect: Depressed  Ability as Historian: Fair  Insight: Inadequate  Judgment: Fair    STRENGTHS:  Exercising self-direction/Resourceful, Access to housing/residential stability, and Interpersonal/supportive relationships (family, friends, peers)    WEAKNESSES:  Health problems and Cognitive limitations    IMPRESSION:  The patient is a 84 y.o. female who presents for neuropsychological evaluation secondary to concerns regarding their cognitive functioning.  The degree to which their cognitive difficulties are related to normal aging versus neurodegenerative versus psychiatric versus vascular versus other requires further clarification.  Suspect there is some likely Alzheimer's disease

## 2025-05-29 DIAGNOSIS — Z86.73 HX OF COMPLETED STROKE: ICD-10-CM

## 2025-05-29 RX ORDER — CLOPIDOGREL BISULFATE 75 MG/1
75 TABLET ORAL NIGHTLY
Qty: 90 TABLET | Refills: 0 | Status: SHIPPED | OUTPATIENT
Start: 2025-05-29

## 2025-06-02 ENCOUNTER — PROCEDURE VISIT (OUTPATIENT)
Age: 85
End: 2025-06-02
Payer: MEDICARE

## 2025-06-02 DIAGNOSIS — F02.B11 MODERATE LATE ONSET ALZHEIMER'S DEMENTIA WITH AGITATION (HCC): Primary | ICD-10-CM

## 2025-06-02 DIAGNOSIS — G30.1 MODERATE LATE ONSET ALZHEIMER'S DEMENTIA WITH AGITATION (HCC): Primary | ICD-10-CM

## 2025-06-02 PROCEDURE — 96133 NRPSYC TST EVAL PHYS/QHP EA: CPT | Performed by: CLINICAL NEUROPSYCHOLOGIST

## 2025-06-02 PROCEDURE — 96132 NRPSYC TST EVAL PHYS/QHP 1ST: CPT | Performed by: CLINICAL NEUROPSYCHOLOGIST

## 2025-06-02 PROCEDURE — 96138 PSYCL/NRPSYC TECH 1ST: CPT | Performed by: CLINICAL NEUROPSYCHOLOGIST

## 2025-06-02 PROCEDURE — 96139 PSYCL/NRPSYC TST TECH EA: CPT | Performed by: CLINICAL NEUROPSYCHOLOGIST

## 2025-06-16 NOTE — PROGRESS NOTES
Neuropsychological Evaluation Report      Patient Name: Mckenzie Rojas  YOB: 1940    Age: 84 y.o.  Date of Intake: 2025   Education: 10 + GED Date of Testin2025   Gender: Female Ethnicity: White     Referring Provider: Dr. Geovanny Hanson     REASON FOR REFERRAL AND EVALUATION PROCEDURES:  Mckenzie Rojas  was referred for neuropsychological evaluation by her Primary Care Physician to obtain a quantitative assessment of her current level of neurocognitive functioning, assist in differential diagnosis, and aid in individualized treatment planning. The patient understood the rationale and procedures for evaluation, as well as the limits to confidentiality, and they agreed to participate. The patient consented to have this report made available to her  treating providers through her  electronic medical records. This evaluation was completed with the patient by Lance Landeros PsyD with the exception of testing by technician, which was completed by BALTA Massey under the supervision of Dr. Landeros.  History Sources: Patient, Relative (daughter), Medical Record, and Test Data    SUMMARY AND IMPRESSION:  The following section is a summary of the patient's pertinent test results and impressions. A more thorough review of the patient's background and test scores can be found below.    Results from the patient's neuropsychological evaluation were most notable for an amnestic memory profile characterized by moderate impairment on tests of learning, moderate to profound impairment on tests of free recall, and minimal benefit from recognition/discrimination cueing.  Executive functioning was variable but generally in the range of mild impairment.  Semantic verbal fluency was moderately impaired but other language measures were within normal limits.  Visuospatial functioning was mixed but generally preserved.  Mental processing speed and basic auditory attention were within normal limits.  The patient's

## 2025-06-17 ENCOUNTER — OFFICE VISIT (OUTPATIENT)
Age: 85
End: 2025-06-17
Payer: MEDICARE

## 2025-06-17 DIAGNOSIS — F02.B11 MODERATE LATE ONSET ALZHEIMER'S DEMENTIA WITH AGITATION (HCC): Primary | ICD-10-CM

## 2025-06-17 DIAGNOSIS — G30.1 MODERATE LATE ONSET ALZHEIMER'S DEMENTIA WITH AGITATION (HCC): Primary | ICD-10-CM

## 2025-06-17 PROCEDURE — 96132 NRPSYC TST EVAL PHYS/QHP 1ST: CPT | Performed by: CLINICAL NEUROPSYCHOLOGIST

## 2025-06-17 NOTE — PROGRESS NOTES
Chief complaint: Patient presented for review of previously completed neuropsychological evaluation and discussion of impressions/recommendations.    Prior to seeing the patient for today's visit, I reviewed pertinent records, including the previously completed report, the records in Epic, and any updated visits from other providers since the patient's last visit.    I provided feedback services related to the previously completed report. Attendees included: Patient and Children. Education was provided regarding my diagnostic impressions, and we discussed treatment plan/options. Attendees were provided with the opportunity to ask questions, which were answered to the best of my ability.    We discussed, in detail, the following:  Reviewed findings from evaluation including test results, diagnosis, and suspected contributing factors  Discussed recommendations outlined in report  Answered questions to the best of my ability    The patient needs to:   Follow up with referring provider for ongoing management, Use practical strategies to compensate for cognitive weaknesses (See handout), Emphasize modifiable risk and protective factors for cognitive functioning (e.g., exercise, diet, cognitive stimulation; see handout), and Access community resources we discussed for additional support (See handout)    The patient had the following reactions to recommendations: Patient and daughter reported understanding results and recommendations    ASSESSMENT:   Diagnosis Orders   1. Moderate late onset Alzheimer's dementia with agitation (HCC)            TIME SPENT PROVIDING SERVICES:   31 minutes     BILLING:  96132 x 1 Units    *Code 88338 Neuropsychological testing evaluation services include: Integration of patient data, interpretation of standardized test results and clinical data, clinical decision-making, treatment planning and report, and interactive feedback to the patient, family member(s) or caregiver(s), when performed.

## 2025-06-18 DIAGNOSIS — R41.3 MEMORY LOSS: ICD-10-CM

## 2025-06-20 RX ORDER — PANTOPRAZOLE SODIUM 40 MG/1
40 TABLET, DELAYED RELEASE ORAL DAILY
Qty: 90 TABLET | Refills: 0 | Status: SHIPPED | OUTPATIENT
Start: 2025-06-20

## 2025-06-20 RX ORDER — DONEPEZIL HYDROCHLORIDE 10 MG/1
10 TABLET, FILM COATED ORAL NIGHTLY
Qty: 90 TABLET | Refills: 0 | Status: SHIPPED | OUTPATIENT
Start: 2025-06-20

## 2025-06-25 DIAGNOSIS — R41.3 MEMORY LOSS: ICD-10-CM

## 2025-06-25 RX ORDER — PANTOPRAZOLE SODIUM 40 MG/1
40 TABLET, DELAYED RELEASE ORAL DAILY
Qty: 90 TABLET | Refills: 0 | Status: SHIPPED | OUTPATIENT
Start: 2025-06-25

## 2025-06-30 ENCOUNTER — APPOINTMENT (OUTPATIENT)
Facility: HOSPITAL | Age: 85
End: 2025-06-30
Payer: MEDICARE

## 2025-06-30 ENCOUNTER — HOSPITAL ENCOUNTER (EMERGENCY)
Facility: HOSPITAL | Age: 85
Discharge: HOME | End: 2025-06-30
Payer: MEDICARE

## 2025-06-30 VITALS
OXYGEN SATURATION: 98 % | BODY MASS INDEX: 24.46 KG/M2 | DIASTOLIC BLOOD PRESSURE: 68 MMHG | WEIGHT: 143.3 LBS | RESPIRATION RATE: 16 BRPM | HEART RATE: 83 BPM | HEIGHT: 64 IN | SYSTOLIC BLOOD PRESSURE: 145 MMHG | TEMPERATURE: 98.2 F

## 2025-06-30 DIAGNOSIS — M25.331 SCAPHOLUNATE DISSOCIATION OF RIGHT WRIST: Primary | ICD-10-CM

## 2025-06-30 PROCEDURE — 29125 APPL SHORT ARM SPLINT STATIC: CPT

## 2025-06-30 PROCEDURE — 6370000000 HC RX 637 (ALT 250 FOR IP)

## 2025-06-30 PROCEDURE — 99283 EMERGENCY DEPT VISIT LOW MDM: CPT

## 2025-06-30 PROCEDURE — 73090 X-RAY EXAM OF FOREARM: CPT

## 2025-06-30 PROCEDURE — 73110 X-RAY EXAM OF WRIST: CPT

## 2025-06-30 RX ORDER — IBUPROFEN 600 MG/1
600 TABLET, FILM COATED ORAL
Status: COMPLETED | OUTPATIENT
Start: 2025-06-30 | End: 2025-06-30

## 2025-06-30 RX ORDER — ACETAMINOPHEN 500 MG
1000 TABLET ORAL
Status: COMPLETED | OUTPATIENT
Start: 2025-06-30 | End: 2025-06-30

## 2025-06-30 RX ADMIN — IBUPROFEN 600 MG: 600 TABLET, FILM COATED ORAL at 18:23

## 2025-06-30 RX ADMIN — ACETAMINOPHEN 1000 MG: 500 TABLET ORAL at 18:23

## 2025-06-30 ASSESSMENT — PAIN DESCRIPTION - ORIENTATION
ORIENTATION: RIGHT
ORIENTATION: RIGHT

## 2025-06-30 ASSESSMENT — PAIN DESCRIPTION - FREQUENCY: FREQUENCY: INTERMITTENT

## 2025-06-30 ASSESSMENT — PAIN - FUNCTIONAL ASSESSMENT
PAIN_FUNCTIONAL_ASSESSMENT: 0-10
PAIN_FUNCTIONAL_ASSESSMENT: NONE - DENIES PAIN
PAIN_FUNCTIONAL_ASSESSMENT: PREVENTS OR INTERFERES SOME ACTIVE ACTIVITIES AND ADLS
PAIN_FUNCTIONAL_ASSESSMENT: PREVENTS OR INTERFERES SOME ACTIVE ACTIVITIES AND ADLS

## 2025-06-30 ASSESSMENT — PAIN DESCRIPTION - DESCRIPTORS
DESCRIPTORS: ACHING
DESCRIPTORS: ACHING

## 2025-06-30 ASSESSMENT — PAIN SCALES - GENERAL
PAINLEVEL_OUTOF10: 7
PAINLEVEL_OUTOF10: 9

## 2025-06-30 ASSESSMENT — PAIN DESCRIPTION - LOCATION
LOCATION: WRIST
LOCATION: WRIST

## 2025-06-30 ASSESSMENT — PAIN DESCRIPTION - ONSET: ONSET: ON-GOING

## 2025-06-30 ASSESSMENT — PAIN DESCRIPTION - PAIN TYPE: TYPE: ACUTE PAIN

## 2025-06-30 NOTE — ED PROVIDER NOTES
\[facility]\"" EMERGENCY DEPT  EMERGENCY DEPARTMENT HISTORY AND PHYSICAL EXAM      Date of evaluation: 6/30/2025  Patient Name: Mckenzie Rojas  Birthdate 1940  MRN: [id number]  ED Provider: BISHOP Alvarado NP   Note Started: 6:13 PM EDT 6/30/25    HISTORY OF PRESENT ILLNESS     Chief Complaint   Patient presents with    Wrist Injury     Pt was playing with her cat. Pt was playing with her cat and some how injured R wrist/forearm. Pt denies fall or head injury. Pt forearm near wrist appears deformed. Pt is currently on thinners       History Provided By: Patient, only     HPI: Mckenzie Rojas is a 84 y.o. female with past medical history as listed below, presents ambulatory into the emergency department with complaints of right wrist/forearm pain/swelling/injury.  Patient states yesterday she was playing with her cat, using a stick to dangle a toy in front of her, she said she pulled the stick back really hard and that is when her pain started.  She did not fall or hit her hand on anything, she also states the cat did not bite her.  Since the incident she has had a lot of discomfort.  She has not really taken anything for pain.  No other injuries or concerns at this time. Upon arrival to the ED pt is alert and oriented x 3, well-appearing, and interacting appropriately; no obvious distress noted.      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

## 2025-06-30 NOTE — DISCHARGE INSTRUCTIONS
You can take Tylenol 1000 mg (2 extra strength tablets) every 6 hours as needed for pain.  Be sure to take this medication with food.                Thank you for choosing our Emergency Department for your care.  It is our privilege to care for you in your time of need.  In the next several days, you may receive a survey via email or mailed to your home about your experience with our team.  We would greatly appreciate you taking a few minutes to complete the survey, as we use this information to learn what we have done well and what we could be doing better. Thank you for trusting us with your care!    Below you will find a list of your tests from today's visit.   Labs and Radiology Studies  No results found for this or any previous visit (from the past 12 hours).  XR WRIST RIGHT (MIN 3 VIEWS)  Result Date: 6/30/2025  EXAM: XR WRIST RIGHT (MIN 3 VIEWS) INDICATION: Right injury with deformity. COMPARISON: 2010. FINDINGS: Three  views of the right wrist demonstrate no fracture. There is mild widening of the scapholunate with rotation of the lunate and blunting of the dorsal lunate. Moderate joint space narrowing at the base of the first metacarpal.     Scapholunate dissociation. Blunting of dorsal lunate is new from 2010 study Electronically signed by Isabel Hernandez    XR RADIUS ULNA RIGHT (2 VIEWS)  Result Date: 6/30/2025  EXAM: XR RADIUS ULNA RIGHT (2 VIEWS) INDICATION: injury with deformity. COMPARISON: None. FINDINGS: Two views of the right radius and ulna demonstrate no fracture or other acute osseous, articular or soft tissue abnormality.     No acute abnormality. Electronically signed by Jamie Gomez MD    ------------------------------------------------------------------------------------------------------------  The evaluation and treatment you received in the Emergency Department were for an urgent problem. It is important that you follow-up with a doctor, nurse practitioner, or physician assistant to:  (1)

## 2025-06-30 NOTE — ED NOTES
Patient discharged from the ED by ABIMAEL Perdomo. Diagnosis, medications, precautions and follow-ups were reviewed with the patient/family. Questions were asked and answered prior to departure. Patient departed the ED via walk-out and was accompanied by daughter.

## 2025-07-05 DIAGNOSIS — F41.1 GENERALIZED ANXIETY DISORDER: ICD-10-CM

## 2025-07-07 RX ORDER — ESCITALOPRAM OXALATE 20 MG/1
20 TABLET ORAL DAILY
Qty: 90 TABLET | Refills: 0 | Status: SHIPPED | OUTPATIENT
Start: 2025-07-07

## 2025-08-22 ENCOUNTER — OFFICE VISIT (OUTPATIENT)
Age: 85
End: 2025-08-22
Payer: MEDICARE

## 2025-08-22 VITALS
BODY MASS INDEX: 23.7 KG/M2 | TEMPERATURE: 98.2 F | SYSTOLIC BLOOD PRESSURE: 118 MMHG | HEIGHT: 64 IN | DIASTOLIC BLOOD PRESSURE: 66 MMHG | OXYGEN SATURATION: 93 % | RESPIRATION RATE: 18 BRPM | WEIGHT: 138.8 LBS | HEART RATE: 83 BPM

## 2025-08-22 DIAGNOSIS — I10 ESSENTIAL (PRIMARY) HYPERTENSION: ICD-10-CM

## 2025-08-22 DIAGNOSIS — Z86.73 HX OF COMPLETED STROKE: ICD-10-CM

## 2025-08-22 DIAGNOSIS — F02.B11 MODERATE LATE ONSET ALZHEIMER'S DEMENTIA WITH AGITATION (HCC): ICD-10-CM

## 2025-08-22 DIAGNOSIS — F41.1 GENERALIZED ANXIETY DISORDER: Primary | ICD-10-CM

## 2025-08-22 DIAGNOSIS — Z91.81 AT HIGH RISK FOR FALLS: ICD-10-CM

## 2025-08-22 DIAGNOSIS — G30.1 MODERATE LATE ONSET ALZHEIMER'S DEMENTIA WITH AGITATION (HCC): ICD-10-CM

## 2025-08-22 DIAGNOSIS — K21.9 GASTROESOPHAGEAL REFLUX DISEASE WITHOUT ESOPHAGITIS: ICD-10-CM

## 2025-08-22 PROBLEM — U07.1 COVID-19: Status: RESOLVED | Noted: 2020-05-18 | Resolved: 2025-08-22

## 2025-08-22 PROBLEM — E11.9 TYPE 2 DIABETES MELLITUS (HCC): Status: RESOLVED | Noted: 2022-05-27 | Resolved: 2025-08-22

## 2025-08-22 PROCEDURE — 1123F ACP DISCUSS/DSCN MKR DOCD: CPT | Performed by: INTERNAL MEDICINE

## 2025-08-22 PROCEDURE — 99214 OFFICE O/P EST MOD 30 MIN: CPT | Performed by: INTERNAL MEDICINE

## 2025-08-22 PROCEDURE — 3074F SYST BP LT 130 MM HG: CPT | Performed by: INTERNAL MEDICINE

## 2025-08-22 PROCEDURE — 1159F MED LIST DOCD IN RCRD: CPT | Performed by: INTERNAL MEDICINE

## 2025-08-22 PROCEDURE — 3078F DIAST BP <80 MM HG: CPT | Performed by: INTERNAL MEDICINE

## 2025-08-22 RX ORDER — OLMESARTAN MEDOXOMIL 5 MG/1
5 TABLET ORAL DAILY
Qty: 90 TABLET | Refills: 1 | Status: SHIPPED | OUTPATIENT
Start: 2025-08-22

## 2025-08-22 RX ORDER — CLOPIDOGREL BISULFATE 75 MG/1
75 TABLET ORAL NIGHTLY
Qty: 90 TABLET | Refills: 0 | Status: SHIPPED | OUTPATIENT
Start: 2025-08-22

## 2025-08-22 RX ORDER — FAMOTIDINE 20 MG/1
20 TABLET, FILM COATED ORAL 2 TIMES DAILY
Qty: 180 TABLET | Refills: 2 | Status: SHIPPED | OUTPATIENT
Start: 2025-08-22

## 2025-08-22 ASSESSMENT — PATIENT HEALTH QUESTIONNAIRE - PHQ9
1. LITTLE INTEREST OR PLEASURE IN DOING THINGS: NOT AT ALL
SUM OF ALL RESPONSES TO PHQ QUESTIONS 1-9: 0
2. FEELING DOWN, DEPRESSED OR HOPELESS: NOT AT ALL

## 2025-08-23 LAB
ALBUMIN SERPL-MCNC: 3.9 G/DL (ref 3.5–5.2)
ALBUMIN/GLOB SERPL: 1.3 (ref 1.1–2.2)
ALP SERPL-CCNC: 87 U/L (ref 35–104)
ALT SERPL-CCNC: 18 U/L (ref 10–35)
ANION GAP SERPL CALC-SCNC: 12 MMOL/L (ref 2–14)
AST SERPL-CCNC: 28 U/L (ref 10–35)
BASOPHILS # BLD: 0.05 K/UL (ref 0–0.1)
BASOPHILS NFR BLD: 1 % (ref 0–1)
BILIRUB SERPL-MCNC: 0.5 MG/DL (ref 0–1.2)
BUN SERPL-MCNC: 22 MG/DL (ref 8–23)
CALCIUM SERPL-MCNC: 9.9 MG/DL (ref 8.8–10.2)
CHLORIDE SERPL-SCNC: 104 MMOL/L (ref 98–107)
CHOLEST SERPL-MCNC: 179 MG/DL (ref 0–200)
CO2 SERPL-SCNC: 25 MMOL/L (ref 20–29)
CREAT SERPL-MCNC: 0.64 MG/DL (ref 0.6–1)
DIFFERENTIAL METHOD BLD: ABNORMAL
EOSINOPHIL # BLD: 0.05 K/UL (ref 0–0.4)
EOSINOPHIL NFR BLD: 1 % (ref 0–7)
ERYTHROCYTE [DISTWIDTH] IN BLOOD BY AUTOMATED COUNT: 13.2 % (ref 11.5–14.5)
GLOBULIN SER CALC-MCNC: 3 G/DL (ref 2–4)
GLUCOSE SERPL-MCNC: 88 MG/DL (ref 65–100)
HCT VFR BLD AUTO: 44.1 % (ref 35–47)
HDLC SERPL-MCNC: 88 MG/DL (ref 40–60)
HDLC SERPL: 2 (ref 0–5)
HGB BLD-MCNC: 13.5 G/DL (ref 11.5–16)
IMM GRANULOCYTES # BLD AUTO: 0 K/UL
IMM GRANULOCYTES NFR BLD AUTO: 0 %
LDLC SERPL CALC-MCNC: 55 MG/DL (ref 0–100)
LYMPHOCYTES # BLD: 1.38 K/UL (ref 0.8–3.5)
LYMPHOCYTES NFR BLD: 26 % (ref 12–49)
MCH RBC QN AUTO: 31 PG (ref 26–34)
MCHC RBC AUTO-ENTMCNC: 30.6 G/DL (ref 30–36.5)
MCV RBC AUTO: 101.1 FL (ref 80–99)
MONOCYTES # BLD: 0.53 K/UL (ref 0–1)
MONOCYTES NFR BLD: 10 % (ref 5–13)
NEUTS SEG # BLD: 3.29 K/UL (ref 1.8–8)
NEUTS SEG NFR BLD: 62 % (ref 32–75)
NRBC # BLD: 0 K/UL (ref 0–0.01)
NRBC BLD-RTO: 0 PER 100 WBC
PLATELET # BLD AUTO: 226 K/UL (ref 150–400)
POTASSIUM SERPL-SCNC: 5.2 MMOL/L (ref 3.5–5.1)
PROT SERPL-MCNC: 6.9 G/DL (ref 6.4–8.3)
RBC # BLD AUTO: 4.36 M/UL (ref 3.8–5.2)
RBC MORPH BLD: ABNORMAL
SODIUM SERPL-SCNC: 142 MMOL/L (ref 136–145)
TRIGL SERPL-MCNC: 183 MG/DL (ref 0–150)
VLDLC SERPL CALC-MCNC: 37 MG/DL
WBC # BLD AUTO: 5.3 K/UL (ref 3.6–11)

## (undated) LAB
ALBUMIN SERPL-MCNC: 3.8 G/DL (ref 3.5–5)
ALBUMIN/GLOB SERPL: 1.2 (ref 1.1–2.2)
ALP SERPL-CCNC: 95 U/L (ref 45–117)
ALT SERPL-CCNC: 24 U/L (ref 12–78)
ANION GAP SERPL CALC-SCNC: 3 MMOL/L (ref 5–15)
AST SERPL-CCNC: 23 U/L (ref 15–37)
BILIRUB SERPL-MCNC: 0.5 MG/DL (ref 0.2–1)
BUN SERPL-MCNC: 15 MG/DL (ref 6–20)
BUN/CREAT SERPL: 19 (ref 12–20)
CALCIUM SERPL-MCNC: 9.6 MG/DL (ref 8.5–10.1)
CHLORIDE SERPL-SCNC: 107 MMOL/L (ref 97–108)
CHOLEST SERPL-MCNC: 160 MG/DL
CO2 SERPL-SCNC: 30 MMOL/L (ref 21–32)
CREAT SERPL-MCNC: 0.77 MG/DL (ref 0.55–1.02)
GLOBULIN SER CALC-MCNC: 3.1 G/DL (ref 2–4)
GLUCOSE SERPL-MCNC: 86 MG/DL (ref 65–100)
HDLC SERPL-MCNC: 80 MG/DL
HDLC SERPL: 2 (ref 0–5)
LDLC SERPL CALC-MCNC: 44.8 MG/DL (ref 0–100)
POTASSIUM SERPL-SCNC: 4.2 MMOL/L (ref 3.5–5.1)
PROT SERPL-MCNC: 6.9 G/DL (ref 6.4–8.2)
SODIUM SERPL-SCNC: 140 MMOL/L (ref 136–145)
TRIGL SERPL-MCNC: 176 MG/DL (ref ?–150)
VLDLC SERPL CALC-MCNC: 35.2 MG/DL

## (undated) DEVICE — NEEDLE HYPO 25GA L1.5IN BVL ORIENTED ECLIPSE

## (undated) DEVICE — ZIMMER® STERILE DISPOSABLE TOURNIQUET CUFF WITH PLC, DUAL PORT, SINGLE BLADDER, 18 IN. (46 CM)

## (undated) DEVICE — SOLUTION SCRB 4OZ 10% PVP I POVIDONE IOD TOP PAINT EXIDINE

## (undated) DEVICE — CONTINU-FLO SOLUTION SET, 2 INJECTION SITES, MALE LUER LOCK ADAPTER WITH RETRACTABLE COLLAR, LARGE BORE STOPCOCK WITH ROTATING MALE LUER LOCK EXTENSION SET, 2 INJECTION SITES, MALE LUER LOCK ADAPTER WITH RETRACTABLE COLLAR: Brand: INTERLINK/CONTINU-FLO

## (undated) DEVICE — STERILE POLYISOPRENE POWDER-FREE SURGICAL GLOVES WITH EMOLLIENT COATING: Brand: PROTEXIS

## (undated) DEVICE — MICRODISSECTION NEEDLE STRAIGHT SLEEVE: Brand: COLORADO

## (undated) DEVICE — KENDALL DL ECG CABLE AND LEAD WIRE SYSTEM, 3-LEAD, SINGLE PATIENT USE: Brand: KENDALL

## (undated) DEVICE — HANDLE LT SNAP ON ULT DURABLE LENS FOR TRUMPF ALC DISPOSABLE

## (undated) DEVICE — KENDALL SCD EXPRESS SLEEVES, KNEE LENGTH, MEDIUM: Brand: KENDALL SCD

## (undated) DEVICE — SYR IRR BLB 2OZ DISP BLU STRL -- CONVERT TO ITEM 357637

## (undated) DEVICE — MEDI-VAC NON-CONDUCTIVE SUCTION TUBING: Brand: CARDINAL HEALTH

## (undated) DEVICE — STRETCH BANDAGE ROLL: Brand: DERMACEA

## (undated) DEVICE — DEVON™ KNEE AND BODY STRAP 60" X 3" (1.5 M X 7.6 CM): Brand: DEVON

## (undated) DEVICE — SUTURE MCRYL SZ 4-0 L27IN ABSRB UD L19MM PS-2 1/2 CIR PRIM Y426H

## (undated) DEVICE — SUTURE VCRL SZ 4-0 L27IN ABSRB UD L19MM FS-2 3/8 CIR REV J422H

## (undated) DEVICE — STERILE POLYISOPRENE POWDER-FREE SURGICAL GLOVES: Brand: PROTEXIS

## (undated) DEVICE — SOLUTION IV 1000ML 0.9% SOD CHL

## (undated) DEVICE — PACK,EENT,TURBAN DRAPE,PK II: Brand: MEDLINE

## (undated) DEVICE — FRAZIER SUCTION INSTRUMENT 7 FR W/CONTROL VENT & OBTURATOR: Brand: FRAZIER

## (undated) DEVICE — PADDING CST CRMPD 3INX4YD NS --

## (undated) DEVICE — INFECTION CONTROL KIT SYS

## (undated) DEVICE — PENCIL ES L3M BTTN SWCH S STL HEX LOK BLDE ELECTRD HOLSTER

## (undated) DEVICE — SUT ETHLN 6-0 18IN P3 BLK --

## (undated) DEVICE — TOWEL SURG W17XL27IN STD BLU COT NONFENESTRATED PREWASHED

## (undated) DEVICE — (D)PREP SKN CHLRAPRP APPL 26ML -- CONVERT TO ITEM 371833

## (undated) DEVICE — SUTURE VCRL 2-0 L27IN ABSRB UD PS-2 L19MM 1/2 CIR J428H

## (undated) DEVICE — HOOK LOCK LATEX FREE ELASTIC BANDAGE 4INX5YD

## (undated) DEVICE — (D)SUT PLN FST 6-0 18IN PC1 -- DISC BY MFR

## (undated) DEVICE — LIGHT HANDLE: Brand: DEVON

## (undated) DEVICE — ICE PK EYE 4 1/2INX10IN (15/BX 2BX/CS

## (undated) DEVICE — Device

## (undated) DEVICE — SUTURE MCRYL SZ 5-0 L18IN ABSRB UD L13MM P-3 3/8 CIR PRIM Y493G

## (undated) DEVICE — SYR 10ML LUER LOK 1/5ML GRAD --

## (undated) DEVICE — REM POLYHESIVE ADULT PATIENT RETURN ELECTRODE: Brand: VALLEYLAB

## (undated) DEVICE — ANTI-EMBOLISM STOCKINGS,KNEE LENGTH,LARGE,REGULAR,SIZE E-: Brand: T.E.D.

## (undated) DEVICE — NEEDLE HYPO 18GA L1.5IN PNK S STL HUB POLYPR SHLD REG BVL

## (undated) DEVICE — SET 2ND L34IN N DEHP THE QUEENS MED CNTR VALUELINK

## (undated) DEVICE — EXTREMITY III-LF: Brand: MEDLINE INDUSTRIES, INC.

## (undated) DEVICE — INTENDED FOR TISSUE SEPARATION, AND OTHER PROCEDURES THAT REQUIRE A SHARP SURGICAL BLADE TO PUNCTURE OR CUT.: Brand: BARD-PARKER ® CARBON RIB-BACK BLADES

## (undated) DEVICE — (D)SYR 10ML 1/5ML GRAD NSAF -- PKGING CHANGE USE ITEM 338027

## (undated) DEVICE — CURITY NON-ADHERENT STRIPS: Brand: CURITY

## (undated) DEVICE — Z DISCONTINUED PER MEDLINE (LOW STOCK)  USE 2422770 DRAPE C ARM W54XL78IN FOR FLROSCN

## (undated) DEVICE — (D)STRIP SKN CLSR 0.5X4IN WHT --

## (undated) DEVICE — SOLUTION LACTATED RINGERS INJECTION USP